# Patient Record
Sex: FEMALE | Race: BLACK OR AFRICAN AMERICAN | NOT HISPANIC OR LATINO | URBAN - METROPOLITAN AREA
[De-identification: names, ages, dates, MRNs, and addresses within clinical notes are randomized per-mention and may not be internally consistent; named-entity substitution may affect disease eponyms.]

---

## 2024-01-01 ENCOUNTER — INPATIENT (INPATIENT)
Facility: HOSPITAL | Age: 79
LOS: 24 days | End: 2025-01-10
Attending: INTERNAL MEDICINE | Admitting: HOSPITALIST
Payer: MEDICARE

## 2024-01-01 VITALS
TEMPERATURE: 98 F | RESPIRATION RATE: 18 BRPM | HEART RATE: 98 BPM | WEIGHT: 89.95 LBS | OXYGEN SATURATION: 99 % | HEIGHT: 69 IN | SYSTOLIC BLOOD PRESSURE: 109 MMHG | DIASTOLIC BLOOD PRESSURE: 68 MMHG

## 2024-01-01 DIAGNOSIS — R13.10 DYSPHAGIA, UNSPECIFIED: ICD-10-CM

## 2024-01-01 DIAGNOSIS — R31.9 HEMATURIA, UNSPECIFIED: ICD-10-CM

## 2024-01-01 DIAGNOSIS — R53.2 FUNCTIONAL QUADRIPLEGIA: ICD-10-CM

## 2024-01-01 DIAGNOSIS — E43 UNSPECIFIED SEVERE PROTEIN-CALORIE MALNUTRITION: ICD-10-CM

## 2024-01-01 DIAGNOSIS — Z51.5 ENCOUNTER FOR PALLIATIVE CARE: ICD-10-CM

## 2024-01-01 DIAGNOSIS — N39.0 URINARY TRACT INFECTION, SITE NOT SPECIFIED: ICD-10-CM

## 2024-01-01 DIAGNOSIS — M86.60 OTHER CHRONIC OSTEOMYELITIS, UNSPECIFIED SITE: ICD-10-CM

## 2024-01-01 DIAGNOSIS — L89.90 PRESSURE ULCER OF UNSPECIFIED SITE, UNSPECIFIED STAGE: ICD-10-CM

## 2024-01-01 LAB
-  AMPICILLIN/SULBACTAM: SIGNIFICANT CHANGE UP
-  AMPICILLIN: SIGNIFICANT CHANGE UP
-  AZTREONAM: SIGNIFICANT CHANGE UP
-  BACTEROIDES FRAGILIS: SIGNIFICANT CHANGE UP
-  CARBAPENEM RESISTANCE: SIGNIFICANT CHANGE UP
-  CEFAZOLIN: SIGNIFICANT CHANGE UP
-  CEFEPIME: SIGNIFICANT CHANGE UP
-  CEFTRIAXONE: SIGNIFICANT CHANGE UP
-  CEFUROXIME: SIGNIFICANT CHANGE UP
-  CIPROFLOXACIN: SIGNIFICANT CHANGE UP
-  CTX-M RESISTANCE MARKER: SIGNIFICANT CHANGE UP
-  ERTAPENEM: SIGNIFICANT CHANGE UP
-  ESBL: SIGNIFICANT CHANGE UP
-  GENTAMICIN: SIGNIFICANT CHANGE UP
-  IMIPENEM: SIGNIFICANT CHANGE UP
-  K. PNEUMONIAE GROUP: SIGNIFICANT CHANGE UP
-  KPC RESISTANCE GENE: SIGNIFICANT CHANGE UP
-  LEVOFLOXACIN: SIGNIFICANT CHANGE UP
-  MEROPENEM: SIGNIFICANT CHANGE UP
-  NITROFURANTOIN: SIGNIFICANT CHANGE UP
-  PIPERACILLIN/TAZOBACTAM: SIGNIFICANT CHANGE UP
-  TOBRAMYCIN: SIGNIFICANT CHANGE UP
-  TRIMETHOPRIM/SULFAMETHOXAZOLE: SIGNIFICANT CHANGE UP
ACANTHOCYTES BLD QL SMEAR: SLIGHT — SIGNIFICANT CHANGE UP
ALBUMIN SERPL ELPH-MCNC: 1.2 G/DL — LOW (ref 3.3–5)
ALBUMIN SERPL ELPH-MCNC: 1.3 G/DL — LOW (ref 3.3–5)
ALBUMIN SERPL ELPH-MCNC: 1.3 G/DL — LOW (ref 3.3–5)
ALBUMIN SERPL ELPH-MCNC: 1.4 G/DL — LOW (ref 3.3–5)
ALBUMIN SERPL ELPH-MCNC: 1.5 G/DL — LOW (ref 3.3–5)
ALBUMIN SERPL ELPH-MCNC: 1.5 G/DL — LOW (ref 3.3–5)
ALBUMIN SERPL ELPH-MCNC: 1.6 G/DL — LOW (ref 3.3–5)
ALBUMIN SERPL ELPH-MCNC: 1.6 G/DL — LOW (ref 3.3–5)
ALBUMIN SERPL ELPH-MCNC: 1.8 G/DL — LOW (ref 3.3–5)
ALP SERPL-CCNC: 157 U/L — HIGH (ref 40–120)
ALP SERPL-CCNC: 166 U/L — HIGH (ref 40–120)
ALP SERPL-CCNC: 166 U/L — HIGH (ref 40–120)
ALP SERPL-CCNC: 208 U/L — HIGH (ref 40–120)
ALP SERPL-CCNC: 214 U/L — HIGH (ref 40–120)
ALP SERPL-CCNC: 219 U/L — HIGH (ref 40–120)
ALP SERPL-CCNC: 226 U/L — HIGH (ref 40–120)
ALP SERPL-CCNC: 244 U/L — HIGH (ref 40–120)
ALP SERPL-CCNC: 258 U/L — HIGH (ref 40–120)
ALT FLD-CCNC: 12 U/L — SIGNIFICANT CHANGE UP (ref 12–78)
ALT FLD-CCNC: 13 U/L — SIGNIFICANT CHANGE UP (ref 12–78)
ALT FLD-CCNC: 14 U/L — SIGNIFICANT CHANGE UP (ref 12–78)
ALT FLD-CCNC: 16 U/L — SIGNIFICANT CHANGE UP (ref 12–78)
ALT FLD-CCNC: 20 U/L — SIGNIFICANT CHANGE UP (ref 12–78)
ALT FLD-CCNC: 23 U/L — SIGNIFICANT CHANGE UP (ref 12–78)
ALT FLD-CCNC: 8 U/L — LOW (ref 12–78)
ANION GAP SERPL CALC-SCNC: 12 MMOL/L — SIGNIFICANT CHANGE UP (ref 5–17)
ANION GAP SERPL CALC-SCNC: 4 MMOL/L — LOW (ref 5–17)
ANION GAP SERPL CALC-SCNC: 6 MMOL/L — SIGNIFICANT CHANGE UP (ref 5–17)
ANION GAP SERPL CALC-SCNC: 7 MMOL/L — SIGNIFICANT CHANGE UP (ref 5–17)
ANION GAP SERPL CALC-SCNC: 8 MMOL/L — SIGNIFICANT CHANGE UP (ref 5–17)
ANION GAP SERPL CALC-SCNC: 9 MMOL/L — SIGNIFICANT CHANGE UP (ref 5–17)
ANION GAP SERPL CALC-SCNC: 9 MMOL/L — SIGNIFICANT CHANGE UP (ref 5–17)
ANISOCYTOSIS BLD QL: SIGNIFICANT CHANGE UP
ANISOCYTOSIS BLD QL: SLIGHT — SIGNIFICANT CHANGE UP
ANISOCYTOSIS BLD QL: SLIGHT — SIGNIFICANT CHANGE UP
APPEARANCE UR: ABNORMAL
APPEARANCE UR: ABNORMAL
APTT BLD: 25.7 SEC — SIGNIFICANT CHANGE UP (ref 24.5–35.6)
APTT BLD: 58 SEC — HIGH (ref 24.5–35.6)
AST SERPL-CCNC: 10 U/L — LOW (ref 15–37)
AST SERPL-CCNC: 18 U/L — SIGNIFICANT CHANGE UP (ref 15–37)
AST SERPL-CCNC: 19 U/L — SIGNIFICANT CHANGE UP (ref 15–37)
AST SERPL-CCNC: 22 U/L — SIGNIFICANT CHANGE UP (ref 15–37)
AST SERPL-CCNC: 27 U/L — SIGNIFICANT CHANGE UP (ref 15–37)
AST SERPL-CCNC: 32 U/L — SIGNIFICANT CHANGE UP (ref 15–37)
AST SERPL-CCNC: 8 U/L — LOW (ref 15–37)
BACTERIA # UR AUTO: ABNORMAL /HPF
BACTERIA # UR AUTO: ABNORMAL /HPF
BASOPHILS # BLD AUTO: 0 K/UL — SIGNIFICANT CHANGE UP (ref 0–0.2)
BASOPHILS # BLD AUTO: 0.01 K/UL — SIGNIFICANT CHANGE UP (ref 0–0.2)
BASOPHILS # BLD AUTO: 0.02 K/UL — SIGNIFICANT CHANGE UP (ref 0–0.2)
BASOPHILS NFR BLD AUTO: 0 % — SIGNIFICANT CHANGE UP (ref 0–2)
BASOPHILS NFR BLD AUTO: 0.1 % — SIGNIFICANT CHANGE UP (ref 0–2)
BILIRUB DIRECT SERPL-MCNC: 0.2 MG/DL — SIGNIFICANT CHANGE UP (ref 0–0.3)
BILIRUB INDIRECT FLD-MCNC: 0.1 MG/DL — LOW (ref 0.2–1)
BILIRUB SERPL-MCNC: 0.3 MG/DL — SIGNIFICANT CHANGE UP (ref 0.2–1.2)
BILIRUB SERPL-MCNC: 0.4 MG/DL — SIGNIFICANT CHANGE UP (ref 0.2–1.2)
BILIRUB SERPL-MCNC: 0.5 MG/DL — SIGNIFICANT CHANGE UP (ref 0.2–1.2)
BILIRUB SERPL-MCNC: 0.6 MG/DL — SIGNIFICANT CHANGE UP (ref 0.2–1.2)
BILIRUB SERPL-MCNC: 1.4 MG/DL — HIGH (ref 0.2–1.2)
BILIRUB UR-MCNC: ABNORMAL
BILIRUB UR-MCNC: NEGATIVE — SIGNIFICANT CHANGE UP
BLD GP AB SCN SERPL QL: SIGNIFICANT CHANGE UP
BLOOD GAS COMMENTS ARTERIAL: SIGNIFICANT CHANGE UP
BUN SERPL-MCNC: 10 MG/DL — SIGNIFICANT CHANGE UP (ref 7–23)
BUN SERPL-MCNC: 10 MG/DL — SIGNIFICANT CHANGE UP (ref 7–23)
BUN SERPL-MCNC: 11 MG/DL — SIGNIFICANT CHANGE UP (ref 7–23)
BUN SERPL-MCNC: 12 MG/DL — SIGNIFICANT CHANGE UP (ref 7–23)
BUN SERPL-MCNC: 15 MG/DL — SIGNIFICANT CHANGE UP (ref 7–23)
BUN SERPL-MCNC: 16 MG/DL — SIGNIFICANT CHANGE UP (ref 7–23)
BUN SERPL-MCNC: 21 MG/DL — SIGNIFICANT CHANGE UP (ref 7–23)
BUN SERPL-MCNC: 4 MG/DL — LOW (ref 7–23)
BUN SERPL-MCNC: 4 MG/DL — LOW (ref 7–23)
BUN SERPL-MCNC: 5 MG/DL — LOW (ref 7–23)
BUN SERPL-MCNC: 6 MG/DL — LOW (ref 7–23)
BUN SERPL-MCNC: 8 MG/DL — SIGNIFICANT CHANGE UP (ref 7–23)
BUN SERPL-MCNC: 8 MG/DL — SIGNIFICANT CHANGE UP (ref 7–23)
BUN SERPL-MCNC: 9 MG/DL — SIGNIFICANT CHANGE UP (ref 7–23)
BUN SERPL-MCNC: 9 MG/DL — SIGNIFICANT CHANGE UP (ref 7–23)
BURR CELLS BLD QL SMEAR: PRESENT — SIGNIFICANT CHANGE UP
BURR CELLS BLD QL SMEAR: PRESENT — SIGNIFICANT CHANGE UP
CALCIUM SERPL-MCNC: 7.1 MG/DL — LOW (ref 8.5–10.1)
CALCIUM SERPL-MCNC: 7.3 MG/DL — LOW (ref 8.5–10.1)
CALCIUM SERPL-MCNC: 7.3 MG/DL — LOW (ref 8.5–10.1)
CALCIUM SERPL-MCNC: 7.4 MG/DL — LOW (ref 8.5–10.1)
CALCIUM SERPL-MCNC: 7.7 MG/DL — LOW (ref 8.5–10.1)
CALCIUM SERPL-MCNC: 7.9 MG/DL — LOW (ref 8.5–10.1)
CALCIUM SERPL-MCNC: 8 MG/DL — LOW (ref 8.5–10.1)
CALCIUM SERPL-MCNC: 8 MG/DL — LOW (ref 8.5–10.1)
CALCIUM SERPL-MCNC: 8.1 MG/DL — LOW (ref 8.5–10.1)
CALCIUM SERPL-MCNC: 8.2 MG/DL — LOW (ref 8.5–10.1)
CALCIUM SERPL-MCNC: 8.2 MG/DL — LOW (ref 8.5–10.1)
CALCIUM SERPL-MCNC: 8.4 MG/DL — LOW (ref 8.5–10.1)
CALCIUM SERPL-MCNC: 8.4 MG/DL — LOW (ref 8.5–10.1)
CALCIUM SERPL-MCNC: 8.5 MG/DL — SIGNIFICANT CHANGE UP (ref 8.5–10.1)
CALCIUM SERPL-MCNC: 8.5 MG/DL — SIGNIFICANT CHANGE UP (ref 8.5–10.1)
CHLORIDE SERPL-SCNC: 100 MMOL/L — SIGNIFICANT CHANGE UP (ref 96–108)
CHLORIDE SERPL-SCNC: 101 MMOL/L — SIGNIFICANT CHANGE UP (ref 96–108)
CHLORIDE SERPL-SCNC: 102 MMOL/L — SIGNIFICANT CHANGE UP (ref 96–108)
CHLORIDE SERPL-SCNC: 103 MMOL/L — SIGNIFICANT CHANGE UP (ref 96–108)
CHLORIDE SERPL-SCNC: 104 MMOL/L — SIGNIFICANT CHANGE UP (ref 96–108)
CHLORIDE SERPL-SCNC: 98 MMOL/L — SIGNIFICANT CHANGE UP (ref 96–108)
CO2 SERPL-SCNC: 16 MMOL/L — LOW (ref 22–31)
CO2 SERPL-SCNC: 19 MMOL/L — LOW (ref 22–31)
CO2 SERPL-SCNC: 21 MMOL/L — LOW (ref 22–31)
CO2 SERPL-SCNC: 21 MMOL/L — LOW (ref 22–31)
CO2 SERPL-SCNC: 23 MMOL/L — SIGNIFICANT CHANGE UP (ref 22–31)
CO2 SERPL-SCNC: 24 MMOL/L — SIGNIFICANT CHANGE UP (ref 22–31)
CO2 SERPL-SCNC: 25 MMOL/L — SIGNIFICANT CHANGE UP (ref 22–31)
CO2 SERPL-SCNC: 26 MMOL/L — SIGNIFICANT CHANGE UP (ref 22–31)
CO2 SERPL-SCNC: 27 MMOL/L — SIGNIFICANT CHANGE UP (ref 22–31)
CO2 SERPL-SCNC: 27 MMOL/L — SIGNIFICANT CHANGE UP (ref 22–31)
CO2 SERPL-SCNC: 28 MMOL/L — SIGNIFICANT CHANGE UP (ref 22–31)
COLOR SPEC: ABNORMAL
COLOR SPEC: YELLOW — SIGNIFICANT CHANGE UP
COMMENT - URINE 2: SIGNIFICANT CHANGE UP
COMMENT - URINE: SIGNIFICANT CHANGE UP
CREAT SERPL-MCNC: 0.2 MG/DL — LOW (ref 0.5–1.3)
CREAT SERPL-MCNC: 0.2 MG/DL — LOW (ref 0.5–1.3)
CREAT SERPL-MCNC: 0.22 MG/DL — LOW (ref 0.5–1.3)
CREAT SERPL-MCNC: 0.22 MG/DL — LOW (ref 0.5–1.3)
CREAT SERPL-MCNC: 0.25 MG/DL — LOW (ref 0.5–1.3)
CREAT SERPL-MCNC: 0.28 MG/DL — LOW (ref 0.5–1.3)
CREAT SERPL-MCNC: 0.32 MG/DL — LOW (ref 0.5–1.3)
CREAT SERPL-MCNC: 0.4 MG/DL — LOW (ref 0.5–1.3)
CREAT SERPL-MCNC: 0.44 MG/DL — LOW (ref 0.5–1.3)
CREAT SERPL-MCNC: 0.45 MG/DL — LOW (ref 0.5–1.3)
CREAT SERPL-MCNC: 0.48 MG/DL — LOW (ref 0.5–1.3)
CREAT SERPL-MCNC: 0.82 MG/DL — SIGNIFICANT CHANGE UP (ref 0.5–1.3)
CREAT SERPL-MCNC: <0.15 MG/DL — LOW (ref 0.5–1.3)
CULTURE RESULTS: ABNORMAL
D DIMER BLD IA.RAPID-MCNC: 2047 NG/ML DDU — HIGH
DACRYOCYTES BLD QL SMEAR: SLIGHT — SIGNIFICANT CHANGE UP
DIFF PNL FLD: ABNORMAL
DIFF PNL FLD: ABNORMAL
EGFR: 101 ML/MIN/1.73M2 — SIGNIFICANT CHANGE UP
EGFR: 107 ML/MIN/1.73M2 — SIGNIFICANT CHANGE UP
EGFR: 110 ML/MIN/1.73M2 — SIGNIFICANT CHANGE UP
EGFR: 113 ML/MIN/1.73M2 — SIGNIFICANT CHANGE UP
EGFR: 117 ML/MIN/1.73M2 — SIGNIFICANT CHANGE UP
EGFR: 117 ML/MIN/1.73M2 — SIGNIFICANT CHANGE UP
EGFR: 120 ML/MIN/1.73M2 — SIGNIFICANT CHANGE UP
EGFR: 120 ML/MIN/1.73M2 — SIGNIFICANT CHANGE UP
EGFR: 128 ML/MIN/1.73M2 — SIGNIFICANT CHANGE UP
EGFR: 73 ML/MIN/1.73M2 — SIGNIFICANT CHANGE UP
EGFR: 97 ML/MIN/1.73M2 — SIGNIFICANT CHANGE UP
EGFR: 98 ML/MIN/1.73M2 — SIGNIFICANT CHANGE UP
EGFR: 99 ML/MIN/1.73M2 — SIGNIFICANT CHANGE UP
ELLIPTOCYTES BLD QL SMEAR: SLIGHT — SIGNIFICANT CHANGE UP
EOSINOPHIL # BLD AUTO: 0 K/UL — SIGNIFICANT CHANGE UP (ref 0–0.5)
EOSINOPHIL # BLD AUTO: 0.1 K/UL — SIGNIFICANT CHANGE UP (ref 0–0.5)
EOSINOPHIL NFR BLD AUTO: 0 % — SIGNIFICANT CHANGE UP (ref 0–6)
EOSINOPHIL NFR BLD AUTO: 0.5 % — SIGNIFICANT CHANGE UP (ref 0–6)
EPI CELLS # UR: SIGNIFICANT CHANGE UP
FERRITIN SERPL-MCNC: 445 NG/ML — HIGH (ref 13–330)
FIBRINOGEN PPP-MCNC: 165 MG/DL — LOW (ref 200–480)
FLUAV H3 RNA SPEC QL NAA+PROBE: DETECTED
GAS PNL BLDA: SIGNIFICANT CHANGE UP
GLUCOSE BLDC GLUCOMTR-MCNC: 101 MG/DL — HIGH (ref 70–99)
GLUCOSE BLDC GLUCOMTR-MCNC: 117 MG/DL — HIGH (ref 70–99)
GLUCOSE BLDC GLUCOMTR-MCNC: 146 MG/DL — HIGH (ref 70–99)
GLUCOSE BLDC GLUCOMTR-MCNC: 170 MG/DL — HIGH (ref 70–99)
GLUCOSE BLDC GLUCOMTR-MCNC: 171 MG/DL — HIGH (ref 70–99)
GLUCOSE BLDC GLUCOMTR-MCNC: 90 MG/DL — SIGNIFICANT CHANGE UP (ref 70–99)
GLUCOSE BLDC GLUCOMTR-MCNC: 95 MG/DL — SIGNIFICANT CHANGE UP (ref 70–99)
GLUCOSE BLDC GLUCOMTR-MCNC: 97 MG/DL — SIGNIFICANT CHANGE UP (ref 70–99)
GLUCOSE SERPL-MCNC: 114 MG/DL — HIGH (ref 70–99)
GLUCOSE SERPL-MCNC: 120 MG/DL — HIGH (ref 70–99)
GLUCOSE SERPL-MCNC: 138 MG/DL — HIGH (ref 70–99)
GLUCOSE SERPL-MCNC: 150 MG/DL — HIGH (ref 70–99)
GLUCOSE SERPL-MCNC: 163 MG/DL — HIGH (ref 70–99)
GLUCOSE SERPL-MCNC: 167 MG/DL — HIGH (ref 70–99)
GLUCOSE SERPL-MCNC: 167 MG/DL — HIGH (ref 70–99)
GLUCOSE SERPL-MCNC: 171 MG/DL — HIGH (ref 70–99)
GLUCOSE SERPL-MCNC: 187 MG/DL — HIGH (ref 70–99)
GLUCOSE SERPL-MCNC: 193 MG/DL — HIGH (ref 70–99)
GLUCOSE SERPL-MCNC: 204 MG/DL — HIGH (ref 70–99)
GLUCOSE SERPL-MCNC: 216 MG/DL — HIGH (ref 70–99)
GLUCOSE SERPL-MCNC: 231 MG/DL — HIGH (ref 70–99)
GLUCOSE SERPL-MCNC: 233 MG/DL — HIGH (ref 70–99)
GLUCOSE SERPL-MCNC: 97 MG/DL — SIGNIFICANT CHANGE UP (ref 70–99)
GLUCOSE UR QL: NEGATIVE MG/DL — SIGNIFICANT CHANGE UP
GLUCOSE UR QL: NEGATIVE MG/DL — SIGNIFICANT CHANGE UP
GRAM STN FLD: ABNORMAL
GRAM STN FLD: ABNORMAL
HCT VFR BLD CALC: 21.5 % — LOW (ref 34.5–45)
HCT VFR BLD CALC: 23.3 % — LOW (ref 34.5–45)
HCT VFR BLD CALC: 23.8 % — LOW (ref 34.5–45)
HCT VFR BLD CALC: 25.8 % — LOW (ref 34.5–45)
HCT VFR BLD CALC: 26 % — LOW (ref 34.5–45)
HCT VFR BLD CALC: 26.7 % — LOW (ref 34.5–45)
HCT VFR BLD CALC: 27.4 % — LOW (ref 34.5–45)
HCT VFR BLD CALC: 28.8 % — LOW (ref 34.5–45)
HCT VFR BLD CALC: 29.9 % — LOW (ref 34.5–45)
HCT VFR BLD CALC: 29.9 % — LOW (ref 34.5–45)
HCT VFR BLD CALC: 30.7 % — LOW (ref 34.5–45)
HCT VFR BLD CALC: 31.1 % — LOW (ref 34.5–45)
HCT VFR BLD CALC: 33.7 % — LOW (ref 34.5–45)
HCT VFR BLD CALC: 34.4 % — LOW (ref 34.5–45)
HCT VFR BLD CALC: 35 % — SIGNIFICANT CHANGE UP (ref 34.5–45)
HEPARIN-PF4 AB RESULT: <0.6 U/ML — SIGNIFICANT CHANGE UP (ref 0–0.9)
HGB BLD-MCNC: 10.3 G/DL — LOW (ref 11.5–15.5)
HGB BLD-MCNC: 10.7 G/DL — LOW (ref 11.5–15.5)
HGB BLD-MCNC: 10.8 G/DL — LOW (ref 11.5–15.5)
HGB BLD-MCNC: 7.1 G/DL — LOW (ref 11.5–15.5)
HGB BLD-MCNC: 7.5 G/DL — LOW (ref 11.5–15.5)
HGB BLD-MCNC: 7.8 G/DL — LOW (ref 11.5–15.5)
HGB BLD-MCNC: 8.3 G/DL — LOW (ref 11.5–15.5)
HGB BLD-MCNC: 8.7 G/DL — LOW (ref 11.5–15.5)
HGB BLD-MCNC: 8.9 G/DL — LOW (ref 11.5–15.5)
HGB BLD-MCNC: 9.2 G/DL — LOW (ref 11.5–15.5)
HGB BLD-MCNC: 9.3 G/DL — LOW (ref 11.5–15.5)
HGB BLD-MCNC: 9.4 G/DL — LOW (ref 11.5–15.5)
HGB BLD-MCNC: 9.6 G/DL — LOW (ref 11.5–15.5)
HYPOCHROMIA BLD QL: SLIGHT — SIGNIFICANT CHANGE UP
HYPOCHROMIA BLD QL: SLIGHT — SIGNIFICANT CHANGE UP
IMM GRANULOCYTES NFR BLD AUTO: 0.4 % — SIGNIFICANT CHANGE UP (ref 0–0.9)
IMM GRANULOCYTES NFR BLD AUTO: 2.9 % — HIGH (ref 0–0.9)
INR BLD: 1.36 RATIO — HIGH (ref 0.85–1.16)
INR BLD: 2.44 RATIO — HIGH (ref 0.85–1.16)
IRON SATN MFR SERPL: 8 % — LOW (ref 14–50)
IRON SATN MFR SERPL: 8 UG/DL — LOW (ref 30–160)
KETONES UR-MCNC: NEGATIVE MG/DL — SIGNIFICANT CHANGE UP
KETONES UR-MCNC: NEGATIVE MG/DL — SIGNIFICANT CHANGE UP
LEUKOCYTE ESTERASE UR-ACNC: ABNORMAL
LEUKOCYTE ESTERASE UR-ACNC: ABNORMAL
LG PLATELETS BLD QL AUTO: SLIGHT — SIGNIFICANT CHANGE UP
LYMPHOCYTES # BLD AUTO: 0.39 K/UL — LOW (ref 1–3.3)
LYMPHOCYTES # BLD AUTO: 0.45 K/UL — LOW (ref 1–3.3)
LYMPHOCYTES # BLD AUTO: 0.47 K/UL — LOW (ref 1–3.3)
LYMPHOCYTES # BLD AUTO: 0.86 K/UL — LOW (ref 1–3.3)
LYMPHOCYTES # BLD AUTO: 1 % — LOW (ref 13–44)
LYMPHOCYTES # BLD AUTO: 1 % — LOW (ref 13–44)
LYMPHOCYTES # BLD AUTO: 1.02 K/UL — SIGNIFICANT CHANGE UP (ref 1–3.3)
LYMPHOCYTES # BLD AUTO: 2 % — LOW (ref 13–44)
LYMPHOCYTES # BLD AUTO: 2.5 % — LOW (ref 13–44)
LYMPHOCYTES # BLD AUTO: 4.7 % — LOW (ref 13–44)
MACROCYTES BLD QL: SLIGHT — SIGNIFICANT CHANGE UP
MACROCYTES BLD QL: SLIGHT — SIGNIFICANT CHANGE UP
MAGNESIUM SERPL-MCNC: 1.5 MG/DL — LOW (ref 1.6–2.6)
MAGNESIUM SERPL-MCNC: 1.6 MG/DL — SIGNIFICANT CHANGE UP (ref 1.6–2.6)
MAGNESIUM SERPL-MCNC: 1.7 MG/DL — SIGNIFICANT CHANGE UP (ref 1.6–2.6)
MAGNESIUM SERPL-MCNC: 1.8 MG/DL — SIGNIFICANT CHANGE UP (ref 1.6–2.6)
MAGNESIUM SERPL-MCNC: 1.8 MG/DL — SIGNIFICANT CHANGE UP (ref 1.6–2.6)
MAGNESIUM SERPL-MCNC: 1.9 MG/DL — SIGNIFICANT CHANGE UP (ref 1.6–2.6)
MAGNESIUM SERPL-MCNC: 2 MG/DL — SIGNIFICANT CHANGE UP (ref 1.6–2.6)
MANUAL SMEAR VERIFICATION: SIGNIFICANT CHANGE UP
MCHC RBC-ENTMCNC: 23.1 PG — LOW (ref 27–34)
MCHC RBC-ENTMCNC: 23.1 PG — LOW (ref 27–34)
MCHC RBC-ENTMCNC: 23.3 PG — LOW (ref 27–34)
MCHC RBC-ENTMCNC: 23.5 PG — LOW (ref 27–34)
MCHC RBC-ENTMCNC: 23.5 PG — LOW (ref 27–34)
MCHC RBC-ENTMCNC: 23.6 PG — LOW (ref 27–34)
MCHC RBC-ENTMCNC: 23.6 PG — LOW (ref 27–34)
MCHC RBC-ENTMCNC: 23.7 PG — LOW (ref 27–34)
MCHC RBC-ENTMCNC: 23.8 PG — LOW (ref 27–34)
MCHC RBC-ENTMCNC: 23.8 PG — LOW (ref 27–34)
MCHC RBC-ENTMCNC: 23.9 PG — LOW (ref 27–34)
MCHC RBC-ENTMCNC: 24.1 PG — LOW (ref 27–34)
MCHC RBC-ENTMCNC: 24.3 PG — LOW (ref 27–34)
MCHC RBC-ENTMCNC: 24.6 PG — LOW (ref 27–34)
MCHC RBC-ENTMCNC: 24.6 PG — LOW (ref 27–34)
MCHC RBC-ENTMCNC: 30.6 G/DL — LOW (ref 32–36)
MCHC RBC-ENTMCNC: 30.8 G/DL — LOW (ref 32–36)
MCHC RBC-ENTMCNC: 30.9 G/DL — LOW (ref 32–36)
MCHC RBC-ENTMCNC: 30.9 G/DL — LOW (ref 32–36)
MCHC RBC-ENTMCNC: 31.1 G/DL — LOW (ref 32–36)
MCHC RBC-ENTMCNC: 31.1 G/DL — LOW (ref 32–36)
MCHC RBC-ENTMCNC: 31.4 G/DL — LOW (ref 32–36)
MCHC RBC-ENTMCNC: 31.8 G/DL — LOW (ref 32–36)
MCHC RBC-ENTMCNC: 31.9 G/DL — LOW (ref 32–36)
MCHC RBC-ENTMCNC: 32.2 G/DL — SIGNIFICANT CHANGE UP (ref 32–36)
MCHC RBC-ENTMCNC: 32.2 G/DL — SIGNIFICANT CHANGE UP (ref 32–36)
MCHC RBC-ENTMCNC: 32.8 G/DL — SIGNIFICANT CHANGE UP (ref 32–36)
MCHC RBC-ENTMCNC: 33 G/DL — SIGNIFICANT CHANGE UP (ref 32–36)
MCV RBC AUTO: 72.4 FL — LOW (ref 80–100)
MCV RBC AUTO: 72.6 FL — LOW (ref 80–100)
MCV RBC AUTO: 72.9 FL — LOW (ref 80–100)
MCV RBC AUTO: 73 FL — LOW (ref 80–100)
MCV RBC AUTO: 73.7 FL — LOW (ref 80–100)
MCV RBC AUTO: 75.4 FL — LOW (ref 80–100)
MCV RBC AUTO: 75.5 FL — LOW (ref 80–100)
MCV RBC AUTO: 75.7 FL — LOW (ref 80–100)
MCV RBC AUTO: 76.1 FL — LOW (ref 80–100)
MCV RBC AUTO: 76.4 FL — LOW (ref 80–100)
MCV RBC AUTO: 76.9 FL — LOW (ref 80–100)
MCV RBC AUTO: 77.8 FL — LOW (ref 80–100)
MCV RBC AUTO: 78.4 FL — LOW (ref 80–100)
MCV RBC AUTO: 78.9 FL — LOW (ref 80–100)
MCV RBC AUTO: 79.1 FL — LOW (ref 80–100)
METHOD TYPE: SIGNIFICANT CHANGE UP
METHOD TYPE: SIGNIFICANT CHANGE UP
MICROCYTES BLD QL: SIGNIFICANT CHANGE UP
MICROCYTES BLD QL: SLIGHT — SIGNIFICANT CHANGE UP
MICROCYTES BLD QL: SLIGHT — SIGNIFICANT CHANGE UP
MONOCYTES # BLD AUTO: 0.24 K/UL — SIGNIFICANT CHANGE UP (ref 0–0.9)
MONOCYTES # BLD AUTO: 0.34 K/UL — SIGNIFICANT CHANGE UP (ref 0–0.9)
MONOCYTES # BLD AUTO: 0.39 K/UL — SIGNIFICANT CHANGE UP (ref 0–0.9)
MONOCYTES # BLD AUTO: 0.48 K/UL — SIGNIFICANT CHANGE UP (ref 0–0.9)
MONOCYTES # BLD AUTO: 1.36 K/UL — HIGH (ref 0–0.9)
MONOCYTES NFR BLD AUTO: 0.8 % — LOW (ref 2–14)
MONOCYTES NFR BLD AUTO: 1 % — LOW (ref 2–14)
MONOCYTES NFR BLD AUTO: 1 % — LOW (ref 2–14)
MONOCYTES NFR BLD AUTO: 2.6 % — SIGNIFICANT CHANGE UP (ref 2–14)
MONOCYTES NFR BLD AUTO: 3 % — SIGNIFICANT CHANGE UP (ref 2–14)
MRSA PCR RESULT.: SIGNIFICANT CHANGE UP
NEUTROPHILS # BLD AUTO: 16.7 K/UL — HIGH (ref 1.8–7.4)
NEUTROPHILS # BLD AUTO: 22.92 K/UL — HIGH (ref 1.8–7.4)
NEUTROPHILS # BLD AUTO: 38.18 K/UL — HIGH (ref 1.8–7.4)
NEUTROPHILS # BLD AUTO: 38.62 K/UL — HIGH (ref 1.8–7.4)
NEUTROPHILS # BLD AUTO: 43.37 K/UL — HIGH (ref 1.8–7.4)
NEUTROPHILS NFR BLD AUTO: 91.7 % — HIGH (ref 43–77)
NEUTROPHILS NFR BLD AUTO: 93 % — HIGH (ref 43–77)
NEUTROPHILS NFR BLD AUTO: 93.8 % — HIGH (ref 43–77)
NEUTROPHILS NFR BLD AUTO: 95 % — HIGH (ref 43–77)
NEUTROPHILS NFR BLD AUTO: 98 % — HIGH (ref 43–77)
NEUTS BAND # BLD: 2 % — SIGNIFICANT CHANGE UP (ref 0–8)
NEUTS BAND # BLD: 3 % — SIGNIFICANT CHANGE UP (ref 0–8)
NEUTS VAC BLD QL SMEAR: SLIGHT — SIGNIFICANT CHANGE UP
NITRITE UR-MCNC: NEGATIVE — SIGNIFICANT CHANGE UP
NITRITE UR-MCNC: POSITIVE
NRBC # BLD: 0 /100 WBCS — SIGNIFICANT CHANGE UP (ref 0–0)
NRBC # BLD: SIGNIFICANT CHANGE UP /100 WBCS (ref 0–0)
ORGANISM # SPEC MICROSCOPIC CNT: ABNORMAL
ORGANISM # SPEC MICROSCOPIC CNT: SIGNIFICANT CHANGE UP
PF4 HEPARIN CMPLX AB SER-ACNC: NEGATIVE — SIGNIFICANT CHANGE UP
PH UR: 5.5 — SIGNIFICANT CHANGE UP (ref 5–8)
PH UR: 6 — SIGNIFICANT CHANGE UP (ref 5–8)
PHOSPHATE SERPL-MCNC: 1.5 MG/DL — LOW (ref 2.5–4.5)
PHOSPHATE SERPL-MCNC: 1.9 MG/DL — LOW (ref 2.5–4.5)
PHOSPHATE SERPL-MCNC: 1.9 MG/DL — LOW (ref 2.5–4.5)
PHOSPHATE SERPL-MCNC: 2.1 MG/DL — LOW (ref 2.5–4.5)
PHOSPHATE SERPL-MCNC: 2.2 MG/DL — LOW (ref 2.5–4.5)
PHOSPHATE SERPL-MCNC: 2.3 MG/DL — LOW (ref 2.5–4.5)
PHOSPHATE SERPL-MCNC: 2.5 MG/DL — SIGNIFICANT CHANGE UP (ref 2.5–4.5)
PHOSPHATE SERPL-MCNC: 2.6 MG/DL — SIGNIFICANT CHANGE UP (ref 2.5–4.5)
PHOSPHATE SERPL-MCNC: 3.5 MG/DL — SIGNIFICANT CHANGE UP (ref 2.5–4.5)
PHOSPHATE SERPL-MCNC: 4.1 MG/DL — SIGNIFICANT CHANGE UP (ref 2.5–4.5)
PHOSPHATE SERPL-MCNC: 4.1 MG/DL — SIGNIFICANT CHANGE UP (ref 2.5–4.5)
PLAT MORPH BLD: ABNORMAL
PLAT MORPH BLD: NORMAL — SIGNIFICANT CHANGE UP
PLAT MORPH BLD: NORMAL — SIGNIFICANT CHANGE UP
PLATELET # BLD AUTO: 101 K/UL — LOW (ref 150–400)
PLATELET # BLD AUTO: 104 K/UL — LOW (ref 150–400)
PLATELET # BLD AUTO: 13 K/UL — CRITICAL LOW (ref 150–400)
PLATELET # BLD AUTO: 168 K/UL — SIGNIFICANT CHANGE UP (ref 150–400)
PLATELET # BLD AUTO: 177 K/UL — SIGNIFICANT CHANGE UP (ref 150–400)
PLATELET # BLD AUTO: 186 K/UL — SIGNIFICANT CHANGE UP (ref 150–400)
PLATELET # BLD AUTO: 186 K/UL — SIGNIFICANT CHANGE UP (ref 150–400)
PLATELET # BLD AUTO: 192 K/UL — SIGNIFICANT CHANGE UP (ref 150–400)
PLATELET # BLD AUTO: 201 K/UL — SIGNIFICANT CHANGE UP (ref 150–400)
PLATELET # BLD AUTO: 202 K/UL — SIGNIFICANT CHANGE UP (ref 150–400)
PLATELET # BLD AUTO: 209 K/UL — SIGNIFICANT CHANGE UP (ref 150–400)
PLATELET # BLD AUTO: 235 K/UL — SIGNIFICANT CHANGE UP (ref 150–400)
PLATELET # BLD AUTO: 270 K/UL — SIGNIFICANT CHANGE UP (ref 150–400)
PLATELET # BLD AUTO: 280 K/UL — SIGNIFICANT CHANGE UP (ref 150–400)
PLATELET # BLD AUTO: 8 K/UL — CRITICAL LOW (ref 150–400)
POIKILOCYTOSIS BLD QL AUTO: SIGNIFICANT CHANGE UP
POIKILOCYTOSIS BLD QL AUTO: SLIGHT — SIGNIFICANT CHANGE UP
POIKILOCYTOSIS BLD QL AUTO: SLIGHT — SIGNIFICANT CHANGE UP
POLYCHROMASIA BLD QL SMEAR: SLIGHT — SIGNIFICANT CHANGE UP
POTASSIUM SERPL-MCNC: 2.9 MMOL/L — CRITICAL LOW (ref 3.5–5.3)
POTASSIUM SERPL-MCNC: 3.4 MMOL/L — LOW (ref 3.5–5.3)
POTASSIUM SERPL-MCNC: 3.5 MMOL/L — SIGNIFICANT CHANGE UP (ref 3.5–5.3)
POTASSIUM SERPL-MCNC: 3.6 MMOL/L — SIGNIFICANT CHANGE UP (ref 3.5–5.3)
POTASSIUM SERPL-MCNC: 3.6 MMOL/L — SIGNIFICANT CHANGE UP (ref 3.5–5.3)
POTASSIUM SERPL-MCNC: 3.8 MMOL/L — SIGNIFICANT CHANGE UP (ref 3.5–5.3)
POTASSIUM SERPL-MCNC: 3.8 MMOL/L — SIGNIFICANT CHANGE UP (ref 3.5–5.3)
POTASSIUM SERPL-MCNC: 4 MMOL/L — SIGNIFICANT CHANGE UP (ref 3.5–5.3)
POTASSIUM SERPL-MCNC: 4.1 MMOL/L — SIGNIFICANT CHANGE UP (ref 3.5–5.3)
POTASSIUM SERPL-MCNC: 4.1 MMOL/L — SIGNIFICANT CHANGE UP (ref 3.5–5.3)
POTASSIUM SERPL-MCNC: 4.4 MMOL/L — SIGNIFICANT CHANGE UP (ref 3.5–5.3)
POTASSIUM SERPL-MCNC: 4.8 MMOL/L — SIGNIFICANT CHANGE UP (ref 3.5–5.3)
POTASSIUM SERPL-MCNC: 4.9 MMOL/L — SIGNIFICANT CHANGE UP (ref 3.5–5.3)
POTASSIUM SERPL-SCNC: 2.9 MMOL/L — CRITICAL LOW (ref 3.5–5.3)
POTASSIUM SERPL-SCNC: 3.4 MMOL/L — LOW (ref 3.5–5.3)
POTASSIUM SERPL-SCNC: 3.5 MMOL/L — SIGNIFICANT CHANGE UP (ref 3.5–5.3)
POTASSIUM SERPL-SCNC: 3.6 MMOL/L — SIGNIFICANT CHANGE UP (ref 3.5–5.3)
POTASSIUM SERPL-SCNC: 3.6 MMOL/L — SIGNIFICANT CHANGE UP (ref 3.5–5.3)
POTASSIUM SERPL-SCNC: 3.8 MMOL/L — SIGNIFICANT CHANGE UP (ref 3.5–5.3)
POTASSIUM SERPL-SCNC: 3.8 MMOL/L — SIGNIFICANT CHANGE UP (ref 3.5–5.3)
POTASSIUM SERPL-SCNC: 4 MMOL/L — SIGNIFICANT CHANGE UP (ref 3.5–5.3)
POTASSIUM SERPL-SCNC: 4.1 MMOL/L — SIGNIFICANT CHANGE UP (ref 3.5–5.3)
POTASSIUM SERPL-SCNC: 4.1 MMOL/L — SIGNIFICANT CHANGE UP (ref 3.5–5.3)
POTASSIUM SERPL-SCNC: 4.4 MMOL/L — SIGNIFICANT CHANGE UP (ref 3.5–5.3)
POTASSIUM SERPL-SCNC: 4.8 MMOL/L — SIGNIFICANT CHANGE UP (ref 3.5–5.3)
POTASSIUM SERPL-SCNC: 4.9 MMOL/L — SIGNIFICANT CHANGE UP (ref 3.5–5.3)
PROCALCITONIN SERPL-MCNC: 6.42 NG/ML — HIGH (ref 0.02–0.1)
PROCALCITONIN SERPL-MCNC: 7.74 NG/ML — HIGH (ref 0.02–0.1)
PROT SERPL-MCNC: 4.6 GM/DL — LOW (ref 6–8.3)
PROT SERPL-MCNC: 5.1 GM/DL — LOW (ref 6–8.3)
PROT SERPL-MCNC: 5.3 GM/DL — LOW (ref 6–8.3)
PROT SERPL-MCNC: 5.4 GM/DL — LOW (ref 6–8.3)
PROT SERPL-MCNC: 5.6 GM/DL — LOW (ref 6–8.3)
PROT SERPL-MCNC: 5.7 GM/DL — LOW (ref 6–8.3)
PROT SERPL-MCNC: 5.7 GM/DL — LOW (ref 6–8.3)
PROT SERPL-MCNC: 5.9 GM/DL — LOW (ref 6–8.3)
PROT SERPL-MCNC: 5.9 GM/DL — LOW (ref 6–8.3)
PROT UR-MCNC: 100 MG/DL
PROT UR-MCNC: 30 MG/DL
PROTEUS SP DNA BLD POS QL NAA+NON-PROBE: SIGNIFICANT CHANGE UP
PROTHROM AB SERPL-ACNC: 15.3 SEC — HIGH (ref 9.9–13.4)
PROTHROM AB SERPL-ACNC: 27.3 SEC — HIGH (ref 9.9–13.4)
RAPID RVP RESULT: DETECTED
RBC # BLD: 2.97 M/UL — LOW (ref 3.8–5.2)
RBC # BLD: 3.19 M/UL — LOW (ref 3.8–5.2)
RBC # BLD: 3.28 M/UL — LOW (ref 3.8–5.2)
RBC # BLD: 3.41 M/UL — LOW (ref 3.8–5.2)
RBC # BLD: 3.51 M/UL — LOW (ref 3.8–5.2)
RBC # BLD: 3.53 M/UL — LOW (ref 3.8–5.2)
RBC # BLD: 3.76 M/UL — LOW (ref 3.8–5.2)
RBC # BLD: 3.77 M/UL — LOW (ref 3.8–5.2)
RBC # BLD: 3.78 M/UL — LOW (ref 3.8–5.2)
RBC # BLD: 3.89 M/UL — SIGNIFICANT CHANGE UP (ref 3.8–5.2)
RBC # BLD: 4.07 M/UL — SIGNIFICANT CHANGE UP (ref 3.8–5.2)
RBC # BLD: 4.12 M/UL — SIGNIFICANT CHANGE UP (ref 3.8–5.2)
RBC # BLD: 4.27 M/UL — SIGNIFICANT CHANGE UP (ref 3.8–5.2)
RBC # BLD: 4.39 M/UL — SIGNIFICANT CHANGE UP (ref 3.8–5.2)
RBC # BLD: 4.5 M/UL — SIGNIFICANT CHANGE UP (ref 3.8–5.2)
RBC # FLD: 21.3 % — HIGH (ref 10.3–14.5)
RBC # FLD: 21.5 % — HIGH (ref 10.3–14.5)
RBC # FLD: 21.6 % — HIGH (ref 10.3–14.5)
RBC # FLD: 21.7 % — HIGH (ref 10.3–14.5)
RBC # FLD: 21.8 % — HIGH (ref 10.3–14.5)
RBC # FLD: 21.9 % — HIGH (ref 10.3–14.5)
RBC # FLD: 22 % — HIGH (ref 10.3–14.5)
RBC # FLD: 22.1 % — HIGH (ref 10.3–14.5)
RBC # FLD: 22.2 % — HIGH (ref 10.3–14.5)
RBC # FLD: 22.3 % — HIGH (ref 10.3–14.5)
RBC # FLD: 22.5 % — HIGH (ref 10.3–14.5)
RBC # FLD: 22.6 % — HIGH (ref 10.3–14.5)
RBC # FLD: 22.6 % — HIGH (ref 10.3–14.5)
RBC # FLD: 22.8 % — HIGH (ref 10.3–14.5)
RBC # FLD: 23 % — HIGH (ref 10.3–14.5)
RBC BLD AUTO: ABNORMAL
RBC BLD AUTO: NORMAL — SIGNIFICANT CHANGE UP
RBC BLD AUTO: SIGNIFICANT CHANGE UP
RBC CASTS # UR COMP ASSIST: 10 /HPF — HIGH (ref 0–4)
RBC CASTS # UR COMP ASSIST: SIGNIFICANT CHANGE UP /HPF (ref 0–4)
RSV RNA SPEC QL NAA+PROBE: DETECTED
S AUREUS DNA NOSE QL NAA+PROBE: SIGNIFICANT CHANGE UP
SARS-COV-2 RNA SPEC QL NAA+PROBE: DETECTED
SCHISTOCYTES BLD QL AUTO: SLIGHT — SIGNIFICANT CHANGE UP
SCHISTOCYTES BLD QL AUTO: SLIGHT — SIGNIFICANT CHANGE UP
SMUDGE CELLS # BLD: PRESENT — SIGNIFICANT CHANGE UP
SODIUM SERPL-SCNC: 130 MMOL/L — LOW (ref 135–145)
SODIUM SERPL-SCNC: 131 MMOL/L — LOW (ref 135–145)
SODIUM SERPL-SCNC: 132 MMOL/L — LOW (ref 135–145)
SODIUM SERPL-SCNC: 133 MMOL/L — LOW (ref 135–145)
SODIUM SERPL-SCNC: 133 MMOL/L — LOW (ref 135–145)
SODIUM SERPL-SCNC: 134 MMOL/L — LOW (ref 135–145)
SODIUM SERPL-SCNC: 135 MMOL/L — SIGNIFICANT CHANGE UP (ref 135–145)
SODIUM SERPL-SCNC: 136 MMOL/L — SIGNIFICANT CHANGE UP (ref 135–145)
SP GR SPEC: 1.01 — SIGNIFICANT CHANGE UP (ref 1–1.03)
SP GR SPEC: 1.03 — SIGNIFICANT CHANGE UP (ref 1–1.03)
SPECIMEN SOURCE: SIGNIFICANT CHANGE UP
TARGETS BLD QL SMEAR: SLIGHT — SIGNIFICANT CHANGE UP
TIBC SERPL-MCNC: 103 UG/DL — LOW (ref 220–430)
TOXIC GRANULES BLD QL SMEAR: PRESENT — SIGNIFICANT CHANGE UP
UIBC SERPL-MCNC: 95 UG/DL — LOW (ref 110–370)
UROBILINOGEN FLD QL: 1 MG/DL — SIGNIFICANT CHANGE UP (ref 0.2–1)
UROBILINOGEN FLD QL: 1 MG/DL — SIGNIFICANT CHANGE UP (ref 0.2–1)
VANCOMYCIN FLD-MCNC: 7 UG/ML — SIGNIFICANT CHANGE UP
VANCOMYCIN TROUGH SERPL-MCNC: 8.6 UG/ML — LOW (ref 10–20)
WBC # BLD: 10.25 K/UL — SIGNIFICANT CHANGE UP (ref 3.8–10.5)
WBC # BLD: 11.95 K/UL — HIGH (ref 3.8–10.5)
WBC # BLD: 13.05 K/UL — HIGH (ref 3.8–10.5)
WBC # BLD: 13.27 K/UL — HIGH (ref 3.8–10.5)
WBC # BLD: 18.06 K/UL — HIGH (ref 3.8–10.5)
WBC # BLD: 18.23 K/UL — HIGH (ref 3.8–10.5)
WBC # BLD: 23.63 K/UL — HIGH (ref 3.8–10.5)
WBC # BLD: 25.87 K/UL — HIGH (ref 3.8–10.5)
WBC # BLD: 39.41 K/UL — HIGH (ref 3.8–10.5)
WBC # BLD: 40.74 K/UL — CRITICAL HIGH (ref 3.8–10.5)
WBC # BLD: 45.18 K/UL — CRITICAL HIGH (ref 3.8–10.5)
WBC # BLD: 6.15 K/UL — SIGNIFICANT CHANGE UP (ref 3.8–10.5)
WBC # BLD: 6.3 K/UL — SIGNIFICANT CHANGE UP (ref 3.8–10.5)
WBC # BLD: 6.63 K/UL — SIGNIFICANT CHANGE UP (ref 3.8–10.5)
WBC # BLD: 7.28 K/UL — SIGNIFICANT CHANGE UP (ref 3.8–10.5)
WBC # FLD AUTO: 10.25 K/UL — SIGNIFICANT CHANGE UP (ref 3.8–10.5)
WBC # FLD AUTO: 11.95 K/UL — HIGH (ref 3.8–10.5)
WBC # FLD AUTO: 13.05 K/UL — HIGH (ref 3.8–10.5)
WBC # FLD AUTO: 13.27 K/UL — HIGH (ref 3.8–10.5)
WBC # FLD AUTO: 18.06 K/UL — HIGH (ref 3.8–10.5)
WBC # FLD AUTO: 18.23 K/UL — HIGH (ref 3.8–10.5)
WBC # FLD AUTO: 23.63 K/UL — HIGH (ref 3.8–10.5)
WBC # FLD AUTO: 25.87 K/UL — HIGH (ref 3.8–10.5)
WBC # FLD AUTO: 39.41 K/UL — HIGH (ref 3.8–10.5)
WBC # FLD AUTO: 40.74 K/UL — CRITICAL HIGH (ref 3.8–10.5)
WBC # FLD AUTO: 45.18 K/UL — CRITICAL HIGH (ref 3.8–10.5)
WBC # FLD AUTO: 6.15 K/UL — SIGNIFICANT CHANGE UP (ref 3.8–10.5)
WBC # FLD AUTO: 6.3 K/UL — SIGNIFICANT CHANGE UP (ref 3.8–10.5)
WBC # FLD AUTO: 6.63 K/UL — SIGNIFICANT CHANGE UP (ref 3.8–10.5)
WBC # FLD AUTO: 7.28 K/UL — SIGNIFICANT CHANGE UP (ref 3.8–10.5)
WBC UR QL: ABNORMAL /HPF (ref 0–5)
WBC UR QL: SIGNIFICANT CHANGE UP /HPF (ref 0–5)

## 2024-01-01 PROCEDURE — 74177 CT ABD & PELVIS W/CONTRAST: CPT | Mod: 26,MC

## 2024-01-01 PROCEDURE — 99285 EMERGENCY DEPT VISIT HI MDM: CPT

## 2024-01-01 PROCEDURE — 99497 ADVNCD CARE PLAN 30 MIN: CPT | Mod: 25

## 2024-01-01 PROCEDURE — 99232 SBSQ HOSP IP/OBS MODERATE 35: CPT

## 2024-01-01 PROCEDURE — 72196 MRI PELVIS W/DYE: CPT | Mod: 26

## 2024-01-01 PROCEDURE — 99223 1ST HOSP IP/OBS HIGH 75: CPT

## 2024-01-01 PROCEDURE — 76937 US GUIDE VASCULAR ACCESS: CPT | Mod: 26

## 2024-01-01 PROCEDURE — 99291 CRITICAL CARE FIRST HOUR: CPT

## 2024-01-01 PROCEDURE — 36600 WITHDRAWAL OF ARTERIAL BLOOD: CPT | Mod: 59

## 2024-01-01 PROCEDURE — 99223 1ST HOSP IP/OBS HIGH 75: CPT | Mod: FS

## 2024-01-01 PROCEDURE — 36410 VNPNXR 3YR/> PHY/QHP DX/THER: CPT

## 2024-01-01 PROCEDURE — 36600 WITHDRAWAL OF ARTERIAL BLOOD: CPT

## 2024-01-01 PROCEDURE — 99222 1ST HOSP IP/OBS MODERATE 55: CPT | Mod: FS

## 2024-01-01 PROCEDURE — 71045 X-RAY EXAM CHEST 1 VIEW: CPT | Mod: 26

## 2024-01-01 PROCEDURE — 36410 VNPNXR 3YR/> PHY/QHP DX/THER: CPT | Mod: 59

## 2024-01-01 PROCEDURE — 99291 CRITICAL CARE FIRST HOUR: CPT | Mod: 25

## 2024-01-01 PROCEDURE — 99233 SBSQ HOSP IP/OBS HIGH 50: CPT

## 2024-01-01 PROCEDURE — 99233 SBSQ HOSP IP/OBS HIGH 50: CPT | Mod: FS

## 2024-01-01 PROCEDURE — 36000 PLACE NEEDLE IN VEIN: CPT

## 2024-01-01 RX ORDER — VANCOMYCIN HYDROCHLORIDE 5 G/100ML
1000 INJECTION, POWDER, LYOPHILIZED, FOR SOLUTION INTRAVENOUS ONCE
Refills: 0 | Status: COMPLETED | OUTPATIENT
Start: 2024-01-01 | End: 2024-01-01

## 2024-01-01 RX ORDER — COLLAGENASE CLOSTRIDIUM HIST. 250 UNIT/G
1 OINTMENT (GRAM) TOPICAL
Refills: 0 | Status: DISCONTINUED | OUTPATIENT
Start: 2024-01-01 | End: 2024-01-01

## 2024-01-01 RX ORDER — SODIUM CHLORIDE 9 MG/ML
1000 INJECTION, SOLUTION INTRAVENOUS ONCE
Refills: 0 | Status: COMPLETED | OUTPATIENT
Start: 2024-01-01 | End: 2024-01-01

## 2024-01-01 RX ORDER — POTASSIUM PHOSPHATE, MONOBASIC AND POTASSIUM PHOSPHATE, DIBASIC 224; 236 MG/ML; MG/ML
15 INJECTION, SOLUTION INTRAVENOUS ONCE
Refills: 0 | Status: COMPLETED | OUTPATIENT
Start: 2024-01-01 | End: 2024-01-01

## 2024-01-01 RX ORDER — ACETAMINOPHEN 80 MG/.8ML
600 SOLUTION/ DROPS ORAL ONCE
Refills: 0 | Status: COMPLETED | OUTPATIENT
Start: 2024-01-01 | End: 2024-01-01

## 2024-01-01 RX ORDER — ACETAMINOPHEN 80 MG/.8ML
625 SOLUTION/ DROPS ORAL ONCE
Refills: 0 | Status: COMPLETED | OUTPATIENT
Start: 2024-01-01 | End: 2024-01-01

## 2024-01-01 RX ORDER — INSULIN LISPRO 100/ML
VIAL (ML) SUBCUTANEOUS EVERY 6 HOURS
Refills: 0 | Status: DISCONTINUED | OUTPATIENT
Start: 2024-01-01 | End: 2025-01-01

## 2024-01-01 RX ORDER — MEROPENEM 1 G/20ML
500 INJECTION, POWDER, FOR SOLUTION INTRAVENOUS EVERY 12 HOURS
Refills: 0 | Status: DISCONTINUED | OUTPATIENT
Start: 2024-01-01 | End: 2024-01-01

## 2024-01-01 RX ORDER — ERTAPENEM SODIUM 1 G/1
1000 INJECTION, POWDER, LYOPHILIZED, FOR SOLUTION INTRAMUSCULAR; INTRAVENOUS EVERY 24 HOURS
Refills: 0 | Status: COMPLETED | OUTPATIENT
Start: 2024-01-01 | End: 2024-01-01

## 2024-01-01 RX ORDER — VANCOMYCIN HYDROCHLORIDE 5 G/100ML
1000 INJECTION, POWDER, LYOPHILIZED, FOR SOLUTION INTRAVENOUS EVERY 12 HOURS
Refills: 0 | Status: DISCONTINUED | OUTPATIENT
Start: 2024-01-01 | End: 2024-01-01

## 2024-01-01 RX ORDER — VANCOMYCIN HYDROCHLORIDE 5 G/100ML
750 INJECTION, POWDER, LYOPHILIZED, FOR SOLUTION INTRAVENOUS ONCE
Refills: 0 | Status: COMPLETED | OUTPATIENT
Start: 2024-01-01 | End: 2024-01-01

## 2024-01-01 RX ORDER — POLYETHYLENE GLYCOL 3350 17 G/DOSE
17 POWDER (GRAM) ORAL DAILY
Refills: 0 | Status: DISCONTINUED | OUTPATIENT
Start: 2024-01-01 | End: 2025-01-01

## 2024-01-01 RX ORDER — SOD PHOS DI, MONO/K PHOS MONO 250 MG
1 TABLET ORAL ONCE
Refills: 0 | Status: COMPLETED | OUTPATIENT
Start: 2024-01-01 | End: 2024-01-01

## 2024-01-01 RX ORDER — SODIUM CHLORIDE 9 MG/ML
10 INJECTION, SOLUTION INTRAMUSCULAR; INTRAVENOUS; SUBCUTANEOUS
Refills: 0 | Status: DISCONTINUED | OUTPATIENT
Start: 2024-01-01 | End: 2025-01-01

## 2024-01-01 RX ORDER — SODIUM PHOSPHATE, MONOBASIC, MONOHYDRATE AND SODIUM PHOSPHATE, DIBASIC ANHYDROUS 142; 276 MG/ML; MG/ML
15 INJECTION, SOLUTION INTRAVENOUS ONCE
Refills: 0 | Status: COMPLETED | OUTPATIENT
Start: 2024-01-01 | End: 2024-01-01

## 2024-01-01 RX ORDER — SODIUM CHLORIDE 9 MG/ML
1250 INJECTION, SOLUTION INTRAVENOUS ONCE
Refills: 0 | Status: COMPLETED | OUTPATIENT
Start: 2024-01-01 | End: 2024-01-01

## 2024-01-01 RX ORDER — POTASSIUM CHLORIDE 600 MG/1
20 TABLET, FILM COATED, EXTENDED RELEASE ORAL
Refills: 0 | Status: COMPLETED | OUTPATIENT
Start: 2024-01-01 | End: 2024-01-01

## 2024-01-01 RX ORDER — GINKGO BILOBA 40 MG
3 CAPSULE ORAL AT BEDTIME
Refills: 0 | Status: DISCONTINUED | OUTPATIENT
Start: 2024-01-01 | End: 2024-01-01

## 2024-01-01 RX ORDER — ACETAMINOPHEN 80 MG/.8ML
650 SOLUTION/ DROPS ORAL EVERY 6 HOURS
Refills: 0 | Status: DISCONTINUED | OUTPATIENT
Start: 2024-01-01 | End: 2024-01-01

## 2024-01-01 RX ORDER — MIDODRINE HYDROCHLORIDE 5 MG/1
20 TABLET ORAL EVERY 8 HOURS
Refills: 0 | Status: DISCONTINUED | OUTPATIENT
Start: 2024-01-01 | End: 2025-01-01

## 2024-01-01 RX ORDER — INSULIN LISPRO 100/ML
VIAL (ML) SUBCUTANEOUS
Refills: 0 | Status: DISCONTINUED | OUTPATIENT
Start: 2024-01-01 | End: 2024-01-01

## 2024-01-01 RX ORDER — POTASSIUM PHOSPHATE, MONOBASIC AND POTASSIUM PHOSPHATE, DIBASIC 224; 236 MG/ML; MG/ML
30 INJECTION, SOLUTION INTRAVENOUS ONCE
Refills: 0 | Status: COMPLETED | OUTPATIENT
Start: 2024-01-01 | End: 2024-01-01

## 2024-01-01 RX ORDER — CHLORHEXIDINE GLUCONATE 1.2 MG/ML
1 RINSE ORAL
Refills: 0 | Status: DISCONTINUED | OUTPATIENT
Start: 2024-01-01 | End: 2025-01-01

## 2024-01-01 RX ORDER — HEPARIN SODIUM 1000 [USP'U]/ML
5000 INJECTION, SOLUTION INTRAVENOUS; SUBCUTANEOUS EVERY 12 HOURS
Refills: 0 | Status: DISCONTINUED | OUTPATIENT
Start: 2024-01-01 | End: 2024-01-01

## 2024-01-01 RX ORDER — PIPERACILLIN AND TAZOBACTAM 3; .375 G/15ML; G/15ML
3.38 INJECTION, POWDER, LYOPHILIZED, FOR SOLUTION INTRAVENOUS ONCE
Refills: 0 | Status: COMPLETED | OUTPATIENT
Start: 2024-01-01 | End: 2024-01-01

## 2024-01-01 RX ORDER — MAGNESIUM SULFATE 500 MG/ML
2 INJECTION, SOLUTION INTRAMUSCULAR; INTRAVENOUS ONCE
Refills: 0 | Status: COMPLETED | OUTPATIENT
Start: 2024-01-01 | End: 2024-01-01

## 2024-01-01 RX ORDER — CHLORHEXIDINE GLUCONATE 1.2 MG/ML
1 RINSE ORAL
Refills: 0 | Status: DISCONTINUED | OUTPATIENT
Start: 2024-01-01 | End: 2024-01-01

## 2024-01-01 RX ORDER — MAGNESIUM SULFATE 500 MG/ML
1 INJECTION, SOLUTION INTRAMUSCULAR; INTRAVENOUS ONCE
Refills: 0 | Status: COMPLETED | OUTPATIENT
Start: 2024-01-01 | End: 2024-01-01

## 2024-01-01 RX ORDER — ONDANSETRON 4 MG/1
4 TABLET ORAL EVERY 8 HOURS
Refills: 0 | Status: DISCONTINUED | OUTPATIENT
Start: 2024-01-01 | End: 2025-01-01

## 2024-01-01 RX ORDER — MAG HYDROX/ALUMINUM HYD/SIMETH 200-200-20
30 SUSPENSION, ORAL (FINAL DOSE FORM) ORAL EVERY 4 HOURS
Refills: 0 | Status: DISCONTINUED | OUTPATIENT
Start: 2024-01-01 | End: 2024-01-01

## 2024-01-01 RX ORDER — NOREPINEPHRINE BITARTRATE 1 MG/ML
0.05 INJECTION INTRAVENOUS
Qty: 8 | Refills: 0 | Status: DISCONTINUED | OUTPATIENT
Start: 2024-01-01 | End: 2025-01-01

## 2024-01-01 RX ORDER — POTASSIUM CHLORIDE 600 MG/1
10 TABLET, FILM COATED, EXTENDED RELEASE ORAL
Refills: 0 | Status: COMPLETED | OUTPATIENT
Start: 2024-01-01 | End: 2024-01-01

## 2024-01-01 RX ORDER — MEROPENEM 1 G/20ML
500 INJECTION, POWDER, FOR SOLUTION INTRAVENOUS EVERY 24 HOURS
Refills: 0 | Status: DISCONTINUED | OUTPATIENT
Start: 2024-01-01 | End: 2024-01-01

## 2024-01-01 RX ORDER — SODIUM CHLORIDE 9 MG/ML
500 INJECTION, SOLUTION INTRAVENOUS ONCE
Refills: 0 | Status: COMPLETED | OUTPATIENT
Start: 2024-01-01 | End: 2024-01-01

## 2024-01-01 RX ORDER — PIPERACILLIN AND TAZOBACTAM 3; .375 G/15ML; G/15ML
3.38 INJECTION, POWDER, LYOPHILIZED, FOR SOLUTION INTRAVENOUS EVERY 8 HOURS
Refills: 0 | Status: COMPLETED | OUTPATIENT
Start: 2024-01-01 | End: 2024-01-01

## 2024-01-01 RX ORDER — NYSTATIN TOPICAL POWDER 100000 U/G
1 POWDER TOPICAL
Refills: 0 | Status: COMPLETED | OUTPATIENT
Start: 2024-01-01 | End: 2025-01-01

## 2024-01-01 RX ORDER — METRONIDAZOLE 250 MG/1
500 TABLET ORAL EVERY 8 HOURS
Refills: 0 | Status: DISCONTINUED | OUTPATIENT
Start: 2024-01-01 | End: 2025-01-01

## 2024-01-01 RX ORDER — SODIUM CHLORIDE 9 MG/ML
1000 INJECTION, SOLUTION INTRAVENOUS
Refills: 0 | Status: DISCONTINUED | OUTPATIENT
Start: 2024-01-01 | End: 2024-01-01

## 2024-01-01 RX ORDER — IBUPROFEN 200 MG
400 TABLET ORAL ONCE
Refills: 0 | Status: COMPLETED | OUTPATIENT
Start: 2024-01-01 | End: 2024-01-01

## 2024-01-01 RX ORDER — VANCOMYCIN HYDROCHLORIDE 5 G/100ML
1000 INJECTION, POWDER, LYOPHILIZED, FOR SOLUTION INTRAVENOUS ONCE
Refills: 0 | Status: DISCONTINUED | OUTPATIENT
Start: 2024-01-01 | End: 2024-01-01

## 2024-01-01 RX ORDER — SENNOSIDES 8.6 MG/1
2 TABLET, FILM COATED ORAL AT BEDTIME
Refills: 0 | Status: DISCONTINUED | OUTPATIENT
Start: 2024-01-01 | End: 2025-01-01

## 2024-01-01 RX ORDER — SOD PHOS DI, MONO/K PHOS MONO 250 MG
1 TABLET ORAL ONCE
Refills: 0 | Status: DISCONTINUED | OUTPATIENT
Start: 2024-01-01 | End: 2024-01-01

## 2024-01-01 RX ADMIN — ACETAMINOPHEN 625 MILLIGRAM(S): 80 SOLUTION/ DROPS ORAL at 23:16

## 2024-01-01 RX ADMIN — POTASSIUM PHOSPHATE, MONOBASIC AND POTASSIUM PHOSPHATE, DIBASIC 83.33 MILLIMOLE(S): 224; 236 INJECTION, SOLUTION INTRAVENOUS at 23:05

## 2024-01-01 RX ADMIN — ACETAMINOPHEN 625 MILLIGRAM(S): 80 SOLUTION/ DROPS ORAL at 16:45

## 2024-01-01 RX ADMIN — Medication 1 APPLICATION(S): at 05:38

## 2024-01-01 RX ADMIN — ACETAMINOPHEN 650 MILLIGRAM(S): 80 SOLUTION/ DROPS ORAL at 17:25

## 2024-01-01 RX ADMIN — NYSTATIN TOPICAL POWDER 1 APPLICATION(S): 100000 POWDER TOPICAL at 05:12

## 2024-01-01 RX ADMIN — ACETAMINOPHEN 650 MILLIGRAM(S): 80 SOLUTION/ DROPS ORAL at 10:50

## 2024-01-01 RX ADMIN — ERTAPENEM SODIUM 120 MILLIGRAM(S): 1 INJECTION, POWDER, LYOPHILIZED, FOR SOLUTION INTRAMUSCULAR; INTRAVENOUS at 14:09

## 2024-01-01 RX ADMIN — ACETAMINOPHEN 600 MILLIGRAM(S): 80 SOLUTION/ DROPS ORAL at 06:12

## 2024-01-01 RX ADMIN — PIPERACILLIN AND TAZOBACTAM 25 GRAM(S): 3; .375 INJECTION, POWDER, LYOPHILIZED, FOR SOLUTION INTRAVENOUS at 16:56

## 2024-01-01 RX ADMIN — ACETAMINOPHEN 650 MILLIGRAM(S): 80 SOLUTION/ DROPS ORAL at 02:58

## 2024-01-01 RX ADMIN — HEPARIN SODIUM 5000 UNIT(S): 1000 INJECTION, SOLUTION INTRAVENOUS; SUBCUTANEOUS at 18:20

## 2024-01-01 RX ADMIN — Medication 1 APPLICATION(S): at 17:02

## 2024-01-01 RX ADMIN — NYSTATIN TOPICAL POWDER 1 APPLICATION(S): 100000 POWDER TOPICAL at 19:29

## 2024-01-01 RX ADMIN — NYSTATIN TOPICAL POWDER 1 APPLICATION(S): 100000 POWDER TOPICAL at 17:15

## 2024-01-01 RX ADMIN — SODIUM PHOSPHATE, MONOBASIC, MONOHYDRATE AND SODIUM PHOSPHATE, DIBASIC ANHYDROUS 62.5 MILLIMOLE(S): 142; 276 INJECTION, SOLUTION INTRAVENOUS at 10:11

## 2024-01-01 RX ADMIN — Medication 1 APPLICATION(S): at 05:08

## 2024-01-01 RX ADMIN — ACETAMINOPHEN 600 MILLIGRAM(S): 80 SOLUTION/ DROPS ORAL at 17:03

## 2024-01-01 RX ADMIN — SODIUM PHOSPHATE, MONOBASIC, MONOHYDRATE AND SODIUM PHOSPHATE, DIBASIC ANHYDROUS 62.5 MILLIMOLE(S): 142; 276 INJECTION, SOLUTION INTRAVENOUS at 14:56

## 2024-01-01 RX ADMIN — ACETAMINOPHEN 650 MILLIGRAM(S): 80 SOLUTION/ DROPS ORAL at 03:58

## 2024-01-01 RX ADMIN — SODIUM CHLORIDE 100 MILLILITER(S): 9 INJECTION, SOLUTION INTRAVENOUS at 23:30

## 2024-01-01 RX ADMIN — ACETAMINOPHEN 240 MILLIGRAM(S): 80 SOLUTION/ DROPS ORAL at 12:59

## 2024-01-01 RX ADMIN — ERTAPENEM SODIUM 120 MILLIGRAM(S): 1 INJECTION, POWDER, LYOPHILIZED, FOR SOLUTION INTRAMUSCULAR; INTRAVENOUS at 15:07

## 2024-01-01 RX ADMIN — Medication 1 APPLICATION(S): at 05:13

## 2024-01-01 RX ADMIN — NYSTATIN TOPICAL POWDER 1 APPLICATION(S): 100000 POWDER TOPICAL at 17:29

## 2024-01-01 RX ADMIN — NYSTATIN TOPICAL POWDER 1 APPLICATION(S): 100000 POWDER TOPICAL at 06:06

## 2024-01-01 RX ADMIN — Medication 1 APPLICATION(S): at 06:06

## 2024-01-01 RX ADMIN — SODIUM CHLORIDE 100 MILLILITER(S): 9 INJECTION, SOLUTION INTRAVENOUS at 10:11

## 2024-01-01 RX ADMIN — SODIUM CHLORIDE 100 MILLILITER(S): 9 INJECTION, SOLUTION INTRAVENOUS at 22:06

## 2024-01-01 RX ADMIN — NYSTATIN TOPICAL POWDER 1 APPLICATION(S): 100000 POWDER TOPICAL at 17:57

## 2024-01-01 RX ADMIN — ACETAMINOPHEN 600 MILLIGRAM(S): 80 SOLUTION/ DROPS ORAL at 13:45

## 2024-01-01 RX ADMIN — NYSTATIN TOPICAL POWDER 1 APPLICATION(S): 100000 POWDER TOPICAL at 18:20

## 2024-01-01 RX ADMIN — SODIUM CHLORIDE 100 MILLILITER(S): 9 INJECTION, SOLUTION INTRAVENOUS at 17:56

## 2024-01-01 RX ADMIN — ACETAMINOPHEN 650 MILLIGRAM(S): 80 SOLUTION/ DROPS ORAL at 17:15

## 2024-01-01 RX ADMIN — NYSTATIN TOPICAL POWDER 1 APPLICATION(S): 100000 POWDER TOPICAL at 05:39

## 2024-01-01 RX ADMIN — SODIUM CHLORIDE 100 MILLILITER(S): 9 INJECTION, SOLUTION INTRAVENOUS at 13:31

## 2024-01-01 RX ADMIN — PIPERACILLIN AND TAZOBACTAM 25 GRAM(S): 3; .375 INJECTION, POWDER, LYOPHILIZED, FOR SOLUTION INTRAVENOUS at 00:31

## 2024-01-01 RX ADMIN — MAGNESIUM SULFATE 100 GRAM(S): 500 INJECTION, SOLUTION INTRAMUSCULAR; INTRAVENOUS at 13:27

## 2024-01-01 RX ADMIN — Medication 50 MILLILITER(S): at 06:46

## 2024-01-01 RX ADMIN — Medication 1 APPLICATION(S): at 18:26

## 2024-01-01 RX ADMIN — PIPERACILLIN AND TAZOBACTAM 25 GRAM(S): 3; .375 INJECTION, POWDER, LYOPHILIZED, FOR SOLUTION INTRAVENOUS at 06:02

## 2024-01-01 RX ADMIN — ACETAMINOPHEN 650 MILLIGRAM(S): 80 SOLUTION/ DROPS ORAL at 17:49

## 2024-01-01 RX ADMIN — PIPERACILLIN AND TAZOBACTAM 25 GRAM(S): 3; .375 INJECTION, POWDER, LYOPHILIZED, FOR SOLUTION INTRAVENOUS at 19:00

## 2024-01-01 RX ADMIN — METRONIDAZOLE 100 MILLIGRAM(S): 250 TABLET ORAL at 09:40

## 2024-01-01 RX ADMIN — Medication 400 MILLIGRAM(S): at 19:10

## 2024-01-01 RX ADMIN — NOREPINEPHRINE BITARTRATE 3.83 MICROGRAM(S)/KG/MIN: 1 INJECTION INTRAVENOUS at 21:24

## 2024-01-01 RX ADMIN — PIPERACILLIN AND TAZOBACTAM 200 GRAM(S): 3; .375 INJECTION, POWDER, LYOPHILIZED, FOR SOLUTION INTRAVENOUS at 21:37

## 2024-01-01 RX ADMIN — NYSTATIN TOPICAL POWDER 1 APPLICATION(S): 100000 POWDER TOPICAL at 05:32

## 2024-01-01 RX ADMIN — ACETAMINOPHEN 650 MILLIGRAM(S): 80 SOLUTION/ DROPS ORAL at 15:56

## 2024-01-01 RX ADMIN — POTASSIUM CHLORIDE 100 MILLIEQUIVALENT(S): 600 TABLET, FILM COATED, EXTENDED RELEASE ORAL at 08:11

## 2024-01-01 RX ADMIN — SODIUM CHLORIDE 100 MILLILITER(S): 9 INJECTION, SOLUTION INTRAVENOUS at 18:48

## 2024-01-01 RX ADMIN — ERTAPENEM SODIUM 120 MILLIGRAM(S): 1 INJECTION, POWDER, LYOPHILIZED, FOR SOLUTION INTRAMUSCULAR; INTRAVENOUS at 13:48

## 2024-01-01 RX ADMIN — VANCOMYCIN HYDROCHLORIDE 250 MILLIGRAM(S): 5 INJECTION, POWDER, LYOPHILIZED, FOR SOLUTION INTRAVENOUS at 21:40

## 2024-01-01 RX ADMIN — SODIUM CHLORIDE 500 MILLILITER(S): 9 INJECTION, SOLUTION INTRAVENOUS at 02:57

## 2024-01-01 RX ADMIN — ACETAMINOPHEN 650 MILLIGRAM(S): 80 SOLUTION/ DROPS ORAL at 18:34

## 2024-01-01 RX ADMIN — PIPERACILLIN AND TAZOBACTAM 25 GRAM(S): 3; .375 INJECTION, POWDER, LYOPHILIZED, FOR SOLUTION INTRAVENOUS at 02:33

## 2024-01-01 RX ADMIN — SODIUM PHOSPHATE, MONOBASIC, MONOHYDRATE AND SODIUM PHOSPHATE, DIBASIC ANHYDROUS 62.5 MILLIMOLE(S): 142; 276 INJECTION, SOLUTION INTRAVENOUS at 09:49

## 2024-01-01 RX ADMIN — Medication 1 APPLICATION(S): at 18:07

## 2024-01-01 RX ADMIN — ACETAMINOPHEN 250 MILLIGRAM(S): 80 SOLUTION/ DROPS ORAL at 16:30

## 2024-01-01 RX ADMIN — PIPERACILLIN AND TAZOBACTAM 25 GRAM(S): 3; .375 INJECTION, POWDER, LYOPHILIZED, FOR SOLUTION INTRAVENOUS at 02:26

## 2024-01-01 RX ADMIN — SODIUM CHLORIDE 100 MILLILITER(S): 9 INJECTION, SOLUTION INTRAVENOUS at 00:31

## 2024-01-01 RX ADMIN — CHLORHEXIDINE GLUCONATE 1 APPLICATION(S): 1.2 RINSE ORAL at 10:53

## 2024-01-01 RX ADMIN — ACETAMINOPHEN 650 MILLIGRAM(S): 80 SOLUTION/ DROPS ORAL at 09:56

## 2024-01-01 RX ADMIN — MEROPENEM 100 MILLIGRAM(S): 1 INJECTION, POWDER, FOR SOLUTION INTRAVENOUS at 10:41

## 2024-01-01 RX ADMIN — SODIUM CHLORIDE 100 MILLILITER(S): 9 INJECTION, SOLUTION INTRAVENOUS at 05:46

## 2024-01-01 RX ADMIN — Medication 1 PACKET(S): at 12:59

## 2024-01-01 RX ADMIN — Medication 1 APPLICATION(S): at 22:07

## 2024-01-01 RX ADMIN — NOREPINEPHRINE BITARTRATE 3.83 MICROGRAM(S)/KG/MIN: 1 INJECTION INTRAVENOUS at 10:53

## 2024-01-01 RX ADMIN — NYSTATIN TOPICAL POWDER 1 APPLICATION(S): 100000 POWDER TOPICAL at 17:01

## 2024-01-01 RX ADMIN — Medication 1 APPLICATION(S): at 05:12

## 2024-01-01 RX ADMIN — NOREPINEPHRINE BITARTRATE 3.83 MICROGRAM(S)/KG/MIN: 1 INJECTION INTRAVENOUS at 08:11

## 2024-01-01 RX ADMIN — NYSTATIN TOPICAL POWDER 1 APPLICATION(S): 100000 POWDER TOPICAL at 05:13

## 2024-01-01 RX ADMIN — SODIUM CHLORIDE 100 MILLILITER(S): 9 INJECTION, SOLUTION INTRAVENOUS at 09:23

## 2024-01-01 RX ADMIN — NYSTATIN TOPICAL POWDER 1 APPLICATION(S): 100000 POWDER TOPICAL at 05:38

## 2024-01-01 RX ADMIN — METRONIDAZOLE 100 MILLIGRAM(S): 250 TABLET ORAL at 21:21

## 2024-01-01 RX ADMIN — POTASSIUM PHOSPHATE, MONOBASIC AND POTASSIUM PHOSPHATE, DIBASIC 62.5 MILLIMOLE(S): 224; 236 INJECTION, SOLUTION INTRAVENOUS at 15:57

## 2024-01-01 RX ADMIN — ERTAPENEM SODIUM 120 MILLIGRAM(S): 1 INJECTION, POWDER, LYOPHILIZED, FOR SOLUTION INTRAMUSCULAR; INTRAVENOUS at 14:12

## 2024-01-01 RX ADMIN — METRONIDAZOLE 100 MILLIGRAM(S): 250 TABLET ORAL at 13:51

## 2024-01-01 RX ADMIN — PIPERACILLIN AND TAZOBACTAM 25 GRAM(S): 3; .375 INJECTION, POWDER, LYOPHILIZED, FOR SOLUTION INTRAVENOUS at 10:26

## 2024-01-01 RX ADMIN — POTASSIUM CHLORIDE 50 MILLIEQUIVALENT(S): 600 TABLET, FILM COATED, EXTENDED RELEASE ORAL at 16:37

## 2024-01-01 RX ADMIN — PIPERACILLIN AND TAZOBACTAM 25 GRAM(S): 3; .375 INJECTION, POWDER, LYOPHILIZED, FOR SOLUTION INTRAVENOUS at 09:44

## 2024-01-01 RX ADMIN — POTASSIUM CHLORIDE 50 MILLIEQUIVALENT(S): 600 TABLET, FILM COATED, EXTENDED RELEASE ORAL at 18:25

## 2024-01-01 RX ADMIN — SODIUM CHLORIDE 1250 MILLILITER(S): 9 INJECTION, SOLUTION INTRAVENOUS at 06:32

## 2024-01-01 RX ADMIN — ACETAMINOPHEN 250 MILLIGRAM(S): 80 SOLUTION/ DROPS ORAL at 22:46

## 2024-01-01 RX ADMIN — MEROPENEM 100 MILLIGRAM(S): 1 INJECTION, POWDER, FOR SOLUTION INTRAVENOUS at 18:20

## 2024-01-01 RX ADMIN — ACETAMINOPHEN 240 MILLIGRAM(S): 80 SOLUTION/ DROPS ORAL at 05:12

## 2024-01-01 RX ADMIN — SODIUM CHLORIDE 100 MILLILITER(S): 9 INJECTION, SOLUTION INTRAVENOUS at 05:08

## 2024-01-01 RX ADMIN — ACETAMINOPHEN 650 MILLIGRAM(S): 80 SOLUTION/ DROPS ORAL at 05:09

## 2024-01-01 RX ADMIN — ACETAMINOPHEN 650 MILLIGRAM(S): 80 SOLUTION/ DROPS ORAL at 14:56

## 2024-01-01 RX ADMIN — ERTAPENEM SODIUM 120 MILLIGRAM(S): 1 INJECTION, POWDER, LYOPHILIZED, FOR SOLUTION INTRAMUSCULAR; INTRAVENOUS at 14:58

## 2024-01-01 RX ADMIN — ACETAMINOPHEN 650 MILLIGRAM(S): 80 SOLUTION/ DROPS ORAL at 06:09

## 2024-01-01 RX ADMIN — Medication 50 MILLILITER(S): at 15:55

## 2024-01-01 RX ADMIN — SODIUM CHLORIDE 1000 MILLILITER(S): 9 INJECTION, SOLUTION INTRAVENOUS at 08:50

## 2024-01-01 RX ADMIN — PIPERACILLIN AND TAZOBACTAM 25 GRAM(S): 3; .375 INJECTION, POWDER, LYOPHILIZED, FOR SOLUTION INTRAVENOUS at 18:42

## 2024-01-01 RX ADMIN — NYSTATIN TOPICAL POWDER 1 APPLICATION(S): 100000 POWDER TOPICAL at 05:09

## 2024-01-01 RX ADMIN — POTASSIUM CHLORIDE 50 MILLIEQUIVALENT(S): 600 TABLET, FILM COATED, EXTENDED RELEASE ORAL at 14:33

## 2024-01-01 RX ADMIN — SODIUM CHLORIDE 100 MILLILITER(S): 9 INJECTION, SOLUTION INTRAVENOUS at 18:44

## 2024-01-01 RX ADMIN — POTASSIUM CHLORIDE 100 MILLIEQUIVALENT(S): 600 TABLET, FILM COATED, EXTENDED RELEASE ORAL at 06:54

## 2024-01-01 RX ADMIN — ERTAPENEM SODIUM 120 MILLIGRAM(S): 1 INJECTION, POWDER, LYOPHILIZED, FOR SOLUTION INTRAMUSCULAR; INTRAVENOUS at 13:11

## 2024-01-01 RX ADMIN — NYSTATIN TOPICAL POWDER 1 APPLICATION(S): 100000 POWDER TOPICAL at 18:29

## 2024-01-01 RX ADMIN — Medication 1 APPLICATION(S): at 13:12

## 2024-01-01 RX ADMIN — SODIUM CHLORIDE 100 MILLILITER(S): 9 INJECTION, SOLUTION INTRAVENOUS at 19:50

## 2024-01-01 RX ADMIN — SODIUM CHLORIDE 100 MILLILITER(S): 9 INJECTION, SOLUTION INTRAVENOUS at 23:18

## 2024-01-01 RX ADMIN — Medication 1 APPLICATION(S): at 17:29

## 2024-01-01 RX ADMIN — ACETAMINOPHEN 240 MILLIGRAM(S): 80 SOLUTION/ DROPS ORAL at 17:01

## 2024-01-01 RX ADMIN — MAGNESIUM SULFATE 25 GRAM(S): 500 INJECTION, SOLUTION INTRAMUSCULAR; INTRAVENOUS at 10:41

## 2024-01-01 RX ADMIN — Medication 1 APPLICATION(S): at 19:30

## 2024-01-01 RX ADMIN — CHLORHEXIDINE GLUCONATE 1 APPLICATION(S): 1.2 RINSE ORAL at 05:31

## 2024-01-01 RX ADMIN — VANCOMYCIN HYDROCHLORIDE 250 MILLIGRAM(S): 5 INJECTION, POWDER, LYOPHILIZED, FOR SOLUTION INTRAVENOUS at 10:53

## 2024-06-03 ENCOUNTER — INPATIENT (INPATIENT)
Facility: HOSPITAL | Age: 79
LOS: 2 days | Discharge: HOME | End: 2024-06-06
Attending: INTERNAL MEDICINE | Admitting: INTERNAL MEDICINE
Payer: MEDICARE

## 2024-06-03 VITALS
WEIGHT: 89.95 LBS | HEART RATE: 107 BPM | TEMPERATURE: 98 F | OXYGEN SATURATION: 100 % | SYSTOLIC BLOOD PRESSURE: 104 MMHG | DIASTOLIC BLOOD PRESSURE: 62 MMHG | RESPIRATION RATE: 19 BRPM

## 2024-06-03 LAB
ALBUMIN SERPL ELPH-MCNC: 2.3 G/DL — LOW (ref 3.3–5)
ALP SERPL-CCNC: 126 U/L — HIGH (ref 40–120)
ALT FLD-CCNC: 17 U/L — SIGNIFICANT CHANGE UP (ref 12–78)
ANION GAP SERPL CALC-SCNC: 5 MMOL/L — SIGNIFICANT CHANGE UP (ref 5–17)
AST SERPL-CCNC: 20 U/L — SIGNIFICANT CHANGE UP (ref 15–37)
BASOPHILS # BLD AUTO: 0.06 K/UL — SIGNIFICANT CHANGE UP (ref 0–0.2)
BASOPHILS NFR BLD AUTO: 0.3 % — SIGNIFICANT CHANGE UP (ref 0–2)
BILIRUB SERPL-MCNC: 0.8 MG/DL — SIGNIFICANT CHANGE UP (ref 0.2–1.2)
BUN SERPL-MCNC: 20 MG/DL — SIGNIFICANT CHANGE UP (ref 7–23)
CALCIUM SERPL-MCNC: 9.2 MG/DL — SIGNIFICANT CHANGE UP (ref 8.5–10.1)
CHLORIDE SERPL-SCNC: 100 MMOL/L — SIGNIFICANT CHANGE UP (ref 96–108)
CO2 SERPL-SCNC: 28 MMOL/L — SIGNIFICANT CHANGE UP (ref 22–31)
CREAT SERPL-MCNC: 0.52 MG/DL — SIGNIFICANT CHANGE UP (ref 0.5–1.3)
EGFR: 95 ML/MIN/1.73M2 — SIGNIFICANT CHANGE UP
EOSINOPHIL # BLD AUTO: 0.35 K/UL — SIGNIFICANT CHANGE UP (ref 0–0.5)
EOSINOPHIL NFR BLD AUTO: 2 % — SIGNIFICANT CHANGE UP (ref 0–6)
GLUCOSE SERPL-MCNC: 182 MG/DL — HIGH (ref 70–99)
HCT VFR BLD CALC: 32.3 % — LOW (ref 34.5–45)
HGB BLD-MCNC: 10.5 G/DL — LOW (ref 11.5–15.5)
IMM GRANULOCYTES NFR BLD AUTO: 0.4 % — SIGNIFICANT CHANGE UP (ref 0–0.9)
LYMPHOCYTES # BLD AUTO: 1.4 K/UL — SIGNIFICANT CHANGE UP (ref 1–3.3)
LYMPHOCYTES # BLD AUTO: 7.8 % — LOW (ref 13–44)
MCHC RBC-ENTMCNC: 24.6 PG — LOW (ref 27–34)
MCHC RBC-ENTMCNC: 32.5 G/DL — SIGNIFICANT CHANGE UP (ref 32–36)
MCV RBC AUTO: 75.8 FL — LOW (ref 80–100)
MONOCYTES # BLD AUTO: 1.6 K/UL — HIGH (ref 0–0.9)
MONOCYTES NFR BLD AUTO: 8.9 % — SIGNIFICANT CHANGE UP (ref 2–14)
NEUTROPHILS # BLD AUTO: 14.43 K/UL — HIGH (ref 1.8–7.4)
NEUTROPHILS NFR BLD AUTO: 80.6 % — HIGH (ref 43–77)
NRBC # BLD: 0 /100 WBCS — SIGNIFICANT CHANGE UP (ref 0–0)
PLATELET # BLD AUTO: 393 K/UL — SIGNIFICANT CHANGE UP (ref 150–400)
POTASSIUM SERPL-MCNC: 4.1 MMOL/L — SIGNIFICANT CHANGE UP (ref 3.5–5.3)
POTASSIUM SERPL-SCNC: 4.1 MMOL/L — SIGNIFICANT CHANGE UP (ref 3.5–5.3)
PROT SERPL-MCNC: 7.6 GM/DL — SIGNIFICANT CHANGE UP (ref 6–8.3)
RBC # BLD: 4.26 M/UL — SIGNIFICANT CHANGE UP (ref 3.8–5.2)
RBC # FLD: 15.8 % — HIGH (ref 10.3–14.5)
SODIUM SERPL-SCNC: 133 MMOL/L — LOW (ref 135–145)
WBC # BLD: 17.92 K/UL — HIGH (ref 3.8–10.5)
WBC # FLD AUTO: 17.92 K/UL — HIGH (ref 3.8–10.5)

## 2024-06-03 PROCEDURE — 99285 EMERGENCY DEPT VISIT HI MDM: CPT

## 2024-06-03 RX ORDER — SODIUM CHLORIDE 9 MG/ML
1000 INJECTION INTRAMUSCULAR; INTRAVENOUS; SUBCUTANEOUS ONCE
Refills: 0 | Status: COMPLETED | OUTPATIENT
Start: 2024-06-03 | End: 2024-06-03

## 2024-06-03 RX ADMIN — SODIUM CHLORIDE 1000 MILLILITER(S): 9 INJECTION INTRAMUSCULAR; INTRAVENOUS; SUBCUTANEOUS at 23:15

## 2024-06-03 NOTE — ED ADULT NURSE NOTE - ED STAT RN HANDOFF DETAILS
Handoff report given to CHRISTIAN Ricardo, pt AOx2 in NAD at this time, respirations equal and unlabored. 14Fr Landeros catheter placed at this time. Safety measures maintained.

## 2024-06-03 NOTE — ED ADULT NURSE NOTE - CHIEF COMPLAINT QUOTE
BIBA for dark colored urine and smell started today. Has aemzquita catheter placed 4 weeks ago. Bedbound, AOx4 in triage.

## 2024-06-03 NOTE — ED ADULT NURSE NOTE - PRIMARY CARE PROVIDER
Subjective   Patient ID: Wanda is a 36 year old female.    Chief Complaint   Patient presents with   • Toenail     Pt c/o of brittle toenails on both feet. Per pt she states it has been going on during and after her pregnancy.      HPI    Patient presents for concern for her nails   Reports she thinks she has fungus of the nails   Has had it for more than a year   Was pregnant so she didn't treat at that time   Has a 2 month old now but not breastfeeding   Would like to treat the nails now   Really bother her aesthetically     Allergies  ALLERGIES:  No Known Allergies    Current Meds      Summary of your Discharge Medications          Accurate as of 2022  3:40 PM. Always use your most recent med list.            Take these Medications      Details   CitraNatal 90 DHA 90-1 & 300 MG combination pack   Take 1 tablet by mouth daily.     multivitamin & mineral w/folic acid- PRENATAL 27-1 MG Tab   Take 1 tablet by mouth daily.     terbinafine 250 MG tablet  Commonly known as: LamISIL   Take 1 tablet by mouth daily.            Active Problems  Patient Active Problem List   Diagnosis   • Attention deficit hyperactivity disorder, predominantly inattentive type   • Onychomycosis of toenail       Past Medical History  Past Medical History:   Diagnosis Date   • Attempting to conceive 11/3/2021   • No known problems        Surgical History  Past Surgical History:   Procedure Laterality Date   •  section, low transverse     • No past surgeries     • Oral surgery procedure Bilateral        Family History  Family History   Problem Relation Age of Onset   • Patient is unaware of any medical problems Mother    • Patient is unaware of any medical problems Father    • Diabetes Paternal Grandmother    • Diabetes Paternal Grandfather          Social History  Social History     Tobacco Use   Smoking Status Never Smoker   Smokeless Tobacco Never Used       Immunizations  Immunizations Reviewed    Patient's  medications, allergies, past medical, surgical, social and family histories were reviewed and updated as appropriate.    Review of Systems   Constitutional: Negative for chills and fever.   HENT: Negative for congestion and sore throat.    Respiratory: Negative for cough.    Cardiovascular: Negative for chest pain.   Gastrointestinal: Negative  Genitourinary: Negative   Musculoskeletal: Negative  Skin: +toenail concern   Neurological: Negative for headaches.       Objective   Visit Vitals  /72 (BP Location: RUE - Right upper extremity, Patient Position: Sitting, Cuff Size: Regular)   Pulse 77   Temp 98.2 °F (36.8 °C) (Temporal)   Resp 18   Ht 5' 2.99\" (1.6 m)   Wt 76.2 kg (167 lb 15.9 oz)   LMP 09/29/2022 Comment: regular menses   SpO2 97%   Breastfeeding No   BMI 29.77 kg/m²       Physical Exam   Vitals reviewed.  Constitutional: Pt is well-developed, well-nourished, and in no acute distress.   HENT:   Head: Normocephalic and atraumatic.   Eyes: Conjunctivae and EOM are normal.   Neck: Normal appearing, Neck supple.   Cardiovascular: Normal rate  Pulmonary/Chest: Effort normal   Musculoskeletal: Normal range of motion of upper and lower extremities.   Neurological:  Gait normal. CN 2-12 grossly intact, 2+patellar reflexes bilaterally  Skin: Skin is warm and dry. Thickened yellow toenails bilaterally with eroded appearance of 1st toes bilaterally, has toenail polish on all toenails but not covering entire nail so can see some of the fungal appearance  Psychiatric: Affect and judgment normal.     Assessment   Problem List Items Addressed This Visit        Skin    Onychomycosis of toenail     > 6 months - chronic condition and seems to be worsening - toenails are getting more thickened and brittle  Will treat empirically with terbinafine 250 mg x 12 weeks   Discussed with patient after that dead fungal nail needs to grow out so trim nail aggressively   RTC if not improving in 3-6 months                Schedule  follow up: as needed - Instructed to call if symptoms worsens, do not improve or new symptoms arise   RTC for pap smear     Disclaimer: this document  was dictated using Dragon Voice Recognition Software. Rapid proofreading  was performed to expedite delivery of information. Phonetic errors will occur, which are common with voice recognition software. If there is concern about meaning or content, please contact our office.        Katja Stanton MD   n/a

## 2024-06-03 NOTE — ED ADULT TRIAGE NOTE - AS TEMP SITE
Verified Results  COMP METABOLIC PANEL WITH CBCA, LIPID PANEL AND TSH (CMP,CBCA,LIPFA,TSH) 12Mar2018 12:25PM WEI LI   [Mar 13, 2018 7:44AM WEI LI]  cristin: Your blood count, sugar, liver, and thyroid were in a good range    Your cholesterol is elevated . You need to diet  (no eggs, sausage or soriano) and exercise (walk) in order to reduce the cardiovascular risk of high cholesterol. but some of the elevation in the cholesterol is HDL good cholesterol so not so bad.     copy of this note to ms alejandro mckeon     Test Name Result Flag Reference   WHITE BLOOD COUNT 5.1 K/mcL  4.2-11.0   RED CELL COUNT 4.55 mil/mcL  4.00-5.20   HEMOGLOBIN 13.8 g/dl  12.0-15.5   HEMATOCRIT 42.0 %  36.0-46.5   MEAN CORPUSCULAR VOLUME 92.3 fL  78.0-100.0   MEAN CORPUSCULAR HEMOGLOBIN 30.3 pg  26.0-34.0   MEAN CORPUSCULAR HGB CONC 32.9 g/dl  32.0-36.5   RDW-CV 13.8 %  11.0-15.0   PLATELET COUNT 201 K/mcL  140-450   PAULO% 59 %     LYM% 30 %     MON% 8 %     EOS% 3 %     BASO% 0 %     PAULO ABS 3.0 K/mcL  1.8-7.7   LYM ABS 1.5 K/mcL  1.0-4.0   MON ABS 0.4 K/mcL  0.3-0.9   EOS ABS 0.2 K/mcL  0.1-0.5   BASO ABS 0.0 K/mcL  0.0-0.3   SODIUM 143 mmol/L  135-145   POTASSIUM 4.7 mmol/L  3.4-5.1   CHLORIDE 105 mmol/L     CARBON DIOXIDE 27 mmol/L  21-32   ANION GAP 16 mmol/L  10-20   GLUCOSE 84 mg/dl  65-99   BUN 23 mg/dl H 6-20   CREATININE 0.90 mg/dl  0.51-0.95   GFR EST.AFRICAN AMER 75     eGFR 60 - 89 mL/min/1.73m2 = Mild decrease in kidney function.   GFR EST.NONAFRI AMER 64     eGFR 60 - 89 mL/min/1.73m2 = Mild decrease in kidney function.   BUN/CREATININE RATIO 26 H 7-25   BILIRUBIN TOTAL 0.5 mg/dl  0.2-1.0   GOT/AST 19 Units/L  <38   ALKALINE PHOSPHATASE 87 Units/L     ALBUMIN 3.9 g/dl  3.6-5.1   TOTAL PROTEIN 7.3 g/dl  6.4-8.2   GLOBULIN (CALCULATED) 3.4 g/dl  2.0-4.0   A/G RATIO 1.1  1.0-2.4   CALCIUM 9.7 mg/dl  8.4-10.2   GPT/ALT 23 Units/L  <79   FASTING STATUS UNKNOWN hrs     CHOLESTEROL 230 mg/dl H <200   Desirable             <200  Borderline High      200 to 239  High                 >=240   HDL CHOLESTEROL 72 mg/dl  >49   Low            <40  Borderline Low 40 to 49  Near Optimal   50 to 59  Optimal        >=60   TRIGLYCERIDES 52 mg/dl  <150   Normal                   <150  Borderline High          150 to 199  High                     200 to 499  Very High                >=500   LDL CHOLESTEROL (CALCULATED) 148 mg/dl H <130   OPTIMAL               <100  NEAR OPTIMAL          100-129  BORDERLINE HIGH       130-159  HIGH                  160-189  VERY HIGH             >=190   NON-HDL CHOLESTEROL 158 mg/dl     Therapeutic Target:  CHD and risk equivalents <130  Multiple risk factors    <160  0 to 1 risk factors      <190   CHOLESTEROL/HDL RATIO 3.2  <4.5   TSH 1.281 mcUnits/mL  0.350-5.000   DIFF TYPE      AUTOMATED DIFFERENTIAL   FASTING STATUS UNKNOWN hrs          oral

## 2024-06-03 NOTE — ED ADULT TRIAGE NOTE - CHIEF COMPLAINT QUOTE
BIBA for dark colored urine and smell started today. Has amezquita catheter placed 4 weeks ago. Bedbound, AOx4 in triage.

## 2024-06-03 NOTE — ED ADULT NURSE NOTE - NSFALLHARMRISKINTERV_ED_ALL_ED
Assistance OOB with selected safe patient handling equipment if applicable/Assistance with ambulation/Communicate risk of Fall with Harm to all staff, patient, and family/Monitor gait and stability/Provide visual cue: red socks, yellow wristband, yellow gown, etc/Reinforce activity limits and safety measures with patient and family/Bed in lowest position, wheels locked, appropriate side rails in place/Call bell, personal items and telephone in reach/Instruct patient to call for assistance before getting out of bed/chair/stretcher/Non-slip footwear applied when patient is off stretcher/[place] to call system/Physically safe environment - no spills, clutter or unnecessary equipment/Purposeful Proactive Rounding/Room/bathroom lighting operational, light cord in reach Dermal Autograft Text: The defect edges were debeveled with a #15 scalpel blade.  Given the location of the defect, shape of the defect and the proximity to free margins a dermal autograft was deemed most appropriate.  Using a sterile surgical marker, the primary defect shape was transferred to the donor site. The area thus outlined was incised deep to adipose tissue with a #15 scalpel blade.  The harvested graft was then trimmed of adipose and epidermal tissue until only dermis was left.  The skin graft was then placed in the primary defect and oriented appropriately.

## 2024-06-03 NOTE — ED ADULT NURSE NOTE - OBJECTIVE STATEMENT
79 yr old female AOx3. C/o dark colored urine noticed today. P presents to ED with indwelling urethral catheter. Output noted to be cloudy. Pt states amezquita was placed 4 weeks ago. PMH of osteoporosis. Pt denies any pain, CP, SOB, N/V/D, fever/chills, h/a, dizziness. Bedbound at baseline

## 2024-06-04 DIAGNOSIS — I10 ESSENTIAL (PRIMARY) HYPERTENSION: ICD-10-CM

## 2024-06-04 DIAGNOSIS — E11.9 TYPE 2 DIABETES MELLITUS WITHOUT COMPLICATIONS: ICD-10-CM

## 2024-06-04 DIAGNOSIS — D64.9 ANEMIA, UNSPECIFIED: ICD-10-CM

## 2024-06-04 DIAGNOSIS — N39.0 URINARY TRACT INFECTION, SITE NOT SPECIFIED: ICD-10-CM

## 2024-06-04 LAB
A1C WITH ESTIMATED AVERAGE GLUCOSE RESULT: 7.2 % — HIGH (ref 4–5.6)
ALBUMIN SERPL ELPH-MCNC: 2.1 G/DL — LOW (ref 3.3–5)
ALP SERPL-CCNC: 120 U/L — SIGNIFICANT CHANGE UP (ref 40–120)
ALT FLD-CCNC: 17 U/L — SIGNIFICANT CHANGE UP (ref 12–78)
ANION GAP SERPL CALC-SCNC: 8 MMOL/L — SIGNIFICANT CHANGE UP (ref 5–17)
APPEARANCE UR: ABNORMAL
AST SERPL-CCNC: 14 U/L — LOW (ref 15–37)
BACTERIA # UR AUTO: ABNORMAL /HPF
BILIRUB SERPL-MCNC: 0.4 MG/DL — SIGNIFICANT CHANGE UP (ref 0.2–1.2)
BILIRUB UR-MCNC: NEGATIVE — SIGNIFICANT CHANGE UP
BUN SERPL-MCNC: 14 MG/DL — SIGNIFICANT CHANGE UP (ref 7–23)
CALCIUM SERPL-MCNC: 9.1 MG/DL — SIGNIFICANT CHANGE UP (ref 8.5–10.1)
CHLORIDE SERPL-SCNC: 103 MMOL/L — SIGNIFICANT CHANGE UP (ref 96–108)
CO2 SERPL-SCNC: 25 MMOL/L — SIGNIFICANT CHANGE UP (ref 22–31)
COLOR SPEC: YELLOW — SIGNIFICANT CHANGE UP
COMMENT - URINE: SIGNIFICANT CHANGE UP
CREAT SERPL-MCNC: 0.43 MG/DL — LOW (ref 0.5–1.3)
DIFF PNL FLD: ABNORMAL
EGFR: 99 ML/MIN/1.73M2 — SIGNIFICANT CHANGE UP
EPI CELLS # UR: PRESENT
ESTIMATED AVERAGE GLUCOSE: 160 MG/DL — HIGH (ref 68–114)
GLUCOSE BLDC GLUCOMTR-MCNC: 129 MG/DL — HIGH (ref 70–99)
GLUCOSE BLDC GLUCOMTR-MCNC: 132 MG/DL — HIGH (ref 70–99)
GLUCOSE BLDC GLUCOMTR-MCNC: 173 MG/DL — HIGH (ref 70–99)
GLUCOSE BLDC GLUCOMTR-MCNC: 191 MG/DL — HIGH (ref 70–99)
GLUCOSE BLDC GLUCOMTR-MCNC: 201 MG/DL — HIGH (ref 70–99)
GLUCOSE SERPL-MCNC: 169 MG/DL — HIGH (ref 70–99)
GLUCOSE UR QL: NEGATIVE MG/DL — SIGNIFICANT CHANGE UP
HCT VFR BLD CALC: 33.3 % — LOW (ref 34.5–45)
HGB BLD-MCNC: 10.8 G/DL — LOW (ref 11.5–15.5)
KETONES UR-MCNC: NEGATIVE MG/DL — SIGNIFICANT CHANGE UP
LEUKOCYTE ESTERASE UR-ACNC: ABNORMAL
MCHC RBC-ENTMCNC: 25.1 PG — LOW (ref 27–34)
MCHC RBC-ENTMCNC: 32.4 G/DL — SIGNIFICANT CHANGE UP (ref 32–36)
MCV RBC AUTO: 77.3 FL — LOW (ref 80–100)
NITRITE UR-MCNC: POSITIVE
NRBC # BLD: 0 /100 WBCS — SIGNIFICANT CHANGE UP (ref 0–0)
PH UR: 9 (ref 5–8)
PLATELET # BLD AUTO: 372 K/UL — SIGNIFICANT CHANGE UP (ref 150–400)
POTASSIUM SERPL-MCNC: 4.3 MMOL/L — SIGNIFICANT CHANGE UP (ref 3.5–5.3)
POTASSIUM SERPL-SCNC: 4.3 MMOL/L — SIGNIFICANT CHANGE UP (ref 3.5–5.3)
PROT SERPL-MCNC: 7.4 GM/DL — SIGNIFICANT CHANGE UP (ref 6–8.3)
PROT UR-MCNC: 30 MG/DL
RBC # BLD: 4.31 M/UL — SIGNIFICANT CHANGE UP (ref 3.8–5.2)
RBC # FLD: 15.9 % — HIGH (ref 10.3–14.5)
RBC CASTS # UR COMP ASSIST: 17 /HPF — HIGH (ref 0–4)
SODIUM SERPL-SCNC: 136 MMOL/L — SIGNIFICANT CHANGE UP (ref 135–145)
SP GR SPEC: <1.005 — LOW (ref 1–1.03)
TRI-PHOS CRY UR QL COMP ASSIST: PRESENT
UROBILINOGEN FLD QL: 0.2 MG/DL — SIGNIFICANT CHANGE UP (ref 0.2–1)
WBC # BLD: 14.67 K/UL — HIGH (ref 3.8–10.5)
WBC # FLD AUTO: 14.67 K/UL — HIGH (ref 3.8–10.5)
WBC UR QL: 10 /HPF — HIGH (ref 0–5)

## 2024-06-04 PROCEDURE — 99223 1ST HOSP IP/OBS HIGH 75: CPT

## 2024-06-04 RX ORDER — DEXTROSE 50 % IN WATER 50 %
15 SYRINGE (ML) INTRAVENOUS ONCE
Refills: 0 | Status: DISCONTINUED | OUTPATIENT
Start: 2024-06-04 | End: 2024-06-06

## 2024-06-04 RX ORDER — DEXTROSE 50 % IN WATER 50 %
25 SYRINGE (ML) INTRAVENOUS ONCE
Refills: 0 | Status: DISCONTINUED | OUTPATIENT
Start: 2024-06-04 | End: 2024-06-06

## 2024-06-04 RX ORDER — SODIUM CHLORIDE 9 MG/ML
1000 INJECTION INTRAMUSCULAR; INTRAVENOUS; SUBCUTANEOUS
Refills: 0 | Status: DISCONTINUED | OUTPATIENT
Start: 2024-06-04 | End: 2024-06-06

## 2024-06-04 RX ORDER — SODIUM CHLORIDE 9 MG/ML
1000 INJECTION, SOLUTION INTRAVENOUS
Refills: 0 | Status: DISCONTINUED | OUTPATIENT
Start: 2024-06-04 | End: 2024-06-06

## 2024-06-04 RX ORDER — AMLODIPINE BESYLATE 2.5 MG/1
10 TABLET ORAL DAILY
Refills: 0 | Status: DISCONTINUED | OUTPATIENT
Start: 2024-06-04 | End: 2024-06-06

## 2024-06-04 RX ORDER — DEXTROSE 10 % IN WATER 10 %
125 INTRAVENOUS SOLUTION INTRAVENOUS ONCE
Refills: 0 | Status: DISCONTINUED | OUTPATIENT
Start: 2024-06-04 | End: 2024-06-06

## 2024-06-04 RX ORDER — ONDANSETRON 8 MG/1
4 TABLET, FILM COATED ORAL EVERY 8 HOURS
Refills: 0 | Status: DISCONTINUED | OUTPATIENT
Start: 2024-06-04 | End: 2024-06-06

## 2024-06-04 RX ORDER — LANOLIN ALCOHOL/MO/W.PET/CERES
3 CREAM (GRAM) TOPICAL AT BEDTIME
Refills: 0 | Status: DISCONTINUED | OUTPATIENT
Start: 2024-06-04 | End: 2024-06-06

## 2024-06-04 RX ORDER — CEFTRIAXONE 500 MG/1
1000 INJECTION, POWDER, FOR SOLUTION INTRAMUSCULAR; INTRAVENOUS ONCE
Refills: 0 | Status: COMPLETED | OUTPATIENT
Start: 2024-06-04 | End: 2024-06-04

## 2024-06-04 RX ORDER — INSULIN LISPRO 100/ML
VIAL (ML) SUBCUTANEOUS
Refills: 0 | Status: DISCONTINUED | OUTPATIENT
Start: 2024-06-04 | End: 2024-06-06

## 2024-06-04 RX ORDER — ENOXAPARIN SODIUM 100 MG/ML
30 INJECTION SUBCUTANEOUS EVERY 24 HOURS
Refills: 0 | Status: DISCONTINUED | OUTPATIENT
Start: 2024-06-04 | End: 2024-06-06

## 2024-06-04 RX ORDER — CEFTRIAXONE 500 MG/1
1000 INJECTION, POWDER, FOR SOLUTION INTRAMUSCULAR; INTRAVENOUS EVERY 24 HOURS
Refills: 0 | Status: DISCONTINUED | OUTPATIENT
Start: 2024-06-05 | End: 2024-06-06

## 2024-06-04 RX ORDER — ACETAMINOPHEN 500 MG
650 TABLET ORAL EVERY 6 HOURS
Refills: 0 | Status: DISCONTINUED | OUTPATIENT
Start: 2024-06-04 | End: 2024-06-06

## 2024-06-04 RX ORDER — GLUCAGON INJECTION, SOLUTION 0.5 MG/.1ML
1 INJECTION, SOLUTION SUBCUTANEOUS ONCE
Refills: 0 | Status: DISCONTINUED | OUTPATIENT
Start: 2024-06-04 | End: 2024-06-06

## 2024-06-04 RX ADMIN — CEFTRIAXONE 100 MILLIGRAM(S): 500 INJECTION, POWDER, FOR SOLUTION INTRAMUSCULAR; INTRAVENOUS at 04:05

## 2024-06-04 RX ADMIN — Medication 650 MILLIGRAM(S): at 17:25

## 2024-06-04 RX ADMIN — ENOXAPARIN SODIUM 30 MILLIGRAM(S): 100 INJECTION SUBCUTANEOUS at 08:55

## 2024-06-04 RX ADMIN — Medication 4: at 08:55

## 2024-06-04 RX ADMIN — Medication 650 MILLIGRAM(S): at 16:25

## 2024-06-04 RX ADMIN — SODIUM CHLORIDE 50 MILLILITER(S): 9 INJECTION INTRAMUSCULAR; INTRAVENOUS; SUBCUTANEOUS at 08:55

## 2024-06-04 NOTE — H&P ADULT - HISTORY OF PRESENT ILLNESS
79 year old female with a PMH of HTN, Osteoporosis presents to the ED to be evaluated for discolored urine. As per the aid, the urine in the Landeros bag appeared darker than with foul smell. Landeros was originally placed 4 weeks ago and was changed 1 week ago. Upon evaluation, patient is stable, NAD, denies dysuria, abdominal pain, or any other pain or discomfort. Labs remarkable for WBC:17.92, H/H:10.5/32.3, UA (+). Normotensive and Afebrile. Received 1L-NS & Ceftriaxone.

## 2024-06-04 NOTE — ED PROVIDER NOTE - PHYSICAL EXAMINATION
General appearance: Nontoxic appearing, conversant, afebrile    Eyes: anicteric sclerae, DAE, EOMI   HENT: Atraumatic; oropharynx clear, MMM and no ulcerations, no pharyngeal erythema or exudate   Neck: Trachea midline; Full range of motion, supple   Pulm: CTA bl, normal respiratory effort and no intercostal retractions, normal work of breathing   CV: RRR, No murmurs, rubs, or gallops   Abdomen: Soft, non-tender, non-distended; no guarding or rebound   Extremities: No peripheral edema, no gross deformities, FROM x4   Skin: Dry, normal temperature, turgor and texture; no rash   Psych: Appropriate affect, cooperative

## 2024-06-04 NOTE — PATIENT PROFILE ADULT - FALL HARM RISK - HARM RISK INTERVENTIONS

## 2024-06-04 NOTE — PATIENT PROFILE ADULT - MEDICATIONS/VISITS
Pt has no signs of infection/inflammation.   She does have pain but it is lower back pain that she has had for months.   no

## 2024-06-04 NOTE — PATIENT PROFILE ADULT - NSPROPOAPRESSUREINJURY_GEN_A_NUR
Gregorio Gonzalez from [facility] called stating that she is faxing patient's lab report here for review. PCP is concerned that her A1C is 11.1%.
yes

## 2024-06-04 NOTE — H&P ADULT - NSHPPHYSICALEXAM_GEN_ALL_CORE
GENERAL: NAD, comfortable at bedside   HEAD:  Atraumatic, Normocephalic  EYES: EOMI, PERRL, conjunctiva and sclera clear  NECK: Supple, No JVD  LUNG: Clear to auscultation bilaterally; No wheezes, rales or rhonchi  HEART: Regular rate and rhythm; No murmurs, rubs, or gallops  ABDOMEN: Soft, Nontender, Nondistended; Normal Bowel sounds   EXTREMITIES:  2+ Peripheral Pulses, No clubbing, cyanosis, or edema  PSYCH: normal mood and affect  NEUROLOGY: AAOx3, non-focal   SKIN: No rashes or lesions

## 2024-06-04 NOTE — ED PROVIDER NOTE - CLINICAL SUMMARY MEDICAL DECISION MAKING FREE TEXT BOX
80yo female presenting with discolored urine.  Patient brought in by aide.  Had amezquita placed initially about 4 weeks ago and this was changed for new one about 1 week ago.  Has been draining but aide who is at bedside is with her M-F and noticed after coming back after the weekend the urine appears much darker.  Patient denies complaints, no fevers, or pain.  + Dark urine with sediment appearing in drainage bag.  Aide notes she drinks less over the weekends when shes not there.  Will get screening labs andgive ivf, treat for possible luis, dehydration.  Will eval uti.  Catheter exchanged.

## 2024-06-04 NOTE — H&P ADULT - PROBLEM SELECTOR PLAN 1
Landeros catheter placed 4 weeks ago   Landeros Exchanged  in the ED   UA (+), Leukocytosis. Afebrile   - IVF  - Ceftriaxone   - F/U Culture  - DVT prophylaxis

## 2024-06-04 NOTE — H&P ADULT - NSHPLABSRESULTS_GEN_ALL_CORE
10.5   17.92 )-----------( 393      ( 03 Jun 2024 21:53 )             32.3     133<L>  |  100  |  20  ----------------------------<  182<H>     06-03  4.1   |  28  |  0.52    Ca    9.2      03 Jun 2024 21:53    TPro  7.6  /  Alb  2.3<L>  /  TBili  0.8  /  DBili  x   /  AST  20  /  ALT  17  /  AlkPhos  126<H>  06-03        Urinalysis Basic - ( 04 Jun 2024 02:01 )  Color: Yellow / Appearance: Turbid / SG: <1.005 / pH: 9.0  Gluc: Negative mg/dL / Ketone: Negative mg/dL  / Bili: Negative / Urobili: 0.2 mg/dL   Blood: Large / Protein: 30 mg/dL / Nitrite: Positive   Leuk Esterase: Large / RBC: 17 /HPF / WBC 10 /HPF   Sq Epi: x / Bacteria: Many /HPF  Hyaline Casts: x/WBC Casts: x

## 2024-06-05 LAB
A1C WITH ESTIMATED AVERAGE GLUCOSE RESULT: 7 % — HIGH (ref 4–5.6)
ANION GAP SERPL CALC-SCNC: 7 MMOL/L — SIGNIFICANT CHANGE UP (ref 5–17)
BUN SERPL-MCNC: 13 MG/DL — SIGNIFICANT CHANGE UP (ref 7–23)
CALCIUM SERPL-MCNC: 8.7 MG/DL — SIGNIFICANT CHANGE UP (ref 8.5–10.1)
CHLORIDE SERPL-SCNC: 104 MMOL/L — SIGNIFICANT CHANGE UP (ref 96–108)
CHOLEST SERPL-MCNC: 112 MG/DL — SIGNIFICANT CHANGE UP
CO2 SERPL-SCNC: 25 MMOL/L — SIGNIFICANT CHANGE UP (ref 22–31)
CREAT SERPL-MCNC: 0.39 MG/DL — LOW (ref 0.5–1.3)
EGFR: 102 ML/MIN/1.73M2 — SIGNIFICANT CHANGE UP
ESTIMATED AVERAGE GLUCOSE: 154 MG/DL — HIGH (ref 68–114)
GLUCOSE BLDC GLUCOMTR-MCNC: 131 MG/DL — HIGH (ref 70–99)
GLUCOSE BLDC GLUCOMTR-MCNC: 148 MG/DL — HIGH (ref 70–99)
GLUCOSE BLDC GLUCOMTR-MCNC: 170 MG/DL — HIGH (ref 70–99)
GLUCOSE BLDC GLUCOMTR-MCNC: 198 MG/DL — HIGH (ref 70–99)
GLUCOSE SERPL-MCNC: 167 MG/DL — HIGH (ref 70–99)
HCT VFR BLD CALC: 29 % — LOW (ref 34.5–45)
HDLC SERPL-MCNC: 33 MG/DL — LOW
HGB BLD-MCNC: 9.2 G/DL — LOW (ref 11.5–15.5)
LIPID PNL WITH DIRECT LDL SERPL: 64 MG/DL — SIGNIFICANT CHANGE UP
MCHC RBC-ENTMCNC: 24.4 PG — LOW (ref 27–34)
MCHC RBC-ENTMCNC: 31.7 G/DL — LOW (ref 32–36)
MCV RBC AUTO: 76.9 FL — LOW (ref 80–100)
NON HDL CHOLESTEROL: 79 MG/DL — SIGNIFICANT CHANGE UP
NRBC # BLD: 0 /100 WBCS — SIGNIFICANT CHANGE UP (ref 0–0)
PLATELET # BLD AUTO: 384 K/UL — SIGNIFICANT CHANGE UP (ref 150–400)
POTASSIUM SERPL-MCNC: 4.1 MMOL/L — SIGNIFICANT CHANGE UP (ref 3.5–5.3)
POTASSIUM SERPL-SCNC: 4.1 MMOL/L — SIGNIFICANT CHANGE UP (ref 3.5–5.3)
RBC # BLD: 3.77 M/UL — LOW (ref 3.8–5.2)
RBC # FLD: 15.8 % — HIGH (ref 10.3–14.5)
SODIUM SERPL-SCNC: 136 MMOL/L — SIGNIFICANT CHANGE UP (ref 135–145)
TRIGL SERPL-MCNC: 70 MG/DL — SIGNIFICANT CHANGE UP
WBC # BLD: 10.47 K/UL — SIGNIFICANT CHANGE UP (ref 3.8–10.5)
WBC # FLD AUTO: 10.47 K/UL — SIGNIFICANT CHANGE UP (ref 3.8–10.5)

## 2024-06-05 PROCEDURE — 99232 SBSQ HOSP IP/OBS MODERATE 35: CPT

## 2024-06-05 RX ADMIN — AMLODIPINE BESYLATE 10 MILLIGRAM(S): 2.5 TABLET ORAL at 05:50

## 2024-06-05 RX ADMIN — CEFTRIAXONE 100 MILLIGRAM(S): 500 INJECTION, POWDER, FOR SOLUTION INTRAMUSCULAR; INTRAVENOUS at 05:49

## 2024-06-05 RX ADMIN — ENOXAPARIN SODIUM 30 MILLIGRAM(S): 100 INJECTION SUBCUTANEOUS at 08:43

## 2024-06-05 RX ADMIN — Medication 2: at 08:42

## 2024-06-06 ENCOUNTER — TRANSCRIPTION ENCOUNTER (OUTPATIENT)
Age: 79
End: 2024-06-06

## 2024-06-06 VITALS
OXYGEN SATURATION: 99 % | HEART RATE: 94 BPM | RESPIRATION RATE: 18 BRPM | SYSTOLIC BLOOD PRESSURE: 123 MMHG | TEMPERATURE: 98 F | DIASTOLIC BLOOD PRESSURE: 74 MMHG

## 2024-06-06 LAB
ANION GAP SERPL CALC-SCNC: 9 MMOL/L — SIGNIFICANT CHANGE UP (ref 5–17)
BUN SERPL-MCNC: 7 MG/DL — SIGNIFICANT CHANGE UP (ref 7–23)
CALCIUM SERPL-MCNC: 8.9 MG/DL — SIGNIFICANT CHANGE UP (ref 8.5–10.1)
CHLORIDE SERPL-SCNC: 103 MMOL/L — SIGNIFICANT CHANGE UP (ref 96–108)
CO2 SERPL-SCNC: 25 MMOL/L — SIGNIFICANT CHANGE UP (ref 22–31)
CREAT SERPL-MCNC: 0.32 MG/DL — LOW (ref 0.5–1.3)
EGFR: 107 ML/MIN/1.73M2 — SIGNIFICANT CHANGE UP
GLUCOSE BLDC GLUCOMTR-MCNC: 133 MG/DL — HIGH (ref 70–99)
GLUCOSE BLDC GLUCOMTR-MCNC: 149 MG/DL — HIGH (ref 70–99)
GLUCOSE BLDC GLUCOMTR-MCNC: 155 MG/DL — HIGH (ref 70–99)
GLUCOSE SERPL-MCNC: 142 MG/DL — HIGH (ref 70–99)
HCT VFR BLD CALC: 32.6 % — LOW (ref 34.5–45)
HGB BLD-MCNC: 10.5 G/DL — LOW (ref 11.5–15.5)
MCHC RBC-ENTMCNC: 24.5 PG — LOW (ref 27–34)
MCHC RBC-ENTMCNC: 32.2 G/DL — SIGNIFICANT CHANGE UP (ref 32–36)
MCV RBC AUTO: 76 FL — LOW (ref 80–100)
NRBC # BLD: 0 /100 WBCS — SIGNIFICANT CHANGE UP (ref 0–0)
PLATELET # BLD AUTO: 424 K/UL — HIGH (ref 150–400)
POTASSIUM SERPL-MCNC: 3.7 MMOL/L — SIGNIFICANT CHANGE UP (ref 3.5–5.3)
POTASSIUM SERPL-SCNC: 3.7 MMOL/L — SIGNIFICANT CHANGE UP (ref 3.5–5.3)
RBC # BLD: 4.29 M/UL — SIGNIFICANT CHANGE UP (ref 3.8–5.2)
RBC # FLD: 15.8 % — HIGH (ref 10.3–14.5)
SODIUM SERPL-SCNC: 137 MMOL/L — SIGNIFICANT CHANGE UP (ref 135–145)
WBC # BLD: 9.98 K/UL — SIGNIFICANT CHANGE UP (ref 3.8–10.5)
WBC # FLD AUTO: 9.98 K/UL — SIGNIFICANT CHANGE UP (ref 3.8–10.5)

## 2024-06-06 PROCEDURE — 99232 SBSQ HOSP IP/OBS MODERATE 35: CPT

## 2024-06-06 RX ORDER — CEFPODOXIME PROXETIL 100 MG
1 TABLET ORAL
Qty: 10 | Refills: 0
Start: 2024-06-06 | End: 2024-06-10

## 2024-06-06 RX ORDER — AMLODIPINE BESYLATE 2.5 MG/1
1 TABLET ORAL
Qty: 30 | Refills: 0
Start: 2024-06-06 | End: 2024-07-05

## 2024-06-06 RX ORDER — AMLODIPINE BESYLATE 2.5 MG/1
1 TABLET ORAL
Qty: 0 | Refills: 0 | DISCHARGE
Start: 2024-06-06

## 2024-06-06 RX ADMIN — CEFTRIAXONE 100 MILLIGRAM(S): 500 INJECTION, POWDER, FOR SOLUTION INTRAMUSCULAR; INTRAVENOUS at 05:42

## 2024-06-06 RX ADMIN — Medication 2: at 08:15

## 2024-06-06 RX ADMIN — AMLODIPINE BESYLATE 10 MILLIGRAM(S): 2.5 TABLET ORAL at 05:43

## 2024-06-06 RX ADMIN — ENOXAPARIN SODIUM 30 MILLIGRAM(S): 100 INJECTION SUBCUTANEOUS at 08:16

## 2024-06-06 NOTE — DIETITIAN INITIAL EVALUATION ADULT - OTHER INFO
Able to obtain limited information from pt. Pt lives c spouse, has HHA assistance (12 hrs x 7 days) who does shopping & cooking; consume mostly convenience & take-out foods; pt is completely dependent for ADLs; also has supportive children.  Pt admitted c discolored urin in Landeros; Landeros exchanged. Denies any N/V/C/D; pt receptive to Easy to Chew consistency for ease of eating & nutritional supplement. Pt reports no PMHx of DM; no medication taken; advised her that she has elevated A1c; discuss c PCP.  No height recorded in header; pt reports her height is 6'0".

## 2024-06-06 NOTE — DIETITIAN INITIAL EVALUATION ADULT - NUTRITIONGOAL OUTCOME1
Pt to consume >75% meals/supplements in order to meet daily estimated need & to promote wound healing

## 2024-06-06 NOTE — PROGRESS NOTE ADULT - ASSESSMENT
A/P     Problem/Plan - 1:  ·  Problem: Acute UTI.   ·  Plan: Landeros catheter placed 4 weeks ago   Landeros Exchanged  in the ED   UA (+), Leukocytosis. Afebrile   - IVF  - Ceftriaxone   - Urine culture growing >[id number] CFUs of Gram negative rods  - DVT prophylaxis.     Problem/Plan - 2:  ·  Problem: Hypertension.   ·  Plan: Normotensive   Continue home meds   - Amlodipine   - Monitor BP.     Problem/Plan - 3:  ·  Problem: Diabetes mellitus.   ·  Plan: ISS with hypoglycemia protocol     Dispo: home tomorrow        Radha Keen MD    
A/P     Problem/Plan - 1:  ·  Problem: Acute UTI.   ·  Plan: Landeros catheter placed 4 weeks ago   Landeros Exchanged  in the ED   UA (+), Leukocytosis. Afebrile   - IVF  - Ceftriaxone   - F/U Culture  - DVT prophylaxis.     Problem/Plan - 2:  ·  Problem: Hypertension.   ·  Plan: Normotensive   Continue home meds   - Amlodipine   - Monitor BP.     Problem/Plan - 3:  ·  Problem: Anemia.   ·  Plan: H/H: 10.5/32.3  - Monitor H/H   - Transfuse as per protocol for  Hgb <7.     Problem/Plan - 4:  ·  Problem: Diabetes mellitus.   ·  Plan: ISS with hypoglycemia protocol   - F.U A1C.    PT jcarlos Keen MD

## 2024-06-06 NOTE — DIETITIAN INITIAL EVALUATION ADULT - ETIOLOGY
Inadequate energy/protein intake + increased needed related to debility, chronic anemia, multiple stage III pressure ulcers

## 2024-06-06 NOTE — DIETITIAN NUTRITION RISK NOTIFICATION - TREATMENT: THE FOLLOWING DIET HAS BEEN RECOMMENDED
Diet, Easy to Chew:   Consistent Carbohydrate {No Snacks}  Supplement Feeding Modality:  Oral  Glucerna Shake Cans or Servings Per Day:  1       Frequency:  Three Times a day (06-06-24 @ 12:13) [Pending Verification By Attending]  Diet, Consistent Carbohydrate Renal w/Evening Snack (06-04-24 @ 06:15) [Active]

## 2024-06-06 NOTE — DISCHARGE NOTE NURSING/CASE MANAGEMENT/SOCIAL WORK - PATIENT PORTAL LINK FT
You can access the FollowMyHealth Patient Portal offered by [facility] by registering at the following website: [email or web address]. By joining [facility]’s FollowMyHealth portal, you will also be able to view your health information using other applications (apps) compatible with our system.

## 2024-06-06 NOTE — DIETITIAN INITIAL EVALUATION ADULT - PHYSICAL ASSESSMENT THIGH
Subjective:      Hiwot Aviles is a 27 year old  female at 29w5d who presents to [facility] with abdominal pain nausea and vomiting.  she was evaluated in the clinic and found to have a positive fetal fibronectin and cervical length of 7mm. Patient reports abdomen pain better than earlier today..      Patient Active Problem List   Diagnosis   • Other dyspnea and respiratory abnormality   • Hyperventilation   • Anxiety state, unspecified   • Back pain   • GERD (gastroesophageal reflux disease)   • Nausea alone   • Lumbar spondylosis   • Lumbar facet joint pain   • Subacromial or subdeltoid bursitis   • Syncope, cardiogenic   • Upper abdominal pain (epigastric & RUQ)   • Non-intractable vomiting with nausea   • Anal fissure   • Abdominal wall pain   • Abdominal cutaneous nerve entrapment syndrome   • Encounter for supervision of normal first pregnancy in first trimester   • Blood type, Rh negative       Her current pregnancy is also significant for routine prenatal care.     REVIEW OF SYSTEMS:  Constitutional: Negative for fever and chills.   Skin: Negative for rash.   HEENT: Negative for eye drainage, ear pain, sore throat.  Respiratory: Negative cough, wheezing or shortness of breath.    Cardiovascular: Negative for chest pain or chest pressure.   Gastrointestinal: vomiting and diarrhea on and off for 3 days  Genitourinary: negative  Extremities:  Negative for joint swelling or joint pain.  Endocrine: Negative for heat or cold intolerance.  Psych: Negative for change in mood or mentation.    Past Medical History:   Diagnosis Date   • Anxiety    • Back pain    • Insomnia    • Mental disorder    • Migraine    • Other chronic pain     back pain   • Otitis media    • Syncope, cardiogenic     last fainted 2014   • Urinary tract infection 2013     Past Surgical History:   Procedure Laterality Date   • CLINIC - CHRONIC PAIN CENTER     • ESOPHAGOGASTRODUODENOSCOPY  2016    Dr Esparza     • ESOPHAGOGASTRODUODENOSCOPY TRANSORAL FLEX W/BX SINGLE OR MULT  08/06/2012    EGD with Bx   • HYSTEROSCOPY  12/08/2016    Dr. Rincon in office   • PAP SMEAR,ROUTINE  04/08/2013   • RADIOFREQUENCY ABLATION BONE  2016    Lumbar   • REMOVE TONSILS/ADENOIDS,12+ Y/O  06/30/2011   • TONSILLECTOMY AND ADENOIDECTOMY     • UPPER GASTROINTESTINAL ENDOSCOPY  7/2014   • WISDOM TOOTH EXTRACTION  07/01/2010     SOCIAL HISTORY:   Social History   Substance Use Topics   • Smoking status: Never Smoker   • Smokeless tobacco: Never Used   • Alcohol use No       Sexually Active: Yes             Partners with: Male       Birth Control/Protection: None      Drug Use:    No              Review of patient's social economics indicates:   [employer]                 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    GBS:   CULTURE   Date Value Ref Range Status   07/30/2017   Final    10,000 TO 50,000 CFU/mL MIXED BACTERIAL ROMMEL WITH NO PREDOMINATING TYPE       Blood type O NEGATIVE  TREPONEMA PALLIDUM AB (no units)   Date Value   07/25/2017 NONREACTIVE     Rubella Antibody IgG (Units/mL)   Date Value   07/25/2017 17.1     HEP B Surface AG (no units)   Date Value   07/25/2017 NEGATIVE       No results found     Objective:      PHYSICAL EXAM:  Visit Vitals  LMP 05/26/2017 (Exact Date)     GENERAL: Well developed, well nourished, in no acute distress  HEENT: WIthin normal limits  LUNGS:  Normal respiratory effort.  CTA.  No wheezes, rales or rhonchi bilaterally  HEART:  Regular rate, rhythm, S1, S2 normal, no murmur, rub or gallop   ABDOMEN:  soft and non-tender, normal bowel sounds  FHT: 150    FETAL PRESENTATION:  cephalic    PELVIC:    External genitalia: normal appearance, no lesions or discharge    Vagina: normal appearing without lesions or discharge    Cervix: Visually closed  Uterus:  Gravid, Fundus non tender  EXTREMITIES: RUE: Normal strength and tone, non-tender,  no edema        LUE:  Normal strength and tone, non-tender, no edema        RLE:  Normal strength and tone, non-tender, Negative Efra's sign      no edema            LLE:  Normal strength and tone, non-tender, Negative Efra's sign      no edema      Assessment:   27 year old  at 29w5d presents to [facility] for  labor   Additional diagnoses:   Patient Active Problem List   Diagnosis   • Other dyspnea and respiratory abnormality   • Hyperventilation   • Anxiety state, unspecified   • Back pain   • GERD (gastroesophageal reflux disease)   • Nausea alone   • Lumbar spondylosis   • Lumbar facet joint pain   • Subacromial or subdeltoid bursitis   • Syncope, cardiogenic   • Upper abdominal pain (epigastric & RUQ)   • Non-intractable vomiting with nausea   • Anal fissure   • Abdominal wall pain   • Abdominal cutaneous nerve entrapment syndrome   • Encounter for supervision of normal first pregnancy in first trimester   • Blood type, Rh negative       Plan:      Admit to Labor and Delivery Inpatient status   consult mfm and NICU.  terbutaline today and in 24 hours.  mgsor if dell soon.  Is the patient expected to require at least a two midnight stay in the hospital? Yes -  I certify that I expect inpatient services for greater than two midnights are medically necessary for this patient.  Please see H&P and MD Progress Notes for additional information about the patient's course of treatment.       severe

## 2024-06-06 NOTE — DISCHARGE NOTE PROVIDER - NSDCMRMEDTOKEN_GEN_ALL_CORE_FT
amLODIPine 10 mg oral tablet: 1 tab(s) orally once a day   amLODIPine 10 mg oral tablet: 1 tab(s) orally once a day  cefpodoxime 100 mg oral tablet: 1 tab(s) orally 2 times a day

## 2024-06-06 NOTE — DISCHARGE NOTE PROVIDER - HOSPITAL COURSE
79 year old female with a PMH of HTN, Osteoporosis presents to the ED to be evaluated for discolored urine. As per the aid, the urine in the Landeros bag appeared darker than with foul smell. Landeros was originally placed 4 weeks ago and was changed 1 week ago. Upon evaluation, patient is stable, NAD, denies dysuria, abdominal pain, or any other pain or discomfort. Labs remarkable for WBC:17.92, H/H:10.5/32.3, UA (+). Normotensive and Afebrile. Received 1L-NS & Ceftriaxone.       Acute UTI.   Landeros catheter placed 4 weeks ago   Landeros Exchanged  in the ED   UA (+), Leukocytosis. Afebrile   IVF  Ceftriaxone   Urine culture growing >851065 CFUs of Gram negative rods    Hypertension.   Normotensive   Continue home meds   Amlodipine     Diabetes mellitus.   ISS with hypoglycemia protocol                        79 year old female with a PMH of HTN, Osteoporosis presents to the ED to be evaluated for discolored urine. As per the aid, the urine in the Landeros bag appeared darker than with foul smell. Landeros was originally placed 4 weeks ago and was changed 1 week ago. Upon evaluation, patient is stable, NAD, denies dysuria, abdominal pain, or any other pain or discomfort. Labs remarkable for WBC:17.92, H/H:10.5/32.3, UA (+). Normotensive and Afebrile. Received 1L-NS & Ceftriaxone.       Acute UTI.   Landeros catheter placed 4 weeks ago   Landeros Exchanged  in the ED   UA (+), Leukocytosis. Afebrile   IVF  Ceftriaxone   Urine culture growing >887079 CFUs of Gram negative rods  D/c on Vantin 100 mg BID x 5 days    Hypertension.   Normotensive     Continue home meds   Amlodipine     Diabetes mellitus.   ISS with hypoglycemia protocol                        79 year old female with a PMH of HTN, Osteoporosis presents to the ED to be evaluated for discolored urine. As per the aid, the urine in the Amezquita bag appeared darker than with foul smell. Amezquita was originally placed 4 weeks ago and was changed 1 week ago. Upon evaluation, patient is stable, NAD, denies dysuria, abdominal pain, or any other pain or discomfort. Labs remarkable for WBC:17.92, H/H:10.5/32.3, UA (+). Normotensive and Afebrile. Received 1L-NS & Ceftriaxone.       Acute UTI.   Amezquita catheter placed 4 weeks ago. UTI likely related to amezquita catheter  Amezquita Exchanged  in the ED   UA (+), Leukocytosis. Afebrile   IVF  Ceftriaxone   Urine culture growing >014654 CFUs of Gram negative rods  D/c on Vantin 100 mg BID x 5 days    Hypertension.   Normotensive     Continue home meds   Amlodipine     Diabetes mellitus.   ISS with hypoglycemia protocol

## 2024-06-06 NOTE — DIETITIAN INITIAL EVALUATION ADULT - LITERATURE/VIDEOS GIVEN
Provided [facility] Nutrition Therapy handout for HHA reference

## 2024-06-06 NOTE — DISCHARGE NOTE PROVIDER - NSDCCPCAREPLAN_GEN_ALL_CORE_FT
PRINCIPAL DISCHARGE DIAGNOSIS  Diagnosis: Acute UTI  Assessment and Plan of Treatment: You were started on antibiotics for a urinary infection. To help prevent future infections maintain healthy hygiene and avoid taking long baths. Wipe from front to back and empty your bladder frequently by keeping yourself properly hydrated. Monitor for signs/symptoms of infection, such as, fever/chills, burning/pain with urination, urinary frequency/hesitancy, cloudy urine, or blood in urine and follow-up with your primary care provider as outpatient for further care.        SECONDARY DISCHARGE DIAGNOSES  Diagnosis: Diabetes mellitus  Assessment and Plan of Treatment: Continue your medication regimen and a consistent carbohydrate diet (Meaning eating the same amount of carbohydrates at the same time each day). Monitor blood glucose levels throughout the day before meals and at bedtime. Record blood sugars and bring to outpatient providers appointment in order to be reviewed by your doctor for management modifications. If your sugars are more than 400 or less than 70 you should contact your PCP immediately. Monitor for signs/symptoms of low blood glucose, such as, dizziness, altered mental status, or cool/clammy skin. In addition, monitor for signs/symptoms of high blood glucose, such as, feeling hot, dry, fatigued, or with increased thirst/urination. Monitor finger sticks pre-meal and bedtime, low salt, fat and carbohydrate diet, minimize glucose intake.  Exercise daily for at least 30 minutes and weight loss.  Follow up with primary care physician and endocrinologist for routine Hemoglobin A1C checks and management.  Follow up with your ophthalmologist for routine yearly vision exams.Make regular podiatry appointments in order to have feet checked for wounds and uncontrolled toe nail growth to prevent infections, as well as, appointments with an ophthalmologist to monitor your vision.      Diagnosis: Hypertension  Assessment and Plan of Treatment: Continue blood pressure medication regimen as directed. Monitor for any visual changes, headaches or dizziness.  Monitor blood pressure regularly.  Follow up with your PCP for further management for high blood pressure.       PRINCIPAL DISCHARGE DIAGNOSIS  Diagnosis: Acute UTI  Assessment and Plan of Treatment: You were started on antibiotics for a urinary infection. To help prevent future infections maintain healthy hygiene and avoid taking long baths. Wipe from front to back and empty your bladder frequently by keeping yourself properly hydrated. Monitor for signs/symptoms of infection, such as, fever/chills, burning/pain with urination, urinary frequency/hesitancy, cloudy urine, or blood in urine and follow-up with your primary care provider as outpatient for further care.        SECONDARY DISCHARGE DIAGNOSES  Diagnosis: Diabetes mellitus  Assessment and Plan of Treatment: Continue your medication regimen and a consistent carbohydrate diet (Meaning eating the same amount of carbohydrates at the same time each day). Monitor blood glucose levels throughout the day before meals and at bedtime. Record blood sugars and bring to outpatient providers appointment in order to be reviewed by your doctor for management modifications. If your sugars are more than 400 or less than 70 you should contact your PCP immediately. Monitor for signs/symptoms of low blood glucose, such as, dizziness, altered mental status, or cool/clammy skin. In addition, monitor for signs/symptoms of high blood glucose, such as, feeling hot, dry, fatigued, or with increased thirst/urination. Monitor finger sticks pre-meal and bedtime, low salt, fat and carbohydrate diet, minimize glucose intake.  Exercise daily for at least 30 minutes and weight loss.  Follow up with primary care physician and endocrinologist for routine Hemoglobin A1C checks and management.  Follow up with your ophthalmologist for routine yearly vision exams.Make regular podiatry appointments in order to have feet checked for wounds and uncontrolled toe nail growth to prevent infections, as well as, appointments with an ophthalmologist to monitor your vision.      Diagnosis: Hypertension  Assessment and Plan of Treatment: Continue blood pressure medication regimen as directed. Monitor for any visual changes, headaches or dizziness.  Monitor blood pressure regularly.  Follow up with your PCP for further management for high blood pressure.      Diagnosis: Encounter for wound care  Assessment and Plan of Treatment: R Greater Trochanter, Stage III Pressure Injury; B PSIS, Stage IV Pressure Injury; Sacrum, Stage IV Pressure Injury;  1. Clean with sterile water.   2. Apply cavilon to periwound.  3. Fill areas of undermining with coiled hydrofiber ag, cover with hydrofiber ag.   4. Apply foam to wound.  5. Change every other day or if soiled/compromised.  Strict incontinence care, practice good hygiene, and apply moisture barrier cream as needed. Proper positioning and change position every 2 hours to off load the pressure area (i.e. Total Cair boots to B LE) and prevent skin breakdown. Optimize nutrition & hydration to promote wound healing.

## 2024-06-06 NOTE — DIETITIAN INITIAL EVALUATION ADULT - PERTINENT LABORATORY DATA
06-06    137  |  103  |  7   ----------------------------<  142<H>  3.7   |  25  |  0.32<L>    Ca    8.9      06 Jun 2024 07:52    POCT Blood Glucose.: 133 mg/dL (06-06-24); 06-05 170, 198, 131, 148  A1C with Estimated Average Glucose Result: 7.0 %, 154 (06-05-24)  A1C Result: 7.2 % (06-04-24)

## 2024-06-06 NOTE — PROGRESS NOTE ADULT - SUBJECTIVE AND OBJECTIVE BOX
Patient is a 79y old  Female who presents with a chief complaint of MARCY (05 Jun 2024 11:24)      INTERVAL HPI/OVERNIGHT EVENTS: No acute events overnight.     MEDICATIONS  (STANDING):  amLODIPine   Tablet 10 milliGRAM(s) Oral daily  cefTRIAXone   IVPB 1000 milliGRAM(s) IV Intermittent every 24 hours  dextrose 10% Bolus 125 milliLiter(s) IV Bolus once  dextrose 5%. 1000 milliLiter(s) (50 mL/Hr) IV Continuous <Continuous>  dextrose 50% Injectable 25 Gram(s) IV Push once  enoxaparin Injectable 30 milliGRAM(s) SubCutaneous every 24 hours  glucagon  Injectable 1 milliGRAM(s) IntraMuscular once  insulin lispro (ADMELOG) corrective regimen sliding scale   SubCutaneous three times a day before meals  sodium chloride 0.9%. 1000 milliLiter(s) (50 mL/Hr) IV Continuous <Continuous>    MEDICATIONS  (PRN):  acetaminophen     Tablet .. 650 milliGRAM(s) Oral every 6 hours PRN Temp greater or equal to 38C (100.4F), Mild Pain (1 - 3)  aluminum hydroxide/magnesium hydroxide/simethicone Suspension 30 milliLiter(s) Oral every 4 hours PRN Dyspepsia  dextrose Oral Gel 15 Gram(s) Oral once PRN Blood Glucose LESS THAN 70 milliGRAM(s)/deciliter  melatonin 3 milliGRAM(s) Oral at bedtime PRN Insomnia  ondansetron Injectable 4 milliGRAM(s) IV Push every 8 hours PRN Nausea and/or Vomiting      Allergies    No Known Allergies    Intolerances        REVIEW OF SYSTEMS:  CONSTITUTIONAL: +ve for fatigue  EYES: No eye pain, visual disturbances, or discharge  ENMT:  No difficulty hearing, tinnitus, vertigo; No sinus or throat pain  NECK: No pain or stiffness      Vital Signs Last 24 Hrs  T(C): 36.9 (06 Jun 2024 05:18), Max: 37.1 (05 Jun 2024 17:39)  T(F): 98.5 (06 Jun 2024 05:18), Max: 98.8 (05 Jun 2024 17:39)  HR: 89 (06 Jun 2024 05:18) (87 - 92)  BP: 134/71 (06 Jun 2024 05:18) (117/68 - 134/71)  BP(mean): --  RR: 18 (06 Jun 2024 05:18) (18 - 18)  SpO2: 98% (06 Jun 2024 05:18) (97% - 100%)    Parameters below as of 06 Jun 2024 05:18  Patient On (Oxygen Delivery Method): room air        PHYSICAL EXAM:  HEAD:  Atraumatic, Normocephalic  EYES: EOMI, PERRLA, conjunctiva and sclera clear  NECK: Supple, No JVD, Normal thyroid      LABS:                        10.5   9.98  )-----------( 424      ( 06 Jun 2024 07:52 )             32.6     06-06    137  |  103  |  7   ----------------------------<  142<H>  3.7   |  25  |  0.32<L>    Ca    8.9      06 Jun 2024 07:52        Urinalysis Basic - ( 06 Jun 2024 07:52 )    Color: x / Appearance: x / SG: x / pH: x  Gluc: 142 mg/dL / Ketone: x  / Bili: x / Urobili: x   Blood: x / Protein: x / Nitrite: x   Leuk Esterase: x / RBC: x / WBC x   Sq Epi: x / Non Sq Epi: x / Bacteria: x      CAPILLARY BLOOD GLUCOSE      POCT Blood Glucose.: 155 mg/dL (06 Jun 2024 07:55)  POCT Blood Glucose.: 148 mg/dL (05 Jun 2024 21:12)  POCT Blood Glucose.: 131 mg/dL (05 Jun 2024 16:17)  POCT Blood Glucose.: 198 mg/dL (05 Jun 2024 11:17)      
Patient is a 79y old  Female who presents with a chief complaint of MARCY (04 Jun 2024 07:32)      INTERVAL HPI/OVERNIGHT EVENTS: No acute events overnight.     MEDICATIONS  (STANDING):  amLODIPine   Tablet 10 milliGRAM(s) Oral daily  cefTRIAXone   IVPB 1000 milliGRAM(s) IV Intermittent every 24 hours  dextrose 10% Bolus 125 milliLiter(s) IV Bolus once  dextrose 5%. 1000 milliLiter(s) (50 mL/Hr) IV Continuous <Continuous>  dextrose 50% Injectable 25 Gram(s) IV Push once  enoxaparin Injectable 30 milliGRAM(s) SubCutaneous every 24 hours  glucagon  Injectable 1 milliGRAM(s) IntraMuscular once  insulin lispro (ADMELOG) corrective regimen sliding scale   SubCutaneous three times a day before meals  sodium chloride 0.9%. 1000 milliLiter(s) (50 mL/Hr) IV Continuous <Continuous>    MEDICATIONS  (PRN):  acetaminophen     Tablet .. 650 milliGRAM(s) Oral every 6 hours PRN Temp greater or equal to 38C (100.4F), Mild Pain (1 - 3)  aluminum hydroxide/magnesium hydroxide/simethicone Suspension 30 milliLiter(s) Oral every 4 hours PRN Dyspepsia  dextrose Oral Gel 15 Gram(s) Oral once PRN Blood Glucose LESS THAN 70 milliGRAM(s)/deciliter  melatonin 3 milliGRAM(s) Oral at bedtime PRN Insomnia  ondansetron Injectable 4 milliGRAM(s) IV Push every 8 hours PRN Nausea and/or Vomiting      Allergies    No Known Allergies    Intolerances        REVIEW OF SYSTEMS:  CONSTITUTIONAL: +ve for fatigue  EYES: No eye pain, visual disturbances, or discharge  ENMT:  No difficulty hearing, tinnitus, vertigo; No sinus or throat pain  NECK: No pain or stiffness      Vital Signs Last 24 Hrs  T(C): 36.8 (05 Jun 2024 11:06), Max: 37.7 (04 Jun 2024 17:46)  T(F): 98.3 (05 Jun 2024 11:06), Max: 99.9 (04 Jun 2024 17:46)  HR: 92 (05 Jun 2024 11:06) (92 - 94)  BP: 126/67 (05 Jun 2024 11:06) (119/60 - 135/67)  BP(mean): --  RR: 18 (05 Jun 2024 11:06) (18 - 20)  SpO2: 100% (05 Jun 2024 11:06) (97% - 100%)    Parameters below as of 05 Jun 2024 11:06  Patient On (Oxygen Delivery Method): room air        PHYSICAL EXAM:    HEAD:  Atraumatic, Normocephalic  EYES: EOMI, PERRLA, conjunctiva and sclera clear  NECK: Supple, No JVD, Normal thyroid      LABS:                        9.2    10.47 )-----------( 384      ( 05 Jun 2024 07:03 )             29.0     06-05    136  |  104  |  13  ----------------------------<  167<H>  4.1   |  25  |  0.39<L>    Ca    8.7      05 Jun 2024 07:03    TPro  7.4  /  Alb  2.1<L>  /  TBili  0.4  /  DBili  x   /  AST  14<L>  /  ALT  17  /  AlkPhos  120  06-04      Urinalysis Basic - ( 05 Jun 2024 07:03 )    Color: x / Appearance: x / SG: x / pH: x  Gluc: 167 mg/dL / Ketone: x  / Bili: x / Urobili: x   Blood: x / Protein: x / Nitrite: x   Leuk Esterase: x / RBC: x / WBC x   Sq Epi: x / Non Sq Epi: x / Bacteria: x      CAPILLARY BLOOD GLUCOSE      POCT Blood Glucose.: 198 mg/dL (05 Jun 2024 11:17)  POCT Blood Glucose.: 170 mg/dL (05 Jun 2024 07:45)  POCT Blood Glucose.: 191 mg/dL (04 Jun 2024 21:05)  POCT Blood Glucose.: 132 mg/dL (04 Jun 2024 16:18)  POCT Blood Glucose.: 129 mg/dL (04 Jun 2024 12:19)

## 2024-06-06 NOTE — DISCHARGE NOTE NURSING/CASE MANAGEMENT/SOCIAL WORK - NSDCPEFALRISK_GEN_ALL_CORE
For information on Fall & Injury Prevention, visit: [email or web address] OR  [email or web address] OR  https://www.cdc.gov/steadi/patient.html

## 2024-06-06 NOTE — DISCHARGE NOTE PROVIDER - DETAILS OF MALNUTRITION DIAGNOSIS/DIAGNOSES
This patient has been assessed with a concern for Malnutrition and was treated during this hospitalization for the following Nutrition diagnosis/diagnoses:     -  06/06/2024: Severe protein-calorie malnutrition   -  06/06/2024: Underweight (BMI < 19)

## 2024-06-07 LAB
-  AMOXICILLIN/CLAVULANIC ACID: SIGNIFICANT CHANGE UP
-  AMPICILLIN/SULBACTAM: SIGNIFICANT CHANGE UP
-  AMPICILLIN: SIGNIFICANT CHANGE UP
-  AZTREONAM: SIGNIFICANT CHANGE UP
-  CEFAZOLIN: SIGNIFICANT CHANGE UP
-  CEFEPIME: SIGNIFICANT CHANGE UP
-  CEFOXITIN: SIGNIFICANT CHANGE UP
-  CEFTRIAXONE: SIGNIFICANT CHANGE UP
-  CEFUROXIME: SIGNIFICANT CHANGE UP
-  CIPROFLOXACIN: SIGNIFICANT CHANGE UP
-  ERTAPENEM: SIGNIFICANT CHANGE UP
-  GENTAMICIN: SIGNIFICANT CHANGE UP
-  LEVOFLOXACIN: SIGNIFICANT CHANGE UP
-  MEROPENEM: SIGNIFICANT CHANGE UP
-  NITROFURANTOIN: SIGNIFICANT CHANGE UP
-  PIPERACILLIN/TAZOBACTAM: SIGNIFICANT CHANGE UP
-  TOBRAMYCIN: SIGNIFICANT CHANGE UP
-  TRIMETHOPRIM/SULFAMETHOXAZOLE: SIGNIFICANT CHANGE UP
METHOD TYPE: SIGNIFICANT CHANGE UP

## 2024-06-09 LAB
CULTURE RESULTS: ABNORMAL
ORGANISM # SPEC MICROSCOPIC CNT: ABNORMAL
ORGANISM # SPEC MICROSCOPIC CNT: SIGNIFICANT CHANGE UP
SPECIMEN SOURCE: SIGNIFICANT CHANGE UP
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
## 2024-06-17 DIAGNOSIS — D64.9 ANEMIA, UNSPECIFIED: ICD-10-CM

## 2024-06-17 DIAGNOSIS — N39.0 URINARY TRACT INFECTION, SITE NOT SPECIFIED: ICD-10-CM

## 2024-06-17 DIAGNOSIS — M81.0 AGE-RELATED OSTEOPOROSIS WITHOUT CURRENT PATHOLOGICAL FRACTURE: ICD-10-CM

## 2024-06-17 DIAGNOSIS — E43 UNSPECIFIED SEVERE PROTEIN-CALORIE MALNUTRITION: ICD-10-CM

## 2024-06-17 DIAGNOSIS — M51.26 OTHER INTERVERTEBRAL DISC DISPLACEMENT, LUMBAR REGION: ICD-10-CM

## 2024-06-17 DIAGNOSIS — E86.0 DEHYDRATION: ICD-10-CM

## 2024-06-17 DIAGNOSIS — T83.511A INFECTION AND INFLAMMATORY REACTION DUE TO INDWELLING URETHRAL CATHETER, INITIAL ENCOUNTER: ICD-10-CM

## 2024-06-17 DIAGNOSIS — E11.9 TYPE 2 DIABETES MELLITUS WITHOUT COMPLICATIONS: ICD-10-CM

## 2024-06-17 DIAGNOSIS — I10 ESSENTIAL (PRIMARY) HYPERTENSION: ICD-10-CM
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
## 2024-06-17 DIAGNOSIS — Z98.51 TUBAL LIGATION STATUS: ICD-10-CM

## 2024-08-04 ENCOUNTER — INPATIENT (INPATIENT)
Facility: HOSPITAL | Age: 79
LOS: 4 days | Discharge: HOME | End: 2024-08-09
Attending: STUDENT IN AN ORGANIZED HEALTH CARE EDUCATION/TRAINING PROGRAM | Admitting: STUDENT IN AN ORGANIZED HEALTH CARE EDUCATION/TRAINING PROGRAM
Payer: MEDICARE

## 2024-08-04 VITALS
HEIGHT: 72 IN | DIASTOLIC BLOOD PRESSURE: 66 MMHG | SYSTOLIC BLOOD PRESSURE: 124 MMHG | TEMPERATURE: 100 F | OXYGEN SATURATION: 96 % | WEIGHT: 89.95 LBS | RESPIRATION RATE: 16 BRPM | HEART RATE: 105 BPM

## 2024-08-04 LAB
ALBUMIN SERPL ELPH-MCNC: 1.9 G/DL — LOW (ref 3.3–5)
ALP SERPL-CCNC: 130 U/L — HIGH (ref 40–120)
ALT FLD-CCNC: 22 U/L — SIGNIFICANT CHANGE UP (ref 12–78)
ANION GAP SERPL CALC-SCNC: 7 MMOL/L — SIGNIFICANT CHANGE UP (ref 5–17)
ANISOCYTOSIS BLD QL: SIGNIFICANT CHANGE UP
APTT BLD: 25.6 SEC — SIGNIFICANT CHANGE UP (ref 24.5–35.6)
AST SERPL-CCNC: 19 U/L — SIGNIFICANT CHANGE UP (ref 15–37)
BASOPHILS # BLD AUTO: 0.05 K/UL — SIGNIFICANT CHANGE UP (ref 0–0.2)
BASOPHILS NFR BLD AUTO: 0.3 % — SIGNIFICANT CHANGE UP (ref 0–2)
BILIRUB SERPL-MCNC: 0.5 MG/DL — SIGNIFICANT CHANGE UP (ref 0.2–1.2)
BUN SERPL-MCNC: 13 MG/DL — SIGNIFICANT CHANGE UP (ref 7–23)
CALCIUM SERPL-MCNC: 9.5 MG/DL — SIGNIFICANT CHANGE UP (ref 8.5–10.1)
CHLORIDE SERPL-SCNC: 89 MMOL/L — LOW (ref 96–108)
CO2 SERPL-SCNC: 27 MMOL/L — SIGNIFICANT CHANGE UP (ref 22–31)
CREAT SERPL-MCNC: 0.5 MG/DL — SIGNIFICANT CHANGE UP (ref 0.5–1.3)
DACRYOCYTES BLD QL SMEAR: SLIGHT — SIGNIFICANT CHANGE UP
EGFR: 96 ML/MIN/1.73M2 — SIGNIFICANT CHANGE UP
ELLIPTOCYTES BLD QL SMEAR: SLIGHT — SIGNIFICANT CHANGE UP
EOSINOPHIL # BLD AUTO: 0.03 K/UL — SIGNIFICANT CHANGE UP (ref 0–0.5)
EOSINOPHIL NFR BLD AUTO: 0.2 % — SIGNIFICANT CHANGE UP (ref 0–6)
GLUCOSE SERPL-MCNC: 264 MG/DL — HIGH (ref 70–99)
HCT VFR BLD CALC: 29.5 % — LOW (ref 34.5–45)
HGB BLD-MCNC: 9.3 G/DL — LOW (ref 11.5–15.5)
HYPOCHROMIA BLD QL: SLIGHT — SIGNIFICANT CHANGE UP
IMM GRANULOCYTES NFR BLD AUTO: 0.6 % — SIGNIFICANT CHANGE UP (ref 0–0.9)
INR BLD: 1.24 RATIO — HIGH (ref 0.85–1.18)
LACTATE SERPL-SCNC: 1.4 MMOL/L — SIGNIFICANT CHANGE UP (ref 0.7–2)
LG PLATELETS BLD QL AUTO: SLIGHT — SIGNIFICANT CHANGE UP
LYMPHOCYTES # BLD AUTO: 1.01 K/UL — SIGNIFICANT CHANGE UP (ref 1–3.3)
LYMPHOCYTES # BLD AUTO: 5.7 % — LOW (ref 13–44)
MACROCYTES BLD QL: SLIGHT — SIGNIFICANT CHANGE UP
MANUAL SMEAR VERIFICATION: SIGNIFICANT CHANGE UP
MCHC RBC-ENTMCNC: 22.1 PG — LOW (ref 27–34)
MCHC RBC-ENTMCNC: 31.5 G/DL — LOW (ref 32–36)
MCV RBC AUTO: 70.2 FL — LOW (ref 80–100)
MICROCYTES BLD QL: SIGNIFICANT CHANGE UP
MONOCYTES # BLD AUTO: 1.05 K/UL — HIGH (ref 0–0.9)
MONOCYTES NFR BLD AUTO: 5.9 % — SIGNIFICANT CHANGE UP (ref 2–14)
NEUTROPHILS # BLD AUTO: 15.43 K/UL — HIGH (ref 1.8–7.4)
NEUTROPHILS NFR BLD AUTO: 87.3 % — HIGH (ref 43–77)
NRBC # BLD: 0 /100 WBCS — SIGNIFICANT CHANGE UP (ref 0–0)
OVALOCYTES BLD QL SMEAR: SLIGHT — SIGNIFICANT CHANGE UP
PLAT MORPH BLD: ABNORMAL
PLATELET # BLD AUTO: 434 K/UL — HIGH (ref 150–400)
PLATELET CLUMP BLD QL SMEAR: SLIGHT
PLATELET COUNT - ESTIMATE: ABNORMAL
POIKILOCYTOSIS BLD QL AUTO: SLIGHT — SIGNIFICANT CHANGE UP
POLYCHROMASIA BLD QL SMEAR: SLIGHT — SIGNIFICANT CHANGE UP
POTASSIUM SERPL-MCNC: 4.7 MMOL/L — SIGNIFICANT CHANGE UP (ref 3.5–5.3)
POTASSIUM SERPL-SCNC: 4.7 MMOL/L — SIGNIFICANT CHANGE UP (ref 3.5–5.3)
PROT SERPL-MCNC: 7.2 GM/DL — SIGNIFICANT CHANGE UP (ref 6–8.3)
PROTHROM AB SERPL-ACNC: 14.7 SEC — HIGH (ref 9.5–13)
RBC # BLD: 4.2 M/UL — SIGNIFICANT CHANGE UP (ref 3.8–5.2)
RBC # FLD: 18.9 % — HIGH (ref 10.3–14.5)
RBC BLD AUTO: ABNORMAL
SODIUM SERPL-SCNC: 123 MMOL/L — LOW (ref 135–145)
TARGETS BLD QL SMEAR: SLIGHT — SIGNIFICANT CHANGE UP
WBC # BLD: 17.68 K/UL — HIGH (ref 3.8–10.5)
WBC # FLD AUTO: 17.68 K/UL — HIGH (ref 3.8–10.5)

## 2024-08-04 PROCEDURE — 74177 CT ABD & PELVIS W/CONTRAST: CPT | Mod: 26,MC

## 2024-08-04 PROCEDURE — 71045 X-RAY EXAM CHEST 1 VIEW: CPT | Mod: 26

## 2024-08-04 PROCEDURE — 99285 EMERGENCY DEPT VISIT HI MDM: CPT

## 2024-08-04 RX ORDER — VANCOMYCIN HYDROCHLORIDE 5 G/100ML
1000 INJECTION, POWDER, LYOPHILIZED, FOR SOLUTION INTRAVENOUS ONCE
Refills: 0 | Status: COMPLETED | OUTPATIENT
Start: 2024-08-04 | End: 2024-08-04

## 2024-08-04 RX ORDER — CEFEPIME HYDROCHLORIDE 1 G/1
2000 INJECTION, POWDER, FOR SOLUTION INTRAMUSCULAR; INTRAVENOUS ONCE
Refills: 0 | Status: COMPLETED | OUTPATIENT
Start: 2024-08-04 | End: 2024-08-04

## 2024-08-04 RX ORDER — ACETAMINOPHEN 500 MG
600 TABLET ORAL ONCE
Refills: 0 | Status: COMPLETED | OUTPATIENT
Start: 2024-08-04 | End: 2024-08-04

## 2024-08-04 RX ORDER — BACTERIOSTATIC SODIUM CHLORIDE 0.9 %
1250 VIAL (ML) INJECTION ONCE
Refills: 0 | Status: COMPLETED | OUTPATIENT
Start: 2024-08-04 | End: 2024-08-04

## 2024-08-04 RX ADMIN — Medication 240 MILLIGRAM(S): at 21:08

## 2024-08-04 RX ADMIN — VANCOMYCIN HYDROCHLORIDE 250 MILLIGRAM(S): 5 INJECTION, POWDER, LYOPHILIZED, FOR SOLUTION INTRAVENOUS at 23:31

## 2024-08-04 RX ADMIN — Medication 1250 MILLILITER(S): at 21:32

## 2024-08-04 RX ADMIN — CEFEPIME HYDROCHLORIDE 100 MILLIGRAM(S): 1 INJECTION, POWDER, FOR SOLUTION INTRAMUSCULAR; INTRAVENOUS at 21:33

## 2024-08-04 NOTE — ED PROVIDER NOTE - CLINICAL SUMMARY MEDICAL DECISION MAKING FREE TEXT BOX
80yo female with pmh cva bedbound p/w fever, sacral ulcers.  Brought in by aide and son.  Has known sacral decub ulcers which they have been trying to manage at home with wound care doc.  + Fever today and aide thought she may have uti due to cloudy urine so she pulled the catheter.  Patient endorsing back pain over sacral wounds site.  Several stage 4/ unstageable wounds which appear to have drainage and foul odor.  Plan sepsis labs, abx, ct, admission

## 2024-08-04 NOTE — ED ADULT NURSE NOTE - NSFALLHARMRISKINTERV_ED_ALL_ED

## 2024-08-04 NOTE — ED ADULT TRIAGE NOTE - CHIEF COMPLAINT QUOTE
brought by ems for fever since morning . multiple wounds to the sacrum and bilateral hip . h/o stroke with rt side brought by ems for fever since morning . multiple wounds to the sacrum and bilateral hip . h/o stroke with rt side deficits.

## 2024-08-04 NOTE — ED PROVIDER NOTE - PHYSICAL EXAMINATION
General appearance: Nontoxic appearing, conversant, febrile    Eyes: anicteric sclerae, DAE, EOMI   HENT: Atraumatic; oropharynx clear, MMM and no ulcerations, no pharyngeal erythema or exudate   Neck: Trachea midline; Full range of motion, supple   Pulm: CTA bl, normal respiratory effort and no intercostal retractions, normal work of breathing   CV: RRR, No murmurs, rubs, or gallops   Abdomen: Soft, non-tender, non-distended; no guarding or rebound   Extremities: No peripheral edema, no gross deformities, FROM x4   Skin: Dry, normal temperature, turgor and texture; multiple small infected appearing stage 4 sacral ulcers   Psych: Appropriate affect, cooperative

## 2024-08-04 NOTE — ED ADULT NURSE NOTE - NSSEPSISSUSPECTED_ED_A_ED
PRINCIPAL DISCHARGE DIAGNOSIS  Diagnosis: Colitis  Assessment and Plan of Treatment: You were found to have inflammation in your Colon related to possible Gastroenteritis. Please continue antibiotics for 7 days and if symptoms worsen please return.  Please keep clear liquid diet for several days.    
Yes

## 2024-08-04 NOTE — ED PROVIDER NOTE - PROGRESS NOTE DETAILS
CT shows infected sacral ulcers, sepsis alert, elevated WBCs are consistent with infection  will admit for further care

## 2024-08-04 NOTE — ED ADULT NURSE NOTE - OBJECTIVE STATEMENT
Patient presents from home for fever.Pt is A+Ox1, to self, daughter at bedside states she has been having fevers since yesterday, has pressure uclers to b/l hips and sacrum. patient has a chronic amezquita which daughter took out due to concerns of UTI. patient is A+ox1, as per daughter, she is normally alert and oriented. patient denies any pain or discomfort. Rectal temp at home is 99.5F Patient presents from home for fever.Pt is A+Ox1, to self, daughter at bedside states she has been having fevers since yesterday, has pressure uclers to b/l hips and sacrum, unable to visualize due to dressings. patient has a chronic amezquita which daughter took out due to concerns of UTI. patient is A+ox1, as per daughter, she is normally alert and oriented. patient denies any pain or discomfort. Rectal temp at home is 99.5F

## 2024-08-04 NOTE — ED ADULT NURSE NOTE - CHIEF COMPLAINT QUOTE
brought by ems for fever since morning . multiple wounds to the sacrum and bilateral hip . h/o stroke with rt side deficits.

## 2024-08-04 NOTE — ED ADULT NURSE NOTE - ED STAT RN HANDOFF DETAILS
report given to [facility] on 1C. unable to insert amezquita catheter at this time after attempts made by multiple RNs. MD Wharton notified, pt ok to bring to floor. IV's flushing well. VSS in flowsheet. no acute distress noted at this time.

## 2024-08-05 DIAGNOSIS — I10 ESSENTIAL (PRIMARY) HYPERTENSION: ICD-10-CM

## 2024-08-05 DIAGNOSIS — L89.90 PRESSURE ULCER OF UNSPECIFIED SITE, UNSPECIFIED STAGE: ICD-10-CM

## 2024-08-05 DIAGNOSIS — E87.1 HYPO-OSMOLALITY AND HYPONATREMIA: ICD-10-CM

## 2024-08-05 DIAGNOSIS — K59.00 CONSTIPATION, UNSPECIFIED: ICD-10-CM

## 2024-08-05 DIAGNOSIS — R53.2 FUNCTIONAL QUADRIPLEGIA: ICD-10-CM

## 2024-08-05 DIAGNOSIS — A41.9 SEPSIS, UNSPECIFIED ORGANISM: ICD-10-CM

## 2024-08-05 LAB
ABO RH CONFIRMATION: SIGNIFICANT CHANGE UP
ALBUMIN SERPL ELPH-MCNC: 1.8 G/DL — LOW (ref 3.3–5)
ALLERGY+IMMUNOLOGY DIAG STUDY NOTE: SIGNIFICANT CHANGE UP
ALP SERPL-CCNC: 109 U/L — SIGNIFICANT CHANGE UP (ref 40–120)
ALT FLD-CCNC: 19 U/L — SIGNIFICANT CHANGE UP (ref 12–78)
ANION GAP SERPL CALC-SCNC: 3 MMOL/L — LOW (ref 5–17)
ANTIBODY INTERPRETATION 2: SIGNIFICANT CHANGE UP
APPEARANCE UR: CLEAR — SIGNIFICANT CHANGE UP
AST SERPL-CCNC: 18 U/L — SIGNIFICANT CHANGE UP (ref 15–37)
BILIRUB SERPL-MCNC: 0.4 MG/DL — SIGNIFICANT CHANGE UP (ref 0.2–1.2)
BILIRUB UR-MCNC: NEGATIVE — SIGNIFICANT CHANGE UP
BLD GP AB SCN SERPL QL: SIGNIFICANT CHANGE UP
BUN SERPL-MCNC: 11 MG/DL — SIGNIFICANT CHANGE UP (ref 7–23)
CALCIUM SERPL-MCNC: 8.8 MG/DL — SIGNIFICANT CHANGE UP (ref 8.5–10.1)
CHLORIDE SERPL-SCNC: 92 MMOL/L — LOW (ref 96–108)
CO2 SERPL-SCNC: 30 MMOL/L — SIGNIFICANT CHANGE UP (ref 22–31)
COD CRY URNS QL: PRESENT
COLOR SPEC: SIGNIFICANT CHANGE UP
COMMENT - URINE: SIGNIFICANT CHANGE UP
CREAT SERPL-MCNC: 0.41 MG/DL — LOW (ref 0.5–1.3)
DIFF PNL FLD: NEGATIVE — SIGNIFICANT CHANGE UP
DIR ANTIGLOB POLYSPECIFIC INTERPRETATION: SIGNIFICANT CHANGE UP
EGFR: 101 ML/MIN/1.73M2 — SIGNIFICANT CHANGE UP
EPI CELLS # UR: PRESENT
GLUCOSE BLDC GLUCOMTR-MCNC: 155 MG/DL — HIGH (ref 70–99)
GLUCOSE BLDC GLUCOMTR-MCNC: 169 MG/DL — HIGH (ref 70–99)
GLUCOSE BLDC GLUCOMTR-MCNC: 236 MG/DL — HIGH (ref 70–99)
GLUCOSE BLDC GLUCOMTR-MCNC: 269 MG/DL — HIGH (ref 70–99)
GLUCOSE BLDC GLUCOMTR-MCNC: 297 MG/DL — HIGH (ref 70–99)
GLUCOSE SERPL-MCNC: 244 MG/DL — HIGH (ref 70–99)
GLUCOSE UR QL: NEGATIVE MG/DL — SIGNIFICANT CHANGE UP
HCT VFR BLD CALC: 27.9 % — LOW (ref 34.5–45)
HGB BLD-MCNC: 9 G/DL — LOW (ref 11.5–15.5)
KETONES UR-MCNC: ABNORMAL MG/DL
LEUKOCYTE ESTERASE UR-ACNC: ABNORMAL
MAGNESIUM SERPL-MCNC: 1.8 MG/DL — SIGNIFICANT CHANGE UP (ref 1.6–2.6)
MCHC RBC-ENTMCNC: 22.9 PG — LOW (ref 27–34)
MCHC RBC-ENTMCNC: 32.3 G/DL — SIGNIFICANT CHANGE UP (ref 32–36)
MCV RBC AUTO: 71 FL — LOW (ref 80–100)
NITRITE UR-MCNC: NEGATIVE — SIGNIFICANT CHANGE UP
NRBC # BLD: 0 /100 WBCS — SIGNIFICANT CHANGE UP (ref 0–0)
PH UR: 6 — SIGNIFICANT CHANGE UP (ref 5–8)
PHOSPHATE SERPL-MCNC: 2.2 MG/DL — LOW (ref 2.5–4.5)
PLATELET # BLD AUTO: 425 K/UL — HIGH (ref 150–400)
POTASSIUM SERPL-MCNC: 4.4 MMOL/L — SIGNIFICANT CHANGE UP (ref 3.5–5.3)
POTASSIUM SERPL-SCNC: 4.4 MMOL/L — SIGNIFICANT CHANGE UP (ref 3.5–5.3)
PROT SERPL-MCNC: 6.8 GM/DL — SIGNIFICANT CHANGE UP (ref 6–8.3)
PROT UR-MCNC: 30 MG/DL
RBC # BLD: 3.93 M/UL — SIGNIFICANT CHANGE UP (ref 3.8–5.2)
RBC # FLD: 18.8 % — HIGH (ref 10.3–14.5)
RBC CASTS # UR COMP ASSIST: 1 /HPF — SIGNIFICANT CHANGE UP (ref 0–4)
SODIUM SERPL-SCNC: 125 MMOL/L — LOW (ref 135–145)
SP GR SPEC: >1.03 — HIGH (ref 1–1.03)
URATE CRY FLD QL MICRO: PRESENT
UROBILINOGEN FLD QL: 0.2 MG/DL — SIGNIFICANT CHANGE UP (ref 0.2–1)
VANCOMYCIN TROUGH SERPL-MCNC: 7.6 UG/ML — LOW (ref 10–20)
WBC # BLD: 17.5 K/UL — HIGH (ref 3.8–10.5)
WBC # FLD AUTO: 17.5 K/UL — HIGH (ref 3.8–10.5)
WBC UR QL: 8 /HPF — HIGH (ref 0–5)

## 2024-08-05 PROCEDURE — 86077 PHYS BLOOD BANK SERV XMATCH: CPT

## 2024-08-05 PROCEDURE — 99223 1ST HOSP IP/OBS HIGH 75: CPT

## 2024-08-05 PROCEDURE — 93010 ELECTROCARDIOGRAM REPORT: CPT

## 2024-08-05 RX ORDER — INSULIN LISPRO 100/ML
VIAL (ML) SUBCUTANEOUS
Refills: 0 | Status: DISCONTINUED | OUTPATIENT
Start: 2024-08-05 | End: 2024-08-09

## 2024-08-05 RX ORDER — BACTERIOSTATIC SODIUM CHLORIDE 0.9 %
1000 VIAL (ML) INJECTION
Refills: 0 | Status: DISCONTINUED | OUTPATIENT
Start: 2024-08-05 | End: 2024-08-06

## 2024-08-05 RX ORDER — DEXTROSE 4 G
25 TABLET,CHEWABLE ORAL ONCE
Refills: 0 | Status: DISCONTINUED | OUTPATIENT
Start: 2024-08-05 | End: 2024-08-09

## 2024-08-05 RX ORDER — SENNOSIDES 8.6 MG/1
2 TABLET ORAL AT BEDTIME
Refills: 0 | Status: DISCONTINUED | OUTPATIENT
Start: 2024-08-05 | End: 2024-08-09

## 2024-08-05 RX ORDER — VANCOMYCIN HYDROCHLORIDE 5 G/100ML
500 INJECTION, POWDER, LYOPHILIZED, FOR SOLUTION INTRAVENOUS EVERY 12 HOURS
Refills: 0 | Status: DISCONTINUED | OUTPATIENT
Start: 2024-08-05 | End: 2024-08-05

## 2024-08-05 RX ORDER — ACETAMINOPHEN 500 MG
650 TABLET ORAL EVERY 6 HOURS
Refills: 0 | Status: DISCONTINUED | OUTPATIENT
Start: 2024-08-05 | End: 2024-08-09

## 2024-08-05 RX ORDER — DEXTROSE MONOHYDRATE, SODIUM CHLORIDE, SODIUM LACTATE, CALCIUM CHLORIDE, MAGNESIUM CHLORIDE 1.5; 538; 448; 18.4; 5.08 G/100ML; MG/100ML; MG/100ML; MG/100ML; MG/100ML
1000 SOLUTION INTRAPERITONEAL
Refills: 0 | Status: DISCONTINUED | OUTPATIENT
Start: 2024-08-05 | End: 2024-08-09

## 2024-08-05 RX ORDER — GLUCAGON INJECTION, SOLUTION 0.5 MG/.1ML
1 INJECTION, SOLUTION SUBCUTANEOUS ONCE
Refills: 0 | Status: DISCONTINUED | OUTPATIENT
Start: 2024-08-05 | End: 2024-08-09

## 2024-08-05 RX ORDER — MELATONIN 3 MG
3 TABLET ORAL AT BEDTIME
Refills: 0 | Status: DISCONTINUED | OUTPATIENT
Start: 2024-08-05 | End: 2024-08-09

## 2024-08-05 RX ORDER — PIPERACILLIN SODIUM, TAZOBACTAM SODIUM 3; .375 G/15ML; G/15ML
3.38 INJECTION, POWDER, LYOPHILIZED, FOR SOLUTION INTRAVENOUS EVERY 8 HOURS
Refills: 0 | Status: DISCONTINUED | OUTPATIENT
Start: 2024-08-05 | End: 2024-08-09

## 2024-08-05 RX ORDER — DEXTROSE 4 G
15 TABLET,CHEWABLE ORAL ONCE
Refills: 0 | Status: DISCONTINUED | OUTPATIENT
Start: 2024-08-05 | End: 2024-08-09

## 2024-08-05 RX ORDER — VANCOMYCIN HYDROCHLORIDE 5 G/100ML
500 INJECTION, POWDER, LYOPHILIZED, FOR SOLUTION INTRAVENOUS EVERY 12 HOURS
Refills: 0 | Status: DISCONTINUED | OUTPATIENT
Start: 2024-08-05 | End: 2024-08-09

## 2024-08-05 RX ORDER — AMLODIPINE BESYLATE 2.5 MG/1
10 TABLET ORAL DAILY
Refills: 0 | Status: DISCONTINUED | OUTPATIENT
Start: 2024-08-05 | End: 2024-08-07

## 2024-08-05 RX ORDER — SOD PHOS DI, MONO/K PHOS MONO 250 MG
1 TABLET ORAL ONCE
Refills: 0 | Status: COMPLETED | OUTPATIENT
Start: 2024-08-05 | End: 2024-08-05

## 2024-08-05 RX ORDER — LORATADINE 10 MG
17 TABLET,DISINTEGRATING ORAL DAILY
Refills: 0 | Status: DISCONTINUED | OUTPATIENT
Start: 2024-08-05 | End: 2024-08-09

## 2024-08-05 RX ORDER — DEXTROSE 4 G
12.5 TABLET,CHEWABLE ORAL ONCE
Refills: 0 | Status: DISCONTINUED | OUTPATIENT
Start: 2024-08-05 | End: 2024-08-09

## 2024-08-05 RX ORDER — BACTERIOSTATIC SODIUM CHLORIDE 0.9 %
1000 VIAL (ML) INJECTION
Refills: 0 | Status: DISCONTINUED | OUTPATIENT
Start: 2024-08-05 | End: 2024-08-05

## 2024-08-05 RX ADMIN — PIPERACILLIN SODIUM, TAZOBACTAM SODIUM 25 GRAM(S): 3; .375 INJECTION, POWDER, LYOPHILIZED, FOR SOLUTION INTRAVENOUS at 15:37

## 2024-08-05 RX ADMIN — Medication 75 MILLILITER(S): at 11:59

## 2024-08-05 RX ADMIN — Medication 40 MILLILITER(S): at 09:19

## 2024-08-05 RX ADMIN — PIPERACILLIN SODIUM, TAZOBACTAM SODIUM 25 GRAM(S): 3; .375 INJECTION, POWDER, LYOPHILIZED, FOR SOLUTION INTRAVENOUS at 10:05

## 2024-08-05 RX ADMIN — Medication 133 MILLILITER(S): at 13:45

## 2024-08-05 RX ADMIN — Medication 1 PACKET(S): at 14:50

## 2024-08-05 RX ADMIN — Medication 75 MILLILITER(S): at 22:30

## 2024-08-05 RX ADMIN — VANCOMYCIN HYDROCHLORIDE 100 MILLIGRAM(S): 5 INJECTION, POWDER, LYOPHILIZED, FOR SOLUTION INTRAVENOUS at 10:05

## 2024-08-05 RX ADMIN — Medication 4: at 09:00

## 2024-08-05 RX ADMIN — VANCOMYCIN HYDROCHLORIDE 100 MILLIGRAM(S): 5 INJECTION, POWDER, LYOPHILIZED, FOR SOLUTION INTRAVENOUS at 20:52

## 2024-08-05 RX ADMIN — Medication 40 MILLILITER(S): at 04:02

## 2024-08-05 RX ADMIN — AMLODIPINE BESYLATE 10 MILLIGRAM(S): 2.5 TABLET ORAL at 11:50

## 2024-08-05 RX ADMIN — Medication 2: at 17:42

## 2024-08-05 RX ADMIN — Medication 6: at 11:50

## 2024-08-05 RX ADMIN — PIPERACILLIN SODIUM, TAZOBACTAM SODIUM 25 GRAM(S): 3; .375 INJECTION, POWDER, LYOPHILIZED, FOR SOLUTION INTRAVENOUS at 21:03

## 2024-08-05 RX ADMIN — Medication 17 GRAM(S): at 11:50

## 2024-08-05 RX ADMIN — SENNOSIDES 2 TABLET(S): 8.6 TABLET ORAL at 21:03

## 2024-08-05 NOTE — H&P ADULT - PROBLEM SELECTOR PLAN 1
febrile, tachycardic upon arrival, leukocytosis, infected sacral ulcers  CT abd/pelvis  ( I personally review) with multiple bilateral decubitus ulcers with associated abscess. Erosive changes involving right sacroiliac joint  Received vanco/cefepime in ED  continue with vanco and zosyn. Monitor vanco trough, therapeutic monitoring is necessary with IV vancomycin  Follow up blood culture result  Closely monitor hemodynamic status

## 2024-08-05 NOTE — H&P ADULT - NSHPPHYSICALEXAM_GEN_ALL_CORE
-- DO NOT REPLY / DO NOT REPLY ALL --  -- Message is from the [facility]--    COVID-19 Universal Screening: Negative      Patient is requesting a medication refill - medication is on active list    Was Medication Pended? Yes.    Rx Name and Dose:      hydrALAZINE (APRESOLINE) 25 MG tablet    Order was placed 04/06/20 but was sent to the [facility] pharmacy. Patient would like for the order to be sent to [facility].     Duration: 90 days    Pharmacy  [facility] - [place] [address]    Patient confirmed the above pharmacy as correct?  Yes    Caller Information       Type Contact Phone    04/23/2020 02:35 PM Phone (Incoming) Maggie Michel (Self) [phone] (H)          Alternative phone number: None given     Turnaround time given to caller:   \"This message will be sent to [state Provider's name]. The clinical team will fulfill your request as soon as they review your message.\"   CONSTITUTIONAL: alert and cooperative, no acute distress  EYES: PERRL,  no scleral icterus  ENT: Mucosa moist, tongue normal.   NECK: Neck supple, trachea midline, non-tender  CARDIAC: Normal S1 and S2. Regular rate and rhythms No Pedal edema. Peripheral pulses intact  LUNGS: Equal air entry both lungs. No rales, rhonchi, wheezing. Normal respiratory effort.   ABDOMEN: Soft, nondistended, nontender. No guarding or rebound tenderness. No hepatomegaly or splenomegaly. Bowel sound normal.   MUSCULOSKELETAL: Normocephalic, atraumatic.  slightly contracted extremities  NEUROLOGICAL: functional quadriplegia. Contracted RUE   SKIN: Multiple infected decubitus ulcers  PSYCHIATRIC: A&O x 1-2

## 2024-08-05 NOTE — H&P ADULT - ASSESSMENT
79 years old female with h/o HTN, osteporosis, bedbound, decubitrus ulcers, chronic Landeros was brought in to ED for fever. Patient denied any pain, cough, SOB, nausea, vomiting or abdominal pain. Attempted to call patient's son to get more collateral information but no one  phone.   Febrile to 100.4, BP stable, WBC 17.6, Na 123, Cr 0.5, glucose 264. CT abd/pelvis with multiple bilateral decubitus ulcers with associated abscess. Erosive changes involving right sacroiliac joint.  Rectum is distended to 8 cm with stool

## 2024-08-05 NOTE — CONSULT NOTE ADULT - SUBJECTIVE AND OBJECTIVE BOX
Patient seen and examined at bedside with Dr. Whitehead. Patient bedbound, laying in bad, responds to pain.    Chief Complaint:  Patient is a 79y old  Female who presents with a chief complaint of sepsis due to infected decubitus ulcers with abscess (05 Aug 2024 11:02)      HPI:  79 years old female with h/o HTN, osteporosis, bedbound, decubitrus ulcers, chronic Landeros was brought in to ED for fever. Patient denied any pain, cough, SOB, nausea, vomiting or abdominal pain. Attempted to call patient's son to get more collateral information but no one  phone.   Febrile to 100.4, BP stable, WBC 17.6, Na 123, Cr 0.5, glucose 264. CT abd/pelvis with multiple bilateral decubitus ulcers with associated abscess. Erosive changes involving right sacroiliac joint.  Rectum is distended to 8 cm with stool (05 Aug 2024 11:02)      PMH/PSH:PAST MEDICAL & SURGICAL HISTORY:  Osteoporosis      Uterine polyp      Herniated disc  lumbar      Bilateral Tubal ligation  48y/o          Allergies:  No Known Allergies      Medications:  acetaminophen     Tablet .. 650 milliGRAM(s) Oral every 6 hours PRN  amLODIPine   Tablet 10 milliGRAM(s) Oral daily  dextrose 5%. 1000 milliLiter(s) IV Continuous <Continuous>  dextrose 5%. 1000 milliLiter(s) IV Continuous <Continuous>  dextrose 50% Injectable 25 Gram(s) IV Push once  dextrose 50% Injectable 12.5 Gram(s) IV Push once  dextrose 50% Injectable 25 Gram(s) IV Push once  dextrose Oral Gel 15 Gram(s) Oral once PRN  glucagon  Injectable 1 milliGRAM(s) IntraMuscular once  insulin lispro (ADMELOG) corrective regimen sliding scale   SubCutaneous three times a day before meals  melatonin 3 milliGRAM(s) Oral at bedtime PRN  piperacillin/tazobactam IVPB.. 3.375 Gram(s) IV Intermittent every 8 hours  polyethylene glycol 3350 17 Gram(s) Oral daily  senna 2 Tablet(s) Oral at bedtime  sodium chloride 0.9%. 1000 milliLiter(s) IV Continuous <Continuous>  vancomycin  IVPB 500 milliGRAM(s) IV Intermittent every 12 hours      REVIEW OF SYSTEMS:  All other review of systems is negative unless indicated above.    Relevant Family History:   FAMILY HISTORY:      Relevant Social History:  Denies ETOH or tobacco history    Physical Exam:    Vital Signs:  Vital Signs Last 24 Hrs  T(C): 37.3 (05 Aug 2024 16:24), Max: 38 (04 Aug 2024 17:50)  T(F): 99.1 (05 Aug 2024 16:24), Max: 100.4 (04 Aug 2024 17:50)  HR: 101 (05 Aug 2024 16:24) (85 - 105)  BP: 116/51 (05 Aug 2024 16:24) (106/54 - 136/55)  RR: 22 (05 Aug 2024 16:24) (16 - 25)  SpO2: 100% (05 Aug 2024 16:24) (96% - 100%)    Parameters below as of 05 Aug 2024 16:24  Patient On (Oxygen Delivery Method): room air      Daily Height in cm: 182.88 (04 Aug 2024 17:50)    Daily     Constitutional: NAD  HEENT: NC/AT, PERRL, EOMI, anicteric sclera, no nasal discharge  Neck: supple; no JVD or thyromegaly  Respiratory: Good inspiratory effort, no respiratory distress  Cardiac: +S1/S2  Gastrointestinal: soft, NT/ND; no rebound or guarding   Extremities: Contracted, No clubbing or cyanosis; no peripheral edema  Musculoskeletal:  no joint swelling, tenderness or erythema  Vascular: 2+ radial, femoral, DP/PT pulses B/L  Skin: bilateral hip ulcers with necrotic tissue, and sacral ulcer, stage IV. Left hip ulcer with purulent drainage  Neurologic: Alert    Laboratory:                          9.0    17.50 )-----------( 425      ( 05 Aug 2024 10:10 )             27.9     08-05    125<L>  |  92<L>  |  11  ----------------------------<  244<H>  4.4   |  30  |  0.41<L>    Ca    8.8      05 Aug 2024 10:10  Phos  2.2     08-05  Mg     1.8     08-05    TPro  6.8  /  Alb  1.8<L>  /  TBili  0.4  /  DBili  x   /  AST  18  /  ALT  19  /  AlkPhos  109  08-05    LIVER FUNCTIONS - ( 05 Aug 2024 10:10 )  Alb: 1.8 g/dL / Pro: 6.8 gm/dL / ALK PHOS: 109 U/L / ALT: 19 U/L / AST: 18 U/L / GGT: x           PT/INR - ( 04 Aug 2024 20:41 )   PT: 14.7 sec;   INR: 1.24 ratio         PTT - ( 04 Aug 2024 20:41 )  PTT:25.6 sec  Urinalysis Basic - ( 05 Aug 2024 10:10 )    Color: x / Appearance: x / SG: x / pH: x  Gluc: 244 mg/dL / Ketone: x  / Bili: x / Urobili: x   Blood: x / Protein: x / Nitrite: x   Leuk Esterase: x / RBC: x / WBC x   Sq Epi: x / Non Sq Epi: x / Bacteria: x            Imaging:    < from: CT Abdomen and Pelvis w/ IV Cont (08.04.24 @ 23:03) >    ACC: [id number] EXAM:  CT ABDOMEN AND PELVIS IC   ORDERED BY: Anthony Pina     PROCEDURE DATE:  08/04/2024          INTERPRETATION:  PROCEDURE INFORMATION:  Exam: CT Abdomen And Pelvis With Contrast  Exam date and time: 8/4/2024 10:58 PM  Age: 79 years old  Clinical indication: Sepsis, sacral ulcers    TECHNIQUE:  Imaging protocol: Computed tomography of the abdomen and pelvis with   contrast.  Contrast material: IV: OMNIPAQUE 350; Contrast volume: 90 ml; Contrast   route:  IV;    COMPARISON:  CR XR CHEST URGENT 8/4/2024 6:52 PM    FINDINGS:  Liver: Normal. No mass.  Gallbladder and biliary ducts: Gallbladder is contracted. No biliary   ductal  dilatation.  Pancreas: Unremarkable.  Spleen: Normal.  Adrenal glands: Normal. No mass.  Kidneys and ureters: Bilateral renal cysts. Kidneys otherwise   unremarkable.  Stomach and bowel: Rectum is distended to 8 cm with stool, potentially  impacted. Remainder of the colon is mildly distended with stool,   potentially  signifying constipation. No bowel wall thickening or intestinal   obstruction.  Appendix: Appendix not visualized. No evidence of appendicitis.    Intraperitoneal space: Unremarkable. No pneumoperitoneum. No abscess.  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  Lymph nodes: Unremarkable.  Urinary bladder: Nonobstructing calcifications in the dependent left   lumen of  the urinary bladder, measuring 8 mm and 5 mm. Mild thickening of the wall   of the urinary bladder,  potentially signifying cystitis.  Reproductive: Substantially enlarged partially calcified fibroid uterus.  Bones/joints: See &quot;Soft tissues&quot; finding.  Soft tissues: Multifocal bilateral decubitus ulcers: Right lateral   hip/proximal  thigh decubitus ulcer extends to the lateral surface the proximal right   femur  where there is productive calcification some sclerosis but no osseous   erosion  to definitively indicate osteomyelitis. At the inferomedial aspect of this  ulcerationis an approximately 2 cm rim enhancing collection compatible   with an  abscess (images 167-168 of axial series 2). Left lateral hip/proximal   thigh  decubitus ulcer extends to the lateral surface of the proximal left femur,  without erosion or alteration of the bone to indicate osteomyelitis;   however,  there is a thin elongated 6 cm fluid and gas containing collection   suggestive  of an abscess in the gluteus musculature at the posterolateral aspect of   the  greater trochanter of left femur. This collection appears to communicate   with  the external surface of the ulceration. There is a small, approximately   2.2 cm  fluid and gas containing collection in the soft tissue at the posterior   aspect  of the inferior right iliac bone with some overlying skin ulceration,  compatible in appearance with an abscess. Erosive changes involving the   right sacroiliac joint could be degenerative. An infectious sacroiliitis   is not excluded.    IMPRESSION:  1.   Rectum is distended to 8 cm with stool, potentially impacted.   Remainder of  the colon is mildly distended with stool, potentially signifying   constipation.  2.   Substantially enlarged partially calcified fibroid uterus.  3.   Mild thickening of the wall of the urinary bladder, potentially   signifying  cystitis. Nonobstructive bladder calculi.  4.   Multiple bilateral decubitus ulcers with associated abscesses as   detailed  above. Erosive change involving right sacroiliac joint could be   degenerative or infectious

## 2024-08-05 NOTE — ED CLERICAL - DIVISION
Chief Complaint   Patient presents with    Immunization/Injection     Graciela Delgado is a 37 y.o. male who presents for routine immunizations. He denies any symptoms , reactions or allergies that would exclude them from being immunized today. Risks and adverse reactions were discussed and the VIS was given to them. All questions were addressed. He was observed for 15 min post injection. There were no reactions observed.     Girma Calvin LPN [facility]...

## 2024-08-05 NOTE — PATIENT PROFILE ADULT - FALL HARM RISK - HARM RISK INTERVENTIONS

## 2024-08-05 NOTE — H&P ADULT - PROBLEM SELECTOR PLAN 2
CT abd/pelvis  ( I personally review) with multiple bilateral decubitus ulcers with associated abscess. Erosive changes involving right sacroiliac joint  Received vanco/cefepime in ED  continue with vanco and zosyn. Monitor vanco trough, therapeutic monitoring is necessary with IV vancomycin  Follow up blood culture result  Wound care consulted- Dr Whitehead  SCD for DVT prophylaxis pending possible debridement  Frequent turn/position CT abd/pelvis  ( I personally review) with multiple bilateral decubitus ulcers with associated abscess. Erosive changes involving right sacroiliac joint  Received vanco/cefepime in ED  continue with vanco and zosyn. Monitor vanco trough, therapeutic monitoring is necessary with IV vancomycin  Follow up blood culture result  Wound care consulted- Dr Whitehead  SCD for DVT prophylaxis pending possible debridement  Frequent turn/position  glucose elevated--> check A1c CT abd/pelvis  ( I personally review) with multiple bilateral decubitus ulcers with associated abscess. Erosive changes involving right sacroiliac joint  Received vanco/cefepime in ED  continue with vanco and zosyn. Monitor vanco trough, therapeutic monitoring is necessary with IV vancomycin  Follow up blood culture result  Wound care consulted- Dr Whiteehad  SCD for DVT prophylaxis pending possible debridement  Frequent turn/position  glucose elevated--> check A1c  Palliative care consulted for GOC discussion

## 2024-08-06 DIAGNOSIS — R53.81 OTHER MALAISE: ICD-10-CM

## 2024-08-06 DIAGNOSIS — E43 UNSPECIFIED SEVERE PROTEIN-CALORIE MALNUTRITION: ICD-10-CM

## 2024-08-06 DIAGNOSIS — Z51.5 ENCOUNTER FOR PALLIATIVE CARE: ICD-10-CM

## 2024-08-06 LAB
A1C WITH ESTIMATED AVERAGE GLUCOSE RESULT: 7.4 % — HIGH (ref 4–5.6)
A1C WITH ESTIMATED AVERAGE GLUCOSE RESULT: 7.5 % — HIGH (ref 4–5.6)
ALBUMIN SERPL ELPH-MCNC: 1.7 G/DL — LOW (ref 3.3–5)
ALP SERPL-CCNC: 108 U/L — SIGNIFICANT CHANGE UP (ref 40–120)
ALT FLD-CCNC: 20 U/L — SIGNIFICANT CHANGE UP (ref 12–78)
ANION GAP SERPL CALC-SCNC: 9 MMOL/L — SIGNIFICANT CHANGE UP (ref 5–17)
AST SERPL-CCNC: 24 U/L — SIGNIFICANT CHANGE UP (ref 15–37)
BILIRUB SERPL-MCNC: 0.5 MG/DL — SIGNIFICANT CHANGE UP (ref 0.2–1.2)
BUN SERPL-MCNC: 10 MG/DL — SIGNIFICANT CHANGE UP (ref 7–23)
CALCIUM SERPL-MCNC: 8.6 MG/DL — SIGNIFICANT CHANGE UP (ref 8.5–10.1)
CHLORIDE SERPL-SCNC: 93 MMOL/L — LOW (ref 96–108)
CO2 SERPL-SCNC: 24 MMOL/L — SIGNIFICANT CHANGE UP (ref 22–31)
CREAT SERPL-MCNC: 0.4 MG/DL — LOW (ref 0.5–1.3)
CULTURE RESULTS: SIGNIFICANT CHANGE UP
EGFR: 101 ML/MIN/1.73M2 — SIGNIFICANT CHANGE UP
ESTIMATED AVERAGE GLUCOSE: 166 MG/DL — HIGH (ref 68–114)
ESTIMATED AVERAGE GLUCOSE: 169 MG/DL — HIGH (ref 68–114)
GLUCOSE BLDC GLUCOMTR-MCNC: 180 MG/DL — HIGH (ref 70–99)
GLUCOSE BLDC GLUCOMTR-MCNC: 185 MG/DL — HIGH (ref 70–99)
GLUCOSE BLDC GLUCOMTR-MCNC: 200 MG/DL — HIGH (ref 70–99)
GLUCOSE BLDC GLUCOMTR-MCNC: 268 MG/DL — HIGH (ref 70–99)
GLUCOSE SERPL-MCNC: 193 MG/DL — HIGH (ref 70–99)
HCT VFR BLD CALC: 27 % — LOW (ref 34.5–45)
HGB BLD-MCNC: 8.6 G/DL — LOW (ref 11.5–15.5)
MAGNESIUM SERPL-MCNC: 1.7 MG/DL — SIGNIFICANT CHANGE UP (ref 1.6–2.6)
MCHC RBC-ENTMCNC: 22.6 PG — LOW (ref 27–34)
MCHC RBC-ENTMCNC: 31.9 G/DL — LOW (ref 32–36)
MCV RBC AUTO: 70.9 FL — LOW (ref 80–100)
NRBC # BLD: 0 /100 WBCS — SIGNIFICANT CHANGE UP (ref 0–0)
PHOSPHATE SERPL-MCNC: 2.5 MG/DL — SIGNIFICANT CHANGE UP (ref 2.5–4.5)
PLATELET # BLD AUTO: 425 K/UL — HIGH (ref 150–400)
POTASSIUM SERPL-MCNC: 4.2 MMOL/L — SIGNIFICANT CHANGE UP (ref 3.5–5.3)
POTASSIUM SERPL-SCNC: 4.2 MMOL/L — SIGNIFICANT CHANGE UP (ref 3.5–5.3)
PROT SERPL-MCNC: 6.4 GM/DL — SIGNIFICANT CHANGE UP (ref 6–8.3)
RBC # BLD: 3.81 M/UL — SIGNIFICANT CHANGE UP (ref 3.8–5.2)
RBC # FLD: 18.8 % — HIGH (ref 10.3–14.5)
SODIUM SERPL-SCNC: 126 MMOL/L — LOW (ref 135–145)
SPECIMEN SOURCE: SIGNIFICANT CHANGE UP
VANCOMYCIN TROUGH SERPL-MCNC: 9.8 UG/ML — LOW (ref 10–20)
WBC # BLD: 18.13 K/UL — HIGH (ref 3.8–10.5)
WBC # FLD AUTO: 18.13 K/UL — HIGH (ref 3.8–10.5)

## 2024-08-06 PROCEDURE — 99233 SBSQ HOSP IP/OBS HIGH 50: CPT

## 2024-08-06 PROCEDURE — 99223 1ST HOSP IP/OBS HIGH 75: CPT

## 2024-08-06 RX ORDER — BACTERIOSTATIC SODIUM CHLORIDE 0.9 %
1000 VIAL (ML) INJECTION
Refills: 0 | Status: DISCONTINUED | OUTPATIENT
Start: 2024-08-06 | End: 2024-08-09

## 2024-08-06 RX ORDER — SODIUM PHOSPHATE,MONO-DIBASIC
1 SOLUTION, ORAL ORAL ONCE
Refills: 0 | Status: COMPLETED | OUTPATIENT
Start: 2024-08-06 | End: 2024-08-06

## 2024-08-06 RX ADMIN — PIPERACILLIN SODIUM, TAZOBACTAM SODIUM 25 GRAM(S): 3; .375 INJECTION, POWDER, LYOPHILIZED, FOR SOLUTION INTRAVENOUS at 15:02

## 2024-08-06 RX ADMIN — PIPERACILLIN SODIUM, TAZOBACTAM SODIUM 25 GRAM(S): 3; .375 INJECTION, POWDER, LYOPHILIZED, FOR SOLUTION INTRAVENOUS at 06:14

## 2024-08-06 RX ADMIN — VANCOMYCIN HYDROCHLORIDE 100 MILLIGRAM(S): 5 INJECTION, POWDER, LYOPHILIZED, FOR SOLUTION INTRAVENOUS at 06:11

## 2024-08-06 RX ADMIN — PIPERACILLIN SODIUM, TAZOBACTAM SODIUM 25 GRAM(S): 3; .375 INJECTION, POWDER, LYOPHILIZED, FOR SOLUTION INTRAVENOUS at 22:47

## 2024-08-06 RX ADMIN — SENNOSIDES 2 TABLET(S): 8.6 TABLET ORAL at 22:47

## 2024-08-06 RX ADMIN — Medication 6: at 11:40

## 2024-08-06 RX ADMIN — Medication 100 MILLILITER(S): at 10:45

## 2024-08-06 RX ADMIN — Medication 75 MILLILITER(S): at 06:11

## 2024-08-06 RX ADMIN — Medication 2 MILLIGRAM(S): at 09:54

## 2024-08-06 RX ADMIN — Medication 2 MILLIGRAM(S): at 10:46

## 2024-08-06 RX ADMIN — Medication 1 ENEMA: at 09:55

## 2024-08-06 RX ADMIN — VANCOMYCIN HYDROCHLORIDE 100 MILLIGRAM(S): 5 INJECTION, POWDER, LYOPHILIZED, FOR SOLUTION INTRAVENOUS at 18:05

## 2024-08-06 RX ADMIN — Medication 2: at 16:46

## 2024-08-06 RX ADMIN — Medication 17 GRAM(S): at 13:12

## 2024-08-06 RX ADMIN — Medication 2: at 08:46

## 2024-08-06 NOTE — CONSULT NOTE ADULT - CONSULT REASON
Hyponatremia
sepsis due to infected decubitus ulcers with abscess
bilateral hip and sacral ulcer
GOC discussion in setting of bilateral hip and sacral ulcer  sepsis due to infected decubitus ulcers with abscess

## 2024-08-06 NOTE — CONSULT NOTE ADULT - PROBLEM SELECTOR RECOMMENDATION 5
Per son, pt presently oriented to name, baseline to place  mostly bedbound x 4-5 years, does get OOB to chair, minimally weight bearing tolerates for ~1-1 1/2 hrs before tiring and needs to return to bed   Has 12x7 [employer] provided HA who do wound care daily, visiting wound care MD visits weekly  dependent of ADL's

## 2024-08-06 NOTE — CONSULT NOTE ADULT - SUBJECTIVE AND OBJECTIVE BOX
Patient chart reviewed, full consult to follow.     Thank you for the courtesy of this consultation. [facility] NEPHROLOGY SERVICES, [facility]  NEPHROLOGY AND HYPERTENSION  [address]  [address]  [place] [address]  [phone]    MD DEE DEE DRAPER MD YELENA ROSENBERG, MD BINNY KOSHY, MD CHRISTOPHER CAPUTO, MD EDWARD BOVER, MD      Information from chart:  "Patient is a 79y old  Female who presents with a chief complaint of sepsis due to infected decubitus ulcers with abscess (06 Aug 2024 19:40)    HPI:  79 years old female with h/o HTN, osteporosis, bedbound, decubitrus ulcers, chronic Landeros was brought in to ED for fever. Patient denied any pain, cough, SOB, nausea, vomiting or abdominal pain. Attempted to call patient's son to get more collateral information but no one  phone.   Febrile to 100.4, BP stable, WBC 17.6, Na 123, Cr 0.5, glucose 264. CT abd/pelvis with multiple bilateral decubitus ulcers with associated abscess. Erosive changes involving right sacroiliac joint.  Rectum is distended to 8 cm with stool (05 Aug 2024 11:02)   "  No distress    PAST MEDICAL & SURGICAL HISTORY:  Osteoporosis      Uterine polyp      Herniated disc  lumbar      Bilateral Tubal ligation  46y/o        FAMILY HISTORY:    Allergies    No Known Allergies    Intolerances      Home Medications:    MEDICATIONS  (STANDING):  amLODIPine   Tablet 10 milliGRAM(s) Oral daily  dextrose 5%. 1000 milliLiter(s) (100 mL/Hr) IV Continuous <Continuous>  dextrose 5%. 1000 milliLiter(s) (50 mL/Hr) IV Continuous <Continuous>  dextrose 50% Injectable 25 Gram(s) IV Push once  dextrose 50% Injectable 12.5 Gram(s) IV Push once  dextrose 50% Injectable 25 Gram(s) IV Push once  glucagon  Injectable 1 milliGRAM(s) IntraMuscular once  insulin lispro (ADMELOG) corrective regimen sliding scale   SubCutaneous three times a day before meals  piperacillin/tazobactam IVPB.. 3.375 Gram(s) IV Intermittent every 8 hours  polyethylene glycol 3350 17 Gram(s) Oral daily  senna 2 Tablet(s) Oral at bedtime  sodium chloride 0.9%. 1000 milliLiter(s) (100 mL/Hr) IV Continuous <Continuous>  vancomycin  IVPB 500 milliGRAM(s) IV Intermittent every 12 hours    MEDICATIONS  (PRN):  acetaminophen     Tablet .. 650 milliGRAM(s) Oral every 6 hours PRN Temp greater or equal to 38C (100.4F), Mild Pain (1 - 3), Moderate Pain (4 - 6)  dextrose Oral Gel 15 Gram(s) Oral once PRN Blood Glucose LESS THAN 70 milliGRAM(s)/deciliter  melatonin 3 milliGRAM(s) Oral at bedtime PRN Insomnia    Vital Signs Last 24 Hrs  T(C): 37.6 (06 Aug 2024 18:28), Max: 37.6 (06 Aug 2024 18:28)  T(F): 99.7 (06 Aug 2024 18:28), Max: 99.7 (06 Aug 2024 18:28)  HR: 105 (06 Aug 2024 18:28) (92 - 105)  BP: 108/55 (06 Aug 2024 18:28) (97/58 - 108/55)  BP(mean): --  RR: 17 (06 Aug 2024 18:28) (16 - 18)  SpO2: 97% (06 Aug 2024 18:28) (94% - 97%)    Parameters below as of 06 Aug 2024 18:28  Patient On (Oxygen Delivery Method): room air        Daily     Daily     08-05-24 @ 07:01  -  08-06-24 @ 07:00  --------------------------------------------------------  IN: 0 mL / OUT: 300 mL / NET: -300 mL    08-06-24 @ 07:01  -  08-06-24 @ 22:57  --------------------------------------------------------  IN: 0 mL / OUT: 400 mL / NET: -400 mL      CAPILLARY BLOOD GLUCOSE      POCT Blood Glucose.: 180 mg/dL (06 Aug 2024 22:46)  POCT Blood Glucose.: 185 mg/dL (06 Aug 2024 16:21)  POCT Blood Glucose.: 268 mg/dL (06 Aug 2024 11:21)  POCT Blood Glucose.: 200 mg/dL (06 Aug 2024 07:57)    PHYSICAL EXAM:      T(C): 37.6 (08-06-24 @ 18:28), Max: 37.6 (08-06-24 @ 18:28)  HR: 105 (08-06-24 @ 18:28) (92 - 105)  BP: 108/55 (08-06-24 @ 18:28) (97/58 - 108/55)  RR: 17 (08-06-24 @ 18:28) (16 - 18)  SpO2: 97% (08-06-24 @ 18:28) (94% - 97%)  Wt(kg): --  Lungs clear  Heart S1S2  Abd soft NT ND  Extremities:   tr edema              08-06    126<L>  |  93<L>  |  10  ----------------------------<  193<H>  4.2   |  24  |  0.40<L>    Ca    8.6      06 Aug 2024 07:40  Phos  2.5     08-06  Mg     1.7     08-06    TPro  6.4  /  Alb  1.7<L>  /  TBili  0.5  /  DBili  x   /  AST  24  /  ALT  20  /  AlkPhos  108  08-06                          8.6    18.13 )-----------( 425      ( 06 Aug 2024 07:40 )             27.0     Creatinine Trend: 0.40<--, 0.41<--, 0.50<--  Urinalysis Basic - ( 06 Aug 2024 07:40 )    Color: x / Appearance: x / SG: x / pH: x  Gluc: 193 mg/dL / Ketone: x  / Bili: x / Urobili: x   Blood: x / Protein: x / Nitrite: x   Leuk Esterase: x / RBC: x / WBC x   Sq Epi: x / Non Sq Epi: x / Bacteria: x            Assessment   Hyponatremia, hypovolemic; poor po intake    Plan  Trail IVF NS    Pranav Brown MD

## 2024-08-06 NOTE — CONSULT NOTE ADULT - REASON FOR ADMISSION
sepsis due to infected decubitus ulcers with abscess

## 2024-08-06 NOTE — CONSULT NOTE ADULT - CONVERSATION DETAILS
Met with pt son Nasir toribio bedside, son Yazan via telephone.  Introduced service and  Palliative medicines  team's role in assisting with complex decision making, communication and support.  Discussed her current clinical condition and further goals of care.     Per sons, pt is  and lives with her . Cognitively he is not with capacity to make complex medical decisions, so both of pts 2 sons collaborate before decision making. Family has hired  private HH  12x 7. The HA is the person who provides the wound care. Pt  is seen by  home wound care specialist whose name they could not readily provide, every Sunday. Reviewed CT Scan findings They had believed that the wounds were getting better and were surprised to hear of the extensive infection within the wounds. They state that they bought pt to hospital as they believed that she had a UTI and was hyperglycemic. They were unaware of severe constipation     Discussed hopes and wishes for future. Discussed the unlikelihood  that these wounds will heal for multiple reasons,  her age, diabetes and  poor stores of albumin and protein to name a few  and attempted to set more realistic goals to contain infection, prevent pain and suffering. Discussed options for [facility] for excellent wound care. Family states that they are happy with Homecare at this time and do not wish to remove pt from her home. he asks for a copy of the CT to provide to the visiting doctor. They are  agreeable to antibiotics, were unaware of a planned debridement of B/L hips. Sons wish to discuss with doctor before giving consent.    Further discussed advance directives, discussed risks/benefits of LST such as CPR, intubation, feeding tubes in the context of comorbidities. Family states that they are not ready to make any decisions at this time. They are aware that [facility] default is to provide CPR and intubation unless otherwise told not to be pt or medical decision maker Met with pt son Nasir at bedside, son Yazan via telephone.  Introduced service and  Palliative medicines  team's role in assisting with complex decision making, communication and support.  Discussed her current clinical condition and further goals of care.     Per sons, pt is  and lives with her . Cognitively he is not with capacity to make complex medical decisions, so both of pts 2 sons collaborate before decision making. Family has hired  private HH  12x 7. The HA is the person who provides the wound care. Pt  is seen by  home wound care specialist whose name they could not readily provide, every Sunday. Reviewed CT Scan findings They had believed that the wounds were getting better and were surprised to hear of the extensive infection within the wounds. They state that they bought pt to hospital as they believed that she had a UTI and was hyperglycemic. They were unaware of severe constipation     Discussed hopes and wishes for future. Discussed the unlikelihood  that these wounds will heal for multiple reasons,  her age, diabetes and  poor stores of albumin and protein to name a few  and attempted to set more realistic goals to contain infection, prevent pain and suffering. Discussed options for [facility] for excellent wound care. Family states that they are happy with Homecare at this time and do not wish to remove pt from her home. he asks for a copy of the CT to provide to the visiting doctor. They are  agreeable to antibiotics, were unaware of a planned debridement of B/L hips. Sons wish to discuss with doctor before giving consent. AMEE Garcia aware, will provide copy of Scan     Further discussed advance directives, discussed risks/benefits of LST such as CPR, intubation, feeding tubes in the context of comorbidities. Family states that they are not ready to make any decisions at this time. They are aware that [facility] default is to provide CPR and intubation unless otherwise told not to be pt or medical decision maker    Dr Burgos aware  fo above via teams

## 2024-08-06 NOTE — CONSULT NOTE ADULT - SUBJECTIVE AND OBJECTIVE BOX
SPENCER MIXON          MRN-[id number]           : 1945    HPI:  79 year old female with h/o HTN, osteporosis, bedbound, decubitrus ulcers, chronic Amezquita was brought in to ED for fever. Patient denied any pain, cough, SOB, nausea, vomiting or abdominal pain.     Febrile to 100.4, BP stable, WBC 17.6, Na 123, Cr 0.5, glucose 264. CT abd/pelvis with multiple bilateral decubitus ulcers with associated abscess. Erosive changes involving right sacroiliac joint.  Rectum is distended to 8 cm with stool (05 Aug 2024 11:02)    Pt with recent admission to [facility]  - with UTI Treated with ABT and discharged home. with private wound care nurse, refused [facility] services    Interval Events:     PAST MEDICAL & SURGICAL HISTORY:  HTN  Osteoporosis  Uterine polyp  decubitus ulcers   chronic amezquita  Herniated disc Lumbar  Bilateral Tubal ligation 46y/o    FAMILY HISTORY:  Reviewed and found non contributory in mother or father    SOCIAL HISTORY: Restoration    ROS:    Unable to attain due to:                      Dyspnea (Suzanne 0-10):                        N/V (Y/N):                              Secretions (Y/N) :                Agitation(Y/N):   Pain (Y/N):       http://geriatrictoolkit.[place].[facility]/cog/painad.pdf  https://www.aci.health.[facility].gov.au/__data/assets/pdf_file/0018/080855/Abbey_Pain_Scale_Final.pdf  -Provocation/Palliation:  -Quality/Quantity:  -Radiating:  -Severity:  -Timing/Frequency:  -Impact on ADLs:    All other systems reveiwed and negative     Allergies    No Known Allergies    Intolerances    Opiate Naive (Y/N): Y  -iStop reviewed (Y/N): Yes. No Rx found on iStop  | Reference #: [id number]    Medications:      MEDICATIONS  (STANDING):  amLODIPine   Tablet 10 milliGRAM(s) Oral daily  dextrose 5%. 1000 milliLiter(s) (100 mL/Hr) IV Continuous <Continuous>  dextrose 5%. 1000 milliLiter(s) (50 mL/Hr) IV Continuous <Continuous>  dextrose 50% Injectable 25 Gram(s) IV Push once  dextrose 50% Injectable 12.5 Gram(s) IV Push once  dextrose 50% Injectable 25 Gram(s) IV Push once  glucagon  Injectable 1 milliGRAM(s) IntraMuscular once  insulin lispro (ADMELOG) corrective regimen sliding scale   SubCutaneous three times a day before meals  piperacillin/tazobactam IVPB.. 3.375 Gram(s) IV Intermittent every 8 hours  polyethylene glycol 3350 17 Gram(s) Oral daily  senna 2 Tablet(s) Oral at bedtime  sodium chloride 0.9%. 1000 milliLiter(s) (75 mL/Hr) IV Continuous <Continuous>  vancomycin  IVPB 500 milliGRAM(s) IV Intermittent every 12 hours    MEDICATIONS  (PRN):  acetaminophen     Tablet .. 650 milliGRAM(s) Oral every 6 hours PRN Temp greater or equal to 38C (100.4F), Mild Pain (1 - 3), Moderate Pain (4 - 6)  dextrose Oral Gel 15 Gram(s) Oral once PRN Blood Glucose LESS THAN 70 milliGRAM(s)/deciliter  melatonin 3 milliGRAM(s) Oral at bedtime PRN Insomnia    Labs:    CBC:                        8.6    18.13 )-----------( 425      ( 06 Aug 2024 07:40 )             27.0     CMP:    08-06    126<L>  |  93<L>  |  10  ----------------------------<  193<H>  4.2   |  24  |  0.40<L>    Ca    8.6      06 Aug 2024 07:40  Phos  2.2     08-05  Mg     1.7     08-06    TPro  x   /  Alb  1.7<L>  /  TBili  x   /  DBili  x   /  AST  24  /  ALT  20  /  AlkPhos  x   08-06     PT/INR - ( 04 Aug 2024 20:41 )   PT: 14.7 sec;   INR: 1.24 ratio         PTT - ( 04 Aug 2024 20:41 )  PTT:25.6 sec   Urinalysis Basic - ( 06 Aug 2024 07:40 )    Color: x / Appearance: x / SG: x / pH: x  Gluc: 193 mg/dL / Ketone: x  / Bili: x / Urobili: x   Blood: x / Protein: x / Nitrite: x   Leuk Esterase: x / RBC: x / WBC x   Sq Epi: x / Non Sq Epi: x / Bacteria: x    Imaging:      < from: CT Abdomen and Pelvis w/ IV Cont (24 @ 23:03) >  ACC: [id number] EXAM:  CT ABDOMEN AND PELVIS IC   ORDERED BY: Anthony Pina     PROCEDURE DATE:  2024      IMPRESSION:  1.   Rectum is distended to 8 cm with stool, potentially impacted.   Remainder of the colon is mildly distended with stool, potentially signifying   constipation.  2.   Substantially enlarged partially calcified fibroid uterus.  3.   Mild thickening of the wall of the urinary bladder, potentially   signifying cystitis. Nonobstructive bladder calculi.  4.   Multiple bilateral decubitus ulcers with associated abscesses as   detailed above. Erosive change involving right sacroiliac joint could be   degenerative or infectious        PEx:  T(C): 36.4 (24 @ 05:01), Max: 37.3 (24 @ 16:24)  HR: 94 (24 @ 05:01) (92 - 101)  BP: 97/58 (24 @ 05:01) (96/58 - 136/55)  RR: 17 (24 @ 05:01) (17 - 25)  SpO2: 94% (24 @ 05:01) (94% - 100%)  Wt(kg): --  Daily     Daily     General:   HEENT:    Neck:   CVS:   Resp:   GI:    :    Musc:     Neuro:   Psych:     Skin:  Lymph:  Preadmit Karnofsky:  %           Current Karnofsky:     %  http://www.npcrc.org/files/news/karnofsky_performance_scale.pdf   http://www.npcrc.org/files/news/palliative_performance_scale_PPSv2.pdf  Cachexia (Y/N):   BMI: 12.2    Advanced Directives:     Full Code     DNR/DNI     MOLST     HCP     DPOA     Living Will     Decision maker:  Legal surrogate:    GOALS OF CARE DISCUSSION       Palliative care info/counseling provided	           Family meeting       Advanced Directives addressed please see GOC discussion note below SPENCER MIXON          MRN-[id number]           : 1945    HPI:  79 year old female with h/o HTN, osteoporosis bedbound, decubitus ulcers, chronic Amezquita was brought in to ED for fever. Patient denied any pain, cough, SOB, nausea, vomiting or abdominal pain.     Febrile to 100.4, BP stable, WBC 17.6, Na 123, Cr 0.5, glucose 264. CT abd/pelvis with multiple bilateral decubitus ulcers with associated abscess. Erosive changes involving right sacroiliac joint.  Rectum is distended to 8 cm with stool (05 Aug 2024 11:02)    Pt with recent admission to [facility]  - with UTI Treated with ABT and discharged home  with private wound care nurse, refused [facility] services    Interval Events:     PAST MEDICAL & SURGICAL HISTORY:  HTN  Osteoporosis  Uterine polyp  decubitus ulcers   chronic amezquita  Herniated disc Lumbar  Bilateral Tubal ligation 46y/o    FAMILY HISTORY:  Reviewed and found non contributory in mother or father    SOCIAL HISTORY: Orthodoxy    ROS:                      Dyspnea (Suzanne 0-10):                        N/V (Y/N):                              Secretions (Y/N) :                Agitation(Y/N):   Pain (Y/N):       http://geriatrictoolkit.[place].[facility]/cog/painad.pdf  https://www.aci.health.[facility].gov.au/__data/assets/pdf_file/0018/210322/[place]bey_Pain_Scale_Final.pdf  -Provocation/Palliation:  -Quality/Quantity:  -Radiating:  -Severity:  -Timing/Frequency:  -Impact on ADLs:    All other systems reviewed and negative     Allergies    No Known Allergies    Intolerances    Opiate Naive (Y/N): Y  -iStop reviewed (Y/N): Yes. No Rx found on iStop  | Reference #: [id number]    Medications:      MEDICATIONS  (STANDING):  amLODIPine   Tablet 10 milliGRAM(s) Oral daily  dextrose 5%. 1000 milliLiter(s) (100 mL/Hr) IV Continuous <Continuous>  dextrose 5%. 1000 milliLiter(s) (50 mL/Hr) IV Continuous <Continuous>  dextrose 50% Injectable 25 Gram(s) IV Push once  dextrose 50% Injectable 12.5 Gram(s) IV Push once  dextrose 50% Injectable 25 Gram(s) IV Push once  glucagon  Injectable 1 milliGRAM(s) IntraMuscular once  insulin lispro (ADMELOG) corrective regimen sliding scale   SubCutaneous three times a day before meals  piperacillin/tazobactam IVPB.. 3.375 Gram(s) IV Intermittent every 8 hours  polyethylene glycol 3350 17 Gram(s) Oral daily  senna 2 Tablet(s) Oral at bedtime  sodium chloride 0.9%. 1000 milliLiter(s) (75 mL/Hr) IV Continuous <Continuous>  vancomycin  IVPB 500 milliGRAM(s) IV Intermittent every 12 hours    MEDICATIONS  (PRN):  acetaminophen     Tablet .. 650 milliGRAM(s) Oral every 6 hours PRN Temp greater or equal to 38C (100.4F), Mild Pain (1 - 3), Moderate Pain (4 - 6)  dextrose Oral Gel 15 Gram(s) Oral once PRN Blood Glucose LESS THAN 70 milliGRAM(s)/deciliter  melatonin 3 milliGRAM(s) Oral at bedtime PRN Insomnia    Labs:    CBC:                        8.6    18.13 )-----------( 425      ( 06 Aug 2024 07:40 )             27.0     CMP:    08-    126<L>  |  93<L>  |  10  ----------------------------<  193<H>  4.2   |  24  |  0.40<L>    Ca    8.6      06 Aug 2024 07:40  Phos  2.2     08-05  Mg     1.7     08-06    TPro  x   /  Alb  1.7<L>  /  TBili  x   /  DBili  x   /  AST  24  /  ALT  20  /  AlkPhos  x   08-06     PT/INR - ( 04 Aug 2024 20:41 )   PT: 14.7 sec;   INR: 1.24 ratio       PTT - ( 04 Aug 2024 20:41 )  PTT:25.6 sec   Urinalysis Basic - ( 06 Aug 2024 07:40 )    Color: x / Appearance: x / SG: x / pH: x  Gluc: 193 mg/dL / Ketone: x  / Bili: x / Urobili: x   Blood: x / Protein: x / Nitrite: x   Leuk Esterase: x / RBC: x / WBC x   Sq Epi: x / Non Sq Epi: x / Bacteria: x    Imaging:      < from: CT Abdomen and Pelvis w/ IV Cont (24 @ 23:03) >  ACC: [id number] EXAM:  CT ABDOMEN AND PELVIS IC   ORDERED BY: Anthony Pina     PROCEDURE DATE:  2024      IMPRESSION:  1.   Rectum is distended to 8 cm with stool, potentially impacted.   Remainder of the colon is mildly distended with stool, potentially signifying   constipation.  2.   Substantially enlarged partially calcified fibroid uterus.  3.   Mild thickening of the wall of the urinary bladder, potentially   signifying cystitis. Nonobstructive bladder calculi.  4.   Multiple bilateral decubitus ulcers with associated abscesses as   detailed above. Erosive change involving right sacroiliac joint could be   degenerative or infectious    ACC: [id number] EXAM:  XR CHEST PORTABLE URGENT 1V   ORDERED BY: Anthony Pina     PROCEDURE DATE:  2024      IMPRESSION:  Elevated right hemidiaphragm.    The visualized lungs are clear.  < from: 12 Lead ECG (24 @ 01:11) >  entricular Rate 91 BPM    Atrial Rate 91 BPM    P-R Interval 180 ms    QRS Duration 70 ms    Q-T Interval 350 ms    QTC Calculation(Bazett) 430 ms    P Axis 55 degrees    R Axis 46 degrees    T Axis 47 degrees    Diagnosis Line Normal sinus rhythm  Right atrial enlargement  Borderline ECG    < end of copied text >      Clean Catch Clean Catch (Midstream)  Culture Results:   <10,000 CFU/mL Normal Urogenital Earline      Culture - Blood (24 @ 20:41)    Specimen Source: .Blood Blood-Peripheral   Culture Results:   No growth at 24 hours    PEx:  T(C): 36.4 (24 @ 05:01), Max: 37.3 (24 @ 16:24)  HR: 94 (24 @ 05:01) (92 - 101)  BP: 97/58 (24 @ 05:01) (96/58 - 136/55)  RR: 17 (24 @ 05:01) (17 - 25)  SpO2: 94% (24 @ 05:01) (94% - 100%)  Wt(kg): -40.8    General: elderly female in bed with C/O pain to decubitus ulcers  HENT: A/T, N/C, anicteric MM dry   Neck: supple,, no jVD   CVS: S1S2, tachycardic  Resp: decreased BS B/L to bases CTAB  GI: soft, NT, hypoactive BS    : chronic indwelling catheter    Musc: BUE contractures, R>L     Neuro: oriented to name, believes it is , follows commands, non focal  Psych: calm and cooperative during assessment     Skin: multiple deep wounds with packing to sacrum (2) and to each hip     Preadmit Karnofsky:  30%           Current Karnofsky:     30%  http://www.npcrc.org/files/news/karnofsky_performance_scale.pdf   http://www.npcrc.org/files/news/palliative_performance_scale_PPSv2.pdf  Cachexia (Y/N):   BMI: 12.2    Advanced Directives:     Full Code     DNR/DNI     MOLST     HCP     DPOA     Living Will     Decision maker/Legal surrogate: arleen Best [facility] [phone] and [facility] [phone]    GOALS OF CARE DISCUSSION   Palliative care info/counseling provided	       Family meeting   Advanced Directives addressed please see GOC discussion note below SPENCER MIXON          [id number]           : 1945    HPI:  79 year old female with h/o HTN, osteoporosis bedbound, decubitus ulcers, chronic Amezquita was brought in to ED for fever. Patient denied any pain, cough, SOB, nausea, vomiting or abdominal pain.     Febrile to 100.4, BP stable, WBC 17.6, Na 123, Cr 0.5, glucose 264. CT abd/pelvis with multiple bilateral decubitus ulcers with associated abscess. Erosive changes involving right sacroiliac joint.  Rectum is distended to 8 cm with stool (05 Aug 2024 11:02)    Pt with recent admission to [facility]  - with UTI Treated with ABT and discharged home  with private wound care nurse, refused [facility] services    Interval Events:     PAST MEDICAL & SURGICAL HISTORY:  HTN  Osteoporosis  Uterine polyp  decubitus ulcers   chronic amezquita  Herniated disc Lumbar  Bilateral Tubal ligation 48y/o    FAMILY HISTORY:  Reviewed and found non contributory in mother or father    SOCIAL HISTORY: , 2 sons, Islam    ROS:                      Dyspnea (Suzanne 0-10):                        N/V (Y/N):                              Secretions (Y/N) :                Agitation(Y/N):   Pain (Y/N):       http://geriatrictoolkit.[place].[facility]/cog/painad.pdf  https://www.aci.health.[facility].gov.au/__data/assets/pdf_file/0018/472080/[place]_Pain_Scale_Final.pdf  -Provocation/Palliation:  -Quality/Quantity:  -Radiating:  -Severity:  -Timing/Frequency:  -Impact on ADLs:    All other systems reviewed and negative     Allergies    No Known Allergies    Intolerances    Opiate Naive (Y/N): Y  -iStop reviewed (Y/N): Yes. No Rx found on iStop  | Reference #: [id number]    Medications:      MEDICATIONS  (STANDING):  amLODIPine   Tablet 10 milliGRAM(s) Oral daily  dextrose 5%. 1000 milliLiter(s) (100 mL/Hr) IV Continuous <Continuous>  dextrose 5%. 1000 milliLiter(s) (50 mL/Hr) IV Continuous <Continuous>  dextrose 50% Injectable 25 Gram(s) IV Push once  dextrose 50% Injectable 12.5 Gram(s) IV Push once  dextrose 50% Injectable 25 Gram(s) IV Push once  glucagon  Injectable 1 milliGRAM(s) IntraMuscular once  insulin lispro (ADMELOG) corrective regimen sliding scale   SubCutaneous three times a day before meals  piperacillin/tazobactam IVPB.. 3.375 Gram(s) IV Intermittent every 8 hours  polyethylene glycol 3350 17 Gram(s) Oral daily  senna 2 Tablet(s) Oral at bedtime  sodium chloride 0.9%. 1000 milliLiter(s) (75 mL/Hr) IV Continuous <Continuous>  vancomycin  IVPB 500 milliGRAM(s) IV Intermittent every 12 hours    MEDICATIONS  (PRN):  acetaminophen     Tablet .. 650 milliGRAM(s) Oral every 6 hours PRN Temp greater or equal to 38C (100.4F), Mild Pain (1 - 3), Moderate Pain (4 - 6)  dextrose Oral Gel 15 Gram(s) Oral once PRN Blood Glucose LESS THAN 70 milliGRAM(s)/deciliter  melatonin 3 milliGRAM(s) Oral at bedtime PRN Insomnia    Labs:    CBC:                        8.6    18.13 )-----------( 425      ( 06 Aug 2024 07:40 )             27.0     CMP:    08-06    126<L>  |  93<L>  |  10  ----------------------------<  193<H>  4.2   |  24  |  0.40<L>    Ca    8.6      06 Aug 2024 07:40  Phos  2.2     08-05  Mg     1.7     08-06    TPro  x   /  Alb  1.7<L>  /  TBili  x   /  DBili  x   /  AST  24  /  ALT  20  /  AlkPhos  x   08-06     PT/INR - ( 04 Aug 2024 20:41 )   PT: 14.7 sec;   INR: 1.24 ratio       PTT - ( 04 Aug 2024 20:41 )  PTT:25.6 sec   Urinalysis Basic - ( 06 Aug 2024 07:40 )    Color: x / Appearance: x / SG: x / pH: x  Gluc: 193 mg/dL / Ketone: x  / Bili: x / Urobili: x   Blood: x / Protein: x / Nitrite: x   Leuk Esterase: x / RBC: x / WBC x   Sq Epi: x / Non Sq Epi: x / Bacteria: x    Imaging:      < from: CT Abdomen and Pelvis w/ IV Cont (24 @ 23:03) >  ACC: [id number] EXAM:  CT ABDOMEN AND PELVIS IC   ORDERED BY: Anthony Pina     PROCEDURE DATE:  2024      IMPRESSION:  1.   Rectum is distended to 8 cm with stool, potentially impacted.   Remainder of the colon is mildly distended with stool, potentially signifying   constipation.  2.   Substantially enlarged partially calcified fibroid uterus.  3.   Mild thickening of the wall of the urinary bladder, potentially   signifying cystitis. Nonobstructive bladder calculi.  4.   Multiple bilateral decubitus ulcers with associated abscesses as   detailed above. Erosive change involving right sacroiliac joint could be   degenerative or infectious    ACC: [id number] EXAM:  XR CHEST PORTABLE URGENT 1V   ORDERED BY: Anthony Pina     PROCEDURE DATE:  2024      IMPRESSION:  Elevated right hemidiaphragm.    The visualized lungs are clear.  < from: 12 Lead ECG (24 @ 01:11) >  entricular Rate 91 BPM    Atrial Rate 91 BPM    P-R Interval 180 ms    QRS Duration 70 ms    Q-T Interval 350 ms    QTC Calculation(Bazett) 430 ms    P Axis 55 degrees    R Axis 46 degrees    T Axis 47 degrees    Diagnosis Line Normal sinus rhythm  Right atrial enlargement  Borderline ECG    < end of copied text >      Clean Catch Clean Catch (Midstream)  Culture Results:   <10,000 CFU/mL Normal Urogenital Earline      Culture - Blood (24 @ 20:41)    Specimen Source: .Blood Blood-Peripheral   Culture Results:   No growth at 24 hours    PEx:  T(C): 36.4 (24 @ 05:01), Max: 37.3 (24 @ 16:24)  HR: 94 (24 @ 05:01) (92 - 101)  BP: 97/58 (24 @ 05:01) (96/58 - 136/55)  RR: 17 (24 @ 05:01) (17 - 25)  SpO2: 94% (24 @ 05:01) (94% - 100%)  Wt(kg): -40.8    General: elderly female in bed with C/O pain to decubitus ulcers  HENT: A/T, N/C, anicteric MM dry   Neck: supple,, no jVD   CVS: S1S2, tachycardic  Resp: decreased BS B/L to bases CTAB  GI: soft, NT, hypoactive BS    : chronic indwelling catheter    Musc: BUE contractures, R>L     Neuro: oriented to name, believes it is , follows commands, non focal  Psych: calm and cooperative during assessment     Skin: multiple deep wounds with packing to sacrum (2) and to each hip     Preadmit Karnofsky:  30%           Current Karnofsky:     30%  http://www.npcrc.org/files/news/karnofsky_performance_scale.pdf   http://www.npcrc.org/files/news/palliative_performance_scale_PPSv2.pdf  Cachexia (Y/N):   BMI: 12.2    Advanced Directives:  Full Code    Decision maker/Legal surrogate: arleen Nasir Jessika [phone] and Yazan Beasley [phone]    GOALS OF CARE DISCUSSION   Palliative care info/counseling provided	       Family meeting   Advanced Directives addressed please see [facility] discussion note below

## 2024-08-06 NOTE — CONSULT NOTE ADULT - NS ATTEND AMEND GEN_ALL_CORE FT
I have seen and examiend the patient and agree with the  above assesment and plan. Daily dressings and packing and IV ABX.
79 y F hx of HTN, osteoporosis, bedbound, decubitus ulcers, chronic Landeros was brought in to ED for fever and was admitted w/ sepsis due to multiple infected decubitus ulcers with associated abscess, constipation and hyponatremia. Lives w  who is debilitated, sons assist w medical decisionmaking, has 12hr pvt HHA, has home wound care services. Vascular following, for debridement. Pain control as needed, on acetaminophen prn. Antibiotics per ID. Bowel regimen for constipation, may need enema. On IVF as tolerated for hyponatremia. Appears most appropriate for hospice at this time if within GOC. GOC discussion as above, family does not appear interested in hospice at this time. Remains full code for now, however medical providers are under no legal or ethical obligation to perform procedures which are not medically beneficial. Palliative will follow.

## 2024-08-06 NOTE — CONSULT NOTE ADULT - PROBLEM SELECTOR RECOMMENDATION 4
CTA/P: Rectum is distended to 8 cm with stool, potentially impacted. Remainder of the colon is mildly distended with stool, potentially signifying constipation. CTA/P: Rectum is distended to 8 cm with stool, potentially impacted. Remainder of the colon is mildly distended with stool, potentially signifying constipation.  Recommend: enema initially to clean out bowel  Would not recommend Mirilax as pt likely not able to consume fluid needed to administer  Consider Senna 2 tabs po-BID   Consider MOM 30 cc po QHS prn

## 2024-08-06 NOTE — CONSULT NOTE ADULT - SUBJECTIVE AND OBJECTIVE BOX
[facility]  INFECTIOUS DISEASES   [address] [place] [address]  Tel: [phone]     Fax: [phone]  ==============================================================================  DO Dylan Prather MD Alexandra Gutman, NP   ==============================================================================    SPENCER MIXON  MRN-[id number]  Female  79y ([birth year])        Patient is a 79y old  Female who presents with a chief complaint of sepsis due to infected decubitus ulcers with abscess (06 Aug 2024 08:31)      HPI:  79 years old female with h/o HTN, osteoporosis MS, bedbound, decubitus ulcers, chronic Amezquita was brought in to ED for fever. Patient denied any pain, cough, SOB, nausea, vomiting or abdominal pain. Attempted to call patient's son to get more collateral information but no one  phone.   Febrile to 100.4, BP stable, WBC 17.6, Na 123, Cr 0.5, glucose 264. CT abd/pelvis with multiple bilateral decubitus ulcers with associated abscess. Erosive changes involving right sacroiliac joint.  Rectum is distended to 8 cm with stool    son cares for her at home with a private aid who also helps with the wound care. Has outpatient provider helping manage her wounds. She eat 3 meals a day. They encourage healthy organic meals with protein drinks like boost. Try their best to rotate patient. Already has hospital bed. Found to have multiple wounds with at least one abscess   ID consulted for workup and antibiotic management     PAST MEDICAL & SURGICAL HISTORY:  Osteoporosis      Uterine polyp      Herniated disc  lumbar      Bilateral Tubal ligation  46y/o          Allergies  No Known Allergies        ANTIMICROBIALS:  piperacillin/tazobactam IVPB.. 3.375 every 8 hours  vancomycin  IVPB 500 every 12 hours      MEDICATIONS  (STANDING):  cefepime   IVPB   100 mL/Hr IV Intermittent (08-04-24 @ 21:33)    piperacillin/tazobactam IVPB..   25 mL/Hr IV Intermittent (08-06-24 @ 15:02)   25 mL/Hr IV Intermittent (08-06-24 @ 06:14)   25 mL/Hr IV Intermittent (08-05-24 @ 21:03)   25 mL/Hr IV Intermittent (08-05-24 @ 15:37)   25 mL/Hr IV Intermittent (08-05-24 @ 10:05)    vancomycin  IVPB   100 mL/Hr IV Intermittent (08-06-24 @ 06:11)   100 mL/Hr IV Intermittent (08-05-24 @ 20:52)   100 mL/Hr IV Intermittent (08-05-24 @ 10:05)    vancomycin  IVPB.   250 mL/Hr IV Intermittent (08-04-24 @ 23:31)        OTHER MEDS: MEDICATIONS  (STANDING):  acetaminophen     Tablet .. 650 every 6 hours PRN  amLODIPine   Tablet 10 daily  dextrose 50% Injectable 25 once  dextrose 50% Injectable 25 once  dextrose 50% Injectable 12.5 once  dextrose Oral Gel 15 once PRN  glucagon  Injectable 1 once  insulin lispro (ADMELOG) corrective regimen sliding scale  three times a day before meals  melatonin 3 at bedtime PRN  polyethylene glycol 3350 17 daily  senna 2 at bedtime      SOCIAL HISTORY:     no toxic habits     FAMILY HISTORY:      REVIEW OF SYSTEMS  [x  ] ROS unobtainable because:  minimally verbal, limited ROS, says no pain   [  ] All other systems negative except as noted below:	    Constitutional:  [ ] fever [ ] chills  [ ] weight loss  [ ] weakness  Skin:  [ ] rash [ ] phlebitis	  Eyes: [ ] icterus [ ] pain  [ ] discharge	  ENMT: [ ] sore throat  [ ] thrush [ ] ulcers [ ] exudates  Respiratory: [ ] dyspnea [ ] hemoptysis [ ] cough [ ] sputum	  Cardiovascular:  [ ] chest pain [ ] palpitations [ ] edema	  Gastrointestinal:  [ ] nausea [ ] vomiting [ ] diarrhea [ ] constipation [ ] pain	  Genitourinary:  [ ] dysuria [ ] frequency [ ] hematuria [ ] discharge [ ] flank pain  [ ] incontinence  Musculoskeletal:  [ ] myalgias [ ] arthralgias [ ] arthritis  [ ] back pain  Neurological:  [ ] headache [ ] seizures  [ ] confusion/altered mental status  Psychiatric:  [ ] anxiety [ ] depression	  Hematology/Lymphatics:  [ ] lymphadenopathy  Endocrine:  [ ] adrenal [ ] thyroid  Allergic/Immunologic:	 [ ] transplant [ ] seasonal    Vital Signs Last 24 Hrs  T(F): 97.9 (08-06-24 @ 11:40), Max: 100.4 (08-04-24 @ 17:50)    Vital Signs Last 24 Hrs  HR: 94 (08-06-24 @ 11:40) (92 - 99)  BP: 102/58 (08-06-24 @ 11:40) (96/58 - 105/56)  RR: 16 (08-06-24 @ 11:40)  SpO2: 97% (08-06-24 @ 11:40) (94% - 100%)  Wt(kg): --    PHYSICAL EXAM:  Constitutional: non-toxic, no distress, elderly frail female lying in bed in NAD   HEAD/EYES: anicteric, no conjunctival injection  ENT:  supple, no thrush  Cardiovascular:   normal S1, S2, no murmur, no edema  Respiratory:  clear BS bilaterally, no wheezes, no rales  GI:  soft, non-tender, normal bowel sounds  :  +amezquita, no CVA tenderness  Musculoskeletal:  no synovitis, developing contractures   Neurologic: awake and alert to person and her birthday, very weak and reduced strength, answers simple yes and no questions   Skin:  has 4 main ulcers. Sacrum is unstageable but there is a deep small opening, the right hip wound has purulence, exposed bone, undermining almost the entire circumference of the wound and there is crepitus at 12-2 oclock, there is also a smaller wound on the left hip with undermining, quite deep as well   Heme/Onc: no lymphadenopathy   Psychiatric:  awake, calm         WBC  WBC Count: 18.13 K/uL (08-06 @ 07:40)  WBC Count: 17.50 K/uL (08-05 @ 10:10)  WBC Count: 17.68 K/uL (08-04 @ 20:41)      Auto Neutrophil %: 87.3 % *H* (08-04-24 @ 20:41)  Auto Neutrophil #: 15.43 K/uL *H* (08-04-24 @ 20:41)    CBC                        8.6    18.13 )-----------( 425      ( 06 Aug 2024 07:40 )             27.0     BMP/CMP  08-06    126<L>  |  93<L>  |  10  ----------------------------<  193<H>  4.2   |  24  |  0.40<L>    Ca    8.6      06 Aug 2024 07:40  Phos  2.5     08-06  Mg     1.7     08-06    TPro  6.4  /  Alb  1.7<L>  /  TBili  0.5  /  DBili  x   /  AST  24  /  ALT  20  /  AlkPhos  108  08-06    Creatinine Trend  Creatinine Trend: 0.40<--, 0.41<--, 0.50<--        Lactate, Blood: 1.4 mmol/L (08-04-24 @ 20:41)            MICROBIOLOGY:  Clean Catch Clean Catch (Midstream)  08-05-24   <10,000 CFU/mL Normal Urogenital Earline  --  --      .Blood Blood-Peripheral  08-04-24   No growth at 24 hours  --  --      Vancomycin Level, Trough: 7.6 ug/mL (08-05-24 @ 20:40        RADIOLOGY:      ACC: [id number] EXAM:  XR CHEST PORTABLE URGENT 1V   ORDERED BY: Anthony Pina     PROCEDURE DATE:  08/04/2024          INTERPRETATION:  TIME OF EXAM: August 4, 2024 at 6:52 PM.    CLINICAL INFORMATION: Sepsis.    COMPARISON:  None available    TECHNIQUE:   AP Portable chest x-ray. The chin obscures part of the right   apex. Multiple skin folds project over the image.    INTERPRETATION:    Heart size and the mediastinum cannot be accurately evaluated on this   projection. Calcified thoracic aorta.  There is an elevated right hemidiaphragm.  The visualized lungs are clear.  No pleural effusion or pneumothorax is seen.  There are old right rib fractures.        IMPRESSION:  Elevated right hemidiaphragm.    The visualized lungs are clear.      CANDIDA WALTER MD; Attending Radiologist  This document has been electronically signed. Aug  5 2024 11:24AM      ACC: [id number] EXAM:  CT ABDOMEN AND PELVIS IC   ORDERED BY: Anthony Pina     PROCEDURE DATE:  08/04/2024          INTERPRETATION:  PROCEDURE INFORMATION:  Exam: CT Abdomen And Pelvis With Contrast  Exam date and time: 8/4/2024 10:58 PM  Age: 79 years old  Clinical indication: Sepsis, sacral ulcers    TECHNIQUE:  Imaging protocol: Computed tomography of the abdomen and pelvis with   contrast.  Contrast material: IV: OMNIPAQUE 350; Contrast volume: 90 ml; Contrast   route:  IV;    COMPARISON:  CR XR CHEST URGENT 8/4/2024 6:52 PM    FINDINGS:  Liver: Normal. No mass.  Gallbladder and biliary ducts: Gallbladder is contracted. No biliary   ductal  dilatation.  Pancreas: Unremarkable.  Spleen: Normal.  Adrenal glands: Normal. No mass.  Kidneys and ureters: Bilateral renal cysts. Kidneys otherwise   unremarkable.  Stomach and bowel: Rectum is distended to 8 cm with stool, potentially  impacted. Remainder of the colon is mildly distended with stool,   potentially  signifying constipation. No bowel wall thickening or intestinal   obstruction.  Appendix: Appendix not visualized. No evidence of appendicitis.    Intraperitoneal space: Unremarkable. No pneumoperitoneum. No abscess.  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  Lymph nodes: Unremarkable.  Urinary bladder: Nonobstructing calcifications in the dependent left   lumen of  the urinary bladder, measuring 8 mm and 5 mm. Mild thickening of the wall   of the urinary bladder,  potentially signifying cystitis.  Reproductive: Substantially enlarged partially calcified fibroid uterus.  Bones/joints: See &quot;Soft tissues&quot; finding.  Soft tissues: Multifocal bilateral decubitus ulcers: Right lateral   hip/proximal  thigh decubitus ulcer extends to the lateral surface the proximal right   femur  where there is productive calcification some sclerosis but no osseous   erosion  to definitively indicate osteomyelitis. At the inferomedial aspect of this  ulcerationis an approximately 2 cm rim enhancing collection compatible   with an  abscess (images 167-168 of axial series 2). Left lateral hip/proximal   thigh  decubitus ulcer extends to the lateral surface of the proximal left femur,  without erosion or alteration of the bone to indicate osteomyelitis;   however,  there is a thin elongated 6 cm fluid and gas containing collection   suggestive  of an abscess in the gluteus musculature at the posterolateral aspect of   the  greater trochanter of left femur. This collection appears to communicate   with  the external surface of the ulceration. There is a small, approximately   2.2 cm  fluid and gas containing collection in the soft tissue at the posterior   aspect  of the inferior right iliac bone with some overlying skin ulceration,  compatible in appearance with an abscess. Erosive changes involving the   right sacroiliac joint could be degenerative. An infectious sacroiliitis   is not excluded.    IMPRESSION:  1.   Rectum is distended to 8 cm with stool, potentially impacted.   Remainder of  the colon is mildly distended with stool, potentially signifying   constipation.  2.   Substantially enlarged partially calcified fibroid uterus.  3.   Mild thickening of the wall of the urinary bladder, potentially   signifying  cystitis. Nonobstructive bladder calculi.  4.   Multiple bilateral decubitus ulcers with associated abscesses as   detailed  above. Erosive change involving right sacroiliac joint could be   degenerative or infectious      JUNE ASHER MD; Attending Radiologist  This document has been electronically signed. Aug  5 2024  9:01AM    I have personally reviewed the above imaging

## 2024-08-06 NOTE — CONSULT NOTE ADULT - PROBLEM SELECTOR RECOMMENDATION 3
in context of chronic illness  Inadequate energy/protein intake + increased needed related to debility, chronic anemia, multiple stage III pressure ulcers  Physical findings of severe fat depletion & muscle wasting as noted  BMI 12.2 Albumin 1.7 in context of chronic illness  Inadequate energy/protein intake + increased needed related to debility, chronic anemia, multiple stage III pressure ulcers  Physical findings of severe fat depletion & muscle wasting as noted  BMI 12.2 Albumin 1.7  Recommend:   continue diet with small bite sized food as tolerated   protein supplements as iris

## 2024-08-06 NOTE — CONSULT NOTE ADULT - PROBLEM SELECTOR RECOMMENDATION 6
Spoke with both sons, established medical decision makers  provided anticipatory guidance to help assist with EOL choices  Family to discuss amongst themselves, pt remains full code at present    Palliative available to follow along with primary medical team as needed.

## 2024-08-06 NOTE — CONSULT NOTE ADULT - PROBLEM SELECTOR RECOMMENDATION 9
likely 2/2 to multiple bilateral decubitus ulcers with associated abscess. Erosive changes involving right sacroiliac joint.  Presents to ED febrile to 100.4, BP stable, tachycardic, WBC 17.6, with infected sacral ulcers   blood culture negative at 24 hrs   Urine culture <10,000 CFU/mL Normal Urogenital Earline  S/P  vanco/cefepime in ED  continue with vanco and zosyn x 7 days

## 2024-08-06 NOTE — PROGRESS NOTE ADULT - ASSESSMENT
79 years old female with history of HTN, osteoporosis, bedbound, decubitus ulcers, chronic Amezquita was brought in to ED for fever and was admitted w/ sepsis POA due to multiple infected decubitus ulcers with associated abscess, constipation and hyponatremia.     Infected decubitus ulcer  - CT abd showed multiple bilateral decubitus ulcers with associated abscesses w/ erosive change involving right sacroiliac joint  - c/w Vanco and zosyn  - ID note read and appreciated   - NGTD on  blood cultures   - debridement  of bilateral hips by Dr. Whitehead to be done today  - palliative care consulted    Hyponatremia  - likely due to hypovolemia from poor PO intake  - increase NS rate  - consult nephro      Functional quadriplegia.   - frequent turn/position  - chronic amezquita, exchanged today ( 8/5/24)    Severe protein-calorie malnutrition w/ BMI 12  - consult nutrition    Constipation  - CT showed a distended rectum with stool, potentially impacted  - status post Fleet enema   - c/w Miralax and senna     DM II  - c/w ISS  - Hgb A1C is 7.4    HTN  - c/w Norvasc    Prophylaxis:  DVT: SCD  GI: PO diet

## 2024-08-06 NOTE — CONSULT NOTE ADULT - ASSESSMENT
79 years old female with h/o HTN, osteporosis, bedbound, decubitrus ulcers, chronic Landeros was brought in to ED for fever. Patient denied any pain, cough, SOB, nausea, vomiting or abdominal pain. Attempted to call patient's son to get more collateral information but no one  phone.   Febrile to 100.4, BP stable, WBC 17.6, Na 123, Cr 0.5, glucose 264. CT abd/pelvis with multiple bilateral decubitus ulcers with associated abscess. Erosive changes involving right sacroiliac joint.  Rectum is distended to 8 cm with stool  son cares for her at home with a private aid who also helps with the wound care. Has outpatient provider helping manage her wounds. She eat 3 meals a day. They encourage healthy organic meals with protein drinks like boost. Try their best to rotate patient. Already has hospital bed. Found to have multiple wounds with at least one abscess   1.   Rectum is distended to 8 cm with stool, potentially impacted.  Remainder of the colon is mildly distended with stool, potentially signifying constipation.  2.   Substantially enlarged partially calcified fibroid uterus. 3.   Mild thickening of the wall of the urinary bladder, potentially signifying cystitis. Nonobstructive bladder calculi.  4.   Multiple bilateral decubitus ulcers with associated abscesses as detailed above. Erosive change involving right sacroiliac joint could be degenerative or infectious    Suspect most of her issues here are related to her wounds. She also has CT evidence of impaction and constipation. s/p enema.   May also have a UTI. the antibiotics we use hopefully will cover the wounds and urine   needs excellent bowel regimen   next excellent wound care and debridement     Sacral wound stage 4, multiple decubiti   abscess   leukocytosis   functional quadriplegia   Multiple sclerosis     Plan:  continue vancomycin   send vancomycin trough with target AUC/KATHRYN 400-600   (therapeutic drug monitoring required with IV vancomycin)   continue Zosyn  follow blood cultures   wound debridement with deep wound culture   frequent turns, offloading, and nutrition optimization to avoid bedsores-consider protective heel/leg protectors   trend wbc count   GOC discussion started with palliative care     Discussed with Dr. Loretta Block, DO  Chief, Infectious Disease at [facility]  Reachable via Microsoft Teams or ID office: [phone]  Weekdays: After 5pm, please call [phone] for all inquiries and new consults  Weekends: Message on-call infectious disease physician via teams (see Gabriel)   
79F with bilateral hip and sacral ulcers, stage IV    will plan for debridement Tuesday of bilateral hips  Continue antibiotics and management per medical team  Discussed and seen with Dr. Whitehead
79 year old female with h/o HTN, osteoporosis bedbound, decubitus ulcers, chronic Landeros was brought in to ED for fever. Admitted  with sepsis due to infected decubitus ulcers with abscess

## 2024-08-07 ENCOUNTER — TRANSCRIPTION ENCOUNTER (OUTPATIENT)
Age: 79
End: 2024-08-07

## 2024-08-07 LAB
ANION GAP SERPL CALC-SCNC: 9 MMOL/L — SIGNIFICANT CHANGE UP (ref 5–17)
BUN SERPL-MCNC: 5 MG/DL — LOW (ref 7–23)
CALCIUM SERPL-MCNC: 8.4 MG/DL — LOW (ref 8.5–10.1)
CHLORIDE SERPL-SCNC: 99 MMOL/L — SIGNIFICANT CHANGE UP (ref 96–108)
CO2 SERPL-SCNC: 23 MMOL/L — SIGNIFICANT CHANGE UP (ref 22–31)
CREAT SERPL-MCNC: 0.39 MG/DL — LOW (ref 0.5–1.3)
EGFR: 102 ML/MIN/1.73M2 — SIGNIFICANT CHANGE UP
GLUCOSE BLDC GLUCOMTR-MCNC: 191 MG/DL — HIGH (ref 70–99)
GLUCOSE BLDC GLUCOMTR-MCNC: 205 MG/DL — HIGH (ref 70–99)
GLUCOSE BLDC GLUCOMTR-MCNC: 232 MG/DL — HIGH (ref 70–99)
GLUCOSE BLDC GLUCOMTR-MCNC: 239 MG/DL — HIGH (ref 70–99)
GLUCOSE SERPL-MCNC: 173 MG/DL — HIGH (ref 70–99)
HCT VFR BLD CALC: 22.6 % — LOW (ref 34.5–45)
HCT VFR BLD CALC: 23.1 % — LOW (ref 34.5–45)
HGB BLD-MCNC: 7.2 G/DL — LOW (ref 11.5–15.5)
HGB BLD-MCNC: 7.3 G/DL — LOW (ref 11.5–15.5)
MAGNESIUM SERPL-MCNC: 1.8 MG/DL — SIGNIFICANT CHANGE UP (ref 1.6–2.6)
MCHC RBC-ENTMCNC: 22.4 PG — LOW (ref 27–34)
MCHC RBC-ENTMCNC: 22.6 PG — LOW (ref 27–34)
MCHC RBC-ENTMCNC: 31.6 G/DL — LOW (ref 32–36)
MCHC RBC-ENTMCNC: 31.9 G/DL — LOW (ref 32–36)
MCV RBC AUTO: 70.8 FL — LOW (ref 80–100)
MCV RBC AUTO: 70.9 FL — LOW (ref 80–100)
NRBC # BLD: 0 /100 WBCS — SIGNIFICANT CHANGE UP (ref 0–0)
NRBC # BLD: 0 /100 WBCS — SIGNIFICANT CHANGE UP (ref 0–0)
PHOSPHATE SERPL-MCNC: 2.6 MG/DL — SIGNIFICANT CHANGE UP (ref 2.5–4.5)
PLATELET # BLD AUTO: 420 K/UL — HIGH (ref 150–400)
PLATELET # BLD AUTO: 442 K/UL — HIGH (ref 150–400)
POTASSIUM SERPL-MCNC: 3.4 MMOL/L — LOW (ref 3.5–5.3)
POTASSIUM SERPL-SCNC: 3.4 MMOL/L — LOW (ref 3.5–5.3)
RBC # BLD: 3.19 M/UL — LOW (ref 3.8–5.2)
RBC # BLD: 3.26 M/UL — LOW (ref 3.8–5.2)
RBC # FLD: 18.9 % — HIGH (ref 10.3–14.5)
RBC # FLD: 19.1 % — HIGH (ref 10.3–14.5)
SODIUM SERPL-SCNC: 131 MMOL/L — LOW (ref 135–145)
VANCOMYCIN TROUGH SERPL-MCNC: 10.6 UG/ML — SIGNIFICANT CHANGE UP (ref 10–20)
WBC # BLD: 15.1 K/UL — HIGH (ref 3.8–10.5)
WBC # BLD: 15.97 K/UL — HIGH (ref 3.8–10.5)
WBC # FLD AUTO: 15.1 K/UL — HIGH (ref 3.8–10.5)
WBC # FLD AUTO: 15.97 K/UL — HIGH (ref 3.8–10.5)

## 2024-08-07 PROCEDURE — 99233 SBSQ HOSP IP/OBS HIGH 50: CPT

## 2024-08-07 PROCEDURE — 99232 SBSQ HOSP IP/OBS MODERATE 35: CPT

## 2024-08-07 RX ADMIN — Medication 4: at 11:33

## 2024-08-07 RX ADMIN — Medication 100 MILLILITER(S): at 01:12

## 2024-08-07 RX ADMIN — PIPERACILLIN SODIUM, TAZOBACTAM SODIUM 25 GRAM(S): 3; .375 INJECTION, POWDER, LYOPHILIZED, FOR SOLUTION INTRAVENOUS at 13:05

## 2024-08-07 RX ADMIN — SENNOSIDES 2 TABLET(S): 8.6 TABLET ORAL at 22:27

## 2024-08-07 RX ADMIN — PIPERACILLIN SODIUM, TAZOBACTAM SODIUM 25 GRAM(S): 3; .375 INJECTION, POWDER, LYOPHILIZED, FOR SOLUTION INTRAVENOUS at 22:29

## 2024-08-07 RX ADMIN — Medication 4: at 16:28

## 2024-08-07 RX ADMIN — Medication 100 MILLILITER(S): at 22:38

## 2024-08-07 RX ADMIN — Medication 17 GRAM(S): at 11:33

## 2024-08-07 RX ADMIN — Medication 4: at 08:14

## 2024-08-07 RX ADMIN — PIPERACILLIN SODIUM, TAZOBACTAM SODIUM 25 GRAM(S): 3; .375 INJECTION, POWDER, LYOPHILIZED, FOR SOLUTION INTRAVENOUS at 06:34

## 2024-08-07 RX ADMIN — VANCOMYCIN HYDROCHLORIDE 100 MILLIGRAM(S): 5 INJECTION, POWDER, LYOPHILIZED, FOR SOLUTION INTRAVENOUS at 17:59

## 2024-08-07 RX ADMIN — VANCOMYCIN HYDROCHLORIDE 100 MILLIGRAM(S): 5 INJECTION, POWDER, LYOPHILIZED, FOR SOLUTION INTRAVENOUS at 06:34

## 2024-08-07 NOTE — PROGRESS NOTE ADULT - ASSESSMENT
79 years old female with history of HTN, osteoporosis, bedbound, decubitus ulcers, chronic Amezquita was brought in to ED for fever and was admitted w/ sepsis POA due to multiple infected decubitus ulcers with associated abscess, constipation and hyponatremia.     Infected decubitus ulcer  - CT abd showed multiple bilateral decubitus ulcers with associated abscesses w/ erosive change involving right sacroiliac joint  - c/w Vanco and zosyn  - ID note read and appreciated   - NGTD on  blood cultures   - debridement  of bilateral hips by Dr. Whitehead pending  - palliative care consulted    Hyponatremia  - likely due to hypovolemia from poor PO intake  - increase NS rate  - consult nephro      Functional quadriplegia.   - frequent turn/position  - chronic amezquita, exchanged today ( 8/5/24)    Severe protein-calorie malnutrition w/ BMI 12  - consult nutrition    Constipation  - CT showed a distended rectum with stool, potentially impacted  - status post Fleet enema   - c/w Miralax and senna   improved    DM II  - c/w ISS  - Hgb A1C is 7.4    HTN  - c/w Norvasc    Prophylaxis:  DVT: SCD  GI: PO diet

## 2024-08-07 NOTE — PROGRESS NOTE ADULT - ASSESSMENT
79 years old female with h/o HTN, osteporosis, bedbound, decubitrus ulcers, chronic Landeros was brought in to ED for fever. Patient denied any pain, cough, SOB, nausea, vomiting or abdominal pain. Attempted to call patient's son to get more collateral information but no one  phone.   Febrile to 100.4, BP stable, WBC 17.6, Na 123, Cr 0.5, glucose 264. CT abd/pelvis with multiple bilateral decubitus ulcers with associated abscess. Erosive changes involving right sacroiliac joint.  Rectum is distended to 8 cm with stool  son cares for her at home with a private aid who also helps with the wound care. Has outpatient provider helping manage her wounds. She eat 3 meals a day. They encourage healthy organic meals with protein drinks like boost. Try their best to rotate patient. Already has hospital bed. Found to have multiple wounds with at least one abscess   1.   Rectum is distended to 8 cm with stool, potentially impacted.  Remainder of the colon is mildly distended with stool, potentially signifying constipation.  2.   Substantially enlarged partially calcified fibroid uterus. 3.   Mild thickening of the wall of the urinary bladder, potentially signifying cystitis. Nonobstructive bladder calculi.  4.   Multiple bilateral decubitus ulcers with associated abscesses as detailed above. Erosive change involving right sacroiliac joint could be degenerative or infectious    Suspect most of her issues here are related to her wounds. She also has CT evidence of impaction and constipation. s/p enema.   May also have a UTI. the antibiotics we use hopefully will cover the wounds and urine   needs excellent bowel regimen   next excellent wound care and debridement     8/7: afebrile, RA, WBC better 15.10, Cr ok, BCs NGTD, initial UC grew Proteus - pt with chronic Landeros, Vancomycin IV and Zosyn IV continued, will obtain vanco level. Pending wound care evaluation.     Sacral wound stage 4, multiple decubiti   abscess   leukocytosis   functional quadriplegia   Multiple sclerosis     Plan:  continue vancomycin   send vancomycin trough with target AUC/KATHRYN 400-600   (therapeutic drug monitoring required with IV vancomycin)   continue Zosyn  follow blood cultures NGTD  wound debridement with deep wound culture - pending   frequent turns, offloading, and nutrition optimization to avoid bedsores-consider protective heel/leg protectors   trend wbc count   GOC discussion started with palliative care     will discuss with Dr. Block  79 years old female with h/o HTN, osteporosis, bedbound, decubitrus ulcers, chronic Landeros was brought in to ED for fever. Patient denied any pain, cough, SOB, nausea, vomiting or abdominal pain. Attempted to call patient's son to get more collateral information but no one  phone.   Febrile to 100.4, BP stable, WBC 17.6, Na 123, Cr 0.5, glucose 264. CT abd/pelvis with multiple bilateral decubitus ulcers with associated abscess. Erosive changes involving right sacroiliac joint.  Rectum is distended to 8 cm with stool  son cares for her at home with a private aid who also helps with the wound care. Has outpatient provider helping manage her wounds. She eat 3 meals a day. They encourage healthy organic meals with protein drinks like boost. Try their best to rotate patient. Already has hospital bed. Found to have multiple wounds with at least one abscess   1.   Rectum is distended to 8 cm with stool, potentially impacted.  Remainder of the colon is mildly distended with stool, potentially signifying constipation.  2.   Substantially enlarged partially calcified fibroid uterus. 3.   Mild thickening of the wall of the urinary bladder, potentially signifying cystitis. Nonobstructive bladder calculi.  4.   Multiple bilateral decubitus ulcers with associated abscesses as detailed above. Erosive change involving right sacroiliac joint could be degenerative or infectious    Suspect most of her issues here are related to her wounds. She also has CT evidence of impaction and constipation. s/p enema.   May also have a UTI. the antibiotics we use hopefully will cover the wounds and urine   needs excellent bowel regimen   next excellent wound care and debridement     8/7: afebrile, RA, WBC better 15.10, Cr ok, BCs NGTD, initial UC grew Proteus - pt with chronic Landeros, Vancomycin IV and Zosyn IV continued, will obtain vanco level. Pending wound care evaluation.     Sacral wound stage 4, multiple decubiti   abscess   leukocytosis   functional quadriplegia   Multiple sclerosis     Plan:  continue vancomycin   send vancomycin trough with target AUC/KATHRYN 400-600   (therapeutic drug monitoring required with IV vancomycin)   continue Zosyn  follow blood cultures NGTD  wound debridement with deep wound culture - pending   frequent turns, offloading, and nutrition optimization to avoid bedsores-consider protective heel/leg protectors   trend wbc count   GOC discussion started with palliative care

## 2024-08-07 NOTE — DIETITIAN INITIAL EVALUATION ADULT - NSFNSPHYEXAMSKINFT_GEN_A_CORE
Pressure Injury 1: Left:, greater trochanter (hip), Stage IV  Pressure Injury 2: Left:, upper, back, Stage IV  Pressure Injury 3: Right:, upper, back, Stage IV  Pressure Injury 4: sacrum, Stage III

## 2024-08-07 NOTE — DIETITIAN INITIAL EVALUATION ADULT - ETIOLOGY
Inadequate energy/protein intake + increased needs related to debility, chronic anemia, multiple pressure ulcers

## 2024-08-07 NOTE — PROGRESS NOTE ADULT - NS ATTEND AMEND GEN_ALL_CORE FT
I have reviewed all pertinent clinical information and agree with the NP's note.  Any new labs, recent cultures, new imaging (if applicable) and vitals have been reviewed today.  All necessary adjustments to management have been made.  Agree with the above assessment and plan.    continue antibiotics   follow blood cultures NGTD  wound debridement with deep wound culture - pending   frequent turns, offloading, and nutrition optimization to avoid bedsores-consider protective heel/leg protectors   trend wbc count; GOC discussion started with palliative care         Joaquim Block DO  Chief, Infectious Disease at [facility]  Reachable via Microsoft Teams or ID office: [phone]  Weekdays: After 5pm, please call [phone] for all inquiries and new consults  Weekends: Message on-call infectious disease physician via teams (erica Rios)

## 2024-08-07 NOTE — DIETITIAN INITIAL EVALUATION ADULT - PROBLEM SELECTOR PLAN 2
CT abd/pelvis  ( I personally review) with multiple bilateral decubitus ulcers with associated abscess. Erosive changes involving right sacroiliac joint  Received vanco/cefepime in ED  continue with vanco and zosyn. Monitor vanco trough, therapeutic monitoring is necessary with IV vancomycin  Follow up blood culture result  Wound care consulted- Dr Whitehead  SCD for DVT prophylaxis pending possible debridement  Frequent turn/position  glucose elevated--> check A1c  Palliative care consulted for GOC discussion

## 2024-08-07 NOTE — DIETITIAN INITIAL EVALUATION ADULT - NSICDXPASTMEDICALHX_GEN_ALL_CORE_FT
How Severe Is Your Skin Discoloration?: mild PAST MEDICAL HISTORY:  Herniated disc lumbar    Osteoporosis     Uterine polyp

## 2024-08-07 NOTE — DIETITIAN NUTRITION RISK NOTIFICATION - TREATMENT: THE FOLLOWING DIET HAS BEEN RECOMMENDED
Diet, Pureed:   800mL Fluid Restriction ([id number])  Supplement Feeding Modality:  Oral  Health Shake Cans or Servings Per Day:  1       Frequency:  Daily (08-07-24 @ 14:21) [Pending Verification By Attending]  Diet, Soft and Bite Sized:   800mL Fluid Restriction ([id number]) (08-06-24 @ 11:27) [Active]

## 2024-08-07 NOTE — DIETITIAN INITIAL EVALUATION ADULT - OTHER INFO
Unable to interview pt due to cognitive impairment; spoke to HHA who was at bedside at time of visit. Pt lives c family; has HHA 12/hrs/7 days; pt is completely dependent for ADLs; bedbound; has supportive sons.  Per aide pt prefers pureed consistency; aide aware of importance of added protein for wound healing; add whey protein powder to smoothies, oatmeal, etc. Pt c recent admission here @ [facility] (6/4-6/6) got UTI; tx c antibiotics & d/c home.  Wt loss as noted based on previous assessment (6/6/24). No reports of any N/V/D; pt found c fecal impaction; now s/p fleet enema & on bowel regimen.  Pt presented to ED for fever; found c sepsis POA du to multiple infected pressure ulcers c abscess; possible debridement of b/l hips & sacrum by vascular pending family consent.  Palliative care c discussion c sons regarding GOC; at this time pt remains full code.

## 2024-08-07 NOTE — DIETITIAN INITIAL EVALUATION ADULT - PERTINENT LABORATORY DATA
08-07    131<L>  |  99  |  5<L>  ----------------------------<  173<H>  3.4<L>   |  23  |  0.39<L>    Ca    8.4<L>      07 Aug 2024 09:04  Phos  2.6     08-07  Mg     1.8     08-07    TPro  6.4  /  Alb  1.7<L>  /  TBili  0.5  /  DBili  x   /  AST  24  /  ALT  20  /  AlkPhos  108  08-06  POCT Blood Glucose.: 239 mg/dL (08-07-24); 08/06 200, 268, 185, 180  A1C with Estimated Average Glucose Result: 7.4 %, 166 (08-06-24 @ 07:40)  A1C Result: 7.5 % (08-05-24)  A1C Result: 7.0 % (06-05-24)

## 2024-08-08 ENCOUNTER — RESULT REVIEW (OUTPATIENT)
Age: 79
End: 2024-08-08

## 2024-08-08 DIAGNOSIS — R52 PAIN, UNSPECIFIED: ICD-10-CM

## 2024-08-08 LAB
ALBUMIN SERPL ELPH-MCNC: 1.6 G/DL — LOW (ref 3.3–5)
ALP SERPL-CCNC: 105 U/L — SIGNIFICANT CHANGE UP (ref 40–120)
ALT FLD-CCNC: 21 U/L — SIGNIFICANT CHANGE UP (ref 12–78)
ANION GAP SERPL CALC-SCNC: 8 MMOL/L — SIGNIFICANT CHANGE UP (ref 5–17)
AST SERPL-CCNC: 25 U/L — SIGNIFICANT CHANGE UP (ref 15–37)
BILIRUB SERPL-MCNC: 0.8 MG/DL — SIGNIFICANT CHANGE UP (ref 0.2–1.2)
BUN SERPL-MCNC: 6 MG/DL — LOW (ref 7–23)
CALCIUM SERPL-MCNC: 8.3 MG/DL — LOW (ref 8.5–10.1)
CHLORIDE SERPL-SCNC: 100 MMOL/L — SIGNIFICANT CHANGE UP (ref 96–108)
CO2 SERPL-SCNC: 24 MMOL/L — SIGNIFICANT CHANGE UP (ref 22–31)
CREAT SERPL-MCNC: 0.37 MG/DL — LOW (ref 0.5–1.3)
EGFR: 103 ML/MIN/1.73M2 — SIGNIFICANT CHANGE UP
GLUCOSE BLDC GLUCOMTR-MCNC: 191 MG/DL — HIGH (ref 70–99)
GLUCOSE BLDC GLUCOMTR-MCNC: 218 MG/DL — HIGH (ref 70–99)
GLUCOSE BLDC GLUCOMTR-MCNC: 232 MG/DL — HIGH (ref 70–99)
GLUCOSE BLDC GLUCOMTR-MCNC: 335 MG/DL — HIGH (ref 70–99)
GLUCOSE SERPL-MCNC: 213 MG/DL — HIGH (ref 70–99)
HCT VFR BLD CALC: 31.1 % — LOW (ref 34.5–45)
HGB BLD-MCNC: 9.9 G/DL — LOW (ref 11.5–15.5)
MAGNESIUM SERPL-MCNC: 1.7 MG/DL — SIGNIFICANT CHANGE UP (ref 1.6–2.6)
MCHC RBC-ENTMCNC: 23.1 PG — LOW (ref 27–34)
MCHC RBC-ENTMCNC: 31.8 G/DL — LOW (ref 32–36)
MCV RBC AUTO: 72.5 FL — LOW (ref 80–100)
NRBC # BLD: 0 /100 WBCS — SIGNIFICANT CHANGE UP (ref 0–0)
PHOSPHATE SERPL-MCNC: 1.8 MG/DL — LOW (ref 2.5–4.5)
PLATELET # BLD AUTO: 457 K/UL — HIGH (ref 150–400)
POTASSIUM SERPL-MCNC: 3.3 MMOL/L — LOW (ref 3.5–5.3)
POTASSIUM SERPL-SCNC: 3.3 MMOL/L — LOW (ref 3.5–5.3)
PROT SERPL-MCNC: 6.3 GM/DL — SIGNIFICANT CHANGE UP (ref 6–8.3)
RBC # BLD: 4.29 M/UL — SIGNIFICANT CHANGE UP (ref 3.8–5.2)
RBC # FLD: 19.8 % — HIGH (ref 10.3–14.5)
SODIUM SERPL-SCNC: 132 MMOL/L — LOW (ref 135–145)
WBC # BLD: 15.46 K/UL — HIGH (ref 3.8–10.5)
WBC # FLD AUTO: 15.46 K/UL — HIGH (ref 3.8–10.5)

## 2024-08-08 PROCEDURE — 99233 SBSQ HOSP IP/OBS HIGH 50: CPT

## 2024-08-08 PROCEDURE — 99232 SBSQ HOSP IP/OBS MODERATE 35: CPT

## 2024-08-08 PROCEDURE — 88304 TISSUE EXAM BY PATHOLOGIST: CPT | Mod: 26

## 2024-08-08 PROCEDURE — 11043 DBRDMT MUSC&/FSCA 1ST 20/<: CPT | Mod: AS

## 2024-08-08 PROCEDURE — 11043 DBRDMT MUSC&/FSCA 1ST 20/<: CPT

## 2024-08-08 PROCEDURE — 11046 DBRDMT MUSC&/FSCA EA ADDL: CPT | Mod: AS

## 2024-08-08 RX ORDER — HYOSCYAMINE SULFATE 16 OZ
1 SOLUTION MISCELLANEOUS DAILY
Refills: 0 | Status: DISCONTINUED | OUTPATIENT
Start: 2024-08-08 | End: 2024-08-09

## 2024-08-08 RX ORDER — SOD PHOS DI, MONO/K PHOS MONO 250 MG
1 TABLET ORAL THREE TIMES A DAY
Refills: 0 | Status: DISCONTINUED | OUTPATIENT
Start: 2024-08-08 | End: 2024-08-09

## 2024-08-08 RX ADMIN — PIPERACILLIN SODIUM, TAZOBACTAM SODIUM 25 GRAM(S): 3; .375 INJECTION, POWDER, LYOPHILIZED, FOR SOLUTION INTRAVENOUS at 22:08

## 2024-08-08 RX ADMIN — VANCOMYCIN HYDROCHLORIDE 100 MILLIGRAM(S): 5 INJECTION, POWDER, LYOPHILIZED, FOR SOLUTION INTRAVENOUS at 18:13

## 2024-08-08 RX ADMIN — Medication 2: at 17:02

## 2024-08-08 RX ADMIN — Medication 2 MILLIGRAM(S): at 13:34

## 2024-08-08 RX ADMIN — HYOSCYAMINE SULFATE 1 APPLICATION(S): 16 SOLUTION at 11:49

## 2024-08-08 RX ADMIN — Medication 4: at 09:04

## 2024-08-08 RX ADMIN — VANCOMYCIN HYDROCHLORIDE 100 MILLIGRAM(S): 5 INJECTION, POWDER, LYOPHILIZED, FOR SOLUTION INTRAVENOUS at 06:14

## 2024-08-08 RX ADMIN — PIPERACILLIN SODIUM, TAZOBACTAM SODIUM 25 GRAM(S): 3; .375 INJECTION, POWDER, LYOPHILIZED, FOR SOLUTION INTRAVENOUS at 06:14

## 2024-08-08 RX ADMIN — Medication 70 MILLILITER(S): at 12:34

## 2024-08-08 RX ADMIN — Medication 2 MILLIGRAM(S): at 12:34

## 2024-08-08 RX ADMIN — Medication 1 PACKET(S): at 22:08

## 2024-08-08 RX ADMIN — Medication 1 PACKET(S): at 13:26

## 2024-08-08 RX ADMIN — SENNOSIDES 2 TABLET(S): 8.6 TABLET ORAL at 22:08

## 2024-08-08 NOTE — PROGRESS NOTE ADULT - ASSESSMENT
79F with bilateral hip and sacral ulcers, stage IV  left hip debridement performed by Dr. Whitehead at bedside  I, Yaima Vincent PA-C provided direct first assist support to the surgeon during this surgical procedure    Cleanse wound daily and when soiled  Wash with Dakins solution  Pack with Iodoform packing and cover with dry sterile gauze and Pink foam dressing  Frequent turning  Optimize nutrition  Continue antibiotics and management per medical team  Discussed and seen with Dr. Whitehead

## 2024-08-08 NOTE — PROGRESS NOTE ADULT - PROBLEM SELECTOR PLAN 5
baseline oriented to name and place, oriented to name only this AM   mostly bedbound x 4-5 years, does get OOB to chair, minimally weight bearing tolerates for ~1-1 1/2 hrs before tiring and needs to return to bed   Has 12x7 Medicaid provided HA who do wound care daily, visiting wound care MD visits weekly  dependent of ADL's.  PPSV2 30%

## 2024-08-08 NOTE — PROGRESS NOTE ADULT - ASSESSMENT
79 years old female with h/o HTN, osteporosis, bedbound, decubitrus ulcers, chronic Landeros was brought in to ED for fever. Patient denied any pain, cough, SOB, nausea, vomiting or abdominal pain. Attempted to call patient's son to get more collateral information but no one  phone.   Febrile to 100.4, BP stable, WBC 17.6, Na 123, Cr 0.5, glucose 264. CT abd/pelvis with multiple bilateral decubitus ulcers with associated abscess. Erosive changes involving right sacroiliac joint.  Rectum is distended to 8 cm with stool  son cares for her at home with a private aid who also helps with the wound care. Has outpatient provider helping manage her wounds. She eat 3 meals a day. They encourage healthy organic meals with protein drinks like boost. Try their best to rotate patient. Already has hospital bed. Found to have multiple wounds with at least one abscess   1.   Rectum is distended to 8 cm with stool, potentially impacted.  Remainder of the colon is mildly distended with stool, potentially signifying constipation.  2.   Substantially enlarged partially calcified fibroid uterus. 3.   Mild thickening of the wall of the urinary bladder, potentially signifying cystitis. Nonobstructive bladder calculi.  4.   Multiple bilateral decubitus ulcers with associated abscesses as detailed above. Erosive change involving right sacroiliac joint could be degenerative or infectious    Suspect most of her issues here are related to her wounds. She also has CT evidence of impaction and constipation. s/p enema.   May also have a UTI. the antibiotics we use hopefully will cover the wounds and urine   needs excellent bowel regimen   next excellent wound care and debridement     8/7: afebrile, RA, WBC better 15.10, Cr ok, BCs NGTD, initial UC grew Proteus - pt with chronic Landeros, Vancomycin IV and Zosyn IV continued, will obtain vanco level. Pending wound care evaluation.   Attending addendum:  continue antibiotics   follow blood cultures NGTD  wound debridement with deep wound culture - pending   frequent turns, offloading, and nutrition optimization to avoid bedsores-consider protective heel/leg protectors   trend wbc count; GOC discussion started with palliative care     8/8: s/p wounds debridement today, no fevers, RA, WBC is about the same 15.46, Cr and LfTs ok, BCs NGTD, UC grew Proteus, Vancomycin IV and Zosyn IV continued for now.     Sacral wound stage 4, multiple decubiti   abscess   leukocytosis   functional quadriplegia   Multiple sclerosis     Plan:  continue vancomycin   send vancomycin trough with target AUC/KATHRYN 400-600   (therapeutic drug monitoring required with IV vancomycin)   continue Zosyn  follow blood cultures NGTD  vascular / wound care evaluation and debridement are appreciated   frequent turns, offloading, and nutrition optimization to avoid bedsores-consider protective heel/leg protectors   trend wbc count   GOC discussion started with palliative care     discussed with Dr. Block  discussed with Dr. Whitehead      79 years old female with h/o HTN, osteporosis, bedbound, decubitrus ulcers, chronic Landeros was brought in to ED for fever. Patient denied any pain, cough, SOB, nausea, vomiting or abdominal pain. Attempted to call patient's son to get more collateral information but no one  phone.   Febrile to 100.4, BP stable, WBC 17.6, Na 123, Cr 0.5, glucose 264. CT abd/pelvis with multiple bilateral decubitus ulcers with associated abscess. Erosive changes involving right sacroiliac joint.  Rectum is distended to 8 cm with stool  son cares for her at home with a private aid who also helps with the wound care. Has outpatient provider helping manage her wounds. She eat 3 meals a day. They encourage healthy organic meals with protein drinks like boost. Try their best to rotate patient. Already has hospital bed. Found to have multiple wounds with at least one abscess   1.   Rectum is distended to 8 cm with stool, potentially impacted.  Remainder of the colon is mildly distended with stool, potentially signifying constipation.  2.   Substantially enlarged partially calcified fibroid uterus. 3.   Mild thickening of the wall of the urinary bladder, potentially signifying cystitis. Nonobstructive bladder calculi.  4.   Multiple bilateral decubitus ulcers with associated abscesses as detailed above. Erosive change involving right sacroiliac joint could be degenerative or infectious    Suspect most of her issues here are related to her wounds. She also has CT evidence of impaction and constipation. s/p enema.   May also have a UTI. the antibiotics we use hopefully will cover the wounds and urine   needs excellent bowel regimen   next excellent wound care and debridement     8/7: afebrile, RA, WBC better 15.10, Cr ok, BCs NGTD, initial UC grew Proteus - pt with chronic Landeros, Vancomycin IV and Zosyn IV continued, will obtain vanco level. Pending wound care evaluation.   Attending addendum:  continue antibiotics   follow blood cultures NGTD  wound debridement with deep wound culture - pending   frequent turns, offloading, and nutrition optimization to avoid bedsores-consider protective heel/leg protectors   trend wbc count; GOC discussion started with palliative care     8/8: s/p wounds debridement today, no fevers, RA, WBC is about the same 15.46, Cr and LFTs ok, BCs NGTD, UC grew Proteus, Vancomycin IV and Zosyn IV continued for now.     Sacral wound stage 4, multiple decubiti   abscess   leukocytosis   functional quadriplegia   Multiple sclerosis     Plan:  continue vancomycin   send vancomycin trough with target AUC/KATHRYN 400-600   (therapeutic drug monitoring required with IV vancomycin)   continue Zosyn  follow blood cultures NGTD  vascular / wound care evaluation and debridement are appreciated   frequent turns, offloading, and nutrition optimization to avoid bedsores-consider protective heel/leg protectors   trend wbc count   GOC discussion started with palliative care     discussed with Dr. Block  discussed with Dr. Whitehead

## 2024-08-08 NOTE — PROGRESS NOTE ADULT - PROBLEM SELECTOR PLAN 3
in context of chronic illness  Inadequate energy/protein intake + increased needed related to debility, chronic anemia, multiple stage III pressure ulcers  Physical findings of severe fat depletion & muscle wasting as noted  BMI 12.2 Albumin 1.7  Recommend:   continue diet with small bite sized food as tolerated   protein supplements as iris.

## 2024-08-08 NOTE — PROGRESS NOTE ADULT - PROBLEM SELECTOR PLAN 7
no family at bedside this AM  Pt remains full code at his time as family contemplates decisions    Palliative available to follow along with primary medical team as needed.

## 2024-08-08 NOTE — PROGRESS NOTE ADULT - NS ATTEND AMEND GEN_ALL_CORE FT
I have reviewed all pertinent clinical information and agree with the NP's note.  Any new labs, recent cultures, new imaging (if applicable) and vitals have been reviewed today.  All necessary adjustments to management have been made.  Agree with the above assessment and plan.    continue antibiotics   follow cultures   excellent wound care   frequent turns, offloading, and nutrition optimization to avoid bedsores        Joaquim Block DO  Chief, Infectious Disease at [facility]  Reachable via Microsoft Teams or ID office: [phone]  Weekdays: After 5pm, please call [phone] for all inquiries and new consults  Weekends: Message on-call infectious disease physician via teams (see Gabriel)

## 2024-08-08 NOTE — PROGRESS NOTE ADULT - ASSESSMENT
79 years old female with history of HTN, osteoporosis, bedbound, decubitus ulcers, chronic Amezquita was brought in to ED for fever and was admitted w/ sepsis POA due to multiple infected decubitus ulcers with associated abscess, constipation and hyponatremia.     Infected decubitus ulcer  - CT abd showed multiple bilateral decubitus ulcers with associated abscesses w/ erosive change involving right sacroiliac joint  - c/w Vanco and zosyn  - ID note read and appreciated   - NGTD on  blood cultures   - debridement  of bilateral hips by Dr. Whitehead pending  - palliative care consulted    Anemia  H/H <7 transfused 1 unit.  (8/8) H/H stable    Hyponatremia  - likely due to hypovolemia from poor PO intake  - increase NS rate  - consult nephro      Functional quadriplegia.   - frequent turn/position  - chronic amezquita, exchanged today ( 8/5/24)    Severe protein-calorie malnutrition w/ BMI 12  - consult nutrition    Constipation  - CT showed a distended rectum with stool, potentially impacted  - status post Fleet enema   - c/w Miralax and senna   improved    DM II  - c/w ISS  - Hgb A1C is 7.4    HTN  - c/w Norvasc    Prophylaxis:  DVT: SCD  GI: PO diet      79 years old female with history of HTN, osteoporosis, bedbound, decubitus ulcers, chronic Amezquita was brought in to ED for fever and was admitted w/ sepsis POA due to multiple infected decubitus ulcers with associated abscess, constipation and hyponatremia.     Infected decubitus ulcer  - CT abd showed multiple bilateral decubitus ulcers with associated abscesses w/ erosive change involving right sacroiliac joint  - c/w Vanco and zosyn  - ID note read and appreciated   - NGTD on  blood cultures   - debridement  of bilateral hips by Dr. Whitehead done on 8/8  - palliative care consulted  discharge planning    Anemia  H/H <7 transfused 1 unit.  (8/8) H/H stable    Hyponatremia  - likely due to hypovolemia from poor PO intake  - increase NS rate  - consult nephro      Functional quadriplegia.   - frequent turn/position  - chronic amezquita, exchanged today ( 8/5/24)    Severe protein-calorie malnutrition w/ BMI 12  - consult nutrition    Constipation  - CT showed a distended rectum with stool, potentially impacted  - status post Fleet enema   - c/w Miralax and senna   improved    DM II  - c/w ISS  - Hgb A1C is 7.4    HTN  - c/w Norvasc    Prophylaxis:  DVT: SCD  GI: PO diet

## 2024-08-08 NOTE — PROGRESS NOTE ADULT - PROBLEM SELECTOR PLAN 2
multiple bilateral decubitus ulcers with associated abscess. Erosive changes involving right sacroiliac joint.  s/p debridement B/L hips 8/7

## 2024-08-08 NOTE — PROGRESS NOTE ADULT - ASSESSMENT
79 year old female with h/o HTN, osteoporosis bedbound, decubitus ulcers, chronic Landeros was brought in to ED for fever. Admitted  with sepsis due to infected decubitus ulcers with abscess Principal Discharge DX:	Chest discomfort   1

## 2024-08-08 NOTE — PROGRESS NOTE ADULT - PROBLEM SELECTOR PLAN 1
likely 2/2 to multiple bilateral decubitus ulcers with associated abscess. Erosive changes involving right sacroiliac joint.  Presents to ED febrile to 100.4, BP stable, tachycardic, WBC 17.6, with infected sacral ulcers   blood culture negative at 24 hrs    BCs NGTD,   UC grew Proteus  S/P  vanco/cefepime in ED  continue with vanco and zosyn x 7 days.

## 2024-08-08 NOTE — PROGRESS NOTE ADULT - NUTRITIONAL ASSESSMENT
This patient has been assessed with a concern for Malnutrition and has been determined to have a diagnosis/diagnoses of Severe protein-calorie malnutrition and Underweight (BMI < 19).    This patient is being managed with:   Diet Pureed-  800mL Fluid Restriction ([id number])  Supplement Feeding Modality:  Oral  Health Shake Cans or Servings Per Day:  1       Frequency:  Daily  Entered: Aug  7 2024  2:21PM  
This patient has been assessed with a concern for Malnutrition and has been determined to have a diagnosis/diagnoses of Severe protein-calorie malnutrition and Underweight (BMI < 19).    This patient is being managed with:   Diet Soft and Bite Sized-  800mL Fluid Restriction ([id number])  Entered: Aug  6 2024 11:27AM    The following pending diet order is being considered for treatment of Severe protein-calorie malnutrition and Underweight (BMI < 19):  Diet Pureed-  800mL Fluid Restriction ([id number])  Supplement Feeding Modality:  Oral  Health Shake Cans or Servings Per Day:  1       Frequency:  Daily  Entered: Aug  7 2024  2:21PM

## 2024-08-08 NOTE — PROGRESS NOTE ADULT - PROBLEM SELECTOR PLAN 6
2/2 decubitus ulcers  Begin Morphine 2 mg IV q 4 hr prn  Please premedicate 15 minutes prior to daily wound care.

## 2024-08-08 NOTE — PROGRESS NOTE ADULT - PROBLEM SELECTOR PLAN 4
CTA/P: Rectum is distended to 8 cm with stool, potentially impacted. Remainder of the colon is mildly distended with stool, potentially signifying constipation.  Per floor RN, pt with good BM last night   Would not recommend Mirilax as pt likely not able to consume fluid needed to administer  Consider Senna 2 tabs po-BID   Consider MOM 30 cc po QHS prn.Per floor RN, pt with "good soft BM" last night  Consider Fleet enema DE q  daily prn

## 2024-08-09 ENCOUNTER — TRANSCRIPTION ENCOUNTER (OUTPATIENT)
Age: 79
End: 2024-08-09

## 2024-08-09 VITALS
SYSTOLIC BLOOD PRESSURE: 130 MMHG | DIASTOLIC BLOOD PRESSURE: 67 MMHG | TEMPERATURE: 98 F | OXYGEN SATURATION: 99 % | HEART RATE: 82 BPM | RESPIRATION RATE: 18 BRPM

## 2024-08-09 LAB
ANION GAP SERPL CALC-SCNC: 6 MMOL/L — SIGNIFICANT CHANGE UP (ref 5–17)
BUN SERPL-MCNC: 5 MG/DL — LOW (ref 7–23)
CALCIUM SERPL-MCNC: 8.3 MG/DL — LOW (ref 8.5–10.1)
CHLORIDE SERPL-SCNC: 101 MMOL/L — SIGNIFICANT CHANGE UP (ref 96–108)
CO2 SERPL-SCNC: 26 MMOL/L — SIGNIFICANT CHANGE UP (ref 22–31)
CREAT SERPL-MCNC: 0.39 MG/DL — LOW (ref 0.5–1.3)
EGFR: 102 ML/MIN/1.73M2 — SIGNIFICANT CHANGE UP
GLUCOSE BLDC GLUCOMTR-MCNC: 195 MG/DL — HIGH (ref 70–99)
GLUCOSE BLDC GLUCOMTR-MCNC: 209 MG/DL — HIGH (ref 70–99)
GLUCOSE SERPL-MCNC: 175 MG/DL — HIGH (ref 70–99)
HCT VFR BLD CALC: 26.1 % — LOW (ref 34.5–45)
HGB BLD-MCNC: 8.4 G/DL — LOW (ref 11.5–15.5)
MCHC RBC-ENTMCNC: 23.5 PG — LOW (ref 27–34)
MCHC RBC-ENTMCNC: 32.2 G/DL — SIGNIFICANT CHANGE UP (ref 32–36)
MCV RBC AUTO: 72.9 FL — LOW (ref 80–100)
NRBC # BLD: 0 /100 WBCS — SIGNIFICANT CHANGE UP (ref 0–0)
PLATELET # BLD AUTO: 439 K/UL — HIGH (ref 150–400)
POTASSIUM SERPL-MCNC: 3.6 MMOL/L — SIGNIFICANT CHANGE UP (ref 3.5–5.3)
POTASSIUM SERPL-SCNC: 3.6 MMOL/L — SIGNIFICANT CHANGE UP (ref 3.5–5.3)
RBC # BLD: 3.58 M/UL — LOW (ref 3.8–5.2)
RBC # FLD: 19.8 % — HIGH (ref 10.3–14.5)
SODIUM SERPL-SCNC: 133 MMOL/L — LOW (ref 135–145)
SURGICAL PATHOLOGY STUDY: SIGNIFICANT CHANGE UP
VANCOMYCIN TROUGH SERPL-MCNC: 7.4 UG/ML — LOW (ref 10–20)
WBC # BLD: 11.3 K/UL — HIGH (ref 3.8–10.5)
WBC # FLD AUTO: 11.3 K/UL — HIGH (ref 3.8–10.5)

## 2024-08-09 PROCEDURE — 99239 HOSP IP/OBS DSCHRG MGMT >30: CPT

## 2024-08-09 RX ORDER — SENNOSIDES 8.6 MG/1
2 TABLET ORAL
Qty: 60 | Refills: 0
Start: 2024-08-09 | End: 2024-09-07

## 2024-08-09 RX ORDER — DOXYCYCLINE 100 MG/1
1 CAPSULE ORAL
Qty: 18 | Refills: 0
Start: 2024-08-09 | End: 2024-08-17

## 2024-08-09 RX ADMIN — Medication 70 MILLILITER(S): at 05:34

## 2024-08-09 RX ADMIN — PIPERACILLIN SODIUM, TAZOBACTAM SODIUM 25 GRAM(S): 3; .375 INJECTION, POWDER, LYOPHILIZED, FOR SOLUTION INTRAVENOUS at 05:30

## 2024-08-09 RX ADMIN — Medication 2: at 11:59

## 2024-08-09 RX ADMIN — Medication 1 PACKET(S): at 05:33

## 2024-08-09 RX ADMIN — VANCOMYCIN HYDROCHLORIDE 100 MILLIGRAM(S): 5 INJECTION, POWDER, LYOPHILIZED, FOR SOLUTION INTRAVENOUS at 07:11

## 2024-08-09 RX ADMIN — Medication 17 GRAM(S): at 12:00

## 2024-08-09 RX ADMIN — HYOSCYAMINE SULFATE 1 APPLICATION(S): 16 SOLUTION at 11:59

## 2024-08-09 RX ADMIN — Medication 4: at 08:43

## 2024-08-09 NOTE — DISCHARGE NOTE PROVIDER - PROVIDER TOKENS
PROVIDER:[TOKEN:[[id number]],FOLLOWUP:[2 weeks]],PROVIDER:[TOKEN:[[id number]],FOLLOWUP:[2 weeks]]

## 2024-08-09 NOTE — DISCHARGE NOTE PROVIDER - CARE PROVIDERS DIRECT ADDRESSES
[email or web address]

## 2024-08-09 NOTE — DISCHARGE NOTE PROVIDER - NSDCMRMEDTOKEN_GEN_ALL_CORE_FT
amoxicillin-clavulanate 500 mg-125 mg oral tablet: 500 milligram(s) orally 2 times a day  doxycycline hyclate 100 mg oral capsule: 1 cap(s) orally 2 times a day  senna leaf extract oral tablet: 2 tab(s) orally once a day (at bedtime)

## 2024-08-09 NOTE — PROGRESS NOTE ADULT - REASON FOR ADMISSION
sepsis due to infected decubitus ulcers with abscess
No
sepsis due to infected decubitus ulcers with abscess

## 2024-08-09 NOTE — PROGRESS NOTE ADULT - SUBJECTIVE AND OBJECTIVE BOX
SPENCER MIXON  [id number]  79y (1945)    Follow Up:  wounds    Interval History: The pt was seen and examined earlier, not in acute distress, no new complaints, awake and alert, answers simple questions, confused. Pt is afebrile, RA, wbc better 11.3.    PAST MEDICAL & SURGICAL HISTORY:  Osteoporosis      Uterine polyp      Herniated disc  lumbar      Bilateral Tubal ligation  48y/o          ROS:  limited, pt is confused, denies sob, denies pain   [ ] Unobtainable because:  [x ] All other systems negative    Constitutional: no fever, no chills  Head: no trauma  Eyes: no vision changes, no eye pain  ENT:  no sore throat, no rhinorrhea  Cardiovascular:  no chest pain, no palpitation  Respiratory:  no SOB, no cough  GI:  no abd pain, no vomiting, no diarrhea  urinary: no dysuria, no hematuria, no flank pain  musculoskeletal:  no joint pain, no joint swelling  skin:  no rash  neurology:  no headache, no seizure, no change in mental status  psych: no anxiety, no depression         Allergies  No Known Allergies        ANTIMICROBIALS:  piperacillin/tazobactam IVPB.. 3.375 every 8 hours  vancomycin  IVPB 500 every 12 hours      OTHER MEDS:  acetaminophen     Tablet .. 650 milliGRAM(s) Oral every 6 hours PRN  Dakins Solution - 1/2 Strength 1 Application(s) Topical daily  dextrose 5%. 1000 milliLiter(s) IV Continuous <Continuous>  dextrose 5%. 1000 milliLiter(s) IV Continuous <Continuous>  dextrose 50% Injectable 25 Gram(s) IV Push once  dextrose 50% Injectable 25 Gram(s) IV Push once  dextrose 50% Injectable 12.5 Gram(s) IV Push once  dextrose Oral Gel 15 Gram(s) Oral once PRN  glucagon  Injectable 1 milliGRAM(s) IntraMuscular once  insulin lispro (ADMELOG) corrective regimen sliding scale   SubCutaneous three times a day before meals  melatonin 3 milliGRAM(s) Oral at bedtime PRN  morphine  - Injectable 2 milliGRAM(s) IV Push every 4 hours PRN  polyethylene glycol 3350 17 Gram(s) Oral daily  potassium phosphate / sodium phosphate Powder (PHOS-NaK) 1 Packet(s) Oral three times a day  senna 2 Tablet(s) Oral at bedtime  sodium chloride 0.9%. 1000 milliLiter(s) IV Continuous <Continuous>      Vital Signs Last 24 Hrs  T(C): 36.4 (09 Aug 2024 11:28), Max: 37.4 (09 Aug 2024 05:52)  T(F): 97.6 (09 Aug 2024 11:28), Max: 99.4 (09 Aug 2024 05:52)  HR: 84 (09 Aug 2024 11:28) (81 - 94)  BP: 138/67 (09 Aug 2024 11:28) (120/64 - 152/60)  BP(mean): --  RR: 18 (09 Aug 2024 11:28) (16 - 18)  SpO2: 99% (09 Aug 2024 11:28) (95% - 99%)    Parameters below as of 09 Aug 2024 11:28  Patient On (Oxygen Delivery Method): room air        Physical Exam:  Constitutional: non-toxic, no distress, elderly frail female lying in bed in NAD   HEAD/EYES: anicteric, no conjunctival injection  ENT:  supple, no thrush  Cardiovascular:   normal S1, S2, no murmur, no edema  Respiratory:  clear BS bilaterally, no wheezes, no rales  GI:  soft, non-tender, normal bowel sounds  :  +amezquita, no CVA tenderness  Musculoskeletal:  no synovitis, developing contractures   Neurologic: awake and alert to person and her birthday, very weak and reduced strength, answers simple yes and no questions   Skin:  has 4 main ulcers, s/p debridement yesterday, dressed c/d/i  Heme/Onc: no lymphadenopathy   Psychiatric:  awake, calm     WBC Count: 11.30 K/uL (08-09 @ 07:00)  WBC Count: 15.46 K/uL (08-08 @ 07:19)  WBC Count: 15.97 K/uL (08-07 @ 16:13)  WBC Count: 15.10 K/uL (08-07 @ 09:04)  WBC Count: 18.13 K/uL (08-06 @ 07:40)  WBC Count: 17.50 K/uL (08-05 @ 10:10)  WBC Count: 17.68 K/uL (08-04 @ 20:41)                            8.4    11.30 )-----------( 439      ( 09 Aug 2024 07:00 )             26.1       08-09    133<L>  |  101  |  5<L>  ----------------------------<  175<H>  3.6   |  26  |  0.39<L>    Ca    8.3<L>      09 Aug 2024 07:00  Phos  1.8     08-08  Mg     1.7     08-08    TPro  6.3  /  Alb  1.6<L>  /  TBili  0.8  /  DBili  x   /  AST  25  /  ALT  21  /  AlkPhos  105  08-08      Urinalysis Basic - ( 09 Aug 2024 07:00 )    Color: x / Appearance: x / SG: x / pH: x  Gluc: 175 mg/dL / Ketone: x  / Bili: x / Urobili: x   Blood: x / Protein: x / Nitrite: x   Leuk Esterase: x / RBC: x / WBC x   Sq Epi: x / Non Sq Epi: x / Bacteria: x        Creatinine Trend: 0.39<--, 0.37<--, 0.39<--, 0.40<--, 0.41<--, 0.50<--      MICROBIOLOGY:  Vancomycin Level, Trough: 7.4 ug/mL (08-09-24 @ 07:00)  v  Clean Catch Clean Catch (Midstream)  08-05-24   <10,000 CFU/mL Normal Urogenital Earline  --  --      .Blood Blood-Peripheral  08-04-24   No growth at 4 days  --  --      RADIOLOGY:    
[facility] NEPHROLOGY SERVICES, [facility]  NEPHROLOGY AND HYPERTENSION  [address]  [address]  [place] [address]  [phone]    MD DEE DEE DRAPER MD YELENA ROSENBERG, MD BINNY KOSHY, MD CHRISTOPHER CAPUTO, MD EDWARD BOVER, MD          Patient events noted    MEDICATIONS  (STANDING):  Dakins Solution - 1/2 Strength 1 Application(s) Topical daily  dextrose 5%. 1000 milliLiter(s) (100 mL/Hr) IV Continuous <Continuous>  dextrose 5%. 1000 milliLiter(s) (50 mL/Hr) IV Continuous <Continuous>  dextrose 50% Injectable 25 Gram(s) IV Push once  dextrose 50% Injectable 12.5 Gram(s) IV Push once  dextrose 50% Injectable 25 Gram(s) IV Push once  glucagon  Injectable 1 milliGRAM(s) IntraMuscular once  insulin lispro (ADMELOG) corrective regimen sliding scale   SubCutaneous three times a day before meals  piperacillin/tazobactam IVPB.. 3.375 Gram(s) IV Intermittent every 8 hours  polyethylene glycol 3350 17 Gram(s) Oral daily  potassium phosphate / sodium phosphate Powder (PHOS-NaK) 1 Packet(s) Oral three times a day  senna 2 Tablet(s) Oral at bedtime  sodium chloride 0.9%. 1000 milliLiter(s) (100 mL/Hr) IV Continuous <Continuous>  vancomycin  IVPB 500 milliGRAM(s) IV Intermittent every 12 hours    MEDICATIONS  (PRN):  acetaminophen     Tablet .. 650 milliGRAM(s) Oral every 6 hours PRN Temp greater or equal to 38C (100.4F), Mild Pain (1 - 3), Moderate Pain (4 - 6)  dextrose Oral Gel 15 Gram(s) Oral once PRN Blood Glucose LESS THAN 70 milliGRAM(s)/deciliter  melatonin 3 milliGRAM(s) Oral at bedtime PRN Insomnia      08-07-24 @ 07:01  -  08-08-24 @ 07:00  --------------------------------------------------------  IN: 1533 mL / OUT: 600 mL / NET: 933 mL      PHYSICAL EXAM:      T(C): 36.7 (08-08-24 @ 11:01), Max: 37.7 (08-07-24 @ 17:30)  HR: 88 (08-08-24 @ 11:01) (86 - 99)  BP: 110/53 (08-08-24 @ 11:01) (96/56 - 132/67)  RR: 16 (08-08-24 @ 11:01) (15 - 18)  SpO2: 97% (08-08-24 @ 11:01) (95% - 97%)  Wt(kg): --  Lungs clear  Heart S1S2  Abd soft NT ND  Extremities:   tr edema                                    9.9    15.46 )-----------( 457      ( 08 Aug 2024 07:19 )             31.1     08-08    132<L>  |  100  |  6<L>  ----------------------------<  213<H>  3.3<L>   |  24  |  0.37<L>    Ca    8.3<L>      08 Aug 2024 07:19  Phos  1.8     08-08  Mg     1.7     08-08    TPro  6.3  /  Alb  1.6<L>  /  TBili  0.8  /  DBili  x   /  AST  25  /  ALT  21  /  AlkPhos  105  08-08      LIVER FUNCTIONS - ( 08 Aug 2024 07:19 )  Alb: 1.6 g/dL / Pro: 6.3 gm/dL / ALK PHOS: 105 U/L / ALT: 21 U/L / AST: 25 U/L / GGT: x           Creatinine Trend: 0.37<--, 0.39<--, 0.40<--, 0.41<--, 0.50<--        Assessment   Hyponatremia, hypovolemic; poor po intake  Hypokalemia     Plan  Continue IVF; kphos   Will follow     Pranav Brown MD
Patient seen and examined at bedside, with Dr. Whitehead, for left hip debridement      MEDICATIONS  (STANDING):  Dakins Solution - 1/2 Strength 1 Application(s) Topical daily  dextrose 5%. 1000 milliLiter(s) (50 mL/Hr) IV Continuous <Continuous>  dextrose 5%. 1000 milliLiter(s) (100 mL/Hr) IV Continuous <Continuous>  dextrose 50% Injectable 25 Gram(s) IV Push once  dextrose 50% Injectable 25 Gram(s) IV Push once  dextrose 50% Injectable 12.5 Gram(s) IV Push once  glucagon  Injectable 1 milliGRAM(s) IntraMuscular once  insulin lispro (ADMELOG) corrective regimen sliding scale   SubCutaneous three times a day before meals  piperacillin/tazobactam IVPB.. 3.375 Gram(s) IV Intermittent every 8 hours  polyethylene glycol 3350 17 Gram(s) Oral daily  potassium phosphate / sodium phosphate Powder (PHOS-NaK) 1 Packet(s) Oral three times a day  senna 2 Tablet(s) Oral at bedtime  sodium chloride 0.9%. 1000 milliLiter(s) (70 mL/Hr) IV Continuous <Continuous>  vancomycin  IVPB 500 milliGRAM(s) IV Intermittent every 12 hours    MEDICATIONS  (PRN):  acetaminophen     Tablet .. 650 milliGRAM(s) Oral every 6 hours PRN Temp greater or equal to 38C (100.4F), Mild Pain (1 - 3), Moderate Pain (4 - 6)  dextrose Oral Gel 15 Gram(s) Oral once PRN Blood Glucose LESS THAN 70 milliGRAM(s)/deciliter  melatonin 3 milliGRAM(s) Oral at bedtime PRN Insomnia  morphine  - Injectable 2 milliGRAM(s) IV Push every 4 hours PRN moderate to severe pain      Vital Signs Last 24 Hrs  T(C): 36.7 (08 Aug 2024 11:01), Max: 37.7 (07 Aug 2024 17:30)  T(F): 98.1 (08 Aug 2024 11:01), Max: 99.9 (07 Aug 2024 17:30)  HR: 88 (08 Aug 2024 11:01) (86 - 99)  BP: 110/53 (08 Aug 2024 11:01) (96/56 - 132/67)  RR: 16 (08 Aug 2024 11:01) (16 - 18)  SpO2: 97% (08 Aug 2024 11:01) (95% - 97%)    Parameters below as of 08 Aug 2024 11:01  Patient On (Oxygen Delivery Method): room air      PHYSICAL EXAM:  General: NAD  Neuro:  Alert  HEENT: NCAT, EOMI, conjunctiva clear  CV: +S1+S2  Lung: Respirations nonlabored, good inspiratory effort  Abdomen: soft, NTND  Skin: bilateral hip ulcer, left hip with some necrotic tissue. Sacral ulcer x2  Extremities: no pedal edema or calf tenderness noted           LABS:                        9.9    15.46 )-----------( 457      ( 08 Aug 2024 07:19 )             31.1     08-08    132<L>  |  100  |  6<L>  ----------------------------<  213<H>  3.3<L>   |  24  |  0.37<L>    Ca    8.3<L>      08 Aug 2024 07:19  Phos  1.8     08-08  Mg     1.7     08-08    TPro  6.3  /  Alb  1.6<L>  /  TBili  0.8  /  DBili  x   /  AST  25  /  ALT  21  /  AlkPhos  105  08-08      Urinalysis Basic - ( 08 Aug 2024 07:19 )    Color: x / Appearance: x / SG: x / pH: x  Gluc: 213 mg/dL / Ketone: x  / Bili: x / Urobili: x   Blood: x / Protein: x / Nitrite: x   Leuk Esterase: x / RBC: x / WBC x   Sq Epi: x / Non Sq Epi: x / Bacteria: x      
SPENCER MIXON  MRN-[id number]  79y (1945)    Follow Up:  multiple decubitus, Fever, leukocytosis    Interval History: The pt was seen and examined earlier, not in acute distress, pt is in bed, comfortable, awake and alert, able to state her name and birthday, not place, year or president, pleasant. Pt is afebrile, RA,  WBC better 15.10.    PAST MEDICAL & SURGICAL HISTORY:  Osteoporosis      Uterine polyp      Herniated disc  lumbar      Bilateral Tubal ligation  48y/o          ROS:  pt is confused, was able to answer "no", denies any sob or chest pain, no abd pain, no n/V or diarrhea, denies pain at sacral area - unclear how reliable to pt is as a source of information   [ ] Unobtainable because:  [ x] All other systems negative    Constitutional: no fever, no chills  Head: no trauma  Eyes: no vision changes, no eye pain  ENT:  no sore throat, no rhinorrhea  Cardiovascular:  no chest pain, no palpitation  Respiratory:  no SOB, no cough  GI:  no abd pain, no vomiting, no diarrhea  urinary: no dysuria, no hematuria, no flank pain  musculoskeletal:  no joint pain, no joint swelling  skin:  no rash  neurology:  no headache, no seizure, no change in mental status  psych: no anxiety, no depression         Allergies  No Known Allergies        ANTIMICROBIALS:  piperacillin/tazobactam IVPB.. 3.375 every 8 hours  vancomycin  IVPB 500 every 12 hours      OTHER MEDS:  acetaminophen     Tablet .. 650 milliGRAM(s) Oral every 6 hours PRN  amLODIPine   Tablet 10 milliGRAM(s) Oral daily  dextrose 5%. 1000 milliLiter(s) IV Continuous <Continuous>  dextrose 5%. 1000 milliLiter(s) IV Continuous <Continuous>  dextrose 50% Injectable 12.5 Gram(s) IV Push once  dextrose 50% Injectable 25 Gram(s) IV Push once  dextrose 50% Injectable 25 Gram(s) IV Push once  dextrose Oral Gel 15 Gram(s) Oral once PRN  glucagon  Injectable 1 milliGRAM(s) IntraMuscular once  insulin lispro (ADMELOG) corrective regimen sliding scale   SubCutaneous three times a day before meals  melatonin 3 milliGRAM(s) Oral at bedtime PRN  polyethylene glycol 3350 17 Gram(s) Oral daily  senna 2 Tablet(s) Oral at bedtime  sodium chloride 0.9%. 1000 milliLiter(s) IV Continuous <Continuous>      Vital Signs Last 24 Hrs  T(C): 37.4 (07 Aug 2024 05:17), Max: 37.6 (06 Aug 2024 18:28)  T(F): 99.4 (07 Aug 2024 05:17), Max: 99.7 (06 Aug 2024 18:28)  HR: 89 (07 Aug 2024 05:17) (89 - 105)  BP: 100/73 (07 Aug 2024 06:03) (93/59 - 108/55)  BP(mean): --  RR: 16 (07 Aug 2024 05:17) (15 - 17)  SpO2: 97% (07 Aug 2024 05:17) (97% - 98%)    Parameters below as of 07 Aug 2024 05:17  Patient On (Oxygen Delivery Method): room air        Physical Exam:  Constitutional: non-toxic, no distress, elderly frail female lying in bed in NAD   HEAD/EYES: anicteric, no conjunctival injection  ENT:  supple, no thrush  Cardiovascular:   normal S1, S2, no murmur, no edema  Respiratory:  clear BS bilaterally, no wheezes, no rales  GI:  soft, non-tender, normal bowel sounds  :  +amezquita, no CVA tenderness  Musculoskeletal:  no synovitis, developing contractures   Neurologic: awake and alert to person and her birthday, very weak and reduced strength, answers simple yes and no questions   Skin:  has 4 main ulcers. Sacrum is unstageable but there is a deep small opening, the right hip wound has purulence, exposed bone, undermining almost the entire circumference of the wound and there is crepitus at 12-2 oclock, there is also a smaller wound on the left hip with undermining, quite deep as well   Heme/Onc: no lymphadenopathy   Psychiatric:  awake, calm     WBC Count: 15.10 K/uL (08-07 @ 09:04)  WBC Count: 18.13 K/uL (08-06 @ 07:40)  WBC Count: 17.50 K/uL (08-05 @ 10:10)  WBC Count: 17.68 K/uL (08-04 @ 20:41)                            7.2    15.10 )-----------( 420      ( 07 Aug 2024 09:04 )             22.6       08-07    131<L>  |  99  |  5<L>  ----------------------------<  173<H>  3.4<L>   |  23  |  0.39<L>    Ca    8.4<L>      07 Aug 2024 09:04  Phos  2.6     08-07  Mg     1.8     08-07    TPro  6.4  /  Alb  1.7<L>  /  TBili  0.5  /  DBili  x   /  AST  24  /  ALT  20  /  AlkPhos  108  08-06      Urinalysis Basic - ( 07 Aug 2024 09:04 )    Color: x / Appearance: x / SG: x / pH: x  Gluc: 173 mg/dL / Ketone: x  / Bili: x / Urobili: x   Blood: x / Protein: x / Nitrite: x   Leuk Esterase: x / RBC: x / WBC x   Sq Epi: x / Non Sq Epi: x / Bacteria: x        Creatinine Trend: 0.39<--, 0.40<--, 0.41<--, 0.50<--      MICROBIOLOGY:  Vancomycin Level, Trough: 9.8 ug/mL (08-06-24 @ 17:35)  v  Clean Catch Clean Catch (Midstream)  08-05-24   <10,000 CFU/mL Normal Urogenital Earline  --  --      .Blood Blood-Peripheral  08-04-24   No growth at 48 Hours  --  --      RADIOLOGY:        
SPENCER MIXON  MRN-[id number]  79y (1945)    Follow Up:  multiple wounds, leukocytosis     Interval History: The pt was seen and examined earlier, not in acute distress, awake and alert, mildly confused, pleasant, no new complaints. Pt is afebrile, RA, WBC is about the same 15.46.    PAST MEDICAL & SURGICAL HISTORY:  Osteoporosis      Uterine polyp      Herniated disc  lumbar      Bilateral Tubal ligation  48y/o          ROS:  limited, pt is confused mildly, denies any cough or sob, no chest pain, no abd pain, no N/V, admits to mild pain at wound site   [ ] Unobtainable because:  [x ] All other systems negative    Constitutional: no fever, no chills  Head: no trauma  Eyes: no vision changes, no eye pain  ENT:  no sore throat, no rhinorrhea  Cardiovascular:  no chest pain, no palpitation  Respiratory:  no SOB, no cough  GI:  no abd pain, no vomiting, no diarrhea  urinary: no dysuria, no hematuria, no flank pain  musculoskeletal:  no joint pain, no joint swelling  skin:  no rash  neurology:  no headache, no seizure, no change in mental status  psych: no anxiety, no depression         Allergies  No Known Allergies        ANTIMICROBIALS:  piperacillin/tazobactam IVPB.. 3.375 every 8 hours  vancomycin  IVPB 500 every 12 hours      OTHER MEDS:  acetaminophen     Tablet .. 650 milliGRAM(s) Oral every 6 hours PRN  Dakins Solution - 1/2 Strength 1 Application(s) Topical daily  dextrose 5%. 1000 milliLiter(s) IV Continuous <Continuous>  dextrose 5%. 1000 milliLiter(s) IV Continuous <Continuous>  dextrose 50% Injectable 25 Gram(s) IV Push once  dextrose 50% Injectable 25 Gram(s) IV Push once  dextrose 50% Injectable 12.5 Gram(s) IV Push once  dextrose Oral Gel 15 Gram(s) Oral once PRN  glucagon  Injectable 1 milliGRAM(s) IntraMuscular once  insulin lispro (ADMELOG) corrective regimen sliding scale   SubCutaneous three times a day before meals  melatonin 3 milliGRAM(s) Oral at bedtime PRN  morphine  - Injectable 2 milliGRAM(s) IV Push every 4 hours PRN  polyethylene glycol 3350 17 Gram(s) Oral daily  potassium phosphate / sodium phosphate Powder (PHOS-NaK) 1 Packet(s) Oral three times a day  senna 2 Tablet(s) Oral at bedtime  sodium chloride 0.9%. 1000 milliLiter(s) IV Continuous <Continuous>      Vital Signs Last 24 Hrs  T(C): 36.7 (08 Aug 2024 11:01), Max: 37.7 (07 Aug 2024 17:30)  T(F): 98.1 (08 Aug 2024 11:01), Max: 99.9 (07 Aug 2024 17:30)  HR: 88 (08 Aug 2024 11:01) (86 - 99)  BP: 110/53 (08 Aug 2024 11:01) (96/56 - 132/67)  BP(mean): --  RR: 16 (08 Aug 2024 11:01) (16 - 18)  SpO2: 97% (08 Aug 2024 11:01) (95% - 97%)    Parameters below as of 08 Aug 2024 11:01  Patient On (Oxygen Delivery Method): room air        Physical Exam:  Constitutional: non-toxic, no distress, elderly frail female lying in bed in NAD   HEAD/EYES: anicteric, no conjunctival injection  ENT:  supple, no thrush  Cardiovascular:   normal S1, S2, no murmur, no edema  Respiratory:  clear BS bilaterally, no wheezes, no rales  GI:  soft, non-tender, normal bowel sounds  :  +amezquita, no CVA tenderness  Musculoskeletal:  no synovitis, developing contractures   Neurologic: awake and alert to person and her birthday, very weak and reduced strength, answers simple yes and no questions   Skin:  has 4 main ulcers. Sacrum dressed c/d/i  Heme/Onc: no lymphadenopathy   Psychiatric:  awake, calm     WBC Count: 15.46 K/uL (08-08 @ 07:19)  WBC Count: 15.97 K/uL (08-07 @ 16:13)  WBC Count: 15.10 K/uL (08-07 @ 09:04)  WBC Count: 18.13 K/uL (08-06 @ 07:40)  WBC Count: 17.50 K/uL (08-05 @ 10:10)  WBC Count: 17.68 K/uL (08-04 @ 20:41)                            9.9    15.46 )-----------( 457      ( 08 Aug 2024 07:19 )             31.1       08-08    132<L>  |  100  |  6<L>  ----------------------------<  213<H>  3.3<L>   |  24  |  0.37<L>    Ca    8.3<L>      08 Aug 2024 07:19  Phos  1.8     08-08  Mg     1.7     08-08    TPro  6.3  /  Alb  1.6<L>  /  TBili  0.8  /  DBili  x   /  AST  25  /  ALT  21  /  AlkPhos  105  08-08      Urinalysis Basic - ( 08 Aug 2024 07:19 )    Color: x / Appearance: x / SG: x / pH: x  Gluc: 213 mg/dL / Ketone: x  / Bili: x / Urobili: x   Blood: x / Protein: x / Nitrite: x   Leuk Esterase: x / RBC: x / WBC x   Sq Epi: x / Non Sq Epi: x / Bacteria: x        Creatinine Trend: 0.37<--, 0.39<--, 0.40<--, 0.41<--, 0.50<--      MICROBIOLOGY:  Vancomycin Level, Trough: 10.6 ug/mL (08-07-24 @ 16:13)  v  Clean Catch Clean Catch (Midstream)  08-05-24   <10,000 CFU/mL Normal Urogenital Earline  --  --      .Blood Blood-Peripheral  08-04-24   No growth at 72 Hours  --  --      RADIOLOGY:    
79 years old female with history of HTN, osteoporosis, bedbound, decubitus ulcers, chronic Landeros was brought in to ED for fever and was admitted w/ sepsis POA due to multiple infected decubitus ulcers with associated abscess, constipation and hyponatremia. She is lying in bed in NAD.    MEDICATIONS  (STANDING):  amLODIPine   Tablet 10 milliGRAM(s) Oral daily  dextrose 5%. 1000 milliLiter(s) (100 mL/Hr) IV Continuous <Continuous>  dextrose 5%. 1000 milliLiter(s) (50 mL/Hr) IV Continuous <Continuous>  dextrose 50% Injectable 25 Gram(s) IV Push once  dextrose 50% Injectable 12.5 Gram(s) IV Push once  dextrose 50% Injectable 25 Gram(s) IV Push once  glucagon  Injectable 1 milliGRAM(s) IntraMuscular once  insulin lispro (ADMELOG) corrective regimen sliding scale   SubCutaneous three times a day before meals  piperacillin/tazobactam IVPB.. 3.375 Gram(s) IV Intermittent every 8 hours  polyethylene glycol 3350 17 Gram(s) Oral daily  senna 2 Tablet(s) Oral at bedtime  sodium chloride 0.9%. 1000 milliLiter(s) (100 mL/Hr) IV Continuous <Continuous>  vancomycin  IVPB 500 milliGRAM(s) IV Intermittent every 12 hours    MEDICATIONS  (PRN):  acetaminophen     Tablet .. 650 milliGRAM(s) Oral every 6 hours PRN Temp greater or equal to 38C (100.4F), Mild Pain (1 - 3), Moderate Pain (4 - 6)  dextrose Oral Gel 15 Gram(s) Oral once PRN Blood Glucose LESS THAN 70 milliGRAM(s)/deciliter  melatonin 3 milliGRAM(s) Oral at bedtime PRN Insomnia      Allergies    No Known Allergies    Intolerances        Vital Signs Last 24 Hrs  T(C): 37.6 (06 Aug 2024 18:28), Max: 37.6 (06 Aug 2024 18:28)  T(F): 99.7 (06 Aug 2024 18:28), Max: 99.7 (06 Aug 2024 18:28)  HR: 105 (06 Aug 2024 18:28) (92 - 105)  BP: 108/55 (06 Aug 2024 18:28) (96/58 - 108/55)   RR: 17 (06 Aug 2024 18:28) (16 - 20)  SpO2: 97% (06 Aug 2024 18:28) (94% - 100%)    Parameters below as of 06 Aug 2024 18:28  Patient On (Oxygen Delivery Method): room air        PHYSICAL EXAM:  GENERAL: NAD, cachectic, contracted   HEAD:  Atraumatic, Normocephalic  EYES: EOMI, PERRLA  NECK: Supple   NERVOUS SYSTEM:  Alert & Oriented to self   CHEST/LUNG: Clear to auscultation bilaterally; No rales, rhonchi, wheezing, or rubs  HEART: Regular rate and rhythm; No murmurs, rubs, or gallops  ABDOMEN: Soft, Nontender, Nondistended; Bowel sounds present  EXTREMITIES:  No clubbing, cyanosis, or edema  LYMPH: No lymphadenopathy noted  SKIN: bilateral hip and sacral ulcers    LABS:                        8.6    18.13 )-----------( 425      ( 06 Aug 2024 07:40 )             27.0     08-06    126<L>  |  93<L>  |  10  ----------------------------<  193<H>  4.2   |  24  |  0.40<L>    Ca    8.6      06 Aug 2024 07:40  Phos  2.5     08-06  Mg     1.7     08-06    TPro  6.4  /  Alb  1.7<L>  /  TBili  0.5  /  DBili  x   /  AST  24  /  ALT  20  /  AlkPhos  108  08-06    PT/INR - ( 04 Aug 2024 20:41 )   PT: 14.7 sec;   INR: 1.24 ratio         PTT - ( 04 Aug 2024 20:41 )  PTT:25.6 sec  Urinalysis Basic - ( 06 Aug 2024 07:40 )    Color: x / Appearance: x / SG: x / pH: x  Gluc: 193 mg/dL / Ketone: x  / Bili: x / Urobili: x   Blood: x / Protein: x / Nitrite: x   Leuk Esterase: x / RBC: x / WBC x   Sq Epi: x / Non Sq Epi: x / Bacteria: x     POCT Blood Glucose.: 185 mg/dL (06 Aug 2024 16:21)  POCT Blood Glucose.: 268 mg/dL (06 Aug 2024 11:21)  POCT Blood Glucose.: 200 mg/dL (06 Aug 2024 07:57)  POCT Blood Glucose.: 155 mg/dL (05 Aug 2024 20:58)      Culture - Urine (collected 05 Aug 2024 00:25)  Source: Clean Catch Clean Catch (Midstream)  Final Report (06 Aug 2024 07:22):    <10,000 CFU/mL Normal Urogenital Earline    Culture - Blood (collected 04 Aug 2024 20:41)  Source: .Blood Blood-Peripheral  Preliminary Report (06 Aug 2024 01:02):    No growth at 24 hours    Culture - Blood (collected 04 Aug 2024 20:41)  Source: .Blood Blood-Peripheral  Preliminary Report (06 Aug 2024 01:02):    No growth at 24 hours      RADIOLOGY & ADDITIONAL TESTS:    08-05-24 @ 07:01  -  08-06-24 @ 07:00  --------------------------------------------------------  IN:  Total IN: 0 mL    OUT:    Indwelling Catheter - Urethral (mL): 300 mL  Total OUT: 300 mL    Total NET: -300 mL      08-06-24 @ 07:01  -  08-06-24 @ 19:41  --------------------------------------------------------  IN:  Total IN: 0 mL    OUT:    Indwelling Catheter - Urethral (mL): 400 mL  Total OUT: 400 mL    Total NET: -400 mL      
[facility] NEPHROLOGY [facility], [facility]  NEPHROLOGY AND HYPERTENSION  [address]  [address]  [place] [address]  [phone]    MD DEE DEE DRAPER MD YELENA ROSENBERG, MD BINNY KOSHY, MD CHRISTOPHER CAPUTO, MD EDWARD BOVER, MD          Patient events noted    MEDICATIONS  (STANDING):  dextrose 5%. 1000 milliLiter(s) (50 mL/Hr) IV Continuous <Continuous>  dextrose 5%. 1000 milliLiter(s) (100 mL/Hr) IV Continuous <Continuous>  dextrose 50% Injectable 25 Gram(s) IV Push once  dextrose 50% Injectable 12.5 Gram(s) IV Push once  dextrose 50% Injectable 25 Gram(s) IV Push once  glucagon  Injectable 1 milliGRAM(s) IntraMuscular once  insulin lispro (ADMELOG) corrective regimen sliding scale   SubCutaneous three times a day before meals  piperacillin/tazobactam IVPB.. 3.375 Gram(s) IV Intermittent every 8 hours  polyethylene glycol 3350 17 Gram(s) Oral daily  senna 2 Tablet(s) Oral at bedtime  sodium chloride 0.9%. 1000 milliLiter(s) (100 mL/Hr) IV Continuous <Continuous>  vancomycin  IVPB 500 milliGRAM(s) IV Intermittent every 12 hours    MEDICATIONS  (PRN):  acetaminophen     Tablet .. 650 milliGRAM(s) Oral every 6 hours PRN Temp greater or equal to 38C (100.4F), Mild Pain (1 - 3), Moderate Pain (4 - 6)  dextrose Oral Gel 15 Gram(s) Oral once PRN Blood Glucose LESS THAN 70 milliGRAM(s)/deciliter  melatonin 3 milliGRAM(s) Oral at bedtime PRN Insomnia      08-06-24 @ 07:01  -  08-07-24 @ 07:00  --------------------------------------------------------  IN: 1400 mL / OUT: 600 mL / NET: 800 mL      PHYSICAL EXAM:      T(C): 37.2 (08-07-24 @ 18:15), Max: 37.7 (08-07-24 @ 17:30)  HR: 93 (08-07-24 @ 18:15) (88 - 94)  BP: 97/50 (08-07-24 @ 18:15) (93/59 - 106/56)  RR: 16 (08-07-24 @ 18:15) (15 - 16)  SpO2: 97% (08-07-24 @ 18:15) (96% - 98%)  Wt(kg): --  Lungs clear  Heart S1S2  Abd soft NT ND  Extremities:   tr edema                                    7.3    15.97 )-----------( 442      ( 07 Aug 2024 16:13 )             23.1     08-07    131<L>  |  99  |  5<L>  ----------------------------<  173<H>  3.4<L>   |  23  |  0.39<L>    Ca    8.4<L>      07 Aug 2024 09:04  Phos  2.6     08-07  Mg     1.8     08-07    TPro  6.4  /  Alb  1.7<L>  /  TBili  0.5  /  DBili  x   /  AST  24  /  ALT  20  /  AlkPhos  108  08-06      LIVER FUNCTIONS - ( 06 Aug 2024 07:40 )  Alb: 1.7 g/dL / Pro: 6.4 gm/dL / ALK PHOS: 108 U/L / ALT: 20 U/L / AST: 24 U/L / GGT: x           Creatinine Trend: 0.39<--, 0.40<--, 0.41<--, 0.50<--        Assessment   Hyponatremia, hypovolemic; poor po intake  Improving   Plan  Continue IVF       Pranav Brown MD
Patient is a 79y old  Female who presents with a chief complaint of sepsis due to infected decubitus ulcers with abscess (07 Aug 2024 21:39)    INTERVAL HPI/OVERNIGHT EVENTS: Patients seen and examined at bedside this morning. No acute events overnight.     MEDICATIONS  (STANDING):  Dakins Solution - 1/2 Strength 1 Application(s) Topical daily  dextrose 5%. 1000 milliLiter(s) (100 mL/Hr) IV Continuous <Continuous>  dextrose 5%. 1000 milliLiter(s) (50 mL/Hr) IV Continuous <Continuous>  dextrose 50% Injectable 25 Gram(s) IV Push once  dextrose 50% Injectable 12.5 Gram(s) IV Push once  dextrose 50% Injectable 25 Gram(s) IV Push once  glucagon  Injectable 1 milliGRAM(s) IntraMuscular once  insulin lispro (ADMELOG) corrective regimen sliding scale   SubCutaneous three times a day before meals  piperacillin/tazobactam IVPB.. 3.375 Gram(s) IV Intermittent every 8 hours  polyethylene glycol 3350 17 Gram(s) Oral daily  potassium phosphate / sodium phosphate Powder (PHOS-NaK) 1 Packet(s) Oral three times a day  senna 2 Tablet(s) Oral at bedtime  sodium chloride 0.9%. 1000 milliLiter(s) (100 mL/Hr) IV Continuous <Continuous>  vancomycin  IVPB 500 milliGRAM(s) IV Intermittent every 12 hours    MEDICATIONS  (PRN):  acetaminophen     Tablet .. 650 milliGRAM(s) Oral every 6 hours PRN Temp greater or equal to 38C (100.4F), Mild Pain (1 - 3), Moderate Pain (4 - 6)  dextrose Oral Gel 15 Gram(s) Oral once PRN Blood Glucose LESS THAN 70 milliGRAM(s)/deciliter  melatonin 3 milliGRAM(s) Oral at bedtime PRN Insomnia    Allergies    No Known Allergies    Intolerances      REVIEW OF SYSTEMS:  All other systems reviewed and are negative    Vital Signs Last 24 Hrs  T(C): 37.4 (08 Aug 2024 05:35), Max: 37.7 (07 Aug 2024 17:30)  T(F): 99.3 (08 Aug 2024 05:35), Max: 99.9 (07 Aug 2024 17:30)  HR: 86 (08 Aug 2024 05:35) (86 - 99)  BP: 117/63 (08 Aug 2024 05:35) (96/56 - 132/67)  BP(mean): --  RR: 16 (08 Aug 2024 05:35) (15 - 18)  SpO2: 95% (08 Aug 2024 05:35) (95% - 97%)    Parameters below as of 08 Aug 2024 05:35  Patient On (Oxygen Delivery Method): room air      Daily     Daily   I&O's Summary    07 Aug 2024 07:01  -  08 Aug 2024 07:00  --------------------------------------------------------  IN: 1533 mL / OUT: 600 mL / NET: 933 mL      CAPILLARY BLOOD GLUCOSE      POCT Blood Glucose.: 232 mg/dL (08 Aug 2024 08:57)  POCT Blood Glucose.: 191 mg/dL (07 Aug 2024 21:35)  POCT Blood Glucose.: 232 mg/dL (07 Aug 2024 16:06)  POCT Blood Glucose.: 239 mg/dL (07 Aug 2024 11:17)    PHYSICAL EXAM:  HEAD:  Atraumatic, Normocephalic  EYES: EOMI, PERRLA  NECK: Supple   NERVOUS SYSTEM:  Alert & Oriented to self   CHEST/LUNG: Clear to auscultation bilaterally; No rales, rhonchi, wheezing, or rubs  HEART: Regular rate and rhythm; No murmurs, rubs, or gallops  ABDOMEN: Soft, Nontender, Nondistended; Bowel sounds present  EXTREMITIES:  No clubbing, cyanosis, or edema  LYMPH: No lymphadenopathy noted  SKIN: bilateral hip and sacral ulcers    Labs                          9.9    15.46 )-----------( 457      ( 08 Aug 2024 07:19 )             31.1     08-08    132<L>  |  100  |  6<L>  ----------------------------<  213<H>  3.3<L>   |  24  |  0.37<L>    Ca    8.3<L>      08 Aug 2024 07:19  Phos  1.8     08-08  Mg     1.7     08-08    TPro  6.3  /  Alb  1.6<L>  /  TBili  0.8  /  DBili  x   /  AST  25  /  ALT  21  /  AlkPhos  105  08-08          Urinalysis Basic - ( 08 Aug 2024 07:19 )    Color: x / Appearance: x / SG: x / pH: x  Gluc: 213 mg/dL / Ketone: x  / Bili: x / Urobili: x   Blood: x / Protein: x / Nitrite: x   Leuk Esterase: x / RBC: x / WBC x   Sq Epi: x / Non Sq Epi: x / Bacteria: x                      Radiology and Imaging reviewed.
Patient is a 79y old  Female who presents with a chief complaint of SACRAL PRESSURE ULCER; SEPSIS     (07 Aug 2024 13:48)    INTERVAL HPI/OVERNIGHT EVENTS: Patients seen and examined at bedside this morning. No acute events overnight.     MEDICATIONS  (STANDING):  amLODIPine   Tablet 10 milliGRAM(s) Oral daily  dextrose 5%. 1000 milliLiter(s) (100 mL/Hr) IV Continuous <Continuous>  dextrose 5%. 1000 milliLiter(s) (50 mL/Hr) IV Continuous <Continuous>  dextrose 50% Injectable 25 Gram(s) IV Push once  dextrose 50% Injectable 12.5 Gram(s) IV Push once  dextrose 50% Injectable 25 Gram(s) IV Push once  glucagon  Injectable 1 milliGRAM(s) IntraMuscular once  insulin lispro (ADMELOG) corrective regimen sliding scale   SubCutaneous three times a day before meals  piperacillin/tazobactam IVPB.. 3.375 Gram(s) IV Intermittent every 8 hours  polyethylene glycol 3350 17 Gram(s) Oral daily  senna 2 Tablet(s) Oral at bedtime  sodium chloride 0.9%. 1000 milliLiter(s) (100 mL/Hr) IV Continuous <Continuous>  vancomycin  IVPB 500 milliGRAM(s) IV Intermittent every 12 hours    MEDICATIONS  (PRN):  acetaminophen     Tablet .. 650 milliGRAM(s) Oral every 6 hours PRN Temp greater or equal to 38C (100.4F), Mild Pain (1 - 3), Moderate Pain (4 - 6)  dextrose Oral Gel 15 Gram(s) Oral once PRN Blood Glucose LESS THAN 70 milliGRAM(s)/deciliter  melatonin 3 milliGRAM(s) Oral at bedtime PRN Insomnia    Allergies    No Known Allergies    Intolerances      REVIEW OF SYSTEMS:  All other systems reviewed and are negative    Vital Signs Last 24 Hrs  T(C): 37.1 (07 Aug 2024 12:05), Max: 37.6 (06 Aug 2024 18:28)  T(F): 98.8 (07 Aug 2024 12:05), Max: 99.7 (06 Aug 2024 18:28)  HR: 90 (07 Aug 2024 12:05) (89 - 105)  BP: 106/56 (07 Aug 2024 12:05) (93/59 - 108/55)  BP(mean): --  RR: 15 (07 Aug 2024 12:05) (15 - 17)  SpO2: 96% (07 Aug 2024 12:05) (96% - 98%)    Parameters below as of 07 Aug 2024 12:05  Patient On (Oxygen Delivery Method): room air      Daily     Daily   I&O's Summary    06 Aug 2024 07:01  -  07 Aug 2024 07:00  --------------------------------------------------------  IN: 1400 mL / OUT: 600 mL / NET: 800 mL      CAPILLARY BLOOD GLUCOSE      POCT Blood Glucose.: 239 mg/dL (07 Aug 2024 11:17)  POCT Blood Glucose.: 205 mg/dL (07 Aug 2024 07:52)  POCT Blood Glucose.: 180 mg/dL (06 Aug 2024 22:46)  POCT Blood Glucose.: 185 mg/dL (06 Aug 2024 16:21)    PHYSICAL EXAM:  HEAD:  Atraumatic, Normocephalic  EYES: EOMI, PERRLA  NECK: Supple   NERVOUS SYSTEM:  Alert & Oriented to self   CHEST/LUNG: Clear to auscultation bilaterally; No rales, rhonchi, wheezing, or rubs  HEART: Regular rate and rhythm; No murmurs, rubs, or gallops  ABDOMEN: Soft, Nontender, Nondistended; Bowel sounds present  EXTREMITIES:  No clubbing, cyanosis, or edema  LYMPH: No lymphadenopathy noted  SKIN: bilateral hip and sacral ulcers      Labs                          7.2    15.10 )-----------( 420      ( 07 Aug 2024 09:04 )             22.6     08-07    131<L>  |  99  |  5<L>  ----------------------------<  173<H>  3.4<L>   |  23  |  0.39<L>    Ca    8.4<L>      07 Aug 2024 09:04  Phos  2.6     08-07  Mg     1.8     08-07    TPro  6.4  /  Alb  1.7<L>  /  TBili  0.5  /  DBili  x   /  AST  24  /  ALT  20  /  AlkPhos  108  08-06          Urinalysis Basic - ( 07 Aug 2024 09:04 )    Color: x / Appearance: x / SG: x / pH: x  Gluc: 173 mg/dL / Ketone: x  / Bili: x / Urobili: x   Blood: x / Protein: x / Nitrite: x   Leuk Esterase: x / RBC: x / WBC x   Sq Epi: x / Non Sq Epi: x / Bacteria: x        Culture - Urine (collected 05 Aug 2024 00:25)  Source: Clean Catch Clean Catch (Midstream)  Final Report (06 Aug 2024 07:22):    <10,000 CFU/mL Normal Urogenital Earline    Culture - Blood (collected 04 Aug 2024 20:41)  Source: .Blood Blood-Peripheral  Preliminary Report (07 Aug 2024 01:01):    No growth at 48 Hours    Culture - Blood (collected 04 Aug 2024 20:41)  Source: .Blood Blood-Peripheral  Preliminary Report (07 Aug 2024 01:01):    No growth at 48 Hours                    Radiology and Imaging reviewed.
follow up on:  complex medical decision making related to goals of care    OVERNIGHT EVENTS:  no acute events overnight  c/o pain at site of decubitus ulcers    Review of systems:     Pain:  [x ] yes [ ] no chronic pain at site of decubitus unable to quantify or qualify   QOL impact -   Location -                    Aggravating factors -  Quality -  Radiation -  Timing-  Severity (0-10 scale):  Minimal acceptable level (0-10 scale):     Unable to obtain/Limited due to poor mental status    MEDICATIONS  (STANDING):  Dakins Solution - 1/2 Strength 1 Application(s) Topical daily  dextrose 5%. 1000 milliLiter(s) (100 mL/Hr) IV Continuous <Continuous>  dextrose 5%. 1000 milliLiter(s) (50 mL/Hr) IV Continuous <Continuous>  dextrose 50% Injectable 25 Gram(s) IV Push once  dextrose 50% Injectable 25 Gram(s) IV Push once  dextrose 50% Injectable 12.5 Gram(s) IV Push once  glucagon  Injectable 1 milliGRAM(s) IntraMuscular once  insulin lispro (ADMELOG) corrective regimen sliding scale   SubCutaneous three times a day before meals  piperacillin/tazobactam IVPB.. 3.375 Gram(s) IV Intermittent every 8 hours  polyethylene glycol 3350 17 Gram(s) Oral daily  potassium phosphate / sodium phosphate Powder (PHOS-NaK) 1 Packet(s) Oral three times a day  senna 2 Tablet(s) Oral at bedtime  sodium chloride 0.9%. 1000 milliLiter(s) (70 mL/Hr) IV Continuous <Continuous>  vancomycin  IVPB 500 milliGRAM(s) IV Intermittent every 12 hours    MEDICATIONS  (PRN):  acetaminophen     Tablet .. 650 milliGRAM(s) Oral every 6 hours PRN Temp greater or equal to 38C (100.4F), Mild Pain (1 - 3), Moderate Pain (4 - 6)  dextrose Oral Gel 15 Gram(s) Oral once PRN Blood Glucose LESS THAN 70 milliGRAM(s)/deciliter  melatonin 3 milliGRAM(s) Oral at bedtime PRN Insomnia  morphine  - Injectable 2 milliGRAM(s) IV Push every 4 hours PRN moderate to severe pain      PHYSICAL EXAM:  Vital Signs Last 24 Hrs  T(C): 36.7 (08 Aug 2024 11:01), Max: 37.7 (07 Aug 2024 17:30)  T(F): 98.1 (08 Aug 2024 11:01), Max: 99.9 (07 Aug 2024 17:30)  HR: 88 (08 Aug 2024 11:01) (86 - 99)  BP: 110/53 (08 Aug 2024 11:01) (96/56 - 132/67)  BP(mean): --  RR: 16 (08 Aug 2024 11:01) (16 - 18)  SpO2: 97% (08 Aug 2024 11:01) (95% - 97%)    Parameters below as of 08 Aug 2024 11:01  Patient On (Oxygen Delivery Method): room air          Palliative Performance Scale/Karnofsky Score:  ECOG Performance:     General: elderly female in bed with C/O pain to decubitus ulcers  HENT: A/T, N/C, anicteric MM dry   Neck: supple,, no jVD   CVS: S1S2, tachycardic  Resp: decreased BS B/L to bases CTAB  GI: soft, NT, hypoactive BS    : chronic indwelling catheter    Musc: BUE contractures, R>L     Neuro: oriented to name,  follows simple commands, non focal  Psych: calm and cooperative during assessment     Skin: multiple deep wounds with packing to sacrum (2) and to each hip  PO intake: poor     Preadmit Karnofsky:  30%           Current Karnofsky:     30%    LABS:                        9.9    15.46 )-----------( 457      ( 08 Aug 2024 07:19 )             31.1     08-08    132<L>  |  100  |  6<L>  ----------------------------<  213<H>  3.3<L>   |  24  |  0.37<L>    Ca    8.3<L>      08 Aug 2024 07:19  Phos  1.8     08-08  Mg     1.7     08-08    TPro  6.3  /  Alb  1.6<L>  /  TBili  0.8  /  DBili  x   /  AST  25  /  ALT  21  /  AlkPhos  105  08-08    Urinalysis Basic - ( 08 Aug 2024 07:19 )    Color: x / Appearance: x / SG: x / pH: x  Gluc: 213 mg/dL / Ketone: x  / Bili: x / Urobili: x   Blood: x / Protein: x / Nitrite: x   Leuk Esterase: x / RBC: x / WBC x   Sq Epi: x / Non Sq Epi: x / Bacteria: x        RADIOLOGY & ADDITIONAL STUDIES: no new radiology testing

## 2024-08-09 NOTE — PROGRESS NOTE ADULT - ASSESSMENT
79 years old female with h/o HTN, osteporosis, bedbound, decubitrus ulcers, chronic Landeros was brought in to ED for fever. Patient denied any pain, cough, SOB, nausea, vomiting or abdominal pain. Attempted to call patient's son to get more collateral information but no one  phone.   Febrile to 100.4, BP stable, WBC 17.6, Na 123, Cr 0.5, glucose 264. CT abd/pelvis with multiple bilateral decubitus ulcers with associated abscess. Erosive changes involving right sacroiliac joint.  Rectum is distended to 8 cm with stool  son cares for her at home with a private aid who also helps with the wound care. Has outpatient provider helping manage her wounds. She eat 3 meals a day. They encourage healthy organic meals with protein drinks like boost. Try their best to rotate patient. Already has hospital bed. Found to have multiple wounds with at least one abscess   1.   Rectum is distended to 8 cm with stool, potentially impacted.  Remainder of the colon is mildly distended with stool, potentially signifying constipation.  2.   Substantially enlarged partially calcified fibroid uterus. 3.   Mild thickening of the wall of the urinary bladder, potentially signifying cystitis. Nonobstructive bladder calculi.  4.   Multiple bilateral decubitus ulcers with associated abscesses as detailed above. Erosive change involving right sacroiliac joint could be degenerative or infectious    Suspect most of her issues here are related to her wounds. She also has CT evidence of impaction and constipation. s/p enema.   May also have a UTI. the antibiotics we use hopefully will cover the wounds and urine   needs excellent bowel regimen   next excellent wound care and debridement     8/7: afebrile, RA, WBC better 15.10, Cr ok, BCs NGTD, initial UC grew Proteus - pt with chronic Landeros, Vancomycin IV and Zosyn IV continued, will obtain vanco level. Pending wound care evaluation.   Attending addendum:  continue antibiotics   follow blood cultures NGTD  wound debridement with deep wound culture - pending   frequent turns, offloading, and nutrition optimization to avoid bedsores-consider protective heel/leg protectors   trend wbc count; GOC discussion started with palliative care     8/8: s/p wounds debridement today, no fevers, RA, WBC is about the same 15.46, Cr and LFTs ok, BCs NGTD, UC grew Proteus, Vancomycin IV and Zosyn IV continued for now.   Attending addendum:  continue antibiotics   follow cultures   excellent wound care   frequent turns, offloading, and nutrition optimization to avoid bedsores    8/9: no fever, RA, WBC better, on IV abx while in pt, Vancomycin and zosyn, ok to change abx to po Augmentin and po Doxycycline to complete 10 days course post debridement.     Sacral wound stage 4, multiple decubiti   abscess   leukocytosis   functional quadriplegia   Multiple sclerosis     Plan:  continue vancomycin while in pt  send vancomycin trough with target AUC/KATHRYN 400-600   (therapeutic drug monitoring required with IV vancomycin)   continue Zosyn while in pt  ok to change abx upon discharge to po Augmentin and po Doxycycline to complete 10 days course post debridement   follow blood cultures NGTD  frequent turns, offloading, and nutrition optimization to avoid bedsores-consider protective heel/leg protectors   trend wbc count   GOC discussion started with palliative care   wound care as op    discussed with Dr. Block  discussed with Dr. Villegas

## 2024-08-09 NOTE — PROGRESS NOTE ADULT - NS ATTEND AMEND GEN_ALL_CORE FT
I have reviewed all pertinent clinical information and agree with the NP's note.  Any new labs, recent cultures, new imaging (if applicable) and vitals have been reviewed today.  All necessary adjustments to management have been made.  Agree with the above assessment and plan.    continue vancomycin while in pt  send vancomycin trough with target AUC/KATHRYN 400-600   (therapeutic drug monitoring required with IV vancomycin)   continue Zosyn while in pt  ok to change abx upon discharge to po Augmentin and po Doxycycline to complete 10 days course post debridement   follow blood cultures NGTD  frequent turns, offloading, and nutrition optimization to avoid bedsores-consider protective heel/leg protectors   trend wbc count   GOC discussion started with palliative care       Joaquim Block DO  Chief, Infectious Disease at [facility]  Reachable via Microsoft Teams or ID office: [phone]  Weekdays: After 5pm, please call [phone] for all inquiries and new consults  Weekends: Message on-call infectious disease physician via teams (erica Rios)

## 2024-08-09 NOTE — DISCHARGE NOTE PROVIDER - CARE PROVIDER_API CALL
Joaquim Block  Infectious Disease  [address] [place] [address]  Phone: [phone]  Fax: [phone]  Follow Up Time: 2 weeks    Francisco Whitehead  Vascular Surgery  [address]  [place] [address]  Phone: [phone]  Fax: [phone]  Follow Up Time: 2 weeks

## 2024-08-09 NOTE — DISCHARGE NOTE NURSING/CASE MANAGEMENT/SOCIAL WORK - PATIENT PORTAL LINK FT
You can access the FollowMyHealth Patient Portal offered by [facility] by registering at the following website: [email or web address]. By joining [employer]’s FollowMyHealth portal, you will also be able to view your health information using other applications (apps) compatible with our system.

## 2024-08-09 NOTE — DISCHARGE NOTE PROVIDER - DETAILS OF MALNUTRITION DIAGNOSIS/DIAGNOSES
This patient has been assessed with a concern for Malnutrition and was treated during this hospitalization for the following Nutrition diagnosis/diagnoses:     -  08/07/2024: Severe protein-calorie malnutrition   -  08/07/2024: Underweight (BMI < 19)

## 2024-08-09 NOTE — DISCHARGE NOTE PROVIDER - NSDCCPCAREPLAN_GEN_ALL_CORE_FT
PRINCIPAL DISCHARGE DIAGNOSIS  Diagnosis: Sacral pressure ulcer  Assessment and Plan of Treatment:       SECONDARY DISCHARGE DIAGNOSES  Diagnosis: Sepsis  Assessment and Plan of Treatment:

## 2024-08-09 NOTE — DISCHARGE NOTE PROVIDER - ATTENDING DISCHARGE PHYSICAL EXAMINATION:
HEAD:  Atraumatic, Normocephalic  EYES: EOMI, PERRLA  NECK: Supple   NERVOUS SYSTEM:  Alert & Oriented to self   CHEST/LUNG: Clear to auscultation bilaterally; No rales, rhonchi, wheezing, or rubs  HEART: Regular rate and rhythm; No murmurs, rubs, or gallops  ABDOMEN: Soft, Nontender, Nondistended; Bowel sounds present  EXTREMITIES:  No clubbing, cyanosis, or edema  LYMPH: No lymphadenopathy noted  SKIN: bilateral hip and sacral ulcers

## 2024-08-09 NOTE — DISCHARGE NOTE PROVIDER - HOSPITAL COURSE
79 years old female with history of HTN, osteoporosis, bedbound, decubitus ulcers, chronic Amezquita was brought in to ED for fever and was admitted w/ sepsis POA due to multiple infected decubitus ulcers with associated abscess, constipation and hyponatremia nwo stable for discharge with home care services.     Infected decubitus ulcer  - CT abd showed multiple bilateral decubitus ulcers with associated abscesses w/ erosive change involving right sacroiliac joint  - c/w Vanco and zosyn  - ID note read and appreciated   - NGTD on  blood cultures   - debridement  of bilateral hips by Dr. Whitehead done on 8/8. Will need to complete 10 day course of doxycyline and augmentin.  - palliative care consulted  discharge planning    Anemia  H/H <7 transfused 1 unit.  (8/8) H/H stable    Hyponatremia  - likely due to hypovolemia from poor PO intake  - increase NS rate  - consult nephro      Functional quadriplegia.   - frequent turn/position  - chronic amezquita, exchanged today ( 8/5/24)    Severe protein-calorie malnutrition w/ BMI 12  - consult nutrition    Constipation  - CT showed a distended rectum with stool, potentially impacted  - status post Fleet enema   - c/w Miralax and senna   improved    DM II  - c/w ISS  - Hgb A1C is 7.4    HTN  - c/w Norvasc    Prophylaxis:  DVT: SCD  GI: PO diet

## 2024-08-09 NOTE — PROGRESS NOTE ADULT - PROVIDER SPECIALTY LIST ADULT
Hospitalist
Infectious Disease
Infectious Disease
Nephrology
Hospitalist
Nephrology
Wound Care
Infectious Disease
Hospitalist
Palliative Care

## 2024-08-10 LAB
CULTURE RESULTS: SIGNIFICANT CHANGE UP
CULTURE RESULTS: SIGNIFICANT CHANGE UP
SPECIMEN SOURCE: SIGNIFICANT CHANGE UP
SPECIMEN SOURCE: SIGNIFICANT CHANGE UP

## 2024-08-13 LAB — ANTIBODY INTERPRETATION 2: SIGNIFICANT CHANGE UP

## 2024-08-17 DIAGNOSIS — L89.154 PRESSURE ULCER OF SACRAL REGION, STAGE 4: ICD-10-CM

## 2024-08-17 DIAGNOSIS — Z74.01 BED CONFINEMENT STATUS: ICD-10-CM

## 2024-08-17 DIAGNOSIS — R53.2 FUNCTIONAL QUADRIPLEGIA: ICD-10-CM

## 2024-08-17 DIAGNOSIS — E11.9 TYPE 2 DIABETES MELLITUS WITHOUT COMPLICATIONS: ICD-10-CM

## 2024-08-17 DIAGNOSIS — A41.9 SEPSIS, UNSPECIFIED ORGANISM: ICD-10-CM

## 2024-08-17 DIAGNOSIS — Z79.899 OTHER LONG TERM (CURRENT) DRUG THERAPY: ICD-10-CM

## 2024-08-17 DIAGNOSIS — Z86.73 PERSONAL HISTORY OF TRANSIENT ISCHEMIC ATTACK (TIA), AND CEREBRAL INFARCTION WITHOUT RESIDUAL DEFICITS: ICD-10-CM

## 2024-08-17 DIAGNOSIS — K59.00 CONSTIPATION, UNSPECIFIED: ICD-10-CM

## 2024-08-17 DIAGNOSIS — E87.1 HYPO-OSMOLALITY AND HYPONATREMIA: ICD-10-CM

## 2024-08-17 DIAGNOSIS — I10 ESSENTIAL (PRIMARY) HYPERTENSION: ICD-10-CM

## 2024-08-17 DIAGNOSIS — N39.0 URINARY TRACT INFECTION, SITE NOT SPECIFIED: ICD-10-CM

## 2024-08-17 DIAGNOSIS — B96.4 PROTEUS (MIRABILIS) (MORGANII) AS THE CAUSE OF DISEASES CLASSIFIED ELSEWHERE: ICD-10-CM

## 2024-08-17 DIAGNOSIS — E43 UNSPECIFIED SEVERE PROTEIN-CALORIE MALNUTRITION: ICD-10-CM

## 2024-08-17 DIAGNOSIS — D64.9 ANEMIA, UNSPECIFIED: ICD-10-CM

## 2024-08-17 DIAGNOSIS — M46.28 OSTEOMYELITIS OF VERTEBRA, SACRAL AND SACROCOCCYGEAL REGION: ICD-10-CM

## 2024-08-17 DIAGNOSIS — M81.0 AGE-RELATED OSTEOPOROSIS WITHOUT CURRENT PATHOLOGICAL FRACTURE: ICD-10-CM

## 2024-08-17 DIAGNOSIS — L02.416 CUTANEOUS ABSCESS OF LEFT LOWER LIMB: ICD-10-CM

## 2024-08-17 DIAGNOSIS — G35 MULTIPLE SCLEROSIS: ICD-10-CM

## 2024-08-17 DIAGNOSIS — L02.415 CUTANEOUS ABSCESS OF RIGHT LOWER LIMB: ICD-10-CM

## 2024-08-17 DIAGNOSIS — L89.224 PRESSURE ULCER OF LEFT HIP, STAGE 4: ICD-10-CM

## 2024-10-07 ENCOUNTER — APPOINTMENT (OUTPATIENT)
Dept: HOME HEALTH SERVICES | Facility: HOME HEALTH | Age: 79
End: 2024-10-07

## 2024-10-07 VITALS — OXYGEN SATURATION: 99 % | SYSTOLIC BLOOD PRESSURE: 124 MMHG | HEART RATE: 102 BPM | DIASTOLIC BLOOD PRESSURE: 62 MMHG

## 2024-10-07 VITALS
OXYGEN SATURATION: 99 % | DIASTOLIC BLOOD PRESSURE: 62 MMHG | RESPIRATION RATE: 16 BRPM | HEART RATE: 102 BPM | SYSTOLIC BLOOD PRESSURE: 124 MMHG | TEMPERATURE: 98.9 F

## 2024-10-07 DIAGNOSIS — L89.223 PRESSURE ULCER OF LEFT HIP, STAGE 3: ICD-10-CM

## 2024-10-07 DIAGNOSIS — R64 CACHEXIA: ICD-10-CM

## 2024-10-07 DIAGNOSIS — L89.213 PRESSURE ULCER OF RIGHT HIP, STAGE 3: ICD-10-CM

## 2024-10-07 DIAGNOSIS — Z87.42 PERSONAL HISTORY OF OTHER DISEASES OF THE FEMALE GENITAL TRACT: ICD-10-CM

## 2024-10-07 DIAGNOSIS — E11.9 TYPE 2 DIABETES MELLITUS W/OUT COMPLICATIONS: ICD-10-CM

## 2024-10-07 DIAGNOSIS — R53.2 FUNCTIONAL QUADRIPLEGIA: ICD-10-CM

## 2024-10-07 DIAGNOSIS — Z97.8 PRESENCE OF OTHER SPECIFIED DEVICES: ICD-10-CM

## 2024-10-07 DIAGNOSIS — Z74.01 BED CONFINEMENT STATUS: ICD-10-CM

## 2024-10-07 DIAGNOSIS — G35 MULTIPLE SCLEROSIS: ICD-10-CM

## 2024-10-24 ENCOUNTER — NON-APPOINTMENT (OUTPATIENT)
Age: 79
End: 2024-10-24

## 2024-11-06 ENCOUNTER — APPOINTMENT (OUTPATIENT)
Dept: HOME HEALTH SERVICES | Facility: HOME HEALTH | Age: 79
End: 2024-11-06

## 2024-11-19 ENCOUNTER — INPATIENT (INPATIENT)
Facility: HOSPITAL | Age: 79
LOS: 23 days | Discharge: HOME HEALTH | End: 2024-12-13
Attending: STUDENT IN AN ORGANIZED HEALTH CARE EDUCATION/TRAINING PROGRAM | Admitting: STUDENT IN AN ORGANIZED HEALTH CARE EDUCATION/TRAINING PROGRAM
Payer: MEDICARE

## 2024-11-19 VITALS
SYSTOLIC BLOOD PRESSURE: 153 MMHG | HEIGHT: 69 IN | WEIGHT: 95.02 LBS | OXYGEN SATURATION: 99 % | TEMPERATURE: 97 F | HEART RATE: 96 BPM | RESPIRATION RATE: 17 BRPM | DIASTOLIC BLOOD PRESSURE: 83 MMHG

## 2024-11-19 DIAGNOSIS — D64.9 ANEMIA, UNSPECIFIED: ICD-10-CM

## 2024-11-19 DIAGNOSIS — R62.7 ADULT FAILURE TO THRIVE: ICD-10-CM

## 2024-11-19 DIAGNOSIS — M81.0 AGE-RELATED OSTEOPOROSIS WITHOUT CURRENT PATHOLOGICAL FRACTURE: ICD-10-CM

## 2024-11-19 DIAGNOSIS — E43 UNSPECIFIED SEVERE PROTEIN-CALORIE MALNUTRITION: ICD-10-CM
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
## 2024-11-19 DIAGNOSIS — L89.892 PRESSURE ULCER OF OTHER SITE, STAGE 2: ICD-10-CM

## 2024-11-19 DIAGNOSIS — L89.214 PRESSURE ULCER OF RIGHT HIP, STAGE 4: ICD-10-CM

## 2024-11-19 DIAGNOSIS — L89.894 PRESSURE ULCER OF OTHER SITE, STAGE 4: ICD-10-CM

## 2024-11-19 DIAGNOSIS — R73.9 HYPERGLYCEMIA, UNSPECIFIED: ICD-10-CM

## 2024-11-19 DIAGNOSIS — B96.1 KLEBSIELLA PNEUMONIAE [K. PNEUMONIAE] AS THE CAUSE OF DISEASES CLASSIFIED ELSEWHERE: ICD-10-CM

## 2024-11-19 DIAGNOSIS — T83.511A INFECTION AND INFLAMMATORY REACTION DUE TO INDWELLING URETHRAL CATHETER, INITIAL ENCOUNTER: ICD-10-CM

## 2024-11-19 DIAGNOSIS — Z51.5 ENCOUNTER FOR PALLIATIVE CARE: ICD-10-CM

## 2024-11-19 DIAGNOSIS — L89.224 PRESSURE ULCER OF LEFT HIP, STAGE 4: ICD-10-CM

## 2024-11-19 DIAGNOSIS — L89.154 PRESSURE ULCER OF SACRAL REGION, STAGE 4: ICD-10-CM

## 2024-11-19 DIAGNOSIS — R53.2 FUNCTIONAL QUADRIPLEGIA: ICD-10-CM

## 2024-11-19 DIAGNOSIS — N39.0 URINARY TRACT INFECTION, SITE NOT SPECIFIED: ICD-10-CM

## 2024-11-19 DIAGNOSIS — Z16.12 EXTENDED SPECTRUM BETA LACTAMASE (ESBL) RESISTANCE: ICD-10-CM

## 2024-11-19 DIAGNOSIS — L89.104 PRESSURE ULCER OF UNSPECIFIED PART OF BACK, STAGE 4: ICD-10-CM
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
## 2024-11-19 DIAGNOSIS — E83.39 OTHER DISORDERS OF PHOSPHORUS METABOLISM: ICD-10-CM

## 2024-11-19 DIAGNOSIS — Z74.01 BED CONFINEMENT STATUS: ICD-10-CM

## 2024-11-19 DIAGNOSIS — U07.1 COVID-19: ICD-10-CM

## 2024-11-19 DIAGNOSIS — E83.42 HYPOMAGNESEMIA: ICD-10-CM

## 2024-11-19 DIAGNOSIS — E87.1 HYPO-OSMOLALITY AND HYPONATREMIA: ICD-10-CM

## 2024-11-19 DIAGNOSIS — G35 MULTIPLE SCLEROSIS: ICD-10-CM

## 2024-11-19 DIAGNOSIS — B96.20 UNSPECIFIED ESCHERICHIA COLI [E. COLI] AS THE CAUSE OF DISEASES CLASSIFIED ELSEWHERE: ICD-10-CM

## 2024-11-19 LAB
ALBUMIN SERPL ELPH-MCNC: 1.5 G/DL — LOW (ref 3.3–5)
ALP SERPL-CCNC: 275 U/L — HIGH (ref 40–120)
ALT FLD-CCNC: 35 U/L — SIGNIFICANT CHANGE UP (ref 12–78)
ANION GAP SERPL CALC-SCNC: 5 MMOL/L — SIGNIFICANT CHANGE UP (ref 5–17)
AST SERPL-CCNC: 26 U/L — SIGNIFICANT CHANGE UP (ref 15–37)
BILIRUB SERPL-MCNC: 0.3 MG/DL — SIGNIFICANT CHANGE UP (ref 0.2–1.2)
BUN SERPL-MCNC: 14 MG/DL — SIGNIFICANT CHANGE UP (ref 7–23)
CALCIUM SERPL-MCNC: 8.7 MG/DL — SIGNIFICANT CHANGE UP (ref 8.5–10.1)
CHLORIDE SERPL-SCNC: 96 MMOL/L — SIGNIFICANT CHANGE UP (ref 96–108)
CO2 SERPL-SCNC: 27 MMOL/L — SIGNIFICANT CHANGE UP (ref 22–31)
CREAT SERPL-MCNC: 0.38 MG/DL — LOW (ref 0.5–1.3)
EGFR: 102 ML/MIN/1.73M2 — SIGNIFICANT CHANGE UP
GLUCOSE SERPL-MCNC: 226 MG/DL — HIGH (ref 70–99)
HCT VFR BLD CALC: 27.6 % — LOW (ref 34.5–45)
HGB BLD-MCNC: 8.6 G/DL — LOW (ref 11.5–15.5)
MCHC RBC-ENTMCNC: 22.2 PG — LOW (ref 27–34)
MCHC RBC-ENTMCNC: 31.2 G/DL — LOW (ref 32–36)
MCV RBC AUTO: 71.1 FL — LOW (ref 80–100)
PLATELET # BLD AUTO: 318 K/UL — SIGNIFICANT CHANGE UP (ref 150–400)
POTASSIUM SERPL-MCNC: 4.9 MMOL/L — SIGNIFICANT CHANGE UP (ref 3.5–5.3)
POTASSIUM SERPL-SCNC: 4.9 MMOL/L — SIGNIFICANT CHANGE UP (ref 3.5–5.3)
PROT SERPL-MCNC: 6.3 GM/DL — SIGNIFICANT CHANGE UP (ref 6–8.3)
RBC # BLD: 3.88 M/UL — SIGNIFICANT CHANGE UP (ref 3.8–5.2)
RBC # FLD: 19.8 % — HIGH (ref 10.3–14.5)
SODIUM SERPL-SCNC: 128 MMOL/L — LOW (ref 135–145)
WBC # BLD: 15.34 K/UL — HIGH (ref 3.8–10.5)
WBC # FLD AUTO: 15.34 K/UL — HIGH (ref 3.8–10.5)

## 2024-11-19 PROCEDURE — 99285 EMERGENCY DEPT VISIT HI MDM: CPT

## 2024-11-19 PROCEDURE — 71045 X-RAY EXAM CHEST 1 VIEW: CPT | Mod: 26

## 2024-11-19 NOTE — ED ADULT NURSE REASSESSMENT NOTE - NS ED NURSE REASSESS COMMENT FT1
patient noted right flank , left flank stage 4, right iliac , left iliac stage 4, sacrum stage 4, noted right ear sore. bandaged dry and intact.

## 2024-11-19 NOTE — ED ADULT NURSE NOTE - ASSOCIATED SYMPTOMS
Pt BIBA with HHA at the bedside. HHA providing history states pt has decreased po intake and has cloudy urine

## 2024-11-19 NOTE — ED PROVIDER NOTE - CLINICAL SUMMARY MEDICAL DECISION MAKING FREE TEXT BOX
79 F pmh MS, osteoporosis, sepsis 2/2 infected decubitus ulcers presenting to the ED for reduced appetite    concern for infected decubitus ulcer  labs  XR, UA 79 F pmh MS, osteoporosis, sepsis 2/2 infected decubitus ulcers presenting to the ED for reduced appetite    concern for infected decubitus ulcer, UTI, failure to thrive  labs  XR, UA  amezquita change  electrolyte repletion  medicine admit

## 2024-11-19 NOTE — ED PROVIDER NOTE - OBJECTIVE STATEMENT
79 F pmh osteoporosis, MS, bedbound. infected decubitus ulcers presenting to the ED for reduced appetite x 2-3 days. Son states that home health aide noted that patient has not been eating. There was no fever. amezquita catheter changed last week

## 2024-11-19 NOTE — ED PROVIDER NOTE - IV ALTEPLASE INCLUSION HIDDEN
Patient contacted unable to reach. Message left for a return call. Awaiting return call.       ----- Message from DUANE Barron NP sent at 9/5/2024  8:27 PM EDT -----  Cholesterol numbers are elevated.  According to the American Heart Association and the American College of Cardiology, your 10-year risk of a heart attack or stroke is 29.6%.   Adults 40 to 75 years of age with LDL 70 to 189 mg/dL with no diabetes and estimated 10-year risk greater than 7.5% for a stroke or heart attack should be treated with cholesterol medication. I know you have not wanted to start cholesterol medication.  Your risk of stroke or heart attack has increased from 13.4% in Oct 2022 to almost 30% today.  I just to make you aware of the risks and see if you would be interested in starting a cholesterol medication?    A1c has gone up to 7.5%.  Glucose was 226.  this is the highest it has been since 2018.  I would like to start you on a diabetic medication (Januvia).  If you are not interested in medication, would you be interested in seeing our diabetes educator and pharmacist Jose Carlos to discuss diabetes, diet and ways to improve your sugar without medication? (There is no charge for her visits).    Prostate cancer screening negative.  Blood counts normal.  Liver and kidney function normal.  
show

## 2024-11-19 NOTE — ED ADULT TRIAGE NOTE - CHIEF COMPLAINT QUOTE
hx of Uterine polyp, OA, brought in by HHA for failure to to thrive, in addition c/o cloudy urine. PT is A & O x 2

## 2024-11-19 NOTE — ED ADULT NURSE NOTE - OBJECTIVE STATEMENT
Pt is a 79y F AOx2 with a pmh of MS, uterine polyp, bedbound and OA. Pt BIBA with HHA at the bedside. HHA providing history states pt has decreased po intake and has cloudy urine. HHA believes pt has a UTI. Pt has a chronic Landeros catheter placed, last changed on Saturday. Pt hasn't had any fevers, flu-like symptoms or abdominal pain.

## 2024-11-19 NOTE — ED ADULT NURSE NOTE - NSFALLHARMRISKINTERV_ED_ALL_ED
Assistance OOB with selected safe patient handling equipment if applicable/Assistance with ambulation/Communicate risk of Fall with Harm to all staff, patient, and family/Monitor gait and stability/Provide visual cue: red socks, yellow wristband, yellow gown, etc/Reinforce activity limits and safety measures with patient and family/Bed in lowest position, wheels locked, appropriate side rails in place/Call bell, personal items and telephone in reach/Instruct patient to call for assistance before getting out of bed/chair/stretcher/Non-slip footwear applied when patient is off stretcher/[place] to call system/Physically safe environment - no spills, clutter or unnecessary equipment/Purposeful Proactive Rounding/Room/bathroom lighting operational, light cord in reach

## 2024-11-19 NOTE — ED PROVIDER NOTE - CONSIDERATION OF ADMISSION OBSERVATION
Consideration of Admission/Observation will admit for failure to thrive. son concerned that patient will continue to not eat.

## 2024-11-19 NOTE — ED PROVIDER NOTE - NS ED ROS FT
General: Denies fever, chills; + reduced appetite  HEENT: Denies sensory changes, sore throat  Neck: Denies neck pain, neck stiffness  Resp: Denies coughing, SOB  Cardiovascular: Denies CP, palpitations, LE edema  GI: Denies nausea, vomiting, abdominal pain, diarrhea, constipation, blood in stool  : Denies dysuria, hematuria, frequency, incontinence  MSK: Denies back pain  Neuro: Denies HA, dizziness, numbness, weakness  Skin: + bed sores

## 2024-11-19 NOTE — ED PROVIDER NOTE - PHYSICAL EXAMINATION
General: Well appearing elderly female in no acute distress  HEENT: Normocephalic, atraumatic. Moist mucous membranes. Oropharynx clear. No lymphadenopathy.  Eyes: No scleral icterus. EOMI. SHAUNNA.  Neck:. Soft and supple. Full ROM without pain. No midline tenderness  Cardiac: Regular rate and regular rhythm. No murmurs, rubs, gallops. Peripheral pulses 2+ and symmetric. No LE edema.  Resp: Lungs CTAB. Speaking in full sentences. No wheezes, rales or rhonchi.  Abd: Soft, non-tender, non-distended. No guarding or rebound. No scars, masses, or lesions.  : amezquita catheter in place  Back: Spine midline and non-tender. No CVA tenderness.    Skin: 7 unstageable ulcers over sacrum and b/l upper back  Neuro: AO x 1-2. contracted b/l extremities

## 2024-11-20 ENCOUNTER — NON-APPOINTMENT (OUTPATIENT)
Age: 79
End: 2024-11-20

## 2024-11-20 DIAGNOSIS — G35 MULTIPLE SCLEROSIS: ICD-10-CM

## 2024-11-20 DIAGNOSIS — R62.7 ADULT FAILURE TO THRIVE: ICD-10-CM

## 2024-11-20 DIAGNOSIS — N39.0 URINARY TRACT INFECTION, SITE NOT SPECIFIED: ICD-10-CM

## 2024-11-20 DIAGNOSIS — L89.90 PRESSURE ULCER OF UNSPECIFIED SITE, UNSPECIFIED STAGE: ICD-10-CM

## 2024-11-20 LAB
ANION GAP SERPL CALC-SCNC: 6 MMOL/L — SIGNIFICANT CHANGE UP (ref 5–17)
ANISOCYTOSIS BLD QL: SLIGHT — SIGNIFICANT CHANGE UP
APPEARANCE UR: ABNORMAL
APPEARANCE UR: ABNORMAL
BACTERIA # UR AUTO: ABNORMAL /HPF
BACTERIA # UR AUTO: ABNORMAL /HPF
BASOPHILS # BLD AUTO: 0.03 K/UL — SIGNIFICANT CHANGE UP (ref 0–0.2)
BASOPHILS NFR BLD AUTO: 0.2 % — SIGNIFICANT CHANGE UP (ref 0–2)
BILIRUB UR-MCNC: NEGATIVE — SIGNIFICANT CHANGE UP
BILIRUB UR-MCNC: NEGATIVE — SIGNIFICANT CHANGE UP
BUN SERPL-MCNC: 10 MG/DL — SIGNIFICANT CHANGE UP (ref 7–23)
CALCIUM SERPL-MCNC: 8.1 MG/DL — LOW (ref 8.5–10.1)
CHLORIDE SERPL-SCNC: 97 MMOL/L — SIGNIFICANT CHANGE UP (ref 96–108)
CO2 SERPL-SCNC: 26 MMOL/L — SIGNIFICANT CHANGE UP (ref 22–31)
COD CRY URNS QL: PRESENT
COLOR SPEC: SIGNIFICANT CHANGE UP
COLOR SPEC: SIGNIFICANT CHANGE UP
COMMENT - URINE: SIGNIFICANT CHANGE UP
COMMENT - URINE: SIGNIFICANT CHANGE UP
CREAT SERPL-MCNC: 0.47 MG/DL — LOW (ref 0.5–1.3)
DACRYOCYTES BLD QL SMEAR: SLIGHT — SIGNIFICANT CHANGE UP
DIFF PNL FLD: ABNORMAL
DIFF PNL FLD: ABNORMAL
EGFR: 97 ML/MIN/1.73M2 — SIGNIFICANT CHANGE UP
ELLIPTOCYTES BLD QL SMEAR: SLIGHT — SIGNIFICANT CHANGE UP
EOSINOPHIL # BLD AUTO: 0.01 K/UL — SIGNIFICANT CHANGE UP (ref 0–0.5)
EOSINOPHIL NFR BLD AUTO: 0.1 % — SIGNIFICANT CHANGE UP (ref 0–6)
EPI CELLS # UR: PRESENT
EPI CELLS # UR: PRESENT
FLUAV AG NPH QL: SIGNIFICANT CHANGE UP
FLUBV AG NPH QL: SIGNIFICANT CHANGE UP
GLUCOSE BLDC GLUCOMTR-MCNC: 201 MG/DL — HIGH (ref 70–99)
GLUCOSE BLDC GLUCOMTR-MCNC: 217 MG/DL — HIGH (ref 70–99)
GLUCOSE BLDC GLUCOMTR-MCNC: 292 MG/DL — HIGH (ref 70–99)
GLUCOSE BLDC GLUCOMTR-MCNC: 314 MG/DL — HIGH (ref 70–99)
GLUCOSE SERPL-MCNC: 284 MG/DL — HIGH (ref 70–99)
GLUCOSE UR QL: NEGATIVE MG/DL — SIGNIFICANT CHANGE UP
GLUCOSE UR QL: NEGATIVE MG/DL — SIGNIFICANT CHANGE UP
HYPOCHROMIA BLD QL: SLIGHT — SIGNIFICANT CHANGE UP
IMM GRANULOCYTES NFR BLD AUTO: 0.5 % — SIGNIFICANT CHANGE UP (ref 0–0.9)
KETONES UR-MCNC: ABNORMAL MG/DL
KETONES UR-MCNC: ABNORMAL MG/DL
LACTATE SERPL-SCNC: 1.9 MMOL/L — SIGNIFICANT CHANGE UP (ref 0.7–2)
LEUKOCYTE ESTERASE UR-ACNC: ABNORMAL
LEUKOCYTE ESTERASE UR-ACNC: ABNORMAL
LYMPHOCYTES # BLD AUTO: 0.79 K/UL — LOW (ref 1–3.3)
LYMPHOCYTES # BLD AUTO: 5.1 % — LOW (ref 13–44)
MACROCYTES BLD QL: SLIGHT — SIGNIFICANT CHANGE UP
MANUAL SMEAR VERIFICATION: SIGNIFICANT CHANGE UP
MICROCYTES BLD QL: SLIGHT — SIGNIFICANT CHANGE UP
MONOCYTES # BLD AUTO: 0.56 K/UL — SIGNIFICANT CHANGE UP (ref 0–0.9)
MONOCYTES NFR BLD AUTO: 3.7 % — SIGNIFICANT CHANGE UP (ref 2–14)
NEUTROPHILS # BLD AUTO: 13.87 K/UL — HIGH (ref 1.8–7.4)
NEUTROPHILS NFR BLD AUTO: 90.4 % — HIGH (ref 43–77)
NITRITE UR-MCNC: POSITIVE
NITRITE UR-MCNC: POSITIVE
NRBC # BLD: 0 /100 WBCS — SIGNIFICANT CHANGE UP (ref 0–0)
PH UR: 6 — SIGNIFICANT CHANGE UP (ref 5–8)
PH UR: 6 — SIGNIFICANT CHANGE UP (ref 5–8)
PLAT MORPH BLD: NORMAL — SIGNIFICANT CHANGE UP
POIKILOCYTOSIS BLD QL AUTO: SLIGHT — SIGNIFICANT CHANGE UP
POLYCHROMASIA BLD QL SMEAR: SLIGHT — SIGNIFICANT CHANGE UP
POTASSIUM SERPL-MCNC: 4.2 MMOL/L — SIGNIFICANT CHANGE UP (ref 3.5–5.3)
POTASSIUM SERPL-SCNC: 4.2 MMOL/L — SIGNIFICANT CHANGE UP (ref 3.5–5.3)
PROT UR-MCNC: 100 MG/DL
PROT UR-MCNC: 100 MG/DL
RBC BLD AUTO: ABNORMAL
RBC CASTS # UR COMP ASSIST: 2 /HPF — SIGNIFICANT CHANGE UP (ref 0–4)
RBC CASTS # UR COMP ASSIST: 3 /HPF — SIGNIFICANT CHANGE UP (ref 0–4)
RSV RNA NPH QL NAA+NON-PROBE: SIGNIFICANT CHANGE UP
SARS-COV-2 RNA SPEC QL NAA+PROBE: SIGNIFICANT CHANGE UP
SODIUM SERPL-SCNC: 129 MMOL/L — LOW (ref 135–145)
SP GR SPEC: 1.02 — SIGNIFICANT CHANGE UP (ref 1–1.03)
SP GR SPEC: >1.03 — HIGH (ref 1–1.03)
TARGETS BLD QL SMEAR: SLIGHT — SIGNIFICANT CHANGE UP
UROBILINOGEN FLD QL: 1 MG/DL — SIGNIFICANT CHANGE UP (ref 0.2–1)
UROBILINOGEN FLD QL: 1 MG/DL — SIGNIFICANT CHANGE UP (ref 0.2–1)
WBC UR QL: ABNORMAL /HPF (ref 0–5)
WBC UR QL: ABNORMAL /HPF (ref 0–5)

## 2024-11-20 PROCEDURE — 12345: CPT | Mod: NC

## 2024-11-20 PROCEDURE — 93010 ELECTROCARDIOGRAM REPORT: CPT

## 2024-11-20 PROCEDURE — 99222 1ST HOSP IP/OBS MODERATE 55: CPT

## 2024-11-20 RX ORDER — SENNOSIDES 8.6 MG
2 TABLET ORAL AT BEDTIME
Refills: 0 | Status: DISCONTINUED | OUTPATIENT
Start: 2024-11-20 | End: 2024-12-13

## 2024-11-20 RX ORDER — HEPARIN SODIUM 5000 [USP'U]/.5ML
15 INJECTION, SOLUTION INTRAVENOUS; SUBCUTANEOUS ONCE
Refills: 0 | Status: COMPLETED | OUTPATIENT
Start: 2024-11-20 | End: 2024-11-20

## 2024-11-20 RX ORDER — ACETAMINOPHEN 500MG 500 MG/1
650 TABLET, COATED ORAL EVERY 6 HOURS
Refills: 0 | Status: DISCONTINUED | OUTPATIENT
Start: 2024-11-20 | End: 2024-12-13

## 2024-11-20 RX ORDER — ACETAMINOPHEN, DIPHENHYDRAMINE HCL, PHENYLEPHRINE HCL 325; 25; 5 MG/1; MG/1; MG/1
3 TABLET ORAL AT BEDTIME
Refills: 0 | Status: DISCONTINUED | OUTPATIENT
Start: 2024-11-20 | End: 2024-12-13

## 2024-11-20 RX ORDER — ONDANSETRON HYDROCHLORIDE 4 MG/1
4 TABLET, FILM COATED ORAL EVERY 8 HOURS
Refills: 0 | Status: DISCONTINUED | OUTPATIENT
Start: 2024-11-20 | End: 2024-12-13

## 2024-11-20 RX ORDER — MAGNESIUM, ALUMINUM HYDROXIDE 200-225/5
30 SUSPENSION, ORAL (FINAL DOSE FORM) ORAL EVERY 4 HOURS
Refills: 0 | Status: DISCONTINUED | OUTPATIENT
Start: 2024-11-20 | End: 2024-12-13

## 2024-11-20 RX ORDER — SODIUM CHLORIDE 9 MG/ML
1000 INJECTION, SOLUTION INTRAMUSCULAR; INTRAVENOUS; SUBCUTANEOUS
Refills: 0 | Status: DISCONTINUED | OUTPATIENT
Start: 2024-11-20 | End: 2024-11-21

## 2024-11-20 RX ORDER — GLUCAGON INJECTION, SOLUTION 0.5 MG/.1ML
1 INJECTION, SOLUTION SUBCUTANEOUS ONCE
Refills: 0 | Status: DISCONTINUED | OUTPATIENT
Start: 2024-11-20 | End: 2024-12-13

## 2024-11-20 RX ORDER — 0.9 % SODIUM CHLORIDE 0.9 %
1000 INTRAVENOUS SOLUTION INTRAVENOUS
Refills: 0 | Status: DISCONTINUED | OUTPATIENT
Start: 2024-11-20 | End: 2024-12-13

## 2024-11-20 RX ORDER — CEFTRIAXONE SODIUM 1 G
1000 VIAL (EA) INJECTION ONCE
Refills: 0 | Status: COMPLETED | OUTPATIENT
Start: 2024-11-20 | End: 2024-11-20

## 2024-11-20 RX ADMIN — ACETAMINOPHEN 500MG 650 MILLIGRAM(S): 500 TABLET, COATED ORAL at 22:40

## 2024-11-20 RX ADMIN — ACETAMINOPHEN 500MG 650 MILLIGRAM(S): 500 TABLET, COATED ORAL at 23:40

## 2024-11-20 RX ADMIN — Medication 100 MILLIGRAM(S): at 02:11

## 2024-11-20 RX ADMIN — Medication 2 TABLET(S): at 22:26

## 2024-11-20 RX ADMIN — HEPARIN SODIUM 62.5 MILLIMOLE(S): 5000 INJECTION, SOLUTION INTRAVENOUS; SUBCUTANEOUS at 01:35

## 2024-11-20 RX ADMIN — Medication 3: at 16:53

## 2024-11-20 RX ADMIN — Medication 4: at 11:41

## 2024-11-20 RX ADMIN — Medication 2: at 08:18

## 2024-11-20 NOTE — H&P ADULT - NSICDXPASTMEDICALHX_GEN_ALL_CORE_FT
PAST MEDICAL HISTORY:  Functional quadriplegia secondary to multiple sclerosis     Herniated disc lumbar    Osteoporosis     Uterine polyp

## 2024-11-20 NOTE — H&P ADULT - NSHPREVIEWOFSYSTEMS_GEN_ALL_CORE
REVIEW OF SYSTEMS:    CONSTITUTIONAL: +weakness, -fevers or chills  EYES/ENT: No visual changes;  No vertigo or throat pain   NECK: No pain or stiffness  RESPIRATORY: No cough, wheezing, hemoptysis; No shortness of breath  CARDIOVASCULAR: No chest pain or palpitations  GASTROINTESTINAL: No abdominal or epigastric pain. No nausea, vomiting, or hematemesis; No diarrhea or constipation.  GENITOURINARY: No dysuria, frequency or hematuria  NEUROLOGICAL: No numbness or weakness beyond her baseline.   SKIN: No itching, rashes

## 2024-11-20 NOTE — PATIENT PROFILE ADULT - FUNCTIONAL ASSESSMENT - BASIC MOBILITY ASSESSMENT TYPE
Admission Notification Instructions: Patient will be notified of biopsy results. However, patient instructed to call the office if not contacted within 2 weeks.

## 2024-11-20 NOTE — PROGRESS NOTE ADULT - SUBJECTIVE AND OBJECTIVE BOX
Patient is a 79y old  Female who presents with a chief complaint of UTI, failure to thrive , hyponatremia. (20 Nov 2024 04:32)      OVERNIGHT EVENTS:      REVIEW OF SYSTEMS: denies chest pain/SOB, diaphoresis, no F/C, cough, dizziness, headache, blurry vision, nausea, vomiting, abdominal pain. Rest unremarkable     MEDICATIONS  (STANDING):  dextrose 5%. 1000 milliLiter(s) (50 mL/Hr) IV Continuous <Continuous>  dextrose 5%. 1000 milliLiter(s) (100 mL/Hr) IV Continuous <Continuous>  dextrose 50% Injectable 25 Gram(s) IV Push once  dextrose 50% Injectable 12.5 Gram(s) IV Push once  dextrose 50% Injectable 25 Gram(s) IV Push once  glucagon  Injectable 1 milliGRAM(s) IntraMuscular once  insulin lispro (ADMELOG) corrective regimen sliding scale   SubCutaneous three times a day before meals  senna 2 Tablet(s) Oral at bedtime  sodium chloride 0.9%. 1000 milliLiter(s) (75 mL/Hr) IV Continuous <Continuous>    MEDICATIONS  (PRN):  acetaminophen     Tablet .. 650 milliGRAM(s) Oral every 6 hours PRN Temp greater or equal to 38C (100.4F), Mild Pain (1 - 3)  aluminum hydroxide/magnesium hydroxide/simethicone Suspension 30 milliLiter(s) Oral every 4 hours PRN Dyspepsia  dextrose Oral Gel 15 Gram(s) Oral once PRN Blood Glucose LESS THAN 70 milliGRAM(s)/deciliter  melatonin 3 milliGRAM(s) Oral at bedtime PRN Insomnia  ondansetron Injectable 4 milliGRAM(s) IV Push every 8 hours PRN Nausea and/or Vomiting      Allergies    No Known Allergies    Intolerances        SUBJECTIVE: in bed in NAD, no acute events overnight     T(F): 97.7 (11-20-24 @ 10:51), Max: 98.2 (11-20-24 @ 00:32)  HR: 87 (11-20-24 @ 10:51) (68 - 96)  BP: 123/68 (11-20-24 @ 10:51) (118/70 - 153/83)  RR: 17 (11-20-24 @ 10:51) (16 - 18)  SpO2: 95% (11-20-24 @ 10:51) (95% - 99%)  Wt(kg): --    PHYSICAL EXAM:  GENERAL: NAD, well-groomed, well-developed  HEAD:  Atraumatic, Normocephalic  EYES: EOMI, PERRLA, conjunctiva and sclera clear  ENMT: No tonsillar erythema, exudates, or enlargement; Moist mucous membranes, Good dentition, No lesions  NECK: Supple,   CHEST/LUNG: Clear to  auscultation bilaterally; No rales, rhonchi, wheezing, or rubs  bilaterally  HEART: Regular rate and rhythm; No murmurs, rubs, or gallops  ABDOMEN: Soft, Nontender, Nondistended; Bowel sounds present  EXTREMITIES:  2+ Peripheral Pulses, No clubbing, cyanosis, or edema BL LE  SKIN: No rashes or lesions  NERVOUS SYSTEM:  Alert & Oriented X3, Good concentration; Motor Strength 5/5 B/L upper and lower extremities;   DTRs 2+ intact and symmetric, sensation intact BL    LABS:                        8.6    15.34 )-----------( 318      ( 19 Nov 2024 23:05 )             27.6     11-20    129[L]  |  97  |  10  ----------------------------<  284[H]  4.2   |  26  |  0.47[L]    Ca    8.1[L]      20 Nov 2024 10:20  Phos  1.9     11-19  Mg     1.6     11-19    TPro  6.3  /  Alb  1.5[L]  /  TBili  0.3  /  DBili  x   /  AST  26  /  ALT  35  /  AlkPhos  275[H]  11-19      Urinalysis Basic - ( 20 Nov 2024 10:20 )    Color: x / Appearance: x / SG: x / pH: x  Gluc: 284 mg/dL / Ketone: x  / Bili: x / Urobili: x   Blood: x / Protein: x / Nitrite: x   Leuk Esterase: x / RBC: x / WBC x   Sq Epi: x / Non Sq Epi: x / Bacteria: x      Cultures;   CAPILLARY BLOOD GLUCOSE      POCT Blood Glucose.: 314 mg/dL (20 Nov 2024 11:16)  POCT Blood Glucose.: 217 mg/dL (20 Nov 2024 07:49)    Lipid panel:           RADIOLOGY & ADDITIONAL TESTS:      Imaging Personally Reviewed:  [ x] YES      Consultant(s) Notes Reviewed:  [x ] YES     Care Discussed with [x ] Consultants [X ] Patient [x ] Family  [x ]    [x ]  Other; RN Patient is a 79y old  Female who presents with a chief complaint of UTI, failure to thrive , hyponatremia. (20 Nov 2024 04:32)      OVERNIGHT EVENTS:    MEDICATIONS  (STANDING):  dextrose 5%. 1000 milliLiter(s) (50 mL/Hr) IV Continuous <Continuous>  dextrose 5%. 1000 milliLiter(s) (100 mL/Hr) IV Continuous <Continuous>  dextrose 50% Injectable 25 Gram(s) IV Push once  dextrose 50% Injectable 12.5 Gram(s) IV Push once  dextrose 50% Injectable 25 Gram(s) IV Push once  glucagon  Injectable 1 milliGRAM(s) IntraMuscular once  insulin lispro (ADMELOG) corrective regimen sliding scale   SubCutaneous three times a day before meals  senna 2 Tablet(s) Oral at bedtime  sodium chloride 0.9%. 1000 milliLiter(s) (75 mL/Hr) IV Continuous <Continuous>    MEDICATIONS  (PRN):  acetaminophen     Tablet .. 650 milliGRAM(s) Oral every 6 hours PRN Temp greater or equal to 38C (100.4F), Mild Pain (1 - 3)  aluminum hydroxide/magnesium hydroxide/simethicone Suspension 30 milliLiter(s) Oral every 4 hours PRN Dyspepsia  dextrose Oral Gel 15 Gram(s) Oral once PRN Blood Glucose LESS THAN 70 milliGRAM(s)/deciliter  melatonin 3 milliGRAM(s) Oral at bedtime PRN Insomnia  ondansetron Injectable 4 milliGRAM(s) IV Push every 8 hours PRN Nausea and/or Vomiting      Allergies    No Known Allergies    Intolerances        SUBJECTIVE: in bed in NAD, no acute events overnight     T(F): 97.7 (11-20-24 @ 10:51), Max: 98.2 (11-20-24 @ 00:32)  HR: 87 (11-20-24 @ 10:51) (68 - 96)  BP: 123/68 (11-20-24 @ 10:51) (118/70 - 153/83)  RR: 17 (11-20-24 @ 10:51) (16 - 18)  SpO2: 95% (11-20-24 @ 10:51) (95% - 99%)  Wt(kg): --    PHYSICAL EXAM:  GENERAL: NAD, cachetic frail   HEAD:  Atraumatic, Normocephalic  EYES: EOMI, PERRLA, conjunctiva and sclera clear  ENMT: No tonsillar erythema, exudates, or enlargement; Moist mucous membranes, Good dentition, No lesions  NECK: Supple,   CHEST/LUNG: Clear to  auscultation bilaterally; No rales, rhonchi, wheezing, or rubs  bilaterally  HEART: Regular rate and rhythm; No murmurs, rubs, or gallops  ABDOMEN: Soft, Nontender, Nondistended; Bowel sounds present  EXTREMITIES:  2+ Peripheral Pulses, No clubbing, cyanosis, or edema BL LE  SKIN: multiple  decubuti  ulcers on back, hips and feet varying stages and depths   NERVOUS SYSTEM:  Alert , contracted       LABS:                        8.6    15.34 )-----------( 318      ( 19 Nov 2024 23:05 )             27.6     11-20    129[L]  |  97  |  10  ----------------------------<  284[H]  4.2   |  26  |  0.47[L]    Ca    8.1[L]      20 Nov 2024 10:20  Phos  1.9     11-19  Mg     1.6     11-19    TPro  6.3  /  Alb  1.5[L]  /  TBili  0.3  /  DBili  x   /  AST  26  /  ALT  35  /  AlkPhos  275[H]  11-19      Urinalysis Basic - ( 20 Nov 2024 10:20 )    Color: x / Appearance: x / SG: x / pH: x  Gluc: 284 mg/dL / Ketone: x  / Bili: x / Urobili: x   Blood: x / Protein: x / Nitrite: x   Leuk Esterase: x / RBC: x / WBC x   Sq Epi: x / Non Sq Epi: x / Bacteria: x      Cultures;   CAPILLARY BLOOD GLUCOSE      POCT Blood Glucose.: 314 mg/dL (20 Nov 2024 11:16)  POCT Blood Glucose.: 217 mg/dL (20 Nov 2024 07:49)    Lipid panel:           RADIOLOGY & ADDITIONAL TESTS:      Imaging Personally Reviewed:  [ x] YES      Consultant(s) Notes Reviewed:  [x ] YES     Care Discussed with [x ] Consultants [X ] Patient [x ] Family  [x ]    [x ]  Other; RN

## 2024-11-20 NOTE — PATIENT PROFILE ADULT - HISTORY OF COVID-19 VACCINATION
----- Message from CHRISTIANO Salazar CNP sent at 6/17/2022  1:14 PM EDT -----  Abnormal mammogram on the left. Additional views needed. Ordered. Yes

## 2024-11-20 NOTE — PATIENT PROFILE ADULT - FALL HARM RISK - HARM RISK INTERVENTIONS

## 2024-11-20 NOTE — H&P ADULT - NSHPLABSRESULTS_GEN_ALL_CORE
=======================================================  Labs:                        8.6    15.34 )-----------( 318      ( 19 Nov 2024 23:05 )             27.6     11-19    128[L]  |  96  |  14  ----------------------------<  226[H]  4.9   |  27  |  0.38[L]    Ca    8.7      19 Nov 2024 23:05  Phos  1.9     11-19  Mg     1.6     11-19    TPro  6.3  /  Alb  1.5[L]  /  TBili  0.3  /  DBili  x   /  AST  26  /  ALT  35  /  AlkPhos  275[H]  11-19      Urinalysis with Rflx Culture (collected 11-20-24 @ 03:00)    Urinalysis with Rflx Culture (collected 11-20-24 @ 00:20)    Culture - Urine (collected 08-05-24 @ 00:25)  Source: Clean Catch Clean Catch (Midstream)  Final Report (08-06-24 @ 07:22):    <10,000 CFU/mL Normal Urogenital Earline    Culture - Blood (collected 08-04-24 @ 20:41)  Source: .Blood Blood-Peripheral  Final Report (08-10-24 @ 01:01):    No growth at 5 days    Culture - Blood (collected 08-04-24 @ 20:41)  Source: .Blood Blood-Peripheral  Final Report (08-10-24 @ 01:01):    No growth at 5 days    Culture - Urine (collected 06-04-24 @ 01:59)  Source: Catheterized Catheterized  Final Report (06-09-24 @ 12:02):    >100,000 CFU/ml Proteus mirabilis    Unable to evaluate further due to Proteus overgrowth  Organism: Proteus mirabilis (06-09-24 @ 12:02)  Organism: Proteus mirabilis (06-09-24 @ 12:02)    Sensitivities:      -  Trimethoprim/Sulfamethoxazole: S <=0.5/9.5      -  Tobramycin: S <=2      -  Piperacillin/Tazobactam: S <=8      -  Nitrofurantoin: R 64 Should not be used to treat pyelonephritis      -  Meropenem: S <=1      -  Levofloxacin: S <=0.5      -  Gentamicin: S <=2      -  Ertapenem: S <=0.5      -  Ciprofloxacin: S <=0.25      -  Cefuroxime: S <=4      -  Ceftriaxone: S <=1      -  Cefoxitin: S <=8      -  Cefepime: S <=2      -  Cefazolin: S <=2 For uncomplicated UTI with K. pneumoniae, E. coli, or P. mirablis: KATHRYN <=16 is sensitive and KATHRYN >=32 is resistant. This also predicts results for oral agents cefaclor, cefdinir, cefpodoxime, cefprozil, cefuroxime axetil, cephalexin and locarbef for uncomplicated UTI. Note that some isolates may be susceptible to these agents while testing resistant to cefazolin.      -  Aztreonam: S <=4      -  Ampicillin/Sulbactam: S <=4/2      -  Ampicillin: S <=8 These ampicillin results predict results for amoxicillin      -  Amoxicillin/Clavulanic Acid: S <=8/4      Method Type: KATHRYN      Creatinine: 0.38 mg/dL (11-19-24 @ 23:05)            WBC Count: 15.34 K/uL (11-19-24 @ 23:05)    SARS-CoV-2 Result: NotDetec (11-19-24 @ 23:05)      Alkaline Phosphatase: 275 U/L (11-19-24 @ 23:05)  Alanine Aminotransferase (ALT/SGPT): 35 U/L (11-19-24 @ 23:05)  Aspartate Aminotransferase (AST/SGOT): 26 U/L (11-19-24 @ 23:05)  Bilirubin Total: 0.3 mg/dL (11-19-24 @ 23:05)

## 2024-11-20 NOTE — CONSULT NOTE ADULT - SUBJECTIVE AND OBJECTIVE BOX
[facility] NEPHROLOGY SERVICES, [facility]  NEPHROLOGY AND HYPERTENSION  [address]  [address]  [place] [address]  [phone]    MD DEE DEE DRAPER MD YELENA ROSENBERG, MD BINNY KOSHY, MD CHRISTOPHER CAPUTO, MD EDWARD BOVER, MD      Information from chart:  "Patient is a 79y old  Female who presents with a chief complaint of UTI, failure to thrive , hyponatremia. (2024 13:02)    HPI:  79 F pmh osteoporosis, MS, bedbound. infected decubitus ulcers presenting to the ED for reduced appetite x 2-3 days. Son states that home health aide noted that patient has not been eating. There was no fever. amezquita catheter changed last week.    Pt denies any symptoms only saying she doesnt feel thirsty or hungry (2024 04:32)   "  poor solute intake     PAST MEDICAL & SURGICAL HISTORY:  Osteoporosis      Uterine polyp      Herniated disc  lumbar      Functional quadriplegia secondary to multiple sclerosis      Bilateral Tubal ligation  46y/o        FAMILY HISTORY:    Allergies    No Known Allergies    Intolerances      Home Medications:    MEDICATIONS  (STANDING):  dextrose 5%. 1000 milliLiter(s) (50 mL/Hr) IV Continuous <Continuous>  dextrose 5%. 1000 milliLiter(s) (100 mL/Hr) IV Continuous <Continuous>  dextrose 50% Injectable 25 Gram(s) IV Push once  dextrose 50% Injectable 12.5 Gram(s) IV Push once  dextrose 50% Injectable 25 Gram(s) IV Push once  glucagon  Injectable 1 milliGRAM(s) IntraMuscular once  insulin lispro (ADMELOG) corrective regimen sliding scale   SubCutaneous three times a day before meals  senna 2 Tablet(s) Oral at bedtime  sodium chloride 0.9%. 1000 milliLiter(s) (75 mL/Hr) IV Continuous <Continuous>    MEDICATIONS  (PRN):  acetaminophen     Tablet .. 650 milliGRAM(s) Oral every 6 hours PRN Temp greater or equal to 38C (100.4F), Mild Pain (1 - 3)  aluminum hydroxide/magnesium hydroxide/simethicone Suspension 30 milliLiter(s) Oral every 4 hours PRN Dyspepsia  dextrose Oral Gel 15 Gram(s) Oral once PRN Blood Glucose LESS THAN 70 milliGRAM(s)/deciliter  melatonin 3 milliGRAM(s) Oral at bedtime PRN Insomnia  ondansetron Injectable 4 milliGRAM(s) IV Push every 8 hours PRN Nausea and/or Vomiting    Vital Signs Last 24 Hrs  T(C): 38.1 (2024 21:10), Max: 38.1 (2024 21:10)  T(F): 100.5 (2024 21:10), Max: 100.5 (2024 21:10)  HR: 88 (2024 18:00) (68 - 110)  BP: 108/66 (2024 18:00) (97/58 - 129/84)  BP(mean): --  RR: 18 (:09) (16 - 18)  SpO2: 100% (:09) (95% - 100%)    Parameters below as of 2024 17:09  Patient On (Oxygen Delivery Method): room air        Daily     Daily Weight in k (2024 04:00)    CAPILLARY BLOOD GLUCOSE      POCT Blood Glucose.: 201 mg/dL (2024 21:37)  POCT Blood Glucose.: 292 mg/dL (2024 15:50)  POCT Blood Glucose.: 314 mg/dL (2024 11:16)  POCT Blood Glucose.: 217 mg/dL (2024 07:49)    PHYSICAL EXAM:      T(C): 38.1 (24 @ 21:10), Max: 38.1 (24 @ 21:10)  HR: 88 (24 @ 18:00) (68 - 110)  BP: 108/66 (24 @ 18:00) (97/58 - 129/84)  RR: 18 (24 @ 17:09) (16 - 18)  SpO2: 100% (24 @ 17:09) (95% - 100%)  Wt(kg): --  Lungs clear  Heart S1S2  Abd soft NT ND  Extremities:   tr edema                  129[L]  |  97  |  10  ----------------------------<  284[H]  4.2   |  26  |  0.47[L]    Ca    8.1[L]      2024 10:20  Phos  1.9     -  Mg     1.6     -    TPro  6.3  /  Alb  1.5[L]  /  TBili  0.3  /  DBili  x   /  AST  26  /  ALT  35  /  AlkPhos  275[H]  11-19                          8.6    15.34 )-----------( 318      ( 2024 23:05 )             27.6     Creatinine Trend: 0.47<--, 0.38<--  Urinalysis Basic - ( 2024 10:20 )    Color: x / Appearance: x / SG: x / pH: x  Gluc: 284 mg/dL / Ketone: x  / Bili: x / Urobili: x   Blood: x / Protein: x / Nitrite: x   Leuk Esterase: x / RBC: x / WBC x   Sq Epi: x / Non Sq Epi: x / Bacteria: x            Assessment   Suspected hypovolemic hyponatremia     Plan  Continue isotonic saline   Discussed with son at bedside     Pranav Brown MD

## 2024-11-20 NOTE — H&P ADULT - HISTORY OF PRESENT ILLNESS
79 F pmh osteoporosis, MS, bedbound. infected decubitus ulcers presenting to the ED for reduced appetite x 2-3 days. Son states that home health aide noted that patient has not been eating. There was no fever. amezquita catheter changed last week.    Pt denies any symptoms only saying she doesnt feel thirsty or hungry

## 2024-11-20 NOTE — PROGRESS NOTE ADULT - PROBLEM SELECTOR PLAN 4
per my colleague's documentation "multiple ulcers unstageable.  wound care in AM"    11/20/2024-f/u wound care per my colleague's documentation "multiple ulcers unstageable.  wound care in AM"    11/20/2024-f/u wound care also need Dr. Whitehead has some that will likely need debridement  and wound vac

## 2024-11-20 NOTE — H&P ADULT - ASSESSMENT
79 F pmh osteoporosis, MS, bedbound. infected decubitus ulcers presenting to the ED for reduced appetite x 2-3 days. Son states that home health aide noted that patient has not been eating. Workup with UTI. Also noted hyponatremia to 128 .

## 2024-11-20 NOTE — H&P ADULT - NSHPPHYSICALEXAM_GEN_ALL_CORE
VITALS:   T(C): 36.8 (11-20-24 @ 00:32), Max: 36.8 (11-20-24 @ 00:32)  HR: 96 (11-20-24 @ 00:32) (96 - 96)  BP: 118/70 (11-20-24 @ 00:32) (118/70 - 153/83)  RR: 18 (11-20-24 @ 00:32) (17 - 18)  SpO2: 99% (11-20-24 @ 00:32) (99% - 99%)    GENERAL: NAD, lying in bed , emaciated   HEAD:  Atraumatic, Normocephalic, tilted towards left side at rest.   EYES: EOMI, PERRLA, conjunctiva and sclera clear  ENT: dry  mucous membranes  NECK: Supple,   CHEST/LUNG: Clear to auscultation bilaterally. Unlabored respirations  HEART: Regular rate and rhythm;   ABDOMEN: BSx4; Soft, nontender, nondistended  EXTREMITIES:. No clubbing, cyanosis, or edema  NERVOUS SYSTEM:  A&Ox2, not moving any extremity   SKIN: decubitus ulcers unstageable.

## 2024-11-21 ENCOUNTER — RESULT REVIEW (OUTPATIENT)
Age: 79
End: 2024-11-21

## 2024-11-21 DIAGNOSIS — D64.9 ANEMIA, UNSPECIFIED: ICD-10-CM

## 2024-11-21 LAB
A1C WITH ESTIMATED AVERAGE GLUCOSE RESULT: 7 % — HIGH (ref 4–5.6)
ANION GAP SERPL CALC-SCNC: 5 MMOL/L — SIGNIFICANT CHANGE UP (ref 5–17)
BLD GP AB SCN SERPL QL: SIGNIFICANT CHANGE UP
BUN SERPL-MCNC: 12 MG/DL — SIGNIFICANT CHANGE UP (ref 7–23)
CALCIUM SERPL-MCNC: 7.9 MG/DL — LOW (ref 8.5–10.1)
CHLORIDE SERPL-SCNC: 102 MMOL/L — SIGNIFICANT CHANGE UP (ref 96–108)
CO2 SERPL-SCNC: 26 MMOL/L — SIGNIFICANT CHANGE UP (ref 22–31)
CREAT SERPL-MCNC: 0.17 MG/DL — LOW (ref 0.5–1.3)
EGFR: 124 ML/MIN/1.73M2 — SIGNIFICANT CHANGE UP
ESTIMATED AVERAGE GLUCOSE: 154 MG/DL — HIGH (ref 68–114)
GLUCOSE BLDC GLUCOMTR-MCNC: 187 MG/DL — HIGH (ref 70–99)
GLUCOSE BLDC GLUCOMTR-MCNC: 258 MG/DL — HIGH (ref 70–99)
GLUCOSE BLDC GLUCOMTR-MCNC: 282 MG/DL — HIGH (ref 70–99)
GLUCOSE BLDC GLUCOMTR-MCNC: 356 MG/DL — HIGH (ref 70–99)
GLUCOSE SERPL-MCNC: 230 MG/DL — HIGH (ref 70–99)
HCT VFR BLD CALC: 20.4 % — CRITICAL LOW (ref 34.5–45)
HCT VFR BLD CALC: 22 % — LOW (ref 34.5–45)
HGB BLD-MCNC: 6.4 G/DL — CRITICAL LOW (ref 11.5–15.5)
HGB BLD-MCNC: 6.8 G/DL — CRITICAL LOW (ref 11.5–15.5)
LACTATE SERPL-SCNC: 3.1 MMOL/L — HIGH (ref 0.7–2)
MAGNESIUM SERPL-MCNC: 1.6 MG/DL — SIGNIFICANT CHANGE UP (ref 1.6–2.6)
MCHC RBC-ENTMCNC: 22.1 PG — LOW (ref 27–34)
MCHC RBC-ENTMCNC: 22.5 PG — LOW (ref 27–34)
MCHC RBC-ENTMCNC: 30.9 G/DL — LOW (ref 32–36)
MCHC RBC-ENTMCNC: 31.4 G/DL — LOW (ref 32–36)
MCV RBC AUTO: 71.4 FL — LOW (ref 80–100)
MCV RBC AUTO: 71.8 FL — LOW (ref 80–100)
NRBC # BLD: 0 /100 WBCS — SIGNIFICANT CHANGE UP (ref 0–0)
NRBC # BLD: 0 /100 WBCS — SIGNIFICANT CHANGE UP (ref 0–0)
PHOSPHATE SERPL-MCNC: 1.8 MG/DL — LOW (ref 2.5–4.5)
PLATELET # BLD AUTO: 257 K/UL — SIGNIFICANT CHANGE UP (ref 150–400)
PLATELET # BLD AUTO: 292 K/UL — SIGNIFICANT CHANGE UP (ref 150–400)
POTASSIUM SERPL-MCNC: 3.9 MMOL/L — SIGNIFICANT CHANGE UP (ref 3.5–5.3)
POTASSIUM SERPL-SCNC: 3.9 MMOL/L — SIGNIFICANT CHANGE UP (ref 3.5–5.3)
RBC # BLD: 2.84 M/UL — LOW (ref 3.8–5.2)
RBC # BLD: 3.08 M/UL — LOW (ref 3.8–5.2)
RBC # FLD: 19.5 % — HIGH (ref 10.3–14.5)
RBC # FLD: 19.7 % — HIGH (ref 10.3–14.5)
SODIUM SERPL-SCNC: 133 MMOL/L — LOW (ref 135–145)
WBC # BLD: 13.57 K/UL — HIGH (ref 3.8–10.5)
WBC # BLD: 14.73 K/UL — HIGH (ref 3.8–10.5)
WBC # FLD AUTO: 13.57 K/UL — HIGH (ref 3.8–10.5)
WBC # FLD AUTO: 14.73 K/UL — HIGH (ref 3.8–10.5)

## 2024-11-21 PROCEDURE — 99232 SBSQ HOSP IP/OBS MODERATE 35: CPT

## 2024-11-21 PROCEDURE — 88304 TISSUE EXAM BY PATHOLOGIST: CPT | Mod: 26

## 2024-11-21 RX ORDER — CEFTRIAXONE SODIUM 1 G
1000 VIAL (EA) INJECTION EVERY 24 HOURS
Refills: 0 | Status: DISCONTINUED | OUTPATIENT
Start: 2024-11-21 | End: 2024-11-22

## 2024-11-21 RX ORDER — SODIUM CHLORIDE 9 MG/ML
250 INJECTION, SOLUTION INTRAMUSCULAR; INTRAVENOUS; SUBCUTANEOUS
Refills: 0 | Status: DISCONTINUED | OUTPATIENT
Start: 2024-11-21 | End: 2024-11-21

## 2024-11-21 RX ORDER — SODIUM CHLORIDE 9 MG/ML
250 INJECTION, SOLUTION INTRAMUSCULAR; INTRAVENOUS; SUBCUTANEOUS
Refills: 0 | Status: DISCONTINUED | OUTPATIENT
Start: 2024-11-21 | End: 2024-11-22

## 2024-11-21 RX ORDER — SODIUM,POTASSIUM PHOSPHATES 278-250MG
1 POWDER IN PACKET (EA) ORAL
Refills: 0 | Status: COMPLETED | OUTPATIENT
Start: 2024-11-21 | End: 2024-11-22

## 2024-11-21 RX ORDER — ACETAMINOPHEN 500MG 500 MG/1
650 TABLET, COATED ORAL ONCE
Refills: 0 | Status: COMPLETED | OUTPATIENT
Start: 2024-11-21 | End: 2024-11-21

## 2024-11-21 RX ORDER — COLLAGENASE SANTYL 250 [ARB'U]/G
1 OINTMENT TOPICAL DAILY
Refills: 0 | Status: DISCONTINUED | OUTPATIENT
Start: 2024-11-21 | End: 2024-12-13

## 2024-11-21 RX ADMIN — SODIUM CHLORIDE 250 MILLILITER(S): 9 INJECTION, SOLUTION INTRAMUSCULAR; INTRAVENOUS; SUBCUTANEOUS at 17:15

## 2024-11-21 RX ADMIN — Medication 1 PACKET(S): at 16:07

## 2024-11-21 RX ADMIN — Medication 1: at 11:16

## 2024-11-21 RX ADMIN — Medication 3: at 16:05

## 2024-11-21 RX ADMIN — ACETAMINOPHEN 500MG 260 MILLIGRAM(S): 500 TABLET, COATED ORAL at 21:56

## 2024-11-21 RX ADMIN — COLLAGENASE SANTYL 1 APPLICATION(S): 250 OINTMENT TOPICAL at 16:07

## 2024-11-21 RX ADMIN — Medication 3: at 21:57

## 2024-11-21 RX ADMIN — ACETAMINOPHEN 500MG 650 MILLIGRAM(S): 500 TABLET, COATED ORAL at 22:56

## 2024-11-21 RX ADMIN — Medication 3: at 08:07

## 2024-11-21 RX ADMIN — Medication 100 MILLIGRAM(S): at 12:46

## 2024-11-21 NOTE — DIETITIAN INITIAL EVALUATION ADULT - PERTINENT LABORATORY DATA
11-21    133[L]  |  102  |  12  ----------------------------<  230[H]  3.9   |  26  |  0.17[L]    Ca    7.9[L]      21 Nov 2024 08:15  Phos  1.8     11-21  Mg     1.6     11-21    TPro  6.3  /  Alb  1.5[L]  /  TBili  0.3  /  DBili  x   /  AST  26  /  ALT  35  /  AlkPhos  275[H]  11-19  POCT Blood Glucose.: 187 mg/dL (11-21-24 @ 10:54)  A1C with Estimated Average Glucose Result: 7.4 % (08-06-24 @ 07:40)  A1C with Estimated Average Glucose Result: 7.5 % (08-05-24 @ 10:10)  A1C with Estimated Average Glucose Result: 7.0 % (06-05-24 @ 07:03)

## 2024-11-21 NOTE — DIETITIAN INITIAL EVALUATION ADULT - PERTINENT MEDS FT
Subjective   Patient ID: Robles Rodrigues is a 50 y.o. male is here today for follow-up after cervical MRI for neck and right arm pain.  He states his symptoms are unchanged since last visit. Mr. Rodrigues takes Ibuprofen 200 mg PRN for pain.     Neck Pain    The current episode started more than 1 year ago (March 2017). The problem occurs constantly. The problem has been unchanged. The pain is present in the anterior neck and right side. The quality of the pain is described as aching and stabbing. The pain is at a severity of 10/10. The pain is severe. The symptoms are aggravated by position. Associated symptoms include pain with swallowing. Pertinent negatives include no fever, headaches, leg pain, paresis, photophobia, syncope, trouble swallowing, visual change, weakness or weight loss. He has tried nothing for the symptoms. The treatment provided no relief.   Arm Pain    The pain is present in the right shoulder. The pain has been intermittent since the incident.       The following portions of the patient's history were reviewed and updated as appropriate: allergies, current medications, past family history, past medical history, past social history, past surgical history and problem list.    Review of Systems   Constitutional: Negative for fever and weight loss.   HENT: Negative for trouble swallowing.    Eyes: Negative for photophobia.   Cardiovascular: Negative for syncope.   Musculoskeletal: Positive for neck pain.   Neurological: Negative for weakness and headaches.   All other systems reviewed and are negative.      Objective   Physical Exam   Constitutional: He is oriented to person, place, and time. He appears well-developed and well-nourished.   HENT:   Head: Normocephalic and atraumatic.   Right Ear: External ear normal.   Left Ear: External ear normal.   Eyes: Conjunctivae and EOM are normal. Pupils are equal, round, and reactive to light. Right eye exhibits no discharge. Left eye exhibits no  discharge.   Neck: Normal range of motion. Neck supple. No tracheal deviation present.   Pulmonary/Chest: Effort normal. No stridor. No respiratory distress.   Musculoskeletal: Normal range of motion. He exhibits no edema, tenderness or deformity.   Neurological: He is alert and oriented to person, place, and time. He has normal strength and normal reflexes. He displays no atrophy, no tremor and normal reflexes. No cranial nerve deficit or sensory deficit. He exhibits normal muscle tone. He displays a negative Romberg sign. He displays no seizure activity. Coordination and gait normal.   No long tract signs   Skin: Skin is warm and dry.   Psychiatric: His speech is normal and behavior is normal. Judgment and thought content normal. He exhibits a depressed mood.   Nursing note and vitals reviewed.    Neurologic Exam     Mental Status   Oriented to person, place, and time.   Speech: speech is normal     Cranial Nerves     CN III, IV, VI   Pupils are equal, round, and reactive to light.  Extraocular motions are normal.     Motor Exam     Strength   Strength 5/5 throughout.       Assessment/Plan   Independent Review of Radiographic Studies:    I did review the cervical spine MRI.  It does show multilevel stenosis mostly secondary to a congenitally small canal with superimposed disc osteophyte complexes.  There is moderate stenosis at C3 C4 C4 C5 C5 C6 C6-C7 C7-T1.  There is at least moderate if not more severe right-sided foraminal narrowing at C3 C4 C4 C5 C5 C6 C6-C7.  Medical Decision Making:    Mr. Rodrigues continues to complain of an area of pain along the right anterior neck.  He has had this pain for just over a year.  He has been evaluated by multiple different physicians with multiple specialties.  No one has found any abnormalities to account for his pain.  He does occasionally have headaches but no real posterior neck pain or arm pain.  I did review the MRI with the patient as well as his sister and mother.   I explained that although he does have moderate stenosis and moderate to severe foraminal narrowing on the right side at multiple levels I still cannot correlate his anterior medial neck pain with these findings.  He has no radiculopathy or signs or symptoms of myelopathy and therefore no surgical indications.  I would not recommend epidurals or other traditional pain management options either given that his symptoms are not consistent with radicular pain.  His sister is currently established as a patient with [facility] pain management and has requested a referral for her brother to see if they have any medical recommendations.  Again they have been cleared by multiple ENTs, gastroenterologists, internal medicine and emergency room medicine physicians.  They are in the process of a workup with an endocrinologist but to their knowledge they have not yet discovered any abnormalities.  I am happy to refer him to pain management.  I told them to call at any point if he did develop posterior neck pain or right scapular or right arm pain or any change in arm strength or sensation or gait or balance problems.  Robles was seen today for neck pain and arm pain.    Diagnoses and all orders for this visit:    Neck pain on right side  -     Ambulatory Referral to Pain Management    Spinal stenosis in cervical region  -     Ambulatory Referral to Pain Management      Return if symptoms worsen or fail to improve.                  MEDICATIONS  (STANDING):  dextrose 5%. 1000 milliLiter(s) (50 mL/Hr) IV Continuous <Continuous>  dextrose 5%. 1000 milliLiter(s) (100 mL/Hr) IV Continuous <Continuous>  dextrose 50% Injectable 25 Gram(s) IV Push once  dextrose 50% Injectable 12.5 Gram(s) IV Push once  dextrose 50% Injectable 25 Gram(s) IV Push once  glucagon  Injectable 1 milliGRAM(s) IntraMuscular once  insulin lispro (ADMELOG) corrective regimen sliding scale   SubCutaneous three times a day before meals  potassium phosphate / sodium phosphate Powder (PHOS-NaK) 1 Packet(s) Oral two times a day before meals  senna 2 Tablet(s) Oral at bedtime  sodium chloride 0.9%. 1000 milliLiter(s) (75 mL/Hr) IV Continuous <Continuous>    MEDICATIONS  (PRN):  acetaminophen     Tablet .. 650 milliGRAM(s) Oral every 6 hours PRN Temp greater or equal to 38C (100.4F), Mild Pain (1 - 3)  aluminum hydroxide/magnesium hydroxide/simethicone Suspension 30 milliLiter(s) Oral every 4 hours PRN Dyspepsia  dextrose Oral Gel 15 Gram(s) Oral once PRN Blood Glucose LESS THAN 70 milliGRAM(s)/deciliter  melatonin 3 milliGRAM(s) Oral at bedtime PRN Insomnia  ondansetron Injectable 4 milliGRAM(s) IV Push every 8 hours PRN Nausea and/or Vomiting

## 2024-11-21 NOTE — PROGRESS NOTE ADULT - SUBJECTIVE AND OBJECTIVE BOX
NEPHROLOGY PROGRESS NOTE    CHIEF COMPLAINT:  Hyponatremia    HPI:  Serum Na slowly correcting.    EXAM:  Vital Signs Last 24 Hrs  T(C): 36.7 (21 Nov 2024 11:30), Max: 38.1 (20 Nov 2024 21:10)  T(F): 98 (21 Nov 2024 11:30), Max: 100.5 (20 Nov 2024 21:10)  HR: 93 (21 Nov 2024 11:30) (88 - 110)  BP: 119/68 (21 Nov 2024 11:30) (79/58 - 119/68)  BP(mean): --  RR: 17 (21 Nov 2024 11:30) (17 - 18)  SpO2: 100% (21 Nov 2024 11:30) (95% - 100%)    Parameters below as of 20 Nov 2024 17:09  Patient On (Oxygen Delivery Method): room air      I&O's Summary    20 Nov 2024 07:01  -  21 Nov 2024 07:00  --------------------------------------------------------  IN: 450 mL / OUT: 750 mL / NET: -300 mL      Normal respiratory effort, lungs clear bilaterally  Heart RRR with no murmur, no peripheral edema    LABS                        6.8    14.73 )-----------( 292      ( 21 Nov 2024 12:02 )             22.0     11-21    133[L]  |  102  |  12  ----------------------------<  230[H]  3.9   |  26  |  0.17[L]    Ca    7.9[L]      21 Nov 2024 08:15  Phos  1.8     11-21  Mg     1.6     11-21    TPro  6.3  /  Alb  1.5[L]  /  TBili  0.3  /  DBili  x   /  AST  26  /  ALT  35  /  AlkPhos  275[H]  11-19      Assessment   Hypovolemic hyponatremia improving    Plan  Continue isotonic saline   BMP in AM

## 2024-11-21 NOTE — DIETITIAN INITIAL EVALUATION ADULT - DIET TYPE
Glucerna Shake 8 oz daily (220 miguel angel, 10 gm pro) x 3/pureed/ileostomy/consistent carbohydrate (evening snack)/supplement (specify)

## 2024-11-21 NOTE — DIETITIAN INITIAL EVALUATION ADULT - OTHER INFO
Pt seen on medical floor, adm w/ hyponatremia. Pt w/ MS ; bedbound ; infected decub itis ulcers and decreased p.o intake x 2-3 days PTA as per son in medical chart. Pt is unable to speak ( unrelated to language ). No N/V/D/C/Chewing/Swallowing issues, No food allergies documented. Pt lives w/ HHA. Adult FTT , BMI = 14 on admission & P/U x 10.

## 2024-11-21 NOTE — DIETITIAN INITIAL EVALUATION ADULT - NS FNS DIET ORDER
Diet, Pureed:   Supplement Feeding Modality:  Oral  Ensure Plus High Protein Cans or Servings Per Day:  1       Frequency:  Three Times a day (11-20-24 @ 01:57)

## 2024-11-21 NOTE — PROGRESS NOTE ADULT - PROBLEM SELECTOR PLAN 4
per my colleague's documentation "multiple ulcers unstageable.  wound care in AM"    11/20/2024-f/u wound care also need Dr. Whitehead has some that will likely need debridement  and wound vac  11/21/2024 f/u with both wound care team and dr. bronson team

## 2024-11-21 NOTE — DIETITIAN NUTRITION RISK NOTIFICATION - TREATMENT: THE FOLLOWING DIET HAS BEEN RECOMMENDED
Diet, Consistent Carbohydrate w/Evening Snack:   Pureed (PUREED)  Supplement Feeding Modality:  Oral  Glucerna Shake Cans or Servings Per Day:  1       Frequency:  Three Times a day (11-21-24 @ 11:27) [Pending Verification By Attending]  Diet, Pureed:   Supplement Feeding Modality:  Oral  Ensure Plus High Protein Cans or Servings Per Day:  1       Frequency:  Three Times a day (11-20-24 @ 01:57) [Active]

## 2024-11-21 NOTE — DIETITIAN INITIAL EVALUATION ADULT - RD TO REMAIN AVAILABLE
Pt brought in by ambulance from home for eval of worsening nausea for the past month. Pt states that today she had excessive nausea and dizziness with positional changes. Pt denies chest pain or pressure. States that all of her symptoms began 2 years ago s/p fall and rib fracture. yes

## 2024-11-21 NOTE — DIETITIAN INITIAL EVALUATION ADULT - NUTRITION DIAGNOSIS
yes... No. WILLY screening performed.  STOP BANG Legend: 0-2 = LOW Risk; 3-4 = INTERMEDIATE Risk; 5-8 = HIGH Risk

## 2024-11-21 NOTE — PROGRESS NOTE ADULT - SUBJECTIVE AND OBJECTIVE BOX
Patient is a 79y old  Female who presents with a chief complaint of UTI, failure to thrive , hyponatremia. (20 Nov 2024 04:32)      OVERNIGHT EVENTS:  today hgb low    MEDICATIONS  (STANDING):  dextrose 5%. 1000 milliLiter(s) (50 mL/Hr) IV Continuous <Continuous>  dextrose 5%. 1000 milliLiter(s) (100 mL/Hr) IV Continuous <Continuous>  dextrose 50% Injectable 25 Gram(s) IV Push once  dextrose 50% Injectable 12.5 Gram(s) IV Push once  dextrose 50% Injectable 25 Gram(s) IV Push once  glucagon  Injectable 1 milliGRAM(s) IntraMuscular once  insulin lispro (ADMELOG) corrective regimen sliding scale   SubCutaneous three times a day before meals  potassium phosphate / sodium phosphate Powder (PHOS-NaK) 1 Packet(s) Oral two times a day before meals  senna 2 Tablet(s) Oral at bedtime  sodium chloride 0.9%. 1000 milliLiter(s) (75 mL/Hr) IV Continuous <Continuous>    MEDICATIONS  (PRN):  acetaminophen     Tablet .. 650 milliGRAM(s) Oral every 6 hours PRN Temp greater or equal to 38C (100.4F), Mild Pain (1 - 3)  aluminum hydroxide/magnesium hydroxide/simethicone Suspension 30 milliLiter(s) Oral every 4 hours PRN Dyspepsia  dextrose Oral Gel 15 Gram(s) Oral once PRN Blood Glucose LESS THAN 70 milliGRAM(s)/deciliter  melatonin 3 milliGRAM(s) Oral at bedtime PRN Insomnia  ondansetron Injectable 4 milliGRAM(s) IV Push every 8 hours PRN Nausea and/or Vomiting      Allergies    No Known Allergies    Intolerances        SUBJECTIVE: in bed in NAD, no acute events overnight     Vital Signs Last 24 Hrs  T(C): 36.7 (21 Nov 2024 11:30), Max: 38.1 (20 Nov 2024 21:10)  T(F): 98 (21 Nov 2024 11:30), Max: 100.5 (20 Nov 2024 21:10)  HR: 93 (21 Nov 2024 11:30) (88 - 110)  BP: 119/68 (21 Nov 2024 11:30) (79/58 - 119/68)  BP(mean): --  RR: 17 (21 Nov 2024 11:30) (17 - 18)  SpO2: 100% (21 Nov 2024 11:30) (95% - 100%)    Parameters below as of 20 Nov 2024 17:09  Patient On (Oxygen Delivery Method): room air    PHYSICAL EXAM:  GENERAL: NAD, cachetic frail   HEAD:  Atraumatic, Normocephalic  EYES: EOMI, PERRLA, conjunctiva and sclera clear  ENMT: No tonsillar erythema, exudates, or enlargement; Moist mucous membranes, Good dentition, No lesions  NECK: Supple,   CHEST/LUNG: Clear to  auscultation bilaterally; No rales, rhonchi, wheezing, or rubs  bilaterally  HEART: Regular rate and rhythm; No murmurs, rubs, or gallops  ABDOMEN: Soft, Nontender, Nondistended; Bowel sounds present  EXTREMITIES:  2+ Peripheral Pulses, No clubbing, cyanosis, or edema BL LE  SKIN: multiple  decubiti  ulcers on back, hips and feet varying stages and depths   NERVOUS SYSTEM:  Alert , contracted       LABS:                                 6.4    13.57 )-----------( 257      ( 21 Nov 2024 08:15 )             20.4   11-21    133[L]  |  102  |  12  ----------------------------<  230[H]  3.9   |  26  |  0.17[L]    Ca    7.9[L]      21 Nov 2024 08:15  Phos  1.8     11-21  Mg     1.6     11-21    TPro  6.3  /  Alb  1.5[L]  /  TBili  0.3  /  DBili  x   /  AST  26  /  ALT  35  /  AlkPhos  275[H]  11-19      Urinalysis Basic - ( 20 Nov 2024 10:20 )    Color: x / Appearance: x / SG: x / pH: x  Gluc: 284 mg/dL / Ketone: x  / Bili: x / Urobili: x   Blood: x / Protein: x / Nitrite: x   Leuk Esterase: x / RBC: x / WBC x   Sq Epi: x / Non Sq Epi: x / Bacteria: x      Cultures;   CAPILLARY BLOOD GLUCOSE    CAPILLARY BLOOD GLUCOSE      POCT Blood Glucose.: 187 mg/dL (21 Nov 2024 10:54)  POCT Blood Glucose.: 258 mg/dL (21 Nov 2024 07:37)  POCT Blood Glucose.: 201 mg/dL (20 Nov 2024 21:37)  POCT Blood Glucose.: 292 mg/dL (20 Nov 2024 15:50)    Lipid panel:           RADIOLOGY & ADDITIONAL TESTS:      Imaging Personally Reviewed:  [ x] YES      Consultant(s) Notes Reviewed:  [x ] YES     Care Discussed with [x ] Consultants [X ] Patient [x ] Family  [x ]    [x ]  Other; RN

## 2024-11-22 DIAGNOSIS — R50.9 FEVER, UNSPECIFIED: ICD-10-CM

## 2024-11-22 LAB
ANION GAP SERPL CALC-SCNC: 4 MMOL/L — LOW (ref 5–17)
BUN SERPL-MCNC: 12 MG/DL — SIGNIFICANT CHANGE UP (ref 7–23)
CALCIUM SERPL-MCNC: 7.8 MG/DL — LOW (ref 8.5–10.1)
CHLORIDE SERPL-SCNC: 105 MMOL/L — SIGNIFICANT CHANGE UP (ref 96–108)
CO2 SERPL-SCNC: 27 MMOL/L — SIGNIFICANT CHANGE UP (ref 22–31)
CREAT SERPL-MCNC: 0.23 MG/DL — LOW (ref 0.5–1.3)
EGFR: 116 ML/MIN/1.73M2 — SIGNIFICANT CHANGE UP
GLUCOSE BLDC GLUCOMTR-MCNC: 168 MG/DL — HIGH (ref 70–99)
GLUCOSE BLDC GLUCOMTR-MCNC: 178 MG/DL — HIGH (ref 70–99)
GLUCOSE BLDC GLUCOMTR-MCNC: 185 MG/DL — HIGH (ref 70–99)
GLUCOSE BLDC GLUCOMTR-MCNC: 247 MG/DL — HIGH (ref 70–99)
GLUCOSE SERPL-MCNC: 188 MG/DL — HIGH (ref 70–99)
HCT VFR BLD CALC: 24.8 % — LOW (ref 34.5–45)
HCT VFR BLD CALC: 25.9 % — LOW (ref 34.5–45)
HGB BLD-MCNC: 8 G/DL — LOW (ref 11.5–15.5)
HGB BLD-MCNC: 8.3 G/DL — LOW (ref 11.5–15.5)
LACTATE SERPL-SCNC: 1.6 MMOL/L — SIGNIFICANT CHANGE UP (ref 0.7–2)
LACTATE SERPL-SCNC: 2.7 MMOL/L — HIGH (ref 0.7–2)
MAGNESIUM SERPL-MCNC: 1.6 MG/DL — SIGNIFICANT CHANGE UP (ref 1.6–2.6)
MCHC RBC-ENTMCNC: 24.1 PG — LOW (ref 27–34)
MCHC RBC-ENTMCNC: 24.2 PG — LOW (ref 27–34)
MCHC RBC-ENTMCNC: 32 G/DL — SIGNIFICANT CHANGE UP (ref 32–36)
MCHC RBC-ENTMCNC: 32.3 G/DL — SIGNIFICANT CHANGE UP (ref 32–36)
MCV RBC AUTO: 74.9 FL — LOW (ref 80–100)
MCV RBC AUTO: 75.1 FL — LOW (ref 80–100)
NRBC # BLD: 0 /100 WBCS — SIGNIFICANT CHANGE UP (ref 0–0)
NRBC # BLD: 0 /100 WBCS — SIGNIFICANT CHANGE UP (ref 0–0)
PHOSPHATE SERPL-MCNC: 1.6 MG/DL — LOW (ref 2.5–4.5)
PLATELET # BLD AUTO: 250 K/UL — SIGNIFICANT CHANGE UP (ref 150–400)
PLATELET # BLD AUTO: 251 K/UL — SIGNIFICANT CHANGE UP (ref 150–400)
POTASSIUM SERPL-MCNC: 3.6 MMOL/L — SIGNIFICANT CHANGE UP (ref 3.5–5.3)
POTASSIUM SERPL-SCNC: 3.6 MMOL/L — SIGNIFICANT CHANGE UP (ref 3.5–5.3)
RBC # BLD: 3.31 M/UL — LOW (ref 3.8–5.2)
RBC # BLD: 3.45 M/UL — LOW (ref 3.8–5.2)
RBC # FLD: 20.3 % — HIGH (ref 10.3–14.5)
RBC # FLD: 20.5 % — HIGH (ref 10.3–14.5)
SODIUM SERPL-SCNC: 136 MMOL/L — SIGNIFICANT CHANGE UP (ref 135–145)
WBC # BLD: 13.61 K/UL — HIGH (ref 3.8–10.5)
WBC # BLD: 14.05 K/UL — HIGH (ref 3.8–10.5)
WBC # FLD AUTO: 13.61 K/UL — HIGH (ref 3.8–10.5)
WBC # FLD AUTO: 14.05 K/UL — HIGH (ref 3.8–10.5)

## 2024-11-22 PROCEDURE — 99222 1ST HOSP IP/OBS MODERATE 55: CPT

## 2024-11-22 PROCEDURE — 99222 1ST HOSP IP/OBS MODERATE 55: CPT | Mod: FS,57

## 2024-11-22 PROCEDURE — 99232 SBSQ HOSP IP/OBS MODERATE 35: CPT

## 2024-11-22 PROCEDURE — 11046 DBRDMT MUSC&/FSCA EA ADDL: CPT

## 2024-11-22 PROCEDURE — 11043 DBRDMT MUSC&/FSCA 1ST 20/<: CPT

## 2024-11-22 RX ORDER — POTASSIUM PHOSPHATE, MONOBASIC POTASSIUM PHOSPHATE, DIBASIC INJECTION, 236; 224 MG/ML; MG/ML
30 SOLUTION, CONCENTRATE INTRAVENOUS ONCE
Refills: 0 | Status: COMPLETED | OUTPATIENT
Start: 2024-11-22 | End: 2024-11-22

## 2024-11-22 RX ORDER — PIPERACILLIN SODIUM AND TAZOBACTAM SODIUM 4; .5 G/20ML; G/20ML
3.38 INJECTION, POWDER, LYOPHILIZED, FOR SOLUTION INTRAVENOUS ONCE
Refills: 0 | Status: COMPLETED | OUTPATIENT
Start: 2024-11-22 | End: 2024-11-22

## 2024-11-22 RX ORDER — VANCOMYCIN HCL 900 MCG/MG
750 POWDER (GRAM) MISCELLANEOUS EVERY 24 HOURS
Refills: 0 | Status: DISCONTINUED | OUTPATIENT
Start: 2024-11-22 | End: 2024-11-22

## 2024-11-22 RX ORDER — SODIUM CHLORIDE 9 MG/ML
250 INJECTION, SOLUTION INTRAMUSCULAR; INTRAVENOUS; SUBCUTANEOUS
Refills: 0 | Status: DISCONTINUED | OUTPATIENT
Start: 2024-11-22 | End: 2024-12-13

## 2024-11-22 RX ORDER — SODIUM HYPOCHLORITE 0.057 %
1 GEL (GRAM) TOPICAL DAILY
Refills: 0 | Status: DISCONTINUED | OUTPATIENT
Start: 2024-11-22 | End: 2024-12-13

## 2024-11-22 RX ORDER — PIPERACILLIN SODIUM AND TAZOBACTAM SODIUM 4; .5 G/20ML; G/20ML
3.38 INJECTION, POWDER, LYOPHILIZED, FOR SOLUTION INTRAVENOUS EVERY 8 HOURS
Refills: 0 | Status: DISCONTINUED | OUTPATIENT
Start: 2024-11-23 | End: 2024-11-24

## 2024-11-22 RX ORDER — VANCOMYCIN HCL 900 MCG/MG
500 POWDER (GRAM) MISCELLANEOUS EVERY 24 HOURS
Refills: 0 | Status: DISCONTINUED | OUTPATIENT
Start: 2024-11-22 | End: 2024-11-25

## 2024-11-22 RX ADMIN — Medication 100 MILLIGRAM(S): at 12:11

## 2024-11-22 RX ADMIN — ACETAMINOPHEN 500MG 650 MILLIGRAM(S): 500 TABLET, COATED ORAL at 16:23

## 2024-11-22 RX ADMIN — SODIUM CHLORIDE 250 MILLILITER(S): 9 INJECTION, SOLUTION INTRAMUSCULAR; INTRAVENOUS; SUBCUTANEOUS at 02:22

## 2024-11-22 RX ADMIN — Medication 1: at 16:48

## 2024-11-22 RX ADMIN — Medication 2 TABLET(S): at 22:53

## 2024-11-22 RX ADMIN — PIPERACILLIN SODIUM AND TAZOBACTAM SODIUM 200 GRAM(S): 4; .5 INJECTION, POWDER, LYOPHILIZED, FOR SOLUTION INTRAVENOUS at 18:45

## 2024-11-22 RX ADMIN — PIPERACILLIN SODIUM AND TAZOBACTAM SODIUM 25 GRAM(S): 4; .5 INJECTION, POWDER, LYOPHILIZED, FOR SOLUTION INTRAVENOUS at 22:53

## 2024-11-22 RX ADMIN — Medication 100 MILLIGRAM(S): at 20:54

## 2024-11-22 RX ADMIN — Medication 1: at 08:08

## 2024-11-22 RX ADMIN — POTASSIUM PHOSPHATE, MONOBASIC POTASSIUM PHOSPHATE, DIBASIC INJECTION, 83.33 MILLIMOLE(S): 236; 224 SOLUTION, CONCENTRATE INTRAVENOUS at 11:24

## 2024-11-22 RX ADMIN — Medication 1 PACKET(S): at 06:56

## 2024-11-22 RX ADMIN — COLLAGENASE SANTYL 1 APPLICATION(S): 250 OINTMENT TOPICAL at 11:45

## 2024-11-22 RX ADMIN — Medication 1: at 11:39

## 2024-11-22 RX ADMIN — ACETAMINOPHEN 500MG 650 MILLIGRAM(S): 500 TABLET, COATED ORAL at 16:53

## 2024-11-22 NOTE — PROGRESS NOTE ADULT - PROBLEM SELECTOR PLAN 6
pan cultured yesterday     continue with Rocephin for Urinary Tract-   will consult ID as I suspect infected decubiti

## 2024-11-22 NOTE — PROGRESS NOTE ADULT - SUBJECTIVE AND OBJECTIVE BOX
Patient is a 79y old  Female who presents with a chief complaint of UTI, failure to thrive , hyponatremia. (20 Nov 2024 04:32)      OVERNIGHT EVENTS:  transfused overnight -    MEDICATIONS  (STANDING):  cefTRIAXone   IVPB 1000 milliGRAM(s) IV Intermittent every 24 hours  collagenase Ointment 1 Application(s) Topical daily  dextrose 5%. 1000 milliLiter(s) (50 mL/Hr) IV Continuous <Continuous>  dextrose 5%. 1000 milliLiter(s) (100 mL/Hr) IV Continuous <Continuous>  dextrose 50% Injectable 25 Gram(s) IV Push once  dextrose 50% Injectable 12.5 Gram(s) IV Push once  dextrose 50% Injectable 25 Gram(s) IV Push once  glucagon  Injectable 1 milliGRAM(s) IntraMuscular once  insulin lispro (ADMELOG) corrective regimen sliding scale   SubCutaneous three times a day before meals  insulin lispro (ADMELOG) corrective regimen sliding scale   SubCutaneous at bedtime  senna 2 Tablet(s) Oral at bedtime  sodium chloride 0.9%. 250 milliLiter(s) (250 mL/Hr) IV Continuous <Continuous>    MEDICATIONS  (PRN):  acetaminophen     Tablet .. 650 milliGRAM(s) Oral every 6 hours PRN Temp greater or equal to 38C (100.4F), Mild Pain (1 - 3)  aluminum hydroxide/magnesium hydroxide/simethicone Suspension 30 milliLiter(s) Oral every 4 hours PRN Dyspepsia  dextrose Oral Gel 15 Gram(s) Oral once PRN Blood Glucose LESS THAN 70 milliGRAM(s)/deciliter  melatonin 3 milliGRAM(s) Oral at bedtime PRN Insomnia  ondansetron Injectable 4 milliGRAM(s) IV Push every 8 hours PRN Nausea and/or Vomiting      Allergies    No Known Allergies    Intolerances        SUBJECTIVE: in bed in NAD, no acute events overnight     Vital Signs Last 24 Hrs  T(C): 36.9 (22 Nov 2024 12:10), Max: 38.5 (21 Nov 2024 21:00)  T(F): 98.4 (22 Nov 2024 12:10), Max: 101.3 (21 Nov 2024 21:00)  HR: 91 (22 Nov 2024 12:10) (91 - 104)  BP: 113/68 (22 Nov 2024 12:10) (94/53 - 114/66)  BP(mean): --  RR: 18 (22 Nov 2024 12:10) (16 - 18)  SpO2: 100% (22 Nov 2024 12:10) (96% - 100%)    Parameters below as of 22 Nov 2024 12:10  Patient On (Oxygen Delivery Method): room air      PHYSICAL EXAM:  GENERAL: NAD, cachetic frail   HEAD:  Atraumatic, Normocephalic  EYES: EOMI, PERRLA, conjunctiva and sclera clear  ENMT: No tonsillar erythema, exudates, or enlargement; Moist mucous membranes, Good dentition, No lesions  NECK: Supple,   CHEST/LUNG: Clear to  auscultation bilaterally; No rales, rhonchi, wheezing, or rubs  bilaterally  HEART: Regular rate and rhythm; No murmurs, rubs, or gallops  ABDOMEN: Soft, Nontender, Nondistended; Bowel sounds present  EXTREMITIES:  2+ Peripheral Pulses, No clubbing, cyanosis, or edema BL LE  SKIN: multiple  decubiti  ulcers on back, hips and feet varying stages and depths   NERVOUS SYSTEM:  Alert , contracted       LABS:                                                      8.0    14.05 )-----------( 250      ( 22 Nov 2024 06:26 )             24.8   11-22    136  |  105  |  12  ----------------------------<  188[H]  3.6   |  27  |  0.23[L]    Ca    7.8[L]      22 Nov 2024 06:26  Phos  1.6     11-22  Mg     1.6     11-22        Urinalysis Basic - ( 20 Nov 2024 10:20 )    Color: x / Appearance: x / SG: x / pH: x  Gluc: 284 mg/dL / Ketone: x  / Bili: x / Urobili: x   Blood: x / Protein: x / Nitrite: x   Leuk Esterase: x / RBC: x / WBC x   Sq Epi: x / Non Sq Epi: x / Bacteria: x      Cultures;     CAPILLARY BLOOD GLUCOSE      POCT Blood Glucose.: 168 mg/dL (22 Nov 2024 11:21)  POCT Blood Glucose.: 185 mg/dL (22 Nov 2024 07:44)  POCT Blood Glucose.: 356 mg/dL (21 Nov 2024 21:27)  POCT Blood Glucose.: 282 mg/dL (21 Nov 2024 15:55)    Lipid panel:           RADIOLOGY & ADDITIONAL TESTS:      Imaging Personally Reviewed:  [ x] YES      Consultant(s) Notes Reviewed:  [x ] YES     Care Discussed with [x ] Consultants [X ] Patient [x ] Family  [x ]    [x ]  Other; RN

## 2024-11-22 NOTE — CONSULT NOTE ADULT - SUBJECTIVE AND OBJECTIVE BOX
Asked to see this patient for infected decubiti and UTi     History obtained from medical chart as patient is with dementia and unable to provide medical history.    HPI:  Patient is a 79 year old Female with  pmh osteoporosis, MS, bedbound. infected decubitus ulcers presenting to the ED for reduced appetite x 2-3 days.   patient  also has chronic amezquita     Infectious disease consult called 11/22/2024 at 3:45 pm to help with antibiotic management.    patient seen and examined .  she is awake but weak  patient does not answer most questions      PAST MEDICAL & SURGICAL HISTORY:  Osteoporosis      Uterine polyp      Herniated disc  lumbar      Functional quadriplegia secondary to multiple sclerosis      Bilateral Tubal ligation  46y/o          Allergies    No Known Drug Allergies      ANTIMICROBIALS:  cefTRIAXone   IVPB 1000 every 24 hours      OTHER MEDS:  acetaminophen     Tablet .. 650 milliGRAM(s) Oral every 6 hours PRN  aluminum hydroxide/magnesium hydroxide/simethicone Suspension 30 milliLiter(s) Oral every 4 hours PRN  collagenase Ointment 1 Application(s) Topical daily  dextrose 5%. 1000 milliLiter(s) IV Continuous <Continuous>  dextrose 5%. 1000 milliLiter(s) IV Continuous <Continuous>  dextrose 50% Injectable 25 Gram(s) IV Push once  dextrose 50% Injectable 12.5 Gram(s) IV Push once  dextrose 50% Injectable 25 Gram(s) IV Push once  dextrose Oral Gel 15 Gram(s) Oral once PRN  glucagon  Injectable 1 milliGRAM(s) IntraMuscular once  insulin lispro (ADMELOG) corrective regimen sliding scale   SubCutaneous three times a day before meals  insulin lispro (ADMELOG) corrective regimen sliding scale   SubCutaneous at bedtime  melatonin 3 milliGRAM(s) Oral at bedtime PRN  ondansetron Injectable 4 milliGRAM(s) IV Push every 8 hours PRN  senna 2 Tablet(s) Oral at bedtime  sodium chloride 0.9%. 250 milliLiter(s) IV Continuous <Continuous>      SOCIAL HISTORY:  bed-bound      FAMILY HISTORY:  N/C    ROS:  Unobtainable because:   patient does not answer questions    Physical Exam:    General:    weak looking   Head: atraumatic, normocephalic  Eyes: normal sclera and conjunctiva  ENT:   no oropharyngeal lesions, no LAD, neck supple  Cardio:    regular S1,S2  Respiratory:   No wheezing, No rhonchi  abd:   soft, BS +, not tender, no distention  :     no CVAT, no suprapubic tenderness, has amezquita and urine is with sediments  Musculoskeletal : multiple decubiti with most prominent on right hip and upper back and sacral  region , deep wounds about 5 x 6 cm and purulent fluid coming from the wounds and presence of necrotic tissue as well  Skin:    no rash  Neurologic:    awake but weak and does not answer most questions  psych: normal affect      Drug Dosing Weight  Height (cm): 175.3 (19 Nov 2024 19:25)  Weight (kg): 43.1 (19 Nov 2024 19:25)  BMI (kg/m2): 14 (19 Nov 2024 19:25)  BSA (m2): 1.51 (19 Nov 2024 19:25)    Vital Signs Last 24 Hrs  T(F): 98.4 (11-22-24 @ 12:10), Max: 101.3 (11-21-24 @ 21:00)    Vital Signs Last 24 Hrs  HR: 91 (11-22-24 @ 12:10) (91 - 104)  BP: 113/68 (11-22-24 @ 12:10) (94/53 - 114/66)  RR: 18 (11-22-24 @ 12:10)  SpO2: 100% (11-22-24 @ 12:10) (96% - 100%)  Wt(kg): --                          8.0    14.05 )-----------( 250      ( 22 Nov 2024 06:26 )             24.8       11-22    136  |  105  |  12  ----------------------------<  188[H]  3.6   |  27  |  0.23[L]    Ca    7.8[L]      22 Nov 2024 06:26  Phos  1.6     11-22  Mg     1.6     11-22        Urinalysis Basic - ( 22 Nov 2024 06:26 )    Color: x / Appearance: x / SG: x / pH: x  Gluc: 188 mg/dL / Ketone: x  / Bili: x / Urobili: x   Blood: x / Protein: x / Nitrite: x   Leuk Esterase: x / RBC: x / WBC x   Sq Epi: x / Non Sq Epi: x / Bacteria: x        MICROBIOLOGY:    Clean Catch  11-20-24   >100,000 CFU/ml Escherichia coli  <10,000 CFU/ml Normal Urogenital korey present  --  --      Clean Catch  11-20-24   >100,000 CFU/ml Klebsiella pneumoniae  >100,000 CFU/ml Escherichia coli  --  --      < from: Xray Chest 1 View- PORTABLE-Urgent (Xray Chest 1 View- PORTABLE-Urgent .) (11.19.24 @ 23:05) >  ACC: [id number] EXAM:  XR CHEST PORTABLE URGENT 1V   ORDERED BY:   BRENDA GONZALEZ     PROCEDURE DATE:  11/19/2024          INTERPRETATION:  Failure to thrive.    AP chest. Prior 8/4/2024.    Stable heart mediastinum. No consolidation or effusion. Stable elevation   right hemidiaphragm. Old right rib fractures.    IMPRESSION: No acute findings.    --- End of Report ---        JUNE ASHER MD; Attending Radiologist  This document has been electronically signed. Nov 20 2024 11:03AM    < end of copied text >      RADIOLOGY:

## 2024-11-22 NOTE — PROGRESS NOTE ADULT - PROBLEM SELECTOR PLAN 5
repeat cbc stat  with type and cross   transfuse if repeat hgb <7  11/22/2024 s/p one unit prbc - monitor hgb- good response hgb today at 8

## 2024-11-22 NOTE — CONSULT NOTE ADULT - REASON FOR ADMISSION
UTI, failure to thrive , hyponatremia.

## 2024-11-22 NOTE — CONSULT NOTE ADULT - SUBJECTIVE AND OBJECTIVE BOX
Patient is a 79y old  Female who presents with a chief complaint of UTI, failure to thrive , hyponatremia. (21 Nov 2024 13:44)      HPI:  79 F pmh osteoporosis, MS, bedbound. infected decubitus ulcers presenting to the ED for reduced appetite x 2-3 days. Son states that home health aide noted that patient has not been eating. There was no fever. amezquita catheter changed last week.    Pt denies any symptoms only saying she doesnt feel thirsty or hungry (20 Nov 2024 04:32)    interval wound care HPI: 80 yo F with pmhx of MS bedbound, chronic amezquita, multiple known decubitus ulcers admitted for UTI, failure to thrive. wound care consulted for possible debridement       PAST MEDICAL & SURGICAL HISTORY:  Osteoporosis      Uterine polyp      Herniated disc  lumbar      Functional quadriplegia secondary to multiple sclerosis      Bilateral Tubal ligation  46y/o          Review of Systems:  Negative except  as above in HPI    MEDICATIONS  (STANDING):  cefTRIAXone   IVPB 1000 milliGRAM(s) IV Intermittent every 24 hours  collagenase Ointment 1 Application(s) Topical daily  dextrose 5%. 1000 milliLiter(s) (50 mL/Hr) IV Continuous <Continuous>  dextrose 5%. 1000 milliLiter(s) (100 mL/Hr) IV Continuous <Continuous>  dextrose 50% Injectable 25 Gram(s) IV Push once  dextrose 50% Injectable 12.5 Gram(s) IV Push once  dextrose 50% Injectable 25 Gram(s) IV Push once  glucagon  Injectable 1 milliGRAM(s) IntraMuscular once  insulin lispro (ADMELOG) corrective regimen sliding scale   SubCutaneous three times a day before meals  insulin lispro (ADMELOG) corrective regimen sliding scale   SubCutaneous at bedtime  senna 2 Tablet(s) Oral at bedtime  sodium chloride 0.9%. 250 milliLiter(s) (250 mL/Hr) IV Continuous <Continuous>    MEDICATIONS  (PRN):  acetaminophen     Tablet .. 650 milliGRAM(s) Oral every 6 hours PRN Temp greater or equal to 38C (100.4F), Mild Pain (1 - 3)  aluminum hydroxide/magnesium hydroxide/simethicone Suspension 30 milliLiter(s) Oral every 4 hours PRN Dyspepsia  dextrose Oral Gel 15 Gram(s) Oral once PRN Blood Glucose LESS THAN 70 milliGRAM(s)/deciliter  melatonin 3 milliGRAM(s) Oral at bedtime PRN Insomnia  ondansetron Injectable 4 milliGRAM(s) IV Push every 8 hours PRN Nausea and/or Vomiting      Allergies    No Known Allergies    Intolerances        Vital Signs Last 24 Hrs  T(C): 36.9 (22 Nov 2024 12:10), Max: 38.5 (21 Nov 2024 21:00)  T(F): 98.4 (22 Nov 2024 12:10), Max: 101.3 (21 Nov 2024 21:00)  HR: 91 (22 Nov 2024 12:10) (91 - 104)  BP: 113/68 (22 Nov 2024 12:10) (94/53 - 114/66)  BP(mean): --  RR: 18 (22 Nov 2024 12:10) (16 - 18)  SpO2: 100% (22 Nov 2024 12:10) (96% - 100%)    Parameters below as of 22 Nov 2024 12:10  Patient On (Oxygen Delivery Method): room air        Physical Exam:  General:  Appears stated age, cachetic  HENT:  WNL, no JVD  Chest: no respiratory distress, no accessory muscle use  Skin: right hip deep decubitus ulcer minimal slough, left hip deep decubitus ulcer with slough and active purulent drainage.   sacral wound with tracking to smaller superior sacral wound   Neuro/Psych: alert and awake. not oriented    LABS:                        8.0    14.05 )-----------( 250      ( 22 Nov 2024 06:26 )             24.8     11-22    136  |  105  |  12  ----------------------------<  188[H]  3.6   |  27  |  0.23[L]    Ca    7.8[L]      22 Nov 2024 06:26  Phos  1.6     11-22  Mg     1.6     11-22        Urinalysis Basic - ( 22 Nov 2024 06:26 )    Color: x / Appearance: x / SG: x / pH: x  Gluc: 188 mg/dL / Ketone: x  / Bili: x / Urobili: x   Blood: x / Protein: x / Nitrite: x   Leuk Esterase: x / RBC: x / WBC x   Sq Epi: x / Non Sq Epi: x / Bacteria: x      Culture Results:   >100,000 CFU/ml Escherichia coli  <10,000 CFU/ml Normal Urogenital korey present *!* (11-20 @ 03:00)  Culture Results:   >100,000 CFU/ml Gram Negative Rods *!* (11-20 @ 00:20)      A/P:  80 yo F with pmhx of MS bedbound, chronic amezquita, multiple known decubitus ulcers admitted for UTI, failure to thrive. wound care consulted for possible debridement   ****incomplete note       Patient is a 79y old  Female who presents with a chief complaint of UTI, failure to thrive , hyponatremia. (21 Nov 2024 13:44)      HPI:  79 F pmh osteoporosis, MS, bedbound. infected decubitus ulcers presenting to the ED for reduced appetite x 2-3 days. Son states that home health aide noted that patient has not been eating. There was no fever. amezquita catheter changed last week.    Pt denies any symptoms only saying she doesnt feel thirsty or hungry (20 Nov 2024 04:32)    interval wound care HPI: 80 yo F with pmhx of MS bedbound, chronic amezquita, multiple known decubitus ulcers admitted for UTI, failure to thrive. wound care consulted for possible debridement       PAST MEDICAL & SURGICAL HISTORY:  Osteoporosis      Uterine polyp      Herniated disc  lumbar      Functional quadriplegia secondary to multiple sclerosis      Bilateral Tubal ligation  46y/o          Review of Systems:  Negative except  as above in HPI    MEDICATIONS  (STANDING):  cefTRIAXone   IVPB 1000 milliGRAM(s) IV Intermittent every 24 hours  collagenase Ointment 1 Application(s) Topical daily  dextrose 5%. 1000 milliLiter(s) (50 mL/Hr) IV Continuous <Continuous>  dextrose 5%. 1000 milliLiter(s) (100 mL/Hr) IV Continuous <Continuous>  dextrose 50% Injectable 25 Gram(s) IV Push once  dextrose 50% Injectable 12.5 Gram(s) IV Push once  dextrose 50% Injectable 25 Gram(s) IV Push once  glucagon  Injectable 1 milliGRAM(s) IntraMuscular once  insulin lispro (ADMELOG) corrective regimen sliding scale   SubCutaneous three times a day before meals  insulin lispro (ADMELOG) corrective regimen sliding scale   SubCutaneous at bedtime  senna 2 Tablet(s) Oral at bedtime  sodium chloride 0.9%. 250 milliLiter(s) (250 mL/Hr) IV Continuous <Continuous>    MEDICATIONS  (PRN):  acetaminophen     Tablet .. 650 milliGRAM(s) Oral every 6 hours PRN Temp greater or equal to 38C (100.4F), Mild Pain (1 - 3)  aluminum hydroxide/magnesium hydroxide/simethicone Suspension 30 milliLiter(s) Oral every 4 hours PRN Dyspepsia  dextrose Oral Gel 15 Gram(s) Oral once PRN Blood Glucose LESS THAN 70 milliGRAM(s)/deciliter  melatonin 3 milliGRAM(s) Oral at bedtime PRN Insomnia  ondansetron Injectable 4 milliGRAM(s) IV Push every 8 hours PRN Nausea and/or Vomiting      Allergies    No Known Allergies    Intolerances        Vital Signs Last 24 Hrs  T(C): 36.9 (22 Nov 2024 12:10), Max: 38.5 (21 Nov 2024 21:00)  T(F): 98.4 (22 Nov 2024 12:10), Max: 101.3 (21 Nov 2024 21:00)  HR: 91 (22 Nov 2024 12:10) (91 - 104)  BP: 113/68 (22 Nov 2024 12:10) (94/53 - 114/66)  BP(mean): --  RR: 18 (22 Nov 2024 12:10) (16 - 18)  SpO2: 100% (22 Nov 2024 12:10) (96% - 100%)    Parameters below as of 22 Nov 2024 12:10  Patient On (Oxygen Delivery Method): room air        Physical Exam:  General:  Appears stated age, cachetic  HENT:  WNL, no JVD  Chest: no respiratory distress, no accessory muscle use  Skin: right hip deep decubitus ulcer minimal slough, left hip deep decubitus ulcer with slough and active purulent drainage.   sacral wound with tracking to smaller superior sacral wound   Neuro/Psych: alert and awake. not oriented    LABS:                        8.0    14.05 )-----------( 250      ( 22 Nov 2024 06:26 )             24.8     11-22    136  |  105  |  12  ----------------------------<  188[H]  3.6   |  27  |  0.23[L]    Ca    7.8[L]      22 Nov 2024 06:26  Phos  1.6     11-22  Mg     1.6     11-22        Urinalysis Basic - ( 22 Nov 2024 06:26 )    Color: x / Appearance: x / SG: x / pH: x  Gluc: 188 mg/dL / Ketone: x  / Bili: x / Urobili: x   Blood: x / Protein: x / Nitrite: x   Leuk Esterase: x / RBC: x / WBC x   Sq Epi: x / Non Sq Epi: x / Bacteria: x      Culture Results:   >100,000 CFU/ml Escherichia coli  <10,000 CFU/ml Normal Urogenital korey present *!* (11-20 @ 03:00)  Culture Results:   >100,000 CFU/ml Gram Negative Rods *!* (11-20 @ 00:20)      A/P:  80 yo F with pmhx of MS bedbound, chronic amezquita, multiple known decubitus ulcers admitted for UTI, failure to thrive. wound care consulted for possible debridement   -daily wound care to bilateral hips with dakins solution, pack with iodoform packing and place allevyn  -Charley to debride, will obtain consent  -continue care per primary team  -case discussed with Dr Whitehead

## 2024-11-22 NOTE — PROCEDURE NOTE - ADDITIONAL PROCEDURE DETAILS
Bedside excisional debridement of bilateral hip stage 4 pressure ulcers performed by Dr. Whitehead.   Full dictation to follow.   Daily dressing changes per RN ordered.

## 2024-11-22 NOTE — PROGRESS NOTE ADULT - PROBLEM SELECTOR PLAN 4
per my colleague's documentation "multiple ulcers unstageable.  wound care in AM"    11/20/2024-f/u wound care also need Dr. Whitehead has some that will likely need debridement  and wound vac  11/21/2024 f/u with both wound care team and dr. segovia team  11/22/2024- will need ID consult- I have consulted them  - suspect infected decubiti-

## 2024-11-22 NOTE — CONSULT NOTE ADULT - ASSESSMENT
A/P-  79 year old Female with  pmh osteoporosis, MS, bedbound. infected decubitus ulcers presenting to the ED for reduced appetite x 2-3 days.   patient  also has chronic amezquita     Infectious disease consult called 11/22/2024 at 3:45 pm to help with antibiotic management.    +fever  +leukocytosis  CAUTI with urine cx>100K e.coli and K,pneumonia  Infected looking hip and sacral and  upper back decubiti    Impression-  multiple infected deep decubiti , most likely OM  CAUTI      Plan-  check blood cx x 2  await sens reports of the GNR pathogens in urine cx.  advise to start IV zosyn pending sens result of Urine cx pathogens.  d/c ceftraixone  also advise to start vanco pending decubiti debridement and decubiti wound cx.  keep vanco trough <15, -600  patient needs to have debridement of the infected ,necrotic  decubiti.  please send wound cx as well.  frequent offloading .  monitor for aspiration precautions as well.        All labs and imaging and chart notes reviewed.     d/w Hospitalist .    Thank you for this consultation.      Dylan Escobar MD  Infectious Disease Attending    for any questions please do not hesitate to contact me either via teams or by calling [phone]           A/P-  79 year old Female with  pmh osteoporosis, MS, bedbound. infected decubitus ulcers presenting to the ED for reduced appetite x 2-3 days.   patient  also has chronic amezquita     Infectious disease consult called 11/22/2024 at 3:45 pm to help with antibiotic management.    +fever  +leukocytosis  CAUTI with urine cx>100K e.coli and K,pneumonia  Infected looking hip and sacral and  upper back decubiti    Impression-  multiple infected deep decubiti , most likely OM  CAUTI      Plan-  check blood cx x 2  await sens reports of the GNR pathogens in urine cx.  advise to start IV zosyn pending sens result of Urine cx pathogens.  d/c ceftraixone  also advise to start vanco pending decubiti debridement and decubiti wound cx.  keep vanco trough <15, -600  patient needs to have debridement of the infected ,necrotic  decubiti.  please send wound cx as well.  frequent offloading .  if amezquita is needed please make sure it gets changed every 2 weeks .  monitor for aspiration precautions as well.        All labs and imaging and chart notes reviewed.     d/w Hospitalist .    Thank you for this consultation.      Dylan Escobar MD  Infectious Disease Attending    for any questions please do not hesitate to contact me either via teams or by calling [phone]

## 2024-11-22 NOTE — PROCEDURE NOTE - NSDEBLEVEL_SKIN_A_CORE
6/21/19 [facility] [place] [phone]  Call reference [phone]  bone

## 2024-11-22 NOTE — PROGRESS NOTE ADULT - PROBLEM SELECTOR PLAN 1
ceftriaxone  FU Ucx  11/21/2024 f/u urine cx - continue with antibx  11/22/2024 urine cx - kleb /ecoli-- await sensitivities

## 2024-11-23 DIAGNOSIS — E83.39 OTHER DISORDERS OF PHOSPHORUS METABOLISM: ICD-10-CM

## 2024-11-23 DIAGNOSIS — E83.42 HYPOMAGNESEMIA: ICD-10-CM

## 2024-11-23 LAB
-  AMOXICILLIN/CLAVULANIC ACID: SIGNIFICANT CHANGE UP
-  AMPICILLIN/SULBACTAM: SIGNIFICANT CHANGE UP
-  AMPICILLIN: SIGNIFICANT CHANGE UP
-  AZTREONAM: SIGNIFICANT CHANGE UP
-  CEFAZOLIN: SIGNIFICANT CHANGE UP
-  CEFEPIME: SIGNIFICANT CHANGE UP
-  CEFTRIAXONE: SIGNIFICANT CHANGE UP
-  CEFUROXIME: SIGNIFICANT CHANGE UP
-  CIPROFLOXACIN: SIGNIFICANT CHANGE UP
-  ERTAPENEM: SIGNIFICANT CHANGE UP
-  GENTAMICIN: SIGNIFICANT CHANGE UP
-  IMIPENEM: SIGNIFICANT CHANGE UP
-  LEVOFLOXACIN: SIGNIFICANT CHANGE UP
-  MEROPENEM: SIGNIFICANT CHANGE UP
-  NITROFURANTOIN: SIGNIFICANT CHANGE UP
-  PIPERACILLIN/TAZOBACTAM: SIGNIFICANT CHANGE UP
-  RESISTANCE GENE KPC: SIGNIFICANT CHANGE UP
-  TOBRAMYCIN: SIGNIFICANT CHANGE UP
-  TRIMETHOPRIM/SULFAMETHOXAZOLE: SIGNIFICANT CHANGE UP
BLANDM BLD POS QL PROBE: SIGNIFICANT CHANGE UP
BUN SERPL-MCNC: 11 MG/DL — SIGNIFICANT CHANGE UP (ref 7–23)
CALCIUM SERPL-MCNC: 7.5 MG/DL — LOW (ref 8.5–10.1)
CHLORIDE SERPL-SCNC: 106 MMOL/L — SIGNIFICANT CHANGE UP (ref 96–108)
CO2 SERPL-SCNC: 27 MMOL/L — SIGNIFICANT CHANGE UP (ref 22–31)
CREAT SERPL-MCNC: 0.27 MG/DL — LOW (ref 0.5–1.3)
CULTURE RESULTS: ABNORMAL
CULTURE RESULTS: ABNORMAL
EGFR: 111 ML/MIN/1.73M2 — SIGNIFICANT CHANGE UP
GLUCOSE BLDC GLUCOMTR-MCNC: 169 MG/DL — HIGH (ref 70–99)
GLUCOSE BLDC GLUCOMTR-MCNC: 189 MG/DL — HIGH (ref 70–99)
GLUCOSE BLDC GLUCOMTR-MCNC: 212 MG/DL — HIGH (ref 70–99)
GLUCOSE BLDC GLUCOMTR-MCNC: 249 MG/DL — HIGH (ref 70–99)
GLUCOSE SERPL-MCNC: 178 MG/DL — HIGH (ref 70–99)
GRAM STN FLD: ABNORMAL
HCT VFR BLD CALC: 25.2 % — LOW (ref 34.5–45)
HGB BLD-MCNC: 8.1 G/DL — LOW (ref 11.5–15.5)
MAGNESIUM SERPL-MCNC: 1.5 MG/DL — LOW (ref 1.6–2.6)
MCHC RBC-ENTMCNC: 24.4 PG — LOW (ref 27–34)
MCHC RBC-ENTMCNC: 32.1 G/DL — SIGNIFICANT CHANGE UP (ref 32–36)
MCV RBC AUTO: 75.9 FL — LOW (ref 80–100)
METHOD TYPE: SIGNIFICANT CHANGE UP
NRBC # BLD: 0 /100 WBCS — SIGNIFICANT CHANGE UP (ref 0–0)
ORGANISM # SPEC MICROSCOPIC CNT: ABNORMAL
ORGANISM # SPEC MICROSCOPIC CNT: SIGNIFICANT CHANGE UP
ORGANISM # SPEC MICROSCOPIC CNT: SIGNIFICANT CHANGE UP
PHOSPHATE SERPL-MCNC: 1.9 MG/DL — LOW (ref 2.5–4.5)
PLATELET # BLD AUTO: 245 K/UL — SIGNIFICANT CHANGE UP (ref 150–400)
POTASSIUM SERPL-MCNC: 4 MMOL/L — SIGNIFICANT CHANGE UP (ref 3.5–5.3)
POTASSIUM SERPL-SCNC: 4 MMOL/L — SIGNIFICANT CHANGE UP (ref 3.5–5.3)
RBC # BLD: 3.32 M/UL — LOW (ref 3.8–5.2)
RBC # FLD: 20.9 % — HIGH (ref 10.3–14.5)
SODIUM SERPL-SCNC: 139 MMOL/L — SIGNIFICANT CHANGE UP (ref 135–145)
SPECIMEN SOURCE: SIGNIFICANT CHANGE UP
WBC # BLD: 13.74 K/UL — HIGH (ref 3.8–10.5)
WBC # FLD AUTO: 13.74 K/UL — HIGH (ref 3.8–10.5)

## 2024-11-23 PROCEDURE — 99231 SBSQ HOSP IP/OBS SF/LOW 25: CPT

## 2024-11-23 RX ORDER — HEPARIN SODIUM 5000 [USP'U]/.5ML
30 INJECTION, SOLUTION INTRAVENOUS; SUBCUTANEOUS ONCE
Refills: 0 | Status: COMPLETED | OUTPATIENT
Start: 2024-11-23 | End: 2024-11-23

## 2024-11-23 RX ORDER — TRAMADOL HYDROCHLORIDE 300 MG/1
25 CAPSULE ORAL EVERY 8 HOURS
Refills: 0 | Status: DISCONTINUED | OUTPATIENT
Start: 2024-11-23 | End: 2024-11-29

## 2024-11-23 RX ORDER — VITAMIN A
1 CREAM (GRAM) TOPICAL THREE TIMES A DAY
Refills: 0 | Status: DISCONTINUED | OUTPATIENT
Start: 2024-11-23 | End: 2024-12-13

## 2024-11-23 RX ADMIN — Medication 1 APPLICATION(S): at 22:15

## 2024-11-23 RX ADMIN — COLLAGENASE SANTYL 1 APPLICATION(S): 250 OINTMENT TOPICAL at 11:50

## 2024-11-23 RX ADMIN — Medication 100 MILLIGRAM(S): at 20:14

## 2024-11-23 RX ADMIN — ACETAMINOPHEN 500MG 650 MILLIGRAM(S): 500 TABLET, COATED ORAL at 06:25

## 2024-11-23 RX ADMIN — ACETAMINOPHEN 500MG 650 MILLIGRAM(S): 500 TABLET, COATED ORAL at 05:25

## 2024-11-23 RX ADMIN — PIPERACILLIN SODIUM AND TAZOBACTAM SODIUM 25 GRAM(S): 4; .5 INJECTION, POWDER, LYOPHILIZED, FOR SOLUTION INTRAVENOUS at 05:24

## 2024-11-23 RX ADMIN — Medication 1: at 08:08

## 2024-11-23 RX ADMIN — HEPARIN SODIUM 85 MILLIMOLE(S): 5000 INJECTION, SOLUTION INTRAVENOUS; SUBCUTANEOUS at 16:22

## 2024-11-23 RX ADMIN — ACETAMINOPHEN 500MG 650 MILLIGRAM(S): 500 TABLET, COATED ORAL at 11:46

## 2024-11-23 RX ADMIN — PIPERACILLIN SODIUM AND TAZOBACTAM SODIUM 25 GRAM(S): 4; .5 INJECTION, POWDER, LYOPHILIZED, FOR SOLUTION INTRAVENOUS at 22:09

## 2024-11-23 RX ADMIN — Medication 1 APPLICATION(S): at 17:08

## 2024-11-23 RX ADMIN — Medication 1 APPLICATION(S): at 11:42

## 2024-11-23 RX ADMIN — Medication 1: at 16:41

## 2024-11-23 RX ADMIN — ACETAMINOPHEN 500MG 650 MILLIGRAM(S): 500 TABLET, COATED ORAL at 12:30

## 2024-11-23 RX ADMIN — Medication 2: at 11:42

## 2024-11-23 RX ADMIN — PIPERACILLIN SODIUM AND TAZOBACTAM SODIUM 25 GRAM(S): 4; .5 INJECTION, POWDER, LYOPHILIZED, FOR SOLUTION INTRAVENOUS at 13:35

## 2024-11-23 RX ADMIN — Medication 25 GRAM(S): at 14:39

## 2024-11-23 NOTE — PROGRESS NOTE ADULT - SUBJECTIVE AND OBJECTIVE BOX
Patient is a 79y old  Female who presents with a chief complaint of UTI, failure to thrive , hyponatremia. (20 Nov 2024 04:32)      OVERNIGHT EVENTS:    MEDICATIONS  (STANDING):  collagenase Ointment 1 Application(s) Topical daily  Dakins Solution - 1/2 Strength 1 Application(s) Topical daily  dextrose 5%. 1000 milliLiter(s) (50 mL/Hr) IV Continuous <Continuous>  dextrose 5%. 1000 milliLiter(s) (100 mL/Hr) IV Continuous <Continuous>  dextrose 50% Injectable 25 Gram(s) IV Push once  dextrose 50% Injectable 12.5 Gram(s) IV Push once  dextrose 50% Injectable 25 Gram(s) IV Push once  glucagon  Injectable 1 milliGRAM(s) IntraMuscular once  insulin lispro (ADMELOG) corrective regimen sliding scale   SubCutaneous three times a day before meals  insulin lispro (ADMELOG) corrective regimen sliding scale   SubCutaneous at bedtime  magnesium sulfate  IVPB 2 Gram(s) IV Intermittent once  piperacillin/tazobactam IVPB.. 3.375 Gram(s) IV Intermittent every 8 hours  senna 2 Tablet(s) Oral at bedtime  sodium chloride 0.9%. 250 milliLiter(s) (250 mL/Hr) IV Continuous <Continuous>  sodium phosphate 30 milliMole(s)/500 mL IVPB 30 milliMole(s) IV Intermittent once  vancomycin  IVPB 500 milliGRAM(s) IV Intermittent every 24 hours    MEDICATIONS  (PRN):  acetaminophen     Tablet .. 650 milliGRAM(s) Oral every 6 hours PRN Temp greater or equal to 38C (100.4F), Mild Pain (1 - 3)  aluminum hydroxide/magnesium hydroxide/simethicone Suspension 30 milliLiter(s) Oral every 4 hours PRN Dyspepsia  dextrose Oral Gel 15 Gram(s) Oral once PRN Blood Glucose LESS THAN 70 milliGRAM(s)/deciliter  melatonin 3 milliGRAM(s) Oral at bedtime PRN Insomnia  ondansetron Injectable 4 milliGRAM(s) IV Push every 8 hours PRN Nausea and/or Vomiting      Allergies    No Known Allergies    Intolerances        SUBJECTIVE: in bed in NAD, no acute events overnight   Vital Signs Last 24 Hrs  T(C): 37.3 (23 Nov 2024 13:20), Max: 37.3 (23 Nov 2024 13:20)  T(F): 99.1 (23 Nov 2024 13:20), Max: 99.1 (23 Nov 2024 13:20)  HR: 98 (23 Nov 2024 13:20) (92 - 98)  BP: 110/67 (23 Nov 2024 13:20) (96/56 - 116/69)  BP(mean): --  RR: 18 (23 Nov 2024 13:20) (18 - 18)  SpO2: 100% (23 Nov 2024 13:20) (98% - 100%)    Parameters below as of 23 Nov 2024 13:20  Patient On (Oxygen Delivery Method): room air        PHYSICAL EXAM:  GENERAL: NAD, cachetic frail   HEAD:  Atraumatic, Normocephalic  EYES: EOMI, PERRLA, conjunctiva and sclera clear  ENMT: No tonsillar erythema, exudates, or enlargement; Moist mucous membranes, Good dentition, No lesions  NECK: Supple,   CHEST/LUNG: Clear to  auscultation bilaterally; No rales, rhonchi, wheezing, or rubs  bilaterally  HEART: Regular rate and rhythm; No murmurs, rubs, or gallops  ABDOMEN: Soft, Nontender, Nondistended; Bowel sounds present  EXTREMITIES:  2+ Peripheral Pulses, No clubbing, cyanosis, or edema BL LE  SKIN: multiple  decubiti  ulcers on back, hips and feet varying stages and depths   NERVOUS SYSTEM:  Alert , contracted       LABS:                                  8.1    13.74 )-----------( 245      ( 23 Nov 2024 05:32 )             25.2   11-23    139  |  106  |  11  ----------------------------<  178[H]  4.0   |  27  |  0.27[L]    Ca    7.5[L]      23 Nov 2024 05:32  Phos  1.9     11-23  Mg     1.5     11-23          Urinalysis Basic - ( 20 Nov 2024 10:20 )    Color: x / Appearance: x / SG: x / pH: x  Gluc: 284 mg/dL / Ketone: x  / Bili: x / Urobili: x   Blood: x / Protein: x / Nitrite: x   Leuk Esterase: x / RBC: x / WBC x   Sq Epi: x / Non Sq Epi: x / Bacteria: x      Cultures;     CAPILLARY BLOOD GLUCOSE      POCT Blood Glucose.: 168 mg/dL (22 Nov 2024 11:21)  POCT Blood Glucose.: 185 mg/dL (22 Nov 2024 07:44)  POCT Blood Glucose.: 356 mg/dL (21 Nov 2024 21:27)  POCT Blood Glucose.: 282 mg/dL (21 Nov 2024 15:55)    Lipid panel:           RADIOLOGY & ADDITIONAL TESTS:      Imaging Personally Reviewed:  [ x] YES      Consultant(s) Notes Reviewed:  [x ] YES     Care Discussed with [x ] Consultants [X ] Patient [x ] Family  [x ]    [x ]  Other; RN

## 2024-11-23 NOTE — PROGRESS NOTE ADULT - PROBLEM SELECTOR PLAN 4
per my colleague's documentation "multiple ulcers unstageable.  wound care in AM"    11/20/2024-f/u wound care also need Dr. Whitehead has some that will likely need debridement  and wound vac  11/21/2024 f/u with both wound care team and dr. segovia team  11/22/2024- will need ID consult- I have consulted them  - suspect infected decubiti-  11/23/2024 appreciate Id consult- f/u wound culture

## 2024-11-23 NOTE — PROGRESS NOTE ADULT - PROBLEM SELECTOR PLAN 5
repeat cbc stat  with type and cross   transfuse if repeat hgb <7  11/22/2024 s/p one unit prbc - monitor hgb- good response hgb today at 8  11/23/2024 hgb at 8.1

## 2024-11-24 LAB
-  AMPICILLIN/SULBACTAM: SIGNIFICANT CHANGE UP
-  AMPICILLIN: SIGNIFICANT CHANGE UP
-  AMPICILLIN: SIGNIFICANT CHANGE UP
-  AZTREONAM: SIGNIFICANT CHANGE UP
-  CEFAZOLIN: SIGNIFICANT CHANGE UP
-  CEFEPIME: SIGNIFICANT CHANGE UP
-  CEFTRIAXONE: SIGNIFICANT CHANGE UP
-  CIPROFLOXACIN: SIGNIFICANT CHANGE UP
-  ERTAPENEM: SIGNIFICANT CHANGE UP
-  GENTAMICIN: SIGNIFICANT CHANGE UP
-  IMIPENEM: SIGNIFICANT CHANGE UP
-  LEVOFLOXACIN: SIGNIFICANT CHANGE UP
-  MEROPENEM: SIGNIFICANT CHANGE UP
-  PIPERACILLIN/TAZOBACTAM: SIGNIFICANT CHANGE UP
-  TOBRAMYCIN: SIGNIFICANT CHANGE UP
-  TRIMETHOPRIM/SULFAMETHOXAZOLE: SIGNIFICANT CHANGE UP
-  VANCOMYCIN: SIGNIFICANT CHANGE UP
ANION GAP SERPL CALC-SCNC: 5 MMOL/L — SIGNIFICANT CHANGE UP (ref 5–17)
BUN SERPL-MCNC: 12 MG/DL — SIGNIFICANT CHANGE UP (ref 7–23)
CALCIUM SERPL-MCNC: 7.7 MG/DL — LOW (ref 8.5–10.1)
CHLORIDE SERPL-SCNC: 104 MMOL/L — SIGNIFICANT CHANGE UP (ref 96–108)
CO2 SERPL-SCNC: 28 MMOL/L — SIGNIFICANT CHANGE UP (ref 22–31)
CREAT SERPL-MCNC: 0.18 MG/DL — LOW (ref 0.5–1.3)
EGFR: 123 ML/MIN/1.73M2 — SIGNIFICANT CHANGE UP
GLUCOSE BLDC GLUCOMTR-MCNC: 154 MG/DL — HIGH (ref 70–99)
GLUCOSE BLDC GLUCOMTR-MCNC: 159 MG/DL — HIGH (ref 70–99)
GLUCOSE BLDC GLUCOMTR-MCNC: 159 MG/DL — HIGH (ref 70–99)
GLUCOSE BLDC GLUCOMTR-MCNC: 168 MG/DL — HIGH (ref 70–99)
GLUCOSE SERPL-MCNC: 139 MG/DL — HIGH (ref 70–99)
HCT VFR BLD CALC: 24.7 % — LOW (ref 34.5–45)
HGB BLD-MCNC: 8 G/DL — LOW (ref 11.5–15.5)
MAGNESIUM SERPL-MCNC: 1.8 MG/DL — SIGNIFICANT CHANGE UP (ref 1.6–2.6)
MCHC RBC-ENTMCNC: 24.3 PG — LOW (ref 27–34)
MCHC RBC-ENTMCNC: 32.4 G/DL — SIGNIFICANT CHANGE UP (ref 32–36)
MCV RBC AUTO: 75.1 FL — LOW (ref 80–100)
METHOD TYPE: SIGNIFICANT CHANGE UP
METHOD TYPE: SIGNIFICANT CHANGE UP
NRBC # BLD: 0 /100 WBCS — SIGNIFICANT CHANGE UP (ref 0–0)
PHOSPHATE SERPL-MCNC: 2.4 MG/DL — LOW (ref 2.5–4.5)
PLATELET # BLD AUTO: 268 K/UL — SIGNIFICANT CHANGE UP (ref 150–400)
POTASSIUM SERPL-MCNC: 3.4 MMOL/L — LOW (ref 3.5–5.3)
POTASSIUM SERPL-SCNC: 3.4 MMOL/L — LOW (ref 3.5–5.3)
RBC # BLD: 3.29 M/UL — LOW (ref 3.8–5.2)
RBC # FLD: 21.3 % — HIGH (ref 10.3–14.5)
SODIUM SERPL-SCNC: 137 MMOL/L — SIGNIFICANT CHANGE UP (ref 135–145)
VANCOMYCIN TROUGH SERPL-MCNC: 3.1 UG/ML — LOW (ref 10–20)
WBC # BLD: 13.91 K/UL — HIGH (ref 3.8–10.5)
WBC # FLD AUTO: 13.91 K/UL — HIGH (ref 3.8–10.5)

## 2024-11-24 PROCEDURE — 99231 SBSQ HOSP IP/OBS SF/LOW 25: CPT

## 2024-11-24 RX ORDER — POTASSIUM CHLORIDE 600 MG/1
40 TABLET, EXTENDED RELEASE ORAL ONCE
Refills: 0 | Status: COMPLETED | OUTPATIENT
Start: 2024-11-24 | End: 2024-11-24

## 2024-11-24 RX ORDER — MEROPENEM 500 MG/1
1000 INJECTION, POWDER, FOR SOLUTION INTRAVENOUS EVERY 8 HOURS
Refills: 0 | Status: DISCONTINUED | OUTPATIENT
Start: 2024-11-25 | End: 2024-11-25

## 2024-11-24 RX ORDER — POTASSIUM PHOSPHATE, MONOBASIC POTASSIUM PHOSPHATE, DIBASIC INJECTION, 236; 224 MG/ML; MG/ML
15 SOLUTION, CONCENTRATE INTRAVENOUS ONCE
Refills: 0 | Status: COMPLETED | OUTPATIENT
Start: 2024-11-24 | End: 2024-11-24

## 2024-11-24 RX ADMIN — POTASSIUM CHLORIDE 40 MILLIEQUIVALENT(S): 600 TABLET, EXTENDED RELEASE ORAL at 16:02

## 2024-11-24 RX ADMIN — Medication 1 APPLICATION(S): at 14:01

## 2024-11-24 RX ADMIN — Medication 1 APPLICATION(S): at 21:53

## 2024-11-24 RX ADMIN — PIPERACILLIN SODIUM AND TAZOBACTAM SODIUM 25 GRAM(S): 4; .5 INJECTION, POWDER, LYOPHILIZED, FOR SOLUTION INTRAVENOUS at 21:52

## 2024-11-24 RX ADMIN — Medication 1: at 16:51

## 2024-11-24 RX ADMIN — PIPERACILLIN SODIUM AND TAZOBACTAM SODIUM 25 GRAM(S): 4; .5 INJECTION, POWDER, LYOPHILIZED, FOR SOLUTION INTRAVENOUS at 13:54

## 2024-11-24 RX ADMIN — TRAMADOL HYDROCHLORIDE 25 MILLIGRAM(S): 300 CAPSULE ORAL at 20:40

## 2024-11-24 RX ADMIN — POTASSIUM PHOSPHATE, MONOBASIC POTASSIUM PHOSPHATE, DIBASIC INJECTION, 62.5 MILLIMOLE(S): 236; 224 SOLUTION, CONCENTRATE INTRAVENOUS at 15:54

## 2024-11-24 RX ADMIN — COLLAGENASE SANTYL 1 APPLICATION(S): 250 OINTMENT TOPICAL at 11:54

## 2024-11-24 RX ADMIN — Medication 1 APPLICATION(S): at 05:53

## 2024-11-24 RX ADMIN — Medication 1 APPLICATION(S): at 11:52

## 2024-11-24 RX ADMIN — PIPERACILLIN SODIUM AND TAZOBACTAM SODIUM 25 GRAM(S): 4; .5 INJECTION, POWDER, LYOPHILIZED, FOR SOLUTION INTRAVENOUS at 05:55

## 2024-11-24 RX ADMIN — Medication 2 TABLET(S): at 21:53

## 2024-11-24 RX ADMIN — Medication 100 MILLIGRAM(S): at 20:18

## 2024-11-24 RX ADMIN — TRAMADOL HYDROCHLORIDE 25 MILLIGRAM(S): 300 CAPSULE ORAL at 19:48

## 2024-11-24 RX ADMIN — Medication 1: at 11:48

## 2024-11-24 RX ADMIN — Medication 1: at 08:24

## 2024-11-24 NOTE — PROGRESS NOTE ADULT - PROBLEM SELECTOR PLAN 1
ceftriaxone  FU Ucx  11/21/2024 f/u urine cx - continue with antibx  11/22/2024 urine cx - kleb /ecoli-- await sensitivities  11/24/2024 - kleb carbapenem resistant / ecoli esbl- defer to ID for antibx managemnet

## 2024-11-24 NOTE — PROGRESS NOTE ADULT - PROBLEM SELECTOR PLAN 6
pan cultured yesterday     continue with Rocephin for Urinary Tract-   will consult ID as I suspect infected decubiti    11/24/2024 wound cx as above await sensitivities, antibx per ID team

## 2024-11-24 NOTE — PROGRESS NOTE ADULT - SUBJECTIVE AND OBJECTIVE BOX
Patient is a 79y old  Female who presents with a chief complaint of UTI, failure to thrive , hyponatremia. (20 Nov 2024 04:32)      OVERNIGHT EVENTS:    MEDICATIONS  (STANDING):  collagenase Ointment 1 Application(s) Topical daily  Dakins Solution - 1/2 Strength 1 Application(s) Topical daily  dextrose 5%. 1000 milliLiter(s) (100 mL/Hr) IV Continuous <Continuous>  dextrose 5%. 1000 milliLiter(s) (50 mL/Hr) IV Continuous <Continuous>  dextrose 50% Injectable 25 Gram(s) IV Push once  dextrose 50% Injectable 12.5 Gram(s) IV Push once  dextrose 50% Injectable 25 Gram(s) IV Push once  glucagon  Injectable 1 milliGRAM(s) IntraMuscular once  insulin lispro (ADMELOG) corrective regimen sliding scale   SubCutaneous at bedtime  insulin lispro (ADMELOG) corrective regimen sliding scale   SubCutaneous three times a day before meals  piperacillin/tazobactam IVPB.. 3.375 Gram(s) IV Intermittent every 8 hours  potassium chloride   Powder 40 milliEquivalent(s) Oral once  potassium phosphate IVPB 15 milliMole(s) IV Intermittent once  senna 2 Tablet(s) Oral at bedtime  sodium chloride 0.9%. 250 milliLiter(s) (250 mL/Hr) IV Continuous <Continuous>  vancomycin  IVPB 500 milliGRAM(s) IV Intermittent every 24 hours  vitamin A & D Ointment 1 Application(s) Topical three times a day    MEDICATIONS  (PRN):  acetaminophen     Tablet .. 650 milliGRAM(s) Oral every 6 hours PRN Temp greater or equal to 38C (100.4F), Mild Pain (1 - 3)  aluminum hydroxide/magnesium hydroxide/simethicone Suspension 30 milliLiter(s) Oral every 4 hours PRN Dyspepsia  dextrose Oral Gel 15 Gram(s) Oral once PRN Blood Glucose LESS THAN 70 milliGRAM(s)/deciliter  melatonin 3 milliGRAM(s) Oral at bedtime PRN Insomnia  ondansetron Injectable 4 milliGRAM(s) IV Push every 8 hours PRN Nausea and/or Vomiting  traMADol 25 milliGRAM(s) Oral every 8 hours PRN Severe Pain (7 - 10)    Allergies    No Known Allergies    Intolerances        SUBJECTIVE: in bed in NAD, no acute events overnight     Vital Signs Last 24 Hrs  T(C): 36.7 (24 Nov 2024 11:24), Max: 37.5 (23 Nov 2024 17:47)  T(F): 98 (24 Nov 2024 11:24), Max: 99.5 (23 Nov 2024 17:47)  HR: 90 (24 Nov 2024 11:24) (83 - 90)  BP: 107/61 (24 Nov 2024 11:24) (98/59 - 111/62)  BP(mean): --  RR: 18 (24 Nov 2024 11:24) (17 - 18)  SpO2: 98% (24 Nov 2024 11:24) (98% - 99%)    Parameters below as of 24 Nov 2024 11:24  Patient On (Oxygen Delivery Method): room air          PHYSICAL EXAM:  GENERAL: NAD, cachetic frail   HEAD:  Atraumatic, Normocephalic  EYES: EOMI, PERRLA, conjunctiva and sclera clear  ENMT: No tonsillar erythema, exudates, or enlargement; Moist mucous membranes, Good dentition, No lesions  NECK: Supple,   CHEST/LUNG: Clear to  auscultation bilaterally; No rales, rhonchi, wheezing, or rubs  bilaterally  HEART: Regular rate and rhythm; No murmurs, rubs, or gallops  ABDOMEN: Soft, Nontender, Nondistended; Bowel sounds present  EXTREMITIES:  2+ Peripheral Pulses, No clubbing, cyanosis, or edema BL LE  SKIN: multiple  decubiti  ulcers on back, hips and feet varying stages and depths   NERVOUS SYSTEM:  Alert , contracted       LABS:                                   8.0    13.91 )-----------( 268      ( 24 Nov 2024 06:10 )             24.7   11-24    137  |  104  |  12  ----------------------------<  139[H]  3.4[L]   |  28  |  0.18[L]    Ca    7.7[L]      24 Nov 2024 06:10  Phos  2.4     11-24  Mg     1.8     11-24        Culture - Abscess with Gram Stain (collected 22 Nov 2024 17:40)  Source: .Abscess  Gram Stain (prelim) (23 Nov 2024 18:48):    No polymorphonuclear cells seen per low power field    Few Gram Negative Rods seen per oil power field    Few Gram positive cocci in pairs seen per oil power field  Preliminary Report (23 Nov 2024 18:48):    Few Escherichia coli    Rare Enterococcus faecalis    Rare Streptococcus mitis/oralis group "Susceptibilities not performed"    Culture - Blood (collected 21 Nov 2024 16:28)  Source: .Blood BLOOD  Preliminary Report (24 Nov 2024 01:01):    No growth at 48 Hours    Culture - Blood (collected 21 Nov 2024 16:25)  Source: .Blood BLOOD  Preliminary Report (24 Nov 2024 01:01):    No growth at 48 Hours        Urinalysis Basic - ( 20 Nov 2024 10:20 )    Color: x / Appearance: x / SG: x / pH: x  Gluc: 284 mg/dL / Ketone: x  / Bili: x / Urobili: x   Blood: x / Protein: x / Nitrite: x   Leuk Esterase: x / RBC: x / WBC x   Sq Epi: x / Non Sq Epi: x / Bacteria: x      Cultures;     CAPILLARY BLOOD GLUCOSE    CAPILLARY BLOOD GLUCOSE      POCT Blood Glucose.: 159 mg/dL (24 Nov 2024 11:32)  POCT Blood Glucose.: 159 mg/dL (24 Nov 2024 07:49)  POCT Blood Glucose.: 249 mg/dL (23 Nov 2024 21:35)  POCT Blood Glucose.: 169 mg/dL (23 Nov 2024 16:26)    Lipid panel:           RADIOLOGY & ADDITIONAL TESTS:      Imaging Personally Reviewed:  [ x] YES      Consultant(s) Notes Reviewed:  [x ] YES     Care Discussed with [x ] Consultants [X ] Patient [x ] Family  [x ]    [x ]  Other; RN

## 2024-11-24 NOTE — PROGRESS NOTE ADULT - PROBLEM SELECTOR PLAN 4
per my colleague's documentation "multiple ulcers unstageable.  wound care in AM"    11/20/2024-f/u wound care also need Dr. Whitehead has some that will likely need debridement  and wound vac  11/21/2024 f/u with both wound care team and dr. segovia team  11/22/2024- will need ID consult- I have consulted them  - suspect infected decubiti-  11/23/2024 appreciate Id consult- f/u wound culture  11/24/2024 wound cx as above await sensitivities, antibx per ID team

## 2024-11-25 LAB
GLUCOSE BLDC GLUCOMTR-MCNC: 137 MG/DL — HIGH (ref 70–99)
GLUCOSE BLDC GLUCOMTR-MCNC: 144 MG/DL — HIGH (ref 70–99)
GLUCOSE BLDC GLUCOMTR-MCNC: 160 MG/DL — HIGH (ref 70–99)
GLUCOSE BLDC GLUCOMTR-MCNC: 201 MG/DL — HIGH (ref 70–99)

## 2024-11-25 PROCEDURE — 99231 SBSQ HOSP IP/OBS SF/LOW 25: CPT

## 2024-11-25 PROCEDURE — 99232 SBSQ HOSP IP/OBS MODERATE 35: CPT

## 2024-11-25 RX ORDER — MEROPENEM 500 MG/1
1000 INJECTION, POWDER, FOR SOLUTION INTRAVENOUS EVERY 12 HOURS
Refills: 0 | Status: DISCONTINUED | OUTPATIENT
Start: 2024-11-25 | End: 2024-12-05

## 2024-11-25 RX ORDER — VANCOMYCIN HCL 900 MCG/MG
500 POWDER (GRAM) MISCELLANEOUS EVERY 12 HOURS
Refills: 0 | Status: DISCONTINUED | OUTPATIENT
Start: 2024-11-25 | End: 2024-11-27

## 2024-11-25 RX ADMIN — Medication 1 APPLICATION(S): at 13:41

## 2024-11-25 RX ADMIN — Medication 1 APPLICATION(S): at 06:51

## 2024-11-25 RX ADMIN — TRAMADOL HYDROCHLORIDE 25 MILLIGRAM(S): 300 CAPSULE ORAL at 15:53

## 2024-11-25 RX ADMIN — COLLAGENASE SANTYL 1 APPLICATION(S): 250 OINTMENT TOPICAL at 12:04

## 2024-11-25 RX ADMIN — Medication 1 APPLICATION(S): at 12:03

## 2024-11-25 RX ADMIN — Medication 1 APPLICATION(S): at 21:19

## 2024-11-25 RX ADMIN — MEROPENEM 100 MILLIGRAM(S): 500 INJECTION, POWDER, FOR SOLUTION INTRAVENOUS at 05:21

## 2024-11-25 RX ADMIN — Medication 2 TABLET(S): at 21:19

## 2024-11-25 RX ADMIN — TRAMADOL HYDROCHLORIDE 25 MILLIGRAM(S): 300 CAPSULE ORAL at 15:13

## 2024-11-25 RX ADMIN — MEROPENEM 100 MILLIGRAM(S): 500 INJECTION, POWDER, FOR SOLUTION INTRAVENOUS at 17:25

## 2024-11-25 RX ADMIN — Medication 1: at 12:05

## 2024-11-25 RX ADMIN — Medication 100 MILLIGRAM(S): at 18:15

## 2024-11-25 NOTE — PROGRESS NOTE ADULT - PROBLEM SELECTOR PLAN 1
ceftriaxone  FU Ucx  11/21/2024 f/u urine cx - continue with antibx  11/22/2024 urine cx - kleb /ecoli-- await sensitivities  11/24/2024 - kleb carbapenem resistant / ecoli esbl- defer to ID for antibx management    11/25/2024 - kleb carbapenem resistant / ecoli esbl- defer to ID for antibx management

## 2024-11-25 NOTE — PROGRESS NOTE ADULT - SUBJECTIVE AND OBJECTIVE BOX
SPENCER MIXON  MRN-[id number]  79y (1945)    Follow Up:  wounds, fever, CAUTI    Interval History: The pt was seen and examined earlier, not in acute distress, pt is awake and alert, calm, able to state her name and birthday, know she is in the hospital, unable to state current year or president. Pt is afebrile since 11/21, RA, no new cbc. Pt has no complaints.     PAST MEDICAL & SURGICAL HISTORY:  Osteoporosis      Uterine polyp      Herniated disc  lumbar      Functional quadriplegia secondary to multiple sclerosis      Bilateral Tubal ligation  48y/o          ROS:    [ ] Unobtainable because:  [x ] All other systems negative    Constitutional: no fever, no chills  Head: no trauma  Eyes: no vision changes, no eye pain  ENT:  no sore throat, no rhinorrhea  Cardiovascular:  no chest pain, no palpitation  Respiratory:  no SOB, no cough  GI:  no abd pain, no vomiting, no diarrhea  urinary: no dysuria, no hematuria, no flank pain  musculoskeletal:  no joint pain, no joint swelling  skin:  no rash  neurology:  no headache, no seizure, no change in mental status  psych: no anxiety, no depression         Allergies  No Known Allergies        ANTIMICROBIALS:  meropenem  IVPB 1000 every 8 hours  vancomycin  IVPB 500 every 24 hours      OTHER MEDS:  acetaminophen     Tablet .. 650 milliGRAM(s) Oral every 6 hours PRN  aluminum hydroxide/magnesium hydroxide/simethicone Suspension 30 milliLiter(s) Oral every 4 hours PRN  collagenase Ointment 1 Application(s) Topical daily  Dakins Solution - 1/2 Strength 1 Application(s) Topical daily  dextrose 5%. 1000 milliLiter(s) IV Continuous <Continuous>  dextrose 5%. 1000 milliLiter(s) IV Continuous <Continuous>  dextrose 50% Injectable 25 Gram(s) IV Push once  dextrose 50% Injectable 12.5 Gram(s) IV Push once  dextrose 50% Injectable 25 Gram(s) IV Push once  dextrose Oral Gel 15 Gram(s) Oral once PRN  glucagon  Injectable 1 milliGRAM(s) IntraMuscular once  insulin lispro (ADMELOG) corrective regimen sliding scale   SubCutaneous at bedtime  insulin lispro (ADMELOG) corrective regimen sliding scale   SubCutaneous three times a day before meals  melatonin 3 milliGRAM(s) Oral at bedtime PRN  ondansetron Injectable 4 milliGRAM(s) IV Push every 8 hours PRN  senna 2 Tablet(s) Oral at bedtime  sodium chloride 0.9%. 250 milliLiter(s) IV Continuous <Continuous>  traMADol 25 milliGRAM(s) Oral every 8 hours PRN  vitamin A & D Ointment 1 Application(s) Topical three times a day      Vital Signs Last 24 Hrs  T(C): 36.8 (25 Nov 2024 05:22), Max: 37 (24 Nov 2024 16:44)  T(F): 98.2 (25 Nov 2024 05:22), Max: 98.6 (24 Nov 2024 16:44)  HR: 94 (25 Nov 2024 05:22) (87 - 94)  BP: 105/56 (25 Nov 2024 05:22) (105/56 - 117/67)  BP(mean): --  RR: 16 (25 Nov 2024 05:22) (16 - 18)  SpO2: 94% (25 Nov 2024 05:22) (94% - 99%)    Parameters below as of 25 Nov 2024 05:22  Patient On (Oxygen Delivery Method): room air        Physical Exam:  General:    weak looking, frail elderly contracted female  Head: atraumatic, normocephalic, bitemporal wasting   Eyes: normal sclera and conjunctiva  ENT:   no oropharyngeal lesions, no LAD, neck supple  Cardio:    regular S1,S2  Respiratory:   No wheezing, No rhonchi  abd:   soft, BS +, not tender, no distention  :     no CVAT, no suprapubic tenderness, has amezquita and urine is with sediments  Musculoskeletal : contracted, multiple decubiti with most prominent on right hip, left hip and upper back and sacral  region , deep wounds, packed   Skin:    no rash, PIV ok  Neurologic:    awake, able to answer simple questions  psych: normal affect, calm    WBC Count: 13.91 K/uL (11-24 @ 06:10)  WBC Count: 13.74 K/uL (11-23 @ 05:32)  WBC Count: 14.05 K/uL (11-22 @ 06:26)  WBC Count: 13.61 K/uL (11-21 @ 23:09)  WBC Count: 14.73 K/uL (11-21 @ 12:02)  WBC Count: 13.57 K/uL (11-21 @ 08:15)  WBC Count: 15.34 K/uL (11-19 @ 23:05)                            8.0    13.91 )-----------( 268      ( 24 Nov 2024 06:10 )             24.7       11-24    137  |  104  |  12  ----------------------------<  139[H]  3.4[L]   |  28  |  0.18[L]    Ca    7.7[L]      24 Nov 2024 06:10  Phos  2.4     11-24  Mg     1.8     11-24        Urinalysis Basic - ( 24 Nov 2024 06:10 )    Color: x / Appearance: x / SG: x / pH: x  Gluc: 139 mg/dL / Ketone: x  / Bili: x / Urobili: x   Blood: x / Protein: x / Nitrite: x   Leuk Esterase: x / RBC: x / WBC x   Sq Epi: x / Non Sq Epi: x / Bacteria: x        Creatinine Trend: 0.18<--, 0.27<--, 0.23<--, 0.17<--, 0.47<--, 0.38<--      MICROBIOLOGY:  Vancomycin Level, Trough: 3.1 ug/mL (11-24-24 @ 19:00)  v  .Abscess  11-22-24   Few Escherichia coli ESBL  Rare Enterococcus faecalis  Rare Streptococcus mitis/oralis group "Susceptibilities not performed"  --  Escherichia coli ESBL  Enterococcus faecalis      .Blood BLOOD  11-21-24   No growth at 72 Hours  --  --      .Blood BLOOD  11-21-24   No growth at 72 Hours  --  --      Clean Catch  11-20-24   >100,000 CFU/ml Escherichia coli ESBL  <10,000 CFU/ml Normal Urogenital korey present  --  Escherichia coli ESBL      Clean Catch  11-20-24   >100,000 CFU/ml Klebsiella pneumoniae (Carbapenem Resistant)  >100,000 CFU/ml Escherichia coli ESBL  --  Klebsiella pneumoniae (Carbapenem Resistant)  Escherichia coli ESBL      RADIOLOGY:     SPENCER MIXON  [id number]  79y (1945)    Follow Up:  wounds, fever, CAUTI    Interval History: The pt was seen and examined earlier, not in acute distress, pt is awake and alert, calm, able to state her name and birthday, know she is in the hospital, unable to state current year or president. Pt is afebrile since 11/21, RA, no new cbc. Pt has no complaints.     PAST MEDICAL & SURGICAL HISTORY:  Osteoporosis      Uterine polyp      Herniated disc  lumbar      Functional quadriplegia secondary to multiple sclerosis      Bilateral Tubal ligation  48y/o          ROS:    [ ] Unobtainable because:  [x ] All other systems negative    Constitutional: no fever, no chills  Head: no trauma  Eyes: no vision changes, no eye pain  ENT:  no sore throat, no rhinorrhea  Cardiovascular:  no chest pain, no palpitation  Respiratory:  no SOB, no cough  GI:  no abd pain, no vomiting, no diarrhea  urinary: no dysuria, no hematuria, no flank pain  musculoskeletal:  no joint pain, no joint swelling  skin:  no rash  neurology:  no headache, no seizure, no change in mental status  psych: no anxiety, no depression         Allergies  No Known Allergies        ANTIMICROBIALS:  meropenem  IVPB 1000 every 8 hours  vancomycin  IVPB 500 every 24 hours      OTHER MEDS:  acetaminophen     Tablet .. 650 milliGRAM(s) Oral every 6 hours PRN  aluminum hydroxide/magnesium hydroxide/simethicone Suspension 30 milliLiter(s) Oral every 4 hours PRN  collagenase Ointment 1 Application(s) Topical daily  Dakins Solution - 1/2 Strength 1 Application(s) Topical daily  dextrose 5%. 1000 milliLiter(s) IV Continuous <Continuous>  dextrose 5%. 1000 milliLiter(s) IV Continuous <Continuous>  dextrose 50% Injectable 25 Gram(s) IV Push once  dextrose 50% Injectable 12.5 Gram(s) IV Push once  dextrose 50% Injectable 25 Gram(s) IV Push once  dextrose Oral Gel 15 Gram(s) Oral once PRN  glucagon  Injectable 1 milliGRAM(s) IntraMuscular once  insulin lispro (ADMELOG) corrective regimen sliding scale   SubCutaneous at bedtime  insulin lispro (ADMELOG) corrective regimen sliding scale   SubCutaneous three times a day before meals  melatonin 3 milliGRAM(s) Oral at bedtime PRN  ondansetron Injectable 4 milliGRAM(s) IV Push every 8 hours PRN  senna 2 Tablet(s) Oral at bedtime  sodium chloride 0.9%. 250 milliLiter(s) IV Continuous <Continuous>  traMADol 25 milliGRAM(s) Oral every 8 hours PRN  vitamin A & D Ointment 1 Application(s) Topical three times a day      Vital Signs Last 24 Hrs  T(C): 36.8 (25 Nov 2024 05:22), Max: 37 (24 Nov 2024 16:44)  T(F): 98.2 (25 Nov 2024 05:22), Max: 98.6 (24 Nov 2024 16:44)  HR: 94 (25 Nov 2024 05:22) (87 - 94)  BP: 105/56 (25 Nov 2024 05:22) (105/56 - 117/67)  BP(mean): --  RR: 16 (25 Nov 2024 05:22) (16 - 18)  SpO2: 94% (25 Nov 2024 05:22) (94% - 99%)    Parameters below as of 25 Nov 2024 05:22  Patient On (Oxygen Delivery Method): room air        Physical Exam:  General:    weak looking, frail elderly contracted female  Head: atraumatic, normocephalic, bitemporal wasting   Eyes: normal sclera and conjunctiva  ENT:   no oropharyngeal lesions, no LAD, neck supple  Cardio:    regular S1,S2  Respiratory:   No wheezing, No rhonchi  abd:   soft, BS +, not tender, no distention  :     no CVAT, no suprapubic tenderness, has amezquita and urine is with sediments  Musculoskeletal : contracted, multiple decubiti with most prominent on right hip, left hip and upper back and sacral  region , deep wounds, packed   Skin:    no rash, PIV ok  Neurologic:    awake, able to answer simple questions  psych: normal affect, calm    WBC Count: 13.91 K/uL (11-24 @ 06:10)  WBC Count: 13.74 K/uL (11-23 @ 05:32)  WBC Count: 14.05 K/uL (11-22 @ 06:26)  WBC Count: 13.61 K/uL (11-21 @ 23:09)  WBC Count: 14.73 K/uL (11-21 @ 12:02)  WBC Count: 13.57 K/uL (11-21 @ 08:15)  WBC Count: 15.34 K/uL (11-19 @ 23:05)                            8.0    13.91 )-----------( 268      ( 24 Nov 2024 06:10 )             24.7       11-24    137  |  104  |  12  ----------------------------<  139[H]  3.4[L]   |  28  |  0.18[L]    Ca    7.7[L]      24 Nov 2024 06:10  Phos  2.4     11-24  Mg     1.8     11-24        Urinalysis Basic - ( 24 Nov 2024 06:10 )    Color: x / Appearance: x / SG: x / pH: x  Gluc: 139 mg/dL / Ketone: x  / Bili: x / Urobili: x   Blood: x / Protein: x / Nitrite: x   Leuk Esterase: x / RBC: x / WBC x   Sq Epi: x / Non Sq Epi: x / Bacteria: x        Creatinine Trend: 0.18<--, 0.27<--, 0.23<--, 0.17<--, 0.47<--, 0.38<--      MICROBIOLOGY:  Vancomycin Level, Trough: 3.1 ug/mL (11-24-24 @ 19:00)    .Abscess  11-22-24   Few Escherichia coli ESBL  Rare Enterococcus faecalis  Rare Streptococcus mitis/oralis group "Susceptibilities not performed"  --  Escherichia coli ESBL  Enterococcus faecalis      .Blood BLOOD  11-21-24   No growth at 72 Hours  --  --      .Blood BLOOD  11-21-24   No growth at 72 Hours  --  --      Clean Catch  11-20-24   >100,000 CFU/ml Escherichia coli ESBL  <10,000 CFU/ml Normal Urogenital korey present  --  Escherichia coli ESBL      Clean Catch  11-20-24   >100,000 CFU/ml Klebsiella pneumoniae (Carbapenem Resistant)  >100,000 CFU/ml Escherichia coli ESBL  --  Klebsiella pneumoniae (Carbapenem Resistant)  Escherichia coli ESBL      RADIOLOGY:

## 2024-11-25 NOTE — PROGRESS NOTE ADULT - SUBJECTIVE AND OBJECTIVE BOX
Patient is a 79y old  Female who presents with a chief complaint of UTI, failure to thrive , hyponatremia. (20 Nov 2024 04:32)      OVERNIGHT EVENTS:    MEDICATIONS  (STANDING):  collagenase Ointment 1 Application(s) Topical daily  Dakins Solution - 1/2 Strength 1 Application(s) Topical daily  dextrose 5%. 1000 milliLiter(s) (50 mL/Hr) IV Continuous <Continuous>  dextrose 5%. 1000 milliLiter(s) (100 mL/Hr) IV Continuous <Continuous>  dextrose 50% Injectable 25 Gram(s) IV Push once  dextrose 50% Injectable 12.5 Gram(s) IV Push once  dextrose 50% Injectable 25 Gram(s) IV Push once  glucagon  Injectable 1 milliGRAM(s) IntraMuscular once  insulin lispro (ADMELOG) corrective regimen sliding scale   SubCutaneous three times a day before meals  insulin lispro (ADMELOG) corrective regimen sliding scale   SubCutaneous at bedtime  meropenem  IVPB 1000 milliGRAM(s) IV Intermittent every 12 hours  senna 2 Tablet(s) Oral at bedtime  sodium chloride 0.9%. 250 milliLiter(s) (250 mL/Hr) IV Continuous <Continuous>  vancomycin  IVPB 500 milliGRAM(s) IV Intermittent every 12 hours  vitamin A & D Ointment 1 Application(s) Topical three times a day    MEDICATIONS  (PRN):  acetaminophen     Tablet .. 650 milliGRAM(s) Oral every 6 hours PRN Temp greater or equal to 38C (100.4F), Mild Pain (1 - 3)  aluminum hydroxide/magnesium hydroxide/simethicone Suspension 30 milliLiter(s) Oral every 4 hours PRN Dyspepsia  dextrose Oral Gel 15 Gram(s) Oral once PRN Blood Glucose LESS THAN 70 milliGRAM(s)/deciliter  melatonin 3 milliGRAM(s) Oral at bedtime PRN Insomnia  ondansetron Injectable 4 milliGRAM(s) IV Push every 8 hours PRN Nausea and/or Vomiting  traMADol 25 milliGRAM(s) Oral every 8 hours PRN Severe Pain (7 - 10)    Allergies    No Known Allergies    Intolerances        SUBJECTIVE: in bed in NAD, no acute events overnight   Vital Signs Last 24 Hrs  T(C): 36.7 (25 Nov 2024 11:11), Max: 37 (24 Nov 2024 16:44)  T(F): 98 (25 Nov 2024 11:11), Max: 98.6 (24 Nov 2024 16:44)  HR: 100 (25 Nov 2024 11:11) (87 - 100)  BP: 106/58 (25 Nov 2024 11:11) (105/56 - 117/67)  BP(mean): --  RR: 17 (25 Nov 2024 11:11) (16 - 18)  SpO2: 99% (25 Nov 2024 11:11) (94% - 99%)    Parameters below as of 25 Nov 2024 11:11  Patient On (Oxygen Delivery Method): room air            PHYSICAL EXAM:  GENERAL: NAD, cachetic frail   HEAD:  Atraumatic, Normocephalic  EYES: EOMI, PERRLA, conjunctiva and sclera clear  ENMT: No tonsillar erythema, exudates, or enlargement; Moist mucous membranes, Good dentition, No lesions  NECK: Supple,   CHEST/LUNG: Clear to  auscultation bilaterally; No rales, rhonchi, wheezing, or rubs  bilaterally  HEART: Regular rate and rhythm; No murmurs, rubs, or gallops  ABDOMEN: Soft, Nontender, Nondistended; Bowel sounds present  EXTREMITIES:  2+ Peripheral Pulses, No clubbing, cyanosis, or edema BL LE  SKIN: multiple  decubiti  ulcers on back, hips and feet varying stages and depths   NERVOUS SYSTEM:  Alert , contracted       LABS:                                     Culture - Abscess with Gram Stain (collected 22 Nov 2024 17:40)  Source: .Abscess  Gram Stain (prelim) (23 Nov 2024 18:48):    No polymorphonuclear cells seen per low power field    Few Gram Negative Rods seen per oil power field    Few Gram positive cocci in pairs seen per oil power field  Preliminary Report (23 Nov 2024 18:48):    Few Escherichia coli    Rare Enterococcus faecalis    Rare Streptococcus mitis/oralis group "Susceptibilities not performed"    Culture - Blood (collected 21 Nov 2024 16:28)  Source: .Blood BLOOD  Preliminary Report (24 Nov 2024 01:01):    No growth at 48 Hours    Culture - Blood (collected 21 Nov 2024 16:25)  Source: .Blood BLOOD  Preliminary Report (24 Nov 2024 01:01):    No growth at 48 Hours        Urinalysis Basic - ( 20 Nov 2024 10:20 )    Color: x / Appearance: x / SG: x / pH: x  Gluc: 284 mg/dL / Ketone: x  / Bili: x / Urobili: x   Blood: x / Protein: x / Nitrite: x   Leuk Esterase: x / RBC: x / WBC x   Sq Epi: x / Non Sq Epi: x / Bacteria: x      Cultures;     CAPILLARY BLOOD GLUCOSE    CAPILLARY BLOOD GLUCOSE      POCT Blood Glucose.: 159 mg/dL (24 Nov 2024 11:32)  POCT Blood Glucose.: 159 mg/dL (24 Nov 2024 07:49)  POCT Blood Glucose.: 249 mg/dL (23 Nov 2024 21:35)  POCT Blood Glucose.: 169 mg/dL (23 Nov 2024 16:26)    Lipid panel:           RADIOLOGY & ADDITIONAL TESTS:      Imaging Personally Reviewed:  [ x] YES      Consultant(s) Notes Reviewed:  [x ] YES     Care Discussed with [x ] Consultants [X ] Patient [x ] Family  [x ]    [x ]  Other; RN

## 2024-11-25 NOTE — PROGRESS NOTE ADULT - ASSESSMENT
A/P-  79 year old Female with  pmh osteoporosis, MS, bedbound. infected decubitus ulcers presenting to the ED for reduced appetite x 2-3 days.   patient  also has chronic amezquita     Infectious disease consult called 11/22/2024 at 3:45 pm to help with antibiotic management.    +fever  +leukocytosis  CAUTI with urine cx>100K e.coli and K,pneumonia  Infected looking hip and sacral and  upper back decubiti    11/25  no fevers since 11/21  RA  no new cbc  UC #1 grew Klebsiella and ESBL E. Coli  UC #2 grew ESBL E. Coli  BCs x 2 NGTD  abx were changed over the weekend to Meropenem, IV Vanco continued  Vanco level is 3.1 yesterday     Impression-  multiple infected deep decubiti , most likely OM  CAUTI      Plan-  Zosyn was changed to Meropenem IV over the weekend (day #1)  continue vanco pending decubiti debridement and decubiti wound cx.  keep vanco trough <15, -600  patient needs to have debridement of the infected ,necrotic  decubiti.  please send wound cx as well.  frequent offloading   if amezquita is needed please make sure it gets changed every 2 weeks .  monitor for aspiration precautions as well     A/P-  79 year old Female with  pmh osteoporosis, MS, bedbound. infected decubitus ulcers presenting to the ED for reduced appetite x 2-3 days.   patient  also has chronic amezquita     Infectious disease consult called 11/22/2024 at 3:45 pm to help with antibiotic management.    +fever  +leukocytosis  CAUTI with urine cx>100K e.coli and K,pneumonia  Infected looking hip and sacral and  upper back decubiti    11/25  no fevers since 11/21  13K leukocytosis  UC #1 grew Klebsiella and ESBL E. Coli  UC #2 grew ESBL E. Coli  BCs x 2 NGTD  wound cx-ESBL, e.feaclis, strep Mitis from 11/22   abx were changed over the weekend to Meropenem, IV Vanco continued  Vanco level is 3.1 yesterday     Impression-  multiple infected deep decubiti , most likely OM  CAUTI from pre-admission      Plan-  Zosyn was changed to Meropenem IV over the weekend (day #1)  continue vanco   keep vanco trough <15, -600  s/p debridement by wound care on 11/22/2024  frequent offloading   if amezquita is needed please make sure it gets changed every 2 weeks .  monitor for aspiration precautions as well

## 2024-11-26 LAB
ANION GAP SERPL CALC-SCNC: 4 MMOL/L — LOW (ref 5–17)
BUN SERPL-MCNC: 13 MG/DL — SIGNIFICANT CHANGE UP (ref 7–23)
CALCIUM SERPL-MCNC: 8.2 MG/DL — LOW (ref 8.5–10.1)
CHLORIDE SERPL-SCNC: 106 MMOL/L — SIGNIFICANT CHANGE UP (ref 96–108)
CO2 SERPL-SCNC: 28 MMOL/L — SIGNIFICANT CHANGE UP (ref 22–31)
CREAT SERPL-MCNC: 0.25 MG/DL — LOW (ref 0.5–1.3)
EGFR: 113 ML/MIN/1.73M2 — SIGNIFICANT CHANGE UP
GLUCOSE BLDC GLUCOMTR-MCNC: 144 MG/DL — HIGH (ref 70–99)
GLUCOSE BLDC GLUCOMTR-MCNC: 153 MG/DL — HIGH (ref 70–99)
GLUCOSE BLDC GLUCOMTR-MCNC: 222 MG/DL — HIGH (ref 70–99)
GLUCOSE BLDC GLUCOMTR-MCNC: 226 MG/DL — HIGH (ref 70–99)
GLUCOSE SERPL-MCNC: 193 MG/DL — HIGH (ref 70–99)
HCT VFR BLD CALC: 26.7 % — LOW (ref 34.5–45)
HGB BLD-MCNC: 8.3 G/DL — LOW (ref 11.5–15.5)
MAGNESIUM SERPL-MCNC: 2.1 MG/DL — SIGNIFICANT CHANGE UP (ref 1.6–2.6)
MCHC RBC-ENTMCNC: 24.3 PG — LOW (ref 27–34)
MCHC RBC-ENTMCNC: 31.1 G/DL — LOW (ref 32–36)
MCV RBC AUTO: 78.1 FL — LOW (ref 80–100)
NRBC # BLD: 0 /100 WBCS — SIGNIFICANT CHANGE UP (ref 0–0)
PHOSPHATE SERPL-MCNC: 2.3 MG/DL — LOW (ref 2.5–4.5)
PLATELET # BLD AUTO: 313 K/UL — SIGNIFICANT CHANGE UP (ref 150–400)
POTASSIUM SERPL-MCNC: 4.3 MMOL/L — SIGNIFICANT CHANGE UP (ref 3.5–5.3)
POTASSIUM SERPL-SCNC: 4.3 MMOL/L — SIGNIFICANT CHANGE UP (ref 3.5–5.3)
RBC # BLD: 3.42 M/UL — LOW (ref 3.8–5.2)
RBC # FLD: 22.2 % — HIGH (ref 10.3–14.5)
SODIUM SERPL-SCNC: 138 MMOL/L — SIGNIFICANT CHANGE UP (ref 135–145)
SURGICAL PATHOLOGY STUDY: SIGNIFICANT CHANGE UP
SURGICAL PATHOLOGY STUDY: SIGNIFICANT CHANGE UP
VANCOMYCIN TROUGH SERPL-MCNC: 6.2 UG/ML — LOW (ref 10–20)
WBC # BLD: 13.28 K/UL — HIGH (ref 3.8–10.5)
WBC # FLD AUTO: 13.28 K/UL — HIGH (ref 3.8–10.5)

## 2024-11-26 PROCEDURE — 99231 SBSQ HOSP IP/OBS SF/LOW 25: CPT

## 2024-11-26 PROCEDURE — 99232 SBSQ HOSP IP/OBS MODERATE 35: CPT

## 2024-11-26 RX ORDER — HEPARIN SODIUM 5000 [USP'U]/.5ML
15 INJECTION, SOLUTION INTRAVENOUS; SUBCUTANEOUS ONCE
Refills: 0 | Status: COMPLETED | OUTPATIENT
Start: 2024-11-26 | End: 2024-11-26

## 2024-11-26 RX ADMIN — COLLAGENASE SANTYL 1 APPLICATION(S): 250 OINTMENT TOPICAL at 14:21

## 2024-11-26 RX ADMIN — Medication 1 APPLICATION(S): at 21:28

## 2024-11-26 RX ADMIN — Medication 100 MILLIGRAM(S): at 06:07

## 2024-11-26 RX ADMIN — Medication 2 TABLET(S): at 21:24

## 2024-11-26 RX ADMIN — Medication 1 APPLICATION(S): at 14:50

## 2024-11-26 RX ADMIN — Medication 1 APPLICATION(S): at 11:52

## 2024-11-26 RX ADMIN — Medication 100 MILLIGRAM(S): at 21:13

## 2024-11-26 RX ADMIN — MEROPENEM 100 MILLIGRAM(S): 500 INJECTION, POWDER, FOR SOLUTION INTRAVENOUS at 18:37

## 2024-11-26 RX ADMIN — Medication 1 APPLICATION(S): at 06:15

## 2024-11-26 RX ADMIN — MEROPENEM 100 MILLIGRAM(S): 500 INJECTION, POWDER, FOR SOLUTION INTRAVENOUS at 06:07

## 2024-11-26 RX ADMIN — Medication 1: at 11:50

## 2024-11-26 RX ADMIN — Medication 2: at 08:19

## 2024-11-26 RX ADMIN — HEPARIN SODIUM 62.5 MILLIMOLE(S): 5000 INJECTION, SOLUTION INTRAVENOUS; SUBCUTANEOUS at 14:54

## 2024-11-26 NOTE — PROGRESS NOTE ADULT - SUBJECTIVE AND OBJECTIVE BOX
SPENCER MIXON  [id number]  79y (1945)    Follow Up:  OM, wounds     Interval History: The pt was seen and examined earlier, not in acute distress, no new complaints, awake and alert, fatigued, answers simple questions. Pt is afebrile, RA, leukocytosis is about the same 13.28.    PAST MEDICAL & SURGICAL HISTORY:  Osteoporosis      Uterine polyp      Herniated disc  lumbar      Functional quadriplegia secondary to multiple sclerosis      Bilateral Tubal ligation  46y/o          ROS:    [ ] Unobtainable because:  [ x] All other systems negative    Constitutional: no fever, no chills  Head: no trauma  Eyes: no vision changes, no eye pain  ENT:  no sore throat, no rhinorrhea  Cardiovascular:  no chest pain, no palpitation  Respiratory:  no SOB, no cough  GI:  no abd pain, no vomiting, no diarrhea  urinary: no dysuria, no hematuria, no flank pain  musculoskeletal:  no joint pain, no joint swelling  skin:  no rash  neurology:  no headache, no seizure, no change in mental status  psych: no anxiety, no depression         Allergies  No Known Allergies        ANTIMICROBIALS:  meropenem  IVPB 1000 every 12 hours  vancomycin  IVPB 500 every 12 hours      OTHER MEDS:  acetaminophen     Tablet .. 650 milliGRAM(s) Oral every 6 hours PRN  aluminum hydroxide/magnesium hydroxide/simethicone Suspension 30 milliLiter(s) Oral every 4 hours PRN  collagenase Ointment 1 Application(s) Topical daily  Dakins Solution - 1/2 Strength 1 Application(s) Topical daily  dextrose 5%. 1000 milliLiter(s) IV Continuous <Continuous>  dextrose 5%. 1000 milliLiter(s) IV Continuous <Continuous>  dextrose 50% Injectable 25 Gram(s) IV Push once  dextrose 50% Injectable 12.5 Gram(s) IV Push once  dextrose 50% Injectable 25 Gram(s) IV Push once  dextrose Oral Gel 15 Gram(s) Oral once PRN  glucagon  Injectable 1 milliGRAM(s) IntraMuscular once  insulin lispro (ADMELOG) corrective regimen sliding scale   SubCutaneous three times a day before meals  insulin lispro (ADMELOG) corrective regimen sliding scale   SubCutaneous at bedtime  melatonin 3 milliGRAM(s) Oral at bedtime PRN  ondansetron Injectable 4 milliGRAM(s) IV Push every 8 hours PRN  senna 2 Tablet(s) Oral at bedtime  sodium chloride 0.9%. 250 milliLiter(s) IV Continuous <Continuous>  traMADol 25 milliGRAM(s) Oral every 8 hours PRN  vitamin A & D Ointment 1 Application(s) Topical three times a day      Vital Signs Last 24 Hrs  T(C): 36.7 (26 Nov 2024 10:35), Max: 37.2 (25 Nov 2024 23:48)  T(F): 98.1 (26 Nov 2024 10:35), Max: 98.9 (25 Nov 2024 23:48)  HR: 93 (26 Nov 2024 10:35) (87 - 103)  BP: 94/58 (26 Nov 2024 10:35) (94/58 - 126/73)  BP(mean): --  RR: 16 (26 Nov 2024 10:35) (16 - 17)  SpO2: 99% (26 Nov 2024 10:35) (98% - 99%)    Parameters below as of 26 Nov 2024 10:35  Patient On (Oxygen Delivery Method): room air        Physical Exam:  General:    weak looking, frail elderly contracted female  Head: atraumatic, normocephalic, bitemporal wasting   Eyes: normal sclera and conjunctiva  ENT:   no oropharyngeal lesions, no LAD, neck supple  Cardio:    regular S1,S2  Respiratory:   No wheezing, No rhonchi  abd:   soft, BS +, not tender, no distention  :     no CVAT, no suprapubic tenderness, has amezquita and urine is with sediments  Musculoskeletal : contracted, multiple decubiti with most prominent on right hip, left hip and upper back and sacral  region , deep wounds, packed   Skin:    no rash, PIV ok  Neurologic:    awake, able to answer simple questions  psych: normal affect, calm    WBC Count: 13.28 K/uL (11-26 @ 07:06)  WBC Count: 13.91 K/uL (11-24 @ 06:10)  WBC Count: 13.74 K/uL (11-23 @ 05:32)  WBC Count: 14.05 K/uL (11-22 @ 06:26)  WBC Count: 13.61 K/uL (11-21 @ 23:09)  WBC Count: 14.73 K/uL (11-21 @ 12:02)  WBC Count: 13.57 K/uL (11-21 @ 08:15)                            8.3    13.28 )-----------( 313      ( 26 Nov 2024 07:06 )             26.7       11-26    138  |  106  |  13  ----------------------------<  193[H]  4.3   |  28  |  0.25[L]    Ca    8.2[L]      26 Nov 2024 07:06  Phos  2.3     11-26  Mg     2.1     11-26        Urinalysis Basic - ( 26 Nov 2024 07:06 )    Color: x / Appearance: x / SG: x / pH: x  Gluc: 193 mg/dL / Ketone: x  / Bili: x / Urobili: x   Blood: x / Protein: x / Nitrite: x   Leuk Esterase: x / RBC: x / WBC x   Sq Epi: x / Non Sq Epi: x / Bacteria: x        Creatinine Trend: 0.25<--, 0.18<--, 0.27<--, 0.23<--, 0.17<--, 0.47<--      MICROBIOLOGY:  v  .Abscess  11-22-24   Few Escherichia coli ESBL  Rare Enterococcus faecalis  Rare Streptococcus mitis/oralis group "Susceptibilities not performed"  --  Escherichia coli ESBL  Enterococcus faecalis      .Blood BLOOD  11-21-24   No growth at 4 days  --  --      .Blood BLOOD  11-21-24   No growth at 4 days  --  --      Clean Catch  11-20-24   >100,000 CFU/ml Escherichia coli ESBL  <10,000 CFU/ml Normal Urogenital korey present  --  Escherichia coli ESBL      Clean Catch  11-20-24   >100,000 CFU/ml Klebsiella pneumoniae (Carbapenem Resistant)  >100,000 CFU/ml Escherichia coli ESBL  --  Klebsiella pneumoniae (Carbapenem Resistant)  Escherichia coli ESBL        RADIOLOGY:     SPENCER MIXON  [id number]  79y (1945)    Follow Up:  OM, wounds     Interval History: The pt was seen and examined earlier, not in acute distress, no new complaints, awake and alert, fatigued, answers simple questions. Pt is afebrile, RA, leukocytosis is about the same 13.28.    PAST MEDICAL & SURGICAL HISTORY:  Osteoporosis      Uterine polyp      Herniated disc  lumbar      Functional quadriplegia secondary to multiple sclerosis      Bilateral Tubal ligation  48y/o          ROS:    [ ] Unobtainable because:  [ x] All other systems negative    Constitutional: no fever, no chills  Head: no trauma  Eyes: no vision changes, no eye pain  ENT:  no sore throat, no rhinorrhea  Cardiovascular:  no chest pain, no palpitation  Respiratory:  no SOB, no cough  GI:  no abd pain, no vomiting, no diarrhea  urinary: no dysuria, no hematuria, no flank pain  musculoskeletal:  no joint pain, no joint swelling  skin:  no rash  neurology:  no headache, no seizure, no change in mental status  psych: no anxiety, no depression         Allergies  No Known Allergies        ANTIMICROBIALS:  meropenem  IVPB 1000 every 12 hours  vancomycin  IVPB 500 every 12 hours      OTHER MEDS:  acetaminophen     Tablet .. 650 milliGRAM(s) Oral every 6 hours PRN  aluminum hydroxide/magnesium hydroxide/simethicone Suspension 30 milliLiter(s) Oral every 4 hours PRN  collagenase Ointment 1 Application(s) Topical daily  Dakins Solution - 1/2 Strength 1 Application(s) Topical daily  dextrose 5%. 1000 milliLiter(s) IV Continuous <Continuous>  dextrose 5%. 1000 milliLiter(s) IV Continuous <Continuous>  dextrose 50% Injectable 25 Gram(s) IV Push once  dextrose 50% Injectable 12.5 Gram(s) IV Push once  dextrose 50% Injectable 25 Gram(s) IV Push once  dextrose Oral Gel 15 Gram(s) Oral once PRN  glucagon  Injectable 1 milliGRAM(s) IntraMuscular once  insulin lispro (ADMELOG) corrective regimen sliding scale   SubCutaneous three times a day before meals  insulin lispro (ADMELOG) corrective regimen sliding scale   SubCutaneous at bedtime  melatonin 3 milliGRAM(s) Oral at bedtime PRN  ondansetron Injectable 4 milliGRAM(s) IV Push every 8 hours PRN  senna 2 Tablet(s) Oral at bedtime  sodium chloride 0.9%. 250 milliLiter(s) IV Continuous <Continuous>  traMADol 25 milliGRAM(s) Oral every 8 hours PRN  vitamin A & D Ointment 1 Application(s) Topical three times a day      Vital Signs Last 24 Hrs  T(C): 36.7 (26 Nov 2024 10:35), Max: 37.2 (25 Nov 2024 23:48)  T(F): 98.1 (26 Nov 2024 10:35), Max: 98.9 (25 Nov 2024 23:48)  HR: 93 (26 Nov 2024 10:35) (87 - 103)  BP: 94/58 (26 Nov 2024 10:35) (94/58 - 126/73)  BP(mean): --  RR: 16 (26 Nov 2024 10:35) (16 - 17)  SpO2: 99% (26 Nov 2024 10:35) (98% - 99%)    Parameters below as of 26 Nov 2024 10:35  Patient On (Oxygen Delivery Method): room air        Physical Exam:  General:    weak looking, frail elderly contracted female  Head: atraumatic, normocephalic, bitemporal wasting   Eyes: normal sclera and conjunctiva  ENT:   no oropharyngeal lesions, no LAD, neck supple  Cardio:    regular S1,S2  Respiratory:   No wheezing, No rhonchi  abd:   soft, BS +, not tender, no distention  :     no CVAT, no suprapubic tenderness, has amezquita and urine is with sediments  Musculoskeletal : contracted, multiple decubiti with most prominent on right hip, left hip and upper back and sacral  region , deep wounds, packed   Skin:    no rash, PIV ok  Neurologic:    awake, able to answer simple questions  psych: normal affect, calm    WBC Count: 13.28 K/uL (11-26 @ 07:06)  WBC Count: 13.91 K/uL (11-24 @ 06:10)  WBC Count: 13.74 K/uL (11-23 @ 05:32)  WBC Count: 14.05 K/uL (11-22 @ 06:26)  WBC Count: 13.61 K/uL (11-21 @ 23:09)  WBC Count: 14.73 K/uL (11-21 @ 12:02)  WBC Count: 13.57 K/uL (11-21 @ 08:15)                            8.3    13.28 )-----------( 313      ( 26 Nov 2024 07:06 )             26.7       11-26    138  |  106  |  13  ----------------------------<  193[H]  4.3   |  28  |  0.25[L]    Ca    8.2[L]      26 Nov 2024 07:06  Phos  2.3     11-26  Mg     2.1     11-26        Urinalysis Basic - ( 26 Nov 2024 07:06 )    Color: x / Appearance: x / SG: x / pH: x  Gluc: 193 mg/dL / Ketone: x  / Bili: x / Urobili: x   Blood: x / Protein: x / Nitrite: x   Leuk Esterase: x / RBC: x / WBC x   Sq Epi: x / Non Sq Epi: x / Bacteria: x        Creatinine Trend: 0.25<--, 0.18<--, 0.27<--, 0.23<--, 0.17<--, 0.47<--      MICROBIOLOGY:    .Abscess  11-22-24   Few Escherichia coli ESBL  Rare Enterococcus faecalis  Rare Streptococcus mitis/oralis group "Susceptibilities not performed"  --  Escherichia coli ESBL  Enterococcus faecalis      .Blood BLOOD  11-21-24   No growth at 4 days  --  --      .Blood BLOOD  11-21-24   No growth at 4 days  --  --      Clean Catch  11-20-24   >100,000 CFU/ml Escherichia coli ESBL  <10,000 CFU/ml Normal Urogenital korey present  --  Escherichia coli ESBL      Clean Catch  11-20-24   >100,000 CFU/ml Klebsiella pneumoniae (Carbapenem Resistant)  >100,000 CFU/ml Escherichia coli ESBL  --  Klebsiella pneumoniae (Carbapenem Resistant)  Escherichia coli ESBL        RADIOLOGY:

## 2024-11-26 NOTE — PROGRESS NOTE ADULT - ASSESSMENT
A/P-  79 year old Female with  pmh osteoporosis, MS, bedbound. infected decubitus ulcers presenting to the ED for reduced appetite x 2-3 days.   patient  also has chronic amezquita     Infectious disease consult called 11/22/2024 at 3:45 pm to help with antibiotic management.    +fever  +leukocytosis  CAUTI with urine cx>100K e.coli and K,pneumonia  Infected looking hip and sacral and  upper back decubiti    11/25  no fevers since 11/21  13K leukocytosis  UC #1 grew Klebsiella and ESBL E. Coli  UC #2 grew ESBL E. Coli  BCs x 2 NGTD  wound cx-ESBL, e.feaclis, strep Mitis from 11/22   abx were changed over the weekend to Meropenem, IV Vanco continued  Vanco level is 3.1 yesterday     11/26: afebrile  RA  leukocytosis is about the same 13.28  Cr ok  culture data reviewed, no change   pending vac dressing application  abx continued, Meropenem and Vancomycin     Impression-  multiple infected deep decubiti , most likely OM  CAUTI from pre-admission      Plan-  continue Meropenem IV over the weekend (day #2)  continue vanco   keep vanco trough <15, -600  s/p debridement by wound care on 11/22/2024, pending wound vac application   frequent offloading   if amezquita is needed please make sure it gets changed every 2 weeks .  monitor for aspiration precautions as well      discussed with Dr. Nj   A/P-  79 year old Female with  pmh osteoporosis, MS, bedbound. infected decubitus ulcers presenting to the ED for reduced appetite x 2-3 days.   patient  also has chronic amezquita     Infectious disease consult called 11/22/2024 at 3:45 pm to help with antibiotic management.    +fever  +leukocytosis  CAUTI with urine cx>100K e.coli and K,pneumonia  Infected looking hip and sacral and  upper back decubiti    11/25  no fevers since 11/21  13K leukocytosis  UC #1 grew Klebsiella ( carbapanem resistant )  and ESBL E. Coli   UC #2 grew ESBL E. Coli ( repeat urine culture)  BCs x 2 NGTD  wound cx-ESBL, e.feaclis, strep Mitis from 11/22   abx were changed over the weekend to Meropenem, IV Vanco continued  Vanco level is 3.1 yesterday     11/26:   afebrile  on RA  leukocytosis is about the same 13.28  Cr ok  culture data reviewed, no change   pending vac dressing application  abx continued, Meropenem and Vancomycin     Impression-  multiple infected deep decubiti , most likely OM  CAUTI from pre-admission      Plan-  continue Meropenem IV over the weekend (day #2)  continue vanco   keep vanco trough <15, -600  s/p debridement by wound care on 11/22/2024, pending wound vac application   frequent offloading   if amezquita is needed please make sure it gets changed every 2 weeks .  monitor for aspiration precautions as well      discussed with Dr. Escobar

## 2024-11-27 LAB
CULTURE RESULTS: ABNORMAL
CULTURE RESULTS: SIGNIFICANT CHANGE UP
CULTURE RESULTS: SIGNIFICANT CHANGE UP
GLUCOSE BLDC GLUCOMTR-MCNC: 171 MG/DL — HIGH (ref 70–99)
GLUCOSE BLDC GLUCOMTR-MCNC: 206 MG/DL — HIGH (ref 70–99)
GLUCOSE BLDC GLUCOMTR-MCNC: 232 MG/DL — HIGH (ref 70–99)
GLUCOSE BLDC GLUCOMTR-MCNC: 243 MG/DL — HIGH (ref 70–99)
GRAM STN FLD: ABNORMAL
ORGANISM # SPEC MICROSCOPIC CNT: ABNORMAL
ORGANISM # SPEC MICROSCOPIC CNT: ABNORMAL
ORGANISM # SPEC MICROSCOPIC CNT: SIGNIFICANT CHANGE UP
SPECIMEN SOURCE: SIGNIFICANT CHANGE UP
VANCOMYCIN TROUGH SERPL-MCNC: 8 UG/ML — LOW (ref 10–20)

## 2024-11-27 PROCEDURE — 99232 SBSQ HOSP IP/OBS MODERATE 35: CPT

## 2024-11-27 RX ORDER — VANCOMYCIN HCL 900 MCG/MG
1250 POWDER (GRAM) MISCELLANEOUS EVERY 24 HOURS
Refills: 0 | Status: DISCONTINUED | OUTPATIENT
Start: 2024-11-28 | End: 2024-11-30

## 2024-11-27 RX ADMIN — Medication 100 MILLIGRAM(S): at 10:54

## 2024-11-27 RX ADMIN — Medication 2: at 17:06

## 2024-11-27 RX ADMIN — ACETAMINOPHEN 500MG 650 MILLIGRAM(S): 500 TABLET, COATED ORAL at 02:03

## 2024-11-27 RX ADMIN — MEROPENEM 100 MILLIGRAM(S): 500 INJECTION, POWDER, FOR SOLUTION INTRAVENOUS at 05:53

## 2024-11-27 RX ADMIN — TRAMADOL HYDROCHLORIDE 25 MILLIGRAM(S): 300 CAPSULE ORAL at 02:03

## 2024-11-27 RX ADMIN — Medication 1 APPLICATION(S): at 23:13

## 2024-11-27 RX ADMIN — Medication 2: at 11:41

## 2024-11-27 RX ADMIN — Medication 1 APPLICATION(S): at 13:28

## 2024-11-27 RX ADMIN — TRAMADOL HYDROCHLORIDE 25 MILLIGRAM(S): 300 CAPSULE ORAL at 03:00

## 2024-11-27 RX ADMIN — Medication 2 TABLET(S): at 23:09

## 2024-11-27 RX ADMIN — MEROPENEM 100 MILLIGRAM(S): 500 INJECTION, POWDER, FOR SOLUTION INTRAVENOUS at 17:43

## 2024-11-27 RX ADMIN — Medication 1: at 08:29

## 2024-11-27 RX ADMIN — Medication 1 APPLICATION(S): at 12:24

## 2024-11-27 RX ADMIN — COLLAGENASE SANTYL 1 APPLICATION(S): 250 OINTMENT TOPICAL at 12:24

## 2024-11-27 RX ADMIN — ACETAMINOPHEN 500MG 650 MILLIGRAM(S): 500 TABLET, COATED ORAL at 03:00

## 2024-11-27 RX ADMIN — Medication 1 APPLICATION(S): at 05:54

## 2024-11-27 NOTE — PHARMACOTHERAPY INTERVENTION NOTE - COMMENTS
As per policy adjusted meropenem from 1g q8h to 1g q12h as CrCl = 39 mL/min (utilized SCr of 0.8 mg/dL as pt has low body weight and low actual SCr can overestimate CrCl).
As per policy vancomycin dose adjusted.     Pt on vancomycin 500mg q24h with calculated AUC of 212. Therapeutic AUC range is 400-600. Dose adjusted to 500mg q12h to target a predicted AUC of 427.
As per policy vancomycin dose adjusted.     Patient on vancomycin 500 mg q12h to predict AUC of 391. Therapeutic AUC range is 400-600. Dose increased to 1250mg q24h to target a predicted AUC of 489.

## 2024-11-27 NOTE — PROGRESS NOTE ADULT - ASSESSMENT
A/P-  79 year old Female with  pmh osteoporosis, MS, bedbound. infected decubitus ulcers presenting to the ED for reduced appetite x 2-3 days.   patient  also has chronic amezquita     Infectious disease consult called 11/22/2024 at 3:45 pm to help with antibiotic management.    +fever  +leukocytosis  CAUTI with urine cx>100K e.coli and K,pneumonia  Infected looking hip and sacral and  upper back decubiti    11/25  no fevers since 11/21  13K leukocytosis  UC #1 grew Klebsiella ( carbapanem resistant )  and ESBL E. Coli   UC #2 grew ESBL E. Coli ( repeat urine culture)  BCs x 2 NGTD  wound cx-ESBL, e.feaclis, strep Mitis from 11/22   abx were changed over the weekend to Meropenem, IV Vanco continued  Vanco level is 3.1 yesterday     11/26:   afebrile  on RA  leukocytosis is about the same 13.28  Cr ok  culture data reviewed, no change   pending vac dressing application  abx continued, Meropenem and Vancomycin     11/27: no fever, RA  no new cbc  culture data reviewed:  Abscess cx grew ESBL E. Coli, Enterococcus and strep mitis   BCs NGTD x 2  Meropenem and Vancomycin continued  Vanco level is 8.0 today, spoke with pharmacy  pending vac dressing application     Impression-  multiple infected deep decubiti , most likely OM  CAUTI from pre-admission      Plan-  continue Meropenem IV over the weekend (day #3)  continue vanco (day #5) - vanco level is low, spoke with pharmacy  keep vanco trough <15, -600  s/p debridement by wound care on 11/22/2024, pending wound vac application   frequent offloading   if amezquita is needed please make sure it gets changed every 2 weeks .  monitor for aspiration precautions as well      discussed with Dr. Escobar  discussed with pharmacy    A/P-  79 year old Female with  pmh osteoporosis, MS, bedbound. infected decubitus ulcers presenting to the ED for reduced appetite x 2-3 days.   patient  also has chronic amezquita     Infectious disease consult called 11/22/2024 at 3:45 pm to help with antibiotic management.    +fever  +leukocytosis  CAUTI with urine cx>100K e.coli and K,pneumonia  Infected looking hip and sacral and  upper back decubiti    11/25  no fevers since 11/21  13K leukocytosis  UC #1 grew Klebsiella ( carbapanem resistant )  and ESBL E. Coli   UC #2 grew ESBL E. Coli ( repeat urine culture)  BCs x 2 NGTD  wound cx-ESBL, e.feaclis, strep Mitis from 11/22   abx were changed over the weekend to Meropenem, IV Vanco continued  Vanco level is 3.1 yesterday     11/26:   afebrile  on RA  leukocytosis is about the same 13.28  Cr ok  culture data reviewed, no change   pending vac dressing application  abx continued, Meropenem and Vancomycin     11/27: no fever,   is on  RA  no new cbc today  culture data reviewed:  Abscess cx grew ESBL E. Coli, Enterococcus and strep mitis   BCs NGTD x 2  Meropenem and Vancomycin continued  Vanco level is 8.0 today, spoke with pharmacy  pending vac dressing application     Impression-  multiple infected deep decubiti , most likely OM  CAUTI from pre-admission      Plan-  continue Meropenem IV (day #3)  continue vanco (day #5) - vanco level is low, spoke with pharmacy  keep vanco trough <15, -600  s/p debridement by wound care on 11/22/2024, pending wound vac application   frequent offloading   if amezquita is needed please make sure it gets changed every 2 weeks .  monitor for aspiration precautions as well      discussed with Dr. Escboar  discussed with pharmacy

## 2024-11-27 NOTE — PROGRESS NOTE ADULT - SUBJECTIVE AND OBJECTIVE BOX
Patient is a 79y old  Female who presents with a chief complaint of UTI, failure to thrive , hyponatremia. (27 Nov 2024 13:00)      INTERVAL HPI/OVERNIGHT EVENTS: Overnight no acute events. C/o pain all over.     MEDICATIONS  (STANDING):  collagenase Ointment 1 Application(s) Topical daily  Dakins Solution - 1/2 Strength 1 Application(s) Topical daily  dextrose 5%. 1000 milliLiter(s) (100 mL/Hr) IV Continuous <Continuous>  dextrose 5%. 1000 milliLiter(s) (50 mL/Hr) IV Continuous <Continuous>  dextrose 50% Injectable 25 Gram(s) IV Push once  dextrose 50% Injectable 12.5 Gram(s) IV Push once  dextrose 50% Injectable 25 Gram(s) IV Push once  glucagon  Injectable 1 milliGRAM(s) IntraMuscular once  insulin lispro (ADMELOG) corrective regimen sliding scale   SubCutaneous at bedtime  insulin lispro (ADMELOG) corrective regimen sliding scale   SubCutaneous three times a day before meals  meropenem  IVPB 1000 milliGRAM(s) IV Intermittent every 12 hours  senna 2 Tablet(s) Oral at bedtime  sodium chloride 0.9%. 250 milliLiter(s) (250 mL/Hr) IV Continuous <Continuous>  vitamin A & D Ointment 1 Application(s) Topical three times a day    MEDICATIONS  (PRN):  acetaminophen     Tablet .. 650 milliGRAM(s) Oral every 6 hours PRN Temp greater or equal to 38C (100.4F), Mild Pain (1 - 3)  aluminum hydroxide/magnesium hydroxide/simethicone Suspension 30 milliLiter(s) Oral every 4 hours PRN Dyspepsia  dextrose Oral Gel 15 Gram(s) Oral once PRN Blood Glucose LESS THAN 70 milliGRAM(s)/deciliter  melatonin 3 milliGRAM(s) Oral at bedtime PRN Insomnia  ondansetron Injectable 4 milliGRAM(s) IV Push every 8 hours PRN Nausea and/or Vomiting  traMADol 25 milliGRAM(s) Oral every 8 hours PRN Severe Pain (7 - 10)      Allergies    No Known Allergies    Intolerances        REVIEW OF SYSTEMS:  ROS negative unless stated otherwise.    Vital Signs Last 24 Hrs  T(C): 36.9 (27 Nov 2024 11:17), Max: 37.3 (26 Nov 2024 23:56)  T(F): 98.4 (27 Nov 2024 11:17), Max: 99.1 (26 Nov 2024 23:56)  HR: 88 (27 Nov 2024 11:17) (81 - 92)  BP: 90/55 (27 Nov 2024 11:17) (90/55 - 131/66)  BP(mean): --  RR: 17 (27 Nov 2024 11:17) (15 - 18)  SpO2: 100% (27 Nov 2024 11:17) (97% - 100%)    Parameters below as of 27 Nov 2024 11:17  Patient On (Oxygen Delivery Method): room air        PHYSICAL EXAM:  GENERAL: NAD, cachetic frail   HEAD:  Atraumatic, Normocephalic  EYES: EOMI, PERRLA, conjunctiva and sclera clear  ENMT: Dry mucus membranes  NECK: Supple,   CHEST/LUNG: Clear to  auscultation bilaterally; No rales, rhonchi, wheezing, or rubs  bilaterally  HEART: Regular rate and rhythm; No murmurs, rubs, or gallops  ABDOMEN: Soft, Nontender, Nondistended; Bowel sounds present  EXTREMITIES:  2+ Peripheral Pulses, No clubbing, cyanosis, or edema BL LE  SKIN: multiple  decubiti  ulcers on back, hips and feet varying stages and depths   NERVOUS SYSTEM:  Alert , contracted     LABS:                        8.3    13.28 )-----------( 313      ( 26 Nov 2024 07:06 )             26.7     11-26    138  |  106  |  13  ----------------------------<  193[H]  4.3   |  28  |  0.25[L]    Ca    8.2[L]      26 Nov 2024 07:06  Phos  2.3     11-26  Mg     2.1     11-26        Urinalysis Basic - ( 26 Nov 2024 07:06 )    Color: x / Appearance: x / SG: x / pH: x  Gluc: 193 mg/dL / Ketone: x  / Bili: x / Urobili: x   Blood: x / Protein: x / Nitrite: x   Leuk Esterase: x / RBC: x / WBC x   Sq Epi: x / Non Sq Epi: x / Bacteria: x      CAPILLARY BLOOD GLUCOSE      POCT Blood Glucose.: 243 mg/dL (27 Nov 2024 16:28)  POCT Blood Glucose.: 232 mg/dL (27 Nov 2024 11:08)  POCT Blood Glucose.: 171 mg/dL (27 Nov 2024 07:53)  POCT Blood Glucose.: 226 mg/dL (26 Nov 2024 22:29)  POCT Blood Glucose.: 144 mg/dL (26 Nov 2024 16:50)      RADIOLOGY & ADDITIONAL TESTS:    Imaging Personally Reviewed:  [ X] YES  [ ] NO    Consultant(s) Notes Reviewed:  [ X] YES  [ ] NO    Care Discussed with Consultants/Other Providers [X ] YES  [ ] NO

## 2024-11-27 NOTE — PROGRESS NOTE ADULT - SUBJECTIVE AND OBJECTIVE BOX
SPENCER MIXON  MRN-[id number]  79y (1945)    Follow Up:  multiple wounds     Interval History: The pt was seen and examined earlier, not in acute distress, pt is awake and alert, able to answer simple questions. Pt is afebrile, RA, no new cbc.     PAST MEDICAL & SURGICAL HISTORY:  Osteoporosis      Uterine polyp      Herniated disc  lumbar      Functional quadriplegia secondary to multiple sclerosis      Bilateral Tubal ligation  48y/o          ROS:  limited, pt denies any sob or chest pain, no abd pain   [ ] Unobtainable because:  [x ] All other systems negative    Constitutional: no fever, no chills  Head: no trauma  Eyes: no vision changes, no eye pain  ENT:  no sore throat, no rhinorrhea  Cardiovascular:  no chest pain, no palpitation  Respiratory:  no SOB, no cough  GI:  no abd pain, no vomiting, no diarrhea  urinary: no dysuria, no hematuria, no flank pain  musculoskeletal:  no joint pain, no joint swelling  skin:  no rash  neurology:  no headache, no seizure, no change in mental status  psych: no anxiety, no depression         Allergies  No Known Allergies        ANTIMICROBIALS:  meropenem  IVPB 1000 every 12 hours  vancomycin  IVPB 500 every 12 hours      OTHER MEDS:  acetaminophen     Tablet .. 650 milliGRAM(s) Oral every 6 hours PRN  aluminum hydroxide/magnesium hydroxide/simethicone Suspension 30 milliLiter(s) Oral every 4 hours PRN  collagenase Ointment 1 Application(s) Topical daily  Dakins Solution - 1/2 Strength 1 Application(s) Topical daily  dextrose 5%. 1000 milliLiter(s) IV Continuous <Continuous>  dextrose 5%. 1000 milliLiter(s) IV Continuous <Continuous>  dextrose 50% Injectable 25 Gram(s) IV Push once  dextrose 50% Injectable 12.5 Gram(s) IV Push once  dextrose 50% Injectable 25 Gram(s) IV Push once  dextrose Oral Gel 15 Gram(s) Oral once PRN  glucagon  Injectable 1 milliGRAM(s) IntraMuscular once  insulin lispro (ADMELOG) corrective regimen sliding scale   SubCutaneous at bedtime  insulin lispro (ADMELOG) corrective regimen sliding scale   SubCutaneous three times a day before meals  melatonin 3 milliGRAM(s) Oral at bedtime PRN  ondansetron Injectable 4 milliGRAM(s) IV Push every 8 hours PRN  senna 2 Tablet(s) Oral at bedtime  sodium chloride 0.9%. 250 milliLiter(s) IV Continuous <Continuous>  traMADol 25 milliGRAM(s) Oral every 8 hours PRN  vitamin A & D Ointment 1 Application(s) Topical three times a day      Vital Signs Last 24 Hrs  T(C): 36.9 (27 Nov 2024 11:17), Max: 37.3 (26 Nov 2024 23:56)  T(F): 98.4 (27 Nov 2024 11:17), Max: 99.1 (26 Nov 2024 23:56)  HR: 88 (27 Nov 2024 11:17) (81 - 92)  BP: 90/55 (27 Nov 2024 11:17) (90/55 - 131/66)  BP(mean): --  RR: 17 (27 Nov 2024 11:17) (15 - 18)  SpO2: 100% (27 Nov 2024 11:17) (97% - 100%)    Parameters below as of 27 Nov 2024 11:17  Patient On (Oxygen Delivery Method): room air        Physical Exam:  General:    weak looking, frail elderly contracted female  Head: atraumatic, normocephalic, bitemporal wasting   Eyes: normal sclera and conjunctiva  ENT:   no oropharyngeal lesions, no LAD, neck supple  Cardio:    regular S1,S2  Respiratory:   No wheezing, No rhonchi  abd:   soft, BS +, not tender, no distention  :     no CVAT, no suprapubic tenderness, has amezquita and urine is with sediments  Musculoskeletal : contracted, multiple decubiti with most prominent on right hip, left hip and upper back and sacral  region , deep wounds, packed   Skin:    no rash, PIV ok  Neurologic:    awake, able to answer simple questions  psych: normal affect, calm    WBC Count: 13.28 K/uL (11-26 @ 07:06)  WBC Count: 13.91 K/uL (11-24 @ 06:10)  WBC Count: 13.74 K/uL (11-23 @ 05:32)  WBC Count: 14.05 K/uL (11-22 @ 06:26)  WBC Count: 13.61 K/uL (11-21 @ 23:09)  WBC Count: 14.73 K/uL (11-21 @ 12:02)  WBC Count: 13.57 K/uL (11-21 @ 08:15)                            8.3    13.28 )-----------( 313      ( 26 Nov 2024 07:06 )             26.7       11-26    138  |  106  |  13  ----------------------------<  193[H]  4.3   |  28  |  0.25[L]    Ca    8.2[L]      26 Nov 2024 07:06  Phos  2.3     11-26  Mg     2.1     11-26        Urinalysis Basic - ( 26 Nov 2024 07:06 )    Color: x / Appearance: x / SG: x / pH: x  Gluc: 193 mg/dL / Ketone: x  / Bili: x / Urobili: x   Blood: x / Protein: x / Nitrite: x   Leuk Esterase: x / RBC: x / WBC x   Sq Epi: x / Non Sq Epi: x / Bacteria: x        Creatinine Trend: 0.25<--, 0.18<--, 0.27<--, 0.23<--, 0.17<--, 0.47<--      MICROBIOLOGY:  Vancomycin Level, Trough: 8.0 ug/mL (11-27-24 @ 09:50)  Vancomycin Level, Trough: 6.2 ug/mL (11-26-24 @ 18:54)  v  .Abscess  11-22-24   Few Escherichia coli ESBL  Rare Enterococcus faecalis  Rare Streptococcus mitis/oralis group "Susceptibilities not performed"  --  Escherichia coli ESBL  Enterococcus faecalis      .Blood BLOOD  11-21-24   No growth at 5 days  --  --      .Blood BLOOD  11-21-24   No growth at 5 days  --  --      Clean Catch  11-20-24   >100,000 CFU/ml Escherichia coli ESBL  <10,000 CFU/ml Normal Urogenital korey present  --  Escherichia coli ESBL      Clean Catch  11-20-24   >100,000 CFU/ml Klebsiella pneumoniae (Carbapenem Resistant)  >100,000 CFU/ml Escherichia coli ESBL  --  Klebsiella pneumoniae (Carbapenem Resistant)  Escherichia coli ESBL    RADIOLOGY:     SPENCER MIXON  [id number]  79y (1945)    Follow Up:  multiple wounds     Interval History: The pt was seen and examined earlier, not in acute distress, pt is awake and alert, able to answer simple questions. Pt is afebrile, RA, no new cbc.     PAST MEDICAL & SURGICAL HISTORY:  Osteoporosis      Uterine polyp      Herniated disc  lumbar      Functional quadriplegia secondary to multiple sclerosis      Bilateral Tubal ligation  48y/o          ROS:  limited, pt denies any sob or chest pain, no abd pain   [ ] Unobtainable because:  [x ] All other systems negative    Constitutional: no fever, no chills  Head: no trauma  Eyes: no vision changes, no eye pain  ENT:  no sore throat, no rhinorrhea  Cardiovascular:  no chest pain, no palpitation  Respiratory:  no SOB, no cough  GI:  no abd pain, no vomiting, no diarrhea  urinary: no dysuria, no hematuria, no flank pain  musculoskeletal:  no joint pain, no joint swelling  skin:  no rash  neurology:  no headache, no seizure, no change in mental status  psych: no anxiety, no depression         Allergies  No Known Allergies        ANTIMICROBIALS:  meropenem  IVPB 1000 every 12 hours  vancomycin  IVPB 500 every 12 hours      OTHER MEDS:  acetaminophen     Tablet .. 650 milliGRAM(s) Oral every 6 hours PRN  aluminum hydroxide/magnesium hydroxide/simethicone Suspension 30 milliLiter(s) Oral every 4 hours PRN  collagenase Ointment 1 Application(s) Topical daily  Dakins Solution - 1/2 Strength 1 Application(s) Topical daily  dextrose 5%. 1000 milliLiter(s) IV Continuous <Continuous>  dextrose 5%. 1000 milliLiter(s) IV Continuous <Continuous>  dextrose 50% Injectable 25 Gram(s) IV Push once  dextrose 50% Injectable 12.5 Gram(s) IV Push once  dextrose 50% Injectable 25 Gram(s) IV Push once  dextrose Oral Gel 15 Gram(s) Oral once PRN  glucagon  Injectable 1 milliGRAM(s) IntraMuscular once  insulin lispro (ADMELOG) corrective regimen sliding scale   SubCutaneous at bedtime  insulin lispro (ADMELOG) corrective regimen sliding scale   SubCutaneous three times a day before meals  melatonin 3 milliGRAM(s) Oral at bedtime PRN  ondansetron Injectable 4 milliGRAM(s) IV Push every 8 hours PRN  senna 2 Tablet(s) Oral at bedtime  sodium chloride 0.9%. 250 milliLiter(s) IV Continuous <Continuous>  traMADol 25 milliGRAM(s) Oral every 8 hours PRN  vitamin A & D Ointment 1 Application(s) Topical three times a day      Vital Signs Last 24 Hrs  T(C): 36.9 (27 Nov 2024 11:17), Max: 37.3 (26 Nov 2024 23:56)  T(F): 98.4 (27 Nov 2024 11:17), Max: 99.1 (26 Nov 2024 23:56)  HR: 88 (27 Nov 2024 11:17) (81 - 92)  BP: 90/55 (27 Nov 2024 11:17) (90/55 - 131/66)  BP(mean): --  RR: 17 (27 Nov 2024 11:17) (15 - 18)  SpO2: 100% (27 Nov 2024 11:17) (97% - 100%)    Parameters below as of 27 Nov 2024 11:17  Patient On (Oxygen Delivery Method): room air        Physical Exam:  General:    weak looking, frail elderly contracted female  Head: atraumatic, normocephalic, bitemporal wasting   Eyes: normal sclera and conjunctiva  ENT:   no oropharyngeal lesions, no LAD, neck supple  Cardio:    regular S1,S2  Respiratory:   No wheezing, No rhonchi  abd:   soft, BS +, not tender, no distention  :     no CVAT, no suprapubic tenderness, has amezquita and urine is with sediments  Musculoskeletal : contracted, multiple decubiti with most prominent on right hip, left hip and upper back and sacral  region , deep wounds, packed   Skin:    no rash, PIV ok  Neurologic:    awake, able to answer simple questions  psych: normal affect, calm    WBC Count: 13.28 K/uL (11-26 @ 07:06)  WBC Count: 13.91 K/uL (11-24 @ 06:10)  WBC Count: 13.74 K/uL (11-23 @ 05:32)  WBC Count: 14.05 K/uL (11-22 @ 06:26)  WBC Count: 13.61 K/uL (11-21 @ 23:09)  WBC Count: 14.73 K/uL (11-21 @ 12:02)  WBC Count: 13.57 K/uL (11-21 @ 08:15)                            8.3    13.28 )-----------( 313      ( 26 Nov 2024 07:06 )             26.7       11-26    138  |  106  |  13  ----------------------------<  193[H]  4.3   |  28  |  0.25[L]    Ca    8.2[L]      26 Nov 2024 07:06  Phos  2.3     11-26  Mg     2.1     11-26        Urinalysis Basic - ( 26 Nov 2024 07:06 )    Color: x / Appearance: x / SG: x / pH: x  Gluc: 193 mg/dL / Ketone: x  / Bili: x / Urobili: x   Blood: x / Protein: x / Nitrite: x   Leuk Esterase: x / RBC: x / WBC x   Sq Epi: x / Non Sq Epi: x / Bacteria: x        Creatinine Trend: 0.25<--, 0.18<--, 0.27<--, 0.23<--, 0.17<--, 0.47<--      MICROBIOLOGY:  Vancomycin Level, Trough: 8.0 ug/mL (11-27-24 @ 09:50)  Vancomycin Level, Trough: 6.2 ug/mL (11-26-24 @ 18:54)    .Abscess  11-22-24   Few Escherichia coli ESBL  Rare Enterococcus faecalis  Rare Streptococcus mitis/oralis group "Susceptibilities not performed"  --  Escherichia coli ESBL  Enterococcus faecalis      .Blood BLOOD  11-21-24   No growth at 5 days  --  --      .Blood BLOOD  11-21-24   No growth at 5 days  --  --      Clean Catch  11-20-24   >100,000 CFU/ml Escherichia coli ESBL  <10,000 CFU/ml Normal Urogenital korey present  --  Escherichia coli ESBL      Clean Catch  11-20-24   >100,000 CFU/ml Klebsiella pneumoniae (Carbapenem Resistant)  >100,000 CFU/ml Escherichia coli ESBL  --  Klebsiella pneumoniae (Carbapenem Resistant)  Escherichia coli ESBL    RADIOLOGY:

## 2024-11-27 NOTE — PROGRESS NOTE ADULT - PROBLEM SELECTOR PLAN 4
multiple ulcers unstageable.  wound care in AM    11/20/2024-f/u wound care also need Dr. Whitehead has some that will likely need debridement  and wound vac  11/21/2024 f/u with both wound care team and dr. segovia team  11/22/2024- will need ID consult- I have consulted them  - suspect infected decubiti-  11/23/2024 appreciate Id consult- f/u wound culture  11/24/2024 wound cx as above await sensitivities, antibx per ID team    Pending wound vac placment

## 2024-11-28 LAB
ANION GAP SERPL CALC-SCNC: 4 MMOL/L — LOW (ref 5–17)
BUN SERPL-MCNC: 12 MG/DL — SIGNIFICANT CHANGE UP (ref 7–23)
CALCIUM SERPL-MCNC: 7.8 MG/DL — LOW (ref 8.5–10.1)
CHLORIDE SERPL-SCNC: 103 MMOL/L — SIGNIFICANT CHANGE UP (ref 96–108)
CO2 SERPL-SCNC: 29 MMOL/L — SIGNIFICANT CHANGE UP (ref 22–31)
CREAT SERPL-MCNC: 0.27 MG/DL — LOW (ref 0.5–1.3)
EGFR: 111 ML/MIN/1.73M2 — SIGNIFICANT CHANGE UP
GLUCOSE BLDC GLUCOMTR-MCNC: 137 MG/DL — HIGH (ref 70–99)
GLUCOSE BLDC GLUCOMTR-MCNC: 140 MG/DL — HIGH (ref 70–99)
GLUCOSE BLDC GLUCOMTR-MCNC: 221 MG/DL — HIGH (ref 70–99)
GLUCOSE BLDC GLUCOMTR-MCNC: 87 MG/DL — SIGNIFICANT CHANGE UP (ref 70–99)
GLUCOSE SERPL-MCNC: 211 MG/DL — HIGH (ref 70–99)
HCT VFR BLD CALC: 26.9 % — LOW (ref 34.5–45)
HGB BLD-MCNC: 8.3 G/DL — LOW (ref 11.5–15.5)
MCHC RBC-ENTMCNC: 24.3 PG — LOW (ref 27–34)
MCHC RBC-ENTMCNC: 30.9 G/DL — LOW (ref 32–36)
MCV RBC AUTO: 78.7 FL — LOW (ref 80–100)
NRBC # BLD: 0 /100 WBCS — SIGNIFICANT CHANGE UP (ref 0–0)
PLATELET # BLD AUTO: 351 K/UL — SIGNIFICANT CHANGE UP (ref 150–400)
POTASSIUM SERPL-MCNC: 3.8 MMOL/L — SIGNIFICANT CHANGE UP (ref 3.5–5.3)
POTASSIUM SERPL-SCNC: 3.8 MMOL/L — SIGNIFICANT CHANGE UP (ref 3.5–5.3)
RBC # BLD: 3.42 M/UL — LOW (ref 3.8–5.2)
RBC # FLD: 22 % — HIGH (ref 10.3–14.5)
SODIUM SERPL-SCNC: 136 MMOL/L — SIGNIFICANT CHANGE UP (ref 135–145)
WBC # BLD: 13.67 K/UL — HIGH (ref 3.8–10.5)
WBC # FLD AUTO: 13.67 K/UL — HIGH (ref 3.8–10.5)

## 2024-11-28 PROCEDURE — 99232 SBSQ HOSP IP/OBS MODERATE 35: CPT

## 2024-11-28 RX ADMIN — Medication 1 APPLICATION(S): at 14:35

## 2024-11-28 RX ADMIN — Medication 2 TABLET(S): at 21:51

## 2024-11-28 RX ADMIN — COLLAGENASE SANTYL 1 APPLICATION(S): 250 OINTMENT TOPICAL at 14:33

## 2024-11-28 RX ADMIN — Medication 1 APPLICATION(S): at 22:00

## 2024-11-28 RX ADMIN — TRAMADOL HYDROCHLORIDE 25 MILLIGRAM(S): 300 CAPSULE ORAL at 08:45

## 2024-11-28 RX ADMIN — Medication 1 APPLICATION(S): at 14:33

## 2024-11-28 RX ADMIN — TRAMADOL HYDROCHLORIDE 25 MILLIGRAM(S): 300 CAPSULE ORAL at 07:46

## 2024-11-28 RX ADMIN — Medication 1 APPLICATION(S): at 05:37

## 2024-11-28 RX ADMIN — Medication 166.67 MILLIGRAM(S): at 05:25

## 2024-11-28 RX ADMIN — MEROPENEM 100 MILLIGRAM(S): 500 INJECTION, POWDER, FOR SOLUTION INTRAVENOUS at 05:25

## 2024-11-28 RX ADMIN — MEROPENEM 100 MILLIGRAM(S): 500 INJECTION, POWDER, FOR SOLUTION INTRAVENOUS at 17:20

## 2024-11-28 RX ADMIN — Medication 2: at 07:51

## 2024-11-28 NOTE — PROGRESS NOTE ADULT - PROBLEM SELECTOR PLAN 4
multiple ulcers unstageable  Dr. Whitehead and wound care input   IV meropenem as per ID   Wound vac placed

## 2024-11-28 NOTE — PROGRESS NOTE ADULT - SUBJECTIVE AND OBJECTIVE BOX
Patient is a 79y old  Female who presents with a chief complaint of UTI, failure to thrive , hyponatremia. (27 Nov 2024 16:46)      INTERVAL HPI/OVERNIGHT EVENTS: Overnight no acute events. Wound vac placed.     MEDICATIONS  (STANDING):  collagenase Ointment 1 Application(s) Topical daily  Dakins Solution - 1/2 Strength 1 Application(s) Topical daily  dextrose 5%. 1000 milliLiter(s) (100 mL/Hr) IV Continuous <Continuous>  dextrose 5%. 1000 milliLiter(s) (50 mL/Hr) IV Continuous <Continuous>  dextrose 50% Injectable 25 Gram(s) IV Push once  dextrose 50% Injectable 12.5 Gram(s) IV Push once  dextrose 50% Injectable 25 Gram(s) IV Push once  glucagon  Injectable 1 milliGRAM(s) IntraMuscular once  insulin lispro (ADMELOG) corrective regimen sliding scale   SubCutaneous at bedtime  insulin lispro (ADMELOG) corrective regimen sliding scale   SubCutaneous three times a day before meals  meropenem  IVPB 1000 milliGRAM(s) IV Intermittent every 12 hours  senna 2 Tablet(s) Oral at bedtime  sodium chloride 0.9%. 250 milliLiter(s) (250 mL/Hr) IV Continuous <Continuous>  vancomycin  IVPB 1250 milliGRAM(s) IV Intermittent every 24 hours  vitamin A & D Ointment 1 Application(s) Topical three times a day    MEDICATIONS  (PRN):  acetaminophen     Tablet .. 650 milliGRAM(s) Oral every 6 hours PRN Temp greater or equal to 38C (100.4F), Mild Pain (1 - 3)  aluminum hydroxide/magnesium hydroxide/simethicone Suspension 30 milliLiter(s) Oral every 4 hours PRN Dyspepsia  dextrose Oral Gel 15 Gram(s) Oral once PRN Blood Glucose LESS THAN 70 milliGRAM(s)/deciliter  melatonin 3 milliGRAM(s) Oral at bedtime PRN Insomnia  ondansetron Injectable 4 milliGRAM(s) IV Push every 8 hours PRN Nausea and/or Vomiting  traMADol 25 milliGRAM(s) Oral every 8 hours PRN Severe Pain (7 - 10)      Allergies    No Known Allergies    Intolerances        REVIEW OF SYSTEMS:  ROS negative unless stated otherwise.    Vital Signs Last 24 Hrs  T(C): 36.2 (28 Nov 2024 11:44), Max: 37 (27 Nov 2024 17:02)  T(F): 97.2 (28 Nov 2024 11:44), Max: 98.6 (27 Nov 2024 17:02)  HR: 86 (28 Nov 2024 11:44) (86 - 93)  BP: 91/56 (28 Nov 2024 11:44) (91/56 - 120/72)  BP(mean): --  RR: 17 (28 Nov 2024 11:44) (17 - 18)  SpO2: 97% (28 Nov 2024 11:44) (95% - 100%)    Parameters below as of 28 Nov 2024 11:44  Patient On (Oxygen Delivery Method): room air        PHYSICAL EXAM:  GENERAL: NAD, cachetic frail   HEAD:  Atraumatic, Normocephalic  EYES: EOMI, PERRLA, conjunctiva and sclera clear  ENMT: Dry mucus membranes  NECK: Supple,   CHEST/LUNG: Clear to  auscultation bilaterally   HEART: Regular rate and rhythm; No murmurs, rubs, or gallops  ABDOMEN: Soft, Nontender, Nondistended; Bowel sounds present  EXTREMITIES:  2+ Peripheral Pulses, No clubbing, cyanosis, or edema BL LE  SKIN: multiple  decubiti  ulcers on back, hips and feet varying stages and depths   NERVOUS SYSTEM:  Alert , contracted     LABS:                        8.3    13.67 )-----------( 351      ( 28 Nov 2024 06:47 )             26.9     11-28    136  |  103  |  12  ----------------------------<  211[H]  3.8   |  29  |  0.27[L]    Ca    7.8[L]      28 Nov 2024 06:47        Urinalysis Basic - ( 28 Nov 2024 06:47 )    Color: x / Appearance: x / SG: x / pH: x  Gluc: 211 mg/dL / Ketone: x  / Bili: x / Urobili: x   Blood: x / Protein: x / Nitrite: x   Leuk Esterase: x / RBC: x / WBC x   Sq Epi: x / Non Sq Epi: x / Bacteria: x      CAPILLARY BLOOD GLUCOSE      POCT Blood Glucose.: 87 mg/dL (28 Nov 2024 11:26)  POCT Blood Glucose.: 221 mg/dL (28 Nov 2024 07:46)  POCT Blood Glucose.: 206 mg/dL (27 Nov 2024 22:14)  POCT Blood Glucose.: 243 mg/dL (27 Nov 2024 16:28)      RADIOLOGY & ADDITIONAL TESTS:    Imaging Personally Reviewed:  [ X] YES  [ ] NO    Consultant(s) Notes Reviewed:  [ X] YES  [ ] NO    Care Discussed with Consultants/Other Providers [X ] YES  [ ] NO

## 2024-11-28 NOTE — PROGRESS NOTE ADULT - PROBLEM SELECTOR PLAN 5
repeat cbc stat  with type and cross   transfuse if repeat hgb <7  11/22/2024 s/p one unit prbc - monitor hgb- good response hgb today at 8  11/23/2024 hgb at 8.1 Transfuse if repeat hgb <7

## 2024-11-28 NOTE — PROGRESS NOTE ADULT - PROBLEM SELECTOR PLAN 1
FU Ucx-- kleb carbapenem resistant / ecoli esbl  On IV merem   ID following FU Ucx-- kleb carbapenem resistant / ecoli esbl  On IV merem and vanc  ID following

## 2024-11-28 NOTE — PROGRESS NOTE ADULT - PROBLEM SELECTOR PLAN 6
pan cultured yesterday     continue with Rocephin for Urinary Tract-   will consult ID as I suspect infected decubiti    11/24/2024 wound cx as above await sensitivities, antibx per ID team See above

## 2024-11-29 LAB
ANION GAP SERPL CALC-SCNC: 5 MMOL/L — SIGNIFICANT CHANGE UP (ref 5–17)
BUN SERPL-MCNC: 11 MG/DL — SIGNIFICANT CHANGE UP (ref 7–23)
CALCIUM SERPL-MCNC: 7.9 MG/DL — LOW (ref 8.5–10.1)
CHLORIDE SERPL-SCNC: 102 MMOL/L — SIGNIFICANT CHANGE UP (ref 96–108)
CO2 SERPL-SCNC: 29 MMOL/L — SIGNIFICANT CHANGE UP (ref 22–31)
CREAT SERPL-MCNC: 0.2 MG/DL — LOW (ref 0.5–1.3)
EGFR: 120 ML/MIN/1.73M2 — SIGNIFICANT CHANGE UP
GLUCOSE BLDC GLUCOMTR-MCNC: 104 MG/DL — HIGH (ref 70–99)
GLUCOSE BLDC GLUCOMTR-MCNC: 124 MG/DL — HIGH (ref 70–99)
GLUCOSE BLDC GLUCOMTR-MCNC: 196 MG/DL — HIGH (ref 70–99)
GLUCOSE BLDC GLUCOMTR-MCNC: 234 MG/DL — HIGH (ref 70–99)
GLUCOSE SERPL-MCNC: 197 MG/DL — HIGH (ref 70–99)
HCT VFR BLD CALC: 26 % — LOW (ref 34.5–45)
HGB BLD-MCNC: 8 G/DL — LOW (ref 11.5–15.5)
MCHC RBC-ENTMCNC: 24 PG — LOW (ref 27–34)
MCHC RBC-ENTMCNC: 30.8 G/DL — LOW (ref 32–36)
MCV RBC AUTO: 77.8 FL — LOW (ref 80–100)
NRBC # BLD: 0 /100 WBCS — SIGNIFICANT CHANGE UP (ref 0–0)
PLATELET # BLD AUTO: 390 K/UL — SIGNIFICANT CHANGE UP (ref 150–400)
POTASSIUM SERPL-MCNC: 3.9 MMOL/L — SIGNIFICANT CHANGE UP (ref 3.5–5.3)
POTASSIUM SERPL-SCNC: 3.9 MMOL/L — SIGNIFICANT CHANGE UP (ref 3.5–5.3)
RBC # BLD: 3.34 M/UL — LOW (ref 3.8–5.2)
RBC # FLD: 21.8 % — HIGH (ref 10.3–14.5)
SODIUM SERPL-SCNC: 136 MMOL/L — SIGNIFICANT CHANGE UP (ref 135–145)
WBC # BLD: 12.53 K/UL — HIGH (ref 3.8–10.5)
WBC # FLD AUTO: 12.53 K/UL — HIGH (ref 3.8–10.5)

## 2024-11-29 PROCEDURE — 99232 SBSQ HOSP IP/OBS MODERATE 35: CPT

## 2024-11-29 RX ADMIN — TRAMADOL HYDROCHLORIDE 25 MILLIGRAM(S): 300 CAPSULE ORAL at 06:13

## 2024-11-29 RX ADMIN — Medication 1 APPLICATION(S): at 13:10

## 2024-11-29 RX ADMIN — Medication 2 TABLET(S): at 22:46

## 2024-11-29 RX ADMIN — MEROPENEM 100 MILLIGRAM(S): 500 INJECTION, POWDER, FOR SOLUTION INTRAVENOUS at 17:12

## 2024-11-29 RX ADMIN — TRAMADOL HYDROCHLORIDE 25 MILLIGRAM(S): 300 CAPSULE ORAL at 05:13

## 2024-11-29 RX ADMIN — Medication 1: at 07:48

## 2024-11-29 RX ADMIN — Medication 1 APPLICATION(S): at 05:14

## 2024-11-29 RX ADMIN — TRAMADOL HYDROCHLORIDE 25 MILLIGRAM(S): 300 CAPSULE ORAL at 15:07

## 2024-11-29 RX ADMIN — Medication 166.67 MILLIGRAM(S): at 05:13

## 2024-11-29 RX ADMIN — TRAMADOL HYDROCHLORIDE 25 MILLIGRAM(S): 300 CAPSULE ORAL at 16:13

## 2024-11-29 RX ADMIN — Medication 1 APPLICATION(S): at 22:52

## 2024-11-29 RX ADMIN — MEROPENEM 100 MILLIGRAM(S): 500 INJECTION, POWDER, FOR SOLUTION INTRAVENOUS at 05:13

## 2024-11-29 RX ADMIN — COLLAGENASE SANTYL 1 APPLICATION(S): 250 OINTMENT TOPICAL at 13:11

## 2024-11-29 RX ADMIN — Medication 1 APPLICATION(S): at 13:11

## 2024-11-29 RX ADMIN — Medication 2: at 16:32

## 2024-11-29 NOTE — PROGRESS NOTE ADULT - SUBJECTIVE AND OBJECTIVE BOX
SPENCER MIXON  MRN-[id number]  79y (1945)    Follow Up:  multiple wounds     Interval History: The pt was seen and examined earlier, not in acute distress, Pt is afebrile, RA    PAST MEDICAL & SURGICAL HISTORY:  Osteoporosis      Uterine polyp      Herniated disc  lumbar      Functional quadriplegia secondary to multiple sclerosis      Bilateral Tubal ligation  48y/o          ROS:  [X] Limited, pt denies any sob or chest pain, no abd pain   [ ] Unobtainable because:  [ ] All other systems negative    Constitutional: no fever, no chills  Head: no trauma  Eyes: no vision changes, no eye pain  ENT:  no sore throat, no rhinorrhea  Cardiovascular:  no chest pain, no palpitation  Respiratory:  no SOB, no cough  GI:  no abd pain, no vomiting, no diarrhea  urinary: no dysuria, no hematuria, no flank pain  musculoskeletal:  no joint pain, no joint swelling  skin:  no rash  neurology:  no headache, no seizure, no change in mental status  psych: no anxiety, no depression         Allergies  No Known Allergies        ANTIMICROBIALS:    meropenem  IVPB 1000 every 12 hours  vancomycin  IVPB 1250 every 24 hours      MEDICATIONS  (STANDING):  dextrose 50% Injectable 25 once  dextrose 50% Injectable 12.5 once  dextrose 50% Injectable 25 once  glucagon  Injectable 1 once  insulin lispro (ADMELOG) corrective regimen sliding scale  three times a day before meals  insulin lispro (ADMELOG) corrective regimen sliding scale  at bedtime  senna 2 at bedtime      PRN  acetaminophen     Tablet .. 650 milliGRAM(s) Oral every 6 hours PRN  aluminum hydroxide/magnesium hydroxide/simethicone Suspension 30 milliLiter(s) Oral every 4 hours PRN  dextrose Oral Gel 15 Gram(s) Oral once PRN  melatonin 3 milliGRAM(s) Oral at bedtime PRN  ondansetron Injectable 4 milliGRAM(s) IV Push every 8 hours PRN  traMADol 25 milliGRAM(s) Oral every 8 hours PRN      Physical Exam:  Vital Signs Last 24 Hrs  T(F): 98.4 (11-29-24 @ 10:34), Max: 98.9 (11-29-24 @ 05:53)  HR: 97 (11-29-24 @ 10:34)  BP: 104/69 (11-29-24 @ 10:34)  RR: 18 (11-29-24 @ 10:34)  SpO2: 96% (11-29-24 @ 10:34) (96% - 98%)  Wt(kg): --      Physical Exam:  General:    weak looking, frail elderly contracted female  Head: atraumatic, normocephalic, bitemporal wasting   Eyes: normal sclera and conjunctiva  ENT:   no oropharyngeal lesions, no LAD, neck supple  Cardio:    regular S1,S2  Respiratory:   No wheezing, No rhonchi  abd:   soft, BS +, not tender, no distention  :     no suprapubic tenderness, has amezquita and urine draining   Musculoskeletal : contracted, multiple decubiti with most prominent on right hip, left hip and upper back and sacral  region , deep wounds, wound vac present   Skin:    no rash, PIV ok  Neurologic:    awake, able to answer simple questions  psych: normal affect, calm      LABS    CBC  WBC Count: 12.53 K/uL (11-29 @ 07:17)  WBC Count: 13.67 K/uL (11-28 @ 06:47)  WBC Count: 13.28 K/uL (11-26 @ 07:06)  WBC Count: 13.91 K/uL (11-24 @ 06:10)  WBC Count: 13.74 K/uL (11-23 @ 05:32)                            8.0    12.53 )-----------( 390      ( 29 Nov 2024 07:17 )             26.0       BMP/CMP  11-29    136  |  102  |  11  ----------------------------<  197[H]  3.9   |  29  |  0.20[L]    Ca    7.9[L]      29 Nov 2024 07:17        Creatinine Trend: 0.20<--, 0.27<--, 0.25<--, 0.18<--, 0.27<--, 0.23<--        MICROBIOLOGY:  Vancomycin Level, Trough: 8.0 ug/mL (11-27-24 @ 09:50)  Vancomycin Level, Trough: 6.2 ug/mL (11-26-24 @ 18:54)    .Abscess  11-22-24   Few Escherichia coli ESBL  Rare Enterococcus faecalis  Rare Streptococcus mitis/oralis group "Susceptibilities not performed"  --  Escherichia coli ESBL  Enterococcus faecalis      .Blood BLOOD  11-21-24   No growth at 5 days  --  --      .Blood BLOOD  11-21-24   No growth at 5 days  --  --      Clean Catch  11-20-24   >100,000 CFU/ml Escherichia coli ESBL  <10,000 CFU/ml Normal Urogenital korey present  --  Escherichia coli ESBL      Clean Catch  11-20-24   >100,000 CFU/ml Klebsiella pneumoniae (Carbapenem Resistant)  >100,000 CFU/ml Escherichia coli ESBL  --  Klebsiella pneumoniae (Carbapenem Resistant)  Escherichia coli ESBL    RADIOLOGY:

## 2024-11-29 NOTE — CHART NOTE - NSCHARTNOTEFT_GEN_A_CORE
Pt seen on medical floor for followup, adm w/ UTI , FTT ; hyponatremia. Pt has MS ; bedbound ; osteoporosis ; infected decubitus ulcers; decreased p.o. intake  2 days PTA; Acute on chronic anemia ; functional quadriplegia positive wound vac; severe Malnutrition in chronic illness; BMI = 14 on adm    Factors impacting intake: [ ] none [ ] nausea  [ ] vomiting [ ] diarrhea [ ] constipation  [ ]chewing problems [ ] swallowing issues  [ ] other: Consuming < 50 % of meals, drinking Glucerna Shake 8 oz daily (220 miguel angel, 10 gm pro).     Diet Prescription: Diet, Consistent Carbohydrate w/Evening Snack:   Pureed (PUREED)  Supplement Feeding Modality:  Oral  Glucerna Shake Cans or Servings Per Day:  1       Frequency:  Three Times a day (11-21-24 @ 11:27)    Intake: < 50 % meals.    Current Weight: 11/20 - 149.8 (68 kg )  % Weight Change - no weights to trend since admission.    Edema - 11/21 - 1+ ( L & R ) foot      Pertinent Medications: MEDICATIONS  (STANDING):  collagenase Ointment 1 Application(s) Topical daily  Dakins Solution - 1/2 Strength 1 Application(s) Topical daily  dextrose 5%. 1000 milliLiter(s) (50 mL/Hr) IV Continuous <Continuous>  dextrose 5%. 1000 milliLiter(s) (100 mL/Hr) IV Continuous <Continuous>  dextrose 50% Injectable 25 Gram(s) IV Push once  dextrose 50% Injectable 12.5 Gram(s) IV Push once  dextrose 50% Injectable 25 Gram(s) IV Push once  glucagon  Injectable 1 milliGRAM(s) IntraMuscular once  insulin lispro (ADMELOG) corrective regimen sliding scale   SubCutaneous three times a day before meals  insulin lispro (ADMELOG) corrective regimen sliding scale   SubCutaneous at bedtime  meropenem  IVPB 1000 milliGRAM(s) IV Intermittent every 12 hours  senna 2 Tablet(s) Oral at bedtime  sodium chloride 0.9%. 250 milliLiter(s) (250 mL/Hr) IV Continuous <Continuous>  vancomycin  IVPB 1250 milliGRAM(s) IV Intermittent every 24 hours  vitamin A & D Ointment 1 Application(s) Topical three times a day    MEDICATIONS  (PRN):  acetaminophen     Tablet .. 650 milliGRAM(s) Oral every 6 hours PRN Temp greater or equal to 38C (100.4F), Mild Pain (1 - 3)  aluminum hydroxide/magnesium hydroxide/simethicone Suspension 30 milliLiter(s) Oral every 4 hours PRN Dyspepsia  dextrose Oral Gel 15 Gram(s) Oral once PRN Blood Glucose LESS THAN 70 milliGRAM(s)/deciliter  melatonin 3 milliGRAM(s) Oral at bedtime PRN Insomnia  ondansetron Injectable 4 milliGRAM(s) IV Push every 8 hours PRN Nausea and/or Vomiting  traMADol 25 milliGRAM(s) Oral every 8 hours PRN Severe Pain (7 - 10)    Pertinent Labs: 11-29 Na136 mmol/L Glu 197 mg/dL[H] K+ 3.9 mmol/L Cr  0.20 mg/dL[L] BUN 11 mg/dL 11-26 Phos 2.3 mg/dL[L]11-21-24 @ 08:15 A1C 7.0       CAPILLARY BLOOD GLUCOSE      POCT Blood Glucose.: 196 mg/dL (29 Nov 2024 07:43)  POCT Blood Glucose.: 137 mg/dL (28 Nov 2024 21:12)  POCT Blood Glucose.: 140 mg/dL (28 Nov 2024 16:20)  POCT Blood Glucose.: 87 mg/dL (28 Nov 2024 11:26)    Skin: P/U x 10    Estimated Needs:   [ x] no change since previous assessment ( 11/21 )  [ ] recalculated:     Previous Nutrition Diagnosis:   Nutrition Diagnostic Terminology #1	Malnutrition...  Malnutrition	Severe Malnutrition in the context of chronic illness  Etiology	Increased energy / protein needs related to  Signs/Symptoms	P/U x 10 > stage ll  Goal/Expected Outcome	Pt will Meet calorie and protein needs > 50 % via meals / supplements - NOT Met      Nutrition Diagnosis is [x ] ongoing  [ ] resolved [ ] not applicable     New Nutrition Diagnosis: [x ] not applicable       Interventions:   Recommend - Continue current diet as Rx'd  [ ] Change Diet To:  [ ] Nutrition Supplement  [ ] Nutrition Support  [ ] Other:     Monitoring and Evaluation:   [x ] PO intake [ x ] Tolerance to diet prescription [ x ] weights [ x ] labs[ x ] follow up per protocol  [ ] other:

## 2024-11-29 NOTE — PROGRESS NOTE ADULT - ASSESSMENT
A/P-  79 year old Female with  pmh osteoporosis, MS, bedbound. infected decubitus ulcers presenting to the ED for reduced appetite x 2-3 days.   patient  also has chronic amezquita     Infectious disease consult called 11/22/2024 at 3:45 pm to help with antibiotic management.    +fever  +leukocytosis  CAUTI with urine cx>100K e.coli and K,pneumonia  Infected looking hip and sacral and  upper back decubiti    11/25  no fevers since 11/21  13K leukocytosis  UC #1 grew Klebsiella ( carbapanem resistant )  and ESBL E. Coli   UC #2 grew ESBL E. Coli ( repeat urine culture)  BCs x 2 NGTD  wound cx-ESBL, e.feaclis, strep Mitis from 11/22   abx were changed over the weekend to Meropenem, IV Vanco continued  Vanco level is 3.1 yesterday     11/26:   afebrile  on RA  leukocytosis is about the same 13.28  Cr ok  culture data reviewed, no change   pending vac dressing application  abx continued, Meropenem and Vancomycin     11/27: no fever,   is on  RA  no new cbc today  culture data reviewed:  Abscess cx grew ESBL E. Coli, Enterococcus and strep mitis   BCs NGTD x 2  Meropenem and Vancomycin continued  Vanco level is 8.0 today, spoke with pharmacy  pending vac dressing application     11/29: vac placed, continue on vancomycin and meropenem, vancomycin level tomorrow AM, AUC is good and will keep same dose of vancomycin, please ensure excellent wound care offloading nutrition optimization     Impression-  multiple infected deep decubiti , most likely OM  CAUTI from pre-admission      Plan-  continue Meropenem IV (day #5)  continue vanco (day #7) -   Keep vancomycin -600, current calculation is between 400-500   s/p debridement by wound care on 11/22/2024, s/p wound vac application   frequent offloading and turning   if amezquita is needed please make sure it gets changed every 2-3 weeks   monitor for aspiration precautions as well, keep head of bed above 30 degrees     Discussed plan with primary team     Joaquim Block DO  Chief, Infectious Disease at [facility]  Reachable via Microsoft Teams or ID office: [phone]  Weekdays: After 5pm, please call [phone] for all inquiries and new consults  Weekends: Message on-call infectious disease physician via teams (see Gabriel)

## 2024-11-29 NOTE — PROGRESS NOTE ADULT - SUBJECTIVE AND OBJECTIVE BOX
Patient is a 79y old  Female who presents with a chief complaint of UTI, failure to thrive , hyponatremia. (28 Nov 2024 14:10)      INTERVAL HPI/OVERNIGHT EVENTS: no events. admits to pain but does not want to try anything other than the tramadol.     MEDICATIONS  (STANDING):  collagenase Ointment 1 Application(s) Topical daily  Dakins Solution - 1/2 Strength 1 Application(s) Topical daily  dextrose 5%. 1000 milliLiter(s) (50 mL/Hr) IV Continuous <Continuous>  dextrose 5%. 1000 milliLiter(s) (100 mL/Hr) IV Continuous <Continuous>  dextrose 50% Injectable 25 Gram(s) IV Push once  dextrose 50% Injectable 12.5 Gram(s) IV Push once  dextrose 50% Injectable 25 Gram(s) IV Push once  glucagon  Injectable 1 milliGRAM(s) IntraMuscular once  insulin lispro (ADMELOG) corrective regimen sliding scale   SubCutaneous three times a day before meals  insulin lispro (ADMELOG) corrective regimen sliding scale   SubCutaneous at bedtime  meropenem  IVPB 1000 milliGRAM(s) IV Intermittent every 12 hours  senna 2 Tablet(s) Oral at bedtime  sodium chloride 0.9%. 250 milliLiter(s) (250 mL/Hr) IV Continuous <Continuous>  vancomycin  IVPB 1250 milliGRAM(s) IV Intermittent every 24 hours  vitamin A & D Ointment 1 Application(s) Topical three times a day    MEDICATIONS  (PRN):  acetaminophen     Tablet .. 650 milliGRAM(s) Oral every 6 hours PRN Temp greater or equal to 38C (100.4F), Mild Pain (1 - 3)  aluminum hydroxide/magnesium hydroxide/simethicone Suspension 30 milliLiter(s) Oral every 4 hours PRN Dyspepsia  dextrose Oral Gel 15 Gram(s) Oral once PRN Blood Glucose LESS THAN 70 milliGRAM(s)/deciliter  melatonin 3 milliGRAM(s) Oral at bedtime PRN Insomnia  ondansetron Injectable 4 milliGRAM(s) IV Push every 8 hours PRN Nausea and/or Vomiting  traMADol 25 milliGRAM(s) Oral every 8 hours PRN Severe Pain (7 - 10)      Allergies    No Known Allergies    Intolerances        REVIEW OF SYSTEMS:  ROS negative unless stated otherwise.    Vital Signs Last 24 Hrs  T(C): 36.9 (29 Nov 2024 10:34), Max: 37.2 (29 Nov 2024 05:53)  T(F): 98.4 (29 Nov 2024 10:34), Max: 98.9 (29 Nov 2024 05:53)  HR: 97 (29 Nov 2024 10:34) (73 - 97)  BP: 104/69 (29 Nov 2024 10:34) (104/69 - 145/74)  BP(mean): --  RR: 18 (29 Nov 2024 10:34) (16 - 18)  SpO2: 96% (29 Nov 2024 10:34) (96% - 98%)    Parameters below as of 29 Nov 2024 10:34  Patient On (Oxygen Delivery Method): room air        PHYSICAL EXAM:  GENERAL: NAD, cachetic frail , significant kyphosis   HEAD:  Atraumatic, Normocephalic  EYES: EOMI, PERRLA, conjunctiva and sclera clear  ENMT: Dry mucus membranes  NECK: Supple,   CHEST/LUNG: Clear to  auscultation bilaterally   HEART: Regular rate and rhythm; No murmurs, rubs, or gallops  ABDOMEN: Soft, Nontender, Nondistended; Bowel sounds present  EXTREMITIES:  2+ Peripheral Pulses, No clubbing, cyanosis, or edema BL LE  SKIN: multiple  decubiti  ulcers on back, hips and feet varying stages and depths   NERVOUS SYSTEM:  Alert , contracted     LABS:                        8.0    12.53 )-----------( 390      ( 29 Nov 2024 07:17 )             26.0     11-29    136  |  102  |  11  ----------------------------<  197[H]  3.9   |  29  |  0.20[L]    Ca    7.9[L]      29 Nov 2024 07:17        Urinalysis Basic - ( 29 Nov 2024 07:17 )    Color: x / Appearance: x / SG: x / pH: x  Gluc: 197 mg/dL / Ketone: x  / Bili: x / Urobili: x   Blood: x / Protein: x / Nitrite: x   Leuk Esterase: x / RBC: x / WBC x   Sq Epi: x / Non Sq Epi: x / Bacteria: x      CAPILLARY BLOOD GLUCOSE      POCT Blood Glucose.: 124 mg/dL (29 Nov 2024 11:07)  POCT Blood Glucose.: 196 mg/dL (29 Nov 2024 07:43)  POCT Blood Glucose.: 137 mg/dL (28 Nov 2024 21:12)  POCT Blood Glucose.: 140 mg/dL (28 Nov 2024 16:20)      RADIOLOGY & ADDITIONAL TESTS:    Imaging Personally Reviewed:  [ X] YES  [ ] NO    Consultant(s) Notes Reviewed:  [ X] YES  [ ] NO    Care Discussed with Consultants/Other Providers [X ] YES  [ ] NO

## 2024-11-30 LAB
ANION GAP SERPL CALC-SCNC: 5 MMOL/L — SIGNIFICANT CHANGE UP (ref 5–17)
BUN SERPL-MCNC: 9 MG/DL — SIGNIFICANT CHANGE UP (ref 7–23)
CALCIUM SERPL-MCNC: 8.1 MG/DL — LOW (ref 8.5–10.1)
CHLORIDE SERPL-SCNC: 102 MMOL/L — SIGNIFICANT CHANGE UP (ref 96–108)
CO2 SERPL-SCNC: 29 MMOL/L — SIGNIFICANT CHANGE UP (ref 22–31)
CREAT SERPL-MCNC: 0.22 MG/DL — LOW (ref 0.5–1.3)
EGFR: 117 ML/MIN/1.73M2 — SIGNIFICANT CHANGE UP
GLUCOSE BLDC GLUCOMTR-MCNC: 140 MG/DL — HIGH (ref 70–99)
GLUCOSE BLDC GLUCOMTR-MCNC: 150 MG/DL — HIGH (ref 70–99)
GLUCOSE BLDC GLUCOMTR-MCNC: 172 MG/DL — HIGH (ref 70–99)
GLUCOSE BLDC GLUCOMTR-MCNC: 260 MG/DL — HIGH (ref 70–99)
GLUCOSE SERPL-MCNC: 130 MG/DL — HIGH (ref 70–99)
HCT VFR BLD CALC: 27.3 % — LOW (ref 34.5–45)
HGB BLD-MCNC: 8.4 G/DL — LOW (ref 11.5–15.5)
MCHC RBC-ENTMCNC: 23.9 PG — LOW (ref 27–34)
MCHC RBC-ENTMCNC: 30.8 G/DL — LOW (ref 32–36)
MCV RBC AUTO: 77.8 FL — LOW (ref 80–100)
NRBC # BLD: 0 /100 WBCS — SIGNIFICANT CHANGE UP (ref 0–0)
PLATELET # BLD AUTO: 425 K/UL — HIGH (ref 150–400)
POTASSIUM SERPL-MCNC: 4.2 MMOL/L — SIGNIFICANT CHANGE UP (ref 3.5–5.3)
POTASSIUM SERPL-SCNC: 4.2 MMOL/L — SIGNIFICANT CHANGE UP (ref 3.5–5.3)
RBC # BLD: 3.51 M/UL — LOW (ref 3.8–5.2)
RBC # FLD: 22 % — HIGH (ref 10.3–14.5)
SODIUM SERPL-SCNC: 136 MMOL/L — SIGNIFICANT CHANGE UP (ref 135–145)
VANCOMYCIN TROUGH SERPL-MCNC: 8.7 UG/ML — LOW (ref 10–20)
WBC # BLD: 11.76 K/UL — HIGH (ref 3.8–10.5)
WBC # FLD AUTO: 11.76 K/UL — HIGH (ref 3.8–10.5)

## 2024-11-30 PROCEDURE — 99232 SBSQ HOSP IP/OBS MODERATE 35: CPT

## 2024-11-30 RX ORDER — VANCOMYCIN HCL 900 MCG/MG
750 POWDER (GRAM) MISCELLANEOUS EVERY 12 HOURS
Refills: 0 | Status: DISCONTINUED | OUTPATIENT
Start: 2024-11-30 | End: 2024-11-30

## 2024-11-30 RX ORDER — VANCOMYCIN HCL 900 MCG/MG
750 POWDER (GRAM) MISCELLANEOUS ONCE
Refills: 0 | Status: COMPLETED | OUTPATIENT
Start: 2024-11-30 | End: 2024-11-30

## 2024-11-30 RX ORDER — VANCOMYCIN HCL 900 MCG/MG
500 POWDER (GRAM) MISCELLANEOUS ONCE
Refills: 0 | Status: COMPLETED | OUTPATIENT
Start: 2024-11-30 | End: 2024-11-30

## 2024-11-30 RX ORDER — VANCOMYCIN HCL 900 MCG/MG
1250 POWDER (GRAM) MISCELLANEOUS EVERY 24 HOURS
Refills: 0 | Status: DISCONTINUED | OUTPATIENT
Start: 2024-12-01 | End: 2024-12-02

## 2024-11-30 RX ORDER — VANCOMYCIN HCL 900 MCG/MG
POWDER (GRAM) MISCELLANEOUS
Refills: 0 | Status: DISCONTINUED | OUTPATIENT
Start: 2024-11-30 | End: 2024-11-30

## 2024-11-30 RX ADMIN — Medication 1: at 16:30

## 2024-11-30 RX ADMIN — Medication 1 APPLICATION(S): at 14:33

## 2024-11-30 RX ADMIN — Medication 250 MILLIGRAM(S): at 10:35

## 2024-11-30 RX ADMIN — Medication 3: at 11:46

## 2024-11-30 RX ADMIN — MEROPENEM 100 MILLIGRAM(S): 500 INJECTION, POWDER, FOR SOLUTION INTRAVENOUS at 05:08

## 2024-11-30 RX ADMIN — Medication 1 APPLICATION(S): at 11:45

## 2024-11-30 RX ADMIN — MEROPENEM 100 MILLIGRAM(S): 500 INJECTION, POWDER, FOR SOLUTION INTRAVENOUS at 18:02

## 2024-11-30 RX ADMIN — Medication 100 MILLIGRAM(S): at 11:44

## 2024-11-30 RX ADMIN — COLLAGENASE SANTYL 1 APPLICATION(S): 250 OINTMENT TOPICAL at 11:44

## 2024-11-30 RX ADMIN — Medication 2 TABLET(S): at 22:25

## 2024-11-30 RX ADMIN — Medication 1 APPLICATION(S): at 05:33

## 2024-11-30 RX ADMIN — Medication 1 APPLICATION(S): at 22:29

## 2024-11-30 NOTE — PROGRESS NOTE ADULT - SUBJECTIVE AND OBJECTIVE BOX
Patient is a 79y old  Female who presents with a chief complaint of UTI, failure to thrive , hyponatremia. (29 Nov 2024 14:46)      INTERVAL HPI/OVERNIGHT EVENTS: Overnight no acute events. No complaints. Denies pain.     MEDICATIONS  (STANDING):  collagenase Ointment 1 Application(s) Topical daily  Dakins Solution - 1/2 Strength 1 Application(s) Topical daily  dextrose 5%. 1000 milliLiter(s) (100 mL/Hr) IV Continuous <Continuous>  dextrose 5%. 1000 milliLiter(s) (50 mL/Hr) IV Continuous <Continuous>  dextrose 50% Injectable 25 Gram(s) IV Push once  dextrose 50% Injectable 12.5 Gram(s) IV Push once  dextrose 50% Injectable 25 Gram(s) IV Push once  glucagon  Injectable 1 milliGRAM(s) IntraMuscular once  insulin lispro (ADMELOG) corrective regimen sliding scale   SubCutaneous three times a day before meals  insulin lispro (ADMELOG) corrective regimen sliding scale   SubCutaneous at bedtime  meropenem  IVPB 1000 milliGRAM(s) IV Intermittent every 12 hours  senna 2 Tablet(s) Oral at bedtime  sodium chloride 0.9%. 250 milliLiter(s) (250 mL/Hr) IV Continuous <Continuous>  vitamin A & D Ointment 1 Application(s) Topical three times a day    MEDICATIONS  (PRN):  acetaminophen     Tablet .. 650 milliGRAM(s) Oral every 6 hours PRN Temp greater or equal to 38C (100.4F), Mild Pain (1 - 3)  aluminum hydroxide/magnesium hydroxide/simethicone Suspension 30 milliLiter(s) Oral every 4 hours PRN Dyspepsia  dextrose Oral Gel 15 Gram(s) Oral once PRN Blood Glucose LESS THAN 70 milliGRAM(s)/deciliter  melatonin 3 milliGRAM(s) Oral at bedtime PRN Insomnia  ondansetron Injectable 4 milliGRAM(s) IV Push every 8 hours PRN Nausea and/or Vomiting  traMADol 25 milliGRAM(s) Oral every 8 hours PRN Severe Pain (7 - 10)      Allergies    No Known Allergies    Intolerances        REVIEW OF SYSTEMS:  ROS negative unless stated otherwise.    Vital Signs Last 24 Hrs  T(C): 36.9 (30 Nov 2024 05:42), Max: 37.2 (30 Nov 2024 00:03)  T(F): 98.4 (30 Nov 2024 05:42), Max: 99 (30 Nov 2024 00:03)  HR: 95 (30 Nov 2024 05:42) (95 - 98)  BP: 135/74 (30 Nov 2024 05:42) (112/65 - 151/80)  BP(mean): --  RR: 18 (30 Nov 2024 05:42) (16 - 18)  SpO2: 98% (30 Nov 2024 05:42) (96% - 98%)    Parameters below as of 30 Nov 2024 05:42  Patient On (Oxygen Delivery Method): room air        PHYSICAL EXAM:  GENERAL: NAD, cachetic frail , significant kyphosis   HEAD:  Atraumatic, Normocephalic  EYES: EOMI, PERRLA, conjunctiva and sclera clear  ENMT: Dry mucus membranes  NECK: Supple,   CHEST/LUNG: Clear to  auscultation bilaterally   HEART: Regular rate and rhythm; No murmurs, rubs, or gallops  ABDOMEN: Soft, Nontender, Nondistended; Bowel sounds present  EXTREMITIES:  2+ Peripheral Pulses, No clubbing, cyanosis, or edema BL LE  SKIN: multiple  decubiti  ulcers on back, hips and feet varying stages and depths   NERVOUS SYSTEM:  Alert , contracted     LABS:                        8.4    11.76 )-----------( 425      ( 30 Nov 2024 07:40 )             27.3     11-30    136  |  102  |  9   ----------------------------<  130[H]  4.2   |  29  |  0.22[L]    Ca    8.1[L]      30 Nov 2024 07:40        Urinalysis Basic - ( 30 Nov 2024 07:40 )    Color: x / Appearance: x / SG: x / pH: x  Gluc: 130 mg/dL / Ketone: x  / Bili: x / Urobili: x   Blood: x / Protein: x / Nitrite: x   Leuk Esterase: x / RBC: x / WBC x   Sq Epi: x / Non Sq Epi: x / Bacteria: x      CAPILLARY BLOOD GLUCOSE      POCT Blood Glucose.: 260 mg/dL (30 Nov 2024 11:34)  POCT Blood Glucose.: 140 mg/dL (30 Nov 2024 08:04)  POCT Blood Glucose.: 104 mg/dL (29 Nov 2024 22:55)  POCT Blood Glucose.: 234 mg/dL (29 Nov 2024 16:30)      RADIOLOGY & ADDITIONAL TESTS:    Imaging Personally Reviewed:  [ X] YES  [ ] NO    Consultant(s) Notes Reviewed:  [ X] YES  [ ] NO    Care Discussed with Consultants/Other Providers [X ] YES  [ ] NO

## 2024-11-30 NOTE — PROGRESS NOTE ADULT - PROBLEM SELECTOR PLAN 4
multiple ulcers unstageable  Dr. Whitehead and wound care input   IV meropenem and vanc as per ID   Wound vac placed

## 2024-11-30 NOTE — CHART NOTE - NSCHARTNOTEFT_GEN_A_CORE
vanco trough 14.8 &  @7:40 am   5 am dose held for trough (am dose not given)   discussed with pharmacy and plan for vanco 750 q 12 trough prior to third dose

## 2024-12-01 LAB
GLUCOSE BLDC GLUCOMTR-MCNC: 144 MG/DL — HIGH (ref 70–99)
GLUCOSE BLDC GLUCOMTR-MCNC: 148 MG/DL — HIGH (ref 70–99)
GLUCOSE BLDC GLUCOMTR-MCNC: 170 MG/DL — HIGH (ref 70–99)
GLUCOSE BLDC GLUCOMTR-MCNC: 209 MG/DL — HIGH (ref 70–99)

## 2024-12-01 PROCEDURE — 99232 SBSQ HOSP IP/OBS MODERATE 35: CPT

## 2024-12-01 RX ADMIN — Medication 2: at 11:35

## 2024-12-01 RX ADMIN — Medication 1 APPLICATION(S): at 06:42

## 2024-12-01 RX ADMIN — MEROPENEM 100 MILLIGRAM(S): 500 INJECTION, POWDER, FOR SOLUTION INTRAVENOUS at 06:40

## 2024-12-01 RX ADMIN — MEROPENEM 100 MILLIGRAM(S): 500 INJECTION, POWDER, FOR SOLUTION INTRAVENOUS at 18:35

## 2024-12-01 RX ADMIN — Medication 1 APPLICATION(S): at 13:29

## 2024-12-01 RX ADMIN — Medication 1 APPLICATION(S): at 12:42

## 2024-12-01 RX ADMIN — COLLAGENASE SANTYL 1 APPLICATION(S): 250 OINTMENT TOPICAL at 12:44

## 2024-12-01 RX ADMIN — Medication 1 APPLICATION(S): at 22:10

## 2024-12-01 RX ADMIN — Medication 1: at 08:49

## 2024-12-01 RX ADMIN — Medication 166.67 MILLIGRAM(S): at 10:14

## 2024-12-01 RX ADMIN — Medication 2 TABLET(S): at 22:09

## 2024-12-01 NOTE — PROGRESS NOTE ADULT - SUBJECTIVE AND OBJECTIVE BOX
Patient is a 79y old  Female who presents with a chief complaint of UTI, failure to thrive , hyponatremia. (30 Nov 2024 12:19)      INTERVAL HPI/OVERNIGHT EVENTS: Overnight no acute events. Son at bedside updated on plan.     MEDICATIONS  (STANDING):  collagenase Ointment 1 Application(s) Topical daily  Dakins Solution - 1/2 Strength 1 Application(s) Topical daily  dextrose 5%. 1000 milliLiter(s) (100 mL/Hr) IV Continuous <Continuous>  dextrose 5%. 1000 milliLiter(s) (50 mL/Hr) IV Continuous <Continuous>  dextrose 50% Injectable 25 Gram(s) IV Push once  dextrose 50% Injectable 12.5 Gram(s) IV Push once  dextrose 50% Injectable 25 Gram(s) IV Push once  glucagon  Injectable 1 milliGRAM(s) IntraMuscular once  insulin lispro (ADMELOG) corrective regimen sliding scale   SubCutaneous three times a day before meals  insulin lispro (ADMELOG) corrective regimen sliding scale   SubCutaneous at bedtime  meropenem  IVPB 1000 milliGRAM(s) IV Intermittent every 12 hours  senna 2 Tablet(s) Oral at bedtime  sodium chloride 0.9%. 250 milliLiter(s) (250 mL/Hr) IV Continuous <Continuous>  vancomycin  IVPB 1250 milliGRAM(s) IV Intermittent every 24 hours  vitamin A & D Ointment 1 Application(s) Topical three times a day    MEDICATIONS  (PRN):  acetaminophen     Tablet .. 650 milliGRAM(s) Oral every 6 hours PRN Temp greater or equal to 38C (100.4F), Mild Pain (1 - 3)  aluminum hydroxide/magnesium hydroxide/simethicone Suspension 30 milliLiter(s) Oral every 4 hours PRN Dyspepsia  dextrose Oral Gel 15 Gram(s) Oral once PRN Blood Glucose LESS THAN 70 milliGRAM(s)/deciliter  melatonin 3 milliGRAM(s) Oral at bedtime PRN Insomnia  ondansetron Injectable 4 milliGRAM(s) IV Push every 8 hours PRN Nausea and/or Vomiting      Allergies    No Known Allergies    Intolerances        REVIEW OF SYSTEMS:  ROS negative unless stated otherwise.    Vital Signs Last 24 Hrs  T(C): 36.7 (01 Dec 2024 05:22), Max: 37.3 (30 Nov 2024 17:35)  T(F): 98.1 (01 Dec 2024 05:22), Max: 99.1 (30 Nov 2024 17:35)  HR: 101 (01 Dec 2024 05:22) (100 - 105)  BP: 106/64 (01 Dec 2024 05:22) (106/64 - 112/69)  BP(mean): --  RR: 18 (01 Dec 2024 05:22) (18 - 18)  SpO2: 97% (01 Dec 2024 05:22) (96% - 97%)    Parameters below as of 01 Dec 2024 05:22  Patient On (Oxygen Delivery Method): room air        PHYSICAL EXAM:  GENERAL: NAD, cachetic frail , significant kyphosis   HEAD:  Atraumatic, Normocephalic  EYES: EOMI, PERRLA, conjunctiva and sclera clear  ENMT: Dry mucus membranes  NECK: Supple,   CHEST/LUNG: Clear to  auscultation bilaterally   HEART: Regular rate and rhythm; No murmurs, rubs, or gallops  ABDOMEN: Soft, Nontender, Nondistended; Bowel sounds present  EXTREMITIES:  2+ Peripheral Pulses, No clubbing, cyanosis, or edema BL LE  SKIN: multiple  decubiti  ulcers on back, hips and feet varying stages and depths   NERVOUS SYSTEM:  Alert , contracted     LABS:                        8.4    11.76 )-----------( 425      ( 30 Nov 2024 07:40 )             27.3     11-30    136  |  102  |  9   ----------------------------<  130[H]  4.2   |  29  |  0.22[L]    Ca    8.1[L]      30 Nov 2024 07:40        Urinalysis Basic - ( 30 Nov 2024 07:40 )    Color: x / Appearance: x / SG: x / pH: x  Gluc: 130 mg/dL / Ketone: x  / Bili: x / Urobili: x   Blood: x / Protein: x / Nitrite: x   Leuk Esterase: x / RBC: x / WBC x   Sq Epi: x / Non Sq Epi: x / Bacteria: x      CAPILLARY BLOOD GLUCOSE      POCT Blood Glucose.: 209 mg/dL (01 Dec 2024 11:15)  POCT Blood Glucose.: 170 mg/dL (01 Dec 2024 07:58)  POCT Blood Glucose.: 150 mg/dL (30 Nov 2024 22:11)  POCT Blood Glucose.: 172 mg/dL (30 Nov 2024 16:24)      RADIOLOGY & ADDITIONAL TESTS:    Imaging Personally Reviewed:  [ X] YES  [ ] NO    Consultant(s) Notes Reviewed:  [ X] YES  [ ] NO    Care Discussed with Consultants/Other Providers [X ] YES  [ ] NO

## 2024-12-01 NOTE — PROGRESS NOTE ADULT - PROBLEM SELECTOR PLAN 1
FU Ucx-- kleb carbapenem resistant / ecoli esbl  On IV merem and vanc  ID following- will discuss length of abx

## 2024-12-02 LAB
ANION GAP SERPL CALC-SCNC: 4 MMOL/L — LOW (ref 5–17)
BUN SERPL-MCNC: 11 MG/DL — SIGNIFICANT CHANGE UP (ref 7–23)
CALCIUM SERPL-MCNC: 7.7 MG/DL — LOW (ref 8.5–10.1)
CHLORIDE SERPL-SCNC: 103 MMOL/L — SIGNIFICANT CHANGE UP (ref 96–108)
CO2 SERPL-SCNC: 30 MMOL/L — SIGNIFICANT CHANGE UP (ref 22–31)
CREAT SERPL-MCNC: 0.16 MG/DL — LOW (ref 0.5–1.3)
EGFR: 126 ML/MIN/1.73M2 — SIGNIFICANT CHANGE UP
GLUCOSE BLDC GLUCOMTR-MCNC: 134 MG/DL — HIGH (ref 70–99)
GLUCOSE BLDC GLUCOMTR-MCNC: 169 MG/DL — HIGH (ref 70–99)
GLUCOSE BLDC GLUCOMTR-MCNC: 181 MG/DL — HIGH (ref 70–99)
GLUCOSE BLDC GLUCOMTR-MCNC: 200 MG/DL — HIGH (ref 70–99)
GLUCOSE SERPL-MCNC: 125 MG/DL — HIGH (ref 70–99)
HCT VFR BLD CALC: 24.7 % — LOW (ref 34.5–45)
HGB BLD-MCNC: 7.6 G/DL — LOW (ref 11.5–15.5)
MCHC RBC-ENTMCNC: 24 PG — LOW (ref 27–34)
MCHC RBC-ENTMCNC: 30.8 G/DL — LOW (ref 32–36)
MCV RBC AUTO: 77.9 FL — LOW (ref 80–100)
NRBC # BLD: 0 /100 WBCS — SIGNIFICANT CHANGE UP (ref 0–0)
PLATELET # BLD AUTO: 422 K/UL — HIGH (ref 150–400)
POTASSIUM SERPL-MCNC: 3.6 MMOL/L — SIGNIFICANT CHANGE UP (ref 3.5–5.3)
POTASSIUM SERPL-SCNC: 3.6 MMOL/L — SIGNIFICANT CHANGE UP (ref 3.5–5.3)
RBC # BLD: 3.17 M/UL — LOW (ref 3.8–5.2)
RBC # FLD: 21.7 % — HIGH (ref 10.3–14.5)
SODIUM SERPL-SCNC: 137 MMOL/L — SIGNIFICANT CHANGE UP (ref 135–145)
VANCOMYCIN TROUGH SERPL-MCNC: 12.4 UG/ML — SIGNIFICANT CHANGE UP (ref 10–20)
WBC # BLD: 8.48 K/UL — SIGNIFICANT CHANGE UP (ref 3.8–10.5)
WBC # FLD AUTO: 8.48 K/UL — SIGNIFICANT CHANGE UP (ref 3.8–10.5)

## 2024-12-02 PROCEDURE — 99232 SBSQ HOSP IP/OBS MODERATE 35: CPT

## 2024-12-02 RX ADMIN — Medication 1: at 16:31

## 2024-12-02 RX ADMIN — Medication 1: at 11:58

## 2024-12-02 RX ADMIN — Medication 1 APPLICATION(S): at 13:37

## 2024-12-02 RX ADMIN — COLLAGENASE SANTYL 1 APPLICATION(S): 250 OINTMENT TOPICAL at 11:54

## 2024-12-02 RX ADMIN — Medication 1 APPLICATION(S): at 21:04

## 2024-12-02 RX ADMIN — Medication 1 APPLICATION(S): at 06:30

## 2024-12-02 RX ADMIN — Medication 1 APPLICATION(S): at 11:53

## 2024-12-02 RX ADMIN — Medication 2 TABLET(S): at 21:04

## 2024-12-02 RX ADMIN — MEROPENEM 100 MILLIGRAM(S): 500 INJECTION, POWDER, FOR SOLUTION INTRAVENOUS at 17:29

## 2024-12-02 RX ADMIN — MEROPENEM 100 MILLIGRAM(S): 500 INJECTION, POWDER, FOR SOLUTION INTRAVENOUS at 06:36

## 2024-12-02 RX ADMIN — Medication 166.67 MILLIGRAM(S): at 11:54

## 2024-12-02 NOTE — PROGRESS NOTE ADULT - SUBJECTIVE AND OBJECTIVE BOX
[facility]  INFECTIOUS DISEASES   [address] [place] [address]  Tel: [phone]     Fax: [phone]  ==============================================================================  MD Joaqumi Joe, DO Deysi Rojas, BRAYAN Oates M.D  ==============================================================================      ORAL, SPENCER  [id number]  79y ([birth year])      Interval History:    ROS:    [ ] Unobtainable because:  [ ] All other systems negative except as noted    Constitutional: no fever, no chills  Head: no trauma  Eyes: no vision changes, no eye pain  ENT:  no sore throat, no rhinorrhea  Cardiovascular:  no chest pain, no palpitation  Respiratory:  no SOB, no cough  GI:  no abd pain, no vomiting, no diarrhea  urinary: no dysuria, no hematuria, no flank pain  musculoskeletal:  no joint pain, no joint swelling  skin:  no rash  neurology:  no headache, no seizure, no change in mental status  psych: no anxiety, no depression         Allergies  No Known Allergies        ANTIMICROBIALS:  meropenem  IVPB 1000 every 12 hours  vancomycin  IVPB 1250 every 24 hours        Physical Exam:  Vital Signs Last 24 Hrs  T(C): 36.9 (02 Dec 2024 05:32), Max: 36.9 (02 Dec 2024 05:32)  T(F): 98.4 (02 Dec 2024 05:32), Max: 98.4 (02 Dec 2024 05:32)  HR: 91 (02 Dec 2024 05:32) (91 - 98)  BP: 119/66 (02 Dec 2024 05:32) (104/67 - 125/69)  BP(mean): --  RR: 18 (02 Dec 2024 05:32) (18 - 18)  SpO2: 96% (02 Dec 2024 05:32) (95% - 97%)    Parameters below as of 02 Dec 2024 05:32  Patient On (Oxygen Delivery Method): room air        12-01-24 @ 07:01  -  12-02-24 @ 07:00  --------------------------------------------------------  IN: 100 mL / OUT: 1150 mL / NET: -1050 mL    12-02-24 @ 07:01  -  12-02-24 @ 10:23  --------------------------------------------------------  IN: 237 mL / OUT: 0 mL / NET: 237 mL      General:    NAD,  non toxic  Head: atraumatic, normocephalic  Eye: normal sclera and conjunctiva  ENT:    no oral lesions, neck supple  Cardio:     regular S1, S2,  no murmur  Respiratory:    clear b/l,    no wheezing  abd:     soft,   BS +,   no tenderness  :   no CVAT,  no suprapubic tenderness,   no  amezquita  Musculoskeletal:   no joint swelling,   no edema  vascular: no central lines, +PIV   Skin:    no rash  Neurologic:     no focal deficit  psych: normal affect    WBC Count: 8.48 K/uL (12-02 @ 07:10)  WBC Count: 11.76 K/uL (11-30 @ 07:40)  WBC Count: 12.53 K/uL (11-29 @ 07:17)  WBC Count: 13.67 K/uL (11-28 @ 06:47)  WBC Count: 13.28 K/uL (11-26 @ 07:06)                            7.6    8.48  )-----------( 422      ( 02 Dec 2024 07:10 )             24.7       12-02    137  |  103  |  11  ----------------------------<  125[H]  3.6   |  30  |  0.16[L]    Ca    7.7[L]      02 Dec 2024 07:10        Urinalysis Basic - ( 02 Dec 2024 07:10 )    Color: x / Appearance: x / SG: x / pH: x  Gluc: 125 mg/dL / Ketone: x  / Bili: x / Urobili: x   Blood: x / Protein: x / Nitrite: x   Leuk Esterase: x / RBC: x / WBC x   Sq Epi: x / Non Sq Epi: x / Bacteria: x          Creatinine Trend: 0.16<--, 0.22<--, 0.20<--, 0.27<--, 0.25<--, 0.18<--      MICROBIOLOGY:  v  .Abscess  11-22-24   Few Escherichia coli ESBL  Rare Enterococcus faecalis  Rare Streptococcus mitis/oralis group "Susceptibilities not performed"  --  Escherichia coli ESBL  Enterococcus faecalis      .Blood BLOOD  11-21-24   No growth at 5 days  --  --      .Blood BLOOD  11-21-24   No growth at 5 days  --  --      Clean Catch  11-20-24   >100,000 CFU/ml Escherichia coli ESBL  <10,000 CFU/ml Normal Urogenital korey present  --  Escherichia coli ESBL      Clean Catch  11-20-24   >100,000 CFU/ml Klebsiella pneumoniae (Carbapenem Resistant)  >100,000 CFU/ml Escherichia coli ESBL  --  Klebsiella pneumoniae (Carbapenem Resistant)  Escherichia coli ESBL                                  RADIOLOGY:   [facility]  INFECTIOUS DISEASES   [address] [place] [address]  Tel: [phone]     Fax: [phone]  ==============================================================================  MD Joaquim Joe, BRAYAN Schultz M.D  ==============================================================================      ORALSPENCER  [id number]  79y ([birth year])      Interval History:    Patient seen and examined today while the wound care team is changing her wound dressings and the wound vac.    patient remains weak and lethargic.    unable to answer any questions      ROS:    [ ] Unobtainable because:  unable as patient does not answer any questions, does not verbalize        Allergies  No Known Drug Allergies        ANTIMICROBIALS:  meropenem  IVPB 1000 every 12 hours  vancomycin  IVPB 1250 every 24 hours        Physical Exam:  Vital Signs Last 24 Hrs  T(C): 36.9 (02 Dec 2024 05:32), Max: 36.9 (02 Dec 2024 05:32)  T(F): 98.4 (02 Dec 2024 05:32), Max: 98.4 (02 Dec 2024 05:32)  HR: 91 (02 Dec 2024 05:32) (91 - 98)  BP: 119/66 (02 Dec 2024 05:32) (104/67 - 125/69)  BP(mean): --  RR: 18 (02 Dec 2024 05:32) (18 - 18)  SpO2: 96% (02 Dec 2024 05:32) (95% - 97%)    Parameters below as of 02 Dec 2024 05:32  Patient On (Oxygen Delivery Method): room air        12-01-24 @ 07:01  -  12-02-24 @ 07:00  --------------------------------------------------------  IN: 100 mL / OUT: 1150 mL / NET: -1050 mL    12-02-24 @ 07:01  -  12-02-24 @ 10:23  --------------------------------------------------------  IN: 237 mL / OUT: 0 mL / NET: 237 mL      Physical Exam:  General:    weak looking, frail elderly contracted female  Head: atraumatic, normocephalic, bitemporal wasting   Eyes: normal sclera and conjunctiva  ENT:   no oropharyngeal lesions, no LAD, neck supple  Cardio:    regular S1,S2  Respiratory:   No wheezing, No rhonchi  abd:   soft, BS +, not tender, no distention  :     no suprapubic tenderness, has amezquita and urine draining   Musculoskeletal : contracted, multiple decubiti with most prominent on right hip, left hip and upper back and sacral  region , deep wounds, wound vac removed by wound team to change dressing and the b/l  hip and sacral  wound all have pink granulation tissue, no pus  very deep wound, bone exposed but no pus and no necrotic tissue  Skin:    no rash, PIV ok  Neurologic:    awake, able to answer simple questions  psych: normal affect, calm    WBC Count: 8.48 K/uL (12-02 @ 07:10)  WBC Count: 11.76 K/uL (11-30 @ 07:40)  WBC Count: 12.53 K/uL (11-29 @ 07:17)  WBC Count: 13.67 K/uL (11-28 @ 06:47)  WBC Count: 13.28 K/uL (11-26 @ 07:06)                            7.6    8.48  )-----------( 422      ( 02 Dec 2024 07:10 )             24.7       12-02    137  |  103  |  11  ----------------------------<  125[H]  3.6   |  30  |  0.16[L]    Ca    7.7[L]      02 Dec 2024 07:10        Urinalysis Basic - ( 02 Dec 2024 07:10 )    Color: x / Appearance: x / SG: x / pH: x  Gluc: 125 mg/dL / Ketone: x  / Bili: x / Urobili: x   Blood: x / Protein: x / Nitrite: x   Leuk Esterase: x / RBC: x / WBC x   Sq Epi: x / Non Sq Epi: x / Bacteria: x          Creatinine Trend: 0.16<--, 0.22<--, 0.20<--, 0.27<--, 0.25<--, 0.18<--      MICROBIOLOGY:    .Abscess  11-22-24   Few Escherichia coli ESBL  Rare Enterococcus faecalis  Rare Streptococcus mitis/oralis group "Susceptibilities not performed"  --  Escherichia coli ESBL  Enterococcus faecalis      .Blood BLOOD  11-21-24   No growth at 5 days  --  --      .Blood BLOOD  11-21-24   No growth at 5 days  --  --      Clean Catch  11-20-24   >100,000 CFU/ml Escherichia coli ESBL  <10,000 CFU/ml Normal Urogenital korey present  --  Escherichia coli ESBL      Clean Catch  11-20-24   >100,000 CFU/ml Klebsiella pneumoniae (Carbapenem Resistant)  >100,000 CFU/ml Escherichia coli ESBL  --  Klebsiella pneumoniae (Carbapenem Resistant)  Escherichia coli ESBL          RADIOLOGY:

## 2024-12-02 NOTE — PROGRESS NOTE ADULT - SUBJECTIVE AND OBJECTIVE BOX
Patient is a 79y old  Female who presents with a chief complaint of UTI, failure to thrive , hyponatremia. (02 Dec 2024 10:23)      INTERVAL HPI/OVERNIGHT EVENTS:    MEDICATIONS  (STANDING):  collagenase Ointment 1 Application(s) Topical daily  Dakins Solution - 1/2 Strength 1 Application(s) Topical daily  dextrose 5%. 1000 milliLiter(s) (100 mL/Hr) IV Continuous <Continuous>  dextrose 5%. 1000 milliLiter(s) (50 mL/Hr) IV Continuous <Continuous>  dextrose 50% Injectable 25 Gram(s) IV Push once  dextrose 50% Injectable 12.5 Gram(s) IV Push once  dextrose 50% Injectable 25 Gram(s) IV Push once  glucagon  Injectable 1 milliGRAM(s) IntraMuscular once  insulin lispro (ADMELOG) corrective regimen sliding scale   SubCutaneous three times a day before meals  insulin lispro (ADMELOG) corrective regimen sliding scale   SubCutaneous at bedtime  meropenem  IVPB 1000 milliGRAM(s) IV Intermittent every 12 hours  senna 2 Tablet(s) Oral at bedtime  sodium chloride 0.9%. 250 milliLiter(s) (250 mL/Hr) IV Continuous <Continuous>  vitamin A & D Ointment 1 Application(s) Topical three times a day    MEDICATIONS  (PRN):  acetaminophen     Tablet .. 650 milliGRAM(s) Oral every 6 hours PRN Temp greater or equal to 38C (100.4F), Mild Pain (1 - 3)  aluminum hydroxide/magnesium hydroxide/simethicone Suspension 30 milliLiter(s) Oral every 4 hours PRN Dyspepsia  dextrose Oral Gel 15 Gram(s) Oral once PRN Blood Glucose LESS THAN 70 milliGRAM(s)/deciliter  melatonin 3 milliGRAM(s) Oral at bedtime PRN Insomnia  ondansetron Injectable 4 milliGRAM(s) IV Push every 8 hours PRN Nausea and/or Vomiting      Allergies    No Known Allergies    Intolerances        REVIEW OF SYSTEMS:  CONSTITUTIONAL: No fever, weight loss, or fatigue  EYES: No eye pain, visual disturbances, or discharge  ENMT:  No difficulty hearing, tinnitus, vertigo; No sinus or throat pain  NECK: No pain or stiffness  BREASTS: No pain, masses, or nipple discharge  RESPIRATORY: No cough, wheezing, chills or hemoptysis; No shortness of breath  CARDIOVASCULAR: No chest pain, palpitations, dizziness, or leg swelling  GASTROINTESTINAL: No abdominal or epigastric pain. No nausea, vomiting, or hematemesis; No diarrhea or constipation. No melena or hematochezia.  GENITOURINARY: No dysuria, frequency, hematuria, or incontinence  NEUROLOGICAL: No headaches, memory loss, loss of strength, numbness, or tremors  SKIN: No itching, burning, rashes, or lesions   LYMPH NODES: No enlarged glands  ENDOCRINE: No heat or cold intolerance; No hair loss  MUSCULOSKELETAL: No joint pain or swelling; No muscle, back, or extremity pain  PSYCHIATRIC: No depression, anxiety, mood swings, or difficulty sleeping  HEME/LYMPH: No easy bruising, or bleeding gums  ALLERGY AND IMMUNOLOGIC: No hives or eczema    Vital Signs Last 24 Hrs  T(C): 36.7 (02 Dec 2024 11:47), Max: 36.9 (02 Dec 2024 05:32)  T(F): 98 (02 Dec 2024 11:47), Max: 98.4 (02 Dec 2024 05:32)  HR: 102 (02 Dec 2024 11:47) (91 - 102)  BP: 112/61 (02 Dec 2024 11:47) (104/67 - 125/69)  BP(mean): --  RR: 18 (02 Dec 2024 11:47) (18 - 18)  SpO2: 95% (02 Dec 2024 11:47) (95% - 97%)    Parameters below as of 02 Dec 2024 11:47  Patient On (Oxygen Delivery Method): room air        PHYSICAL EXAM:  GENERAL: NAD, well-groomed, well-developed  HEAD:  Atraumatic, Normocephalic  EYES: EOMI, PERRLA, conjunctiva and sclera clear  ENMT: No tonsillar erythema, exudates, or enlargement; Moist mucous membranes, Good dentition, No lesions  NECK: Supple, No JVD, Normal thyroid  NERVOUS SYSTEM:  Alert & Oriented X3, Good concentration; Motor Strength 5/5 B/L upper and lower extremities; DTRs 2+ intact and symmetric  CHEST/LUNG: Clear to percussion bilaterally; No rales, rhonchi, wheezing, or rubs  HEART: Regular rate and rhythm; No murmurs, rubs, or gallops  ABDOMEN: Soft, Nontender, Nondistended; Bowel sounds present  EXTREMITIES:  2+ Peripheral Pulses, No clubbing, cyanosis, or edema  LYMPH: No lymphadenopathy noted  SKIN: No rashes or lesions    LABS:                        7.6    8.48  )-----------( 422      ( 02 Dec 2024 07:10 )             24.7     12-02    137  |  103  |  11  ----------------------------<  125[H]  3.6   |  30  |  0.16[L]    Ca    7.7[L]      02 Dec 2024 07:10        Urinalysis Basic - ( 02 Dec 2024 07:10 )    Color: x / Appearance: x / SG: x / pH: x  Gluc: 125 mg/dL / Ketone: x  / Bili: x / Urobili: x   Blood: x / Protein: x / Nitrite: x   Leuk Esterase: x / RBC: x / WBC x   Sq Epi: x / Non Sq Epi: x / Bacteria: x      CAPILLARY BLOOD GLUCOSE      POCT Blood Glucose.: 169 mg/dL (02 Dec 2024 16:09)  POCT Blood Glucose.: 181 mg/dL (02 Dec 2024 11:38)  POCT Blood Glucose.: 134 mg/dL (02 Dec 2024 08:12)  POCT Blood Glucose.: 144 mg/dL (01 Dec 2024 21:36)      RADIOLOGY & ADDITIONAL TESTS:    Imaging Personally Reviewed:  [ X] YES  [ ] NO    Consultant(s) Notes Reviewed:  [ X] YES  [ ] NO    Care Discussed with Consultants/Other Providers [X ] YES  [ ] NO

## 2024-12-02 NOTE — PROGRESS NOTE ADULT - ASSESSMENT
A/P-  79 year old Female with  pmh osteoporosis, MS, bedbound. infected decubitus ulcers presenting to the ED for reduced appetite x 2-3 days.   patient  also has chronic amezquita     Infectious disease consult called 11/22/2024 at 3:45 pm to help with antibiotic management.    +fever  +leukocytosis  CAUTI with urine cx>100K e.coli and K,pneumonia  Infected looking hip and sacral and  upper back decubiti    11/25  no fevers since 11/21  13K leukocytosis  UC #1 grew Klebsiella ( carbapanem resistant )  and ESBL E. Coli   UC #2 grew ESBL E. Coli ( repeat urine culture)  BCs x 2 NGTD  wound cx-ESBL, e.feaclis, strep Mitis from 11/22   abx were changed over the weekend to Meropenem, IV Vanco continued  Vanco level is 3.1 yesterday     11/26:   afebrile  on RA  leukocytosis is about the same 13.28  Cr ok  culture data reviewed, no change   pending vac dressing application  abx continued, Meropenem and Vancomycin     11/27: no fever,   is on  RA  no new cbc today  culture data reviewed:  Abscess cx grew ESBL E. Coli, Enterococcus and strep mitis   BCs NGTD x 2  Meropenem and Vancomycin continued  Vanco level is 8.0 today, spoke with pharmacy  pending vac dressing application     11/29: vac placed, continue on vancomycin and meropenem, vancomycin level tomorrow AM, AUC is good and will keep same dose of vancomycin, please ensure excellent wound care offloading nutrition optimization     12/2/2024-  afebrile  no leukocytosis  wbc-8 k today  wound vac dressing changed today by wound team  wound look clean   on vanco and meropenem    Impression-  multiple infected deep decubiti , most likely OM  CAUTI from pre-admission      Plan-  continue Meropenem IV (day #8)  advise 2 more days of meropenem to complete total 10 days  continue vanco (day #10) -   Keep vancomycin -600, current calculation is between 400-500   d/c IV vanco after today's dose.  if patient is being d/c soon advise po augmentin for 10 days.  patient needs very close wound care to help prevent future infections as her decubiti are very deep and patient  is bedbound.  s/p debridement by wound care on 11/22/2024, s/p wound vac application   wound vac changed today   frequent offloading and turning   if amezquita is needed please make sure it gets changed every 2-3 weeks   monitor for aspiration precautions as well, keep head of bed above 30 degrees     Discussed plan with Hospitalist     All labs and imaging and chart notes reviewed.     Dylan Escobar MD  Infectious Disease Attending    for any questions please do not hesitate to contact me either via teams or by calling [phone]

## 2024-12-02 NOTE — PROGRESS NOTE ADULT - PROBLEM SELECTOR PLAN 1
FU Ucx-- kleb carbapenem resistant / ecoli esbl  On IV merem and vanc--- dc vanc today, need 2 more days of merem as per ID  ID following- will discuss length of abx

## 2024-12-03 LAB
ANION GAP SERPL CALC-SCNC: 5 MMOL/L — SIGNIFICANT CHANGE UP (ref 5–17)
BUN SERPL-MCNC: 11 MG/DL — SIGNIFICANT CHANGE UP (ref 7–23)
CALCIUM SERPL-MCNC: 7.7 MG/DL — LOW (ref 8.5–10.1)
CHLORIDE SERPL-SCNC: 104 MMOL/L — SIGNIFICANT CHANGE UP (ref 96–108)
CO2 SERPL-SCNC: 30 MMOL/L — SIGNIFICANT CHANGE UP (ref 22–31)
CREAT SERPL-MCNC: 0.19 MG/DL — LOW (ref 0.5–1.3)
EGFR: 121 ML/MIN/1.73M2 — SIGNIFICANT CHANGE UP
GLUCOSE BLDC GLUCOMTR-MCNC: 147 MG/DL — HIGH (ref 70–99)
GLUCOSE BLDC GLUCOMTR-MCNC: 147 MG/DL — HIGH (ref 70–99)
GLUCOSE BLDC GLUCOMTR-MCNC: 160 MG/DL — HIGH (ref 70–99)
GLUCOSE BLDC GLUCOMTR-MCNC: 184 MG/DL — HIGH (ref 70–99)
GLUCOSE SERPL-MCNC: 146 MG/DL — HIGH (ref 70–99)
HCT VFR BLD CALC: 26.4 % — LOW (ref 34.5–45)
HGB BLD-MCNC: 8 G/DL — LOW (ref 11.5–15.5)
MCHC RBC-ENTMCNC: 23.6 PG — LOW (ref 27–34)
MCHC RBC-ENTMCNC: 30.3 G/DL — LOW (ref 32–36)
MCV RBC AUTO: 77.9 FL — LOW (ref 80–100)
NRBC # BLD: 0 /100 WBCS — SIGNIFICANT CHANGE UP (ref 0–0)
PLATELET # BLD AUTO: 435 K/UL — HIGH (ref 150–400)
POTASSIUM SERPL-MCNC: 3.8 MMOL/L — SIGNIFICANT CHANGE UP (ref 3.5–5.3)
POTASSIUM SERPL-SCNC: 3.8 MMOL/L — SIGNIFICANT CHANGE UP (ref 3.5–5.3)
RBC # BLD: 3.39 M/UL — LOW (ref 3.8–5.2)
RBC # FLD: 21.8 % — HIGH (ref 10.3–14.5)
SODIUM SERPL-SCNC: 139 MMOL/L — SIGNIFICANT CHANGE UP (ref 135–145)
WBC # BLD: 9.19 K/UL — SIGNIFICANT CHANGE UP (ref 3.8–10.5)
WBC # FLD AUTO: 9.19 K/UL — SIGNIFICANT CHANGE UP (ref 3.8–10.5)

## 2024-12-03 PROCEDURE — 99232 SBSQ HOSP IP/OBS MODERATE 35: CPT

## 2024-12-03 RX ADMIN — Medication 1: at 11:16

## 2024-12-03 RX ADMIN — COLLAGENASE SANTYL 1 APPLICATION(S): 250 OINTMENT TOPICAL at 13:43

## 2024-12-03 RX ADMIN — MEROPENEM 100 MILLIGRAM(S): 500 INJECTION, POWDER, FOR SOLUTION INTRAVENOUS at 05:06

## 2024-12-03 RX ADMIN — Medication 1: at 16:23

## 2024-12-03 RX ADMIN — MEROPENEM 100 MILLIGRAM(S): 500 INJECTION, POWDER, FOR SOLUTION INTRAVENOUS at 18:10

## 2024-12-03 RX ADMIN — Medication 2 TABLET(S): at 21:41

## 2024-12-03 RX ADMIN — Medication 1 APPLICATION(S): at 14:09

## 2024-12-03 RX ADMIN — Medication 1 APPLICATION(S): at 21:41

## 2024-12-03 RX ADMIN — Medication 1 APPLICATION(S): at 05:07

## 2024-12-03 RX ADMIN — Medication 1 APPLICATION(S): at 11:19

## 2024-12-03 NOTE — PROGRESS NOTE ADULT - PROBLEM SELECTOR PLAN 1
FU Ucx-- kleb carbapenem resistant / ecoli esbl  On IV merem and vanc---Vanc d/c 11/2, will complete merem today   ID following  Pending home wound vac and set up of home care for dc

## 2024-12-03 NOTE — PROGRESS NOTE ADULT - SUBJECTIVE AND OBJECTIVE BOX
Patient is a 79y old  Female who presents with a chief complaint of UTI, failure to thrive , hyponatremia. (02 Dec 2024 16:23)      INTERVAL HPI/OVERNIGHT EVENTS:    MEDICATIONS  (STANDING):  collagenase Ointment 1 Application(s) Topical daily  Dakins Solution - 1/2 Strength 1 Application(s) Topical daily  dextrose 5%. 1000 milliLiter(s) (50 mL/Hr) IV Continuous <Continuous>  dextrose 5%. 1000 milliLiter(s) (100 mL/Hr) IV Continuous <Continuous>  dextrose 50% Injectable 25 Gram(s) IV Push once  dextrose 50% Injectable 12.5 Gram(s) IV Push once  dextrose 50% Injectable 25 Gram(s) IV Push once  glucagon  Injectable 1 milliGRAM(s) IntraMuscular once  insulin lispro (ADMELOG) corrective regimen sliding scale   SubCutaneous three times a day before meals  insulin lispro (ADMELOG) corrective regimen sliding scale   SubCutaneous at bedtime  meropenem  IVPB 1000 milliGRAM(s) IV Intermittent every 12 hours  senna 2 Tablet(s) Oral at bedtime  sodium chloride 0.9%. 250 milliLiter(s) (250 mL/Hr) IV Continuous <Continuous>  vitamin A & D Ointment 1 Application(s) Topical three times a day    MEDICATIONS  (PRN):  acetaminophen     Tablet .. 650 milliGRAM(s) Oral every 6 hours PRN Temp greater or equal to 38C (100.4F), Mild Pain (1 - 3)  aluminum hydroxide/magnesium hydroxide/simethicone Suspension 30 milliLiter(s) Oral every 4 hours PRN Dyspepsia  dextrose Oral Gel 15 Gram(s) Oral once PRN Blood Glucose LESS THAN 70 milliGRAM(s)/deciliter  melatonin 3 milliGRAM(s) Oral at bedtime PRN Insomnia  ondansetron Injectable 4 milliGRAM(s) IV Push every 8 hours PRN Nausea and/or Vomiting      Allergies    No Known Allergies    Intolerances        REVIEW OF SYSTEMS:  CONSTITUTIONAL: No fever, weight loss, or fatigue  EYES: No eye pain, visual disturbances, or discharge  ENMT:  No difficulty hearing, tinnitus, vertigo; No sinus or throat pain  NECK: No pain or stiffness  BREASTS: No pain, masses, or nipple discharge  RESPIRATORY: No cough, wheezing, chills or hemoptysis; No shortness of breath  CARDIOVASCULAR: No chest pain, palpitations, dizziness, or leg swelling  GASTROINTESTINAL: No abdominal or epigastric pain. No nausea, vomiting, or hematemesis; No diarrhea or constipation. No melena or hematochezia.  GENITOURINARY: No dysuria, frequency, hematuria, or incontinence  NEUROLOGICAL: No headaches, memory loss, loss of strength, numbness, or tremors  SKIN: No itching, burning, rashes, or lesions   LYMPH NODES: No enlarged glands  ENDOCRINE: No heat or cold intolerance; No hair loss  MUSCULOSKELETAL: No joint pain or swelling; No muscle, back, or extremity pain  PSYCHIATRIC: No depression, anxiety, mood swings, or difficulty sleeping  HEME/LYMPH: No easy bruising, or bleeding gums  ALLERGY AND IMMUNOLOGIC: No hives or eczema    Vital Signs Last 24 Hrs  T(C): 36.6 (03 Dec 2024 11:12), Max: 37.2 (03 Dec 2024 00:03)  T(F): 97.9 (03 Dec 2024 11:12), Max: 98.9 (03 Dec 2024 00:03)  HR: 96 (03 Dec 2024 11:12) (96 - 101)  BP: 112/58 (03 Dec 2024 11:12) (102/62 - 158/77)  BP(mean): --  RR: 18 (03 Dec 2024 11:12) (18 - 18)  SpO2: 95% (03 Dec 2024 11:12) (95% - 100%)    Parameters below as of 03 Dec 2024 11:12  Patient On (Oxygen Delivery Method): room air        PHYSICAL EXAM:  GENERAL: NAD, well-groomed, well-developed  HEAD:  Atraumatic, Normocephalic  EYES: EOMI, PERRLA, conjunctiva and sclera clear  ENMT: No tonsillar erythema, exudates, or enlargement; Moist mucous membranes, Good dentition, No lesions  NECK: Supple, No JVD, Normal thyroid  NERVOUS SYSTEM:  Alert & Oriented X3, Good concentration; Motor Strength 5/5 B/L upper and lower extremities; DTRs 2+ intact and symmetric  CHEST/LUNG: Clear to percussion bilaterally; No rales, rhonchi, wheezing, or rubs  HEART: Regular rate and rhythm; No murmurs, rubs, or gallops  ABDOMEN: Soft, Nontender, Nondistended; Bowel sounds present  EXTREMITIES:  2+ Peripheral Pulses, No clubbing, cyanosis, or edema  LYMPH: No lymphadenopathy noted  SKIN: No rashes or lesions    LABS:                        8.0    9.19  )-----------( 435      ( 03 Dec 2024 07:45 )             26.4     12-03    139  |  104  |  11  ----------------------------<  146[H]  3.8   |  30  |  0.19[L]    Ca    7.7[L]      03 Dec 2024 07:45        Urinalysis Basic - ( 03 Dec 2024 07:45 )    Color: x / Appearance: x / SG: x / pH: x  Gluc: 146 mg/dL / Ketone: x  / Bili: x / Urobili: x   Blood: x / Protein: x / Nitrite: x   Leuk Esterase: x / RBC: x / WBC x   Sq Epi: x / Non Sq Epi: x / Bacteria: x      CAPILLARY BLOOD GLUCOSE      POCT Blood Glucose.: 184 mg/dL (03 Dec 2024 11:01)  POCT Blood Glucose.: 147 mg/dL (03 Dec 2024 08:03)  POCT Blood Glucose.: 200 mg/dL (02 Dec 2024 21:10)  POCT Blood Glucose.: 169 mg/dL (02 Dec 2024 16:09)      RADIOLOGY & ADDITIONAL TESTS:    Imaging Personally Reviewed:  [ X] YES  [ ] NO    Consultant(s) Notes Reviewed:  [ X] YES  [ ] NO    Care Discussed with Consultants/Other Providers [X ] YES  [ ] NO

## 2024-12-03 NOTE — PROGRESS NOTE ADULT - PROBLEM SELECTOR PLAN 4
multiple ulcers unstageable  Dr. Whitehead and wound care input   IV meropenem and vanc as per ID   Wound vac inplace

## 2024-12-03 NOTE — PROGRESS NOTE ADULT - NSPROGADDITIONALINFOA_GEN_ALL_CORE
d/w care taker at bedside
d/w care taker at bedside
Pending home wound vac and set up of home care for dc.
d/w son  at bedside
d/w care taker at bedside
d/w son  at bedside
d/w care taker at bedside
Once completed abx, can dc home with home care, discussed with son.

## 2024-12-04 LAB
ANION GAP SERPL CALC-SCNC: 5 MMOL/L — SIGNIFICANT CHANGE UP (ref 5–17)
BUN SERPL-MCNC: 8 MG/DL — SIGNIFICANT CHANGE UP (ref 7–23)
CALCIUM SERPL-MCNC: 7.6 MG/DL — LOW (ref 8.5–10.1)
CHLORIDE SERPL-SCNC: 104 MMOL/L — SIGNIFICANT CHANGE UP (ref 96–108)
CO2 SERPL-SCNC: 29 MMOL/L — SIGNIFICANT CHANGE UP (ref 22–31)
CREAT SERPL-MCNC: 0.17 MG/DL — LOW (ref 0.5–1.3)
EGFR: 124 ML/MIN/1.73M2 — SIGNIFICANT CHANGE UP
GLUCOSE BLDC GLUCOMTR-MCNC: 151 MG/DL — HIGH (ref 70–99)
GLUCOSE BLDC GLUCOMTR-MCNC: 169 MG/DL — HIGH (ref 70–99)
GLUCOSE BLDC GLUCOMTR-MCNC: 170 MG/DL — HIGH (ref 70–99)
GLUCOSE BLDC GLUCOMTR-MCNC: 248 MG/DL — HIGH (ref 70–99)
GLUCOSE SERPL-MCNC: 135 MG/DL — HIGH (ref 70–99)
HCT VFR BLD CALC: 26.3 % — LOW (ref 34.5–45)
HGB BLD-MCNC: 8.1 G/DL — LOW (ref 11.5–15.5)
MCHC RBC-ENTMCNC: 24 PG — LOW (ref 27–34)
MCHC RBC-ENTMCNC: 30.8 G/DL — LOW (ref 32–36)
MCV RBC AUTO: 78 FL — LOW (ref 80–100)
NRBC # BLD: 0 /100 WBCS — SIGNIFICANT CHANGE UP (ref 0–0)
PLATELET # BLD AUTO: 467 K/UL — HIGH (ref 150–400)
POTASSIUM SERPL-MCNC: 4.1 MMOL/L — SIGNIFICANT CHANGE UP (ref 3.5–5.3)
POTASSIUM SERPL-SCNC: 4.1 MMOL/L — SIGNIFICANT CHANGE UP (ref 3.5–5.3)
RAPID RVP RESULT: DETECTED
RBC # BLD: 3.37 M/UL — LOW (ref 3.8–5.2)
RBC # FLD: 21.8 % — HIGH (ref 10.3–14.5)
SARS-COV-2 RNA SPEC QL NAA+PROBE: DETECTED
SODIUM SERPL-SCNC: 138 MMOL/L — SIGNIFICANT CHANGE UP (ref 135–145)
WBC # BLD: 16.32 K/UL — HIGH (ref 3.8–10.5)
WBC # FLD AUTO: 16.32 K/UL — HIGH (ref 3.8–10.5)

## 2024-12-04 PROCEDURE — 99497 ADVNCD CARE PLAN 30 MIN: CPT

## 2024-12-04 PROCEDURE — 71045 X-RAY EXAM CHEST 1 VIEW: CPT | Mod: 26

## 2024-12-04 PROCEDURE — 99233 SBSQ HOSP IP/OBS HIGH 50: CPT

## 2024-12-04 PROCEDURE — 99232 SBSQ HOSP IP/OBS MODERATE 35: CPT

## 2024-12-04 RX ORDER — DEXAMETHASONE 1.5 MG/1
4 TABLET ORAL DAILY
Refills: 0 | Status: DISCONTINUED | OUTPATIENT
Start: 2024-12-04 | End: 2024-12-13

## 2024-12-04 RX ORDER — REMDESIVIR 100 MG/1
100 INJECTION, POWDER, LYOPHILIZED, FOR SOLUTION INTRAVENOUS EVERY 24 HOURS
Refills: 0 | Status: COMPLETED | OUTPATIENT
Start: 2024-12-05 | End: 2024-12-08

## 2024-12-04 RX ORDER — REMDESIVIR 100 MG/1
INJECTION, POWDER, LYOPHILIZED, FOR SOLUTION INTRAVENOUS
Refills: 0 | Status: COMPLETED | OUTPATIENT
Start: 2024-12-04 | End: 2024-12-08

## 2024-12-04 RX ORDER — REMDESIVIR 100 MG/1
200 INJECTION, POWDER, LYOPHILIZED, FOR SOLUTION INTRAVENOUS EVERY 24 HOURS
Refills: 0 | Status: COMPLETED | OUTPATIENT
Start: 2024-12-04 | End: 2024-12-04

## 2024-12-04 RX ADMIN — Medication 2 TABLET(S): at 22:36

## 2024-12-04 RX ADMIN — Medication 1 APPLICATION(S): at 22:37

## 2024-12-04 RX ADMIN — ACETAMINOPHEN 500MG 650 MILLIGRAM(S): 500 TABLET, COATED ORAL at 22:36

## 2024-12-04 RX ADMIN — DEXAMETHASONE 4 MILLIGRAM(S): 1.5 TABLET ORAL at 17:56

## 2024-12-04 RX ADMIN — Medication 1: at 08:09

## 2024-12-04 RX ADMIN — MEROPENEM 100 MILLIGRAM(S): 500 INJECTION, POWDER, FOR SOLUTION INTRAVENOUS at 17:57

## 2024-12-04 RX ADMIN — Medication 2: at 11:45

## 2024-12-04 RX ADMIN — ACETAMINOPHEN 500MG 650 MILLIGRAM(S): 500 TABLET, COATED ORAL at 23:30

## 2024-12-04 RX ADMIN — MEROPENEM 100 MILLIGRAM(S): 500 INJECTION, POWDER, FOR SOLUTION INTRAVENOUS at 05:26

## 2024-12-04 RX ADMIN — Medication 1 APPLICATION(S): at 05:26

## 2024-12-04 RX ADMIN — REMDESIVIR 200 MILLIGRAM(S): 100 INJECTION, POWDER, LYOPHILIZED, FOR SOLUTION INTRAVENOUS at 17:55

## 2024-12-04 NOTE — PROGRESS NOTE ADULT - ASSESSMENT
79 F pmh osteoporosis, MS, bedbound. infected decubitus ulcers presenting to the ED for reduced appetite x 2-3 days. Son states that home health aide noted that patient has not been eating. Workup with UTI. Also noted hyponatremia to 128 .     COVID + 12/4   started remdesavir and decadron   Precautions per protocol, ideally airborne and contact in negative pressure room. Supplemental oxygen as required to maintain adequate saturation. Vigilance for secondary infection and other superimposed events. Monitor inflammatory markers and lab data. DVT prophylaxis per protocol. Prognosis guarded. Encourage use of incentive spirometer.  proning as tolerated

## 2024-12-04 NOTE — PROGRESS NOTE ADULT - PROBLEM SELECTOR PLAN 1
FU Ucx-- kleb carbapenem resistant / ecoli esbl  On IV merem and vanc---Vanc d/c 11/2, will complete merem today   ID following  Pending home wound vac and set up of home care for dc  12/4 amezquita was changed

## 2024-12-04 NOTE — PROGRESS NOTE ADULT - SUBJECTIVE AND OBJECTIVE BOX
Patient is a 79y old  Female who presents with a chief complaint of UTI, failure to thrive , hyponatremia. (02 Dec 2024 16:23)      INTERVAL HPI/OVERNIGHT EVENTS:  Patient seen and examined bedside.   new fever , elevated WBC   + COVID   per RN desat to 88%, was placed on 2L NC with improvement to 96%   no cough   amezquita was changed today     ROS limited but negative     MEDICATIONS  (STANDING):  collagenase Ointment 1 Application(s) Topical daily  Dakins Solution - 1/2 Strength 1 Application(s) Topical daily  dextrose 5%. 1000 milliLiter(s) (50 mL/Hr) IV Continuous <Continuous>  dextrose 5%. 1000 milliLiter(s) (100 mL/Hr) IV Continuous <Continuous>  dextrose 50% Injectable 25 Gram(s) IV Push once  dextrose 50% Injectable 12.5 Gram(s) IV Push once  dextrose 50% Injectable 25 Gram(s) IV Push once  glucagon  Injectable 1 milliGRAM(s) IntraMuscular once  insulin lispro (ADMELOG) corrective regimen sliding scale   SubCutaneous three times a day before meals  insulin lispro (ADMELOG) corrective regimen sliding scale   SubCutaneous at bedtime  meropenem  IVPB 1000 milliGRAM(s) IV Intermittent every 12 hours  senna 2 Tablet(s) Oral at bedtime  sodium chloride 0.9%. 250 milliLiter(s) (250 mL/Hr) IV Continuous <Continuous>  vitamin A & D Ointment 1 Application(s) Topical three times a day    MEDICATIONS  (PRN):  acetaminophen     Tablet .. 650 milliGRAM(s) Oral every 6 hours PRN Temp greater or equal to 38C (100.4F), Mild Pain (1 - 3)  aluminum hydroxide/magnesium hydroxide/simethicone Suspension 30 milliLiter(s) Oral every 4 hours PRN Dyspepsia  dextrose Oral Gel 15 Gram(s) Oral once PRN Blood Glucose LESS THAN 70 milliGRAM(s)/deciliter  melatonin 3 milliGRAM(s) Oral at bedtime PRN Insomnia  ondansetron Injectable 4 milliGRAM(s) IV Push every 8 hours PRN Nausea and/or Vomiting      Allergies    No Known Allergies    Intolerances          Vital Signs Last 24 Hrs  T(C): 38.3 (04 Dec 2024 21:41), Max: 38.3 (04 Dec 2024 21:41)  T(F): 100.9 (04 Dec 2024 21:41), Max: 100.9 (04 Dec 2024 21:41)  HR: 99 (04 Dec 2024 17:21) (97 - 120)  BP: 112/66 (04 Dec 2024 17:21) (100/63 - 117/68)  BP(mean): --  RR: 17 (04 Dec 2024 17:21) (17 - 18)  SpO2: 95% (04 Dec 2024 17:21) (90% - 95%)    Parameters below as of 04 Dec 2024 17:21  Patient On (Oxygen Delivery Method): room air            PHYSICAL EXAM:  GENERAL: NAD , no increased WOB  HEAD:  Atraumatic, Normocephalic  EYES: EOMI, PERRLA, conjunctiva and sclera clear  ENMT: No tonsillar erythema, exudates, or enlargement;   NECK: Supple, No JVD  NERVOUS SYSTEM:  sleepy but easily arousable, moving extremities   CHEST/LUNG: CTAB;  No rales, rhonchi, wheezing, or rubs  HEART: Regular rate and rhythm; No murmurs, rubs, or gallops  ABDOMEN: Soft, Nontender, Nondistended; Bowel sounds present  EXTREMITIES:  2+ Peripheral Pulses b/l, No clubbing, cyanosis, calf tenderness or edema b/l         LABS:                                   8.1    16.32 )-----------( 467      ( 04 Dec 2024 06:54 )             26.3   12-04    138  |  104  |  8   ----------------------------<  135[H]  4.1   |  29  |  0.17[L]    Ca    7.6[L]      04 Dec 2024 06:54            Urinalysis Basic - ( 03 Dec 2024 07:45 )    Color: x / Appearance: x / SG: x / pH: x  Gluc: 146 mg/dL / Ketone: x  / Bili: x / Urobili: x   Blood: x / Protein: x / Nitrite: x   Leuk Esterase: x / RBC: x / WBC x   Sq Epi: x / Non Sq Epi: x / Bacteria: x      Consultant(s) Notes Reveiwed [x ] Yes     Care Discussed with [x ] Consultants  [ x] Patient  [ x] Family  [x ] /   [x ] Other; RN  Care plan and all findings were discussed in detail with patient and son at bedside.  All questions and concerns addressed

## 2024-12-04 NOTE — PROGRESS NOTE ADULT - SUBJECTIVE AND OBJECTIVE BOX
[facility]  INFECTIOUS DISEASES   [address] [place] [address]  Tel: [phone]     Fax: [phone]  ==============================================================================  MD Joaquim Joe, DO Deysi Rojas, BRAYAN Oates M.D  ==============================================================================      ORAL, SPENCER  [id number]  79y ([birth year])      Interval History:    ROS:    [ ] Unobtainable because:  [ ] All other systems negative except as noted    Constitutional: no fever, no chills  Head: no trauma  Eyes: no vision changes, no eye pain  ENT:  no sore throat, no rhinorrhea  Cardiovascular:  no chest pain, no palpitation  Respiratory:  no SOB, no cough  GI:  no abd pain, no vomiting, no diarrhea  urinary: no dysuria, no hematuria, no flank pain  musculoskeletal:  no joint pain, no joint swelling  skin:  no rash  neurology:  no headache, no seizure, no change in mental status  psych: no anxiety, no depression         Allergies  No Known Allergies        ANTIMICROBIALS:  meropenem  IVPB 1000 every 12 hours        Physical Exam:  Vital Signs Last 24 Hrs  T(C): 36.8 (04 Dec 2024 10:54), Max: 37.8 (03 Dec 2024 16:52)  T(F): 98.3 (04 Dec 2024 10:54), Max: 100.1 (03 Dec 2024 16:52)  HR: 120 (04 Dec 2024 10:54) (91 - 120)  BP: 110/72 (04 Dec 2024 10:54) (100/63 - 117/68)  BP(mean): --  RR: 18 (04 Dec 2024 10:54) (18 - 18)  SpO2: 90% (04 Dec 2024 10:54) (90% - 97%)    Parameters below as of 04 Dec 2024 10:54  Patient On (Oxygen Delivery Method): room air        12-03-24 @ 07:01  -  12-04-24 @ 07:00  --------------------------------------------------------  IN: 0 mL / OUT: 650 mL / NET: -650 mL    12-04-24 @ 07:01  -  12-04-24 @ 11:10  --------------------------------------------------------  IN: 237 mL / OUT: 0 mL / NET: 237 mL      General:    NAD,  non toxic  Head: atraumatic, normocephalic  Eye: normal sclera and conjunctiva  ENT:    no oral lesions, neck supple  Cardio:     regular S1, S2,  no murmur  Respiratory:    clear b/l,    no wheezing  abd:     soft,   BS +,   no tenderness  :   no CVAT,  no suprapubic tenderness,   no  amezquita  Musculoskeletal:   no joint swelling,   no edema  vascular: no central lines, +PIV   Skin:    no rash  Neurologic:     no focal deficit  psych: normal affect    WBC Count: 16.32 K/uL (12-04 @ 06:54)  WBC Count: 9.19 K/uL (12-03 @ 07:45)  WBC Count: 8.48 K/uL (12-02 @ 07:10)  WBC Count: 11.76 K/uL (11-30 @ 07:40)  WBC Count: 12.53 K/uL (11-29 @ 07:17)  WBC Count: 13.67 K/uL (11-28 @ 06:47)                            8.1    16.32 )-----------( 467      ( 04 Dec 2024 06:54 )             26.3       12-04    138  |  104  |  8   ----------------------------<  135[H]  4.1   |  29  |  0.17[L]    Ca    7.6[L]      04 Dec 2024 06:54        Urinalysis Basic - ( 04 Dec 2024 06:54 )    Color: x / Appearance: x / SG: x / pH: x  Gluc: 135 mg/dL / Ketone: x  / Bili: x / Urobili: x   Blood: x / Protein: x / Nitrite: x   Leuk Esterase: x / RBC: x / WBC x   Sq Epi: x / Non Sq Epi: x / Bacteria: x          Creatinine Trend: 0.17<--, 0.19<--, 0.16<--, 0.22<--, 0.20<--, 0.27<--      MICROBIOLOGY:  v  .Abscess  11-22-24   Few Escherichia coli ESBL  Rare Enterococcus faecalis  Rare Streptococcus mitis/oralis group "Susceptibilities not performed"  --  Escherichia coli ESBL  Enterococcus faecalis      .Blood BLOOD  11-21-24   No growth at 5 days  --  --      .Blood BLOOD  11-21-24   No growth at 5 days  --  --      Clean Catch  11-20-24   >100,000 CFU/ml Escherichia coli ESBL  <10,000 CFU/ml Normal Urogenital korey present  --  Escherichia coli ESBL      Clean Catch  11-20-24   >100,000 CFU/ml Klebsiella pneumoniae (Carbapenem Resistant)  >100,000 CFU/ml Escherichia coli ESBL  --  Klebsiella pneumoniae (Carbapenem Resistant)  Escherichia coli ESBL                                  RADIOLOGY:   [facility]  INFECTIOUS DISEASES   [address] [place] [address]  Tel: [phone]     Fax: [phone]  ==============================================================================  MD Joaquim Joe, BRAYAN Schultz M.D  ==============================================================================      ORAL SPENCER  [id number]  79y ([birth year])      Interval History:  patient seen and evaluated.  remains lethargic and baseline nonverbal.    remains afebrile but has + leukocytosis today.      ROS:    [x ] Unobtainable because:  patient  nonverbal and does not answer any questions        Allergies  No Known Allergies        ANTIMICROBIALS:  meropenem  IVPB 1000 every 12 hours        Physical Exam:  Vital Signs Last 24 Hrs  T(C): 36.8 (04 Dec 2024 10:54), Max: 37.8 (03 Dec 2024 16:52)  T(F): 98.3 (04 Dec 2024 10:54), Max: 100.1 (03 Dec 2024 16:52)  HR: 120 (04 Dec 2024 10:54) (91 - 120)  BP: 110/72 (04 Dec 2024 10:54) (100/63 - 117/68)  BP(mean): --  RR: 18 (04 Dec 2024 10:54) (18 - 18)  SpO2: 90% (04 Dec 2024 10:54) (90% - 97%)    Parameters below as of 04 Dec 2024 10:54  Patient On (Oxygen Delivery Method): room air        12-03-24 @ 07:01  -  12-04-24 @ 07:00  --------------------------------------------------------  IN: 0 mL / OUT: 650 mL / NET: -650 mL    12-04-24 @ 07:01  -  12-04-24 @ 11:10  --------------------------------------------------------  IN: 237 mL / OUT: 0 mL / NET: 237 mL      Physical Exam:  General:    weak looking, frail elderly contracted female  Head: atraumatic, normocephalic, bitemporal wasting   Eyes: normal sclera and conjunctiva  ENT:   no oropharyngeal lesions, no LAD, neck supple  Cardio:    regular S1,S2  Respiratory:   No wheezing, No rhonchi  abd:   soft, BS +, not tender, no distention  :     no suprapubic tenderness, has amezquita and urine draining   Musculoskeletal : contracted, multiple decubiti with most prominent on right hip, left hip and upper back and sacral  region , deep wounds, wound vac removed by wound team to change dressing and the b/l  hip and sacral  wound all have pink granulation tissue, no pus  very deep wound, bone exposed but no pus and no necrotic tissue  Skin:    no rash, PIV ok  Neurologic:    awake, able to answer simple questions  psych: normal affect, calm      WBC Count: 16.32 K/uL (12-04 @ 06:54)  WBC Count: 9.19 K/uL (12-03 @ 07:45)  WBC Count: 8.48 K/uL (12-02 @ 07:10)  WBC Count: 11.76 K/uL (11-30 @ 07:40)  WBC Count: 12.53 K/uL (11-29 @ 07:17)  WBC Count: 13.67 K/uL (11-28 @ 06:47)                            8.1    16.32 )-----------( 467      ( 04 Dec 2024 06:54 )             26.3       12-04    138  |  104  |  8   ----------------------------<  135[H]  4.1   |  29  |  0.17[L]    Ca    7.6[L]      04 Dec 2024 06:54        Urinalysis Basic - ( 04 Dec 2024 06:54 )    Color: x / Appearance: x / SG: x / pH: x  Gluc: 135 mg/dL / Ketone: x  / Bili: x / Urobili: x   Blood: x / Protein: x / Nitrite: x   Leuk Esterase: x / RBC: x / WBC x   Sq Epi: x / Non Sq Epi: x / Bacteria: x          Creatinine Trend: 0.17<--, 0.19<--, 0.16<--, 0.22<--, 0.20<--, 0.27<--      MICROBIOLOGY:    .Abscess  11-22-24   Few Escherichia coli ESBL  Rare Enterococcus faecalis  Rare Streptococcus mitis/oralis group "Susceptibilities not performed"  --  Escherichia coli ESBL  Enterococcus faecalis      .Blood BLOOD  11-21-24   No growth at 5 days  --  --      .Blood BLOOD  11-21-24   No growth at 5 days  --  --      Clean Catch  11-20-24   >100,000 CFU/ml Escherichia coli ESBL  <10,000 CFU/ml Normal Urogenital korey present  --  Escherichia coli ESBL      Clean Catch  11-20-24   >100,000 CFU/ml Klebsiella pneumoniae (Carbapenem Resistant)  >100,000 CFU/ml Escherichia coli ESBL  --  Klebsiella pneumoniae (Carbapenem Resistant)  Escherichia coli ESBL    RADIOLOGY:

## 2024-12-04 NOTE — GOALS OF CARE CONVERSATION - ADVANCED CARE PLANNING - CONVERSATION DETAILS
Call to pt family for a f/u GOC  discussion during hospitalization in August. Spoke with pt son Yazan via T/C as he was unable to be in hospital at this time.      Pt is  and lives with her . He is cognitively impaired and unable to participate in any medical decision making. Pt lives with her , has 12x7 HA.SOn states that he is in the process of hiring additional HA hours to cover the nights.   Pt has 2 sons  live locally and coordinate all patient care Collaboratively make all  medical decisions for both of their parents.      Pt  has multiple deep wounds with packing to sacrum (2) and to each hip, She now has a wound vac and has had multiple debridements of the wound.  Family has hired  private HH 12x 7. The HA  does  the wound care throughout the week and pt is seen by Sergio LUBIN once weekly.     Son states that since pt discharge from hospital, things have been eventful. Pt resumed previous care. Pt appetite has improved, has a documented  weight gain of 2.3 kg since August. They feel that present care is meeting pt needs.     They bought their mother to the ED for evaluation of foul smelling urine, she was found to have a UTI and has been treated with a course of Ceftriaxone, followed by ID.     Readdressed wishes for LST measures .During August hospitalization, a  lengthy discussion with both sons took place surrounding  advance directives. Discussed risks/benefits of LST such as CPR, intubation, feeding tubes in the context of multiple comorbidities.  They were  not ready to make any decisions at that  time. Son states that family still has not made decisions but agree to speak about them in the near future.     Discharge plan is to return home when medically stable and resume prior care.

## 2024-12-04 NOTE — PROGRESS NOTE ADULT - ASSESSMENT
A/P-  79 year old Female with  pmh osteoporosis, MS, bedbound. infected decubitus ulcers presenting to the ED for reduced appetite x 2-3 days.   patient  also has chronic amezquita     Infectious disease consult called 11/22/2024 at 3:45 pm to help with antibiotic management.    +fever  +leukocytosis  CAUTI with urine cx>100K e.coli and K,pneumonia  Infected looking hip and sacral and  upper back decubiti    11/25  no fevers since 11/21  13K leukocytosis  UC #1 grew Klebsiella ( carbapanem resistant )  and ESBL E. Coli   UC #2 grew ESBL E. Coli ( repeat urine culture)  BCs x 2 NGTD  wound cx-ESBL, e.feaclis, strep Mitis from 11/22   abx were changed over the weekend to Meropenem, IV Vanco continued  Vanco level is 3.1 yesterday     11/26:   afebrile  on RA  leukocytosis is about the same 13.28  Cr ok  culture data reviewed, no change   pending vac dressing application  abx continued, Meropenem and Vancomycin     11/27: no fever,   is on  RA  no new cbc today  culture data reviewed:  Abscess cx grew ESBL E. Coli, Enterococcus and strep mitis   BCs NGTD x 2  Meropenem and Vancomycin continued  Vanco level is 8.0 today, spoke with pharmacy  pending vac dressing application     11/29: vac placed, continue on vancomycin and meropenem, vancomycin level tomorrow AM, AUC is good and will keep same dose of vancomycin, please ensure excellent wound care offloading nutrition optimization     12/2/2024-  afebrile  no leukocytosis  wbc-8 k today  wound vac dressing changed today by wound team  wound look clean   on vanco and meropenem    12/4-  afebrile  on RA  New leukocytosis of 16 k today  on meropenem ( last dose today)  vanco was d/c 12/2    Impression-  multiple infected deep decubiti , most likely OM  CAUTI from pre-admission      Plan-   Meropenem IV (day #10 today)  d/c after today's dose  in light of new +leukocytosis advise to check CXR r/o aspiration pneumonitis as she is contracted, advise to change amezquita cath  can start po augmentin today for 10 days.  patient needs very close wound care to help prevent future infections as her decubiti are very deep and patient  is bedbound.  s/p debridement by wound care on 11/22/2024, s/p wound vac application   wound vac changed 12/2/2024   frequent offloading and turning   if amezquita is needed please make sure it gets changed every 2-3 weeks   monitor for aspiration precautions as well, keep head of bed above 30 degrees     message sent to hospitalist  via teams.    All labs and imaging and chart notes reviewed.     Dylan Escobar MD  Infectious Disease Attending    for any questions please do not hesitate to contact me either via teams or by calling [phone]       A/P-  79 year old Female with  pmh osteoporosis, MS, bedbound. infected decubitus ulcers presenting to the ED for reduced appetite x 2-3 days.   patient  also has chronic amezquita     Infectious disease consult called 11/22/2024 at 3:45 pm to help with antibiotic management.    +fever  +leukocytosis  CAUTI with urine cx>100K e.coli and K,pneumonia  Infected looking hip and sacral and  upper back decubiti    11/25  no fevers since 11/21  13K leukocytosis  UC #1 grew Klebsiella ( carbapanem resistant )  and ESBL E. Coli   UC #2 grew ESBL E. Coli ( repeat urine culture)  BCs x 2 NGTD  wound cx-ESBL, e.feaclis, strep Mitis from 11/22   abx were changed over the weekend to Meropenem, IV Vanco continued  Vanco level is 3.1 yesterday     11/26:   afebrile  on RA  leukocytosis is about the same 13.28  Cr ok  culture data reviewed, no change   pending vac dressing application  abx continued, Meropenem and Vancomycin     11/27: no fever,   is on  RA  no new cbc today  culture data reviewed:  Abscess cx grew ESBL E. Coli, Enterococcus and strep mitis   BCs NGTD x 2  Meropenem and Vancomycin continued  Vanco level is 8.0 today, spoke with pharmacy  pending vac dressing application     11/29: vac placed, continue on vancomycin and meropenem, vancomycin level tomorrow AM, AUC is good and will keep same dose of vancomycin, please ensure excellent wound care offloading nutrition optimization     12/2/2024-  afebrile  no leukocytosis  wbc-8 k today  wound vac dressing changed today by wound team  wound look clean   on vanco and meropenem    12/4-  afebrile today   low grade fever of 100 yesterday  on RA  New leukocytosis of 16 k today  on meropenem ( last dose today)  vanco was d/c 12/2    Impression-  multiple infected deep decubiti , most likely OM  CAUTI from pre-admission      Plan-   Meropenem IV (day #10 today)  d/c after today's dose  in light of new +leukocytosis advise to check CXR r/o aspiration pneumonitis as she is contracted, advise to change amezquita cath  can start po augmentin today for 10 days.  patient needs very close wound care to help prevent future infections as her decubiti are very deep and patient  is bedbound.  s/p debridement by wound care on 11/22/2024, s/p wound vac application   wound vac changed 12/2/2024   frequent offloading and turning   if amezquita is needed please make sure it gets changed every 2-3 weeks   monitor for aspiration precautions as well, keep head of bed above 30 degrees     message sent to hospitalist  via teams.    All labs and imaging and chart notes reviewed.     Dylan Escobar MD  Infectious Disease Attending    for any questions please do not hesitate to contact me either via teams or by calling [phone]

## 2024-12-05 LAB
ALBUMIN SERPL ELPH-MCNC: 1.1 G/DL — LOW (ref 3.3–5)
ALP SERPL-CCNC: 153 U/L — HIGH (ref 40–120)
ALT FLD-CCNC: 11 U/L — LOW (ref 12–78)
AST SERPL-CCNC: 11 U/L — LOW (ref 15–37)
BILIRUB DIRECT SERPL-MCNC: 0.1 MG/DL — SIGNIFICANT CHANGE UP (ref 0–0.3)
BILIRUB INDIRECT FLD-MCNC: 0.2 MG/DL — SIGNIFICANT CHANGE UP (ref 0.2–1)
BILIRUB SERPL-MCNC: 0.3 MG/DL — SIGNIFICANT CHANGE UP (ref 0.2–1.2)
D DIMER BLD IA.RAPID-MCNC: 1115 NG/ML DDU — HIGH
GLUCOSE BLDC GLUCOMTR-MCNC: 160 MG/DL — HIGH (ref 70–99)
GLUCOSE BLDC GLUCOMTR-MCNC: 167 MG/DL — HIGH (ref 70–99)
GLUCOSE BLDC GLUCOMTR-MCNC: 214 MG/DL — HIGH (ref 70–99)
GLUCOSE BLDC GLUCOMTR-MCNC: 223 MG/DL — HIGH (ref 70–99)
HCT VFR BLD CALC: 26.1 % — LOW (ref 34.5–45)
HGB BLD-MCNC: 8 G/DL — LOW (ref 11.5–15.5)
LDH SERPL L TO P-CCNC: 178 U/L — SIGNIFICANT CHANGE UP (ref 50–242)
MCHC RBC-ENTMCNC: 24.1 PG — LOW (ref 27–34)
MCHC RBC-ENTMCNC: 30.7 G/DL — LOW (ref 32–36)
MCV RBC AUTO: 78.6 FL — LOW (ref 80–100)
NRBC # BLD: 0 /100 WBCS — SIGNIFICANT CHANGE UP (ref 0–0)
PLATELET # BLD AUTO: 417 K/UL — HIGH (ref 150–400)
PROT SERPL-MCNC: 5.4 GM/DL — LOW (ref 6–8.3)
RBC # BLD: 3.32 M/UL — LOW (ref 3.8–5.2)
RBC # FLD: 21.7 % — HIGH (ref 10.3–14.5)
WBC # BLD: 14.36 K/UL — HIGH (ref 3.8–10.5)
WBC # FLD AUTO: 14.36 K/UL — HIGH (ref 3.8–10.5)

## 2024-12-05 PROCEDURE — 93010 ELECTROCARDIOGRAM REPORT: CPT

## 2024-12-05 PROCEDURE — 99232 SBSQ HOSP IP/OBS MODERATE 35: CPT

## 2024-12-05 PROCEDURE — 99233 SBSQ HOSP IP/OBS HIGH 50: CPT

## 2024-12-05 RX ORDER — ACETAMINOPHEN 500MG 500 MG/1
650 TABLET, COATED ORAL ONCE
Refills: 0 | Status: COMPLETED | OUTPATIENT
Start: 2024-12-05 | End: 2024-12-05

## 2024-12-05 RX ORDER — AMOXICILLIN/POTASSIUM CLAV 250-125 MG
1 TABLET ORAL
Refills: 0 | Status: DISCONTINUED | OUTPATIENT
Start: 2024-12-05 | End: 2024-12-13

## 2024-12-05 RX ADMIN — ACETAMINOPHEN 500MG 650 MILLIGRAM(S): 500 TABLET, COATED ORAL at 11:23

## 2024-12-05 RX ADMIN — ACETAMINOPHEN 500MG 260 MILLIGRAM(S): 500 TABLET, COATED ORAL at 17:12

## 2024-12-05 RX ADMIN — Medication 1 APPLICATION(S): at 22:18

## 2024-12-05 RX ADMIN — Medication 1 APPLICATION(S): at 05:29

## 2024-12-05 RX ADMIN — Medication 1 APPLICATION(S): at 14:05

## 2024-12-05 RX ADMIN — ACETAMINOPHEN 500MG 650 MILLIGRAM(S): 500 TABLET, COATED ORAL at 06:32

## 2024-12-05 RX ADMIN — ACETAMINOPHEN 500MG 650 MILLIGRAM(S): 500 TABLET, COATED ORAL at 05:28

## 2024-12-05 RX ADMIN — Medication 1 TABLET(S): at 17:15

## 2024-12-05 RX ADMIN — ACETAMINOPHEN 500MG 650 MILLIGRAM(S): 500 TABLET, COATED ORAL at 12:12

## 2024-12-05 RX ADMIN — Medication 1: at 16:31

## 2024-12-05 RX ADMIN — ACETAMINOPHEN 500MG 650 MILLIGRAM(S): 500 TABLET, COATED ORAL at 18:23

## 2024-12-05 RX ADMIN — Medication 2 TABLET(S): at 22:27

## 2024-12-05 RX ADMIN — ACETAMINOPHEN 500MG 650 MILLIGRAM(S): 500 TABLET, COATED ORAL at 22:27

## 2024-12-05 RX ADMIN — DEXAMETHASONE 4 MILLIGRAM(S): 1.5 TABLET ORAL at 05:28

## 2024-12-05 RX ADMIN — Medication 2: at 11:25

## 2024-12-05 RX ADMIN — MEROPENEM 100 MILLIGRAM(S): 500 INJECTION, POWDER, FOR SOLUTION INTRAVENOUS at 05:28

## 2024-12-05 RX ADMIN — REMDESIVIR 200 MILLIGRAM(S): 100 INJECTION, POWDER, LYOPHILIZED, FOR SOLUTION INTRAVENOUS at 16:21

## 2024-12-05 RX ADMIN — Medication 1: at 08:44

## 2024-12-05 RX ADMIN — ACETAMINOPHEN 500MG 650 MILLIGRAM(S): 500 TABLET, COATED ORAL at 23:30

## 2024-12-05 NOTE — PROGRESS NOTE ADULT - SUBJECTIVE AND OBJECTIVE BOX
SPENCER MIXON  MRN-[id number]  79y (1945)    Follow Up:  COVID 19, multiple wounds     Interval History: The pt was seen and examined earlier, not in acute distress, awake and alert, frail female, complains of headache and no other complaints, RN is aware.  Pt had a low grade temp yesterday evening 100.9, + COVID 19 test, on NC, WBC better 14.36.     PAST MEDICAL & SURGICAL HISTORY:  Osteoporosis      Uterine polyp      Herniated disc  lumbar      Functional quadriplegia secondary to multiple sclerosis      Bilateral Tubal ligation  46y/o          ROS:    [ ] Unobtainable because:  [x ] All other systems negative    Constitutional: no fever, no chills  Head: no trauma  Eyes: no vision changes, no eye pain  ENT:  no sore throat, no rhinorrhea  Cardiovascular:  no chest pain, no palpitation  Respiratory:  no SOB, no cough  GI:  no abd pain, no vomiting, no diarrhea  urinary: no dysuria, no hematuria, no flank pain  musculoskeletal:  no joint pain, no joint swelling  skin:  no rash  neurology:  + headache, no seizure, no change in mental status  psych: no anxiety, no depression         Allergies  No Known Allergies        ANTIMICROBIALS:  meropenem  IVPB 1000 every 12 hours  remdesivir  IVPB    remdesivir  IVPB 100 every 24 hours      OTHER MEDS:  acetaminophen     Tablet .. 650 milliGRAM(s) Oral every 6 hours PRN  aluminum hydroxide/magnesium hydroxide/simethicone Suspension 30 milliLiter(s) Oral every 4 hours PRN  collagenase Ointment 1 Application(s) Topical daily  Dakins Solution - 1/2 Strength 1 Application(s) Topical daily  dexAMETHasone  Injectable 4 milliGRAM(s) IV Push daily  dextrose 5%. 1000 milliLiter(s) IV Continuous <Continuous>  dextrose 5%. 1000 milliLiter(s) IV Continuous <Continuous>  dextrose 50% Injectable 25 Gram(s) IV Push once  dextrose 50% Injectable 12.5 Gram(s) IV Push once  dextrose 50% Injectable 25 Gram(s) IV Push once  dextrose Oral Gel 15 Gram(s) Oral once PRN  glucagon  Injectable 1 milliGRAM(s) IntraMuscular once  insulin lispro (ADMELOG) corrective regimen sliding scale   SubCutaneous three times a day before meals  insulin lispro (ADMELOG) corrective regimen sliding scale   SubCutaneous at bedtime  melatonin 3 milliGRAM(s) Oral at bedtime PRN  ondansetron Injectable 4 milliGRAM(s) IV Push every 8 hours PRN  senna 2 Tablet(s) Oral at bedtime  sodium chloride 0.9%. 250 milliLiter(s) IV Continuous <Continuous>  vitamin A & D Ointment 1 Application(s) Topical three times a day      Vital Signs Last 24 Hrs  T(C): 36.2 (05 Dec 2024 10:50), Max: 38.3 (04 Dec 2024 21:41)  T(F): 97.2 (05 Dec 2024 10:50), Max: 100.9 (04 Dec 2024 21:41)  HR: 97 (05 Dec 2024 10:50) (94 - 99)  BP: 109/64 (05 Dec 2024 10:50) (109/64 - 164/84)  BP(mean): --  RR: 18 (05 Dec 2024 10:50) (17 - 18)  SpO2: 94% (05 Dec 2024 10:50) (94% - 99%)    Parameters below as of 05 Dec 2024 10:50  Patient On (Oxygen Delivery Method): room air        Physical Exam:  General:    weak looking, frail elderly contracted female, NC  Head: atraumatic, normocephalic, bitemporal wasting   Eyes: normal sclera and conjunctiva  ENT:   no oropharyngeal lesions, no LAD, neck supple  Cardio:    regular S1,S2  Respiratory:   No wheezing, No rhonchi  abd:   soft, BS +, not tender, no distention  :     no suprapubic tenderness, has amezquita and urine draining   Musculoskeletal : contracted, multiple decubiti, Vac dressing is on  Skin:    no rash, PIV ok  Neurologic:    awake, able to answer simple questions  psych: normal affect, calm      WBC Count: 14.36 K/uL (12-05 @ 07:30)  WBC Count: 16.32 K/uL (12-04 @ 06:54)  WBC Count: 9.19 K/uL (12-03 @ 07:45)  WBC Count: 8.48 K/uL (12-02 @ 07:10)  WBC Count: 11.76 K/uL (11-30 @ 07:40)  WBC Count: 12.53 K/uL (11-29 @ 07:17)                            8.0    14.36 )-----------( 417      ( 05 Dec 2024 07:30 )             26.1       12-04    138  |  104  |  8   ----------------------------<  135[H]  4.1   |  29  |  0.17[L]    Ca    7.6[L]      04 Dec 2024 06:54    TPro  5.4[L]  /  Alb  1.1[L]  /  TBili  0.3  /  DBili  0.1  /  AST  11[L]  /  ALT  11[L]  /  AlkPhos  153[H]  12-05      Urinalysis Basic - ( 04 Dec 2024 06:54 )    Color: x / Appearance: x / SG: x / pH: x  Gluc: 135 mg/dL / Ketone: x  / Bili: x / Urobili: x   Blood: x / Protein: x / Nitrite: x   Leuk Esterase: x / RBC: x / WBC x   Sq Epi: x / Non Sq Epi: x / Bacteria: x        Creatinine Trend: 0.17<--, 0.19<--, 0.16<--, 0.22<--, 0.20<--, 0.27<--      MICROBIOLOGY:  v  .Abscess  11-22-24   Few Escherichia coli ESBL  Rare Enterococcus faecalis  Rare Streptococcus mitis/oralis group "Susceptibilities not performed"  --  Escherichia coli ESBL  Enterococcus faecalis      .Blood BLOOD  11-21-24   No growth at 5 days  --  --      .Blood BLOOD  11-21-24   No growth at 5 days  --  --      Clean Catch  11-20-24   >100,000 CFU/ml Escherichia coli ESBL  <10,000 CFU/ml Normal Urogenital korey present  --  Escherichia coli ESBL      Clean Catch  11-20-24   >100,000 CFU/ml Klebsiella pneumoniae (Carbapenem Resistant)  >100,000 CFU/ml Escherichia coli ESBL  --  Klebsiella pneumoniae (Carbapenem Resistant)  Escherichia coli ESBL          Rapid RVP Result: Detected (12-04 @ 11:00)              D-Dimer Assay, Quantitative: 1115 (12-05)      SARS-CoV-2: Detected (12-04-24 @ 11:00)  Rapid RVP Result: Detected (12-04-24 @ 11:00)    SARS-CoV-2: Detected (04 Dec 2024 11:00)    RADIOLOGY:    < from: Xray Chest 1 View-PORTABLE IMMEDIATE (Xray Chest 1 View-PORTABLE IMMEDIATE .) (12.04.24 @ 12:47) >    ACC: [id number] EXAM:  XR CHEST PORTABLE IMMED 1V   ORDERED BY: MALIA LI     PROCEDURE DATE:  12/04/2024          INTERPRETATION:  TIME OF EXAM: December 4, 2024 at 11:30 AM.    CLINICAL INFORMATION: New hypoxia.    COMPARISON:  November 19, 2024.    TECHNIQUE:   AP Portable chest x-ray. Patient's arm and hand project over   and obscures portions of the medial right lower chest and adjacent right   upper abdomen.    INTERPRETATION:    Heart size and the mediastinum cannot be accurately evaluated on this   projection.  Continued elevation of the right hemidiaphragm.  Multiple skin folds project over the image.  There is new right midlung platelike atelectasis.  There are new bilateral perihilar lung opacities.  No pleural effusion orpneumothorax is seen.  There are old right rib fractures.      IMPRESSION:  Continued elevation of right hemidiaphragm.    New right midlung platelike atelectasis.    New bilateral perihilar lung opacities, of uncertain etiology.   Atelectasis, central pulmonary edema or multifocal infection are possible.    --- End of Report ---            CANDIDA WALTER MD; Attending Radiologist  This document has been electronically signed. Dec  4 2024  1:05PM    < end of copied text >   SPENCER MIXON  MRN-[id number]  79y (1945)    Follow Up:  COVID 19, multiple wounds     Interval History: The pt was seen and examined earlier, not in acute distress, awake and alert, frail female, complains of headache and no other complaints, RN is aware.  Pt had a low grade temp yesterday evening 100.9, + COVID 19 test, on NC, WBC better 14.36.     PAST MEDICAL & SURGICAL HISTORY:  Osteoporosis      Uterine polyp      Herniated disc  lumbar      Functional quadriplegia secondary to multiple sclerosis      Bilateral Tubal ligation  48y/o          ROS:    [ ] Unobtainable because:  [x ] All other systems negative    Constitutional: no fever, no chills  Head: no trauma  Eyes: no vision changes, no eye pain  ENT:  no sore throat, no rhinorrhea  Cardiovascular:  no chest pain, no palpitation  Respiratory:  no SOB, no cough  GI:  no abd pain, no vomiting, no diarrhea  urinary: no dysuria, no hematuria, no flank pain  musculoskeletal:  no joint pain, no joint swelling  skin:  no rash  neurology:  + headache, no seizure, no change in mental status  psych: no anxiety, no depression         Allergies  No Known Allergies        ANTIMICROBIALS:  meropenem  IVPB 1000 every 12 hours  remdesivir  IVPB    remdesivir  IVPB 100 every 24 hours      OTHER MEDS:  acetaminophen     Tablet .. 650 milliGRAM(s) Oral every 6 hours PRN  aluminum hydroxide/magnesium hydroxide/simethicone Suspension 30 milliLiter(s) Oral every 4 hours PRN  collagenase Ointment 1 Application(s) Topical daily  Dakins Solution - 1/2 Strength 1 Application(s) Topical daily  dexAMETHasone  Injectable 4 milliGRAM(s) IV Push daily  dextrose 5%. 1000 milliLiter(s) IV Continuous <Continuous>  dextrose 5%. 1000 milliLiter(s) IV Continuous <Continuous>  dextrose 50% Injectable 25 Gram(s) IV Push once  dextrose 50% Injectable 12.5 Gram(s) IV Push once  dextrose 50% Injectable 25 Gram(s) IV Push once  dextrose Oral Gel 15 Gram(s) Oral once PRN  glucagon  Injectable 1 milliGRAM(s) IntraMuscular once  insulin lispro (ADMELOG) corrective regimen sliding scale   SubCutaneous three times a day before meals  insulin lispro (ADMELOG) corrective regimen sliding scale   SubCutaneous at bedtime  melatonin 3 milliGRAM(s) Oral at bedtime PRN  ondansetron Injectable 4 milliGRAM(s) IV Push every 8 hours PRN  senna 2 Tablet(s) Oral at bedtime  sodium chloride 0.9%. 250 milliLiter(s) IV Continuous <Continuous>  vitamin A & D Ointment 1 Application(s) Topical three times a day      Vital Signs Last 24 Hrs  T(C): 36.2 (05 Dec 2024 10:50), Max: 38.3 (04 Dec 2024 21:41)  T(F): 97.2 (05 Dec 2024 10:50), Max: 100.9 (04 Dec 2024 21:41)  HR: 97 (05 Dec 2024 10:50) (94 - 99)  BP: 109/64 (05 Dec 2024 10:50) (109/64 - 164/84)  BP(mean): --  RR: 18 (05 Dec 2024 10:50) (17 - 18)  SpO2: 94% (05 Dec 2024 10:50) (94% - 99%)    Parameters below as of 05 Dec 2024 10:50  Patient On (Oxygen Delivery Method): room air        Physical Exam:  General:    weak looking, frail elderly contracted female, NC  Head: atraumatic, normocephalic, bitemporal wasting   Eyes: normal sclera and conjunctiva  ENT:   no oropharyngeal lesions, no LAD, neck supple  Cardio:    regular S1,S2  Respiratory:   No wheezing, No rhonchi  abd:   soft, BS +, not tender, no distention  :     no suprapubic tenderness, has amezquita and urine draining   Musculoskeletal : contracted, multiple decubiti, Vac dressing is on  Skin:    no rash, PIV ok  Neurologic:    awake, able to answer simple questions  psych: normal affect, calm      WBC Count: 14.36 K/uL (12-05 @ 07:30)  WBC Count: 16.32 K/uL (12-04 @ 06:54)  WBC Count: 9.19 K/uL (12-03 @ 07:45)  WBC Count: 8.48 K/uL (12-02 @ 07:10)  WBC Count: 11.76 K/uL (11-30 @ 07:40)  WBC Count: 12.53 K/uL (11-29 @ 07:17)                            8.0    14.36 )-----------( 417      ( 05 Dec 2024 07:30 )             26.1       12-04    138  |  104  |  8   ----------------------------<  135[H]  4.1   |  29  |  0.17[L]    Ca    7.6[L]      04 Dec 2024 06:54    TPro  5.4[L]  /  Alb  1.1[L]  /  TBili  0.3  /  DBili  0.1  /  AST  11[L]  /  ALT  11[L]  /  AlkPhos  153[H]  12-05      Urinalysis Basic - ( 04 Dec 2024 06:54 )    Color: x / Appearance: x / SG: x / pH: x  Gluc: 135 mg/dL / Ketone: x  / Bili: x / Urobili: x   Blood: x / Protein: x / Nitrite: x   Leuk Esterase: x / RBC: x / WBC x   Sq Epi: x / Non Sq Epi: x / Bacteria: x        Creatinine Trend: 0.17<--, 0.19<--, 0.16<--, 0.22<--, 0.20<--, 0.27<--      MICROBIOLOGY:    .Abscess  11-22-24   Few Escherichia coli ESBL  Rare Enterococcus faecalis  Rare Streptococcus mitis/oralis group "Susceptibilities not performed"  --  Escherichia coli ESBL  Enterococcus faecalis      .Blood BLOOD  11-21-24   No growth at 5 days  --  --      .Blood BLOOD  11-21-24   No growth at 5 days  --  --      Clean Catch  11-20-24   >100,000 CFU/ml Escherichia coli ESBL  <10,000 CFU/ml Normal Urogenital korey present  --  Escherichia coli ESBL      Clean Catch  11-20-24   >100,000 CFU/ml Klebsiella pneumoniae (Carbapenem Resistant)  >100,000 CFU/ml Escherichia coli ESBL  --  Klebsiella pneumoniae (Carbapenem Resistant)  Escherichia coli ESBL          Rapid RVP Result: Detected (12-04 @ 11:00)              D-Dimer Assay, Quantitative: 1115 (12-05)      SARS-CoV-2: Detected (12-04-24 @ 11:00)  Rapid RVP Result: Detected (12-04-24 @ 11:00)    SARS-CoV-2: Detected (04 Dec 2024 11:00)    RADIOLOGY:    < from: Xray Chest 1 View-PORTABLE IMMEDIATE (Xray Chest 1 View-PORTABLE IMMEDIATE .) (12.04.24 @ 12:47) >    ACC: [id number] EXAM:  XR CHEST PORTABLE IMMED 1V   ORDERED BY: MALIA LI     PROCEDURE DATE:  12/04/2024          INTERPRETATION:  TIME OF EXAM: December 4, 2024 at 11:30 AM.    CLINICAL INFORMATION: New hypoxia.    COMPARISON:  November 19, 2024.    TECHNIQUE:   AP Portable chest x-ray. Patient's arm and hand project over   and obscures portions of the medial right lower chest and adjacent right   upper abdomen.    INTERPRETATION:    Heart size and the mediastinum cannot be accurately evaluated on this   projection.  Continued elevation of the right hemidiaphragm.  Multiple skin folds project over the image.  There is new right midlung platelike atelectasis.  There are new bilateral perihilar lung opacities.  No pleural effusion orpneumothorax is seen.  There are old right rib fractures.      IMPRESSION:  Continued elevation of right hemidiaphragm.    New right midlung platelike atelectasis.    New bilateral perihilar lung opacities, of uncertain etiology.   Atelectasis, central pulmonary edema or multifocal infection are possible.    --- End of Report ---            CANDIDA WALTER MD; Attending Radiologist  This document has been electronically signed. Dec  4 2024  1:05PM    < end of copied text >

## 2024-12-05 NOTE — CHART NOTE - NSCHARTNOTEFT_GEN_A_CORE
Pt sleeping at time of visit. Per chart pt with PMH of MS, bedbound, infected decubitus ulcers, osteoporosis. Pt presented for reduced appetite; admitted with UTI, FTT(possibly 2/2 infection), hyponatremia. Also COVID+; on Remdesivir and Decadron; on COVID isolation; on supplemental O2. Per palliative care, ongoing discussions with family regarding GOC; discharge plan is to return home when medically stable and resume prior care.    Factors impacting intake: [x] other: persistent lack of appetite; dysphagia; self-feed difficulty;     Diet Prescription: Diet, Consistent Carbohydrate w/Evening Snack:   Pureed (PUREED)  Supplement Feeding Modality:  Oral  Glucerna Shake Cans or Servings Per Day:  1       Frequency:  Three Times a day (11-21-24 @ 11:27)    Intake: mostly 0-50% for documented meals as per flow sheets; % of supplement consumed; requires total feeding assistance    Current Weight: No wt x >2 weeks; request current and daily weights as per flow sheets  % Weight Change: N/A  1+ edema b/l feet as per flow sheets    Pertinent Medications: MEDICATIONS  (STANDING):  collagenase Ointment 1 Application(s) Topical daily  Dakins Solution - 1/2 Strength 1 Application(s) Topical daily  dexAMETHasone  Injectable 4 milliGRAM(s) IV Push daily  dextrose 5%. 1000 milliLiter(s) (50 mL/Hr) IV Continuous <Continuous>  dextrose 5%. 1000 milliLiter(s) (100 mL/Hr) IV Continuous <Continuous>  dextrose 50% Injectable 25 Gram(s) IV Push once  dextrose 50% Injectable 12.5 Gram(s) IV Push once  dextrose 50% Injectable 25 Gram(s) IV Push once  glucagon  Injectable 1 milliGRAM(s) IntraMuscular once  insulin lispro (ADMELOG) corrective regimen sliding scale   SubCutaneous three times a day before meals  insulin lispro (ADMELOG) corrective regimen sliding scale   SubCutaneous at bedtime  meropenem  IVPB 1000 milliGRAM(s) IV Intermittent every 12 hours  remdesivir  IVPB   IV Intermittent   remdesivir  IVPB 100 milliGRAM(s) IV Intermittent every 24 hours  senna 2 Tablet(s) Oral at bedtime  sodium chloride 0.9%. 250 milliLiter(s) (250 mL/Hr) IV Continuous <Continuous>  vitamin A & D Ointment 1 Application(s) Topical three times a day    MEDICATIONS  (PRN):  acetaminophen     Tablet .. 650 milliGRAM(s) Oral every 6 hours PRN Temp greater or equal to 38C (100.4F), Mild Pain (1 - 3)  aluminum hydroxide/magnesium hydroxide/simethicone Suspension 30 milliLiter(s) Oral every 4 hours PRN Dyspepsia  dextrose Oral Gel 15 Gram(s) Oral once PRN Blood Glucose LESS THAN 70 milliGRAM(s)/deciliter  melatonin 3 milliGRAM(s) Oral at bedtime PRN Insomnia  ondansetron Injectable 4 milliGRAM(s) IV Push every 8 hours PRN Nausea and/or Vomiting    Pertinent Labs: 12-04 Na138 mmol/L Glu 135 mg/dL[H] K+ 4.1 mmol/L Cr  0.17 mg/dL[L] BUN 8 mg/dL 12-05 Alb 1.1 g/dL[L]  11-21 HgbA1c 7.0%    POCT Blood Glucose.: 214 mg/dL (05 Dec 2024 11:11)  POCT Blood Glucose.: 160 mg/dL (05 Dec 2024 07:52)  POCT Blood Glucose.: 169 mg/dL (04 Dec 2024 21:37)  POCT Blood Glucose.: 170 mg/dL (04 Dec 2024 16:22)    Skin: pressure ulcer stage II or > x 10    Estimated Needs:   [x] no change since previous assessment on 11/21  [ ] recalculated:     Previous Nutrition Diagnosis:   Severe Malnutrition in the context of chronic illness  Etiology: Increased energy / protein needs related to multiple pressure ulcers  Signs/Symptoms: P/U x 10 > stage ll  Goal/Expected Outcome: Pt will Meet calorie and protein needs > 50 % via meals / supplements - mostly not met    Nutrition Diagnosis is [x] ongoing  [ ] resolved [ ] not applicable     New Nutrition Diagnosis: [x] not applicable       Interventions:   Recommend  [x] Continue with current   [ ] Change Diet To:  [ ] Nutrition Supplement  [ ] Nutrition Support  [x] Other:  1. Monitor and replace electrolytes as needed  2. Continue to provide encouragement and total assistance with PO intake    Monitoring and Evaluation:   [ x ] PO intake [ x ] Tolerance to diet prescription [ x ] weights [ x ] labs (glucose) [ x ] follow up per protocol  [ ] other: Pt sleeping at time of visit. Per chart pt with PMH of MS, bedbound, infected decubitus ulcers, osteoporosis. Pt presented for reduced appetite; admitted with UTI, FTT(possibly 2/2 infection), hyponatremia. Also COVID+; on Remdesivir and Decadron; on COVID isolation; on supplemental O2. Per palliative care, ongoing discussions with family regarding GOC; discharge plan is to return home when medically stable and resume prior care.    Factors impacting intake: [x] other: persistent lack of appetite; dysphagia; self-feed difficulty    Diet Prescription: Diet, Consistent Carbohydrate w/Evening Snack:   Pureed (PUREED)  Supplement Feeding Modality:  Oral  Glucerna Shake Cans or Servings Per Day:  1       Frequency:  Three Times a day (11-21-24 @ 11:27)    Intake: mostly 0-50% for documented meals as per flow sheets; % of supplement consumed; requires total feeding assistance    Current Weight: No wt x >2 weeks; request current and daily weights as per flow sheets  % Weight Change: N/A  1+ edema b/l feet as per flow sheets    Pertinent Medications: MEDICATIONS  (STANDING):  collagenase Ointment 1 Application(s) Topical daily  Dakins Solution - 1/2 Strength 1 Application(s) Topical daily  dexAMETHasone  Injectable 4 milliGRAM(s) IV Push daily  dextrose 5%. 1000 milliLiter(s) (50 mL/Hr) IV Continuous <Continuous>  dextrose 5%. 1000 milliLiter(s) (100 mL/Hr) IV Continuous <Continuous>  dextrose 50% Injectable 25 Gram(s) IV Push once  dextrose 50% Injectable 12.5 Gram(s) IV Push once  dextrose 50% Injectable 25 Gram(s) IV Push once  glucagon  Injectable 1 milliGRAM(s) IntraMuscular once  insulin lispro (ADMELOG) corrective regimen sliding scale   SubCutaneous three times a day before meals  insulin lispro (ADMELOG) corrective regimen sliding scale   SubCutaneous at bedtime  meropenem  IVPB 1000 milliGRAM(s) IV Intermittent every 12 hours  remdesivir  IVPB   IV Intermittent   remdesivir  IVPB 100 milliGRAM(s) IV Intermittent every 24 hours  senna 2 Tablet(s) Oral at bedtime  sodium chloride 0.9%. 250 milliLiter(s) (250 mL/Hr) IV Continuous <Continuous>  vitamin A & D Ointment 1 Application(s) Topical three times a day    MEDICATIONS  (PRN):  acetaminophen     Tablet .. 650 milliGRAM(s) Oral every 6 hours PRN Temp greater or equal to 38C (100.4F), Mild Pain (1 - 3)  aluminum hydroxide/magnesium hydroxide/simethicone Suspension 30 milliLiter(s) Oral every 4 hours PRN Dyspepsia  dextrose Oral Gel 15 Gram(s) Oral once PRN Blood Glucose LESS THAN 70 milliGRAM(s)/deciliter  melatonin 3 milliGRAM(s) Oral at bedtime PRN Insomnia  ondansetron Injectable 4 milliGRAM(s) IV Push every 8 hours PRN Nausea and/or Vomiting    Pertinent Labs: 12-04 Na138 mmol/L Glu 135 mg/dL[H] K+ 4.1 mmol/L Cr  0.17 mg/dL[L] BUN 8 mg/dL 12-05 Alb 1.1 g/dL[L]  11-21 HgbA1c 7.0%    POCT Blood Glucose.: 214 mg/dL (05 Dec 2024 11:11)  POCT Blood Glucose.: 160 mg/dL (05 Dec 2024 07:52)  POCT Blood Glucose.: 169 mg/dL (04 Dec 2024 21:37)  POCT Blood Glucose.: 170 mg/dL (04 Dec 2024 16:22)    Skin: pressure ulcer stage II or > x 10    Estimated Needs:   [x] no change since previous assessment on 11/21  [ ] recalculated:     Previous Nutrition Diagnosis:   Severe Malnutrition in the context of chronic illness  Etiology: Increased energy / protein needs related to multiple pressure ulcers  Signs/Symptoms: P/U x 10 > stage ll  Goal/Expected Outcome: Pt will Meet calorie and protein needs > 50 % via meals / supplements - mostly not met    Nutrition Diagnosis is [x] ongoing  [ ] resolved [ ] not applicable     New Nutrition Diagnosis: [x] not applicable       Interventions:   Recommend  [x] Continue with current diet rx as ordered  [ ] Change Diet To:  [ ] Nutrition Supplement  [ ] Nutrition Support  [x] Other:  1. Monitor and replace electrolytes as needed  2. Continue to provide encouragement and total assistance with PO intake    Monitoring and Evaluation:   [ x ] PO intake [ x ] Tolerance to diet prescription [ x ] weights [ x ] labs (glucose) [ x ] follow up per protocol  [ ] other:

## 2024-12-05 NOTE — PHYSICAL THERAPY INITIAL EVALUATION ADULT - GENERAL OBSERVATIONS, REHAB EVAL
Patient encountered supine in bed, alert.
Pt was received in supine, neck laterally rotated, difficult to position neck & head on th e pillow,

## 2024-12-05 NOTE — PHYSICAL THERAPY INITIAL EVALUATION ADULT - DIAGNOSIS, PT EVAL
[facility] Detox Discharge Summary


Admission Date: 


19





Discharge Date: 10/04/19





- History


Present History: Opioid Dependence


Additional Comments: 





PATIENT RETURNING HOME, [facility] ([place], [place]

) FOR AFTERCARE. PATIENT RECEIVED FIRST DOSE OF BICILLIN L-A 2.4 MILLION UNITS 

IM YESTERDAY FOR REACTIVE RPR NOTED NO DETOX ADMISSION LABORATORY ASSESSMENT. 

PATIENT AGIAN REMINDED TO FOLLOW-UP WITH  (DR. ADAME, [place], [place]

) FOR SUBSEQUENT TWO DOSES OF BICILLIN OVER NEXT TWO WEEKS. PATIENT ALS 

OADVISED AGAIN TO ADVISE HER PARTNER TO BE TESTED FOR SYPHILIS. PATIENT 

VERBALIZED UNDERSTANDING OF ALL RECOMMENDATIONS PRESENTED TO HER PRIOR TO 

DISCHARGE FROM DETOX UNIT. COPIES OF RESULTS OF ALL LABS DRAWN WHILE ADMITTED 

FOR DETOX, INCLUDING RPR RESULT, GIVEN TO PATIENT AT TIME OF DISCHARGE FROM 

DETOX UNIT. PATIENT WAS DISCHARGED FROM DETOX UNIT IN STABLE MEDICAL CONDITION.


Pertinent Past History: 





History Of PE, History Of DVT, History Of CVA / TIA, History Of Seizure, Asthma

, History Of Edema Of Bilateral Lower Extremities, History Of Hysterectomy, 

Depressive Disorder, Leukopenia, Reactive RPR.





- Physical Exam Results


Vital Signs: 


 Vital Signs











Temperature  97.9 F   10/04/19 06:00


 


Pulse Rate  75   10/04/19 06:00


 


Respiratory Rate  18   10/04/19 06:00


 


Blood Pressure  104/68   10/04/19 06:00


 


O2 Sat by Pulse Oximetry (%)      











Pertinent Admission Physical Exam Findings: 





WITHDRAWAL SYMPTOMS.





 Laboratory Tests











  10/01/19 10/01/19 10/01/19





  08:45 08:45 08:45


 


WBC  2.7 L  


 


RBC  4.11  


 


Hgb  12.6  


 


Hct  37.2  


 


MCV  90.6  


 


MCH  30.7  


 


MCHC  33.9  


 


RDW  14.5  


 


Plt Count  183  


 


MPV  10.1  


 


Sickle Cell Screen  Negative  


 


Sodium   139 


 


Potassium   4.0 


 


Chloride   105 


 


Carbon Dioxide   24 


 


Anion Gap   9 


 


BUN   14.9 


 


Creatinine   0.7 


 


Est GFR (CKD-EPI)AfAm   109.91 


 


Est GFR (CKD-EPI)NonAf   94.84 


 


Random Glucose   62 L 


 


Calcium   8.9 


 


Total Bilirubin   0.6 


 


AST   17 


 


ALT   19 


 


Alkaline Phosphatase   86 


 


Total Protein   6.6 


 


Albumin   3.6 


 


RPR Titer    Reactive 1:1 H


 


T.pallidum Ab (MHA)    Reactive


 


HIV 1&2 Antibody Screen   


 


HIV P24 Antigen   














  10/01/19





  08:45


 


WBC 


 


RBC 


 


Hgb 


 


Hct 


 


MCV 


 


MCH 


 


MCHC 


 


RDW 


 


Plt Count 


 


MPV 


 


Sickle Cell Screen 


 


Sodium 


 


Potassium 


 


Chloride 


 


Carbon Dioxide 


 


Anion Gap 


 


BUN 


 


Creatinine 


 


Est GFR (CKD-EPI)AfAm 


 


Est GFR (CKD-EPI)NonAf 


 


Random Glucose 


 


Calcium 


 


Total Bilirubin 


 


AST 


 


ALT 


 


Alkaline Phosphatase 


 


Total Protein 


 


Albumin 


 


RPR Titer 


 


T.pallidum Ab (MHA) 


 


HIV 1&2 Antibody Screen  Negative


 


HIV P24 Antigen  Negative








LABS NOTED.





- Treatment


Hospital Course: Detox Protocol Followed, Detoxed Safely, Responded well, 

Discharged Condition Good


Patient has Accepted a Rehab Referral to: PATIENT WILL ATTEND [facility] ([place], [place]).





- Medication


Discharge Medications: 


Ambulatory Orders





Amitriptyline HCl [Elavil -] 50 mg PO DAILY 19 


Citalopram Hydrobromide [Citalopram HBr] 40 mg PO DAILY 19 


Gabapentin [Neurontin] 600 mg PO TID 19 











- Diagnosis


(1) Depressive disorder


Status: Acute   





(2) Opiate dependence


Status: Acute   


Qualifiers: 


   Substance use status: in withdrawal   Qualified Code(s): F11.23 - Opioid 

dependence with withdrawal   





(3) Substance induced mood disorder


Status: Acute   





(4) Substance-induced sleep disorder


Status: Acute   





(5) Asthma


Status: Acute   


Qualifiers: 


   Asthma severity: unspecified severity   Asthma persistence: unspecified   

Asthma complication type: uncomplicated   Qualified Code(s): J45.909 - 

Unspecified asthma, uncomplicated   





(6) Edema of both feet


Status: Acute   





(7) Heroin abuse


Status: Acute   





(8) History of CVA (cerebrovascular accident)


Status: Acute   





(9) History of DVT (deep vein thrombosis)


Status: Acute   





(10) History of pulmonary embolus (PE)


Status: Acute   





(11) Leukopenia


Status: Acute   


Qualifiers: 


   Leukopenia type: unspecified   Qualified Code(s): D72.819 - Decreased white 

blood cell count, unspecified   





(12) Positive RPR test


Status: Acute   





- AMA


Did Patient Leave Against Medical Advice: No





[facility] COWS





- Scale


Resting Pulse: 0= DE 80 or Below


Sweatin= No chills or Flushing


Restless Observation: 1= Difficult to Sit Still


Pupil Size: 0= Normal to Room Light


Bone or Joint Aches: 1= Mild Discomfort


Runny Nose/ Eye Tearin= None


GI Upset > 30mins: 0= None


Tremor Observation of Outstretched Hands: 0= None


Yawning Observation: 1= 1-2x During Session


Anxiety or Irritability: 2=Irritable/Anxious


Goose Flesh Skin: 0=Smooth Skin


COWS Score: 5
impaired skin integrity
Impaired skin integrity

## 2024-12-05 NOTE — PROGRESS NOTE ADULT - SUBJECTIVE AND OBJECTIVE BOX
Patient is a 79y old  Female who presents with a chief complaint of UTI, failure to thrive , hyponatremia. (02 Dec 2024 16:23)      INTERVAL HPI/OVERNIGHT EVENTS:  Patient seen and examined bedside.   new fever , elevated WBC   + COVID       ROS limited but negative     MEDICATIONS  (STANDING):  collagenase Ointment 1 Application(s) Topical daily  Dakins Solution - 1/2 Strength 1 Application(s) Topical daily  dextrose 5%. 1000 milliLiter(s) (50 mL/Hr) IV Continuous <Continuous>  dextrose 5%. 1000 milliLiter(s) (100 mL/Hr) IV Continuous <Continuous>  dextrose 50% Injectable 25 Gram(s) IV Push once  dextrose 50% Injectable 12.5 Gram(s) IV Push once  dextrose 50% Injectable 25 Gram(s) IV Push once  glucagon  Injectable 1 milliGRAM(s) IntraMuscular once  insulin lispro (ADMELOG) corrective regimen sliding scale   SubCutaneous three times a day before meals  insulin lispro (ADMELOG) corrective regimen sliding scale   SubCutaneous at bedtime  meropenem  IVPB 1000 milliGRAM(s) IV Intermittent every 12 hours  senna 2 Tablet(s) Oral at bedtime  sodium chloride 0.9%. 250 milliLiter(s) (250 mL/Hr) IV Continuous <Continuous>  vitamin A & D Ointment 1 Application(s) Topical three times a day    MEDICATIONS  (PRN):  acetaminophen     Tablet .. 650 milliGRAM(s) Oral every 6 hours PRN Temp greater or equal to 38C (100.4F), Mild Pain (1 - 3)  aluminum hydroxide/magnesium hydroxide/simethicone Suspension 30 milliLiter(s) Oral every 4 hours PRN Dyspepsia  dextrose Oral Gel 15 Gram(s) Oral once PRN Blood Glucose LESS THAN 70 milliGRAM(s)/deciliter  melatonin 3 milliGRAM(s) Oral at bedtime PRN Insomnia  ondansetron Injectable 4 milliGRAM(s) IV Push every 8 hours PRN Nausea and/or Vomiting      Allergies    No Known Allergies    Intolerances          Vital Signs Last 24 Hrs  T(C): 36.6 (05 Dec 2024 17:22), Max: 38.3 (04 Dec 2024 21:41)  T(F): 97.8 (05 Dec 2024 17:22), Max: 100.9 (04 Dec 2024 21:41)  HR: 91 (05 Dec 2024 17:22) (91 - 99)  BP: 138/75 (05 Dec 2024 17:22) (109/64 - 164/84)  BP(mean): --  RR: 18 (05 Dec 2024 17:22) (17 - 18)  SpO2: 100% (05 Dec 2024 17:22) (94% - 100%)    Parameters below as of 05 Dec 2024 17:22  Patient On (Oxygen Delivery Method): room air          PHYSICAL EXAM:  GENERAL: NAD , no increased WOB  HEAD:  Atraumatic, Normocephalic  EYES: EOMI, PERRLA, conjunctiva and sclera clear  ENMT: No tonsillar erythema, exudates, or enlargement;   NECK: Supple, No JVD  NERVOUS SYSTEM:  sleepy but easily arousable, moving extremities   CHEST/LUNG: CTAB;  No rales, rhonchi, wheezing, or rubs  HEART: Regular rate and rhythm; No murmurs, rubs, or gallops  ABDOMEN: Soft, Nontender, Nondistended; Bowel sounds present  EXTREMITIES:  2+ Peripheral Pulses b/l, No clubbing, cyanosis, calf tenderness or edema b/l         LABS:                                              8.0    14.36 )-----------( 417      ( 05 Dec 2024 07:30 )             26.1   12-04    138  |  104  |  8   ----------------------------<  135[H]  4.1   |  29  |  0.17[L]    Ca    7.6[L]      04 Dec 2024 06:54    TPro  5.4[L]  /  Alb  1.1[L]  /  TBili  0.3  /  DBili  0.1  /  AST  11[L]  /  ALT  11[L]  /  AlkPhos  153[H]  12-05        Urinalysis Basic - ( 03 Dec 2024 07:45 )    Color: x / Appearance: x / SG: x / pH: x  Gluc: 146 mg/dL / Ketone: x  / Bili: x / Urobili: x   Blood: x / Protein: x / Nitrite: x   Leuk Esterase: x / RBC: x / WBC x   Sq Epi: x / Non Sq Epi: x / Bacteria: x      Consultant(s) Notes Reviewed [x ] Yes     Care Discussed with [x ] Consultants  [ x] Patient  [ x] Family  [x ] /   [x ] Other; RN  Care plan and all findings were discussed in detail with patient and son at bedside.  All questions and concerns addressed

## 2024-12-05 NOTE — PROGRESS NOTE ADULT - ASSESSMENT
A/P-  79 year old Female with  pmh osteoporosis, MS, bedbound. infected decubitus ulcers presenting to the ED for reduced appetite x 2-3 days.   patient  also has chronic amezquita     Infectious disease consult called 11/22/2024 at 3:45 pm to help with antibiotic management.    +fever  +leukocytosis  CAUTI with urine cx>100K e.coli and K,pneumonia  Infected looking hip and sacral and  upper back decubiti    11/25  no fevers since 11/21  13K leukocytosis  UC #1 grew Klebsiella ( carbapanem resistant )  and ESBL E. Coli   UC #2 grew ESBL E. Coli ( repeat urine culture)  BCs x 2 NGTD  wound cx-ESBL, e.feaclis, strep Mitis from 11/22   abx were changed over the weekend to Meropenem, IV Vanco continued  Vanco level is 3.1 yesterday     11/26:   afebrile  on RA  leukocytosis is about the same 13.28  Cr ok  culture data reviewed, no change   pending vac dressing application  abx continued, Meropenem and Vancomycin     11/27: no fever,   is on  RA  no new cbc today  culture data reviewed:  Abscess cx grew ESBL E. Coli, Enterococcus and strep mitis   BCs NGTD x 2  Meropenem and Vancomycin continued  Vanco level is 8.0 today, spoke with pharmacy  pending vac dressing application     11/29: vac placed, continue on vancomycin and meropenem, vancomycin level tomorrow AM, AUC is good and will keep same dose of vancomycin, please ensure excellent wound care offloading nutrition optimization     12/2/2024-  afebrile  no leukocytosis  wbc-8 k today  wound vac dressing changed today by wound team  wound look clean   on vanco and meropenem    12/4-  afebrile today   low grade fever of 100 yesterday  on RA  New leukocytosis of 16 k today  on meropenem ( last dose today)  vanco was d/c 12/2 12/5: low grade temp yesterday 100.9, NC, WBC better 14.36, RVP positive for covid 19, pt was started on Remdesivir and Decadron, wounds with vac dressing, s/p 10 days of Vancomycin, today is day #11 of Meropenem, will stop and start po Augmentin x 10 days as per attending recommendations.   CXR - Continued elevation of right hemidiaphragm. New right midlung platelike atelectasis. New bilateral perihilar lung opacities, of uncertain etiology. Atelectasis, central pulmonary edema or multifocal infection are possible.      Impression-  multiple infected deep decubiti , most likely OM  CAUTI from pre-admission      Plan-  Meropenem IV (day #11 today) - stopped   s/p 10 days of Vancomycin   advise to change amezquita cath, if amezquita is needed please make sure it gets changed every 2-3 weeks   start po Augmentin today for 10 days  patient needs very close wound care to help prevent future infections as her decubiti are very deep and patient  is bedbound.  s/p debridement by wound care on 11/22/2024, s/p wound vac application   wound vac per PT  frequent offloading and turning   monitor for aspiration precautions as well, keep head of bed above 30 degrees   + COVID 19 - continue remdesivir and decadron  wean off supplemental O2 as able      discussed with RN A/P-  79 year old Female with  pmh osteoporosis, MS, bedbound. infected decubitus ulcers presenting to the ED for reduced appetite x 2-3 days.   patient  also has chronic amezquita     Infectious disease consult called 11/22/2024 at 3:45 pm to help with antibiotic management.    +fever  +leukocytosis  CAUTI with urine cx>100K e.coli and K,pneumonia  Infected looking hip and sacral and  upper back decubiti    11/25  no fevers since 11/21  13K leukocytosis  UC #1 grew Klebsiella ( carbapanem resistant )  and ESBL E. Coli   UC #2 grew ESBL E. Coli ( repeat urine culture)  BCs x 2 NGTD  wound cx-ESBL, e.feaclis, strep Mitis from 11/22   abx were changed over the weekend to Meropenem, IV Vanco continued  Vanco level is 3.1 yesterday     11/26:   afebrile  on RA  leukocytosis is about the same 13.28  Cr ok  culture data reviewed, no change   pending vac dressing application  abx continued, Meropenem and Vancomycin     11/27: no fever,   is on  RA  no new cbc today  culture data reviewed:  Abscess cx grew ESBL E. Coli, Enterococcus and strep mitis   BCs NGTD x 2  Meropenem and Vancomycin continued  Vanco level is 8.0 today, spoke with pharmacy  pending vac dressing application     11/29: vac placed, continue on vancomycin and meropenem, vancomycin level tomorrow AM, AUC is good and will keep same dose of vancomycin, please ensure excellent wound care offloading nutrition optimization     12/2/2024-  afebrile  no leukocytosis  wbc-8 k today  wound vac dressing changed today by wound team  wound look clean   on vanco and meropenem    12/4-  afebrile today   low grade fever of 100 yesterday  on RA  New leukocytosis of 16 k today  on meropenem ( last dose today)  vanco was d/c 12/2 12/5: low grade temp yesterday 100.9, on NC, WBC better 14.36, RVP positive for covid 19, pt was started on Remdesivir and Decadron by medicine team,   wounds with vac dressing, s/p 10 days of Vancomycin, today is day #11 of Meropenem, will stop and start po Augmentin x 10 days as per attending recommendations.   CXR - Continued elevation of right hemidiaphragm. New right midlung platelike atelectasis. New bilateral perihilar lung opacities, of uncertain etiology. Atelectasis, central pulmonary edema or multifocal infection are possible.      Impression-  multiple infected deep decubiti , most likely OM  CAUTI from pre-admission  +covid 19      Plan-  Meropenem IV (day #11 today) - stopped today  s/p 10 days of Vancomycin   advise to change amezquita cath, if amezquita is needed please make sure it gets changed every 2-3 weeks   start po Augmentin today for 10 days  patient needs very close wound care to help prevent future infections as her decubiti are very deep and patient  is bedbound.  s/p debridement by wound care on 11/22/2024, s/p wound vac application   wound vac per PT  frequent offloading and turning   monitor for aspiration precautions as well, keep head of bed above 30 degrees   + COVID 19 - continue remdesivir and decadron  wean off supplemental O2 as able      discussed with RN

## 2024-12-05 NOTE — PHYSICAL THERAPY INITIAL EVALUATION ADULT - MODALITIES TREATMENT COMMENTS
Sacrum, Stage IV Pressure Injury; Lumbar Spine, Stage IV Pressure Injury; L PSIS, Stage IV Pressure Injury; L Hip, Stage IV Pressure Injury; L Rib Cage, Stage IV Pressure Injury; R Hip, Stage IV Pressure Injury; R Ischial Tuberosity, Unstageable Pressure Injury; R Shoulder, Stage II Pressure Injury; R Rib Cage, Stage IV Pressure Injury; L Lateral Knee, Unstageable Pressure Injury (stable eschar); L Medial Malleolus, Suspected DTI, R Heel, Suspected DTI; R Ear, Stage IV Pressure Injury; R 1st Metartarsal, Unstageable Pressure Injury
1. Sacrum stage IV pressure injury-[id number], Lumbar spine-Stage IV pressure injury-1.9x2.3x4.3cms, 3. Left PSIS-stage IV pressure injury-6.5x4.7x4.7cms, 4.Right ISchial tuberosity-Stage IV pressure injury-7x9x2.5cms,5. Left greater trochanter.-stage IV pressure injury- 4.6x4.6x3.3cm,, -all these 5 wounds with NPWT, 6. right rib cage- stage IV pressure-5.5x8x0.1cm, 7. Left lateral knee- unstageable- 2x2.3x0.1cms, 8. Left medial malleolus  DTI- 1.7x2x0.1cms, 9. Right ear -stage IV 10. Right heel DTI- 11. left rib cage unstageable-4.3x2.5-12. Right base of great toe- TI-1.5x1.3x0.1, 13. Right lateral border of foot-edge- DTI-1.3x1.3

## 2024-12-05 NOTE — PHYSICAL THERAPY INITIAL EVALUATION ADULT - ADDITIONAL COMMENTS
Refer initial eval
As per pervious PT eval on 6/6/24: Per EMR patient dependent at baseline. Has HHA 7 days x 12 hours.

## 2024-12-06 LAB
GLUCOSE BLDC GLUCOMTR-MCNC: 129 MG/DL — HIGH (ref 70–99)
GLUCOSE BLDC GLUCOMTR-MCNC: 137 MG/DL — HIGH (ref 70–99)
GLUCOSE BLDC GLUCOMTR-MCNC: 166 MG/DL — HIGH (ref 70–99)
GLUCOSE BLDC GLUCOMTR-MCNC: 167 MG/DL — HIGH (ref 70–99)
GLUCOSE BLDC GLUCOMTR-MCNC: 182 MG/DL — HIGH (ref 70–99)

## 2024-12-06 PROCEDURE — 99497 ADVNCD CARE PLAN 30 MIN: CPT

## 2024-12-06 PROCEDURE — 99232 SBSQ HOSP IP/OBS MODERATE 35: CPT

## 2024-12-06 RX ADMIN — REMDESIVIR 200 MILLIGRAM(S): 100 INJECTION, POWDER, LYOPHILIZED, FOR SOLUTION INTRAVENOUS at 18:13

## 2024-12-06 RX ADMIN — ACETAMINOPHEN 500MG 650 MILLIGRAM(S): 500 TABLET, COATED ORAL at 13:07

## 2024-12-06 RX ADMIN — Medication 1 TABLET(S): at 17:52

## 2024-12-06 RX ADMIN — Medication 1 APPLICATION(S): at 22:12

## 2024-12-06 RX ADMIN — Medication 1 APPLICATION(S): at 05:24

## 2024-12-06 RX ADMIN — Medication 1: at 08:16

## 2024-12-06 RX ADMIN — Medication 1 APPLICATION(S): at 17:21

## 2024-12-06 RX ADMIN — Medication 1 TABLET(S): at 05:17

## 2024-12-06 RX ADMIN — Medication 1: at 11:58

## 2024-12-06 RX ADMIN — ACETAMINOPHEN 500MG 650 MILLIGRAM(S): 500 TABLET, COATED ORAL at 13:42

## 2024-12-06 RX ADMIN — DEXAMETHASONE 4 MILLIGRAM(S): 1.5 TABLET ORAL at 05:17

## 2024-12-06 NOTE — PROGRESS NOTE ADULT - ASSESSMENT
A/P-  79 year old Female with  pmh osteoporosis, MS, bedbound. infected decubitus ulcers presenting to the ED for reduced appetite x 2-3 days.   patient  also has chronic amezquita     Infectious disease consult called 11/22/2024 at 3:45 pm to help with antibiotic management.    +fever  +leukocytosis  CAUTI with urine cx>100K e.coli and K,pneumonia  Infected looking hip and sacral and  upper back decubiti    11/25  no fevers since 11/21  13K leukocytosis  UC #1 grew Klebsiella ( carbapanem resistant )  and ESBL E. Coli   UC #2 grew ESBL E. Coli ( repeat urine culture)  BCs x 2 NGTD  wound cx-ESBL, e.feaclis, strep Mitis from 11/22   abx were changed over the weekend to Meropenem, IV Vanco continued  Vanco level is 3.1 yesterday     11/26:   afebrile  on RA  leukocytosis is about the same 13.28  Cr ok  culture data reviewed, no change   pending vac dressing application  abx continued, Meropenem and Vancomycin     11/27: no fever,   is on  RA  no new cbc today  culture data reviewed:  Abscess cx grew ESBL E. Coli, Enterococcus and strep mitis   BCs NGTD x 2  Meropenem and Vancomycin continued  Vanco level is 8.0 today, spoke with pharmacy  pending vac dressing application     11/29: vac placed, continue on vancomycin and meropenem, vancomycin level tomorrow AM, AUC is good and will keep same dose of vancomycin, please ensure excellent wound care offloading nutrition optimization     12/2/2024-  afebrile  no leukocytosis  wbc-8 k today  wound vac dressing changed today by wound team  wound look clean   on vanco and meropenem    12/4-  afebrile today   low grade fever of 100 yesterday  on RA  New leukocytosis of 16 k today  on meropenem ( last dose today)  vanco was d/c 12/2 12/5: low grade temp yesterday 100.9, on NC, WBC better 14.36, RVP positive for covid 19, pt was started on Remdesivir and Decadron by medicine team,   wounds with vac dressing, s/p 10 days of Vancomycin, today is day #11 of Meropenem, will stop and start po Augmentin x 10 days as per attending recommendations.   CXR - Continued elevation of right hemidiaphragm. New right midlung platelike atelectasis. New bilateral perihilar lung opacities, of uncertain etiology. Atelectasis, central pulmonary edema or multifocal infection are possible.    12/6: no fevers today  comfortable on NC  no new cbc  Remdesivir and Dracdron continued (day #3)  was started on po Augmentin (day #2)  no new culture data         Impression-  multiple infected deep decubiti , most likely OM  CAUTI from pre-admission  +covid 19      Plan-  s/p 11 days course of Meropenem IV - stopped on 12/5  s/p 10 days of Vancomycin   Amezquita exchanged on 12/4 as per my conversation with RN, if amezquita is needed please make sure it gets changed every 2-3 weeks   continue po Augmentin today for 10 days (day #2)  patient needs very close wound care to help prevent future infections as her decubiti are very deep and patient  is bedbound.  s/p debridement by wound care on 11/22/2024, s/p wound vac application - changed today   wound vac per PT  frequent offloading and turning   monitor for aspiration precautions as well, keep head of bed above 30 degrees   + COVID 19 - continue remdesivir and decadron  wean off supplemental O2 as able    discussed with Dr. Escobar  discussed with PT / wound care   discussed with RN

## 2024-12-06 NOTE — PROGRESS NOTE ADULT - SUBJECTIVE AND OBJECTIVE BOX
SPENCER MIXON  MRN-[id number]  79y (1945)    Follow Up:  covid 19, multiple wounds    Interval History: The pt was seen and examined earlier during vac dressings change, not in acute distress, no new complaints, awake and alert, answers simple questions. Pt is afebrile, NC, no new cbc.    PAST MEDICAL & SURGICAL HISTORY:  Osteoporosis      Uterine polyp      Herniated disc  lumbar      Functional quadriplegia secondary to multiple sclerosis      Bilateral Tubal ligation  48y/o          ROS:    [ ] Unobtainable because:  [x ] All other systems negative    Constitutional: no fever, no chills  Head: no trauma  Eyes: no vision changes, no eye pain  ENT:  no sore throat, no rhinorrhea  Cardiovascular:  no chest pain, no palpitation  Respiratory:  no SOB, no cough  GI:  no abd pain, no vomiting, no diarrhea  urinary: no dysuria, no hematuria, no flank pain  musculoskeletal:  no joint pain, no joint swelling  skin:  no rash  neurology:  no headache, no seizure, no change in mental status  psych: no anxiety, no depression         Allergies  No Known Allergies        ANTIMICROBIALS:  amoxicillin  875 milliGRAM(s)/clavulanate 1 two times a day  remdesivir  IVPB 100 every 24 hours  remdesivir  IVPB        OTHER MEDS:  acetaminophen     Tablet .. 650 milliGRAM(s) Oral every 6 hours PRN  aluminum hydroxide/magnesium hydroxide/simethicone Suspension 30 milliLiter(s) Oral every 4 hours PRN  collagenase Ointment 1 Application(s) Topical daily  Dakins Solution - 1/2 Strength 1 Application(s) Topical daily  dexAMETHasone  Injectable 4 milliGRAM(s) IV Push daily  dextrose 5%. 1000 milliLiter(s) IV Continuous <Continuous>  dextrose 5%. 1000 milliLiter(s) IV Continuous <Continuous>  dextrose 50% Injectable 25 Gram(s) IV Push once  dextrose 50% Injectable 12.5 Gram(s) IV Push once  dextrose 50% Injectable 25 Gram(s) IV Push once  dextrose Oral Gel 15 Gram(s) Oral once PRN  glucagon  Injectable 1 milliGRAM(s) IntraMuscular once  insulin lispro (ADMELOG) corrective regimen sliding scale   SubCutaneous three times a day before meals  insulin lispro (ADMELOG) corrective regimen sliding scale   SubCutaneous at bedtime  melatonin 3 milliGRAM(s) Oral at bedtime PRN  ondansetron Injectable 4 milliGRAM(s) IV Push every 8 hours PRN  senna 2 Tablet(s) Oral at bedtime  sodium chloride 0.9%. 250 milliLiter(s) IV Continuous <Continuous>  vitamin A & D Ointment 1 Application(s) Topical three times a day      Vital Signs Last 24 Hrs  T(C): 36.2 (06 Dec 2024 11:55), Max: 36.6 (05 Dec 2024 17:22)  T(F): 97.2 (06 Dec 2024 11:55), Max: 97.8 (05 Dec 2024 17:22)  HR: 84 (06 Dec 2024 11:55) (79 - 91)  BP: 156/81 (06 Dec 2024 11:55) (100/61 - 156/81)  BP(mean): --  RR: 17 (06 Dec 2024 11:55) (17 - 18)  SpO2: 100% (06 Dec 2024 11:55) (98% - 100%)    Parameters below as of 06 Dec 2024 11:55  Patient On (Oxygen Delivery Method): nasal cannula        Physical Exam:  General:    weak looking, frail elderly contracted female, NC  Head: atraumatic, normocephalic, bitemporal wasting   Eyes: normal sclera and conjunctiva  ENT:   no oropharyngeal lesions, no LAD, neck supple  Cardio:    regular S1,S2  Respiratory:   No wheezing, No rhonchi  abd:   soft, BS +, not tender, no distention  :     no suprapubic tenderness, has amezquita and urine draining   Musculoskeletal : contracted, multiple decubiti with clean base, seen during vac dressing change today   Skin:    no rash, PIV ok  Neurologic:    awake, able to answer simple questions  psych: normal affect, calm    WBC Count: 14.36 K/uL (12-05 @ 07:30)  WBC Count: 16.32 K/uL (12-04 @ 06:54)  WBC Count: 9.19 K/uL (12-03 @ 07:45)  WBC Count: 8.48 K/uL (12-02 @ 07:10)  WBC Count: 11.76 K/uL (11-30 @ 07:40)                            8.0    14.36 )-----------( 417      ( 05 Dec 2024 07:30 )             26.1           TPro  5.4[L]  /  Alb  1.1[L]  /  TBili  0.3  /  DBili  0.1  /  AST  11[L]  /  ALT  11[L]  /  AlkPhos  153[H]  12-05          Creatinine Trend: 0.17<--, 0.19<--, 0.16<--, 0.22<--, 0.20<--, 0.27<--      MICROBIOLOGY:  v  .Abscess  11-22-24   Few Escherichia coli ESBL  Rare Enterococcus faecalis  Rare Streptococcus mitis/oralis group "Susceptibilities not performed"  --  Escherichia coli ESBL  Enterococcus faecalis      .Blood BLOOD  11-21-24   No growth at 5 days  --  --      .Blood BLOOD  11-21-24   No growth at 5 days  --  --      Clean Catch  11-20-24   >100,000 CFU/ml Escherichia coli ESBL  <10,000 CFU/ml Normal Urogenital korey present  --  Escherichia coli ESBL      Clean Catch  11-20-24   >100,000 CFU/ml Klebsiella pneumoniae (Carbapenem Resistant)  >100,000 CFU/ml Escherichia coli ESBL  --  Klebsiella pneumoniae (Carbapenem Resistant)  Escherichia coli ESBL          Rapid RVP Result: Detected (12-04 @ 11:00)              D-Dimer Assay, Quantitative: 1115 (12-05)      SARS-CoV-2: Detected (12-04-24 @ 11:00)  Rapid RVP Result: Detected (12-04-24 @ 11:00)    SARS-CoV-2: Detected (04 Dec 2024 11:00)    RADIOLOGY:     SPENCER MIXON  MRN-[id number]  79y (1945)    Follow Up:  covid 19, multiple wounds    Interval History: The pt was seen and examined earlier during vac dressings change, not in acute distress, no new complaints, awake and alert, answers simple questions. Pt is afebrile, NC, no new cbc.    PAST MEDICAL & SURGICAL HISTORY:  Osteoporosis      Uterine polyp      Herniated disc  lumbar      Functional quadriplegia secondary to multiple sclerosis      Bilateral Tubal ligation  48y/o          ROS:    [ ] Unobtainable because:  [x ] All other systems negative    Constitutional: no fever, no chills  Head: no trauma  Eyes: no vision changes, no eye pain  ENT:  no sore throat, no rhinorrhea  Cardiovascular:  no chest pain, no palpitation  Respiratory:  no SOB, no cough  GI:  no abd pain, no vomiting, no diarrhea  urinary: no dysuria, no hematuria, no flank pain  musculoskeletal:  no joint pain, no joint swelling  skin:  no rash  neurology:  no headache, no seizure, no change in mental status  psych: no anxiety, no depression         Allergies  No Known Allergies        ANTIMICROBIALS:  amoxicillin  875 milliGRAM(s)/clavulanate 1 two times a day  remdesivir  IVPB 100 every 24 hours  remdesivir  IVPB        OTHER MEDS:  acetaminophen     Tablet .. 650 milliGRAM(s) Oral every 6 hours PRN  aluminum hydroxide/magnesium hydroxide/simethicone Suspension 30 milliLiter(s) Oral every 4 hours PRN  collagenase Ointment 1 Application(s) Topical daily  Dakins Solution - 1/2 Strength 1 Application(s) Topical daily  dexAMETHasone  Injectable 4 milliGRAM(s) IV Push daily  dextrose 5%. 1000 milliLiter(s) IV Continuous <Continuous>  dextrose 5%. 1000 milliLiter(s) IV Continuous <Continuous>  dextrose 50% Injectable 25 Gram(s) IV Push once  dextrose 50% Injectable 12.5 Gram(s) IV Push once  dextrose 50% Injectable 25 Gram(s) IV Push once  dextrose Oral Gel 15 Gram(s) Oral once PRN  glucagon  Injectable 1 milliGRAM(s) IntraMuscular once  insulin lispro (ADMELOG) corrective regimen sliding scale   SubCutaneous three times a day before meals  insulin lispro (ADMELOG) corrective regimen sliding scale   SubCutaneous at bedtime  melatonin 3 milliGRAM(s) Oral at bedtime PRN  ondansetron Injectable 4 milliGRAM(s) IV Push every 8 hours PRN  senna 2 Tablet(s) Oral at bedtime  sodium chloride 0.9%. 250 milliLiter(s) IV Continuous <Continuous>  vitamin A & D Ointment 1 Application(s) Topical three times a day      Vital Signs Last 24 Hrs  T(C): 36.2 (06 Dec 2024 11:55), Max: 36.6 (05 Dec 2024 17:22)  T(F): 97.2 (06 Dec 2024 11:55), Max: 97.8 (05 Dec 2024 17:22)  HR: 84 (06 Dec 2024 11:55) (79 - 91)  BP: 156/81 (06 Dec 2024 11:55) (100/61 - 156/81)  BP(mean): --  RR: 17 (06 Dec 2024 11:55) (17 - 18)  SpO2: 100% (06 Dec 2024 11:55) (98% - 100%)    Parameters below as of 06 Dec 2024 11:55  Patient On (Oxygen Delivery Method): nasal cannula        Physical Exam:  General:    weak looking, frail elderly contracted female, NC  Head: atraumatic, normocephalic, bitemporal wasting   Eyes: normal sclera and conjunctiva  ENT:   no oropharyngeal lesions, no LAD, neck supple  Cardio:    regular S1,S2  Respiratory:   No wheezing, No rhonchi  abd:   soft, BS +, not tender, no distention  :     no suprapubic tenderness, has amezquita and urine draining   Musculoskeletal : contracted, multiple decubiti with clean base, seen during vac dressing change today   Skin:    no rash, PIV ok  Neurologic:    awake, able to answer simple questions  psych: normal affect, calm    WBC Count: 14.36 K/uL (12-05 @ 07:30)  WBC Count: 16.32 K/uL (12-04 @ 06:54)  WBC Count: 9.19 K/uL (12-03 @ 07:45)  WBC Count: 8.48 K/uL (12-02 @ 07:10)  WBC Count: 11.76 K/uL (11-30 @ 07:40)                            8.0    14.36 )-----------( 417      ( 05 Dec 2024 07:30 )             26.1           TPro  5.4[L]  /  Alb  1.1[L]  /  TBili  0.3  /  DBili  0.1  /  AST  11[L]  /  ALT  11[L]  /  AlkPhos  153[H]  12-05          Creatinine Trend: 0.17<--, 0.19<--, 0.16<--, 0.22<--, 0.20<--, 0.27<--      MICROBIOLOGY:    .Abscess  11-22-24   Few Escherichia coli ESBL  Rare Enterococcus faecalis  Rare Streptococcus mitis/oralis group "Susceptibilities not performed"  --  Escherichia coli ESBL  Enterococcus faecalis      .Blood BLOOD  11-21-24   No growth at 5 days  --  --      .Blood BLOOD  11-21-24   No growth at 5 days  --  --      Clean Catch  11-20-24   >100,000 CFU/ml Escherichia coli ESBL  <10,000 CFU/ml Normal Urogenital korey present  --  Escherichia coli ESBL      Clean Catch  11-20-24   >100,000 CFU/ml Klebsiella pneumoniae (Carbapenem Resistant)  >100,000 CFU/ml Escherichia coli ESBL  --  Klebsiella pneumoniae (Carbapenem Resistant)  Escherichia coli ESBL          Rapid RVP Result: Detected (12-04 @ 11:00)              D-Dimer Assay, Quantitative: 1115 (12-05)      SARS-CoV-2: Detected (12-04-24 @ 11:00)  Rapid RVP Result: Detected (12-04-24 @ 11:00)    SARS-CoV-2: Detected (04 Dec 2024 11:00)    RADIOLOGY:

## 2024-12-06 NOTE — PROGRESS NOTE ADULT - ASSESSMENT
79 F pmh osteoporosis, MS, bedbound. infected decubitus ulcers presenting to the ED for reduced appetite x 2-3 days. Son states that home health aide noted that patient has not been eating. Workup with UTI. Also noted hyponatremia to 128 .     COVID + 12/4   continue with remdesavir and decadron   Precautions per protocol, ideally airborne and contact in negative pressure room. Supplemental oxygen as required to maintain adequate saturation. Vigilance for secondary infection and other superimposed events. Monitor inflammatory markers and lab data. DVT prophylaxis per protocol. Prognosis guarded. Encourage use of incentive spirometer.  proning as tolerated

## 2024-12-06 NOTE — CHART NOTE - NSCHARTNOTEFT_GEN_A_CORE
Met this afternoon with pt son Rex Li at his request. Mr Guillermo had many questions and was seeking guidance to assure that he was doing best for his mother. He states that he and his family will return to prior home care on discharge to for Home with TIFFANY , HA PH 12x7 and PA wound care visit 1x weekly. Pt will now go home with a wound vac Joe is anticipating that his mothers care needs  will be greater.     Family member is presently providing PH care, discussed and recommended that he reach out to Medicaid and inquire about the CDPAP program. He can then take the money he was paying her and hire additional hours at nighttime. Rediscussed options for hospice should they decide to  focus solely on comfort and best QOL. He is aware that a wound vac is not within a hospice POC. Further reminded him to have discussion with his brother in near future to discuss wishes for resuscitation status and other EOL decisions Recommended that he find a copy of a MOLST on the internet and discuss with pt     Plan for discharge when Remdesivir course is completed     Palliative Medicine will sign off at his time. Please reconsult if we could be of further assistance with this patient Met this afternoon with pt son Rex Li at his request. Mr Guillermo had many questions and was seeking guidance to assure that he was doing best for his mother. He states that he and his family will return to prior home care on discharge to for Home with TIFFANY , HA PH 12x7 and PA wound care visit 1x weekly. Pt will now go home with a wound vac Joe is anticipating that his mothers care needs  will be greater.     Family member is presently providing PH care, discussed and recommended that he reach out to Medicaid and inquire about the CDPAP program. He can then take the money he was paying her and hire additional hours at nighttime. Rediscussed options for hospice should they decide to  focus solely on comfort and best QOL. He is aware that a wound vac is not within a hospice POC. Further reminded him to have discussion with his brother in near future to discuss wishes for resuscitation status and other EOL decisions Recommended that he find a copy of a MOLST on the internet and discuss with pt     Plan for discharge when Remdesivir course is completed     Palliative Medicine will sign off at his time. Please reconsult if we could be of further assistance with this patient    Total time spent 24 minutes in counseling and documenting.

## 2024-12-06 NOTE — PROGRESS NOTE ADULT - SUBJECTIVE AND OBJECTIVE BOX
Patient is a 79y old  Female who presents with a chief complaint of UTI, failure to thrive , hyponatremia. (02 Dec 2024 16:23)      INTERVAL HPI/OVERNIGHT EVENTS:  Patient seen and examined bedside.         ROS limited but negative     MEDICATIONS  (STANDING):  collagenase Ointment 1 Application(s) Topical daily  Dakins Solution - 1/2 Strength 1 Application(s) Topical daily  dextrose 5%. 1000 milliLiter(s) (50 mL/Hr) IV Continuous <Continuous>  dextrose 5%. 1000 milliLiter(s) (100 mL/Hr) IV Continuous <Continuous>  dextrose 50% Injectable 25 Gram(s) IV Push once  dextrose 50% Injectable 12.5 Gram(s) IV Push once  dextrose 50% Injectable 25 Gram(s) IV Push once  glucagon  Injectable 1 milliGRAM(s) IntraMuscular once  insulin lispro (ADMELOG) corrective regimen sliding scale   SubCutaneous three times a day before meals  insulin lispro (ADMELOG) corrective regimen sliding scale   SubCutaneous at bedtime  meropenem  IVPB 1000 milliGRAM(s) IV Intermittent every 12 hours  senna 2 Tablet(s) Oral at bedtime  sodium chloride 0.9%. 250 milliLiter(s) (250 mL/Hr) IV Continuous <Continuous>  vitamin A & D Ointment 1 Application(s) Topical three times a day    MEDICATIONS  (PRN):  acetaminophen     Tablet .. 650 milliGRAM(s) Oral every 6 hours PRN Temp greater or equal to 38C (100.4F), Mild Pain (1 - 3)  aluminum hydroxide/magnesium hydroxide/simethicone Suspension 30 milliLiter(s) Oral every 4 hours PRN Dyspepsia  dextrose Oral Gel 15 Gram(s) Oral once PRN Blood Glucose LESS THAN 70 milliGRAM(s)/deciliter  melatonin 3 milliGRAM(s) Oral at bedtime PRN Insomnia  ondansetron Injectable 4 milliGRAM(s) IV Push every 8 hours PRN Nausea and/or Vomiting      Allergies    No Known Allergies    Intolerances          Vital Signs Last 24 Hrs  T(C): 36.6 (05 Dec 2024 17:22), Max: 38.3 (04 Dec 2024 21:41)  T(F): 97.8 (05 Dec 2024 17:22), Max: 100.9 (04 Dec 2024 21:41)  HR: 91 (05 Dec 2024 17:22) (91 - 99)  BP: 138/75 (05 Dec 2024 17:22) (109/64 - 164/84)  BP(mean): --  RR: 18 (05 Dec 2024 17:22) (17 - 18)  SpO2: 100% (05 Dec 2024 17:22) (94% - 100%)    Parameters below as of 05 Dec 2024 17:22  Patient On (Oxygen Delivery Method): room air          PHYSICAL EXAM:  GENERAL: NAD , no increased WOB  HEAD:  Atraumatic, Normocephalic  EYES: EOMI, PERRLA, conjunctiva and sclera clear  ENMT: No tonsillar erythema, exudates, or enlargement;   NECK: Supple, No JVD  NERVOUS SYSTEM:  sleepy but easily arousable, moving extremities   CHEST/LUNG: CTAB;  No rales, rhonchi, wheezing, or rubs  HEART: Regular rate and rhythm; No murmurs, rubs, or gallops  ABDOMEN: Soft, Nontender, Nondistended; Bowel sounds present  EXTREMITIES:  2+ Peripheral Pulses b/l, No clubbing, cyanosis, calf tenderness or edema b/l         LABS:                                              8.0    14.36 )-----------( 417      ( 05 Dec 2024 07:30 )             26.1   12-04    138  |  104  |  8   ----------------------------<  135[H]  4.1   |  29  |  0.17[L]    Ca    7.6[L]      04 Dec 2024 06:54    TPro  5.4[L]  /  Alb  1.1[L]  /  TBili  0.3  /  DBili  0.1  /  AST  11[L]  /  ALT  11[L]  /  AlkPhos  153[H]  12-05        Urinalysis Basic - ( 03 Dec 2024 07:45 )    Color: x / Appearance: x / SG: x / pH: x  Gluc: 146 mg/dL / Ketone: x  / Bili: x / Urobili: x   Blood: x / Protein: x / Nitrite: x   Leuk Esterase: x / RBC: x / WBC x   Sq Epi: x / Non Sq Epi: x / Bacteria: x      Consultant(s) Notes Reviewed [x ] Yes     Care Discussed with [x ] Consultants  [ x] Patient  [ x] Family  [x ] /   [x ] Other; RN  Care plan and all findings were discussed in detail with patient and son at bedside.  All questions and concerns addressed

## 2024-12-07 LAB
GLUCOSE BLDC GLUCOMTR-MCNC: 145 MG/DL — HIGH (ref 70–99)
GLUCOSE BLDC GLUCOMTR-MCNC: 173 MG/DL — HIGH (ref 70–99)
GLUCOSE BLDC GLUCOMTR-MCNC: 199 MG/DL — HIGH (ref 70–99)
GLUCOSE BLDC GLUCOMTR-MCNC: 265 MG/DL — HIGH (ref 70–99)

## 2024-12-07 PROCEDURE — 99232 SBSQ HOSP IP/OBS MODERATE 35: CPT

## 2024-12-07 RX ADMIN — DEXAMETHASONE 4 MILLIGRAM(S): 1.5 TABLET ORAL at 05:22

## 2024-12-07 RX ADMIN — Medication 1 APPLICATION(S): at 05:39

## 2024-12-07 RX ADMIN — REMDESIVIR 200 MILLIGRAM(S): 100 INJECTION, POWDER, LYOPHILIZED, FOR SOLUTION INTRAVENOUS at 16:40

## 2024-12-07 RX ADMIN — COLLAGENASE SANTYL 1 APPLICATION(S): 250 OINTMENT TOPICAL at 12:01

## 2024-12-07 RX ADMIN — Medication 1: at 16:42

## 2024-12-07 RX ADMIN — Medication 1 TABLET(S): at 17:20

## 2024-12-07 RX ADMIN — Medication 1 APPLICATION(S): at 12:01

## 2024-12-07 RX ADMIN — ACETAMINOPHEN 500MG 650 MILLIGRAM(S): 500 TABLET, COATED ORAL at 01:02

## 2024-12-07 RX ADMIN — Medication 3: at 12:00

## 2024-12-07 RX ADMIN — Medication 1 APPLICATION(S): at 21:44

## 2024-12-07 RX ADMIN — ACETAMINOPHEN 500MG 650 MILLIGRAM(S): 500 TABLET, COATED ORAL at 02:02

## 2024-12-07 RX ADMIN — Medication 1 APPLICATION(S): at 13:50

## 2024-12-07 RX ADMIN — Medication 1 TABLET(S): at 05:22

## 2024-12-07 NOTE — PROGRESS NOTE ADULT - PROBLEM SELECTOR PLAN 7
was replaced
will be replaced
was replaced
will be replaced
was replaced

## 2024-12-07 NOTE — PROGRESS NOTE ADULT - PROBLEM SELECTOR PLAN 2
possible 2/2 infection   ensure.   dietary consult
possible 2/2 infection   ensure.   dietary consult in AM
possible 2/2 infection   ensure.   dietary consult

## 2024-12-07 NOTE — PROGRESS NOTE ADULT - PROBLEM SELECTOR PLAN 3
bedbound unable to more.

## 2024-12-07 NOTE — PROGRESS NOTE ADULT - PROBLEM SELECTOR PROBLEM 4
Decubitus ulcers
22-Aug-2019 00:00

## 2024-12-07 NOTE — PROGRESS NOTE ADULT - SUBJECTIVE AND OBJECTIVE BOX
Patient is a 79y old  Female who presents with a chief complaint of UTI, failure to thrive , hyponatremia. (02 Dec 2024 16:23)      INTERVAL HPI/OVERNIGHT EVENTS:  Patient seen and examined bedside.         ROS limited but negative     MEDICATIONS  (STANDING):  collagenase Ointment 1 Application(s) Topical daily  Dakins Solution - 1/2 Strength 1 Application(s) Topical daily  dextrose 5%. 1000 milliLiter(s) (50 mL/Hr) IV Continuous <Continuous>  dextrose 5%. 1000 milliLiter(s) (100 mL/Hr) IV Continuous <Continuous>  dextrose 50% Injectable 25 Gram(s) IV Push once  dextrose 50% Injectable 12.5 Gram(s) IV Push once  dextrose 50% Injectable 25 Gram(s) IV Push once  glucagon  Injectable 1 milliGRAM(s) IntraMuscular once  insulin lispro (ADMELOG) corrective regimen sliding scale   SubCutaneous three times a day before meals  insulin lispro (ADMELOG) corrective regimen sliding scale   SubCutaneous at bedtime  meropenem  IVPB 1000 milliGRAM(s) IV Intermittent every 12 hours  senna 2 Tablet(s) Oral at bedtime  sodium chloride 0.9%. 250 milliLiter(s) (250 mL/Hr) IV Continuous <Continuous>  vitamin A & D Ointment 1 Application(s) Topical three times a day    MEDICATIONS  (PRN):  acetaminophen     Tablet .. 650 milliGRAM(s) Oral every 6 hours PRN Temp greater or equal to 38C (100.4F), Mild Pain (1 - 3)  aluminum hydroxide/magnesium hydroxide/simethicone Suspension 30 milliLiter(s) Oral every 4 hours PRN Dyspepsia  dextrose Oral Gel 15 Gram(s) Oral once PRN Blood Glucose LESS THAN 70 milliGRAM(s)/deciliter  melatonin 3 milliGRAM(s) Oral at bedtime PRN Insomnia  ondansetron Injectable 4 milliGRAM(s) IV Push every 8 hours PRN Nausea and/or Vomiting      Allergies    No Known Allergies    Intolerances          Vital Signs Last 24 Hrs  T(C): 36.4 (07 Dec 2024 17:07), Max: 36.7 (07 Dec 2024 07:38)  T(F): 97.5 (07 Dec 2024 17:07), Max: 98 (07 Dec 2024 07:38)  HR: 93 (07 Dec 2024 17:07) (80 - 102)  BP: 122/73 (07 Dec 2024 17:07) (102/58 - 122/73)  BP(mean): --  RR: 17 (07 Dec 2024 17:07) (17 - 18)  SpO2: 94% (07 Dec 2024 17:07) (92% - 99%)    Parameters below as of 07 Dec 2024 17:07  Patient On (Oxygen Delivery Method): room air            PHYSICAL EXAM:  GENERAL: NAD , no increased WOB  HEAD:  Atraumatic, Normocephalic  EYES: EOMI, PERRLA, conjunctiva and sclera clear  ENMT: No tonsillar erythema, exudates, or enlargement;   NECK: Supple, No JVD  NERVOUS SYSTEM:  sleepy but easily arousable, moving extremities   CHEST/LUNG: CTAB;  No rales, rhonchi, wheezing, or rubs  HEART: Regular rate and rhythm; No murmurs, rubs, or gallops  ABDOMEN: Soft, Nontender, Nondistended; Bowel sounds present  EXTREMITIES:  2+ Peripheral Pulses b/l, No clubbing, cyanosis, calf tenderness or edema b/l         LABS:                                   no new labs         Urinalysis Basic - ( 03 Dec 2024 07:45 )    Color: x / Appearance: x / SG: x / pH: x  Gluc: 146 mg/dL / Ketone: x  / Bili: x / Urobili: x   Blood: x / Protein: x / Nitrite: x   Leuk Esterase: x / RBC: x / WBC x   Sq Epi: x / Non Sq Epi: x / Bacteria: x      Consultant(s) Notes Reviewed [x ] Yes     Care Discussed with [x ] Consultants  [ x] Patient  [ x] Family  [x ] /   [x ] Other; RN  Care plan and all findings were discussed in detail with patient and son at bedside.  All questions and concerns addressed

## 2024-12-07 NOTE — PROGRESS NOTE ADULT - PROBLEM SELECTOR PROBLEM 1
Acute UTI

## 2024-12-07 NOTE — PROGRESS NOTE ADULT - PROBLEM SELECTOR PROBLEM 3
Functional quadriplegia secondary to multiple sclerosis

## 2024-12-07 NOTE — PROGRESS NOTE ADULT - PROBLEM/PLAN-7
DISPLAY PLAN FREE TEXT
Sandy Mcgrath  (RN)  2023 09:24:18

## 2024-12-07 NOTE — PROGRESS NOTE ADULT - PROBLEM SELECTOR PROBLEM 8
Hypophosphatemia

## 2024-12-07 NOTE — PROGRESS NOTE ADULT - PROBLEM SELECTOR PROBLEM 2
Adult failure to thrive

## 2024-12-07 NOTE — PROGRESS NOTE ADULT - PROBLEM/PLAN-3
DISPLAY PLAN FREE TEXT
denies pain/discomfort

## 2024-12-07 NOTE — PROGRESS NOTE ADULT - PROBLEM SELECTOR PROBLEM 7
Hypomagnesemia

## 2024-12-07 NOTE — PROGRESS NOTE ADULT - PROBLEM SELECTOR PROBLEM 5
Acute on chronic anemia

## 2024-12-07 NOTE — PROGRESS NOTE ADULT - PROBLEM SELECTOR PLAN 8
was replaced
will be replaced
was replaced
will be replaced

## 2024-12-08 LAB
GLUCOSE BLDC GLUCOMTR-MCNC: 189 MG/DL — HIGH (ref 70–99)
GLUCOSE BLDC GLUCOMTR-MCNC: 193 MG/DL — HIGH (ref 70–99)
GLUCOSE BLDC GLUCOMTR-MCNC: 235 MG/DL — HIGH (ref 70–99)
GLUCOSE BLDC GLUCOMTR-MCNC: 255 MG/DL — HIGH (ref 70–99)

## 2024-12-08 PROCEDURE — 99232 SBSQ HOSP IP/OBS MODERATE 35: CPT

## 2024-12-08 RX ORDER — HEPARIN SODIUM,PORCINE 1000/ML
5000 VIAL (ML) INJECTION EVERY 12 HOURS
Refills: 0 | Status: DISCONTINUED | OUTPATIENT
Start: 2024-12-08 | End: 2024-12-13

## 2024-12-08 RX ADMIN — Medication 1 APPLICATION(S): at 22:14

## 2024-12-08 RX ADMIN — Medication 1 TABLET(S): at 17:21

## 2024-12-08 RX ADMIN — COLLAGENASE SANTYL 1 APPLICATION(S): 250 OINTMENT TOPICAL at 11:42

## 2024-12-08 RX ADMIN — Medication 2: at 17:16

## 2024-12-08 RX ADMIN — Medication 1 TABLET(S): at 05:56

## 2024-12-08 RX ADMIN — REMDESIVIR 200 MILLIGRAM(S): 100 INJECTION, POWDER, LYOPHILIZED, FOR SOLUTION INTRAVENOUS at 17:18

## 2024-12-08 RX ADMIN — Medication 3: at 11:40

## 2024-12-08 RX ADMIN — DEXAMETHASONE 4 MILLIGRAM(S): 1.5 TABLET ORAL at 05:56

## 2024-12-08 RX ADMIN — Medication 1 APPLICATION(S): at 11:40

## 2024-12-08 RX ADMIN — Medication 1: at 08:21

## 2024-12-08 RX ADMIN — Medication 1 APPLICATION(S): at 05:54

## 2024-12-08 RX ADMIN — Medication 1 APPLICATION(S): at 15:07

## 2024-12-08 NOTE — DISCHARGE NOTE NURSING/CASE MANAGEMENT/SOCIAL WORK - NSDCPEFALRISK_GEN_ALL_CORE
For information on Fall & Injury Prevention, visit: [email or web address] OR  [email or web address] OR  https://www.cdc.gov/steadi/patient.html

## 2024-12-08 NOTE — DISCHARGE NOTE NURSING/CASE MANAGEMENT/SOCIAL WORK - FINANCIAL ASSISTANCE
[facility] provides services at a reduced cost to those who are determined to be eligible through [facility]’s financial assistance program. Information regarding [facility]’s financial assistance program can be found by going to [email or web address] or by calling [phone].

## 2024-12-08 NOTE — PROGRESS NOTE ADULT - TIME BILLING
min spent reviewing patient's chart,  discussing in IDR, examining patient, discussing plan with patient and family and staff, reviewing consultant recommendations/communicating with consultants, writing progress note and placing orders.
37 minutes aggregate care time spent on encounter; activities included   direct patient care, counseling and/or coordinating care reviewing notes, lab data/ imaging , discussion with multidisciplinary team/ patient  /family.
min spent reviewing patient's chart,  discussing in IDR, examining patient, discussing plan with patient and family and staff, reviewing consultant recommendations/communicating with consultants, writing progress note and placing orders.

## 2024-12-08 NOTE — DISCHARGE NOTE NURSING/CASE MANAGEMENT/SOCIAL WORK - PATIENT PORTAL LINK FT
You can access the FollowMyHealth Patient Portal offered by [facility] by registering at the following website: [email or web address]. By joining [facility]’s FollowMyHealth portal, you will also be able to view your health information using other applications (apps) compatible with our system.

## 2024-12-08 NOTE — PROGRESS NOTE ADULT - ASSESSMENT
79 F pmh osteoporosis, MS, bedbound. infected decubitus ulcers presenting to the ED for reduced appetite x 2-3 days. Son states that home health aide noted that patient has not been eating. Workup with UTI. Also noted hyponatremia to 128 .    Assessment and Plan:   COVID + 12/4   continue with remdesavir and decadron   Precautions per protocol, ideally airborne and contact in negative pressure room. Supplemental oxygen as required to maintain adequate saturation. Vigilance for secondary infection and other superimposed events. Monitor inflammatory markers and lab data. DVT prophylaxis per protocol. Prognosis guarded. Encourage use of incentive spirometer.  proning as tolerated      Acute UTI.   ·  Plan: FU Ucx-- kleb carbapenem resistant / ecoli esbl  On IV merem and vanc---Vanc d/c 11/2, will complete merem today   ID following  Pending home wound vac and set up of home care for dc  12/4 amezquita was changed.     Problem/Plan - 2:  ·  Problem: Adult failure to thrive.   ·  Plan: possible 2/2 infection   ensure.   dietary consult.     Problem/Plan - 3:  ·  Problem: Functional quadriplegia secondary to multiple sclerosis.   ·  Plan: bedbound unable to more.     Problem/Plan - 4:  ·  Problem: Decubitus ulcers.   ·  Plan: multiple ulcers unstageable  Dr. Whitehead and wound care input   IV meropenem and vanc as per ID   Wound vac inplace.     Problem/Plan - 5:  ·  Problem: Acute on chronic anemia.   ·  Plan: Transfuse if repeat hgb <7.     Problem/Plan - 6:  ·  Problem: Fever.   ·  Plan: See above.     Problem/Plan - 7:  ·  Problem: Hypomagnesemia.   ·  Plan: was replaced.     Problem/Plan - 8:  ·  Problem: Hypophosphatemia.   ·  Plan: was replaced.     79 F pmh osteoporosis, MS, bedbound. infected decubitus ulcers presenting to the ED for reduced appetite x 2-3 days. Son states that home health aide noted that patient has not been eating. Workup with UTI. Also noted hyponatremia to 128 .    Assessment and Plan:   COVID + 12/4   continue with remdesavir day 5 and decadron day 5   currently doing well on room air      Acute UTI.   ·  Plan: FU Ucx-- kleb carbapenem resistant / ecoli esbl  ID following  was on meropenem   Pending home wound vac and set up of home care for dc  12/4 amezquita was changed. replace every 2-3 weeks      Functional quadriplegia secondary to multiple sclerosis.   bedbound    supportive care  frequent repositioning to prevent pressure wounds      Decubitus ulcers.   ·  Plan: multiple ulcers unstageable s/p debridement 11/22  Dr. Whitehead and wound care input   IV meropenem and vanc as per ID stopped   now on augmentin for 10 days total   Wound vac in place.     Acute on chronic anemia.   ·  Plan: Transfuse if repeat hgb <7.  H/H stable   no source of bleed     Preventative Measures   Heparin SQ--dvt ppx  fall, aspiration precautions   HOBE   FULL CODE   palliative following     Dispo: discharge home once wound Vac delivered

## 2024-12-09 LAB
GLUCOSE BLDC GLUCOMTR-MCNC: 152 MG/DL — HIGH (ref 70–99)
GLUCOSE BLDC GLUCOMTR-MCNC: 153 MG/DL — HIGH (ref 70–99)
GLUCOSE BLDC GLUCOMTR-MCNC: 156 MG/DL — HIGH (ref 70–99)
GLUCOSE BLDC GLUCOMTR-MCNC: 167 MG/DL — HIGH (ref 70–99)

## 2024-12-09 PROCEDURE — 99232 SBSQ HOSP IP/OBS MODERATE 35: CPT

## 2024-12-09 RX ADMIN — Medication 1 TABLET(S): at 17:10

## 2024-12-09 RX ADMIN — Medication 1 APPLICATION(S): at 06:48

## 2024-12-09 RX ADMIN — DEXAMETHASONE 4 MILLIGRAM(S): 1.5 TABLET ORAL at 05:44

## 2024-12-09 RX ADMIN — Medication 1 TABLET(S): at 05:44

## 2024-12-09 RX ADMIN — Medication 5000 UNIT(S): at 05:42

## 2024-12-09 RX ADMIN — Medication 1 APPLICATION(S): at 12:25

## 2024-12-09 RX ADMIN — Medication 5000 UNIT(S): at 17:12

## 2024-12-09 RX ADMIN — Medication 1: at 16:50

## 2024-12-09 RX ADMIN — Medication 1: at 08:35

## 2024-12-09 RX ADMIN — Medication 1 APPLICATION(S): at 13:06

## 2024-12-09 RX ADMIN — Medication 1 APPLICATION(S): at 21:56

## 2024-12-09 RX ADMIN — Medication 1: at 12:27

## 2024-12-09 RX ADMIN — COLLAGENASE SANTYL 1 APPLICATION(S): 250 OINTMENT TOPICAL at 12:26

## 2024-12-09 RX ADMIN — Medication 2 TABLET(S): at 21:57

## 2024-12-09 NOTE — PROGRESS NOTE ADULT - NS ATTEND OPT1A GEN_ALL_CORE
History/Medical decision making
Medical decision making
History/Medical decision making
Medical decision making

## 2024-12-09 NOTE — PROGRESS NOTE ADULT - SUBJECTIVE AND OBJECTIVE BOX
Patient is a 79y old  Female who presents with a chief complaint of UTI, failure to thrive , hyponatremia. (09 Dec 2024 13:56)      INTERVAL HPI/OVERNIGHT EVENTS: no events noted overnight.    MEDICATIONS  (STANDING):  amoxicillin  875 milliGRAM(s)/clavulanate 1 Tablet(s) Oral two times a day  collagenase Ointment 1 Application(s) Topical daily  Dakins Solution - 1/2 Strength 1 Application(s) Topical daily  dexAMETHasone  Injectable 4 milliGRAM(s) IV Push daily  dextrose 5%. 1000 milliLiter(s) (50 mL/Hr) IV Continuous <Continuous>  dextrose 5%. 1000 milliLiter(s) (100 mL/Hr) IV Continuous <Continuous>  dextrose 50% Injectable 25 Gram(s) IV Push once  dextrose 50% Injectable 12.5 Gram(s) IV Push once  dextrose 50% Injectable 25 Gram(s) IV Push once  glucagon  Injectable 1 milliGRAM(s) IntraMuscular once  heparin   Injectable 5000 Unit(s) SubCutaneous every 12 hours  insulin lispro (ADMELOG) corrective regimen sliding scale   SubCutaneous three times a day before meals  insulin lispro (ADMELOG) corrective regimen sliding scale   SubCutaneous at bedtime  senna 2 Tablet(s) Oral at bedtime  sodium chloride 0.9%. 250 milliLiter(s) (250 mL/Hr) IV Continuous <Continuous>  vitamin A & D Ointment 1 Application(s) Topical three times a day    MEDICATIONS  (PRN):  acetaminophen     Tablet .. 650 milliGRAM(s) Oral every 6 hours PRN Temp greater or equal to 38C (100.4F), Mild Pain (1 - 3)  aluminum hydroxide/magnesium hydroxide/simethicone Suspension 30 milliLiter(s) Oral every 4 hours PRN Dyspepsia  dextrose Oral Gel 15 Gram(s) Oral once PRN Blood Glucose LESS THAN 70 milliGRAM(s)/deciliter  melatonin 3 milliGRAM(s) Oral at bedtime PRN Insomnia  ondansetron Injectable 4 milliGRAM(s) IV Push every 8 hours PRN Nausea and/or Vomiting      __________________________________________________  REVIEW OF SYSTEMS:    unable to obtain, patient was minimally verbal- did not answer any questions       Vital Signs Last 24 Hrs  T(C): 36.9 (09 Dec 2024 12:17), Max: 37.1 (08 Dec 2024 17:17)  T(F): 98.4 (09 Dec 2024 12:17), Max: 98.7 (08 Dec 2024 17:17)  HR: 90 (09 Dec 2024 12:17) (84 - 90)  BP: 121/68 (09 Dec 2024 12:17) (121/68 - 134/78)  BP(mean): --  RR: 18 (09 Dec 2024 12:17) (17 - 18)  SpO2: 96% (09 Dec 2024 12:17) (95% - 96%)    Parameters below as of 09 Dec 2024 12:17  Patient On (Oxygen Delivery Method): room air        ________________________________________________  PHYSICAL EXAM:  GENERAL: NAD , no increased WOB  HEAD:  Atraumatic, Normocephalic  EYES: EOMI, PERRLA, conjunctiva and sclera clear  ENMT: No tonsillar erythema, exudates, or enlargement;   NECK: Supple, No JVD  NERVOUS SYSTEM:  sleepy but easily arousable, moving extremities   CHEST/LUNG: CTAB;  No rales, rhonchi, wheezing, or rubs  HEART: Regular rate and rhythm; No murmurs, rubs, or gallops  ABDOMEN: Soft, Nontender, Nondistended; Bowel sounds present  EXTREMITIES:  2+ Peripheral Pulses b/l, No clubbing, cyanosis, calf tenderness or edema b/l   wound vac in place   _________________________________________________  LABS:              CAPILLARY BLOOD GLUCOSE      POCT Blood Glucose.: 153 mg/dL (09 Dec 2024 12:08)  POCT Blood Glucose.: 156 mg/dL (09 Dec 2024 08:13)  POCT Blood Glucose.: 189 mg/dL (08 Dec 2024 22:27)  POCT Blood Glucose.: 235 mg/dL (08 Dec 2024 16:37)        RADIOLOGY & ADDITIONAL TESTS:    Imaging Personally Reviewed:  YES    Consultant(s) Notes Reviewed:   YES    Care Discussed with Consultants : YES     Plan of care was discussed with patient and /or primary care giver; all questions and concerns were addressed and care was aligned with patient's wishes.

## 2024-12-09 NOTE — PROGRESS NOTE ADULT - ASSESSMENT
A/P-  79 year old Female with  pmh osteoporosis, MS, bedbound. infected decubitus ulcers presenting to the ED for reduced appetite x 2-3 days.   patient  also has chronic amezquita     Infectious disease consult called 11/22/2024 at 3:45 pm to help with antibiotic management.    +fever  +leukocytosis  CAUTI with urine cx>100K e.coli and K,pneumonia  Infected looking hip and sacral and  upper back decubiti    11/25  no fevers since 11/21  13K leukocytosis  UC #1 grew Klebsiella ( carbapanem resistant )  and ESBL E. Coli   UC #2 grew ESBL E. Coli ( repeat urine culture)  BCs x 2 NGTD  wound cx-ESBL, e.feaclis, strep Mitis from 11/22   abx were changed over the weekend to Meropenem, IV Vanco continued  Vanco level is 3.1 yesterday     11/26:   afebrile  on RA  leukocytosis is about the same 13.28  Cr ok  culture data reviewed, no change   pending vac dressing application  abx continued, Meropenem and Vancomycin     11/27: no fever,   is on  RA  no new cbc today  culture data reviewed:  Abscess cx grew ESBL E. Coli, Enterococcus and strep mitis   BCs NGTD x 2  Meropenem and Vancomycin continued  Vanco level is 8.0 today, spoke with pharmacy  pending vac dressing application     11/29: vac placed, continue on vancomycin and meropenem, vancomycin level tomorrow AM, AUC is good and will keep same dose of vancomycin, please ensure excellent wound care offloading nutrition optimization     12/2/2024-  afebrile  no leukocytosis  wbc-8 k today  wound vac dressing changed today by wound team  wound look clean   on vanco and meropenem    12/4-  afebrile today   low grade fever of 100 yesterday  on RA  New leukocytosis of 16 k today  on meropenem ( last dose today)  vanco was d/c 12/2 12/5: low grade temp yesterday 100.9, on NC, WBC better 14.36, RVP positive for covid 19, pt was started on Remdesivir and Decadron by medicine team,   wounds with vac dressing, s/p 10 days of Vancomycin, today is day #11 of Meropenem, will stop and start po Augmentin x 10 days as per attending recommendations.   CXR - Continued elevation of right hemidiaphragm. New right midlung platelike atelectasis. New bilateral perihilar lung opacities, of uncertain etiology. Atelectasis, central pulmonary edema or multifocal infection are possible.    12/6: no fevers today  comfortable on NC  no new cbc  Remdesivir and Dracdron continued (day #3)  was started on po Augmentin (day #2)  no new culture data     12/9: no fever, no new blood work  completed covid 19 treatment  Augmentin po continued for 10 days         Impression-  multiple infected deep decubiti , most likely OM  CAUTI from pre-admission  +covid 19      Plan-  s/p 11 days course of Meropenem IV - stopped on 12/5  s/p 10 days of Vancomycin   Amezquita exchanged on 12/4, if amezquita is needed please make sure it gets changed every 2-3 weeks   continue po Augmentin today for 10 days (day #5)  patient needs very close wound care to help prevent future infections as her decubiti are very deep and patient  is bedbound.  s/p debridement by wound care on 11/22/2024, wound vac is on  wound vac per PT  frequent offloading and turning   monitor for aspiration precautions as well, keep head of bed above 30 degrees   + COVID 19 - course of remdesivir and decadron is complete   pending possible home discharge with wound vac, pt will complete course of Augmentin at home    discussed with Dr. Escobar

## 2024-12-09 NOTE — PROGRESS NOTE ADULT - SUBJECTIVE AND OBJECTIVE BOX
SPENCER MIXON  MRN-[id number]  79y (1945)    Follow Up:  multiple wounds, covid 19    Interval History: The pt was seen and examined earlier, not in acute distress, resting in her bed, rousable, denies discomfort, pt's aid is present. Pt is afebrile, no new lab work.     PAST MEDICAL & SURGICAL HISTORY:  Osteoporosis      Uterine polyp      Herniated disc  lumbar      Functional quadriplegia secondary to multiple sclerosis      Bilateral Tubal ligation  48y/o          ROS:  limited, pt denies any sob, denies pain   [ ] Unobtainable because:  [x ] All other systems negative    Constitutional: no fever, no chills  Head: no trauma  Eyes: no vision changes, no eye pain  ENT:  no sore throat, no rhinorrhea  Cardiovascular:  no chest pain, no palpitation  Respiratory:  no SOB, no cough  GI:  no abd pain, no vomiting, no diarrhea  urinary: no dysuria, no hematuria, no flank pain  musculoskeletal:  no joint pain, no joint swelling  skin:  no rash  neurology:  no headache, no seizure, no change in mental status  psych: no anxiety, no depression         Allergies  No Known Allergies        ANTIMICROBIALS:  amoxicillin  875 milliGRAM(s)/clavulanate 1 two times a day      OTHER MEDS:  acetaminophen     Tablet .. 650 milliGRAM(s) Oral every 6 hours PRN  aluminum hydroxide/magnesium hydroxide/simethicone Suspension 30 milliLiter(s) Oral every 4 hours PRN  collagenase Ointment 1 Application(s) Topical daily  Dakins Solution - 1/2 Strength 1 Application(s) Topical daily  dexAMETHasone  Injectable 4 milliGRAM(s) IV Push daily  dextrose 5%. 1000 milliLiter(s) IV Continuous <Continuous>  dextrose 5%. 1000 milliLiter(s) IV Continuous <Continuous>  dextrose 50% Injectable 25 Gram(s) IV Push once  dextrose 50% Injectable 12.5 Gram(s) IV Push once  dextrose 50% Injectable 25 Gram(s) IV Push once  dextrose Oral Gel 15 Gram(s) Oral once PRN  glucagon  Injectable 1 milliGRAM(s) IntraMuscular once  heparin   Injectable 5000 Unit(s) SubCutaneous every 12 hours  insulin lispro (ADMELOG) corrective regimen sliding scale   SubCutaneous at bedtime  insulin lispro (ADMELOG) corrective regimen sliding scale   SubCutaneous three times a day before meals  melatonin 3 milliGRAM(s) Oral at bedtime PRN  ondansetron Injectable 4 milliGRAM(s) IV Push every 8 hours PRN  senna 2 Tablet(s) Oral at bedtime  sodium chloride 0.9%. 250 milliLiter(s) IV Continuous <Continuous>  vitamin A & D Ointment 1 Application(s) Topical three times a day      Vital Signs Last 24 Hrs  T(C): 36.9 (09 Dec 2024 12:17), Max: 37.1 (08 Dec 2024 17:17)  T(F): 98.4 (09 Dec 2024 12:17), Max: 98.7 (08 Dec 2024 17:17)  HR: 90 (09 Dec 2024 12:17) (84 - 90)  BP: 121/68 (09 Dec 2024 12:17) (121/68 - 134/78)  BP(mean): --  RR: 18 (09 Dec 2024 12:17) (17 - 18)  SpO2: 96% (09 Dec 2024 12:17) (95% - 96%)    Parameters below as of 09 Dec 2024 12:17  Patient On (Oxygen Delivery Method): room air        Physical Exam:  General:    weak looking, frail elderly contracted female  Head: atraumatic, normocephalic, bitemporal wasting   Eyes: normal sclera and conjunctiva  ENT:   no oropharyngeal lesions, no LAD, neck supple  Cardio:    regular S1,S2  Respiratory:   No wheezing, No rhonchi  abd:   soft, BS +, not tender, no distention  :     no suprapubic tenderness, has amezquita and urine draining   Musculoskeletal : contracted, multiple decubiti with vac dressing is on   Skin:    no rash, PIV ok  Neurologic:    awake, able to answer simple questions  psych: normal affect, calm    WBC Count: 14.36 K/uL (12-05 @ 07:30)  WBC Count: 16.32 K/uL (12-04 @ 06:54)  WBC Count: 9.19 K/uL (12-03 @ 07:45)      Creatinine Trend: 0.17<--, 0.19<--, 0.16<--, 0.22<--, 0.20<--, 0.27<--      MICROBIOLOGY:  v  .Abscess  11-22-24   Few Escherichia coli ESBL  Rare Enterococcus faecalis  Rare Streptococcus mitis/oralis group "Susceptibilities not performed"  --  Escherichia coli ESBL  Enterococcus faecalis      .Blood BLOOD  11-21-24   No growth at 5 days  --  --      .Blood BLOOD  11-21-24   No growth at 5 days  --  --      Clean Catch  11-20-24   >100,000 CFU/ml Escherichia coli ESBL  <10,000 CFU/ml Normal Urogenital korey present  --  Escherichia coli ESBL      Clean Catch  11-20-24   >100,000 CFU/ml Klebsiella pneumoniae (Carbapenem Resistant)  >100,000 CFU/ml Escherichia coli ESBL  --  Klebsiella pneumoniae (Carbapenem Resistant)  Escherichia coli ESBL          Rapid RVP Result: Detected (12-04 @ 11:00)              D-Dimer Assay, Quantitative: 1115 (12-05)        SARS-CoV-2: Detected (04 Dec 2024 11:00)    RADIOLOGY:     SPENCER MIXON  MRN-[id number]  79y (1945)    Follow Up:  multiple wounds, covid 19    Interval History: The pt was seen and examined earlier, not in acute distress, resting in her bed, rousable, denies discomfort, pt's aid is present. Pt is afebrile, no new lab work.     PAST MEDICAL & SURGICAL HISTORY:  Osteoporosis      Uterine polyp      Herniated disc  lumbar      Functional quadriplegia secondary to multiple sclerosis      Bilateral Tubal ligation  46y/o          ROS:  limited, pt denies any sob, denies pain   [ ] Unobtainable because:  [x ] All other systems negative    Constitutional: no fever, no chills  Head: no trauma  Eyes: no vision changes, no eye pain  ENT:  no sore throat, no rhinorrhea  Cardiovascular:  no chest pain, no palpitation  Respiratory:  no SOB, no cough  GI:  no abd pain, no vomiting, no diarrhea  urinary: no dysuria, no hematuria, no flank pain  musculoskeletal:  no joint pain, no joint swelling  skin:  no rash  neurology:  no headache, no seizure, no change in mental status  psych: no anxiety, no depression         Allergies  No Known Allergies        ANTIMICROBIALS:  amoxicillin  875 milliGRAM(s)/clavulanate 1 two times a day      OTHER MEDS:  acetaminophen     Tablet .. 650 milliGRAM(s) Oral every 6 hours PRN  aluminum hydroxide/magnesium hydroxide/simethicone Suspension 30 milliLiter(s) Oral every 4 hours PRN  collagenase Ointment 1 Application(s) Topical daily  Dakins Solution - 1/2 Strength 1 Application(s) Topical daily  dexAMETHasone  Injectable 4 milliGRAM(s) IV Push daily  dextrose 5%. 1000 milliLiter(s) IV Continuous <Continuous>  dextrose 5%. 1000 milliLiter(s) IV Continuous <Continuous>  dextrose 50% Injectable 25 Gram(s) IV Push once  dextrose 50% Injectable 12.5 Gram(s) IV Push once  dextrose 50% Injectable 25 Gram(s) IV Push once  dextrose Oral Gel 15 Gram(s) Oral once PRN  glucagon  Injectable 1 milliGRAM(s) IntraMuscular once  heparin   Injectable 5000 Unit(s) SubCutaneous every 12 hours  insulin lispro (ADMELOG) corrective regimen sliding scale   SubCutaneous at bedtime  insulin lispro (ADMELOG) corrective regimen sliding scale   SubCutaneous three times a day before meals  melatonin 3 milliGRAM(s) Oral at bedtime PRN  ondansetron Injectable 4 milliGRAM(s) IV Push every 8 hours PRN  senna 2 Tablet(s) Oral at bedtime  sodium chloride 0.9%. 250 milliLiter(s) IV Continuous <Continuous>  vitamin A & D Ointment 1 Application(s) Topical three times a day      Vital Signs Last 24 Hrs  T(C): 36.9 (09 Dec 2024 12:17), Max: 37.1 (08 Dec 2024 17:17)  T(F): 98.4 (09 Dec 2024 12:17), Max: 98.7 (08 Dec 2024 17:17)  HR: 90 (09 Dec 2024 12:17) (84 - 90)  BP: 121/68 (09 Dec 2024 12:17) (121/68 - 134/78)  BP(mean): --  RR: 18 (09 Dec 2024 12:17) (17 - 18)  SpO2: 96% (09 Dec 2024 12:17) (95% - 96%)    Parameters below as of 09 Dec 2024 12:17  Patient On (Oxygen Delivery Method): room air        Physical Exam:  General:    weak looking, frail elderly contracted female  Head: atraumatic, normocephalic, bitemporal wasting   Eyes: normal sclera and conjunctiva  ENT:   no oropharyngeal lesions, no LAD, neck supple  Cardio:    regular S1,S2  Respiratory:   No wheezing, No rhonchi  abd:   soft, BS +, not tender, no distention  :     no suprapubic tenderness, has amezquita and urine draining   Musculoskeletal : contracted, multiple decubiti with vac dressing is on   Skin:    no rash, PIV ok  Neurologic:    awake, able to answer simple questions  psych: normal affect, calm    WBC Count: 14.36 K/uL (12-05 @ 07:30)  WBC Count: 16.32 K/uL (12-04 @ 06:54)  WBC Count: 9.19 K/uL (12-03 @ 07:45)      Creatinine Trend: 0.17<--, 0.19<--, 0.16<--, 0.22<--, 0.20<--, 0.27<--      MICROBIOLOGY:    .Abscess  11-22-24   Few Escherichia coli ESBL  Rare Enterococcus faecalis  Rare Streptococcus mitis/oralis group "Susceptibilities not performed"  --  Escherichia coli ESBL  Enterococcus faecalis      .Blood BLOOD  11-21-24   No growth at 5 days  --  --      .Blood BLOOD  11-21-24   No growth at 5 days  --  --      Clean Catch  11-20-24   >100,000 CFU/ml Escherichia coli ESBL  <10,000 CFU/ml Normal Urogenital korey present  --  Escherichia coli ESBL      Clean Catch  11-20-24   >100,000 CFU/ml Klebsiella pneumoniae (Carbapenem Resistant)  >100,000 CFU/ml Escherichia coli ESBL  --  Klebsiella pneumoniae (Carbapenem Resistant)  Escherichia coli ESBL          Rapid RVP Result: Detected (12-04 @ 11:00)              D-Dimer Assay, Quantitative: 1115 (12-05)        SARS-CoV-2: Detected (04 Dec 2024 11:00)    RADIOLOGY:

## 2024-12-09 NOTE — PROGRESS NOTE ADULT - ASSESSMENT
79 F pmh osteoporosis, MS, bedbound. infected decubitus ulcers presenting to the ED for reduced appetite x 2-3 days. Son states that home health aide noted that patient has not been eating. Workup with UTI. Also noted hyponatremia to 128 .    Assessment and Plan:     #COVID + 12/4   continue with remdesavir day 5 and decadron day 5   currently doing well on room air     # Acute UTI.   ·  Plan: FU Ucx-- kleb carbapenem resistant / ecoli esbl  ID following, s/p meropenem- completed   12/4 amezquita was changed. replace every 2-3 weeks      #Functional quadriplegia secondary to multiple sclerosis.   bedbound    supportive care  frequent repositioning to prevent pressure wounds      #Decubitus ulcers.   ·  Plan: multiple ulcers unstageable s/p debridement 11/22  Dr. Whitehead and wound care input   IV meropenem and vanc as per ID stopped   now on augmentin for 10 days total   Wound vac in place.     #Acute on chronic anemia.   ·  Plan: Transfuse if repeat hgb <7.  H/H stable   no source of bleed     #Preventative Measures   Heparin SQ--dvt ppx  fall, aspiration precautions   HOBE   FULL CODE   palliative following     Dispo: discharge home once wound Vac delivered. Spoke w/ CM- still waiting

## 2024-12-09 NOTE — PROGRESS NOTE ADULT - NS ATTEND AMEND GEN_ALL_CORE FT
All labs and cultures and imaging and pertinent chart notes reviewed by me.    case d/w Np Crystal at length and agree with her assessment and plan.    wound care as per wound team.    complete augmentin therapy.  completed covid-19 treatment.    Dylan Escobar MD  Infectious Disease Attending    for any questions please do not hesitate to contact me either via teams or by calling [phone]
All labs and cultures and imaging and pertinent chart notes reviewed by me.    case d/w Np Crystal at length and agree with her assessment and plan.    Dylan Escobar MD  Infectious Disease Attending    for any questions please do not hesitate to contact me either via teams or by calling [phone]
All labs and cultures and imaging and pertinent chart notes reviewed by me.    case d/w Np Crystal at length and agree with her assessment and plan.    tested positive for Covid-19 yesterday and was started on remdesivir and decadron by medicine team.  patient on NC  advise total of 5 days of remdesivir.  monitor O2 sat.    d/c IV abx today.  start po augementin for the multiple decubiti.  has wound va con    needs close wound follow up .  monitor aspiration precautions.      Dylan Escobar MD  Infectious Disease Attending    for any questions please do not hesitate to contact me either via teams or by calling [phone]
All labs and cultures and imaging and pertinent chart notes reviewed by me.    case d/w Np Crystal at length and agree with her assessment and plan.      multiple infected deep decubiti , most likely OM  CAUTI from pre-admission  +covid 19    plan-  s/p 11 days course of Meropenem IV - stopped on 12/5  s/p 10 days of Vancomycin   Amezquita exchanged on 12/4 as per  RN, if amezquita is needed please make sure it gets changed every 2-3 weeks   continue po Augmentin today for 10 days (day #2)  patient needs very close wound care to help prevent future infections as her decubiti are very deep and patient  is bedbound.  s/p debridement by wound care on 11/22/2024, s/p wound vac application - changed today   wound vac per PT  frequent offloading and turning   monitor for aspiration precautions   + COVID 19 - continue remdesivir and decadron for 5 days  wean off supplemental O2 as able      Dylan Escobar MD  Infectious Disease Attending    for any questions please do not hesitate to contact me either via teams or by calling [phone]
All labs and cultures and imaging and pertinent chart notes reviewed by me.    case d/w Np Crystal at length and agree with her assessment and plan.    Dylan Escobar MD  Infectious Disease Attending    for any questions please do not hesitate to contact me either via teams or by calling [phone]
All labs and cultures and imaging and pertinent chart notes reviewed by me.    case d/w Np Crystal at length and agree with her assessment and plan.    s/p decubiti ebridement by vascular/wound team on 11/22-  wounds cx-  wound cx-ESBL, e.feaclis, strep Mitis from 11/22 11/25  no fevers since 11/21  13K leukocytosis  UC #1 grew Klebsiella and ESBL E. Coli  UC #2 grew ESBL E. Coli  BCs x 2 NGTD x 2  wound cx-ESBL e.coli , e.feaclis, strep Mitis from 11/22       Impression-  multiple infected deep decubiti , most likely OM  CAUTI from pre-admission      Plan-  I d/c Zosyn and  changed to Meropenem IV over the weekend (day #1) to cover the ESBL e.coli in wound cx and urien   patient has low albumin and hence opted for meropenem instead of ertapenem.  continue vanco for e.fecalis   keep vanco trough <15, -600  s/p debridement by wound care on 11/22/2024  duration of ABX TBD pending clinical response.  frequent offloading   if amezquita is needed please make sure it gets changed every 2 weeks .  monitor for aspiration precautions as well      Dylan Escobar MD  Infectious Disease Attending    for any questions please do not hesitate to contact me either via teams or by calling [phone]

## 2024-12-10 ENCOUNTER — TRANSCRIPTION ENCOUNTER (OUTPATIENT)
Age: 79
End: 2024-12-10

## 2024-12-10 LAB
GLUCOSE BLDC GLUCOMTR-MCNC: 150 MG/DL — HIGH (ref 70–99)
GLUCOSE BLDC GLUCOMTR-MCNC: 179 MG/DL — HIGH (ref 70–99)
GLUCOSE BLDC GLUCOMTR-MCNC: 227 MG/DL — HIGH (ref 70–99)
GLUCOSE BLDC GLUCOMTR-MCNC: 244 MG/DL — HIGH (ref 70–99)
GLUCOSE BLDC GLUCOMTR-MCNC: 295 MG/DL — HIGH (ref 70–99)

## 2024-12-10 PROCEDURE — ZZZZZ: CPT

## 2024-12-10 RX ORDER — AMOXICILLIN/POTASSIUM CLAV 250-125 MG
1 TABLET ORAL
Qty: 0 | Refills: 0 | DISCHARGE
Start: 2024-12-10

## 2024-12-10 RX ORDER — VITAMIN A
1 CREAM (GRAM) TOPICAL
Qty: 0 | Refills: 0 | DISCHARGE
Start: 2024-12-10

## 2024-12-10 RX ORDER — AMOXICILLIN/POTASSIUM CLAV 250-125 MG
1 TABLET ORAL
Qty: 10 | Refills: 0
Start: 2024-12-10 | End: 2024-12-14

## 2024-12-10 RX ORDER — SENNOSIDES 8.6 MG
2 TABLET ORAL
Qty: 0 | Refills: 0 | DISCHARGE
Start: 2024-12-10

## 2024-12-10 RX ADMIN — Medication 5000 UNIT(S): at 06:32

## 2024-12-10 RX ADMIN — DEXAMETHASONE 4 MILLIGRAM(S): 1.5 TABLET ORAL at 06:32

## 2024-12-10 RX ADMIN — Medication 2: at 12:11

## 2024-12-10 RX ADMIN — Medication 1 APPLICATION(S): at 14:02

## 2024-12-10 RX ADMIN — Medication 1 APPLICATION(S): at 22:12

## 2024-12-10 RX ADMIN — Medication 1 APPLICATION(S): at 12:13

## 2024-12-10 RX ADMIN — Medication 1 TABLET(S): at 18:19

## 2024-12-10 RX ADMIN — Medication 5000 UNIT(S): at 18:15

## 2024-12-10 RX ADMIN — Medication 1 APPLICATION(S): at 06:32

## 2024-12-10 RX ADMIN — ACETAMINOPHEN 500MG 650 MILLIGRAM(S): 500 TABLET, COATED ORAL at 23:13

## 2024-12-10 RX ADMIN — Medication 1 TABLET(S): at 06:32

## 2024-12-10 RX ADMIN — ACETAMINOPHEN 500MG 650 MILLIGRAM(S): 500 TABLET, COATED ORAL at 22:13

## 2024-12-10 RX ADMIN — COLLAGENASE SANTYL 1 APPLICATION(S): 250 OINTMENT TOPICAL at 12:13

## 2024-12-10 RX ADMIN — Medication 2 TABLET(S): at 22:13

## 2024-12-10 RX ADMIN — Medication 3: at 18:15

## 2024-12-10 NOTE — DISCHARGE NOTE PROVIDER - NSDCCPCAREPLAN_GEN_ALL_CORE_FT
PRINCIPAL DISCHARGE DIAGNOSIS  Diagnosis: Adult failure to thrive  Assessment and Plan of Treatment:       SECONDARY DISCHARGE DIAGNOSES  Diagnosis: Decubitus ulcers  Assessment and Plan of Treatment: Wound Vac Therapy:  Sponge: Black Granufoam  Pressure settinmmHg  Continuous Therapy    Diagnosis: Acute UTI  Assessment and Plan of Treatment: Landeros in place. To change every 2-3 weeks    Diagnosis: Acute on chronic anemia  Assessment and Plan of Treatment:     Diagnosis: Functional quadriplegia secondary to multiple sclerosis  Assessment and Plan of Treatment:

## 2024-12-10 NOTE — DISCHARGE NOTE PROVIDER - HOSPITAL COURSE
79 F pmh osteoporosis, MS, bedbound, infected decubitus ulcers presenting to the ED for reduced appetite x 2-3 days. Son states that home health aide noted that patient has not been eating. Patient without fever. Amezquita catheter changed last week. Pt denies any symptoms only saying she doesn't feel thirsty or hungry.    #COVID + 12/4   completed 5 day course of remdesavir and decadron  currently doing well on room air     # Acute UTI.   ·  Plan: FU Ucx-- kleb carbapenem resistant / ecoli esbl  ID following, s/p meropenem- completed   12/4 amezquita was changed. replace every 2-3 weeks     #Functional quadriplegia secondary to multiple sclerosis.   bedbound, frequent repositioning to prevent pressure wounds   supportive care    #Decubitus ulcers.   ·  Plan: multiple ulcers unstageable s/p debridement 11/22  Dr. Whitehead and wound care input   now on augmentin for 10 days total   Wound vac in place.    #Acute on chronic anemia.   ·  Plan: H/H stable, no source of bleed       Patient medically stable for discharge with homecare per discussion with attending    Wound Care:     bilateral hip daily dressing  -cleanse with dakins 1/4 strength   -pack with iodoform packing  -place allevyn dressing     Sacrum, Stage IV Pressure Injury; Lumbar Spine, Stage IV Pressure Injury; L PSIS, Stage IV Pressure Injury;   1. Clean with normal saline.   2. Apply cavilon to periwound.  3. Fill coiled hydrofiber in areas of undermining and depth, cover with hydrofiber.   4. Cover with foam.  5. Change every other day or if soiled/compromised.     L Rib Cage, Stage IV Pressure Injury; R Rib Cage, Stage IV Pressure Injury;   1. Clean with normal saline.   2. Apply cavilon to periwound.  3. Cover areas of necrotic tissue with nickel thick layer of collagenese- 1 tube per area (total 5 tubes).  4. Fill coiled hydrofiber in areas of undermining and depth, cover with hydrofiber.   5. Cover with foam.  6. Change daily or if soiled/compromised.    R Medial 1st Metarsal, Unstageble Pressure Injury;   1. Clean with normal saline.   2. Apply cavilon to periwound.  3. Cover areas of necrotic tissue with nickel thick layer of collagenese- 1 tube.  4. Cover with hydrofiber.   5. Cover with gauze, wrap with kerlix and secure with tegaderm.  6. Change daily or if soiled/compromised.    R Posterior Aspect of Humeral Head, Stage II Pressure Injury; R Ear, Stage IV Pressure Injury;   1. Clean with normal saline.  2. Apply cavilon to periwound.  3. Cover with hydrofiber.  4. Cover with foam.  5. Change every other day or if soiled/compromised.     L Lateral Knee, Unstageable Pressure Injury (stable eschar)  1. Paint with betadine.  2. Leave open to air.    L Medial Malleolus, Suspected DTI; R Heel, Suspected DTI;   Offload, CAIR Boots       79 F pmh osteoporosis, MS, bedbound, infected decubitus ulcers presenting to the ED for reduced appetite x 2-3 days. Son states that home health aide noted that patient has not been eating. Patient without fever. Amezquita catheter changed last week. Pt denies any symptoms only saying she doesn't feel thirsty or hungry.    #COVID + 12/4   completed 5 day course of remdesavir and decadron  currently doing well on room air     # Acute UTI.   ·  Plan: FU Ucx-- kleb carbapenem resistant / ecoli esbl  ID following, s/p meropenem- completed   12/4 amezquita was changed. replace every 2-3 weeks     #Functional quadriplegia secondary to multiple sclerosis.   bedbound, frequent repositioning to prevent pressure wounds   supportive care    #Decubitus ulcers.   ·  Plan: multiple ulcers unstageable s/p debridement 11/22  Dr. Whitehead and wound care input   now on augmentin for 10 days total   Wound vac in place.    #Acute on chronic anemia.   ·  Plan: H/H stable, no source of bleed       Patient medically stable for discharge with homecare per discussion with attending    Wound Care:     bilateral hip daily dressing  -cleanse with dakins 1/4 strength   -pack with iodoform packing  -place allevyn dressing     Sacrum, Stage IV Pressure Injury; Lumbar Spine, Stage IV Pressure Injury; L PSIS, Stage IV Pressure Injury;   1. Clean with normal saline.   2. Apply cavilon to periwound.  3. Fill coiled hydrofiber in areas of undermining and depth, cover with hydrofiber.   4. Cover with foam.  5. Change every other day or if soiled/compromised.     L Rib Cage, Stage IV Pressure Injury; R Rib Cage, Stage IV Pressure Injury;   1. Clean with normal saline.   2. Apply cavilon to periwound.  3. Cover areas of necrotic tissue with nickel thick layer of collagenese- 1 tube per area (total 5 tubes).  4. Fill coiled hydrofiber in areas of undermining and depth, cover with hydrofiber.   5. Cover with foam.  6. Change daily or if soiled/compromised.    R Medial 1st Metarsal, Unstageble Pressure Injury;   1. Clean with normal saline.   2. Apply cavilon to periwound.  3. Cover areas of necrotic tissue with nickel thick layer of collagenese- 1 tube.  4. Cover with hydrofiber.   5. Cover with gauze, wrap with kerlix and secure with tegaderm.  6. Change daily or if soiled/compromised.    R Posterior Aspect of Humeral Head, Stage II Pressure Injury; R Ear, Stage IV Pressure Injury;   1. Clean with normal saline.  2. Apply cavilon to periwound.  3. Cover with hydrofiber.  4. Cover with foam.  5. Change every other day or if soiled/compromised.     L Lateral Knee, Unstageable Pressure Injury (stable eschar)  1. Paint with betadine.  2. Leave open to air.    L Medial Malleolus, Suspected DTI; R Heel, Suspected DTI;   Offload, CAIR Boots      Patient seen and evaluated. DC home with home care.     DC time 37 mins

## 2024-12-10 NOTE — DISCHARGE NOTE PROVIDER - NSDCMRMEDTOKEN_GEN_ALL_CORE_FT
amoxicillin-clavulanate 500 mg-125 mg oral tablet: 500 milligram(s) orally 2 times a day  doxycycline hyclate 100 mg oral capsule: 1 cap(s) orally 2 times a day  senna leaf extract oral tablet: 2 tab(s) orally once a day (at bedtime)   amoxicillin-clavulanate 875 mg-125 mg oral tablet: 1 tab(s) orally 2 times a day end date 12/15/24  senna leaf extract oral tablet: 2 tab(s) orally once a day (at bedtime)  vitamin A and D topical ointment: 1 Apply topically to affected area 3 times a day

## 2024-12-10 NOTE — DISCHARGE NOTE PROVIDER - NSDCFUADDAPPT_GEN_ALL_CORE_FT
APPTS ARE READY TO BE MADE: [X] YES    Best Family or Patient Contact (if needed):    Additional Information about above appointments (if needed):    1: PCP  2:   3:     Other comments or requests:    APPTS ARE READY TO BE MADE: [X] YES    Best Family or Patient Contact (if needed):    Additional Information about above appointments (if needed):    1: PCP  2:   3:   Family member answered on behalf of the patient and advised they will pass forward our details for the patient to return our call.     Other comments or requests:

## 2024-12-10 NOTE — DISCHARGE NOTE PROVIDER - INSTRUCTIONS
Supplement Feeding Modality:  Oral  Glucerna Shake Cans or Servings Per Day:  1       Frequency:  Three Times a day

## 2024-12-11 LAB
GLUCOSE BLDC GLUCOMTR-MCNC: 191 MG/DL — HIGH (ref 70–99)
GLUCOSE BLDC GLUCOMTR-MCNC: 201 MG/DL — HIGH (ref 70–99)
GLUCOSE BLDC GLUCOMTR-MCNC: 209 MG/DL — HIGH (ref 70–99)
GLUCOSE BLDC GLUCOMTR-MCNC: 296 MG/DL — HIGH (ref 70–99)

## 2024-12-11 PROCEDURE — 99232 SBSQ HOSP IP/OBS MODERATE 35: CPT

## 2024-12-11 RX ADMIN — ACETAMINOPHEN 500MG 650 MILLIGRAM(S): 500 TABLET, COATED ORAL at 16:58

## 2024-12-11 RX ADMIN — ACETAMINOPHEN 500MG 650 MILLIGRAM(S): 500 TABLET, COATED ORAL at 07:00

## 2024-12-11 RX ADMIN — Medication 5000 UNIT(S): at 18:31

## 2024-12-11 RX ADMIN — Medication 1 TABLET(S): at 06:07

## 2024-12-11 RX ADMIN — COLLAGENASE SANTYL 1 APPLICATION(S): 250 OINTMENT TOPICAL at 12:12

## 2024-12-11 RX ADMIN — Medication 1 TABLET(S): at 18:31

## 2024-12-11 RX ADMIN — Medication 1 APPLICATION(S): at 13:30

## 2024-12-11 RX ADMIN — Medication 1: at 08:36

## 2024-12-11 RX ADMIN — Medication 3: at 16:58

## 2024-12-11 RX ADMIN — ACETAMINOPHEN 500MG 650 MILLIGRAM(S): 500 TABLET, COATED ORAL at 17:38

## 2024-12-11 RX ADMIN — Medication 5000 UNIT(S): at 06:08

## 2024-12-11 RX ADMIN — Medication 2 TABLET(S): at 23:04

## 2024-12-11 RX ADMIN — ACETAMINOPHEN 500MG 650 MILLIGRAM(S): 500 TABLET, COATED ORAL at 06:08

## 2024-12-11 RX ADMIN — Medication 1 APPLICATION(S): at 23:04

## 2024-12-11 RX ADMIN — DEXAMETHASONE 4 MILLIGRAM(S): 1.5 TABLET ORAL at 06:08

## 2024-12-11 RX ADMIN — Medication 1 APPLICATION(S): at 06:07

## 2024-12-11 RX ADMIN — Medication 2: at 12:12

## 2024-12-11 NOTE — CHART NOTE - NSCHARTNOTEFT_GEN_A_CORE
Per chart pt with PMH of MS, bedbound, infected decubitus ulcers, osteoporosis, chronic anemia. Pt presented for reduced appetite x several days PTA; admitted with UTI, FTT(possibly 2/2 infection), hyponatremia. Also COVID+ (on Remdesivir and Decadron; on COVID isolation; now on room air). Discharge planning in progress.    Factors impacting intake: [x] persistent lack of appetite; dysphagia; self-feed difficulty    Diet Prescription: Diet, Consistent Carbohydrate w/Evening Snack:   Pureed (PUREED)  Supplement Feeding Modality:  Oral  Glucerna Shake Cans or Servings Per Day:  1       Frequency:  Three Times a day (11-21-24 @ 11:27)    Intake: mostly 0-50% for documented meals as per flow sheets; mostly % for documented supplements as per flow sheets; requires total feeding assistance    Current Weight: 40.3kg(12/11), 68 kg(11/20), 43.1kg(11/19) - wt from 11/20 appears to be an error  % Weight Change: 6.5% decrease x 3 weeks  1+ edema b/l feet as per flow sheets(12/01)    Physical appearance: Unable to perform nutrition-focused physical exam due to pt sleeping at time of visit    Pertinent Medications: MEDICATIONS  (STANDING):  amoxicillin  875 milliGRAM(s)/clavulanate 1 Tablet(s) Oral two times a day  collagenase Ointment 1 Application(s) Topical daily  Dakins Solution - 1/2 Strength 1 Application(s) Topical daily  dexAMETHasone  Injectable 4 milliGRAM(s) IV Push daily  dextrose 5%. 1000 milliLiter(s) (100 mL/Hr) IV Continuous <Continuous>  dextrose 5%. 1000 milliLiter(s) (50 mL/Hr) IV Continuous <Continuous>  dextrose 50% Injectable 25 Gram(s) IV Push once  dextrose 50% Injectable 12.5 Gram(s) IV Push once  dextrose 50% Injectable 25 Gram(s) IV Push once  glucagon  Injectable 1 milliGRAM(s) IntraMuscular once  heparin   Injectable 5000 Unit(s) SubCutaneous every 12 hours  insulin lispro (ADMELOG) corrective regimen sliding scale   SubCutaneous at bedtime  insulin lispro (ADMELOG) corrective regimen sliding scale   SubCutaneous three times a day before meals  senna 2 Tablet(s) Oral at bedtime  sodium chloride 0.9%. 250 milliLiter(s) (250 mL/Hr) IV Continuous <Continuous>  vitamin A & D Ointment 1 Application(s) Topical three times a day    MEDICATIONS  (PRN):  acetaminophen     Tablet .. 650 milliGRAM(s) Oral every 6 hours PRN Temp greater or equal to 38C (100.4F), Mild Pain (1 - 3)  aluminum hydroxide/magnesium hydroxide/simethicone Suspension 30 milliLiter(s) Oral every 4 hours PRN Dyspepsia  dextrose Oral Gel 15 Gram(s) Oral once PRN Blood Glucose LESS THAN 70 milliGRAM(s)/deciliter  melatonin 3 milliGRAM(s) Oral at bedtime PRN Insomnia  ondansetron Injectable 4 milliGRAM(s) IV Push every 8 hours PRN Nausea and/or Vomiting    Pertinent Labs:  12-05 Alb 1.1 g/dL[L]  11-21 HgbA1c 7.0%    POCT Blood Glucose.: 209 mg/dL (11 Dec 2024 11:32)  POCT Blood Glucose.: 191 mg/dL (11 Dec 2024 08:05)  POCT Blood Glucose.: 179 mg/dL (10 Dec 2024 22:09)  POCT Blood Glucose.: 295 mg/dL (10 Dec 2024 18:04)  POCT Blood Glucose.: 244 mg/dL (10 Dec 2024 16:39)    Skin: Pt with multiple stage II or > pressure ulcers x 10    Estimated Needs:   [x] no change since previous assessment on 11/21  [ ] recalculated:     Previous Nutrition Diagnosis:   Severe Malnutrition in the context of chronic illness  Etiology: Increased energy / protein needs related to multiple pressure ulcers  Signs/Symptoms: Pt with severe muscle wasting and fat loss as noted on 11/21    Goal/Expected Outcome: Pt will Meet calorie and protein needs > 50 % via meals / supplements - mostly not met for meals; mostly met for supplements    Nutrition Diagnosis is [x] ongoing  [ ] resolved [ ] not applicable     New Nutrition Diagnosis: [x] not applicable       Interventions:   Recommend  [x] Continue with current diet rx as ordered  [ ] Change Diet To:  [ ] Nutrition Supplement  [ ] Nutrition Support  [x] Other:  1. Monitor and replace electrolytes as needed  2. Continue to provide encouragement and total assistance with PO intake    Monitoring and Evaluation:   [ x ] PO intake [ x ] Tolerance to diet prescription [ x ] weights [ x ] labs (glucose) [ x ] follow up per protocol  [ ] other: Per chart pt with PMH of MS, bedbound, infected decubitus ulcers, osteoporosis, chronic anemia. Pt presented for reduced appetite x several days PTA; admitted with UTI, FTT(possibly 2/2 infection), hyponatremia. Also COVID+ (on Remdesivir and Decadron; on COVID isolation; now on room air). Discharge planning in progress.    Factors impacting intake: [x] persistent lack of appetite; dysphagia; self-feed difficulty    Diet Prescription: Diet, Consistent Carbohydrate w/Evening Snack:   Pureed (PUREED)  Supplement Feeding Modality:  Oral  Glucerna Shake Cans or Servings Per Day:  1       Frequency:  Three Times a day (11-21-24 @ 11:27)    Intake: mostly 0-50% for documented meals as per flow sheets; mostly % for documented supplements as per flow sheets; requires total feeding assistance    Current Weight: 40.3kg(12/11), 68 kg(11/20), 43.1kg(11/19) - wt from 11/20 appears to be an error  % Weight Change: 6.5% decrease x 3 weeks  1+ edema b/l feet as per flow sheets(12/01)    Physical appearance: Unable to perform nutrition-focused physical exam due to pt sleeping at time of visit    Pertinent Medications: MEDICATIONS  (STANDING):  amoxicillin  875 milliGRAM(s)/clavulanate 1 Tablet(s) Oral two times a day  collagenase Ointment 1 Application(s) Topical daily  Dakins Solution - 1/2 Strength 1 Application(s) Topical daily  dexAMETHasone  Injectable 4 milliGRAM(s) IV Push daily  dextrose 5%. 1000 milliLiter(s) (100 mL/Hr) IV Continuous <Continuous>  dextrose 5%. 1000 milliLiter(s) (50 mL/Hr) IV Continuous <Continuous>  dextrose 50% Injectable 25 Gram(s) IV Push once  dextrose 50% Injectable 12.5 Gram(s) IV Push once  dextrose 50% Injectable 25 Gram(s) IV Push once  glucagon  Injectable 1 milliGRAM(s) IntraMuscular once  heparin   Injectable 5000 Unit(s) SubCutaneous every 12 hours  insulin lispro (ADMELOG) corrective regimen sliding scale   SubCutaneous at bedtime  insulin lispro (ADMELOG) corrective regimen sliding scale   SubCutaneous three times a day before meals  senna 2 Tablet(s) Oral at bedtime  sodium chloride 0.9%. 250 milliLiter(s) (250 mL/Hr) IV Continuous <Continuous>  vitamin A & D Ointment 1 Application(s) Topical three times a day    MEDICATIONS  (PRN):  acetaminophen     Tablet .. 650 milliGRAM(s) Oral every 6 hours PRN Temp greater or equal to 38C (100.4F), Mild Pain (1 - 3)  aluminum hydroxide/magnesium hydroxide/simethicone Suspension 30 milliLiter(s) Oral every 4 hours PRN Dyspepsia  dextrose Oral Gel 15 Gram(s) Oral once PRN Blood Glucose LESS THAN 70 milliGRAM(s)/deciliter  melatonin 3 milliGRAM(s) Oral at bedtime PRN Insomnia  ondansetron Injectable 4 milliGRAM(s) IV Push every 8 hours PRN Nausea and/or Vomiting    Pertinent Labs:  12-05 Alb 1.1 g/dL[L]  11-21 HgbA1c 7.0%    POCT Blood Glucose.: 209 mg/dL (11 Dec 2024 11:32)  POCT Blood Glucose.: 191 mg/dL (11 Dec 2024 08:05)  POCT Blood Glucose.: 179 mg/dL (10 Dec 2024 22:09)  POCT Blood Glucose.: 295 mg/dL (10 Dec 2024 18:04)  POCT Blood Glucose.: 244 mg/dL (10 Dec 2024 16:39)    Skin: Pt with multiple stage II or > pressure ulcers x 10    Estimated Needs:   [x] no change since previous assessment on 11/21  [ ] recalculated:     Previous Nutrition Diagnosis:   Severe Malnutrition in the context of chronic illness  Etiology(Updated 12/11): Inadequate protein-energy intake + increased needs related to MS + multiple pressure ulcers  Signs/Symptoms(Updated): Pt with severe muscle wasting and fat loss as noted on 11/21    Goal/Expected Outcome: Pt will Meet calorie and protein needs > 50 % via meals / supplements - mostly not met for meals; mostly met for supplements    Nutrition Diagnosis is [x] ongoing  [ ] resolved [ ] not applicable     New Nutrition Diagnosis: [x] not applicable       Interventions:   Recommend  [x] Continue with current diet rx as ordered  [ ] Change Diet To:  [ ] Nutrition Supplement  [ ] Nutrition Support  [x] Other:  1. Monitor and replace electrolytes as needed  2. Continue to provide encouragement and total assistance with PO intake    Monitoring and Evaluation:   [ x ] PO intake [ x ] Tolerance to diet prescription [ x ] weights [ x ] labs (glucose) [ x ] follow up per protocol  [ ] other:

## 2024-12-11 NOTE — PROGRESS NOTE ADULT - ASSESSMENT
79 F pmh osteoporosis, MS, bedbound. infected decubitus ulcers presenting to the ED for reduced appetite x 2-3 days. Son states that home health aide noted that patient has not been eating. Workup with UTI. Also noted hyponatremia to 128 .    Assessment and Plan:     #COVID + 12/4   continue with remdesavir day 5 and decadron day 5   currently doing well on room air     # Acute UTI.   ·  Plan: FU Ucx-- kleb carbapenem resistant / ecoli esbl  ID following, s/p meropenem- completed   12/4 amezquita was changed. replace every 2-3 weeks      #Functional quadriplegia secondary to multiple sclerosis.   bedbound    supportive care  frequent repositioning to prevent pressure wounds      #Decubitus ulcers.   ·  Plan: multiple ulcers unstageable s/p debridement 11/22  Dr. Whitehead and wound care input   IV meropenem and vanc as per ID stopped   now on augmentin for 10 days total   Wound vac in place.     #Acute on chronic anemia.   ·  Plan: Transfuse if repeat hgb <7.  H/H stable   no source of bleed     #Preventative Measures   Heparin SQ--dvt ppx  fall, aspiration precautions   HOBE   FULL CODE   palliative following     Dispo: No accepting home care, [facility] sent for [facility], attempted to call son, DENAE left.

## 2024-12-11 NOTE — PROGRESS NOTE ADULT - SUBJECTIVE AND OBJECTIVE BOX
Patient is a 79y old  Female who presents with a chief complaint of UTI, failure to thrive , hyponatremia. (10 Dec 2024 12:41)      INTERVAL HPI/OVERNIGHT EVENTS: No events.     MEDICATIONS  (STANDING):  amoxicillin  875 milliGRAM(s)/clavulanate 1 Tablet(s) Oral two times a day  collagenase Ointment 1 Application(s) Topical daily  Dakins Solution - 1/2 Strength 1 Application(s) Topical daily  dexAMETHasone  Injectable 4 milliGRAM(s) IV Push daily  dextrose 5%. 1000 milliLiter(s) (100 mL/Hr) IV Continuous <Continuous>  dextrose 5%. 1000 milliLiter(s) (50 mL/Hr) IV Continuous <Continuous>  dextrose 50% Injectable 25 Gram(s) IV Push once  dextrose 50% Injectable 12.5 Gram(s) IV Push once  dextrose 50% Injectable 25 Gram(s) IV Push once  glucagon  Injectable 1 milliGRAM(s) IntraMuscular once  heparin   Injectable 5000 Unit(s) SubCutaneous every 12 hours  insulin lispro (ADMELOG) corrective regimen sliding scale   SubCutaneous at bedtime  insulin lispro (ADMELOG) corrective regimen sliding scale   SubCutaneous three times a day before meals  senna 2 Tablet(s) Oral at bedtime  sodium chloride 0.9%. 250 milliLiter(s) (250 mL/Hr) IV Continuous <Continuous>  vitamin A & D Ointment 1 Application(s) Topical three times a day    MEDICATIONS  (PRN):  acetaminophen     Tablet .. 650 milliGRAM(s) Oral every 6 hours PRN Temp greater or equal to 38C (100.4F), Mild Pain (1 - 3)  aluminum hydroxide/magnesium hydroxide/simethicone Suspension 30 milliLiter(s) Oral every 4 hours PRN Dyspepsia  dextrose Oral Gel 15 Gram(s) Oral once PRN Blood Glucose LESS THAN 70 milliGRAM(s)/deciliter  melatonin 3 milliGRAM(s) Oral at bedtime PRN Insomnia  ondansetron Injectable 4 milliGRAM(s) IV Push every 8 hours PRN Nausea and/or Vomiting      Allergies    No Known Allergies    Intolerances        REVIEW OF SYSTEMS:  Unable to obtain     Vital Signs Last 24 Hrs  T(C): 36.3 (11 Dec 2024 11:47), Max: 36.7 (11 Dec 2024 05:19)  T(F): 97.4 (11 Dec 2024 11:47), Max: 98.1 (11 Dec 2024 05:19)  HR: 83 (11 Dec 2024 11:47) (78 - 92)  BP: 109/69 (11 Dec 2024 11:47) (109/69 - 129/81)  BP(mean): --  RR: 17 (11 Dec 2024 11:47) (17 - 19)  SpO2: 97% (11 Dec 2024 11:47) (96% - 97%)    Parameters below as of 11 Dec 2024 11:47  Patient On (Oxygen Delivery Method): room air        PHYSICAL EXAM:  GENERAL: NAD , no increased WOB  HEAD:  Atraumatic, Normocephalic  EYES: EOMI, PERRLA, conjunctiva and sclera clear  ENMT: No tonsillar erythema, exudates, or enlargement;   NECK: Supple, No JVD  NERVOUS SYSTEM:  sleepy but easily arousable, moving extremities   CHEST/LUNG: CTAB;  No rales, rhonchi, wheezing, or rubs  HEART: Regular rate and rhythm; No murmurs, rubs, or gallops  ABDOMEN: Soft, Nontender, Nondistended; Bowel sounds present  EXTREMITIES:  2+ Peripheral Pulses b/l, No clubbing, cyanosis, calf tenderness or edema b/l   wound vac in place     LABS:              CAPILLARY BLOOD GLUCOSE      POCT Blood Glucose.: 209 mg/dL (11 Dec 2024 11:32)  POCT Blood Glucose.: 191 mg/dL (11 Dec 2024 08:05)  POCT Blood Glucose.: 179 mg/dL (10 Dec 2024 22:09)  POCT Blood Glucose.: 295 mg/dL (10 Dec 2024 18:04)  POCT Blood Glucose.: 244 mg/dL (10 Dec 2024 16:39)      RADIOLOGY & ADDITIONAL TESTS:    Imaging Personally Reviewed:  [ X] YES  [ ] NO    Consultant(s) Notes Reviewed:  [ X] YES  [ ] NO    Care Discussed with Consultants/Other Providers [X ] YES  [ ] NO

## 2024-12-12 LAB
ANION GAP SERPL CALC-SCNC: 6 MMOL/L — SIGNIFICANT CHANGE UP (ref 5–17)
BUN SERPL-MCNC: 16 MG/DL — SIGNIFICANT CHANGE UP (ref 7–23)
CALCIUM SERPL-MCNC: 8.1 MG/DL — LOW (ref 8.5–10.1)
CHLORIDE SERPL-SCNC: 106 MMOL/L — SIGNIFICANT CHANGE UP (ref 96–108)
CO2 SERPL-SCNC: 26 MMOL/L — SIGNIFICANT CHANGE UP (ref 22–31)
CREAT SERPL-MCNC: 0.22 MG/DL — LOW (ref 0.5–1.3)
EGFR: 117 ML/MIN/1.73M2 — SIGNIFICANT CHANGE UP
FLUAV AG NPH QL: SIGNIFICANT CHANGE UP
FLUBV AG NPH QL: SIGNIFICANT CHANGE UP
GLUCOSE BLDC GLUCOMTR-MCNC: 177 MG/DL — HIGH (ref 70–99)
GLUCOSE BLDC GLUCOMTR-MCNC: 214 MG/DL — HIGH (ref 70–99)
GLUCOSE BLDC GLUCOMTR-MCNC: 218 MG/DL — HIGH (ref 70–99)
GLUCOSE BLDC GLUCOMTR-MCNC: 233 MG/DL — HIGH (ref 70–99)
GLUCOSE SERPL-MCNC: 156 MG/DL — HIGH (ref 70–99)
HCT VFR BLD CALC: 29.7 % — LOW (ref 34.5–45)
HGB BLD-MCNC: 9 G/DL — LOW (ref 11.5–15.5)
MCHC RBC-ENTMCNC: 23.6 PG — LOW (ref 27–34)
MCHC RBC-ENTMCNC: 30.3 G/DL — LOW (ref 32–36)
MCV RBC AUTO: 77.7 FL — LOW (ref 80–100)
NRBC # BLD: 0 /100 WBCS — SIGNIFICANT CHANGE UP (ref 0–0)
PLATELET # BLD AUTO: 387 K/UL — SIGNIFICANT CHANGE UP (ref 150–400)
POTASSIUM SERPL-MCNC: 4.4 MMOL/L — SIGNIFICANT CHANGE UP (ref 3.5–5.3)
POTASSIUM SERPL-SCNC: 4.4 MMOL/L — SIGNIFICANT CHANGE UP (ref 3.5–5.3)
RBC # BLD: 3.82 M/UL — SIGNIFICANT CHANGE UP (ref 3.8–5.2)
RBC # FLD: 22.3 % — HIGH (ref 10.3–14.5)
RSV RNA NPH QL NAA+NON-PROBE: SIGNIFICANT CHANGE UP
SARS-COV-2 RNA SPEC QL NAA+PROBE: SIGNIFICANT CHANGE UP
SODIUM SERPL-SCNC: 138 MMOL/L — SIGNIFICANT CHANGE UP (ref 135–145)
WBC # BLD: 16.74 K/UL — HIGH (ref 3.8–10.5)
WBC # FLD AUTO: 16.74 K/UL — HIGH (ref 3.8–10.5)

## 2024-12-12 PROCEDURE — 99232 SBSQ HOSP IP/OBS MODERATE 35: CPT

## 2024-12-12 RX ADMIN — Medication 1 APPLICATION(S): at 21:58

## 2024-12-12 RX ADMIN — Medication 5000 UNIT(S): at 05:57

## 2024-12-12 RX ADMIN — Medication 1: at 07:32

## 2024-12-12 RX ADMIN — Medication 5000 UNIT(S): at 17:24

## 2024-12-12 RX ADMIN — Medication 2 TABLET(S): at 21:57

## 2024-12-12 RX ADMIN — Medication 1 APPLICATION(S): at 13:24

## 2024-12-12 RX ADMIN — Medication 1 TABLET(S): at 05:57

## 2024-12-12 RX ADMIN — Medication 1 APPLICATION(S): at 05:57

## 2024-12-12 RX ADMIN — Medication 1 TABLET(S): at 17:22

## 2024-12-12 RX ADMIN — COLLAGENASE SANTYL 1 APPLICATION(S): 250 OINTMENT TOPICAL at 13:24

## 2024-12-12 RX ADMIN — DEXAMETHASONE 4 MILLIGRAM(S): 1.5 TABLET ORAL at 05:57

## 2024-12-12 RX ADMIN — Medication 2: at 16:31

## 2024-12-12 RX ADMIN — Medication 1 APPLICATION(S): at 14:19

## 2024-12-12 RX ADMIN — Medication 2: at 11:37

## 2024-12-12 NOTE — PROGRESS NOTE ADULT - SUBJECTIVE AND OBJECTIVE BOX
Patient is a 79y old  Female who presents with a chief complaint of UTI, failure to thrive , hyponatremia. (11 Dec 2024 15:01)      INTERVAL HPI/OVERNIGHT EVENTS: Overnight no acute events.     MEDICATIONS  (STANDING):  amoxicillin  875 milliGRAM(s)/clavulanate 1 Tablet(s) Oral two times a day  collagenase Ointment 1 Application(s) Topical daily  Dakins Solution - 1/2 Strength 1 Application(s) Topical daily  dexAMETHasone  Injectable 4 milliGRAM(s) IV Push daily  dextrose 5%. 1000 milliLiter(s) (100 mL/Hr) IV Continuous <Continuous>  dextrose 5%. 1000 milliLiter(s) (50 mL/Hr) IV Continuous <Continuous>  dextrose 50% Injectable 25 Gram(s) IV Push once  dextrose 50% Injectable 12.5 Gram(s) IV Push once  dextrose 50% Injectable 25 Gram(s) IV Push once  glucagon  Injectable 1 milliGRAM(s) IntraMuscular once  heparin   Injectable 5000 Unit(s) SubCutaneous every 12 hours  insulin lispro (ADMELOG) corrective regimen sliding scale   SubCutaneous three times a day before meals  insulin lispro (ADMELOG) corrective regimen sliding scale   SubCutaneous at bedtime  senna 2 Tablet(s) Oral at bedtime  sodium chloride 0.9%. 250 milliLiter(s) (250 mL/Hr) IV Continuous <Continuous>  vitamin A & D Ointment 1 Application(s) Topical three times a day    MEDICATIONS  (PRN):  acetaminophen     Tablet .. 650 milliGRAM(s) Oral every 6 hours PRN Temp greater or equal to 38C (100.4F), Mild Pain (1 - 3)  aluminum hydroxide/magnesium hydroxide/simethicone Suspension 30 milliLiter(s) Oral every 4 hours PRN Dyspepsia  dextrose Oral Gel 15 Gram(s) Oral once PRN Blood Glucose LESS THAN 70 milliGRAM(s)/deciliter  melatonin 3 milliGRAM(s) Oral at bedtime PRN Insomnia  ondansetron Injectable 4 milliGRAM(s) IV Push every 8 hours PRN Nausea and/or Vomiting      Allergies    No Known Allergies    Intolerances        REVIEW OF SYSTEMS:  ROS negative unless stated otherwise.    Vital Signs Last 24 Hrs  T(C): 36.7 (12 Dec 2024 05:24), Max: 36.7 (12 Dec 2024 05:24)  T(F): 98 (12 Dec 2024 05:24), Max: 98 (12 Dec 2024 05:24)  HR: 77 (12 Dec 2024 05:24) (69 - 88)  BP: 132/77 (12 Dec 2024 05:24) (119/71 - 132/77)  BP(mean): --  RR: 18 (12 Dec 2024 05:24) (18 - 18)  SpO2: 98% (12 Dec 2024 05:24) (98% - 98%)    Parameters below as of 12 Dec 2024 05:24  Patient On (Oxygen Delivery Method): room air        PHYSICAL EXAM:  GENERAL: NAD , no increased WOB  HEAD:  Atraumatic, Normocephalic  EYES: EOMI, PERRLA, conjunctiva and sclera clear  ENMT: No tonsillar erythema, exudates, or enlargement;   NECK: Supple, No JVD  NERVOUS SYSTEM:  sleepy but easily arousable, moving extremities   CHEST/LUNG: CTAB;  No rales, rhonchi, wheezing, or rubs  HEART: Regular rate and rhythm; No murmurs, rubs, or gallops  ABDOMEN: Soft, Nontender, Nondistended; Bowel sounds present  EXTREMITIES:  2+ Peripheral Pulses b/l, No clubbing, cyanosis, calf tenderness or edema b/l   wound vac in place     LABS:                        9.0    16.74 )-----------( 387      ( 12 Dec 2024 07:05 )             29.7     12-12    138  |  106  |  16  ----------------------------<  156[H]  4.4   |  26  |  0.22[L]    Ca    8.1[L]      12 Dec 2024 07:05        Urinalysis Basic - ( 12 Dec 2024 07:05 )    Color: x / Appearance: x / SG: x / pH: x  Gluc: 156 mg/dL / Ketone: x  / Bili: x / Urobili: x   Blood: x / Protein: x / Nitrite: x   Leuk Esterase: x / RBC: x / WBC x   Sq Epi: x / Non Sq Epi: x / Bacteria: x      CAPILLARY BLOOD GLUCOSE      POCT Blood Glucose.: 214 mg/dL (12 Dec 2024 11:21)  POCT Blood Glucose.: 177 mg/dL (12 Dec 2024 07:25)  POCT Blood Glucose.: 201 mg/dL (11 Dec 2024 21:13)  POCT Blood Glucose.: 296 mg/dL (11 Dec 2024 16:13)      RADIOLOGY & ADDITIONAL TESTS:    Imaging Personally Reviewed:  [ X] YES  [ ] NO    Consultant(s) Notes Reviewed:  [ X] YES  [ ] NO    Care Discussed with Consultants/Other Providers [X ] YES  [ ] NO

## 2024-12-12 NOTE — PROGRESS NOTE ADULT - PROVIDER SPECIALTY LIST ADULT
Infectious Disease
Nephrology
Hospitalist
Infectious Disease
Infectious Disease
Hospitalist
Infectious Disease
Infectious Disease
Hospitalist

## 2024-12-12 NOTE — PROGRESS NOTE ADULT - NUTRITIONAL ASSESSMENT
This patient has been assessed with a concern for Malnutrition and has been determined to have a diagnosis/diagnoses of Severe protein-calorie malnutrition.    This patient is being managed with:   Diet Consistent Carbohydrate w/Evening Snack-  Pureed (PUREED)  Supplement Feeding Modality:  Oral  Glucerna Shake Cans or Servings Per Day:  1       Frequency:  Three Times a day  Entered: Nov 21 2024 11:27AM  

## 2024-12-12 NOTE — PROGRESS NOTE ADULT - REASON FOR ADMISSION
UTI, failure to thrive , hyponatremia.

## 2024-12-12 NOTE — PROGRESS NOTE ADULT - ASSESSMENT
79 F pmh osteoporosis, MS, bedbound. infected decubitus ulcers presenting to the ED for reduced appetite x 2-3 days. Son states that home health aide noted that patient has not been eating. Workup with UTI. Also noted hyponatremia to 128 .    Assessment and Plan:     #COVID + 12/4   continue with remdesavir day 5 and decadron day 5   currently doing well on room air     # Acute UTI.   ·  Plan: FU Ucx-- kleb carbapenem resistant / ecoli esbl  ID following, s/p meropenem- completed   12/4 amezquita was changed. replace every 2-3 weeks      #Functional quadriplegia secondary to multiple sclerosis.   bedbound    supportive care  frequent repositioning to prevent pressure wounds      #Decubitus ulcers.   ·  Plan: multiple ulcers unstageable s/p debridement 11/22  Dr. Whitehead and wound care input   IV meropenem and vanc as per ID stopped    augmentin for 10 days total   Wound vac in place.     #Acute on chronic anemia.   ·  Plan: Transfuse if repeat hgb <7.  H/H stable   no source of bleed     #Preventative Measures   Heparin SQ--dvt ppx  fall, aspiration precautions   HOBE   FULL CODE   palliative following     Dispo: More referrals for home care sent out, and now have an accepting one. Possibly DC home tomorrow.

## 2024-12-13 VITALS
WEIGHT: 88.63 LBS | SYSTOLIC BLOOD PRESSURE: 147 MMHG | TEMPERATURE: 98 F | RESPIRATION RATE: 18 BRPM | DIASTOLIC BLOOD PRESSURE: 79 MMHG | HEART RATE: 81 BPM | OXYGEN SATURATION: 98 %

## 2024-12-13 LAB
GLUCOSE BLDC GLUCOMTR-MCNC: 197 MG/DL — HIGH (ref 70–99)
GLUCOSE BLDC GLUCOMTR-MCNC: 364 MG/DL — HIGH (ref 70–99)

## 2024-12-13 PROCEDURE — 99239 HOSP IP/OBS DSCHRG MGMT >30: CPT

## 2024-12-13 RX ADMIN — Medication 5000 UNIT(S): at 06:57

## 2024-12-13 RX ADMIN — Medication 1: at 08:23

## 2024-12-13 RX ADMIN — Medication 1 APPLICATION(S): at 06:49

## 2024-12-13 RX ADMIN — Medication 1 APPLICATION(S): at 12:38

## 2024-12-13 RX ADMIN — COLLAGENASE SANTYL 1 APPLICATION(S): 250 OINTMENT TOPICAL at 12:38

## 2024-12-13 RX ADMIN — DEXAMETHASONE 4 MILLIGRAM(S): 1.5 TABLET ORAL at 06:49

## 2024-12-13 RX ADMIN — Medication 5: at 12:39

## 2024-12-13 RX ADMIN — Medication 1 TABLET(S): at 06:50

## 2024-12-16 PROBLEM — G35 MULTIPLE SCLEROSIS: Chronic | Status: ACTIVE | Noted: 2024-11-20

## 2024-12-16 NOTE — H&P ADULT - ASSESSMENT
79-year-old female with PMH osteoporosis, MS, bedbound, who presents today for hematuria, admitted for urinary tract infection and chronic osteomyelitis

## 2024-12-16 NOTE — ED PROVIDER NOTE - CLINICAL SUMMARY MEDICAL DECISION MAKING FREE TEXT BOX
79F PMH MS BIBEMS accompanied by HHA d/t hematuria w/in Landeros catheter bag, urethral bleeding. Afebrile, VSS. Pt in NAD. Exam as noted in PE. Plan for CBC, CMP, INR, UA/C. Re-eval. 79F PMH MS BIBEMS accompanied by HHA d/t hematuria w/in Landeros catheter bag, urethral bleeding. Afebrile, VSS. Pt in NAD. Exam as noted in PE. Plan for CBC, CMP, INR, UA/C. Re-eval.  W/u significant for: + leukocytosis to 39. 4 (WBC 16.7 on 12/12). UA w/ + infection. CT w/ extensive decub ulcers overlying sacrum and greater trochanters, curvilinear fluid overlying L posterior acetabulum, foci of gas L hip joint, scattered subQ gas, scattered osseous sclerosis worrisome for chronic osteomyelitis. W/o evidence nec fasc nor gangrene on physical exam. Given IV Vanc / Zosyn. Will admit to medicine (d/w Dr Young), Vasc (pt follows w/ Dr Paige, multiple prior wound debridements) consulted. Pt, HHA updated to results, admission. They understand / agree w/ this plan.

## 2024-12-16 NOTE — ED ADULT NURSE NOTE - ED STAT RN HANDOFF DETAILS 2
report received from night shift RN,  pt stable and resting comfortably in bed; demonstrating no s/s of acute distress at this time. provider orders reviewed and addressed appropriately. RN inquired about pt current needs and addressed accordingly. pt safety maintained. awaiting results/additional orders and admission bed placement.

## 2024-12-16 NOTE — ED ADULT NURSE NOTE - ED STAT RN HANDOFF DETAILS 4
Report endorsed to oncoming gisselle Zamora RN. Safety checks compld this shift/Safety rounds completed hourly.  IV sites checked Q2+remains WDL. Meds given as ord with no s/s of adverse RXNs. Fall +skin precs in place. Any issues endorsed to onchillary RN for follow up.

## 2024-12-16 NOTE — H&P ADULT - NSHPPHYSICALEXAM_GEN_ALL_CORE
Vital Signs Last 24 Hrs  T(C): 36.6 (16 Dec 2024 18:42), Max: 36.6 (16 Dec 2024 18:42)  T(F): 97.8 (16 Dec 2024 18:42), Max: 97.8 (16 Dec 2024 18:42)  HR: 93 (16 Dec 2024 18:42) (93 - 98)  BP: 93/63 (16 Dec 2024 18:42) (93/63 - 109/68)  BP(mean): --  RR: 19 (16 Dec 2024 18:42) (18 - 19)  SpO2: 100% (16 Dec 2024 18:42) (99% - 100%)    Parameters below as of 16 Dec 2024 18:42  Patient On (Oxygen Delivery Method): room air    GENERAL: NAD, lying in bed comfortably  HEAD:  Atraumatic, normocephalic  EYES: EOMI, PERRL  NECK: Supple, trachea midline, no JVD  HEART: Regular rate and rhythm  LUNGS: Unlabored respirations.  Clear to auscultation bilaterally, no crackles, wheezing, or rhonchi  ABDOMEN: Soft, nontender, nondistended, +BS  EXTREMITIES: 2+ peripheral pulses bilaterally. No clubbing, cyanosis, or edema  NERVOUS SYSTEM:  A&Ox3, moving all extremities, no focal deficits

## 2024-12-16 NOTE — H&P ADULT - PROBLEM SELECTOR PLAN 1
Patient presents with hematuria.  WBC 39.41. UA showing +LE, +nitrite, +WBC, +bacteria.  - Continue zosyn  - IVF hydration  - Urine culture pending

## 2024-12-16 NOTE — ED ADULT TRIAGE NOTE - CHIEF COMPLAINT QUOTE
PUJA c/o hematuria in Landeros since this morning, hospitalized last week for UTI. Pt bedbound, pressure injuries to buttocks. Aide with pt. PMH MS.

## 2024-12-16 NOTE — ED PROVIDER NOTE - PHYSICAL EXAMINATION
GEN: Awake, alert, interactive, NAD. Cachetic.   HEAD AND NECK: NC/AT. Airway patent. Neck supple.   EYES:  Clear b/l. EOMI. PERRL.   ENT: Moist mucus membranes.   CARDIAC: Regular rate, regular rhythm. No evident pedal edema.    RESP/CHEST: Normal respiratory effort with no use of accessory muscles or retractions. Clear throughout on auscultation.  ABD: Soft, non-distended, non-tender. No rebound, no guarding.   BACK: No midline spinal TTP. No CVAT.   EXTREMITIES: Moving all extremities with no apparent deformities.   SKIN: Multiple dressed pressure wounds over BL elbows, knees, feet. Wound vac to sacrum.   NEURO: AOx3, CN II-XII grossly intact, no focal deficits.   PSYCH: Appropriate mood and affect. GEN: Awake, alert, interactive, NAD. Cachetic.   HEAD AND NECK: NC/AT. Airway patent. Neck supple.   EYES:  Clear b/l. EOMI. PERRL.   ENT: Moist mucus membranes.   CARDIAC: Regular rate, regular rhythm. No evident pedal edema.    RESP/CHEST: Normal respiratory effort with no use of accessory muscles or retractions. Clear throughout on auscultation.  ABD: Soft, non-distended, non-tender. No rebound, no guarding.   BACK: No midline spinal TTP. No CVAT.   EXTREMITIES: Functional quadriplegia w/o apparent deformities.   SKIN: Multiple dressed pressure wounds overlying BL elbows, knees, feet. Wound vac to sacrum and BL hips.   NEURO: AOx2, CN II-XII grossly intact, no focal deficits.   PSYCH: Appropriate mood and affect.

## 2024-12-16 NOTE — ED ADULT NURSE NOTE - NSFALLHARMRISKINTERV_ED_ALL_ED
Assistance OOB with selected safe patient handling equipment if applicable/Assistance with ambulation/Communicate risk of Fall with Harm to all staff, patient, and family/Monitor gait and stability/Provide visual cue: red socks, yellow wristband, yellow gown, etc/Reinforce activity limits and safety measures with patient and family/Bed in lowest position, wheels locked, appropriate side rails in place/Call bell, personal items and telephone in reach/Instruct patient to call for assistance before getting out of bed/chair/stretcher/Non-slip footwear applied when patient is off stretcher/[place] to call system/Physically safe environment - no spills, clutter or unnecessary equipment/Purposeful Proactive Rounding/Room/bathroom lighting operational, light cord in reach

## 2024-12-16 NOTE — H&P ADULT - NSHPLABSRESULTS_GEN_ALL_CORE
.  LABS:                         9.2    39.41 )-----------( 280      ( 16 Dec 2024 18:45 )             29.9     12-    136  |  102  |  21  ----------------------------<  187[H]  4.4   |  28  |  0.82    Ca    8.5      16 Dec 2024 18:45    TPro  5.6[L]  /  Alb  1.6[L]  /  TBili  0.5  /  DBili  x   /  AST  18  /  ALT  16  /  AlkPhos  166[H]  12-16    PT/INR - ( 16 Dec 2024 18:45 )   PT: 15.3 sec;   INR: 1.36 ratio         PTT - ( 16 Dec 2024 18:45 )  PTT:25.7 sec  Urinalysis Basic - ( 16 Dec 2024 18:45 )    Color: Orange / Appearance: Turbid / S.027 / pH: x  Gluc: 187 mg/dL / Ketone: Negative mg/dL  / Bili: Small / Urobili: 1.0 mg/dL   Blood: x / Protein: 100 mg/dL / Nitrite: Positive   Leuk Esterase: Large / RBC: 10 /HPF / WBC Too Numerous to count /HPF   Sq Epi: x / Non Sq Epi: x / Bacteria: Many /HPF            RADIOLOGY, EKG & ADDITIONAL TESTS: Reviewed.

## 2024-12-16 NOTE — H&P ADULT - HISTORY OF PRESENT ILLNESS
79-year-old female with PMH      On presentation, patient is hemodynamically stable and afebrile, although BP is soft at 109/68. Labs significant for elevated WBC 39.41, Hgb 9.2, elevated Alk Ph 166, UA showing +LE, +nitrite, +WBC, +bacteria. CT abdomen/pelvis shows extensive decubitus ulcers overlying the sacrum and the greater trochanters; Curvilinear fluid collection overlying the left posterior acetabulum; Foci of gas in the left hip joint; Scattered subcutaneous gas; Scattered osseous sclerosis worrisome for chronic osteomyelitis; Rectum distended by stool. Patient given zosyn & vancomycin.    79-year-old female with PMH osteoporosis, MS, bedbound    On presentation, patient is hemodynamically stable and afebrile, although BP is soft at 109/68. Labs significant for elevated WBC 39.41, Hgb 9.2, elevated Alk Ph 166, UA showing +LE, +nitrite, +WBC, +bacteria. CT abdomen/pelvis shows extensive decubitus ulcers overlying the sacrum and the greater trochanters; Curvilinear fluid collection overlying the left posterior acetabulum; Foci of gas in the left hip joint; Scattered subcutaneous gas; Scattered osseous sclerosis worrisome for chronic osteomyelitis; Rectum distended by stool. Patient given zosyn & vancomycin.    79-year-old female with PMH osteoporosis, MS, bedbound, who presents today for hematuria. As per patient, amezquita was changed 2 days ago and blood was seen in the catheter at the time. Patient reports dysuria, denies frequency and urgency. Patient denies abdominal pain, flank pain, fever, chills, nausea, vomiting, diarrhea. Patient has had similar episodes prior.     On presentation, patient is hemodynamically stable and afebrile, although BP is soft at 109/68. Labs significant for elevated WBC 39.41, Hgb 9.2, elevated Alk Ph 166, UA showing +LE, +nitrite, +WBC, +bacteria. CT abdomen/pelvis shows extensive decubitus ulcers overlying the sacrum and the greater trochanters; Curvilinear fluid collection overlying the left posterior acetabulum; Foci of gas in the left hip joint; Scattered subcutaneous gas; Scattered osseous sclerosis worrisome for chronic osteomyelitis; Rectum distended by stool. Patient given zosyn & vancomycin.

## 2024-12-16 NOTE — H&P ADULT - PROBLEM SELECTOR PLAN 2
CT abdomen/pelvis shows extensive decubitus ulcers overlying the sacrum and the greater trochanters; Curvilinear fluid collection overlying the left posterior acetabulum; Foci of gas in the left hip joint; Scattered subcutaneous gas; Scattered osseous sclerosis worrisome for chronic osteomyelitis; Rectum distended by stool  - Ortho consulted by ED  - Continue zosyn & vancomycin

## 2024-12-16 NOTE — ED PROVIDER NOTE - OBJECTIVE STATEMENT
79F PMH MS BIBEMS accompanied by HHA d/t hematuria. HHA no longer present on MD evaluation. Pt reports Landeros changed day before yesterday, w/ onset blood in catheter. Pt denies hx similar in the past. Pt endorses mild dysuria. Denies F/C, N/V, abd pain, flank pain. Pt bedbound w/ multiple pressure sores.    Spoke w/ 79F PMH MS LUO accompanied by HHA d/t hematuria. HHA no longer present on MD evaluation. Pt reports Landeros changed day before yesterday, w/ onset blood in catheter. Pt denies hx similar in the past. Pt endorses mild dysuria. Denies F/C, N/V, abd pain, flank pain. Pt bedbound w/ multiple pressure sores.    Spoke w/ HHA, pt d/c'd from hospital on Friday. Unsure when Landeros last exchanged. Pt w/ different HHA on weekend. HHA noted this AM w/ blood on sheets, coming from catheter, as well as noted blood w/in catheter bag. Visiting RN came in afternoon to change wound vac, but when told about bleeding, advised call 911 and return pt to hospital. Wound vac not changed. 79F PMH MS LUO accompanied by HHA d/t hematuria. HHA no longer present on MD evaluation. Pt reports Landeros changed day before yesterday, w/ onset blood in catheter. Pt denies hx similar in the past. Pt endorses mild dysuria. Denies F/C, N/V, abd pain, flank pain. Pt bedbound w/ multiple pressure sores.    HHA now at bedside, pt d/c'd from hospital on Friday. Unsure when Landeros last exchanged. Pt w/ different HHA on the weekends. HHA saw pt this AM, noted blood on her sheets, seemingly coming from urethra, as well as noted blood w/in catheter bag. Visiting RN came in the afternoon to change sacral wound vac, but when HHA informed her of bleeding, visiting RN advised call 911 and return pt to hospital. Wound vac was not exchanged.

## 2024-12-16 NOTE — ED ADULT NURSE NOTE - OBJECTIVE STATEMENT
Pt is a 79y F AOx2 with a pmh of MS, uterine polyp, bedbound and OA. Pt BIBA with HHA at the bedside. HHA providing history states BIBEMS c/o hematuria in Landeros since this morning, hospitalized last week for UTI. Pt bedbound, pressure injuries to buttocks. Aide with pt. PMH MS.

## 2024-12-17 NOTE — ED ADULT NURSE REASSESSMENT NOTE - NSFALLHARMRISKINTERV_ED_ALL_ED
Assistance OOB with selected safe patient handling equipment if applicable/Assistance with ambulation/Communicate risk of Fall with Harm to all staff, patient, and family/Monitor gait and stability/Monitor for mental status changes and reorient to person, place, and time, as needed/Move patient closer to nursing station/within visual sight of ED staff/Provide visual cue: red socks, yellow wristband, yellow gown, etc/Reinforce activity limits and safety measures with patient and family/Toileting schedule using arm’s reach rule for commode and bathroom/Use of alarms - bed, stretcher, chair and/or video monitoring/Bed in lowest position, wheels locked, appropriate side rails in place/Call bell, personal items and telephone in reach/Instruct patient to call for assistance before getting out of bed/chair/stretcher/Non-slip footwear applied when patient is off stretcher/[place] to call system/Physically safe environment - no spills, clutter or unnecessary equipment/Purposeful Proactive Rounding/Room/bathroom lighting operational, light cord in reach

## 2024-12-17 NOTE — PATIENT PROFILE ADULT - FUNCTIONAL ASSESSMENT - BASIC MOBILITY 6.
1-calculated by average/Not able to assess (calculate score using [facility] averaging method)

## 2024-12-17 NOTE — PROGRESS NOTE ADULT - SUBJECTIVE AND OBJECTIVE BOX
Hospitalist Daily Progress Note     *SUBJECTIVE*    Interval Events:  NAEON, Resting comfortably. HD Stable.      *OBJECTIVE*    PHYSICAL EXAM:  GENERAL: NAD, lying in bed comfortably  HEAD:  Atraumatic, normocephalic  EYES: EOMI, PERRL  NECK: Supple, trachea midline, no JVD  HEART: Regular rate and rhythm  LUNGS: Unlabored respirations.  Clear to auscultation bilaterally, no crackles, wheezing, or rhonchi  ABDOMEN: Soft, nontender, nondistended, +BS  EXTREMITIES: 2+ peripheral pulses bilaterally. No clubbing, cyanosis, or edema  NERVOUS SYSTEM:  A&Ox3, moving all extremities, no focal deficits    OBJECTIVE DATA:   Vital Signs Last 24 Hrs  T(C): 36.6 (17 Dec 2024 13:04), Max: 36.7 (17 Dec 2024 02:32)  T(F): 97.9 (17 Dec 2024 13:04), Max: 98 (17 Dec 2024 02:32)  HR: 98 (17 Dec 2024 13:04) (92 - 98)  BP: 116/73 (17 Dec 2024 13:04) (93/63 - 116/73)  BP(mean): --  RR: 19 (17 Dec 2024 13:04) (17 - 19)  SpO2: 100% (17 Dec 2024 13:04) (99% - 100%)    Parameters below as of 17 Dec 2024 13:04  Patient On (Oxygen Delivery Method): room air               Daily     Daily     Labs, Interval Radiology studies, Medications reviewed by me

## 2024-12-17 NOTE — CONSULT NOTE ADULT - NS ATTEND AMEND GEN_ALL_CORE FT
The patient is seen and examiend and agree with above. All the pressure ulcer wounds on the Bilat hips and the gluteal area are vevaluated and are clean and suitable for VAC, No need for debridement.  plan-- Cont the current care and will follow as needed. DOubt 30k WBC count is due to the wounds, No pus, no drainage and no cellulitis. 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

## 2024-12-17 NOTE — CONSULT NOTE ADULT - SUBJECTIVE AND OBJECTIVE BOX
[facility]  INFECTIOUS DISEASES   [address] [place] [address]  Tel: [phone]     Fax: [phone]  ==============================================================================  DO Dylan Prather MD Sehrish Shahid, MD Alexandra Gutman, NP   ==============================================================================    SPENCER MIXON  MRN-[id number]  Female  79y ([birth year])        Patient is a 79y old  Female who presents with a chief complaint of Urinary tract infection and chronic osteomyelitis (17 Dec 2024 00:29)      HPI:  The patient is 78 y/o woman with PMH osteoporosis, MS, bedbound, who presents today for hematuria. As per patient, amezquita was changed 2 days ago and blood was seen in the catheter at the time. Patient reports dysuria, denies frequency and urgency. Patient denies abdominal pain, flank pain, fever, chills, nausea, vomiting, diarrhea. Patient has had similar episodes prior. On presentation, patient is hemodynamically stable and afebrile, although BP is soft at 109/68. Labs significant for elevated WBC 39.41, Hgb 9.2, elevated Alk Ph 166, UA showing +LE, +nitrite, +WBC, +bacteria. CT abdomen/pelvis shows extensive decubitus ulcers overlying the sacrum and the greater trochanters; Curvilinear fluid collection overlying the left posterior acetabulum; Foci of gas in the left hip joint; Scattered subcutaneous gas; Scattered osseous sclerosis worrisome for chronic osteomyelitis; Rectum distended by stool. Patient given zosyn & vancomycin. (16 Dec 2024 22:58)  ID consulted for workup and antibiotic management     PAST MEDICAL & SURGICAL HISTORY:  Osteoporosis  Uterine polyp  Herniated disc lumbar  Functional quadriplegia secondary to multiple sclerosis  Bilateral Tubal ligation      Allergies  No Known Allergies        ANTIMICROBIALS:  piperacillin/tazobactam IVPB.. 3.375 every 8 hours      MEDICATIONS  (STANDING):  piperacillin/tazobactam IVPB..   25 mL/Hr IV Intermittent (12-17-24 @ 10:26)   25 mL/Hr IV Intermittent (12-17-24 @ 02:33)    piperacillin/tazobactam IVPB...   200 mL/Hr IV Intermittent (12-16-24 @ 21:37)    vancomycin  IVPB.   250 mL/Hr IV Intermittent (12-16-24 @ 21:40)        OTHER MEDS: MEDICATIONS  (STANDING):  acetaminophen     Tablet .. 650 every 6 hours PRN  aluminum hydroxide/magnesium hydroxide/simethicone Suspension 30 every 4 hours PRN  melatonin 3 at bedtime PRN  ondansetron Injectable 4 every 8 hours PRN      SOCIAL HISTORY:     Smoking Cigarettes [ ]Active [ ] Former [ ]Denies   ETOH [ ]denies [ ]Former [ ]Current Use denies   Drug Use [ ]Never [ ] Former [ ] Active     FAMILY HISTORY:      REVIEW OF SYSTEMS  [  ] ROS unobtainable because:    [  ] All other systems negative except as noted below:	    Constitutional:  [ ] fever [ ] chills  [ ] weight loss  [ ] weakness  Skin:  [ ] rash [ ] phlebitis	  Eyes: [ ] icterus [ ] pain  [ ] discharge	  ENMT: [ ] sore throat  [ ] thrush [ ] ulcers [ ] exudates  Respiratory: [ ] dyspnea [ ] hemoptysis [ ] cough [ ] sputum	  Cardiovascular:  [ ] chest pain [ ] palpitations [ ] edema	  Gastrointestinal:  [ ] nausea [ ] vomiting [ ] diarrhea [ ] constipation [ ] pain	  Genitourinary:  [ ] dysuria [ ] frequency [ ] hematuria [ ] discharge [ ] flank pain  [ ] incontinence  Musculoskeletal:  [ ] myalgias [ ] arthralgias [ ] arthritis  [ ] back pain  Neurological:  [ ] headache [ ] seizures  [ ] confusion/altered mental status  Psychiatric:  [ ] anxiety [ ] depression	  Hematology/Lymphatics:  [ ] lymphadenopathy  Endocrine:  [ ] adrenal [ ] thyroid  Allergic/Immunologic:	 [ ] transplant [ ] seasonal    Vital Signs Last 24 Hrs  T(F): 97.6 (12-17-24 @ 08:00), Max: 98.1 (12-11-24 @ 05:19)    Vital Signs Last 24 Hrs  HR: 92 (12-17-24 @ 08:00) (92 - 98)  BP: 94/65 (12-17-24 @ 08:00) (93/63 - 109/68)  RR: 17 (12-17-24 @ 08:00)  SpO2: 99% (12-17-24 @ 08:00) (99% - 100%)  Wt(kg): --    PHYSICAL EXAM:  Constitutional: non-toxic, no distress  HEAD/EYES: anicteric, no conjunctival injection  ENT:  supple, no thrush  Cardiovascular:   normal S1, S2, no murmur, no edema  Respiratory:  clear BS bilaterally, no wheezes, no rales  GI:  soft, non-tender, normal bowel sounds  :  no amezquita, no CVA tenderness  Musculoskeletal:  no synovitis, normal ROM  Neurologic: awake and alert, normal strength, no focal findings  Skin:  no rash, no erythema, no phlebitis  Heme/Onc: no lymphadenopathy   Psychiatric:  awake, alert, appropriate mood        WBC  WBC Count: 39.41 K/uL (12-16 @ 18:45)  WBC Count: 16.74 K/uL (12-12 @ 07:05)      Auto Neutrophil %: 98.0 % *H* (12-16-24 @ 18:45)  Auto Neutrophil #: 38.62 K/uL *H* (12-16-24 @ 18:45)    CBC                        9.2    39.41 )-----------( 280      ( 16 Dec 2024 18:45 )             29.9     BMP/CMP  12-16    136  |  102  |  21  ----------------------------<  187[H]  4.4   |  28  |  0.82    Ca    8.5      16 Dec 2024 18:45    TPro  5.6[L]  /  Alb  1.6[L]  /  TBili  0.5  /  DBili  x   /  AST  18  /  ALT  16  /  AlkPhos  166[H]  12-16    Creatinine Trend  Creatinine Trend: 0.82<--, 0.22<--, 0.17<--, 0.19<--, 0.16<--, 0.22<--    Urinalysis with Rflx Culture (12.16.24 @ 18:45)   Urine Appearance: Turbid   Color: Orange   Specific Gravity: 1.027   pH Urine: 5.5   Protein, Urine: 100 mg/dL   Glucose Qualitative, Urine: Negative mg/dL   Ketone - Urine: Negative mg/dL   Blood, Urine: Large   Bilirubin: Small   Urobilinogen: 1.0 mg/dL   Leukocyte Esterase Concentration: Large   Nitrite: Positive  Urine Microscopic-Add On (NC) (12.16.24 @ 18:45)   Comment - Urine: moderate yeast like cells noted   Red Blood Cell - Urine: 10 /HPF   White Blood Cell - Urine: Too Numerous to count /HPF   Bacteria: Many /HPF        MICROBIOLOGY:  .Abscess  11-22-24   Few Escherichia coli ESBL  Rare Enterococcus faecalis  Rare Streptococcus mitis/oralis group "Susceptibilities not performed"  --  Escherichia coli ESBL  Enterococcus faecalis      .Blood BLOOD  11-21-24   No growth at 5 days  --  --      .Blood BLOOD  11-21-24   No growth at 5 days  --  --      Clean Catch  11-20-24   >100,000 CFU/ml Escherichia coli ESBL  <10,000 CFU/ml Normal Urogenital korey present  --  Escherichia coli ESBL      Clean Catch  11-20-24   >100,000 CFU/ml Klebsiella pneumoniae (Carbapenem Resistant)  >100,000 CFU/ml Escherichia coli ESBL  --  Klebsiella pneumoniae (Carbapenem Resistant)  Escherichia coli ESBL      SARS-CoV-2: Detected (04 Dec 2024 11:00)      RADIOLOGY:    ACC: [id number] EXAM:  CT ABDOMEN AND PELVIS IC   ORDERED BY: VALARIE KING     PROCEDURE DATE:  12/16/2024      INTERPRETATION:  CLINICAL INFORMATION: leukocytosis to 40, UTI, multiple   pressure wounds, wound vac to sacrum FCT    COMPARISON: 8.4.24.    CONTRAST/COMPLICATIONS:  IV Contrast: Omnipaque 350  85 cc administered   15 cc discarded  Oral Contrast: NONE    PROCEDURE:  CT of the Abdomen and Pelvis was performed.  Sagittal and coronal reformats were performed.    FINDINGS:    LOWERCHEST: Small left pleural effusion.    LIVER: Within normal limits.  BILE DUCTS: Normal caliber.  GALLBLADDER: Within normal limits.  SPLEEN: Within normal limits.  PANCREAS: Within normal limits.  ADRENALS: Within normal limits.  KIDNEYS/URETERS: Cysts.    BLADDER: Amezquita catheter in place. Small bladder calculi.  REPRODUCTIVE ORGANS: Fibroid uterus.    BOWEL: No bowel obstruction. The rectum is distended by stool.  PERITONEUM/RETROPERITONEUM: Within normal limits.  VESSELS:  Within normal limits.  ABDOMINAL WALL: Sacral decubitus ulcer with soft tissue defect extending   to the bone. Soft tissue defect and ulceration overlying the posterior   right hip. Decubitus ulcer overlying the left greater trochanter with gas   extending into the soft tissues and musculature. There is a   circumferential fluid collection around the left posterior acetabulum   containing gas. Focus of gas within the medial left thigh muscle and left   hip joint.  BONES: Sclerosis of the proximal right femur greater trochanter.   Sclerosis of the posterior iliac bones. Sclerosis of the lower sacrum.   Erosive changes involving the right SI joint are again noted.    IMPRESSION:  Extensive decubitus ulcers overlying the sacrum and the greater   trochanters.  Curvilinear fluid collection overlying the left posterior acetabulum.  Foci of gas in the left hip joint.  Scattered subcutaneous gas.  Scattered osseous sclerosis worrisome for chronic osteomyelitis.  Rectum distended by stool.    --- End of Report ---      MAGGIE MALIK MD  This document has been electronically signed. Dec 16 2024  8:56PM    I have personally reviewed the above imaging  [facility]  INFECTIOUS DISEASES   [address] [place] [address]  Tel: [phone]     Fax: [phone]  ==============================================================================  DO Dylan Prather MD Sehrish Shahid, MD Alexandra Gutman, NP   ==============================================================================    SPENCER MIXON  MRN-[id number]  Female  79y ([birth year])        Patient is a 79y old  Female who presents with a chief complaint of Urinary tract infection and chronic osteomyelitis (17 Dec 2024 00:29)      HPI:  The patient is 80 y/o woman with PMH osteoporosis, MS, bedbound, who presents today for hematuria. Reportedly, amezquita was changed 2 days ago and blood was seen in the catheter at the time. Patient reports dysuria, denies frequency and urgency. Also c/o back pain. On presentation, patient is hemodynamically stable and afebrile. Labs significant for elevated WBC 39.41, UA showing large LE, +nitrite, +WBC, +bacteria. CT abdomen/pelvis shows extensive decubitus ulcers overlying the sacrum and the greater trochanters; curvilinear fluid collection overlying the left posterior acetabulum; foci of gas in the left hip joint; scattered subcutaneous gas; scattered osseous sclerosis worrisome for chronic osteomyelitis; rectum distended by stool. Patient given zosyn & vancomycin. (16 Dec 2024 22:58)  ID consulted for workup and antibiotic management       PAST MEDICAL & SURGICAL HISTORY:  Osteoporosis  Uterine polyp  Herniated disc lumbar  Functional quadriplegia secondary to multiple sclerosis  Bilateral Tubal ligation      Allergies  No Known Allergies        ANTIMICROBIALS:  piperacillin/tazobactam IVPB.. 3.375 every 8 hours      MEDICATIONS  (STANDING):  piperacillin/tazobactam IVPB..   25 mL/Hr IV Intermittent (12-17-24 @ 10:26)   25 mL/Hr IV Intermittent (12-17-24 @ 02:33)    piperacillin/tazobactam IVPB...   200 mL/Hr IV Intermittent (12-16-24 @ 21:37)    vancomycin  IVPB.   250 mL/Hr IV Intermittent (12-16-24 @ 21:40)        OTHER MEDS: MEDICATIONS  (STANDING):  acetaminophen     Tablet .. 650 every 6 hours PRN  aluminum hydroxide/magnesium hydroxide/simethicone Suspension 30 every 4 hours PRN  melatonin 3 at bedtime PRN  ondansetron Injectable 4 every 8 hours PRN      SOCIAL HISTORY:     Smoking Cigarettes [ ]Active [ ] Former [ ]Denies   ETOH [ ]denies [ ]Former [ ]Current Use denies   Drug Use [ ]Never [ ] Former [ ] Active     FAMILY HISTORY:  Unknown    REVIEW OF SYSTEMS  [  ] ROS unobtainable because:    [x ] All other systems negative except as noted below:	    Constitutional:  [ ] fever [ ] chills  [ ] weight loss  [ ] weakness  Skin:  [ ] rash [ ] phlebitis	  Eyes: [ ] icterus [ ] pain  [ ] discharge	  ENMT: [ ] sore throat  [ ] thrush [ ] ulcers [ ] exudates  Respiratory: [ ] dyspnea [ ] hemoptysis [ ] cough [ ] sputum	  Cardiovascular:  [ ] chest pain [ ] palpitations [ ] edema	  Gastrointestinal:  [ ] nausea [ ] vomiting [ ] diarrhea [ ] constipation [x ] pain	  Genitourinary:  [x ] dysuria [ ] frequency [ ] hematuria [ ] discharge [ ] flank pain  [ ] incontinence  Musculoskeletal:  [ ] myalgias [ ] arthralgias [ ] arthritis  [ x] back pain  Neurological:  [ ] headache [ ] seizures  [ ] confusion/altered mental status  Psychiatric:  [ ] anxiety [ ] depression	  Hematology/Lymphatics:  [ ] lymphadenopathy  Endocrine:  [ ] adrenal [ ] thyroid  Allergic/Immunologic:	 [ ] transplant [ ] seasonal    Vital Signs Last 24 Hrs  T(F): 97.6 (12-17-24 @ 08:00), Max: 98.1 (12-11-24 @ 05:19)    Vital Signs Last 24 Hrs  HR: 92 (12-17-24 @ 08:00) (92 - 98)  BP: 94/65 (12-17-24 @ 08:00) (93/63 - 109/68)  RR: 17 (12-17-24 @ 08:00)  SpO2: 99% (12-17-24 @ 08:00) (99% - 100%)  Wt(kg): --    PHYSICAL EXAM:  Constitutional: Elderly woman, non-toxic, no distress  HEENT: EOMI, no scleral icterus  Cardiovascular:   normal S1, S2, no edema  Respiratory:  clear BS bilaterally, no wheezes, no rales  GI:  soft, non-tender, non distended  :  + amezquita with clear urine in the bag.   Musculoskeletal:  curled up in bed. atrophic limbs. +pressure ulcers  Neurologic: awake and alert, oriented x 2, no focal findings  Skin:  warm, dry, no rash  Psychiatric:  appropriate mood        WBC  WBC Count: 39.41 K/uL (12-16 @ 18:45)  WBC Count: 16.74 K/uL (12-12 @ 07:05)      Auto Neutrophil %: 98.0 % *H* (12-16-24 @ 18:45)  Auto Neutrophil #: 38.62 K/uL *H* (12-16-24 @ 18:45)    CBC                        9.2    39.41 )-----------( 280      ( 16 Dec 2024 18:45 )             29.9     BMP/CMP  12-16    136  |  102  |  21  ----------------------------<  187[H]  4.4   |  28  |  0.82    Ca    8.5      16 Dec 2024 18:45    TPro  5.6[L]  /  Alb  1.6[L]  /  TBili  0.5  /  DBili  x   /  AST  18  /  ALT  16  /  AlkPhos  166[H]  12-16    Creatinine Trend  Creatinine Trend: 0.82<--, 0.22<--, 0.17<--, 0.19<--, 0.16<--, 0.22<--    Urinalysis with Rflx Culture (12.16.24 @ 18:45)   Urine Appearance: Turbid   Color: Orange   Specific Gravity: 1.027   pH Urine: 5.5   Protein, Urine: 100 mg/dL   Glucose Qualitative, Urine: Negative mg/dL   Ketone - Urine: Negative mg/dL   Blood, Urine: Large   Bilirubin: Small   Urobilinogen: 1.0 mg/dL   Leukocyte Esterase Concentration: Large   Nitrite: Positive  Urine Microscopic-Add On (NC) (12.16.24 @ 18:45)   Comment - Urine: moderate yeast like cells noted   Red Blood Cell - Urine: 10 /HPF   White Blood Cell - Urine: Too Numerous to count /HPF   Bacteria: Many /HPF        MICROBIOLOGY:  .Abscess  11-22-24   Few Escherichia coli ESBL  Rare Enterococcus faecalis  Rare Streptococcus mitis/oralis group "Susceptibilities not performed"  --  Escherichia coli ESBL  Enterococcus faecalis      .Blood BLOOD  11-21-24   No growth at 5 days  --  --      .Blood BLOOD  11-21-24   No growth at 5 days  --  --      Clean Catch  11-20-24   >100,000 CFU/ml Escherichia coli ESBL  <10,000 CFU/ml Normal Urogenital korey present  --  Escherichia coli ESBL      Clean Catch  11-20-24   >100,000 CFU/ml Klebsiella pneumoniae (Carbapenem Resistant)  >100,000 CFU/ml Escherichia coli ESBL  --  Klebsiella pneumoniae (Carbapenem Resistant)  Escherichia coli ESBL      SARS-CoV-2: Detected (04 Dec 2024 11:00)      RADIOLOGY:    ACC: [id number] EXAM:  CT ABDOMEN AND PELVIS IC   ORDERED BY: VALARIE KING     PROCEDURE DATE:  12/16/2024      INTERPRETATION:  CLINICAL INFORMATION: leukocytosis to 40, UTI, multiple   pressure wounds, wound vac to sacrum FCT    COMPARISON: 8.4.24.    CONTRAST/COMPLICATIONS:  IV Contrast: Omnipaque 350  85 cc administered   15 cc discarded  Oral Contrast: NONE    PROCEDURE:  CT of the Abdomen and Pelvis was performed.  Sagittal and coronal reformats were performed.    FINDINGS:    LOWERCHEST: Small left pleural effusion.    LIVER: Within normal limits.  BILE DUCTS: Normal caliber.  GALLBLADDER: Within normal limits.  SPLEEN: Within normal limits.  PANCREAS: Within normal limits.  ADRENALS: Within normal limits.  KIDNEYS/URETERS: Cysts.    BLADDER: Amezquita catheter in place. Small bladder calculi.  REPRODUCTIVE ORGANS: Fibroid uterus.    BOWEL: No bowel obstruction. The rectum is distended by stool.  PERITONEUM/RETROPERITONEUM: Within normal limits.  VESSELS:  Within normal limits.  ABDOMINAL WALL: Sacral decubitus ulcer with soft tissue defect extending   to the bone. Soft tissue defect and ulceration overlying the posterior   right hip. Decubitus ulcer overlying the left greater trochanter with gas   extending into the soft tissues and musculature. There is a   circumferential fluid collection around the left posterior acetabulum   containing gas. Focus of gas within the medial left thigh muscle and left   hip joint.  BONES: Sclerosis of the proximal right femur greater trochanter.   Sclerosis of the posterior iliac bones. Sclerosis of the lower sacrum.   Erosive changes involving the right SI joint are again noted.    IMPRESSION:  Extensive decubitus ulcers overlying the sacrum and the greater   trochanters.  Curvilinear fluid collection overlying the left posterior acetabulum.  Foci of gas in the left hip joint.  Scattered subcutaneous gas.  Scattered osseous sclerosis worrisome for chronic osteomyelitis.  Rectum distended by stool.    --- End of Report ---      MAGGIE MALIK MD  This document has been electronically signed. Dec 16 2024  8:56PM    I have personally reviewed the above imaging

## 2024-12-17 NOTE — PATIENT PROFILE ADULT - FALL HARM RISK - HARM RISK INTERVENTIONS

## 2024-12-17 NOTE — PROGRESS NOTE ADULT - ASSESSMENT
79-year-old female with PMH osteoporosis, MS, bedbound, who presents today for hematuria, admitted for urinary tract infection and chronic osteomyelitis      ##Sepsis  ##Catheter associated UTI  ## Hematuria  Presents with hematuria. Reportedly, amezquita was changed 2 days ago and blood was seen in the catheter at the time. Patient reports dysuria, denies frequency and urgency. Also c/o back pain. On presentation, patient is hemodynamically stable and afebrile. Labs significant for elevated WBC 39.41, UA showing large LE, +nitrite, +WBC, +bacteria. CT abdomen/pelvis shows extensive decubitus ulcers overlying the sacrum and the greater trochanters; curvilinear fluid collection overlying the left posterior acetabulum; foci of gas in the left hip joint; scattered subcutaneous gas; scattered osseous sclerosis worrisome for chronic osteomyelitis. Last admit here 11/20--12/10 and treated for infected decub ulcers, SARS-CoV-2 infection and CAUTI. 11/20 urine culture with ESBL E. Coli and Klebsiella. 11/22 wound culture with ESBL E. Coli, E. faecalis and strep. She was on meropenem and vancomycin x 10 days and was discharged on a course of Augmentin.  - C/w IV Zosyn   - IVF hydration  - Cultures pending     ##Chronic osteomyelitis   5 Stage 4 sacral ulcer. CT abdomen/pelvis shows extensive decubitus ulcers overlying the sacrum and the greater trochanters; Curvilinear fluid collection overlying the left posterior acetabulum; Foci of gas in the left hip joint; Scattered subcutaneous gas; Scattered osseous sclerosis worrisome for chronic osteomyelitis; Rectum distended by stool. Wound care evaluated no need for debridement. , No pus, no drainage and no cellulitis. Unlikely infection source.   - MRI sacrum to R/o Osteo   - C/w VAC     #Functional quadriplegia secondary to multiple sclerosis.   bedbound    supportive care  frequent repositioning to prevent pressure wounds          Diet:  DVT prophylaxis:  Dispo:  Code Status:    I have spent a total of  minutes to prepare to see the patient, obtaining and reviewing history, physical examination, explaining the diagnosis, prognosis and treatment plan with the patient/family/caregiver. I also have spent the time ordering studies and testing, interpreting results, medicine reconciliation, IDRs, subspecialty consultation and documentation as above.     79-year-old female with PMH osteoporosis, MS, bedbound, who presents today for hematuria, admitted for urinary tract infection and chronic osteomyelitis      ##Sepsis  ##Catheter associated UTI  ## Hematuria  Presents with hematuria. Reportedly, amezquita was changed 2 days ago and blood was seen in the catheter at the time. Patient reports dysuria, denies frequency and urgency. Also c/o back pain. On presentation, patient is hemodynamically stable and afebrile. Labs significant for elevated WBC 39.41, UA showing large LE, +nitrite, +WBC, +bacteria. CT abdomen/pelvis shows extensive decubitus ulcers overlying the sacrum and the greater trochanters; curvilinear fluid collection overlying the left posterior acetabulum; foci of gas in the left hip joint; scattered subcutaneous gas; scattered osseous sclerosis worrisome for chronic osteomyelitis. Last admit here 11/20--12/10 and treated for infected decub ulcers, SARS-CoV-2 infection and CAUTI. 11/20 urine culture with ESBL E. Coli and Klebsiella. 11/22 wound culture with ESBL E. Coli, E. faecalis and strep. She was on meropenem and vancomycin x 10 days and was discharged on a course of Augmentin.  - C/w IV Zosyn   - IVF hydration  - Cultures pending     ##Chronic osteomyelitis   5 Stage 4 sacral ulcer. CT abdomen/pelvis shows extensive decubitus ulcers overlying the sacrum and the greater trochanters; Curvilinear fluid collection overlying the left posterior acetabulum; Foci of gas in the left hip joint; Scattered subcutaneous gas; Scattered osseous sclerosis worrisome for chronic osteomyelitis; Rectum distended by stool. Wound care evaluated no need for debridement. , No pus, no drainage and no cellulitis. Unlikely infection source.   - MRI sacrum to R/o Osteo   - C/w VAC     #Functional quadriplegia secondary to multiple sclerosis.   bedbound    supportive care  frequent repositioning to prevent pressure wounds       I have spent a total of 50 minutes to prepare to see the patient, obtaining and reviewing history, physical examination, explaining the diagnosis, prognosis and treatment plan with the patient/family/caregiver. I also have spent the time ordering studies and testing, interpreting results, medicine reconciliation, IDRs, subspecialty consultation and documentation as above.

## 2024-12-17 NOTE — PATIENT PROFILE ADULT - NSPROGENPREVTRANSF_GEN_A_NUR
RX PROGRESS NOTE: Vancomycin Therapeutic Drug Monitoring    Day of therapy: 3    Indication and target trough: Febrile neutropenia (10-15 mcg/mL)    Current vancomycin dosing regimen: 1750  mg IVPB every 12 hours    Most recent height and weight information:  Weight: 98.7 kg (07/01/20 0500)  Height: 6' 3\" (190.5 cm)(stated) (06/13/20 0135)    The Following are the Calculated  Current Weights for Calvin Jeffers            Adjusted Ideal    90.2 kg 84.5 kg          Labs:  Serum Creatinine and Creatinine Clearance:  Serum creatinine: 0.58 mg/dL (L) 07/01/20 0335  Estimated creatinine clearance: 120 mL/min (A)    Vancomycin Serum Concentrations:  VANCOMYCIN (TROUGH) (mcg/mL)   Date/Time Value   07/01/2020 0335 26.1 (HH)       Assessment:  Serum concentration of 26.1 mcg/mL after the 4th dose.   Based on the serum concentration, will hold dose now. Will draw a random in 12 hours to calculate clearance.     Additional serum concentrations may be necessary depending on pathogen identified, risk factors for adverse events, and/or duration of therapy.  Pharmacy will continue to follow and adjust as needed.    Thank you,    MARIBEL Rich  7/1/2020 5:08 AM  Contact # [phone]     no history of blood product transfusion

## 2024-12-17 NOTE — ED ADULT NURSE REASSESSMENT NOTE - NS ED NURSE REASSESS COMMENT FT1
Safety checks compld + maintd. T+P Q encouraged. IV sites checked Q2+remains WDL. meds given as ord with no s/s of adverse RXNs. skin care and complete care rendered at this time. Pt changed by this RN and dressings soiled changed. Wound vac not working. Hospitalist contacted and awaiting PT to change wound Vac. Fall +skin precs in place. Call bell within reach and safety measures in place. Safety checks compld + maintd. T+P Q encouraged. IV sites checked Q2+remains WDL. meds given as ord with no s/s of adverse RXNs. skin care and complete care rendered at this time. Pt changed by this RN and dressings soiled changed. Wound vac not working. Hospitalist contacted and awaiting PT to change wound Vac. Fall +skin precs in place. Call bell within reach and safety measures in place.  Escalated to case management to start planning

## 2024-12-17 NOTE — CONSULT NOTE ADULT - SUBJECTIVE AND OBJECTIVE BOX
HPI:  79-year-old female with PMH osteoporosis, MS, bedbound, who presents today for hematuria. As per patient, amezquita was changed 2 days ago and blood was seen in the catheter at the time. Patient reports dysuria, denies frequency and urgency. Patient denies abdominal pain, flank pain, fever, chills, nausea, vomiting, diarrhea. Patient has had similar episodes prior.   On presentation, patient is hemodynamically stable and afebrile, although BP is soft at 109/68. Labs significant for elevated WBC 39.41, Hgb 9.2, elevated Alk Ph 166, UA showing +LE, +nitrite, +WBC, +bacteria. CT abdomen/pelvis shows extensive decubitus ulcers overlying the sacrum and the greater trochanters; Curvilinear fluid collection overlying the left posterior acetabulum; Foci of gas in the left hip joint; Scattered subcutaneous gas; Scattered osseous sclerosis worrisome for chronic osteomyelitis; Rectum distended by stool. Patient given zosyn & vancomycin. (16 Dec 2024 22:58). Wound care consulted for further evaluation.     Patient seen and examined at bedside resting comfortably. States she was brought to ED for UTI symptoms and hematuria, but denies any pain or worsening wounds. Patient with known sacral and greater trochanter ulcers, most recently debrided by Dr. Whitehead in 2024, and now with wound vac on sacral and greater trochanter wounds. Per ED attending, wound vac was removed for exam and then replaced. At time of exam, tissue at site of ulcers was noted to be pink/healthy, with no necrotic tissue, fluctuance, or drainage noted and no signs of necrotizing fasciitis or infected ulcers. Per patient, she denies any issues with her wounds and states they have been "doing well". Per HHA who was present earlier in ED, patient has been seen regularly by a wound care specialist since she was last discharged from [facility].  Denies fever, chills, N/V/D, CP, SOB.      PAST MEDICAL & SURGICAL HISTORY:  Osteoporosis      Uterine polyp      Herniated disc  lumbar      Functional quadriplegia secondary to multiple sclerosis      Bilateral Tubal ligation  48y/o          Review of Systems:  contained within HPI    MEDICATIONS  (STANDING):  dextrose 5% + sodium chloride 0.45%. 1000 milliLiter(s) (100 mL/Hr) IV Continuous <Continuous>  piperacillin/tazobactam IVPB.. 3.375 Gram(s) IV Intermittent every 8 hours  vancomycin  IVPB 1000 milliGRAM(s) IV Intermittent every 12 hours    MEDICATIONS  (PRN):  acetaminophen     Tablet .. 650 milliGRAM(s) Oral every 6 hours PRN Temp greater or equal to 38C (100.4F), Mild Pain (1 - 3)  aluminum hydroxide/magnesium hydroxide/simethicone Suspension 30 milliLiter(s) Oral every 4 hours PRN Dyspepsia  melatonin 3 milliGRAM(s) Oral at bedtime PRN Insomnia  ondansetron Injectable 4 milliGRAM(s) IV Push every 8 hours PRN Nausea and/or Vomiting      Allergies    No Known Allergies    Intolerances        SOCIAL HISTORY          Smoking: Yes [ ]  No [X]   ______pk yrs          ETOH  Yes [ ]  No [X]  Social [ ]          DRUGS:  Yes [ ]  No [X]  if so what______________    FAMILY HISTORY:      Vital Signs Last 24 Hrs  T(C): 36.6 (16 Dec 2024 18:42), Max: 36.6 (16 Dec 2024 18:42)  T(F): 97.8 (16 Dec 2024 18:42), Max: 97.8 (16 Dec 2024 18:42)  HR: 93 (16 Dec 2024 18:42) (93 - 98)  BP: 93/63 (16 Dec 2024 18:42) (93/63 - 109/68)  RR: 19 (16 Dec 2024 18:42) (18 - 19)  SpO2: 100% (16 Dec 2024 18:42) (99% - 100%)    Parameters below as of 16 Dec 2024 18:42  Patient On (Oxygen Delivery Method): room air        Physical Exam:    General:  Appears stated age, well-groomed, well-nourished, no distress  Eyes: EOMI  HENT: NCAT. WNL, no JVD  Chest: clear breath sounds  Cardiovascular: Regular rate & rhythm  Abdomen: soft, NT/ND  Extremities: contracted extremities  Skin: wound vac in place with adequate seal   Musculoskeletal: diminished strength  Neuro/Psych: AAO x3      LABS:                        9.2    39.41 )-----------( 280      ( 16 Dec 2024 18:45 )             29.9     12-16    136  |  102  |  21  ----------------------------<  187[H]  4.4   |  28  |  0.82    Ca    8.5      16 Dec 2024 18:45    TPro  5.6[L]  /  Alb  1.6[L]  /  TBili  0.5  /  DBili  x   /  AST  18  /  ALT  16  /  AlkPhos  166[H]  12-16    PT/INR - ( 16 Dec 2024 18:45 )   PT: 15.3 sec;   INR: 1.36 ratio         PTT - ( 16 Dec 2024 18:45 )  PTT:25.7 sec  Urinalysis Basic - ( 16 Dec 2024 18:45 )    Color: Orange / Appearance: Turbid / S.027 / pH: x  Gluc: 187 mg/dL / Ketone: Negative mg/dL  / Bili: Small / Urobili: 1.0 mg/dL   Blood: x / Protein: 100 mg/dL / Nitrite: Positive   Leuk Esterase: Large / RBC: 10 /HPF / WBC Too Numerous to count /HPF   Sq Epi: x / Non Sq Epi: x / Bacteria: Many /HPF    RADIOLOGY & ADDITIONAL STUDIES:  < from: CT Abdomen and Pelvis w/ IV Cont (24 @ 20:14) >  ACC: [id number] EXAM:  CT ABDOMEN AND PELVIS IC   ORDERED BY: VALARIE KING     PROCEDURE DATE:  2024          INTERPRETATION:  CLINICAL INFORMATION: leukocytosis to 40, UTI, multiple   pressure wounds, wound vac to sacrum FCT    COMPARISON: ..    CONTRAST/COMPLICATIONS:  IV Contrast: Omnipaque 350  85 cc administered   15 cc discarded  Oral Contrast: NONE  .    PROCEDURE:  CT of the Abdomen and Pelvis was performed.  Sagittal and coronal reformats were performed.    FINDINGS:    LOWERCHEST: Small left pleural effusion.    LIVER: Within normal limits.  BILE DUCTS: Normal caliber.  GALLBLADDER: Within normal limits.  SPLEEN: Within normal limits.  PANCREAS: Within normal limits.  ADRENALS: Within normal limits.  KIDNEYS/URETERS: Cysts.    BLADDER: Amezquita catheter in place. Small bladder calculi.  REPRODUCTIVE ORGANS: Fibroid uterus.    BOWEL: No bowel obstruction. The rectum is distended by stool.  PERITONEUM/RETROPERITONEUM: Within normal limits.  VESSELS:  Within normal limits.  ABDOMINAL WALL: Sacral decubitus ulcer with soft tissue defect extending   to the bone. Soft tissue defect and ulceration overlying the posterior   right hip. Decubitus ulcer overlying the left greater trochanter with gas   extending into the soft tissues and musculature. There is a   circumferential fluid collection around the left posterior acetabulum   containing gas. Focus of gas within the medial left thigh muscle and left   hip joint.  BONES: Sclerosis of the proximal right femur greater trochanter.   Sclerosis of the posterior iliac bones. Sclerosis of the lower sacrum.   Erosive changes involving the right SI joint are again noted.    IMPRESSION:  Extensive decubitus ulcers overlying the sacrum and the greater   trochanters.    Curvilinear fluid collection overlying the left posterior acetabulum.    Foci of gas in the left hip joint.    Scattered subcutaneous gas.    Scattered osseous sclerosis worrisome for chronic osteomyelitis.    Rectum distended by stool.    A/P  79-year-old female with PMH osteoporosis, MS, bedbound, who presents today for hematuria. Found with UTI, and CT findings of extensive decubitus ulcers overlying sacrum and greater trochanters.   atient with known sacral and greater trochanter ulcers, most recently debrided by Dr. Whitehead in 2024, and now with wound vac on sacral and greater trochanter wounds. Per ED attending, wound vac was removed for exam and then replaced. At time of exam, tissue at site of ulcers was noted to be pink/healthy, with no necrotic tissue, fluctuance, or drainage noted and no signs of necrotizing fasciitis or infected ulcers. Per patient, she denies any issues with her wounds and states they have been "doing well". Per HHA who was present earlier in ED, patient has been seen regularly by a wound care specialist since she was last discharged from [facility].  Afebrile, VSS. With leukocytosis   - continue abx   - pt wound care consult for vac management   - continue care per primary team   - will d/w Dr. Whitehead

## 2024-12-17 NOTE — CONSULT NOTE ADULT - ASSESSMENT
The patient is 78 y/o woman with PMH of osteoporosis, MS, bedbound, known to ID from last admission, who presents today for hematuria. Reportedly, amezquita was changed 2 days ago and blood was seen in the catheter at the time. Patient reports dysuria, denies frequency and urgency. Also c/o back pain. On presentation, patient is hemodynamically stable and afebrile. Labs significant for elevated WBC 39.41, UA showing large LE, +nitrite, +WBC, +bacteria. CT abdomen/pelvis shows extensive decubitus ulcers overlying the sacrum and the greater trochanters; curvilinear fluid collection overlying the left posterior acetabulum; foci of gas in the left hip joint; scattered subcutaneous gas; scattered osseous sclerosis worrisome for chronic osteomyelitis; rectum distended by stool. Patient given zosyn & vancomycin. ID consulted for workup and antibiotic management.    12/17: Last admit here 11/20--12/10 and treated for infected decub ulcers, SARS-CoV-2 infection and CAUTI. 11/20 urine culture with ESBL E. Coli and CRE Kleb was noted. 11/22 wound culture with ESBL E. Coli, E. faecalis and strep. She was on meropenem and vancomycin x 10 days and was discharged on a course of Augmentin. Now returns with another amezquita associated UTI. CT abd pelvis report and images reviewed, showing known pressure ulcers with concern for possible underlying chronic osteomyelitis.     Impression:  1. Catheter associated UTI-present on admission  2. Hematuria  3. Chronic pressure ulcers  4. Possible chronic sacral osteomyelitis  5. Leukocytosis- persistent since last admission  6. Old age, debility, bedbound    Recommendations:  --Continue piperacillin-tazobactam 3.375 g IV q8h for now  --Will follow urine culture and modify accordingly  --Would need MRI and surgery evaluation for osteomyelitis treatment   --Palliative care eval to establish goals of care  --Local wound care, off loading and nutritional optimization  --Closely follow white counts and temperature curve    Discussed with medical team    Yola Sood MD  Attending Physician  Division of Infectious Diseases   Available via [employer] Teams

## 2024-12-18 NOTE — PROGRESS NOTE ADULT - ASSESSMENT
79-year-old female with PMH osteoporosis, MS, bedbound, who presents today for hematuria, admitted for urinary tract infection and chronic osteomyelitis      ##Sepsis  ##Catheter associated UTI  ## Hematuria  Presents with hematuria. Reportedly, amezquita was changed 2 days ago and blood was seen in the catheter at the time. Patient reports dysuria, denies frequency and urgency. Also c/o back pain. On presentation, patient is hemodynamically stable and afebrile. Labs significant for elevated WBC 39.41, UA showing large LE, +nitrite, +WBC, +bacteria. CT abdomen/pelvis shows extensive decubitus ulcers overlying the sacrum and the greater trochanters; curvilinear fluid collection overlying the left posterior acetabulum; foci of gas in the left hip joint; scattered subcutaneous gas; scattered osseous sclerosis worrisome for chronic osteomyelitis. Last admit here 11/20--12/10 and treated for infected decub ulcers, SARS-CoV-2 infection and CAUTI. 11/20 urine culture with ESBL E. Coli and Klebsiella. 11/22 wound culture with ESBL E. Coli, E. faecalis and strep. She was on meropenem and vancomycin x 10 days and was discharged on a course of Augmentin.  - C/w IV Zosyn   - IVF hydration  - Cultures pending   wbc improved     ##Chronic osteomyelitis   5 Stage 4 sacral ulcer. CT abdomen/pelvis shows extensive decubitus ulcers overlying the sacrum and the greater trochanters; Curvilinear fluid collection overlying the left posterior acetabulum; Foci of gas in the left hip joint; Scattered subcutaneous gas; Scattered osseous sclerosis worrisome for chronic osteomyelitis; Rectum distended by stool. Wound care evaluated no need for debridement. , No pus, no drainage and no cellulitis. Unlikely infection source.   - MRI sacrum to R/o Osteo   - C/w VAC     #Functional quadriplegia secondary to multiple sclerosis.   bedbound    supportive care  frequent repositioning to prevent pressure wounds          Diet:  DVT prophylaxis:  Dispo:  Code Status:    I have spent a total of  minutes to prepare to see the patient, obtaining and reviewing history, physical examination, explaining the diagnosis, prognosis and treatment plan with the patient/family/caregiver. I also have spent the time ordering studies and testing, interpreting results, medicine reconciliation, IDRs, subspecialty consultation and documentation as above.

## 2024-12-18 NOTE — PROGRESS NOTE ADULT - SUBJECTIVE AND OBJECTIVE BOX
HPI:  79-year-old female with PMH osteoporosis, MS, bedbound, who presents today for hematuria. As per patient, amezquita was changed 2 days ago and blood was seen in the catheter at the time. Patient reports dysuria, denies frequency and urgency. Patient denies abdominal pain, flank pain, fever, chills, nausea, vomiting, diarrhea. Patient has had similar episodes prior.     On presentation, patient is hemodynamically stable and afebrile, although BP is soft at 109/68. Labs significant for elevated WBC 39.41, Hgb 9.2, elevated Alk Ph 166, UA showing +LE, +nitrite, +WBC, +bacteria. CT abdomen/pelvis shows extensive decubitus ulcers overlying the sacrum and the greater trochanters; Curvilinear fluid collection overlying the left posterior acetabulum; Foci of gas in the left hip joint; Scattered subcutaneous gas; Scattered osseous sclerosis worrisome for chronic osteomyelitis; Rectum distended by stool. Patient given zosyn & vancomycin.    (16 Dec 2024 22:58)  Patient is a 79y old  Female who presents with a chief complaint of Urinary tract infection and chronic osteomyelitis (17 Dec 2024 16:04)      INTERVAL HPI/OVERNIGHT EVENTS:    MEDICATIONS  (STANDING):  piperacillin/tazobactam IVPB.. 3.375 Gram(s) IV Intermittent every 8 hours    MEDICATIONS  (PRN):  acetaminophen     Tablet .. 650 milliGRAM(s) Oral every 6 hours PRN Temp greater or equal to 38C (100.4F), Mild Pain (1 - 3)  aluminum hydroxide/magnesium hydroxide/simethicone Suspension 30 milliLiter(s) Oral every 4 hours PRN Dyspepsia  melatonin 3 milliGRAM(s) Oral at bedtime PRN Insomnia  ondansetron Injectable 4 milliGRAM(s) IV Push every 8 hours PRN Nausea and/or Vomiting      Allergies    No Known Allergies    Intolerances        REVIEW OF SYSTEMS:  says  things are ok doesn't provide a review of systems     Vital Signs Last 24 Hrs  T(C): 36.3 (18 Dec 2024 10:17), Max: 37.1 (17 Dec 2024 19:47)  T(F): 97.3 (18 Dec 2024 10:17), Max: 98.8 (17 Dec 2024 19:47)  HR: 84 (18 Dec 2024 10:17) (84 - 96)  BP: 117/67 (18 Dec 2024 10:17) (94/59 - 117/67)  RR: 18 (18 Dec 2024 10:17) (18 - 18)  SpO2: 99% (18 Dec 2024 10:17) (99% - 100%)    Parameters below as of 18 Dec 2024 10:17  Patient On (Oxygen Delivery Method): room air        PHYSICAL EXAM:  GENERAL: NAD,  HEAD:  Atraumatic, Normocephalic  EYES: EOMI, PERRLA, conjunctiva and sclera clear  ENMT: No tonsillar erythema, exudates, or enlargement; Moist mucous membranes, Good dentition, No lesions  NECK: Supple, No JVD, Normal thyroid  NERVOUS SYSTEM:  Alert & Oriented X3, Good concentration; Motor Strength 5/5 B/L upper and lower extremities; DTRs 2+ intact and symmetric  CHEST/LUNG: Clear to ascultation  bilaterally; No rales, rhonchi, wheezing, or rubs  HEART: Regular rate and rhythm; No murmurs, rubs, or gallops  ABDOMEN: Soft, Nontender, Nondistended; Bowel sounds present  EXTREMITIES:  2+ Peripheral Pulses, No clubbing, cyanosis, or edema  LYMPH: No lymphadenopathy noted  SKIN: sacral ulcer   LABS:                        10.3   18.23 )-----------( 270      ( 17 Dec 2024 11:20 )             33.7     12-17    132[L]  |  100  |  16  ----------------------------<  216[H]  4.1   |  25  |  0.40[L]    Ca    8.4[L]      17 Dec 2024 11:20    TPro  5.9[L]  /  Alb  1.5[L]  /  TBili  0.4  /  DBili  x   /  AST  19  /  ALT  16  /  AlkPhos  166[H]  12-17    PT/INR - ( 16 Dec 2024 18:45 )   PT: 15.3 sec;   INR: 1.36 ratio         PTT - ( 16 Dec 2024 18:45 )  PTT:25.7 sec  Urinalysis Basic - ( 17 Dec 2024 11:20 )    Color: x / Appearance: x / SG: x / pH: x  Gluc: 216 mg/dL / Ketone: x  / Bili: x / Urobili: x   Blood: x / Protein: x / Nitrite: x   Leuk Esterase: x / RBC: x / WBC x   Sq Epi: x / Non Sq Epi: x / Bacteria: x      CAPILLARY BLOOD GLUCOSE          RADIOLOGY & ADDITIONAL TESTS:    Imaging Personally Reviewed:  [ X] YES  [ ] NO    Consultant(s) Notes Reviewed:  [X ] YES  [ ] NO    Care Discussed with Consultants/Other Providers X ] YES  [ ] NO

## 2024-12-18 NOTE — PROGRESS NOTE ADULT - ASSESSMENT
The patient is 78 y/o woman with PMH of osteoporosis, MS, bedbound, known to ID from last admission, who presents today for hematuria. Reportedly, amezquita was changed 2 days ago and blood was seen in the catheter at the time. Patient reports dysuria, denies frequency and urgency. Also c/o back pain. On presentation, patient is hemodynamically stable and afebrile. Labs significant for elevated WBC 39.41, UA showing large LE, +nitrite, +WBC, +bacteria. CT abdomen/pelvis shows extensive decubitus ulcers overlying the sacrum and the greater trochanters; curvilinear fluid collection overlying the left posterior acetabulum; foci of gas in the left hip joint; scattered subcutaneous gas; scattered osseous sclerosis worrisome for chronic osteomyelitis; rectum distended by stool. Patient given zosyn & vancomycin. ID consulted for workup and antibiotic management.    12/17: Last admit here 11/20--12/10 and treated for infected decub ulcers, SARS-CoV-2 infection and CAUTI. 11/20 urine culture with ESBL E. Coli and CRE Kleb was noted. 11/22 wound culture with ESBL E. Coli, E. faecalis and strep. She was on meropenem and vancomycin x 10 days and was discharged on a course of Augmentin. Now returns with another amezquita associated UTI. CT abd pelvis report and images reviewed, showing known pressure ulcers with concern for possible underlying chronic osteomyelitis.   12/18: no fever, RA, no new cbc, UC is growing E. Coli, no blood culture data is available, Zosyn IV continued.     Impression:  1. Catheter associated UTI-present on admission  2. Hematuria  3. Chronic pressure ulcers  4. Possible chronic sacral osteomyelitis  5. Leukocytosis- persistent since last admission  6. Old age, debility, bedbound    Recommendations:  --Continue piperacillin-tazobactam 3.375 g IV q8h for now  --Will follow urine culture and modify accordingly - pending sensitivity   --Would need MRI and surgery evaluation for osteomyelitis treatment   --Palliative care eval to establish goals of care  --vascular wound care evaluation is appreciated, all wounds stage 4, recommended PT/wound care evaluation for vac placement   --Closely follow white counts and temperature curve

## 2024-12-18 NOTE — PROGRESS NOTE ADULT - SUBJECTIVE AND OBJECTIVE BOX
SPENCER MIXON  MRN-[id number]  79y (1945)    Follow Up:  CAUTI, chronic sacral OM, multiple wounds     Interval History: The pt was seen and examined earlier, not in acute distress, pt is awake and alert, fatigued, appears mildly confused but able to answer simple questions.  Pt is afebrile, RA, no new cbc, yesterday's wbc 18.23.    PAST MEDICAL & SURGICAL HISTORY:  Osteoporosis      Uterine polyp      Herniated disc  lumbar      Functional quadriplegia secondary to multiple sclerosis      Bilateral Tubal ligation  46y/o          ROS:    [ ] Unobtainable because:  [x ] All other systems negative    Constitutional: no fever, no chills  Head: no trauma  Eyes: no vision changes, no eye pain  ENT:  no sore throat, no rhinorrhea  Cardiovascular:  no chest pain, no palpitation  Respiratory:  no SOB, no cough  GI:  no abd pain, no vomiting, no diarrhea  urinary: no dysuria, no hematuria, no flank pain  musculoskeletal:  no joint pain, no joint swelling  skin:  no rash  neurology:  no headache, no seizure, no change in mental status  psych: no anxiety, no depression         Allergies  No Known Allergies        ANTIMICROBIALS:  piperacillin/tazobactam IVPB.. 3.375 every 8 hours      OTHER MEDS:  acetaminophen     Tablet .. 650 milliGRAM(s) Oral every 6 hours PRN  aluminum hydroxide/magnesium hydroxide/simethicone Suspension 30 milliLiter(s) Oral every 4 hours PRN  melatonin 3 milliGRAM(s) Oral at bedtime PRN  ondansetron Injectable 4 milliGRAM(s) IV Push every 8 hours PRN      Vital Signs Last 24 Hrs  T(C): 36.3 (18 Dec 2024 10:17), Max: 37.1 (17 Dec 2024 19:47)  T(F): 97.3 (18 Dec 2024 10:17), Max: 98.8 (17 Dec 2024 19:47)  HR: 84 (18 Dec 2024 10:17) (84 - 96)  BP: 117/67 (18 Dec 2024 10:17) (94/59 - 117/67)  BP(mean): --  RR: 18 (18 Dec 2024 10:17) (18 - 18)  SpO2: 99% (18 Dec 2024 10:17) (99% - 100%)    Parameters below as of 18 Dec 2024 10:17  Patient On (Oxygen Delivery Method): room air        Physical Exam:  Constitutional: Elderly woman, non-toxic, no distress  HEENT: EOMI, no scleral icterus  Cardiovascular:   normal S1, S2, no edema  Respiratory:  clear BS bilaterally, no wheezes, no rales  GI:  soft, non-tender, non distended  :  + amezquita with clear urine in the bag.   Musculoskeletal:  curled up in bed. atrophic limbs, contracted. +pressure ulcers  Neurologic: awake and alert, oriented x 2, no focal findings  Skin:  warm, dry, no rash  Psychiatric:  appropriate mood    WBC Count: 18.23 K/uL (12-17 @ 11:20)  WBC Count: 39.41 K/uL (12-16 @ 18:45)  WBC Count: 16.74 K/uL (12-12 @ 07:05)                            10.3   18.23 )-----------( 270      ( 17 Dec 2024 11:20 )             33.7       12-17    132[L]  |  100  |  16  ----------------------------<  216[H]  4.1   |  25  |  0.40[L]    Ca    8.4[L]      17 Dec 2024 11:20    TPro  5.9[L]  /  Alb  1.5[L]  /  TBili  0.4  /  DBili  x   /  AST  19  /  ALT  16  /  AlkPhos  166[H]  12-17      Urinalysis Basic - ( 17 Dec 2024 11:20 )    Color: x / Appearance: x / SG: x / pH: x  Gluc: 216 mg/dL / Ketone: x  / Bili: x / Urobili: x   Blood: x / Protein: x / Nitrite: x   Leuk Esterase: x / RBC: x / WBC x   Sq Epi: x / Non Sq Epi: x / Bacteria: x        Creatinine Trend: 0.40<--, 0.82<--, 0.22<--, 0.17<--, 0.19<--, 0.16<--      MICROBIOLOGY:  v  Clean Catch  12-16-24   >100,000 CFU/ml Escherichia coli  --  --      .Abscess  11-22-24   Few Escherichia coli ESBL  Rare Enterococcus faecalis  Rare Streptococcus mitis/oralis group "Susceptibilities not performed"  --  Escherichia coli ESBL  Enterococcus faecalis      .Blood BLOOD  11-21-24   No growth at 5 days  --  --      .Blood BLOOD  11-21-24   No growth at 5 days  --  --      Clean Catch  11-20-24   >100,000 CFU/ml Escherichia coli ESBL  <10,000 CFU/ml Normal Urogenital korey present  --  Escherichia coli ESBL      Clean Catch  11-20-24   >100,000 CFU/ml Klebsiella pneumoniae (Carbapenem Resistant)  >100,000 CFU/ml Escherichia coli ESBL  --  Klebsiella pneumoniae (Carbapenem Resistant)  Escherichia coli ESBL      D-Dimer Assay, Quantitative: 1115 (12-05)        SARS-CoV-2: Detected (04 Dec 2024 11:00)    RADIOLOGY:     SPENCER MIXON  MRN-[id number]  79y (1945)    Follow Up:  CAUTI, chronic sacral OM, multiple wounds     Interval History: The pt was seen and examined earlier, not in acute distress, pt is awake and alert, fatigued, appears mildly confused but able to answer simple questions.  Pt is afebrile, RA, no new cbc, yesterday's wbc 18.23.      ROS:    [ ] Unobtainable because:  [x ] All other systems negative    Constitutional: no fever, no chills  Head: no trauma  Eyes: no vision changes, no eye pain  ENT:  no sore throat, no rhinorrhea  Cardiovascular:  no chest pain, no palpitation  Respiratory:  no SOB, no cough  GI:  no abd pain, no vomiting, no diarrhea  urinary: no dysuria, no hematuria, no flank pain  musculoskeletal:  no joint pain, no joint swelling  skin:  no rash  neurology:  no headache, no seizure, no change in mental status  psych: no anxiety, no depression         Allergies  No Known Allergies        ANTIMICROBIALS:  piperacillin/tazobactam IVPB.. 3.375 every 8 hours      OTHER MEDS:  acetaminophen     Tablet .. 650 milliGRAM(s) Oral every 6 hours PRN  aluminum hydroxide/magnesium hydroxide/simethicone Suspension 30 milliLiter(s) Oral every 4 hours PRN  melatonin 3 milliGRAM(s) Oral at bedtime PRN  ondansetron Injectable 4 milliGRAM(s) IV Push every 8 hours PRN      Vital Signs Last 24 Hrs  T(C): 36.3 (18 Dec 2024 10:17), Max: 37.1 (17 Dec 2024 19:47)  T(F): 97.3 (18 Dec 2024 10:17), Max: 98.8 (17 Dec 2024 19:47)  HR: 84 (18 Dec 2024 10:17) (84 - 96)  BP: 117/67 (18 Dec 2024 10:17) (94/59 - 117/67)  BP(mean): --  RR: 18 (18 Dec 2024 10:17) (18 - 18)  SpO2: 99% (18 Dec 2024 10:17) (99% - 100%)    Parameters below as of 18 Dec 2024 10:17  Patient On (Oxygen Delivery Method): room air        Physical Exam:  Constitutional: Elderly woman, non-toxic, no distress  HEENT: EOMI, no scleral icterus  Cardiovascular:   normal S1, S2, no edema  Respiratory:  clear BS bilaterally, no wheezes, no rales  GI:  soft, non-tender, non distended  :  + amezquita with clear urine in the bag.   Musculoskeletal:  curled up in bed. atrophic limbs, contracted. +pressure ulcers  Neurologic: awake and alert, oriented x 2, no focal findings  Skin:  warm, dry, no rash  Psychiatric:  appropriate mood    WBC Count: 18.23 K/uL (12-17 @ 11:20)  WBC Count: 39.41 K/uL (12-16 @ 18:45)  WBC Count: 16.74 K/uL (12-12 @ 07:05)                            10.3   18.23 )-----------( 270      ( 17 Dec 2024 11:20 )             33.7       12-17    132[L]  |  100  |  16  ----------------------------<  216[H]  4.1   |  25  |  0.40[L]    Ca    8.4[L]      17 Dec 2024 11:20    TPro  5.9[L]  /  Alb  1.5[L]  /  TBili  0.4  /  DBili  x   /  AST  19  /  ALT  16  /  AlkPhos  166[H]  12-17      Urinalysis Basic - ( 17 Dec 2024 11:20 )    Color: x / Appearance: x / SG: x / pH: x  Gluc: 216 mg/dL / Ketone: x  / Bili: x / Urobili: x   Blood: x / Protein: x / Nitrite: x   Leuk Esterase: x / RBC: x / WBC x   Sq Epi: x / Non Sq Epi: x / Bacteria: x        Creatinine Trend: 0.40<--, 0.82<--, 0.22<--, 0.17<--, 0.19<--, 0.16<--      MICROBIOLOGY:  v  Clean Catch  12-16-24   >100,000 CFU/ml Escherichia coli  --  --      .Abscess  11-22-24   Few Escherichia coli ESBL  Rare Enterococcus faecalis  Rare Streptococcus mitis/oralis group "Susceptibilities not performed"  --  Escherichia coli ESBL  Enterococcus faecalis      .Blood BLOOD  11-21-24   No growth at 5 days  --  --      .Blood BLOOD  11-21-24   No growth at 5 days  --  --      Clean Catch  11-20-24   >100,000 CFU/ml Escherichia coli ESBL  <10,000 CFU/ml Normal Urogenital korey present  --  Escherichia coli ESBL      Clean Catch  11-20-24   >100,000 CFU/ml Klebsiella pneumoniae (Carbapenem Resistant)  >100,000 CFU/ml Escherichia coli ESBL  --  Klebsiella pneumoniae (Carbapenem Resistant)  Escherichia coli ESBL      D-Dimer Assay, Quantitative: 1115 (12-05)        SARS-CoV-2: Detected (04 Dec 2024 11:00)    RADIOLOGY:

## 2024-12-18 NOTE — PHYSICAL THERAPY INITIAL EVALUATION ADULT - ORIENTATION, REHAB EVAL
oriented to person, place, time and situation oriented to person, place, time and situation/unable to assess

## 2024-12-18 NOTE — PHYSICAL THERAPY INITIAL EVALUATION ADULT - CRITERIA FOR SKILLED THERAPEUTIC INTERVENTIONS
impairments found/anticipated equipment needs at discharge/anticipated discharge recommendation impairments found/anticipated equipment needs at discharge

## 2024-12-18 NOTE — INPATIENT CERTIFICATION FOR MEDICARE PATIENTS - THE SEVERITY OF SIGNS/SYMPTOMS. (SEE ED/ADMIT DOCUMENTS)
[place] [facility] [email or web address]. Results not in epic. [facility] & no sample found. Contacted pt & she stated she was unable to get labs done yesterday because [facility] closed at 4p & she did not have time today. Pt states she will try to complete tomorrow.  Pt verbalized an understanding & agrees w/ plan    Routed to [facility] 1. The severity of signs/symptoms.(See ED/admit documents)

## 2024-12-18 NOTE — PHYSICAL THERAPY INITIAL EVALUATION ADULT - PATIENT/FAMILY/SIGNIFICANT OTHER GOALS STATEMENT, PT EVAL
Called and spoke to Megha.  She explained that she needs to have spironolactone and enulose scripts [facility]  [facility] also is having a problem with [facility].  She has spent a lot of time on the phone with them and Dr. Barahona needs to call the [facility] network and register as a provider.  Needs to call Winsome Roy at [phone] and give ref# [id number].  She had another number but she is not sure what it is for #[phone]  Routed to dr. barahona   To be wound free

## 2024-12-19 NOTE — DIETITIAN INITIAL EVALUATION ADULT - PERTINENT LABORATORY DATA
12-19    134[L]  |  102  |  9   ----------------------------<  150[H]  3.5   |  24  |  0.25[L]    Ca    8.5      19 Dec 2024 08:15  Phos  2.5     12-19  Mg     2.0     12-19    TPro  5.7[L]  /  Alb  1.5[L]  /  TBili  0.6  /  DBili  x   /  AST  19  /  ALT  13  /  AlkPhos  157[H]  12-19  A1C with Estimated Average Glucose Result: 7.0 % (11-21-24 @ 08:15)  A1C with Estimated Average Glucose Result: 7.4 % (08-06-24 @ 07:40)  A1C with Estimated Average Glucose Result: 7.5 % (08-05-24 @ 10:10)

## 2024-12-19 NOTE — DIETITIAN INITIAL EVALUATION ADULT - NSFNSPHYEXAMSKINFT_GEN_A_CORE
Pressure Injury 1: none, none  Pressure Injury 2: none, none  Pressure Injury 3: sacrum, none  Pressure Injury 4: none, none  Pressure Injury 5: none, none  Pressure Injury 6: none, none  Pressure Injury 7: none, none  Pressure Injury 8: none, none  Pressure Injury 9: none, none  Pressure Injury 10: none, none  Pressure Injury 11: none, none, Pressure Injury 1: Right:, hip , Stage IV  Pressure Injury 2: Left:, hip, Stage IV  Pressure Injury 3: sacrum, Stage IV  Pressure Injury 4: Left:, PSIS, Stage IV  Pressure Injury 5: Right:, PSIS, Stage IV  Pressure Injury 6: none, none  Pressure Injury 7: none, none  Pressure Injury 8: none, none  Pressure Injury 9: none, none  Pressure Injury 10: none, none  Pressure Injury 11: none, none

## 2024-12-19 NOTE — PROGRESS NOTE ADULT - ASSESSMENT
79-year-old female with PMH osteoporosis, MS, bedbound, who presents today for hematuria, admitted for urinary tract infection and chronic osteomyelitis        no further wound management recommendations  antibiotics switched to ertapenum   MRI pending will likely dc back [facility] with prior services        ##Sepsis  ##Catheter associated UTI  ## Hematuria  Presents with hematuria. Reportedly, amezquita was changed 2 days ago and blood was seen in the catheter at the time. Patient reports dysuria, denies frequency and urgency. Also c/o back pain. On presentation, patient is hemodynamically stable and afebrile. Labs significant for elevated WBC 39.41, UA showing large LE, +nitrite, +WBC, +bacteria. CT abdomen/pelvis shows extensive decubitus ulcers overlying the sacrum and the greater trochanters; curvilinear fluid collection overlying the left posterior acetabulum; foci of gas in the left hip joint; scattered subcutaneous gas; scattered osseous sclerosis worrisome for chronic osteomyelitis. Last admit here 11/20--12/10 and treated for infected decub ulcers, SARS-CoV-2 infection and CAUTI. 11/20 urine culture with ESBL E. Coli and Klebsiella. 11/22 wound culture with ESBL E. Coli, E. faecalis and strep. She was on meropenem and vancomycin x 10 days and was discharged on a course of Augmentin.  - C/w IV Zosyn   - IVF hydration  - Cultures pending   wbc improved     ##Chronic osteomyelitis   5 Stage 4 sacral ulcer. CT abdomen/pelvis shows extensive decubitus ulcers overlying the sacrum and the greater trochanters; Curvilinear fluid collection overlying the left posterior acetabulum; Foci of gas in the left hip joint; Scattered subcutaneous gas; Scattered osseous sclerosis worrisome for chronic osteomyelitis; Rectum distended by stool. Wound care evaluated no need for debridement. , No pus, no drainage and no cellulitis. Unlikely infection source.   - MRI sacrum to R/o Osteo   - C/w VAC     #Functional quadriplegia secondary to multiple sclerosis.   bedbound    supportive care  frequent repositioning to prevent pressure wounds          Diet:  DVT prophylaxis:  Dispo:  Code Status:    I have spent a total of  minutes to prepare to see the patient, obtaining and reviewing history, physical examination, explaining the diagnosis, prognosis and treatment plan with the patient/family/caregiver. I also have spent the time ordering studies and testing, interpreting results, medicine reconciliation, IDRs, subspecialty consultation and documentation as above.

## 2024-12-19 NOTE — DIETITIAN INITIAL EVALUATION ADULT - OTHER INFO
Pt seen on medical floor, adm w/ UTI & chronic osteomyelitis. Pt w/ cognitive limitations, alert to self only. unable to speak ( unrelated to language ). PMH osteoporosis ; MS ; Bedbound ; functional quadriplegia 2/2 to MS. Pt w/ multiple admissions & multiple medical needs. BMI = 13.3 on adm. 11/21 - A1c = 7.0 %. Pt unable to provide usual diet/ weight hx. No reports of N/V/D/C/Chewing/Swallowing issues, No food allergies. Currently no FS are being monitored, RN aware.   Pt seen on medical floor, adm w/ UTI & chronic osteomyelitis. Pt w/ cognitive limitations, alert to self only. unable to speak ( unrelated to language ). PMH osteoporosis ; MS ; Bedbound ; functional quadriplegia 2/2 to MS. Pt w/ multiple admissions & multiple medical needs. BMI = 13.3 on adm. 11/21 - A1c = 7.0 %. Pt unable to provide usual diet/ weight hx. No reports of N/V/D/C/Chewing/Swallowing issues, No food allergies. Currently no FS are being monitored, RN aware.  WT: 11/20/24 - 149.9 (68 kg )

## 2024-12-19 NOTE — PROGRESS NOTE ADULT - SUBJECTIVE AND OBJECTIVE BOX
HPI:  79-year-old female with PMH osteoporosis, MS, bedbound, who presents today for hematuria. As per patient, amezquita was changed 2 days ago and blood was seen in the catheter at the time. Patient reports dysuria, denies frequency and urgency. Patient denies abdominal pain, flank pain, fever, chills, nausea, vomiting, diarrhea. Patient has had similar episodes prior.     On presentation, patient is hemodynamically stable and afebrile, although BP is soft at 109/68. Labs significant for elevated WBC 39.41, Hgb 9.2, elevated Alk Ph 166, UA showing +LE, +nitrite, +WBC, +bacteria. CT abdomen/pelvis shows extensive decubitus ulcers overlying the sacrum and the greater trochanters; Curvilinear fluid collection overlying the left posterior acetabulum; Foci of gas in the left hip joint; Scattered subcutaneous gas; Scattered osseous sclerosis worrisome for chronic osteomyelitis; Rectum distended by stool. Patient given zosyn & vancomycin.    (16 Dec 2024 22:58)  Patient is a 79y old  Female who presents with a chief complaint of UTI     (19 Dec 2024 10:24)      INTERVAL HPI/OVERNIGHT EVENTS: no acute events     MEDICATIONS  (STANDING):  ertapenem  IVPB 1000 milliGRAM(s) IV Intermittent every 24 hours    MEDICATIONS  (PRN):  acetaminophen     Tablet .. 650 milliGRAM(s) Oral every 6 hours PRN Temp greater or equal to 38C (100.4F), Mild Pain (1 - 3)  aluminum hydroxide/magnesium hydroxide/simethicone Suspension 30 milliLiter(s) Oral every 4 hours PRN Dyspepsia  melatonin 3 milliGRAM(s) Oral at bedtime PRN Insomnia  ondansetron Injectable 4 milliGRAM(s) IV Push every 8 hours PRN Nausea and/or Vomiting      Allergies    No Known Allergies    Intolerances        REVIEW OF SYSTEMS:  CONSTITUTIONAL: No fever, weight loss, or fatigue  EYES: No eye pain, visual disturbances, or discharge  ENMT:  No difficulty hearing, tinnitus, vertigo; No sinus or throat pain  NECK: No pain or stiffness  BREASTS: No pain, masses, or nipple discharge  RESPIRATORY: No cough, wheezing, chills or hemoptysis; No shortness of breath  CARDIOVASCULAR: No chest pain, palpitations, dizziness, or leg swelling  GASTROINTESTINAL: No abdominal or epigastric pain. No nausea, vomiting, or hematemesis; No diarrhea or constipation. No melena or hematochezia.  GENITOURINARY: No dysuria, frequency, hematuria, or incontinence  NEUROLOGICAL: No headaches, memory loss, loss of strength, numbness, or tremors  SKIN: No itching, burning, rashes, or lesions   LYMPH NODES: No enlarged glands  ENDOCRINE: No heat or cold intolerance; No hair loss  MUSCULOSKELETAL: No joint pain or swelling; No muscle, back, or extremity pain  PSYCHIATRIC: No depression, anxiety, mood swings, or difficulty sleeping  HEME/LYMPH: No easy bruising, or bleeding gums  ALLERGY AND IMMUNOLOGIC: No hives or eczema    Vital Signs Last 24 Hrs  T(C): 36.8 (20 Dec 2024 05:03), Max: 37.1 (19 Dec 2024 23:41)  T(F): 98.2 (20 Dec 2024 05:03), Max: 98.7 (19 Dec 2024 23:41)  HR: 94 (20 Dec 2024 05:03) (94 - 103)  BP: 108/59 (20 Dec 2024 05:03) (108/59 - 133/79)  RR: 17 (20 Dec 2024 05:03) (16 - 18)  SpO2: 100% (20 Dec 2024 05:03) (97% - 100%)    Parameters below as of 20 Dec 2024 05:03  Patient On (Oxygen Delivery Method): room air        PHYSICAL EXAM:  encountered with family awake a  unable to give complaints   LABS:                        10.8   18.06 )-----------( 209      ( 19 Dec 2024 10:45 )             34.4     12-19    134[L]  |  102  |  9   ----------------------------<  150[H]  3.5   |  24  |  0.25[L]    Ca    8.5      19 Dec 2024 08:15  Phos  2.5     12-19  Mg     2.0     12-19    TPro  5.7[L]  /  Alb  1.5[L]  /  TBili  0.6  /  DBili  x   /  AST  19  /  ALT  13  /  AlkPhos  157[H]  12-19      Urinalysis Basic - ( 19 Dec 2024 08:15 )    Color: x / Appearance: x / SG: x / pH: x  Gluc: 150 mg/dL / Ketone: x  / Bili: x / Urobili: x   Blood: x / Protein: x / Nitrite: x   Leuk Esterase: x / RBC: x / WBC x   Sq Epi: x / Non Sq Epi: x / Bacteria: x      CAPILLARY BLOOD GLUCOSE          RADIOLOGY & ADDITIONAL TESTS:    Imaging Personally Reviewed:  [ ] YES  [ ] NO  < from: CT Abdomen and Pelvis w/ IV Cont (12.16.24 @ 20:14) >  ACC: [id number] EXAM:  CT ABDOMEN AND PELVIS IC   ORDERED BY: VALARIE KING     PROCEDURE DATE:  12/16/2024          INTERPRETATION:  CLINICAL INFORMATION: leukocytosis to 40, UTI, multiple   pressure wounds, wound vac to sacrum FCT    COMPARISON: 8.4.24.    CONTRAST/COMPLICATIONS:  IV Contrast: Omnipaque 350  85 cc administered   15 cc discarded  Oral Contrast: NONE  .    PROCEDURE:  CT of the Abdomen and Pelvis was performed.  Sagittal and coronal reformats were performed.    FINDINGS:    LOWERCHEST: Small left pleural effusion.    LIVER: Within normal limits.  BILE DUCTS: Normal caliber.  GALLBLADDER: Within normal limits.  SPLEEN: Within normal limits.  PANCREAS: Within normal limits.  ADRENALS: Within normal limits.  KIDNEYS/URETERS: Cysts.    BLADDER: Amezquita catheter in place. Small bladder calculi.  REPRODUCTIVE ORGANS: Fibroid uterus.    BOWEL: No bowel obstruction. The rectum is distended by stool.  PERITONEUM/RETROPERITONEUM: Within normal limits.  VESSELS:  Within normal limits.  ABDOMINAL WALL: Sacral decubitus ulcer with soft tissue defect extending   to the bone. Soft tissue defect and ulceration overlying the posterior   right hip. Decubitus ulcer overlying the left greater trochanter with gas   extending into the soft tissues and musculature. There is a   circumferential fluid collection around the left posterior acetabulum   containing gas. Focus of gas within the medial left thigh muscle and left   hip joint.  BONES: Sclerosis of the proximal right femur greater trochanter.   Sclerosis of the posterior iliac bones. Sclerosis of the lower sacrum.   Erosive changes involving the right SI joint are again noted.    IMPRESSION:  Extensive decubitus ulcers overlying the sacrum and the greater   trochanters.    Curvilinear fluid collection overlying the left posterior acetabulum.    Foci of gas in the left hip joint.    < end of copied text >    Consultant(s) Notes Reviewed:  [ ] YES  [ ] NO    Care Discussed with Consultants/Other Providers [ ] YES  [ ] NO

## 2024-12-19 NOTE — DIETITIAN INITIAL EVALUATION ADULT - PERTINENT MEDS FT
MEDICATIONS  (STANDING):  piperacillin/tazobactam IVPB.. 3.375 Gram(s) IV Intermittent every 8 hours    MEDICATIONS  (PRN):  acetaminophen     Tablet .. 650 milliGRAM(s) Oral every 6 hours PRN Temp greater or equal to 38C (100.4F), Mild Pain (1 - 3)  aluminum hydroxide/magnesium hydroxide/simethicone Suspension 30 milliLiter(s) Oral every 4 hours PRN Dyspepsia  melatonin 3 milliGRAM(s) Oral at bedtime PRN Insomnia  ondansetron Injectable 4 milliGRAM(s) IV Push every 8 hours PRN Nausea and/or Vomiting

## 2024-12-19 NOTE — DIETITIAN INITIAL EVALUATION ADULT - DIET TYPE
Glucerna Shake 8 oz 3 times per day (660 miguel angel, 30 gm pro)/consistent carbohydrate (evening snack)/supplement (specify)

## 2024-12-20 NOTE — CHART NOTE - NSCHARTNOTEFT_GEN_A_CORE
The patient is immobile, beneficiary cannot make changes in body position without assistance.     The patient has limited mobility, patient cannot independently make changes in body position significant enough to allow healing for multiple wounds

## 2024-12-20 NOTE — PROGRESS NOTE ADULT - SUBJECTIVE AND OBJECTIVE BOX
HPI:  79-year-old female with PMH osteoporosis, MS, bedbound, who presents today for hematuria. As per patient, amezquita was changed 2 days ago and blood was seen in the catheter at the time. Patient reports dysuria, denies frequency and urgency. Patient denies abdominal pain, flank pain, fever, chills, nausea, vomiting, diarrhea. Patient has had similar episodes prior.     On presentation, patient is hemodynamically stable and afebrile, although BP is soft at 109/68. Labs significant for elevated WBC 39.41, Hgb 9.2, elevated Alk Ph 166, UA showing +LE, +nitrite, +WBC, +bacteria. CT abdomen/pelvis shows extensive decubitus ulcers overlying the sacrum and the greater trochanters; Curvilinear fluid collection overlying the left posterior acetabulum; Foci of gas in the left hip joint; Scattered subcutaneous gas; Scattered osseous sclerosis worrisome for chronic osteomyelitis; Rectum distended by stool. Patient given zosyn & vancomycin.    (16 Dec 2024 22:58)  Patient is a 79y old  Female who presents with a chief complaint of UTI     (19 Dec 2024 10:24)      INTERVAL HPI/OVERNIGHT EVENTS: no acute events     MEDICATIONS  (STANDING):  ertapenem  IVPB 1000 milliGRAM(s) IV Intermittent every 24 hours    MEDICATIONS  (PRN):  acetaminophen     Tablet .. 650 milliGRAM(s) Oral every 6 hours PRN Temp greater or equal to 38C (100.4F), Mild Pain (1 - 3)  aluminum hydroxide/magnesium hydroxide/simethicone Suspension 30 milliLiter(s) Oral every 4 hours PRN Dyspepsia  melatonin 3 milliGRAM(s) Oral at bedtime PRN Insomnia  ondansetron Injectable 4 milliGRAM(s) IV Push every 8 hours PRN Nausea and/or Vomiting      Allergies    No Known Allergies    Intolerances        REVIEW OF SYSTEMS:  CONSTITUTIONAL: No fever, weight loss, or fatigue  EYES: No eye pain, visual disturbances, or discharge  ENMT:  No difficulty hearing, tinnitus, vertigo; No sinus or throat pain  NECK: No pain or stiffness  BREASTS: No pain, masses, or nipple discharge  RESPIRATORY: No cough, wheezing, chills or hemoptysis; No shortness of breath  CARDIOVASCULAR: No chest pain, palpitations, dizziness, or leg swelling  GASTROINTESTINAL: No abdominal or epigastric pain. No nausea, vomiting, or hematemesis; No diarrhea or constipation. No melena or hematochezia.  GENITOURINARY: No dysuria, frequency, hematuria, or incontinence  NEUROLOGICAL: No headaches, memory loss, loss of strength, numbness, or tremors  SKIN: No itching, burning, rashes, or lesions   LYMPH NODES: No enlarged glands  ENDOCRINE: No heat or cold intolerance; No hair loss  MUSCULOSKELETAL: No joint pain or swelling; No muscle, back, or extremity pain  PSYCHIATRIC: No depression, anxiety, mood swings, or difficulty sleeping  HEME/LYMPH: No easy bruising, or bleeding gums  ALLERGY AND IMMUNOLOGIC: No hives or eczema    Vital Signs Last 24 Hrs  T(C): 36.8 (20 Dec 2024 05:03), Max: 37.1 (19 Dec 2024 23:41)  T(F): 98.2 (20 Dec 2024 05:03), Max: 98.7 (19 Dec 2024 23:41)  HR: 94 (20 Dec 2024 05:03) (94 - 103)  BP: 108/59 (20 Dec 2024 05:03) (108/59 - 133/79)  RR: 17 (20 Dec 2024 05:03) (16 - 18)  SpO2: 100% (20 Dec 2024 05:03) (97% - 100%)    Parameters below as of 20 Dec 2024 05:03  Patient On (Oxygen Delivery Method): room air        PHYSICAL EXAM:  encountered with family awake a  unable to give complaints   LABS:                        10.8   18.06 )-----------( 209      ( 19 Dec 2024 10:45 )             34.4     12-19    134[L]  |  102  |  9   ----------------------------<  150[H]  3.5   |  24  |  0.25[L]    Ca    8.5      19 Dec 2024 08:15  Phos  2.5     12-19  Mg     2.0     12-19    TPro  5.7[L]  /  Alb  1.5[L]  /  TBili  0.6  /  DBili  x   /  AST  19  /  ALT  13  /  AlkPhos  157[H]  12-19      Urinalysis Basic - ( 19 Dec 2024 08:15 )    Color: x / Appearance: x / SG: x / pH: x  Gluc: 150 mg/dL / Ketone: x  / Bili: x / Urobili: x   Blood: x / Protein: x / Nitrite: x   Leuk Esterase: x / RBC: x / WBC x   Sq Epi: x / Non Sq Epi: x / Bacteria: x      CAPILLARY BLOOD GLUCOSE          RADIOLOGY & ADDITIONAL TESTS:    Imaging Personally Reviewed:  [ ] YES  [ ] NO  < from: CT Abdomen and Pelvis w/ IV Cont (12.16.24 @ 20:14) >  ACC: [id number] EXAM:  CT ABDOMEN AND PELVIS IC   ORDERED BY: VLAARIE KING     PROCEDURE DATE:  12/16/2024          INTERPRETATION:  CLINICAL INFORMATION: leukocytosis to 40, UTI, multiple   pressure wounds, wound vac to sacrum FCT    COMPARISON: 8.4.24.    CONTRAST/COMPLICATIONS:  IV Contrast: Omnipaque 350  85 cc administered   15 cc discarded  Oral Contrast: NONE  .    PROCEDURE:  CT of the Abdomen and Pelvis was performed.  Sagittal and coronal reformats were performed.    FINDINGS:    LOWERCHEST: Small left pleural effusion.    LIVER: Within normal limits.  BILE DUCTS: Normal caliber.  GALLBLADDER: Within normal limits.  SPLEEN: Within normal limits.  PANCREAS: Within normal limits.  ADRENALS: Within normal limits.  KIDNEYS/URETERS: Cysts.    BLADDER: Amezquita catheter in place. Small bladder calculi.  REPRODUCTIVE ORGANS: Fibroid uterus.    BOWEL: No bowel obstruction. The rectum is distended by stool.  PERITONEUM/RETROPERITONEUM: Within normal limits.  VESSELS:  Within normal limits.  ABDOMINAL WALL: Sacral decubitus ulcer with soft tissue defect extending   to the bone. Soft tissue defect and ulceration overlying the posterior   right hip. Decubitus ulcer overlying the left greater trochanter with gas   extending into the soft tissues and musculature. There is a   circumferential fluid collection around the left posterior acetabulum   containing gas. Focus of gas within the medial left thigh muscle and left   hip joint.  BONES: Sclerosis of the proximal right femur greater trochanter.   Sclerosis of the posterior iliac bones. Sclerosis of the lower sacrum.   Erosive changes involving the right SI joint are again noted.    IMPRESSION:  Extensive decubitus ulcers overlying the sacrum and the greater   trochanters.    Curvilinear fluid collection overlying the left posterior acetabulum.    Foci of gas in the left hip joint.    < end of copied text >    Consultant(s) Notes Reviewed:  [ ] YES  [ ] NO    Care Discussed with Consultants/Other Providers [ ] YES  [ ] NO

## 2024-12-21 NOTE — PROGRESS NOTE ADULT - ASSESSMENT
79-year-old female with PMH osteoporosis, MS, bedbound, who presents today for hematuria, admitted for urinary tract infection and chronic osteomyelitis        no further wound management recommendations  antibiotics switched to ertapenem   MRI pending will likely dc back home with prior services        ##Sepsis  ##Catheter associated UTI  ## Hematuria  Presents with hematuria. Reportedly, amezquita was changed 2 days ago and blood was seen in the catheter at the time. Patient reports dysuria, denies frequency and urgency. Also c/o back pain. On presentation, patient is hemodynamically stable and afebrile. Labs significant for elevated WBC 39.41, UA showing large LE, +nitrite, +WBC, +bacteria. CT abdomen/pelvis shows extensive decubitus ulcers overlying the sacrum and the greater trochanters; curvilinear fluid collection overlying the left posterior acetabulum; foci of gas in the left hip joint; scattered subcutaneous gas; scattered osseous sclerosis worrisome for chronic osteomyelitis. Last admit here 11/20--12/10 and treated for infected decub ulcers, SARS-CoV-2 infection and CAUTI. 11/20 urine culture with ESBL E. Coli and Klebsiella. 11/22 wound culture with ESBL E. Coli, E. faecalis and strep. She was on meropenem and vancomycin x 10 days and was discharged on a course of Augmentin.  - C/w IV Zosyn   - IVF hydration  - Cultures pending   wbc improved     ##Chronic osteomyelitis   5 Stage 4 sacral ulcer. CT abdomen/pelvis shows extensive decubitus ulcers overlying the sacrum and the greater trochanters; Curvilinear fluid collection overlying the left posterior acetabulum; Foci of gas in the left hip joint; Scattered subcutaneous gas; Scattered osseous sclerosis worrisome for chronic osteomyelitis; Rectum distended by stool. Wound care evaluated no need for debridement. , No pus, no drainage and no cellulitis. Unlikely infection source.   - MRI sacrum to R/o Osteo   - C/w VAC     #Functional quadriplegia secondary to multiple sclerosis.   bedbound    supportive care  frequent repositioning to prevent pressure wounds          Diet:  DVT prophylaxis:  Dispo:  Code Status:    I have spent a total of  minutes to prepare to see the patient, obtaining and reviewing history, physical examination, explaining the diagnosis, prognosis and treatment plan with the patient/family/caregiver. I also have spent the time ordering studies and testing, interpreting results, medicine reconciliation, IDRs, subspecialty consultation and documentation as above.

## 2024-12-21 NOTE — PROGRESS NOTE ADULT - SUBJECTIVE AND OBJECTIVE BOX
HPI:  79-year-old female with PMH osteoporosis, MS, bedbound, who presents today for hematuria. As per patient, amezquita was changed 2 days ago and blood was seen in the catheter at the time. Patient reports dysuria, denies frequency and urgency. Patient denies abdominal pain, flank pain, fever, chills, nausea, vomiting, diarrhea. Patient has had similar episodes prior.     On presentation, patient is hemodynamically stable and afebrile, although BP is soft at 109/68. Labs significant for elevated WBC 39.41, Hgb 9.2, elevated Alk Ph 166, UA showing +LE, +nitrite, +WBC, +bacteria. CT abdomen/pelvis shows extensive decubitus ulcers overlying the sacrum and the greater trochanters; Curvilinear fluid collection overlying the left posterior acetabulum; Foci of gas in the left hip joint; Scattered subcutaneous gas; Scattered osseous sclerosis worrisome for chronic osteomyelitis; Rectum distended by stool. Patient given zosyn & vancomycin.    (16 Dec 2024 22:58)  Patient is a 79y old  Female who presents with a chief complaint of Urinary tract infection and chronic osteomyelitis (20 Dec 2024 13:14)      INTERVAL HPI/OVERNIGHT EVENTS: no acute events     MEDICATIONS  (STANDING):  ertapenem  IVPB 1000 milliGRAM(s) IV Intermittent every 24 hours    MEDICATIONS  (PRN):  acetaminophen     Tablet .. 650 milliGRAM(s) Oral every 6 hours PRN Temp greater or equal to 38C (100.4F), Mild Pain (1 - 3)  aluminum hydroxide/magnesium hydroxide/simethicone Suspension 30 milliLiter(s) Oral every 4 hours PRN Dyspepsia  melatonin 3 milliGRAM(s) Oral at bedtime PRN Insomnia  ondansetron Injectable 4 milliGRAM(s) IV Push every 8 hours PRN Nausea and/or Vomiting      Allergies    No Known Allergies    Intolerances        REVIEW OF SYSTEMS:  dementia but gives no complaints     Vital Signs Last 24 Hrs  T(C): 36.9 (22 Dec 2024 05:05), Max: 37.2 (21 Dec 2024 17:54)  T(F): 98.4 (22 Dec 2024 05:05), Max: 98.9 (21 Dec 2024 17:54)  HR: 111 (22 Dec 2024 05:05) (105 - 111)  BP: 121/66 (22 Dec 2024 05:05) (110/68 - 128/78)  BP(mean): 85 (22 Dec 2024 05:05) (82 - 85)  RR: 16 (22 Dec 2024 05:05) (16 - 18)  SpO2: 93% (22 Dec 2024 05:05) (93% - 95%)    Parameters below as of 22 Dec 2024 05:05  Patient On (Oxygen Delivery Method): room air        PHYSICAL EXAM:  GENERAL: NAD  HEAD:  Atraumatic, Normocephalic  EYES: EOMI, PERRLA, conjunctiva and sclera clear  ENMT: No tonsillar erythema, exudates, or enlargement; Moist mucous membranes, Good dentition, No lesions  NECK: Supple, No JVD, Normal thyroid  NERVOUS SYSTEM:  Alert and Awake CHEST/LUNG: Clear to ascultation  bilaterally; No rales, rhonchi, wheezing, or rubs  HEART: Regular rate and rhythm; No murmurs, rubs, or gallops  ABDOMEN: Soft, Nontender, Nondistended; Bowel sounds present  EXTREMITIES:  2+ Peripheral Pulses, No clubbing, cyanosis, or edema  LYMPH: No lymphadenopathy noted  SKIN: vac in place  LABS:                        9.3    13.27 )-----------( 235      ( 21 Dec 2024 02:50 )             29.9     12-21    134[L]  |  98  |  15  ----------------------------<  167[H]  4.9   |  27  |  0.28[L]    Ca    8.4[L]      21 Dec 2024 02:50  Phos  1.5     12-21  Mg     1.8     12-21    TPro  5.7[L]  /  Alb  1.6[L]  /  TBili  0.3  /  DBili  x   /  AST  32  /  ALT  23  /  AlkPhos  244[H]  12-21      Urinalysis Basic - ( 21 Dec 2024 02:50 )    Color: x / Appearance: x / SG: x / pH: x  Gluc: 167 mg/dL / Ketone: x  / Bili: x / Urobili: x   Blood: x / Protein: x / Nitrite: x   Leuk Esterase: x / RBC: x / WBC x   Sq Epi: x / Non Sq Epi: x / Bacteria: x      CAPILLARY BLOOD GLUCOSE          RADIOLOGY & ADDITIONAL TESTS:    Imaging Personally Reviewed:  [ ] YES  [ ] NO    Consultant(s) Notes Reviewed:  [ ] YES  < from: CT Abdomen and Pelvis w/ IV Cont (12.16.24 @ 20:14) >  MPRESSION:  Extensive decubitus ulcers overlying the sacrum and the greater   trochanters.    Curvilinear fluid collection overlying the left posterior acetabulum.    Foci of gas in the left hip joint.    Scattered subcutaneous gas.    Scattered osseous sclerosis worrisome for chronic osteomyelitis.    Rectum distended by stool.    < end of copied text >  [ ] NO    Care Discussed with Consultants/Other Providers [ ] YES  [ ] NO

## 2024-12-22 NOTE — PROGRESS NOTE ADULT - SUBJECTIVE AND OBJECTIVE BOX
HPI:  79-year-old female with PMH osteoporosis, MS, bedbound, who presents today for hematuria. As per patient, amezquita was changed 2 days ago and blood was seen in the catheter at the time. Patient reports dysuria, denies frequency and urgency. Patient denies abdominal pain, flank pain, fever, chills, nausea, vomiting, diarrhea. Patient has had similar episodes prior.     On presentation, patient is hemodynamically stable and afebrile, although BP is soft at 109/68. Labs significant for elevated WBC 39.41, Hgb 9.2, elevated Alk Ph 166, UA showing +LE, +nitrite, +WBC, +bacteria. CT abdomen/pelvis shows extensive decubitus ulcers overlying the sacrum and the greater trochanters; Curvilinear fluid collection overlying the left posterior acetabulum; Foci of gas in the left hip joint; Scattered subcutaneous gas; Scattered osseous sclerosis worrisome for chronic osteomyelitis; Rectum distended by stool. Patient given zosyn & vancomycin.    (16 Dec 2024 22:58)  Patient is a 79y old  Female who presents with a chief complaint of Urinary tract infection and chronic osteomyelitis (21 Dec 2024 15:18)      INTERVAL HPI/OVERNIGHT EVENTS: no acute events     MEDICATIONS  (STANDING):  potassium phosphate IVPB 30 milliMole(s) IV Intermittent once    MEDICATIONS  (PRN):  acetaminophen     Tablet .. 650 milliGRAM(s) Oral every 6 hours PRN Temp greater or equal to 38C (100.4F), Mild Pain (1 - 3)  aluminum hydroxide/magnesium hydroxide/simethicone Suspension 30 milliLiter(s) Oral every 4 hours PRN Dyspepsia  melatonin 3 milliGRAM(s) Oral at bedtime PRN Insomnia  ondansetron Injectable 4 milliGRAM(s) IV Push every 8 hours PRN Nausea and/or Vomiting      Allergies    No Known Allergies    Intolerances        REVIEW OF SYSTEMS:  unable to give     Vital Signs Last 24 Hrs  T(C): 36.2 (22 Dec 2024 18:07), Max: 36.9 (22 Dec 2024 05:05)  T(F): 97.1 (22 Dec 2024 18:07), Max: 98.4 (22 Dec 2024 05:05)  HR: 107 (22 Dec 2024 18:07) (96 - 111)  BP: 128/71 (22 Dec 2024 18:07) (110/68 - 128/71)  BP(mean): 85 (22 Dec 2024 05:05) (82 - 85)  RR: 16 (22 Dec 2024 18:07) (16 - 17)  SpO2: 95% (22 Dec 2024 18:07) (89% - 95%)    Parameters below as of 22 Dec 2024 11:44  Patient On (Oxygen Delivery Method): room air        PHYSICAL EXAM:  GENERAL: NAD,   HEAD:  Atraumatic, Normocephalic  EYES: EOMI, PERRLA, conjunctiva and sclera clear  ENMT: No tonsillar erythema, exudates, or enlargement; Moist mucous membranes, Good dentition, No lesions  NECK: Supple, No JVD, Normal thyroid  NERVOUS SYSTEM: awake eating   CHEST/LUNG: Clear to ascultation  bilaterally; No rales, rhonchi, wheezing, or rubs  HEART: Regular rate and rhythm; No murmurs, rubs, or gallops  ABDOMEN: Soft, Nontender, Nondistended; Bowel sounds present  EXTREMITIES:  2+ Peripheral Pulses, No clubbing, cyanosis, or edema  LYMPH: No lymphadenopathy noted  SKIN: vasc in place   LABS:                        8.3    11.95 )-----------( 201      ( 22 Dec 2024 10:03 )             26.7     12-22    136  |  103  |  10  ----------------------------<  138[H]  3.6   |  27  |  <0.15[L]    Ca    8.0[L]      22 Dec 2024 10:03  Phos  2.1     12-22  Mg     1.9     12-22    TPro  5.1[L]  /  Alb  1.3[L]  /  TBili  0.4  /  DBili  x   /  AST  19  /  ALT  16  /  AlkPhos  214[H]  12-22      Urinalysis Basic - ( 22 Dec 2024 10:03 )    Color: x / Appearance: x / SG: x / pH: x  Gluc: 138 mg/dL / Ketone: x  / Bili: x / Urobili: x   Blood: x / Protein: x / Nitrite: x   Leuk Esterase: x / RBC: x / WBC x   Sq Epi: x / Non Sq Epi: x / Bacteria: x      CAPILLARY BLOOD GLUCOSE          RADIOLOGY & ADDITIONAL TESTS:    Imaging Personally Reviewed:  [ ] YES  [ ] NO    Consultant(s) Notes Reviewed:  [ ] YES  [ ] NO    Care Discussed with Consultants/Other Providers [ ] YES  [ ] NO

## 2024-12-23 NOTE — PROGRESS NOTE ADULT - ASSESSMENT
The patient is 80 y/o woman with PMH of osteoporosis, MS, bedbound, known to ID from last admission, who presents today for hematuria. Reportedly, amezquita was changed 2 days ago and blood was seen in the catheter at the time. Patient reports dysuria, denies frequency and urgency. Also c/o back pain. On presentation, patient is hemodynamically stable and afebrile. Labs significant for elevated WBC 39.41, UA showing large LE, +nitrite, +WBC, +bacteria. CT abdomen/pelvis shows extensive decubitus ulcers overlying the sacrum and the greater trochanters; curvilinear fluid collection overlying the left posterior acetabulum; foci of gas in the left hip joint; scattered subcutaneous gas; scattered osseous sclerosis worrisome for chronic osteomyelitis; rectum distended by stool. Patient given zosyn & vancomycin. ID consulted for workup and antibiotic management.    12/17: Last admit here 11/20--12/10 and treated for infected decub ulcers, SARS-CoV-2 infection and CAUTI. 11/20 urine culture with ESBL E. Coli and CRE Kleb was noted. 11/22 wound culture with ESBL E. Coli, E. faecalis and strep. She was on meropenem and vancomycin x 10 days and was discharged on a course of Augmentin. Now returns with another amezquita associated UTI. CT abd pelvis report and images reviewed, showing known pressure ulcers with concern for possible underlying chronic osteomyelitis.   12/18: no fever, RA, no new cbc, UC is growing E. Coli, no blood culture data is available, Zosyn IV continued.   12/23':     Impression:  1. Catheter associated UTI-present on admission  2. Hematuria  3. Chronic pressure ulcers  4. Possible chronic sacral osteomyelitis  5. Leukocytosis- persistent since last admission  6. Old age, debility, bedbound    Recommendations:  --Continue piperacillin-tazobactam 3.375 g IV q8h for now  --Will follow urine culture and modify accordingly - pending sensitivity   --Would need MRI and surgery evaluation for osteomyelitis treatment   --Palliative care eval to establish goals of care  --vascular wound care evaluation is appreciated, all wounds stage 4, recommended PT/wound care evaluation for vac placement   --Closely follow white counts and temperature curve     The patient is 78 y/o woman with PMH of osteoporosis, MS, bedbound, known to ID from last admission, who presents today for hematuria. Reportedly, amezquita was changed 2 days ago and blood was seen in the catheter at the time. Patient reports dysuria, denies frequency and urgency. Also c/o back pain. On presentation, patient is hemodynamically stable and afebrile. Labs significant for elevated WBC 39.41, UA showing large LE, +nitrite, +WBC, +bacteria. CT abdomen/pelvis shows extensive decubitus ulcers overlying the sacrum and the greater trochanters; curvilinear fluid collection overlying the left posterior acetabulum; foci of gas in the left hip joint; scattered subcutaneous gas; scattered osseous sclerosis worrisome for chronic osteomyelitis; rectum distended by stool. Patient given zosyn & vancomycin. ID consulted for workup and antibiotic management.    12/17: Last admit here 11/20--12/10 and treated for infected decub ulcers, SARS-CoV-2 infection and CAUTI. 11/20 urine culture with ESBL E. Coli and CRE Kleb was noted. 11/22 wound culture with ESBL E. Coli, E. faecalis and strep. She was on meropenem and vancomycin x 10 days and was discharged on a course of Augmentin. Now returns with another amezquita associated UTI. CT abd pelvis report and images reviewed, showing known pressure ulcers with concern for possible underlying chronic osteomyelitis.   12/18: no fever, RA, no new cbc, UC is growing E. Coli, no blood culture data is available, Zosyn IV continued.   12/23: Afebrile. No leukocytosis. Zosyn was switched to Ertapenem on 12/20 after ESBL reported in the urine culture. Sensitivity noted above. MRI pelvis today with acute on chronic osteomyelitis at the sacrum.    Impression:  1. Catheter associated UTI-present on admission  2. Hematuria  3. Chronic pressure ulcers  4. Acute ic sacral osteomyelitis  5. Leukocytosis- persistent since last admission  6. Old age, debility, bedbound    Recommendations:  --Continue ertapenem 1 g IV q24h (day # 4) and day#7 of the total antibiotics this admission  --WoulMRI and surgery evaluation for osteomyelitis treatment   --Palliative care eval to establish goals of care  --vascular wound care evaluation is appreciated, all wounds stage 4, recommended PT/wound care evaluation for vac placement   --Closely follow white counts and temperature curve     The patient is 80 y/o woman with PMH of osteoporosis, MS, bedbound, known to ID from last admission, who presents today for hematuria. Reportedly, amezquita was changed 2 days ago and blood was seen in the catheter at the time. Patient reports dysuria, denies frequency and urgency. Also c/o back pain. On presentation, patient is hemodynamically stable and afebrile. Labs significant for elevated WBC 39.41, UA showing large LE, +nitrite, +WBC, +bacteria. CT abdomen/pelvis shows extensive decubitus ulcers overlying the sacrum and the greater trochanters; curvilinear fluid collection overlying the left posterior acetabulum; foci of gas in the left hip joint; scattered subcutaneous gas; scattered osseous sclerosis worrisome for chronic osteomyelitis; rectum distended by stool. Patient given zosyn & vancomycin. ID consulted for workup and antibiotic management.    12/17: Last admit here 11/20--12/10 and treated for infected decub ulcers, SARS-CoV-2 infection and CAUTI. 11/20 urine culture with ESBL E. Coli and CRE Kleb was noted. 11/22 wound culture with ESBL E. Coli, E. faecalis and strep. She was on meropenem and vancomycin x 10 days and was discharged on a course of Augmentin. Now returns with another amezquita associated UTI. CT abd pelvis report and images reviewed, showing known pressure ulcers with concern for possible underlying chronic osteomyelitis.   12/18: no fever, RA, no new cbc, UC is growing E. Coli, no blood culture data is available, Zosyn IV continued.   12/23: Afebrile. No leukocytosis. Zosyn was switched to Ertapenem on 12/20 after ESBL reported in the urine culture. Sensitivity noted above. MRI pelvis today with acute on chronic osteomyelitis at the sacrum.    Impression:  1. Catheter associated UTI-present on admission  2. Hematuria  3. Chronic pressure ulcers  4. Acute on chronic sacral osteomyelitis  5. Leukocytosis- persistent since last admission  6. Old age, debility, bedbound    Recommendations:  --Continue ertapenem 1 g IV q24h (day # 4/5) and day#7 of the total antibiotics this admission  --Would follow plan for osteomyelitis treatment   --Palliative care eval to establish goals of care  --vascular wound care evaluation is appreciated  --Wound vac per wound care  --Closely follow white counts and temperature curve    Discussed with Son at bedside    Yola Sood MD  Attending Physician  Division of Infectious Diseases   Available via [employer] Teams

## 2024-12-23 NOTE — PROGRESS NOTE ADULT - ASSESSMENT
79-year-old female with PMH osteoporosis, MS, bedbound, who presents today for hematuria, admitted for urinary tract infection and chronic osteomyelitis        no further wound management recommendations  antibiotics switched to ertapenem   MRI reviewed will DC home with prior services once complete  with antibiotics     ##Sepsis  ##Catheter associated UTI  ## Hematuria  Presents with hematuria. Reportedly, amezquita was changed 2 days ago and blood was seen in the catheter at the time. Patient reports dysuria, denies frequency and urgency. Also c/o back pain. On presentation, patient is hemodynamically stable and afebrile. Labs significant for elevated WBC 39.41, UA showing large LE, +nitrite, +WBC, +bacteria. CT abdomen/pelvis shows extensive decubitus ulcers overlying the sacrum and the greater trochanters; curvilinear fluid collection overlying the left posterior acetabulum; foci of gas in the left hip joint; scattered subcutaneous gas; scattered osseous sclerosis worrisome for chronic osteomyelitis. Last admit here 11/20--12/10 and treated for infected decub ulcers, SARS-CoV-2 infection and CAUTI. 11/20 urine culture with ESBL E. Coli and Klebsiella. 11/22 wound culture with ESBL E. Coli, E. faecalis and strep. She was on meropenem and vancomycin x 10 days and was discharged on a course of Augmentin.  - C/w IV Zosyn   - IVF hydration  - Cultures pending   wbc improved     ##Chronic osteomyelitis   5 Stage 4 sacral ulcer. CT abdomen/pelvis shows extensive decubitus ulcers overlying the sacrum and the greater trochanters; Curvilinear fluid collection overlying the left posterior acetabulum; Foci of gas in the left hip joint; Scattered subcutaneous gas; Scattered osseous sclerosis worrisome for chronic osteomyelitis; Rectum distended by stool. Wound care evaluated no need for debridement. , No pus, no drainage and no cellulitis. Unlikely infection source.   - MRI sacrum to R/o Osteo   - C/w VAC     #Functional quadriplegia secondary to multiple sclerosis.   bedbound    supportive care  frequent repositioning to prevent pressure wounds          Diet:  DVT prophylaxis:  Dispo:  Code Status:    I have spent a total of  minutes to prepare to see the patient, obtaining and reviewing history, physical examination, explaining the diagnosis, prognosis and treatment plan with the patient/family/caregiver. I also have spent the time ordering studies and testing, interpreting results, medicine reconciliation, IDRs, subspecialty consultation and documentation as above.

## 2024-12-23 NOTE — PROGRESS NOTE ADULT - SUBJECTIVE AND OBJECTIVE BOX
SPENCER MIXON  MRN-[id number]  79y (1945)    Follow Up:  CAUTI, chronic sacral OM, multiple wounds     Interval History: The pt was seen and examined earlier, not in acute distress, pt is awake and alert, fatigued, appears mildly confused but able to answer simple questions.  Pt is afebrile, RA, no new cbc, yesterday's wbc 18.23.      ROS:    [ ] Unobtainable because:  [x ] All other systems negative    Constitutional: no fever, no chills  Head: no trauma  Eyes: no vision changes, no eye pain  ENT:  no sore throat, no rhinorrhea  Cardiovascular:  no chest pain, no palpitation  Respiratory:  no SOB, no cough  GI:  no abd pain, no vomiting, no diarrhea  urinary: no dysuria, no hematuria, no flank pain  musculoskeletal:  no joint pain, no joint swelling  skin:  no rash  neurology:  no headache, no seizure, no change in mental status  psych: no anxiety, no depression         Allergies  No Known Allergies        ANTIMICROBIALS:  piperacillin/tazobactam IVPB.. 3.375 every 8 hours      OTHER MEDS:  acetaminophen     Tablet .. 650 milliGRAM(s) Oral every 6 hours PRN  aluminum hydroxide/magnesium hydroxide/simethicone Suspension 30 milliLiter(s) Oral every 4 hours PRN  melatonin 3 milliGRAM(s) Oral at bedtime PRN  ondansetron Injectable 4 milliGRAM(s) IV Push every 8 hours PRN        Physical Exam:  Vital Signs Last 24 Hrs  T(C): 36.4 (23 Dec 2024 11:23), Max: 37.1 (23 Dec 2024 05:30)  T(F): 97.5 (23 Dec 2024 11:23), Max: 98.7 (23 Dec 2024 05:30)  HR: 103 (23 Dec 2024 11:23) (96 - 107)  BP: 163/78 (23 Dec 2024 11:23) (118/74 - 163/78)  BP(mean): --  RR: 17 (23 Dec 2024 11:23) (16 - 17)  SpO2: 94% (23 Dec 2024 11:23) (89% - 99%)    Parameters below as of 23 Dec 2024 11:23  Patient On (Oxygen Delivery Method): room air      Constitutional: Elderly woman, non-toxic, no distress  HEENT: EOMI, no scleral icterus  Cardiovascular:   normal S1, S2, no edema  Respiratory:  clear BS bilaterally, no wheezes, no rales  GI:  soft, non-tender, non distended  :  + amezquita with clear urine in the bag.   Musculoskeletal:  curled up in bed. atrophic limbs, contracted. +pressure ulcers  Neurologic: awake and alert, oriented x 2, no focal findings  Skin:  warm, dry, no rash  Psychiatric:  appropriate mood                          10.7   13.05 )-----------( 177      ( 23 Dec 2024 08:00 )             35.0     12-23    136  |  104  |  12  ----------------------------<  120[H]  4.8   |  24  |  0.32[L]    Ca    8.2[L]      23 Dec 2024 08:00  Phos  4.1     12-23  Mg     2.0     12-23    TPro  5.9[L]  /  Alb  1.4[L]  /  TBili  0.4  /  DBili  x   /  AST  22  /  ALT  14  /  AlkPhos  226[H]  12-23      Urinalysis Basic - ( 17 Dec 2024 11:20 )    Color: x / Appearance: x / SG: x / pH: x  Gluc: 216 mg/dL / Ketone: x  / Bili: x / Urobili: x   Blood: x / Protein: x / Nitrite: x   Leuk Esterase: x / RBC: x / WBC x   Sq Epi: x / Non Sq Epi: x / Bacteria: x        Creatinine Trend: 0.40<--, 0.82<--, 0.22<--, 0.17<--, 0.19<--, 0.16<--      MICROBIOLOGY:  Clean Catch  12-16-24   >100,000 CFU/ml Escherichia coli  --  --      .Abscess  11-22-24   Few Escherichia coli ESBL  Rare Enterococcus faecalis  Rare Streptococcus mitis/oralis group "Susceptibilities not performed"  --  Escherichia coli ESBL  Enterococcus faecalis      .Blood BLOOD  11-21-24   No growth at 5 days  --  --      .Blood BLOOD  11-21-24   No growth at 5 days  --  --      Clean Catch  11-20-24   >100,000 CFU/ml Escherichia coli ESBL  <10,000 CFU/ml Normal Urogenital korey present  --  Escherichia coli ESBL      Clean Catch  11-20-24   >100,000 CFU/ml Klebsiella pneumoniae (Carbapenem Resistant)  >100,000 CFU/ml Escherichia coli ESBL  --  Klebsiella pneumoniae (Carbapenem Resistant)  Escherichia coli ESBL      D-Dimer Assay, Quantitative: 1115 (12-05)        SARS-CoV-2: Detected (04 Dec 2024 11:00)    RADIOLOGY:     SPENCER MIXON  MRN-[id number]  79y (1945)    Follow Up:  CAUTI, chronic sacral OM, multiple wounds     Interval History: Seen and examined at bedside. Afebrile. Aid and son present. No new complaints.     ROS:    [ ] Unobtainable because:  [x ] All other systems negative    Constitutional: no fever, no chills  Head: no trauma  Eyes: no vision changes, no eye pain  ENT:  no sore throat, no rhinorrhea  Cardiovascular:  no chest pain, no palpitation  Respiratory:  no SOB, no cough  GI:  no abd pain, no vomiting, no diarrhea  urinary: no dysuria, no hematuria, no flank pain  musculoskeletal:  no joint pain, no joint swelling  skin:  no rash  neurology:  no headache, no seizure, no change in mental status  psych: no anxiety, no depression       Allergies  No Known Allergies      ANTIMICROBIALS:   ertapenem  IVPB 1000 milliGRAM(s) IV Intermittent every 24 hours        Physical Exam:  Vital Signs Last 24 Hrs  T(C): 36.4 (23 Dec 2024 11:23), Max: 37.1 (23 Dec 2024 05:30)  T(F): 97.5 (23 Dec 2024 11:23), Max: 98.7 (23 Dec 2024 05:30)  HR: 103 (23 Dec 2024 11:23) (96 - 107)  BP: 163/78 (23 Dec 2024 11:23) (118/74 - 163/78)  BP(mean): --  RR: 17 (23 Dec 2024 11:23) (16 - 17)  SpO2: 94% (23 Dec 2024 11:23) (89% - 99%)    Parameters below as of 23 Dec 2024 11:23  Patient On (Oxygen Delivery Method): room air      Constitutional: Elderly woman, non-toxic, no distress  HEENT: EOMI, no scleral icterus  Cardiovascular:   normal S1, S2, no edema  Respiratory:  clear BS bilaterally, no wheezes, no rales  GI:  soft, non-tender, non distended  :  + amezquita with dark urine in the bag.   Musculoskeletal:  curled up in bed. atrophic limbs, contracted. +pressure ulcers  Neurologic: awake and alert, oriented x 2, no focal findings  Skin:  warm, dry, no rash  Psychiatric:  appropriate mood                          10.7   13.05 )-----------( 177      ( 23 Dec 2024 08:00 )             35.0     12-23    136  |  104  |  12  ----------------------------<  120[H]  4.8   |  24  |  0.32[L]    Ca    8.2[L]      23 Dec 2024 08:00  Phos  4.1     12-23  Mg     2.0     12-23    TPro  5.9[L]  /  Alb  1.4[L]  /  TBili  0.4  /  DBili  x   /  AST  22  /  ALT  14  /  AlkPhos  226[H]  12-23      Urinalysis Basic - ( 17 Dec 2024 11:20 )    Color: x / Appearance: x / SG: x / pH: x  Gluc: 216 mg/dL / Ketone: x  / Bili: x / Urobili: x   Blood: x / Protein: x / Nitrite: x   Leuk Esterase: x / RBC: x / WBC x   Sq Epi: x / Non Sq Epi: x / Bacteria: x        Creatinine Trend: 0.40<--, 0.82<--, 0.22<--, 0.17<--, 0.19<--, 0.16<--      MICROBIOLOGY:  Clean Catch  12-16-24   >100,000 CFU/ml Escherichia coli  --  --  Culture - Urine (12.16.24 @ 18:45)   -  Piperacillin/Tazobactam: S <=8   -  Tobramycin: R >8   -  Trimethoprim/Sulfamethoxazole: R >2/38   -  Ampicillin/Sulbactam: S 8/4   -  Ampicillin: R >16 These ampicillin results predict results for amoxicillin   -  Aztreonam: R 16   -  Cefazolin: R >16   -  Ceftriaxone: R >32   -  Cefuroxime: R >16   -  Ciprofloxacin: R >2   -  Gentamicin: R >8   -  Ertapenem: S <=0.5   -  Imipenem: S <=1   -  Levofloxacin: R >4   -  Meropenem: S <=1   -  Nitrofurantoin: S <=32 Should not be used to treat pyelonephritis   Specimen Source: Clean Catch   Culture Results:   >100,000 CFU/ml Escherichia coli ESBL   Organism Identification: Escherichia coli ESBL   Organism: Escherichia coli ESBL   Method Type: KATHRYN        .Abscess  11-22-24   Few Escherichia coli ESBL  Rare Enterococcus faecalis  Rare Streptococcus mitis/oralis group "Susceptibilities not performed"  --  Escherichia coli ESBL  Enterococcus faecalis      .Blood BLOOD  11-21-24   No growth at 5 days  --  --      .Blood BLOOD  11-21-24   No growth at 5 days  --  --      Clean Catch  11-20-24   >100,000 CFU/ml Escherichia coli ESBL  <10,000 CFU/ml Normal Urogenital korey present  --  Escherichia coli ESBL      Clean Catch  11-20-24   >100,000 CFU/ml Klebsiella pneumoniae (Carbapenem Resistant)  >100,000 CFU/ml Escherichia coli ESBL  --  Klebsiella pneumoniae (Carbapenem Resistant)  Escherichia coli ESBL      D-Dimer Assay, Quantitative: 1115 (12-05)        SARS-CoV-2: Detected (04 Dec 2024 11:00)    RADIOLOGY:  < from: MR Pelvis w/ IV Cont (12.23.24 @ 10:34) >  IMPRESSION:  MRI of the bony pelvis demonstrates extensive soft tissue ulceration at   the posterior and lateral tissues with gas tracking to the sacrum, and   bilateral greater trochanters consistent with stage IV ulceration for   which correlation with physical exam is recommended. Intermediate to low   T1 signal, high STIR signal, and postcontrast enhancement at the S5   segment of the sacrum, coccyx, and less so at the greater trochanters and   posterior ischial spines suggestive of osteomyelitis. Findings may be   chronic or acute on chronic.  No walled off collection to suggest abscess.    < end of copied text >

## 2024-12-24 NOTE — PROGRESS NOTE ADULT - SUBJECTIVE AND OBJECTIVE BOX
SPENCER MIXON  MRN-[id number]  79y (1945)    Follow Up:  uti, multiple wounds, fever    Interval History: The pt was seen and examined earlier, not in acute distress, no new complaints. Pt is afebrile since 12/20, RA, no new cbc.     PAST MEDICAL & SURGICAL HISTORY:  Osteoporosis      Uterine polyp      Herniated disc  lumbar      Functional quadriplegia secondary to multiple sclerosis      Bilateral Tubal ligation  48y/o          ROS:  limited, pt denies any pain  [ ] Unobtainable because:  [x ] All other systems negative    Constitutional: no fever, no chills  Head: no trauma  Eyes: no vision changes, no eye pain  ENT:  no sore throat, no rhinorrhea  Cardiovascular:  no chest pain, no palpitation  Respiratory:  no SOB, no cough  GI:  no abd pain, no vomiting, no diarrhea  urinary: no dysuria, no hematuria, no flank pain  musculoskeletal:  no joint pain, no joint swelling  skin:  no rash  neurology:  no headache, no seizure, no change in mental status  psych: no anxiety, no depression         Allergies  No Known Allergies        ANTIMICROBIALS:  ertapenem  IVPB 1000 every 24 hours      OTHER MEDS:  acetaminophen     Tablet .. 650 milliGRAM(s) Oral every 6 hours PRN  aluminum hydroxide/magnesium hydroxide/simethicone Suspension 30 milliLiter(s) Oral every 4 hours PRN  dextrose 5% + sodium chloride 0.45%. 1000 milliLiter(s) IV Continuous <Continuous>  melatonin 3 milliGRAM(s) Oral at bedtime PRN  ondansetron Injectable 4 milliGRAM(s) IV Push every 8 hours PRN      Vital Signs Last 24 Hrs  T(C): 36.6 (24 Dec 2024 05:06), Max: 36.8 (23 Dec 2024 17:17)  T(F): 97.8 (24 Dec 2024 05:06), Max: 98.2 (23 Dec 2024 17:17)  HR: 99 (24 Dec 2024 05:06) (99 - 110)  BP: 102/64 (24 Dec 2024 05:06) (102/64 - 163/78)  BP(mean): --  RR: 17 (24 Dec 2024 05:06) (16 - 17)  SpO2: 96% (24 Dec 2024 05:06) (94% - 97%)    Parameters below as of 24 Dec 2024 05:06  Patient On (Oxygen Delivery Method): room air        Physical Exam:  Constitutional: Elderly woman, non-toxic, no distress  HEENT: EOMI, no scleral icterus  Cardiovascular:   normal S1, S2, no edema  Respiratory:  clear BS bilaterally, no wheezes, no rales  GI:  soft, non-tender, non distended  :  + amezquita    Musculoskeletal:  curled up in bed. atrophic limbs, contracted. +pressure ulcers with vac dressing on  Neurologic: awake and alert, oriented x 2, no focal findings  Skin:  warm, dry, no rash  Psychiatric:  appropriate mood    WBC Count: 13.05 K/uL (12-23 @ 08:00)  WBC Count: 11.95 K/uL (12-22 @ 10:03)  WBC Count: 13.27 K/uL (12-21 @ 02:50)  WBC Count: 18.06 K/uL (12-19 @ 10:45)  WBC Count: 18.23 K/uL (12-17 @ 11:20)                            10.7   13.05 )-----------( 177      ( 23 Dec 2024 08:00 )             35.0       12-23    136  |  104  |  12  ----------------------------<  120[H]  4.8   |  24  |  0.32[L]    Ca    8.2[L]      23 Dec 2024 08:00  Phos  4.1     12-23  Mg     2.0     12-23    TPro  5.9[L]  /  Alb  1.4[L]  /  TBili  0.4  /  DBili  x   /  AST  22  /  ALT  14  /  AlkPhos  226[H]  12-23      Urinalysis Basic - ( 23 Dec 2024 08:00 )    Color: x / Appearance: x / SG: x / pH: x  Gluc: 120 mg/dL / Ketone: x  / Bili: x / Urobili: x   Blood: x / Protein: x / Nitrite: x   Leuk Esterase: x / RBC: x / WBC x   Sq Epi: x / Non Sq Epi: x / Bacteria: x        Creatinine Trend: 0.32<--, <0.15<--, 0.28<--, 0.25<--, 0.40<--, 0.82<--      MICROBIOLOGY:  v  Clean Catch  12-16-24   >100,000 CFU/ml Escherichia coli ESBL  --  Escherichia coli ESBL    SARS-CoV-2: Detected (04 Dec 2024 11:00)    RADIOLOGY:     SPENCER MIXON  MRN-[id number]  79y (1945)    Follow Up:  uti, multiple wounds, fever    Interval History: The pt was seen and examined earlier, not in acute distress, no new complaints. Pt is afebrile since 12/20, RA, no new cbc.       ROS:  limited, pt denies any pain  [ ] Unobtainable because:  [x ] All other systems negative    Constitutional: no fever, no chills  Head: no trauma  Eyes: no vision changes, no eye pain  ENT:  no sore throat, no rhinorrhea  Cardiovascular:  no chest pain, no palpitation  Respiratory:  no SOB, no cough  GI:  no abd pain, no vomiting, no diarrhea  urinary: no dysuria, no hematuria, no flank pain  musculoskeletal:  no joint pain, no joint swelling  skin:  no rash  neurology:  no headache, no seizure, no change in mental status  psych: no anxiety, no depression       Allergies  No Known Allergies      ANTIMICROBIALS:  ertapenem  IVPB 1000 every 24 hours    OTHER MEDS:  acetaminophen     Tablet .. 650 milliGRAM(s) Oral every 6 hours PRN  aluminum hydroxide/magnesium hydroxide/simethicone Suspension 30 milliLiter(s) Oral every 4 hours PRN  dextrose 5% + sodium chloride 0.45%. 1000 milliLiter(s) IV Continuous <Continuous>  melatonin 3 milliGRAM(s) Oral at bedtime PRN  ondansetron Injectable 4 milliGRAM(s) IV Push every 8 hours PRN      Vital Signs Last 24 Hrs  T(C): 36.6 (24 Dec 2024 05:06), Max: 36.8 (23 Dec 2024 17:17)  T(F): 97.8 (24 Dec 2024 05:06), Max: 98.2 (23 Dec 2024 17:17)  HR: 99 (24 Dec 2024 05:06) (99 - 110)  BP: 102/64 (24 Dec 2024 05:06) (102/64 - 163/78)  BP(mean): --  RR: 17 (24 Dec 2024 05:06) (16 - 17)  SpO2: 96% (24 Dec 2024 05:06) (94% - 97%)    Parameters below as of 24 Dec 2024 05:06  Patient On (Oxygen Delivery Method): room air      Physical Exam:  Constitutional: Elderly woman, non-toxic, no distress  HEENT: EOMI, no scleral icterus  Cardiovascular:   normal S1, S2, no edema  Respiratory:  clear BS bilaterally, no wheezes, no rales  GI:  soft, non-tender, non distended  :  + amezquita    Musculoskeletal:  curled up in bed. atrophic limbs, contracted. +pressure ulcers with vac dressing on  Neurologic: awake and alert, oriented x 2, no focal findings  Skin:  warm, dry, no rash  Psychiatric:  appropriate mood    WBC Count: 13.05 K/uL (12-23 @ 08:00)  WBC Count: 11.95 K/uL (12-22 @ 10:03)  WBC Count: 13.27 K/uL (12-21 @ 02:50)  WBC Count: 18.06 K/uL (12-19 @ 10:45)  WBC Count: 18.23 K/uL (12-17 @ 11:20)                        10.7   13.05 )-----------( 177      ( 23 Dec 2024 08:00 )             35.0       12-23    136  |  104  |  12  ----------------------------<  120[H]  4.8   |  24  |  0.32[L]    Ca    8.2[L]      23 Dec 2024 08:00  Phos  4.1     12-23  Mg     2.0     12-23    TPro  5.9[L]  /  Alb  1.4[L]  /  TBili  0.4  /  DBili  x   /  AST  22  /  ALT  14  /  AlkPhos  226[H]  12-23      Urinalysis Basic - ( 23 Dec 2024 08:00 )    Color: x / Appearance: x / SG: x / pH: x  Gluc: 120 mg/dL / Ketone: x  / Bili: x / Urobili: x   Blood: x / Protein: x / Nitrite: x   Leuk Esterase: x / RBC: x / WBC x   Sq Epi: x / Non Sq Epi: x / Bacteria: x      Creatinine Trend: 0.32<--, <0.15<--, 0.28<--, 0.25<--, 0.40<--, 0.82<--      MICROBIOLOGY:  Clean Catch  12-16-24   >100,000 CFU/ml Escherichia coli ESBL  --  Escherichia coli ESBL    SARS-CoV-2: Detected (04 Dec 2024 11:00)    RADIOLOGY:

## 2024-12-24 NOTE — PROGRESS NOTE ADULT - ASSESSMENT
79-year-old female with PMH osteoporosis, MS, bedbound, who presents today for hematuria, admitted for urinary tract infection and chronic osteomyelitis        no further wound management recommendations from vascular continue vac   on  ertapenem ID appreciated now with flu and RSV   MRI reviewed will DC home with prior services once complete  with antibiotics   MOLST and DNR/DNI needs to be conirmed     ##Sepsis  ##Catheter associated UTI  ## Hematuria  Presents with hematuria. Reportedly, amezquita was changed 2 days ago and blood was seen in the catheter at the time. Patient reports dysuria, denies frequency and urgency. Also c/o back pain. On presentation, patient is hemodynamically stable and afebrile. Labs significant for elevated WBC 39.41, UA showing large LE, +nitrite, +WBC, +bacteria. CT abdomen/pelvis shows extensive decubitus ulcers overlying the sacrum and the greater trochanters; curvilinear fluid collection overlying the left posterior acetabulum; foci of gas in the left hip joint; scattered subcutaneous gas; scattered osseous sclerosis worrisome for chronic osteomyelitis. Last admit here 11/20--12/10 and treated for infected decub ulcers, SARS-CoV-2 infection and CAUTI. 11/20 urine culture with ESBL E. Coli and Klebsiella. 11/22 wound culture with ESBL E. Coli, E. faecalis and strep. She was on meropenem and vancomycin x 10 days and was discharged on a course of Augmentin.  resolving     ##Chronic osteomyelitis   5 Stage 4 sacral ulcer. CT abdomen/pelvis shows extensive decubitus ulcers overlying the sacrum and the greater trochanters; Curvilinear fluid collection overlying the left posterior acetabulum; Foci of gas in the left hip joint; Scattered subcutaneous gas; Scattered osseous sclerosis worrisome for chronic osteomyelitis; Rectum distended by stool. Wound care evaluated no need for debridement. , No pus, no drainage and no cellulitis. Unlikely infection source.   - MRI sacrum chronic osteo  - C/w VAC     #Functional quadriplegia secondary to multiple sclerosis.   bedbound    supportive care  frequent repositioning to prevent pressure wounds          Diet:  DVT prophylaxis:  Dispo:  Code Status: per palliative is DNR/DNI needs to be confirmed and MOLST o chart

## 2024-12-24 NOTE — PROGRESS NOTE ADULT - SUBJECTIVE AND OBJECTIVE BOX
HPI:  79-year-old female with PMH osteoporosis, MS, bedbound, who presents today for hematuria. As per patient, amezquita was changed 2 days ago and blood was seen in the catheter at the time. Patient reports dysuria, denies frequency and urgency. Patient denies abdominal pain, flank pain, fever, chills, nausea, vomiting, diarrhea. Patient has had similar episodes prior.     On presentation, patient is hemodynamically stable and afebrile, although BP is soft at 109/68. Labs significant for elevated WBC 39.41, Hgb 9.2, elevated Alk Ph 166, UA showing +LE, +nitrite, +WBC, +bacteria. CT abdomen/pelvis shows extensive decubitus ulcers overlying the sacrum and the greater trochanters; Curvilinear fluid collection overlying the left posterior acetabulum; Foci of gas in the left hip joint; Scattered subcutaneous gas; Scattered osseous sclerosis worrisome for chronic osteomyelitis; Rectum distended by stool. Patient given zosyn & vancomycin.    (16 Dec 2024 22:58)  Patient is a 79y old  Female who presents with a chief complaint of Urinary tract infection and chronic osteomyelitis (24 Dec 2024 09:54)      INTERVAL HPI/OVERNIGHT EVENTS: patient was found to be flu and RSV positive     MEDICATIONS  (STANDING):  collagenase Ointment 1 Application(s) Topical two times a day  dextrose 5% + sodium chloride 0.45%. 1000 milliLiter(s) (100 mL/Hr) IV Continuous <Continuous>  ertapenem  IVPB 1000 milliGRAM(s) IV Intermittent every 24 hours  nystatin Powder 1 Application(s) Topical two times a day    MEDICATIONS  (PRN):  acetaminophen     Tablet .. 650 milliGRAM(s) Oral every 6 hours PRN Temp greater or equal to 38C (100.4F), Mild Pain (1 - 3)  aluminum hydroxide/magnesium hydroxide/simethicone Suspension 30 milliLiter(s) Oral every 4 hours PRN Dyspepsia  melatonin 3 milliGRAM(s) Oral at bedtime PRN Insomnia  ondansetron Injectable 4 milliGRAM(s) IV Push every 8 hours PRN Nausea and/or Vomiting      Allergies    No Known Allergies    Intolerances        REVIEW OF SYSTEMS:  dementia but gives no complaints     Vital Signs Last 24 Hrs  T(C): 36.7 (25 Dec 2024 05:16), Max: 37.6 (24 Dec 2024 17:15)  T(F): 98 (25 Dec 2024 05:16), Max: 99.7 (24 Dec 2024 17:15)  HR: 89 (25 Dec 2024 05:16) (87 - 105)  BP: 103/59 (25 Dec 2024 05:16) (101/64 - 156/74)--  RR: 17 (25 Dec 2024 05:16) (16 - 18)  SpO2: 97% (25 Dec 2024 05:16) (93% - 99%)    Parameters below as of 24 Dec 2024 17:15  Patient On (Oxygen Delivery Method): room air        PHYSICAL EXAM:  contracted   awake and responsive   multiple vac dressings in palce     LABS:                        8.9    10.25 )-----------( 202      ( 24 Dec 2024 14:40 )             28.8     12-24    136  |  104  |  11  ----------------------------<  204[H]  4.1   |  24  |  0.28[L]    Ca    8.0[L]      24 Dec 2024 14:40  Phos  2.1     12-24  Mg     1.6     12-24    TPro  5.4[L]  /  Alb  1.3[L]  /  TBili  0.3  /  DBili  x   /  AST  27  /  ALT  20  /  AlkPhos  258[H]  12-24      Urinalysis Basic - ( 24 Dec 2024 14:40 )    Color: x / Appearance: x / SG: x / pH: x  Gluc: 204 mg/dL / Ketone: x  / Bili: x / Urobili: x   Blood: x / Protein: x / Nitrite: x   Leuk Esterase: x / RBC: x / WBC x   Sq Epi: x / Non Sq Epi: x / Bacteria: x      Rapid RVP Result: Detected (12.24.24 @ 11:48)    Influenza AH1 2009 (RapRVP): Detected (12.24.24 @ 11:48)    Resp Syncytial Virus (RapRVP): Detected (12.24.24 @ 11:48)            RADIOLOGY & ADDITIONAL TESTS:    Imaging Personally Reviewed:  [ ] YES  [ ] NO    Consultant(s) Notes Reviewed:  [ ] YES  [ ] NO    Care Discussed with Consultants/Other Providers [ ] YES  [ ] NO

## 2024-12-24 NOTE — PROGRESS NOTE ADULT - ASSESSMENT
The patient is 78 y/o woman with PMH of osteoporosis, MS, bedbound, known to ID from last admission, who presents today for hematuria. Reportedly, amezquita was changed 2 days ago and blood was seen in the catheter at the time. Patient reports dysuria, denies frequency and urgency. Also c/o back pain. On presentation, patient is hemodynamically stable and afebrile. Labs significant for elevated WBC 39.41, UA showing large LE, +nitrite, +WBC, +bacteria. CT abdomen/pelvis shows extensive decubitus ulcers overlying the sacrum and the greater trochanters; curvilinear fluid collection overlying the left posterior acetabulum; foci of gas in the left hip joint; scattered subcutaneous gas; scattered osseous sclerosis worrisome for chronic osteomyelitis; rectum distended by stool. Patient given zosyn & vancomycin. ID consulted for workup and antibiotic management.    12/17: Last admit here 11/20--12/10 and treated for infected decub ulcers, SARS-CoV-2 infection and CAUTI. 11/20 urine culture with ESBL E. Coli and CRE Kleb was noted. 11/22 wound culture with ESBL E. Coli, E. faecalis and strep. She was on meropenem and vancomycin x 10 days and was discharged on a course of Augmentin. Now returns with another amezquita associated UTI. CT abd pelvis report and images reviewed, showing known pressure ulcers with concern for possible underlying chronic osteomyelitis.   12/18: no fever, RA, no new cbc, UC is growing E. Coli, no blood culture data is available, Zosyn IV continued.   12/23: Afebrile. No leukocytosis. Zosyn was switched to Ertapenem on 12/20 after ESBL reported in the urine culture. Sensitivity noted above. MRI pelvis today with acute on chronic osteomyelitis at the sacrum.  12/24: remains afebrile since 12/20, RA, no new cbc, wounds with vac dressings on, + mild rhinorrhea on exam, will obtain RVP, pt had recent covid 19, ertapenem IV continued (day #5)    Impression:  1. Catheter associated UTI-present on admission  2. Hematuria  3. Chronic pressure ulcers  4. Acute on chronic sacral osteomyelitis  5. Leukocytosis- persistent since last admission  6. Old age, debility, bedbound    Recommendations:  --Continue ertapenem 1 g IV q24h (day # 5/5) and day#8 of the total antibiotics this admission  --Would follow plan for osteomyelitis treatment   --Palliative care eval to establish goals of care  --vascular wound care evaluation is appreciated  --Wound vac per wound care  --Closely follow white counts and temperature curve  --obtain RVP

## 2024-12-25 NOTE — PROGRESS NOTE ADULT - SUBJECTIVE AND OBJECTIVE BOX
Patient is a 79y old  Female who presents with a chief complaint of Urinary tract infection and chronic osteomyelitis (24 Dec 2024 09:54)      INTERVAL HPI/OVERNIGHT EVENTS:  -nothing acute overnight vitals stable   -seen and examined at bedside, son updated  -patient endorses HA    MEDICATIONS  (STANDING):  collagenase Ointment 1 Application(s) Topical two times a day  dextrose 5% + sodium chloride 0.45%. 1000 milliLiter(s) (100 mL/Hr) IV Continuous <Continuous>  ertapenem  IVPB 1000 milliGRAM(s) IV Intermittent every 24 hours  nystatin Powder 1 Application(s) Topical two times a day    MEDICATIONS  (PRN):  acetaminophen     Tablet .. 650 milliGRAM(s) Oral every 6 hours PRN Temp greater or equal to 38C (100.4F), Mild Pain (1 - 3)  aluminum hydroxide/magnesium hydroxide/simethicone Suspension 30 milliLiter(s) Oral every 4 hours PRN Dyspepsia  melatonin 3 milliGRAM(s) Oral at bedtime PRN Insomnia  ondansetron Injectable 4 milliGRAM(s) IV Push every 8 hours PRN Nausea and/or Vomiting      Allergies    No Known Allergies    Intolerances        REVIEW OF SYSTEMS:  no CP vision changes cough SOB abdominal pain     Vital Signs Last 24 Hrs  T(C): 36.3 (25 Dec 2024 11:49), Max: 37.6 (24 Dec 2024 17:15)  T(F): 97.4 (25 Dec 2024 11:49), Max: 99.7 (24 Dec 2024 17:15)  HR: 98 (25 Dec 2024 11:49) (87 - 105)  BP: 125/68 (25 Dec 2024 11:49) (103/59 - 156/74)  BP(mean): --  RR: 18 (25 Dec 2024 11:49) (17 - 18)  SpO2: 94% (25 Dec 2024 11:49) (94% - 99%)    Parameters below as of 25 Dec 2024 11:49  Patient On (Oxygen Delivery Method): room air        PHYSICAL EXAM:  GENERAL: NAD, well-groomed, well-developed  HEAD:  Atraumatic, Normocephalic  EYES: EOMI, sclera non-icteric  ENMT:  Moist mucous membranes  NERVOUS SYSTEM:  Alert & Oriented X3, Good concentration;  CHEST/LUNG: Clear to percussion bilaterall  HEART: Regular rate and rhythm; No murmurs, rubs, or gallops  ABDOMEN: Soft, Nontender, Nondistended; Bowel sounds present  EXTREMITIES:  +upper and lower extremity contractures   SKIN: No rashes or lesions      LABS:                        8.3    7.28  )-----------( 186      ( 25 Dec 2024 09:20 )             25.8     12-25    135  |  103  |  8   ----------------------------<  233[H]  3.5   |  24  |  0.20[L]    Ca    7.9[L]      25 Dec 2024 09:20  Phos  2.1     12-25  Mg     1.8     12-25    TPro  5.4[L]  /  Alb  1.3[L]  /  TBili  0.3  /  DBili  x   /  AST  27  /  ALT  20  /  AlkPhos  258[H]  12-24      Urinalysis Basic - ( 25 Dec 2024 09:20 )    Color: x / Appearance: x / SG: x / pH: x  Gluc: 233 mg/dL / Ketone: x  / Bili: x / Urobili: x   Blood: x / Protein: x / Nitrite: x   Leuk Esterase: x / RBC: x / WBC x   Sq Epi: x / Non Sq Epi: x / Bacteria: x      CAPILLARY BLOOD GLUCOSE          RADIOLOGY & ADDITIONAL TESTS:    Imaging Personally Reviewed:  [ X] YES  [ ] NO    Consultant(s) Notes Reviewed:  [ X] YES  [ ] NO    Care Discussed with Consultants/Other Providers [X ] YES  [ ] NO

## 2024-12-25 NOTE — PROGRESS NOTE ADULT - ASSESSMENT
79-year-old female with PMH osteoporosis, MS, bedbound, who presents today for hematuria, admitted for urinary tract infection and chronic osteomyelitis        ##Sepsis  ##Catheter associated UTI  ## Hematuria  Presents with hematuria. Reportedly, amezquita was changed 2 days ago and blood was seen in the catheter at the time. Patient reports dysuria, denies frequency and urgency. Also c/o back pain. On presentation, patient is hemodynamically stable and afebrile. Labs significant for elevated WBC 39.41, UA showing large LE, +nitrite, +WBC, +bacteria. CT abdomen/pelvis shows extensive decubitus ulcers overlying the sacrum and the greater trochanters; curvilinear fluid collection overlying the left posterior acetabulum; foci of gas in the left hip joint; scattered subcutaneous gas; scattered osseous sclerosis worrisome for chronic osteomyelitis. Last admit here 11/20--12/10 and treated for infected decub ulcers, SARS-CoV-2 infection and CAUTI. 11/20 urine culture with ESBL E. Coli and Klebsiella. 11/22 wound culture with ESBL E. Coli, E. faecalis and strep. She was on meropenem and vancomycin x 10 days and was discharged on a course of Augmentin.  -Id following, cont ertapenem last dose 12/26    ##Chronic osteomyelitis   5 Stage 4 sacral ulcer. CT abdomen/pelvis shows extensive decubitus ulcers overlying the sacrum and the greater trochanters; Curvilinear fluid collection overlying the left posterior acetabulum; Foci of gas in the left hip joint; Scattered subcutaneous gas; Scattered osseous sclerosis worrisome for chronic osteomyelitis; Rectum distended by stool. Wound care evaluated no need for debridement. , No pus, no drainage and no cellulitis. Unlikely infection source.   - MRI sacrum chronic osteo  - C/w VAC     #Functional quadriplegia secondary to multiple sclerosis.   bedbound    supportive care  frequent repositioning to prevent pressure wounds     #HA  Suspect tension Ha given location. No vision changes or unilateral distribution. Will trial tylenol        Diet:  DVT prophylaxis:  Dispo:  Code Status: per palliative is DNR/DNI needs to be confirmed and MOLST put in chart

## 2024-12-26 NOTE — CONSULT NOTE ADULT - ASSESSMENT
He states that he and his family will return to prior home care on discharge to for Home with [facility]FF[facility] , HA PH 12x7 and PA wound care visit 1x weekly. Pt will now go home with a wound vac Joe is anticipating that his mothers care needs  will be greater.     Per sons, pt is  and lives with her . Cognitively he is not with capacity to make complex medical decisions, so both of pts 2 sons collaborate before decision making. Family has hired  private HH  12x 7. The HA is the person who provides the wound care. Pt  is seen by  home wound care specialist whose name they could not readily provide, every Sunday.       · 79-year-old female well known to s service from previous admissions,  with PMH osteoporosis, MS, bedbound, presents to ED on 12/16 with  hematuria. As per family,  amezquita was changed 2 days ago and blood was seen in the catheter at the time. No  dysuria, denies frequency and urgency. Patient denies abdominal pain, flank pain, fever, chills, nausea, vomiting, diarrhea. Patient with previous extended admission fro 11/20-12/13 for treatment of infected decubitus and worsening adult FTT. Tested + COVID, RSV and FLU. Pt as failed her speech and swallow exam 12/26

## 2024-12-26 NOTE — CONSULT NOTE ADULT - PROBLEM SELECTOR RECOMMENDATION 3
CT abdomen/pelvis shows extensive decubitus ulcers overlying the sacrum and the greater trochanters; Curvilinear fluid collection overlying the left posterior acetabulum; Foci of gas in the left hip joint; Scattered subcutaneous gas; Scattered osseous sclerosis worrisome for chronic osteomyelitis; Rectum distended by stool.   Wound care evaluated no need for debridement at this time,   MRI sacrum chronic osteo  Wound VAC to sacrum present, draining copious amts of  drainage

## 2024-12-26 NOTE — CONSULT NOTE ADULT - PROBLEM SELECTOR RECOMMENDATION 9
Presents to ED  with hematuria. Landeros was changed 2 days ago and blood was seen in the catheter at the time.   Labs significant for elevated WBC 39.41, UA showing large LE, +nitrite, +WBC, +bacteria.   CT abdomen/pelvis shows extensive decubitus ulcers overlying the sacrum and the greater trochanters; curvilinear fluid collection overlying the left posterior acetabulum; foci of gas in the left hip joint; scattered subcutaneous gas; scattered osseous sclerosis worrisome for chronic osteomyelitis.   Last admit here 11/20--12/10 with treatment  for infected decub ulcers, SARS-CoV-2 infection and CAUTI.   11/20 urine culture with ESBL E. Coli and Klebsiella.   11/22 wound culture with ESBL E. Coli, E. faecalis and strep. She was on meropenem and vancomycin x 10 days and was discharged on a course of Augmentin.  Id following, cont ertapenem last dose today 12/26

## 2024-12-26 NOTE — SWALLOW BEDSIDE ASSESSMENT ADULT - COMMENTS
pt seen bedside seated upright on RA and alert. pt pleasantly confused, she responded to simple questions for assessment, verbalized wants "are u going to give me ensure" and noted difficulty executing instructions. positioning limited 2/2 head in flexion.

## 2024-12-26 NOTE — CONSULT NOTE ADULT - PROBLEM SELECTOR RECOMMENDATION 4
in context of chronic illness  Inadequate energy/protein intake + increased needed related to debility, chronic anemia, multiple stage IV pressure ulcers  Physical findings of severe fat depletion & muscle wasting   BMI 12.2 Albumin 1.3  at high risk for further breakdown, poor stores of Albumin and protein   pending discussion with family re: LT feeding goals

## 2024-12-26 NOTE — CONSULT NOTE ADULT - NS PANP COMMENT GEN_ALL_CORE FT
79 y F hx of MS, bedbound, chronic amezquita, recurrent UTIs, decubiti, recent adm for infected sacral decub/ ESBL UTI, tested + for COVID, Flu A, RSV, now admitted for hematuria, UTI, chronic osteomyelitis. Wound vac in place. Antibiotics per ID. Lethargic, NAD. SLP jcarlos noted, remains npo. Has 2 sons, patient known to Palliative team from previous admissions, hospice eligible, however not within goals of care from previous discussions. Palliative will follow for ongoing GOC discussions pending clinical course.

## 2024-12-26 NOTE — CONSULT NOTE ADULT - PROBLEM SELECTOR RECOMMENDATION 5
2/2 MS  baseline oriented to name and place, oriented to name only this afternoon  mostly bedbound for 4-5 years, does get OOB to chair,  Has 12x7 Medicaid provided HA who does wound care daily, visiting wound care PA visits weekly  dependent of ADL's.  PPSV2 20%.

## 2024-12-26 NOTE — SWALLOW BEDSIDE ASSESSMENT ADULT - PHARYNGEAL PHASE
Delayed pharyngeal swallow/Decreased laryngeal elevation/Throat clear post oral intake/Multiple swallows

## 2024-12-26 NOTE — CONSULT NOTE ADULT - SUBJECTIVE AND OBJECTIVE BOX
SPENCER MIXON          MRN-[id number]           : 1945    HPI:  79-year-old female well known to s service from previous admissions,  with PMH osteoporosis, MS, bedbound, presents to ED on  with  hematuria. As per family,  amezquita was changed 2 days ago and blood was seen in the catheter at the time. No  dysuria, denies frequency and urgency. Patient denies abdominal pain, flank pain, fever, chills, nausea, vomiting, diarrhea. Patient with previous extended admission fro - for treatment of infected decubitus and worsening adult FTT    On presentation, patien was hemodynamically stable and afebrile, although BP was soft at 109/68. Labs significant for elevated WBC 39.41, Hgb 9.2, elevated Alk Ph 166, UA showing +LE, +nitrite, +WBC, +bacteria. CT abdomen/pelvis shows extensive decubitus ulcers overlying the sacrum and the greater trochanters; Curvilinear fluid collection overlying the left posterior acetabulum; Foci of gas in the left hip joint; Scattered subcutaneous gas; Scattered osseous sclerosis worrisome for chronic osteomyelitis; Rectum distended by stool. Patient given zosyn & vancomycin.     Interval Events: pt has  tested + for RSV/COVID and FLU. She has been experiencing dysphagia over the past few days, seen by speech pathologist today and made NPO pending further C discussion surrounding LT feeding goals     PAST MEDICAL & SURGICAL HISTORY:  Osteoporosis  Uterine polyp  Herniated  lumbar  Functional quadriplegia secondary to multiple sclerosis  Bilateral Tubal ligation  Multiple Sclerosis  multiple Stage 3/4 decubitus ulcers     FAMILY HISTORY:   Reviewed and found non contributory in mother or father    SOCIAL HISTORY: , 2 sons, Spiritism     ROS:    Unable to attain further ROS due to:  AMS                    Dyspnea (Suzanne 0-10):                        N/V (Y/N):                              Secretions (Y/N) :                Agitation(Y/N):   Pain (Y/N):       http://geriatrictoolkit.[place].[facility]/cog/painad.pdf  https://www.aci.health.[place].gov.au/__data/assets/pdf_file/0018/183095/[place]_Pain_Scale_Final.pdf  -Provocation/Palliation:  -Quality/Quantity:  -Radiating:  -Severity:  -Timing/Frequency:  -Impact on ADLs:    General:  Denied  HEENT:    Denied  Neck:  Denied  CVS:  Denied  Resp:  Denied  GI:  Denied Last BM:   :  Denied  Musc:  Denied  Neuro:  Denied  Psych:  Denied  Skin:  Denied  Lymph:  Denied    Allergies    No Known Allergies    Intolerances      Opiate Naive (Y/N):   -iStop reviewed (Y/N): Yes. No Rx found on iStop review (Ref#:           )    Medications:      MEDICATIONS  (STANDING):  acetaminophen   IVPB .. 600 milliGRAM(s) IV Intermittent once  collagenase Ointment 1 Application(s) Topical two times a day  dextrose 5% + sodium chloride 0.45%. 1000 milliLiter(s) (100 mL/Hr) IV Continuous <Continuous>  nystatin Powder 1 Application(s) Topical two times a day    MEDICATIONS  (PRN):  acetaminophen     Tablet .. 650 milliGRAM(s) Oral every 6 hours PRN Temp greater or equal to 38C (100.4F), Mild Pain (1 - 3)  aluminum hydroxide/magnesium hydroxide/simethicone Suspension 30 milliLiter(s) Oral every 4 hours PRN Dyspepsia  melatonin 3 milliGRAM(s) Oral at bedtime PRN Insomnia  ondansetron Injectable 4 milliGRAM(s) IV Push every 8 hours PRN Nausea and/or Vomiting      Labs:    CBC:                        9.6    6.63  )-----------( 192      ( 26 Dec 2024 06:46 )             31.1     CMP:        131[L]  |  101  |  8   ----------------------------<  193[H]  3.6   |  24  |  0.22[L]    Ca    8.1[L]      26 Dec 2024 06:46  Phos  2.2       Mg     1.6            Urinalysis Basic - ( 26 Dec 2024 06:46 )    Color: x / Appearance: x / SG: x / pH: x  Gluc: 193 mg/dL / Ketone: x  / Bili: x / Urobili: x   Blood: x / Protein: x / Nitrite: x   Leuk Esterase: x / RBC: x / WBC x   Sq Epi: x / Non Sq Epi: x / Bacteria: x    Imaging:      < from: CT Abdomen and Pelvis w/ IV Cont (24 @ 20:14) >  ACC: [id number] EXAM:  CT ABDOMEN AND PELVIS IC   ORDERED BY: VALARIE KING     IMPRESSION:  Extensive decubitus ulcers overlying the sacrum and the greater   trochanters.    Curvilinear fluid collection overlying the left posterior acetabulum.    Foci of gas in the left hip joint.    Scattered subcutaneous gas.    Scattered osseous sclerosis worrisome for chronic osteomyelitis.    Rectum distended by stool.    < from: MR Pelvis w/ IV Cont (24 @ 10:34) >    ACC: [id number] EXAM:  MR PELVIS IC   ORDERED BY: JACKELYN GREER     PROCEDURE DATE:  2024      IMPRESSION:  MRI of the bony pelvis demonstrates extensive soft tissue ulceration at   the posterior and lateral tissues with gas tracking to the sacrum, and   bilateral greater trochanters consistent with stage IV ulceration for   which correlation with physical exam is recommended. Intermediate to low   T1 signal, high STIR signal, and postcontrast enhancement at the S5   segment of the sacrum, coccyx, and less so at the greater trochanters and   posterior ischial spines suggestive of osteomyelitis. Findings may be   chronic or acute on chronic.    No walled off collection to suggest abscess.    Respiratory Viral Panel with COVID-19 by SHIRLEY (24 @ 11:48)   Rapid RVP Result: Detected   SARS-CoV-2: Detected: This Respiratory Panel uses polymerase chain reaction (PCR) to detect for  adenovirus; coronavirus (HKU1, NL63, 229E, OC43); human metapneumovirus  (hMPV); human enterovirus/rhinovirus (Entero/RV); influenza A; influenza  A/H1; influenza A/H3; influenza A/H1-2009; influenza B; parainfluenza  viruses 1, 2, 3, 4; respiratory syncytial virus; Mycoplasma pneumoniae;  Chlamydophila pneumoniae; and SARS-CoV-2.   Influenza AH1 2009 (RapRVP): Detected   Resp Syncytial Virus (RapRVP): Detected  Urine Culture 2024  >100,000 CFU/ml Escherichia coli ESBL  Organism Identification: Escherichia coli ESBL  Organism: Escherichia coli ESBL  Method Type: KATHRYN  PEx:  T(C): 36.7 (24 @ 16:55), Max: 36.7 (24 @ 16:55)  HR: 114 (24 @ 16:55) (87 - 114)  BP: 156/90 (24 @ 16:55) (127/72 - 156/90)  RR: 18 (24 @ 16:55) (17 - 18)  SpO2: 96% (24 @ 16:55) (95% - 99%)  Wt(kg): --40.8    General: elderly, frail, chronically ill appearing female in bed asleep  HENT: A/T, N/C, anicteric MM dry   Neck: supple,, no jVD   CVS: S1S2, tachycardic  Resp: decreased BS B/L to bases CTAB  GI: soft, NT, hypoactive BS    : chronic indwelling catheter    Musc: BUE contractures, R>L     Neuro: oriented x 2, lethargic  Psych: calm and cooperative during assessment     Skin: multiple deep wounds + wound vac     Preadmit Karnofsky:  30%           Current Karnofsky:    20%  http://www.npcrc.org/files/news/karnofsky_performance_scale.pdf   http://www.npcrc.org/files/news/palliative_performance_scale_PPSv2.pdf  Cachexia (Y/N): Y  BMI: 13.3    Advanced Directives:   Full Code    Decision maker:  Legal surrogate:    GOALS OF CARE DISCUSSION       Palliative care info/counseling provided	           Family meeting       Advanced Directives addressed please see Advance Care Planning Note	           See previous Palliative Medicine Note       Documentation of GOC: 	    REFERRALS	        Palliative Med        Unit SW/Case Mgmt              Speech/Swallow       Patient/Family Support       Massage Therapy       Music Therapy       Pet Therapy       Hospice       Nutrition       Dietician       PT/OT SPENCER MIXON          MRN-[id number]           : 1945    HPI:  79-year-old female well known to s service from previous admissions,  with PMH osteoporosis, MS, bedbound, presents to ED on  with  hematuria. As per family,  amezquita was changed 2 days ago and blood was seen in the catheter at the time. No  dysuria, denies frequency and urgency. Patient denies abdominal pain, flank pain, fever, chills, nausea, vomiting, diarrhea. Patient with previous extended admission fro - for treatment of infected decubitus and worsening adult FTT    On presentation, twas hemodynamically stable and afebrile, although BP was soft at 109/68. Labs significant for elevated WBC 39.41, Hgb 9.2, elevated Alk Ph 166, UA showing +LE, +nitrite, +WBC, +bacteria. CT abdomen/pelvis shows extensive decubitus ulcers overlying the sacrum and the greater trochanters; Curvilinear fluid collection overlying the left posterior acetabulum; Foci of gas in the left hip joint; Scattered subcutaneous gas; Scattered osseous sclerosis worrisome for chronic osteomyelitis; Rectum distended by stool. Patient given zosyn & vancomycin.     Interval Events: pt has  tested + for RSV/COVID and FLU. She has been experiencing dysphagia over the past few days, seen by speech pathologist today and made NPO pending further C discussion surrounding LT feeding goals     PAST MEDICAL & SURGICAL HISTORY:  Osteoporosis  Uterine polyp  Herniated  lumbar  Functional quadriplegia secondary to multiple sclerosis  Bilateral Tubal ligation  Multiple Sclerosis  multiple Stage 3/4 decubitus ulcers     FAMILY HISTORY:   Reviewed and found non contributory in mother or father    SOCIAL HISTORY: , 2 sons, Rastafarian     ROS:    Unable to attain further ROS due to:  AMS                    Dyspnea (Suzanne 0-10):   0                     N/V (Y/N):     N                         Secretions (Y/N) :   N             Agitation(Y/N): N  Pain (Y/N):  appears without moaning or grunting      http://geriatrictoolkit.[place].[facility]/cog/painad.pdf  https://www.aci.health.[place].gov.au/__data/assets/pdf_file/0018/577533/[place]_Pain_Scale_Final.pdf  -Provocation/Palliation:  -Quality/Quantity:  -Radiating:  -Severity:  -Timing/Frequency:  -Impact on ADLs:    Unable to perform further ROS 2/2 AMS     Opiate Naive (Y/N): N  -iStop reviewed (Y/N): Yes. No Rx found on iStop review  | Reference #: [id number]    	Dispenser  A	N	N	O	10/23/2024	10/25/2024	tramadol hcl 25 mg tablet	28	14	Wally Sr	[id number]	Medicare	[id number]  A	N	N	O	2024	tramadol hcl 25 mg tablet	14	7	Wally Sr	[id number]	Medicare	[id number]       Medications:      MEDICATIONS  (STANDING):  acetaminophen   IVPB .. 600 milliGRAM(s) IV Intermittent once  collagenase Ointment 1 Application(s) Topical two times a day  dextrose 5% + sodium chloride 0.45%. 1000 milliLiter(s) (100 mL/Hr) IV Continuous <Continuous>  nystatin Powder 1 Application(s) Topical two times a day    MEDICATIONS  (PRN):  acetaminophen     Tablet .. 650 milliGRAM(s) Oral every 6 hours PRN Temp greater or equal to 38C (100.4F), Mild Pain (1 - 3)  aluminum hydroxide/magnesium hydroxide/simethicone Suspension 30 milliLiter(s) Oral every 4 hours PRN Dyspepsia  melatonin 3 milliGRAM(s) Oral at bedtime PRN Insomnia  ondansetron Injectable 4 milliGRAM(s) IV Push every 8 hours PRN Nausea and/or Vomiting      Labs:    CBC:                        9.6    6.63  )-----------( 192      ( 26 Dec 2024 06:46 )             31.1     CMP:    -    131[L]  |  101  |  8   ----------------------------<  193[H]  3.6   |  24  |  0.22[L]    Ca    8.1[L]      26 Dec 2024 06:46  Phos  2.2     12-  Mg     1.6     -       Urinalysis Basic - ( 26 Dec 2024 06:46 )    Color: x / Appearance: x / SG: x / pH: x  Gluc: 193 mg/dL / Ketone: x  / Bili: x / Urobili: x   Blood: x / Protein: x / Nitrite: x   Leuk Esterase: x / RBC: x / WBC x   Sq Epi: x / Non Sq Epi: x / Bacteria: x    Imaging:      < from: CT Abdomen and Pelvis w/ IV Cont (24 @ 20:14) >  ACC: [id number] EXAM:  CT ABDOMEN AND PELVIS IC   ORDERED BY: VALARIE KING     IMPRESSION:  Extensive decubitus ulcers overlying the sacrum and the greater   trochanters.    Curvilinear fluid collection overlying the left posterior acetabulum.    Foci of gas in the left hip joint.    Scattered subcutaneous gas.    Scattered osseous sclerosis worrisome for chronic osteomyelitis.    Rectum distended by stool.    < from: MR Pelvis w/ IV Cont (24 @ 10:34) >    ACC: [id number] EXAM:  MR PELVIS IC   ORDERED BY: JACKELYN GREER     PROCEDURE DATE:  2024      IMPRESSION:  MRI of the bony pelvis demonstrates extensive soft tissue ulceration at   the posterior and lateral tissues with gas tracking to the sacrum, and   bilateral greater trochanters consistent with stage IV ulceration for   which correlation with physical exam is recommended. Intermediate to low   T1 signal, high STIR signal, and postcontrast enhancement at the S5   segment of the sacrum, coccyx, and less so at the greater trochanters and   posterior ischial spines suggestive of osteomyelitis. Findings may be   chronic or acute on chronic.    No walled off collection to suggest abscess.    Respiratory Viral Panel with COVID-19 by SHIRLEY (24 @ 11:48)   Rapid RVP Result: Detected   SARS-CoV-2: Detected: This Respiratory Panel uses polymerase chain reaction (PCR) to detect for  adenovirus; coronavirus (HKU1, NL63, 229E, OC43); human metapneumovirus  (hMPV); human enterovirus/rhinovirus (Entero/RV); influenza A; influenza  A/H1; influenza A/H3; influenza A/H1-2009; influenza B; parainfluenza  viruses 1, 2, 3, 4; respiratory syncytial virus; Mycoplasma pneumoniae;  Chlamydophila pneumoniae; and SARS-CoV-2.   Influenza AH1 2009 (RapRVP): Detected   Resp Syncytial Virus (RapRVP): Detected  Urine Culture 2024  >100,000 CFU/ml Escherichia coli ESBL  Organism Identification: Escherichia coli ESBL  Organism: Escherichia coli ESBL  Method Type: KATHRYN  PEx:  T(C): 36.7 (24 @ 16:55), Max: 36.7 (24 @ 16:55)  HR: 114 (24 @ 16:55) (87 - 114)  BP: 156/90 (24 @ 16:55) (127/72 - 156/90)  RR: 18 (24 @ 16:55) (17 - 18)  SpO2: 96% (24 @ 16:55) (95% - 99%)  Wt(kg): --40.8    General: elderly, frail, chronically ill appearing female in bed asleep  HENT: A/T, N/C, anicteric MM dry   Neck: supple,, no jVD   CVS: S1S2, tachycardic  Resp: decreased BS B/L to bases CTAB  GI: soft, NT, hypoactive BS    : chronic indwelling catheter    Musc: BUE contractures, R>L     Neuro: oriented x 2, lethargic  Psych: calm and cooperative during assessment     Skin: multiple deep wounds + wound vac     Preadmit Karnofsky:  30%           Current Karnofsky:    20%  http://www.npcrc.org/files/news/karnofsky_performance_scale.pdf   http://www.npcrc.org/files/news/palliative_performance_scale_PPSv2.pdf  Cachexia (Y/N): Y  BMI: 13.3    Advanced Directives:   Full Code    Decision maker/Legal surrogate: Erika sims)       GOALS OF CARE : ongoing, pending LT feeding goals

## 2024-12-26 NOTE — CHART NOTE - NSCHARTNOTEFT_GEN_A_CORE
Per chart, pt with PMH of osteoporosis, MS, bedbound, and multiple pressure ulcers presented for hematuria; admitted for sepsis, CAUTI and chronic osteomyelitis. ID following.    Factors impacting intake: [ ] none [ ] nausea  [ ] vomiting [ ] diarrhea [ ] constipation  [ ]chewing problems [ ] swallowing issues  [ ] other:     Diet Prescription: Diet, NPO (12-25-24 @ 18:29)    Intake:     Current Weight:   % Weight Change    Pertinent Medications: MEDICATIONS  (STANDING):  acetaminophen   IVPB .. 600 milliGRAM(s) IV Intermittent once  collagenase Ointment 1 Application(s) Topical two times a day  dextrose 5% + sodium chloride 0.45%. 1000 milliLiter(s) (100 mL/Hr) IV Continuous <Continuous>  nystatin Powder 1 Application(s) Topical two times a day    MEDICATIONS  (PRN):  acetaminophen     Tablet .. 650 milliGRAM(s) Oral every 6 hours PRN Temp greater or equal to 38C (100.4F), Mild Pain (1 - 3)  aluminum hydroxide/magnesium hydroxide/simethicone Suspension 30 milliLiter(s) Oral every 4 hours PRN Dyspepsia  melatonin 3 milliGRAM(s) Oral at bedtime PRN Insomnia  ondansetron Injectable 4 milliGRAM(s) IV Push every 8 hours PRN Nausea and/or Vomiting    Pertinent Labs: 12-26 Na131 mmol/L[L] Glu 193 mg/dL[H] K+ 3.6 mmol/L Cr  0.22 mg/dL[L] BUN 8 mg/dL 12-26 Phos 2.2 mg/dL[L] 12-24 Alb 1.3 g/dL[L]    Skin:     Estimated Needs:   [ ] no change since previous assessment  [ ] recalculated:     Previous Nutrition Diagnosis:   [ ] Inadequate Energy Intake [ ]Inadequate Oral Intake [ ] Excessive Energy Intake   [ ] Underweight [ ] Increased Nutrient Needs [ ] Overweight/Obesity   [ ] Altered GI Function [ ] Unintended Weight Loss [ ] Food & Nutrition Related Knowledge Deficit [ ] Malnutrition     Nutrition Diagnosis is [ ] ongoing  [ ] resolved [ ] not applicable     New Nutrition Diagnosis: [ ] not applicable       Interventions:   Recommend  [ ] Change Diet To:  [ ] Nutrition Supplement  [ ] Nutrition Support  [ ] Other:     Monitoring and Evaluation:   [ ] PO intake [ x ] Tolerance to diet prescription [ x ] weights [ x ] labs[ x ] follow up per protocol  [ ] other: Per chart, pt with PMH of osteoporosis, MS, bedbound, and multiple pressure ulcers presented for hematuria; admitted for sepsis, CAUTI and chronic osteomyelitis. ID and wound care following. Also COVID-19+; on COVID isolation. s/p swallow eval on 12/26 with SLP recommendations for NPO.    Factors impacting intake: [x] lethargy; persistent lack of appetite; self-feed difficulty; now NPO    Diet Prescription: Diet, NPO (12-25-24 @ 18:29)    Intake: 0-50% for documented meals during LOS; now NPO x 1 day; on IV fluids    Current Weight: No recent weights to trend; request current and daily weights  % Weight Change: N/A  3+ edema L arm, L hand; 4+ edema L foot, R foot, generalized  as per flow sheets    Pertinent Medications: MEDICATIONS  (STANDING):  acetaminophen   IVPB .. 600 milliGRAM(s) IV Intermittent once  collagenase Ointment 1 Application(s) Topical two times a day  dextrose 5% + sodium chloride 0.45%. 1000 milliLiter(s) (100 mL/Hr) IV Continuous <Continuous>  nystatin Powder 1 Application(s) Topical two times a day    MEDICATIONS  (PRN):  acetaminophen     Tablet .. 650 milliGRAM(s) Oral every 6 hours PRN Temp greater or equal to 38C (100.4F), Mild Pain (1 - 3)  aluminum hydroxide/magnesium hydroxide/simethicone Suspension 30 milliLiter(s) Oral every 4 hours PRN Dyspepsia  melatonin 3 milliGRAM(s) Oral at bedtime PRN Insomnia  ondansetron Injectable 4 milliGRAM(s) IV Push every 8 hours PRN Nausea and/or Vomiting    Pertinent Labs: 12-26 Na131 mmol/L[L] Glu 193 mg/dL[H] K+ 3.6 mmol/L Cr  0.22 mg/dL[L] BUN 8 mg/dL 12-26 Phos 2.2 mg/dL[L] 12-24 Alb 1.3 g/dL[L]    Skin: Pt with multiple stage II or > pressure ulcers noted as per flow sheets    Estimated Needs:   [x] no change since previous assessment on 12/19  [ ] recalculated:     Previous Nutrition Diagnosis:   [x] Severe malnutrition in the context of chronic illness  Etiology: Inadequate protein-energy intake + increased needs related to hx of MS, bedbound, multiple pressure ulcers  Signs/Symptoms: Severe fat loss and muscle wasting as noted on 12/19    Goal: Pt will Meet calorie and protein needs > 75% via meals / supplements - not met    Nutrition Diagnosis is [x] ongoing  [ ] resolved [ ] not applicable     New Nutrition Diagnosis: [x] not applicable       Interventions:   Recommend continue with NPO as per SLP recommendations  [ ] Change Diet To:  [ ] Nutrition Supplement  [ ] Nutrition Support  [x] Other: Palliative care consult to discuss GOC    Monitoring and Evaluation:   [ ] PO intake [ x ] Tolerance to diet prescription [ x ] weights [ x ] labs[ x ] follow up per protocol  [ ] other:

## 2024-12-26 NOTE — SWALLOW BEDSIDE ASSESSMENT ADULT - SWALLOW EVAL: DIAGNOSIS
pt presented moderate to severe oropharyngeal dysphagia for puree, moderate thick liquid. oral phase marked by fair labial seal stripping utensil, reduced oral containment resulting in trace to mild anterior loss (may be 2/2 head position), slowed bolus manipulation and transport posterior. pharyngeal phase with suspected delay in initiation of pharyngeal swallow trigger and decreased laryngeal excursion to palpation. there were multiple swallow (5-6 post primary swallow), throat cleat and cough across texture and nasal backflow for puree pt presented moderate to severe oropharyngeal dysphagia for puree, moderate thick liquid. oral phase marked by fair labial seal stripping utensil, reduced oral containment resulting in trace to mild anterior loss (may be 2/2 head position), slowed bolus manipulation and transport posterior. pharyngeal phase with suspected delay in initiation of pharyngeal swallow trigger and decreased laryngeal excursion to palpation. there were multiple swallow (5-6 post primary swallow), throat cleat and cough across texture and nasal backflow/regurgition for puree suggestive of decreased velopharyngeal incompetence or pharyngeal contraction. suggest pt not safe for po diet at this time, she is at risk for aspiration. objective testing not indicated given presence of overt signs /symptoms of aspiration for trialed conservative consistencies.

## 2024-12-26 NOTE — SWALLOW BEDSIDE ASSESSMENT ADULT - H & P REVIEW
"79-year-old female with PMH osteoporosis, MS, bedbound, who presents today for hematuria. As per patient, amezquita was changed 2 days ago and blood was seen in the catheter at the time. Patient reports dysuria, denies frequency and urgency. Patient denies abdominal pain, flank pain, fever, chills, nausea, vomiting, diarrhea. Patient has had similar episodes prior."/yes

## 2024-12-26 NOTE — PROGRESS NOTE ADULT - ASSESSMENT
79-year-old female with PMH osteoporosis, MS, bedbound, who presents today for hematuria, admitted for urinary tract infection and chronic osteomyelitis        ##Sepsis  ##Catheter associated UTI  ## Hematuria  Presents with hematuria. Reportedly, amezquita was changed 2 days ago and blood was seen in the catheter at the time. Patient reports dysuria, denies frequency and urgency. Also c/o back pain. On presentation, patient is hemodynamically stable and afebrile. Labs significant for elevated WBC 39.41, UA showing large LE, +nitrite, +WBC, +bacteria. CT abdomen/pelvis shows extensive decubitus ulcers overlying the sacrum and the greater trochanters; curvilinear fluid collection overlying the left posterior acetabulum; foci of gas in the left hip joint; scattered subcutaneous gas; scattered osseous sclerosis worrisome for chronic osteomyelitis. Last admit here 11/20--12/10 and treated for infected decub ulcers, SARS-CoV-2 infection and CAUTI. 11/20 urine culture with ESBL E. Coli and Klebsiella. 11/22 wound culture with ESBL E. Coli, E. faecalis and strep. She was on meropenem and vancomycin x 10 days and was discharged on a course of Augmentin.  -Id following, cont ertapenem last dose today 12/26    ##Chronic osteomyelitis   5 Stage 4 sacral ulcer. CT abdomen/pelvis shows extensive decubitus ulcers overlying the sacrum and the greater trochanters; Curvilinear fluid collection overlying the left posterior acetabulum; Foci of gas in the left hip joint; Scattered subcutaneous gas; Scattered osseous sclerosis worrisome for chronic osteomyelitis; Rectum distended by stool. Wound care evaluated no need for debridement. , No pus, no drainage and no cellulitis. Unlikely infection source.   - MRI sacrum chronic osteo  - C/w VAC     #Functional quadriplegia secondary to multiple sclerosis.   bedbound    supportive care  frequent repositioning to prevent pressure wounds     #HA  Suspect tension Ha given location. No vision changes or unilateral distribution. Will trial tylenol   -denies HA today     #dysphagia   Failed speech eval. Will start mIVF, palliative to see to discuss feeding options     Diet:  DVT prophylaxis:  Dispo:  Code Status: per palliative is DNR/DNI needs to be confirmed and MOLST put in chart

## 2024-12-26 NOTE — CONSULT NOTE ADULT - PROBLEM SELECTOR RECOMMENDATION 6
called son Joe to discuss LT feeding goals, he was unable to speak and stated that he would call back.  2nd attempt at 4:45PM, unable to leave VM as VMB was full    Will follow up in AM  remains full code pending further discussion called son Joe to discuss LT feeding goals, he was unable to speak and stated that he would call back.  2nd attempt at 4:45PM, unable to leave VM as VMB was full  Son Yazan has deferred to his brother in the past     Will follow up in AM  remains full code pending further discussion

## 2024-12-26 NOTE — PROGRESS NOTE ADULT - SUBJECTIVE AND OBJECTIVE BOX
Patient is a 79y old  Female who presents with a chief complaint of Urinary tract infection and chronic osteomyelitis (25 Dec 2024 13:28)      INTERVAL HPI/OVERNIGHT EVENTS:  -failed speech eval  -palliative to see    MEDICATIONS  (STANDING):  acetaminophen   IVPB .. 600 milliGRAM(s) IV Intermittent once  collagenase Ointment 1 Application(s) Topical two times a day  dextrose 5% + sodium chloride 0.45%. 1000 milliLiter(s) (100 mL/Hr) IV Continuous <Continuous>  nystatin Powder 1 Application(s) Topical two times a day    MEDICATIONS  (PRN):  acetaminophen     Tablet .. 650 milliGRAM(s) Oral every 6 hours PRN Temp greater or equal to 38C (100.4F), Mild Pain (1 - 3)  aluminum hydroxide/magnesium hydroxide/simethicone Suspension 30 milliLiter(s) Oral every 4 hours PRN Dyspepsia  melatonin 3 milliGRAM(s) Oral at bedtime PRN Insomnia  ondansetron Injectable 4 milliGRAM(s) IV Push every 8 hours PRN Nausea and/or Vomiting      Allergies    No Known Allergies    Intolerances        REVIEW OF SYSTEMS:  CONSTITUTIONAL: No fever, weight loss, or fatigue  EYES: No eye pain, visual disturbances, or discharge  ENMT:  No difficulty hearing, tinnitus, vertigo; No sinus or throat pain  NECK: No pain or stiffness  BREASTS: No pain, masses, or nipple discharge  RESPIRATORY: No cough, wheezing, chills or hemoptysis; No shortness of breath  CARDIOVASCULAR: No chest pain, palpitations, dizziness, or leg swelling  GASTROINTESTINAL: No abdominal or epigastric pain. No nausea, vomiting, or hematemesis; No diarrhea or constipation. No melena or hematochezia.  GENITOURINARY: No dysuria, frequency, hematuria, or incontinence  NEUROLOGICAL: No headaches, memory loss, loss of strength, numbness, or tremors  SKIN: No itching, burning, rashes, or lesions   LYMPH NODES: No enlarged glands  ENDOCRINE: No heat or cold intolerance; No hair loss  MUSCULOSKELETAL: No joint pain or swelling; No muscle, back, or extremity pain  PSYCHIATRIC: No depression, anxiety, mood swings, or difficulty sleeping  HEME/LYMPH: No easy bruising, or bleeding gums  ALLERGY AND IMMUNOLOGIC: No hives or eczema    Vital Signs Last 24 Hrs  T(C): 36.3 (26 Dec 2024 11:24), Max: 36.6 (25 Dec 2024 16:46)  T(F): 97.4 (26 Dec 2024 11:24), Max: 97.8 (25 Dec 2024 16:46)  HR: 87 (26 Dec 2024 11:24) (87 - 98)  BP: 154/80 (26 Dec 2024 11:24) (101/64 - 154/80)  BP(mean): --  RR: 18 (26 Dec 2024 11:24) (16 - 18)  SpO2: 99% (26 Dec 2024 11:24) (92% - 99%)    Parameters below as of 26 Dec 2024 11:24  Patient On (Oxygen Delivery Method): room air        PHYSICAL EXAM:  GENERAL: NAD, well-groomed, well-developed  HEAD:  Atraumatic, Normocephalic  EYES: EOMI, PERRLA, conjunctiva and sclera clear  ENMT: No tonsillar erythema, exudates, or enlargement; Moist mucous membranes, Good dentition, No lesions  NECK: Supple, No JVD, Normal thyroid  NERVOUS SYSTEM:  Alert & Oriented X3, Good concentration; Motor Strength 5/5 B/L upper and lower extremities; DTRs 2+ intact and symmetric  CHEST/LUNG: Clear to percussion bilaterally; No rales, rhonchi, wheezing, or rubs  HEART: Regular rate and rhythm; No murmurs, rubs, or gallops  ABDOMEN: Soft, Nontender, Nondistended; Bowel sounds present  EXTREMITIES:  2+ Peripheral Pulses, No clubbing, cyanosis, or edema  LYMPH: No lymphadenopathy noted  SKIN: No rashes or lesions    LABS:                        9.6    6.63  )-----------( 192      ( 26 Dec 2024 06:46 )             31.1     12-26    131[L]  |  101  |  8   ----------------------------<  193[H]  3.6   |  24  |  0.22[L]    Ca    8.1[L]      26 Dec 2024 06:46  Phos  2.2     12-26  Mg     1.6     12-26        Urinalysis Basic - ( 26 Dec 2024 06:46 )    Color: x / Appearance: x / SG: x / pH: x  Gluc: 193 mg/dL / Ketone: x  / Bili: x / Urobili: x   Blood: x / Protein: x / Nitrite: x   Leuk Esterase: x / RBC: x / WBC x   Sq Epi: x / Non Sq Epi: x / Bacteria: x      CAPILLARY BLOOD GLUCOSE          RADIOLOGY & ADDITIONAL TESTS:    Imaging Personally Reviewed:  [ X] YES  [ ] NO    Consultant(s) Notes Reviewed:  [ X] YES  [ ] NO    Care Discussed with Consultants/Other Providers [X ] YES  [ ] NO Patient is a 79y old  Female who presents with a chief complaint of Urinary tract infection and chronic osteomyelitis (25 Dec 2024 13:28)      INTERVAL HPI/OVERNIGHT EVENTS:  -failed speech eval  -palliative to see to discuss feeding options   -pt seen and examined at bedside     MEDICATIONS  (STANDING):  acetaminophen   IVPB .. 600 milliGRAM(s) IV Intermittent once  collagenase Ointment 1 Application(s) Topical two times a day  dextrose 5% + sodium chloride 0.45%. 1000 milliLiter(s) (100 mL/Hr) IV Continuous <Continuous>  nystatin Powder 1 Application(s) Topical two times a day    MEDICATIONS  (PRN):  acetaminophen     Tablet .. 650 milliGRAM(s) Oral every 6 hours PRN Temp greater or equal to 38C (100.4F), Mild Pain (1 - 3)  aluminum hydroxide/magnesium hydroxide/simethicone Suspension 30 milliLiter(s) Oral every 4 hours PRN Dyspepsia  melatonin 3 milliGRAM(s) Oral at bedtime PRN Insomnia  ondansetron Injectable 4 milliGRAM(s) IV Push every 8 hours PRN Nausea and/or Vomiting      Allergies    No Known Allergies    Intolerances        REVIEW OF SYSTEMS:  no HA CP SOB abdominal pain     Vital Signs Last 24 Hrs  T(C): 36.3 (26 Dec 2024 11:24), Max: 36.6 (25 Dec 2024 16:46)  T(F): 97.4 (26 Dec 2024 11:24), Max: 97.8 (25 Dec 2024 16:46)  HR: 87 (26 Dec 2024 11:24) (87 - 98)  BP: 154/80 (26 Dec 2024 11:24) (101/64 - 154/80)  BP(mean): --  RR: 18 (26 Dec 2024 11:24) (16 - 18)  SpO2: 99% (26 Dec 2024 11:24) (92% - 99%)    Parameters below as of 26 Dec 2024 11:24  Patient On (Oxygen Delivery Method): room air        PHYSICAL EXAM:  GENERAL: NAD, well-groomed, well-developed  HEAD:  Atraumatic, Normocephalic  EYES: EOMI, sclera non-icteric  ENMT:  Moist mucous membranes  NERVOUS SYSTEM:  Alert & Oriented X3, Good concentration;  CHEST/LUNG: Clear to percussion bilaterall  HEART: Regular rate and rhythm; No murmurs, rubs, or gallops  ABDOMEN: Soft, Nontender, Nondistended; Bowel sounds present  EXTREMITIES:  +upper and lower extremity contractures   SKIN: No rashes or lesions    LABS:                        9.6    6.63  )-----------( 192      ( 26 Dec 2024 06:46 )             31.1     12-26    131[L]  |  101  |  8   ----------------------------<  193[H]  3.6   |  24  |  0.22[L]    Ca    8.1[L]      26 Dec 2024 06:46  Phos  2.2     12-26  Mg     1.6     12-26        Urinalysis Basic - ( 26 Dec 2024 06:46 )    Color: x / Appearance: x / SG: x / pH: x  Gluc: 193 mg/dL / Ketone: x  / Bili: x / Urobili: x   Blood: x / Protein: x / Nitrite: x   Leuk Esterase: x / RBC: x / WBC x   Sq Epi: x / Non Sq Epi: x / Bacteria: x      CAPILLARY BLOOD GLUCOSE          RADIOLOGY & ADDITIONAL TESTS:    Imaging Personally Reviewed:  [ X] YES  [ ] NO    Consultant(s) Notes Reviewed:  [ X] YES  [ ] NO    Care Discussed with Consultants/Other Providers [X ] YES  [ ] NO

## 2024-12-26 NOTE — SWALLOW BEDSIDE ASSESSMENT ADULT - ADDITIONAL RECOMMENDATIONS
Short term Goals pt/ family education regarding oral care /aspiration precautions and will demonstrate understanding and carryover.   Suggest medical team initiate GOC conversation for pt /family wishes regarding oral feeding and PEG tube.  Consider palliative care consult

## 2024-12-27 NOTE — PROGRESS NOTE ADULT - SUBJECTIVE AND OBJECTIVE BOX
follow up on:  complex medical decision making related to goals of care    OVERNIGHT EVENTS:  weak and frail  continues with copious amounts of drainage from wound vac     Review of systems:     Pain:  [ ] yes [ x] no  QOL impact -   Location -                    Aggravating factors -  Quality -  Radiation -  Timing-  Severity (0-10 scale):  Minimal acceptable level (0-10 scale):     Unable to obtain/Limited due to poor mental status    MEDICATIONS  (STANDING):  collagenase Ointment 1 Application(s) Topical two times a day  dextrose 5% + sodium chloride 0.45%. 1000 milliLiter(s) (100 mL/Hr) IV Continuous <Continuous>  nystatin Powder 1 Application(s) Topical two times a day  sodium phosphate 15 milliMole(s)/250 mL IVPB 15 milliMole(s) IV Intermittent once    MEDICATIONS  (PRN):  acetaminophen     Tablet .. 650 milliGRAM(s) Oral every 6 hours PRN Temp greater or equal to 38C (100.4F), Mild Pain (1 - 3)  aluminum hydroxide/magnesium hydroxide/simethicone Suspension 30 milliLiter(s) Oral every 4 hours PRN Dyspepsia  melatonin 3 milliGRAM(s) Oral at bedtime PRN Insomnia  ondansetron Injectable 4 milliGRAM(s) IV Push every 8 hours PRN Nausea and/or Vomiting    PHYSICAL EXAM:  Vital Signs Last 24 Hrs  T(C): 36.8 (27 Dec 2024 05:27), Max: 36.8 (27 Dec 2024 05:27)  T(F): 98.2 (27 Dec 2024 05:27), Max: 98.2 (27 Dec 2024 05:27)  HR: 78 (27 Dec 2024 05:27) (78 - 114)  BP: 145/83 (27 Dec 2024 05:27) (123/62 - 156/90)  BP(mean): --  RR: 18 (27 Dec 2024 05:27) (17 - 18)  SpO2: 98% (27 Dec 2024 05:27) (95% - 99%)    Parameters below as of 26 Dec 2024 16:55  Patient On (Oxygen Delivery Method): room air       Palliative Performance Scale/Karnofsky Score: 20%    General: elderly, frail, chronically ill appearing female in bed asleep  HENT: A/T, N/C, anicteric MM dry   Neck: supple,, no jVD   CVS: S1S2, tachycardic  Resp: decreased BS B/L to bases CTAB  GI: soft, NT, hypoactive BS    : chronic indwelling catheter    Musc: BUE contractures, R>L     Neuro: oriented x 2, lethargic  Psych: calm and cooperative during assessment     Skin: multiple deep wounds + wound vac   Oral intake: NPO    LABS:                          9.4    6.30  )-----------( 186      ( 27 Dec 2024 06:25 )             30.7     12-27    133[L]  |  102  |  5[L]  ----------------------------<  163[H]  3.5   |  23  |  0.20[L]    Ca    8.2[L]      27 Dec 2024 06:25  Phos  2.3     12-27  Mg     1.7     12-27    TPro  5.3[L]  /  Alb  1.2[L]  /  TBili  0.3  /  DBili  0.2  /  AST  10[L]  /  ALT  12  /  AlkPhos  208[H]  12-27    Urinalysis Basic - ( 27 Dec 2024 06:25 )    Color: x / Appearance: x / SG: x / pH: x  Gluc: 163 mg/dL / Ketone: x  / Bili: x / Urobili: x   Blood: x / Protein: x / Nitrite: x   Leuk Esterase: x / RBC: x / WBC x   Sq Epi: x / Non Sq Epi: x / Bacteria: x    RADIOLOGY & ADDITIONAL STUDIES: reviewed

## 2024-12-27 NOTE — PROGRESS NOTE ADULT - ASSESSMENT
79-year-old female with PMH osteoporosis, MS, bedbound, who presents today for hematuria, admitted for urinary tract infection and chronic osteomyelitis        ##Sepsis  ##Catheter associated UTI  ## Hematuria  Presents with hematuria. Reportedly, amezquita was changed 2 days ago and blood was seen in the catheter at the time. Patient reports dysuria, denies frequency and urgency. Also c/o back pain. On presentation, patient is hemodynamically stable and afebrile. Labs significant for elevated WBC 39.41, UA showing large LE, +nitrite, +WBC, +bacteria. CT abdomen/pelvis shows extensive decubitus ulcers overlying the sacrum and the greater trochanters; curvilinear fluid collection overlying the left posterior acetabulum; foci of gas in the left hip joint; scattered subcutaneous gas; scattered osseous sclerosis worrisome for chronic osteomyelitis. Last admit here 11/20--12/10 and treated for infected decub ulcers, SARS-CoV-2 infection and CAUTI. 11/20 urine culture with ESBL E. Coli and Klebsiella. 11/22 wound culture with ESBL E. Coli, E. faecalis and strep. She was on meropenem and vancomycin x 10 days and was discharged on a course of Augmentin.  -Id following, completed course of ertapenem this admission    ##Chronic osteomyelitis   5 Stage 4 sacral ulcer. CT abdomen/pelvis shows extensive decubitus ulcers overlying the sacrum and the greater trochanters; Curvilinear fluid collection overlying the left posterior acetabulum; Foci of gas in the left hip joint; Scattered subcutaneous gas; Scattered osseous sclerosis worrisome for chronic osteomyelitis; Rectum distended by stool. Wound care evaluated no need for debridement. , No pus, no drainage and no cellulitis. Unlikely infection source.   - MRI sacrum chronic osteo  - C/w VAC     #Functional quadriplegia secondary to multiple sclerosis.   bedbound    supportive care  frequent repositioning to prevent pressure wounds     #HA--resolved  Suspect tension Ha given location. No vision changes or unilateral distribution. Will trial tylenol   -denies HA today     #dysphagia   Failed speech eval. Will start mIVF, palliative to see to discuss feeding options   -family considering options, do not want NGT at this time    Diet:  DVT prophylaxis:  Dispo:  Code Status: per palliative is DNR/DNI needs to be confirmed and MOLST put in chart

## 2024-12-27 NOTE — PROGRESS NOTE ADULT - PROBLEM SELECTOR PLAN 3
CT abdomen/pelvis shows extensive decubitus ulcers overlying the sacrum and the greater trochanters; Curvilinear fluid collection overlying the left posterior acetabulum; Foci of gas in the left hip joint; Scattered subcutaneous gas; Scattered osseous sclerosis worrisome for chronic osteomyelitis; Rectum distended by stool.   Wound care evaluated no need for debridement at this time,   MRI sacrum chronic osteo  Wound VAC to sacrum present, emptied  this AM,  draining copious amts of  drainage.

## 2024-12-27 NOTE — PROGRESS NOTE ADULT - ASSESSMENT
79-year-old female well known to s service from previous admissions,  with PMH osteoporosis, MS, bedbound, presents to ED on 12/16 with  hematuria. As per family,  amezquita was changed 2 days ago and blood was seen in the catheter at the time. No  dysuria, denies frequency and urgency. Patient denies abdominal pain, flank pain, fever, chills, nausea, vomiting, diarrhea. Patient with previous extended admission fro 11/20-12/13 for treatment of infected decubitus and worsening adult FTT. Tested + COVID, RSV and FLU. Pt as failed her speech and swallow exam 12/26

## 2024-12-27 NOTE — PROGRESS NOTE ADULT - CONVERSATION DETAILS
Spoke with pt son Joe via T/C this AM as he was unable to be in the hospital at this time. He has good understanding of pt present clinical situation. He is aware that pt had not passed her speech and  swallow exam on 12/26. Again briefly discussed options for LT feeding, PGT vs comfort feedings. Son states that he has a close friend who is a physician and he has been in touch with him as well as with his brother Yazan. He asks that NGT not be placed for feeding at this time and asks that the family be given some time to come to a more permanent decision    Discussed concerns that pt reserves to fight infection are weakened, with + COVID/FLU/RSV, pt is at higher risk cardiac event. Recommended pt be made DNR/DNI at this time as burden will likely be much greater than benefit to patient. Awaiting family decision.

## 2024-12-27 NOTE — PROGRESS NOTE ADULT - PROBLEM SELECTOR PLAN 2
failed speech and swallow exam 12/26  continues NPO  per son Joe, family requests no NGT placement as they discuss their options for LT feeding

## 2024-12-27 NOTE — PROGRESS NOTE ADULT - SUBJECTIVE AND OBJECTIVE BOX
Patient is a 79y old  Female who presents with a chief complaint of Urinary tract infection and chronic osteomyelitis (27 Dec 2024 09:34)      INTERVAL HPI/OVERNIGHT EVENTS:  -nothing acute overnight   -seen and examined at bedside, no complaints   -family does not want NGT, still thinking over palliative options    MEDICATIONS  (STANDING):  collagenase Ointment 1 Application(s) Topical two times a day  dextrose 5% + sodium chloride 0.45%. 1000 milliLiter(s) (100 mL/Hr) IV Continuous <Continuous>  nystatin Powder 1 Application(s) Topical two times a day    MEDICATIONS  (PRN):  acetaminophen     Tablet .. 650 milliGRAM(s) Oral every 6 hours PRN Temp greater or equal to 38C (100.4F), Mild Pain (1 - 3)  aluminum hydroxide/magnesium hydroxide/simethicone Suspension 30 milliLiter(s) Oral every 4 hours PRN Dyspepsia  melatonin 3 milliGRAM(s) Oral at bedtime PRN Insomnia  ondansetron Injectable 4 milliGRAM(s) IV Push every 8 hours PRN Nausea and/or Vomiting      Allergies    No Known Allergies    Intolerances        REVIEW OF SYSTEMS:  no CP SOB abdominal pain    Vital Signs Last 24 Hrs  T(C): 36.6 (27 Dec 2024 17:51), Max: 37 (27 Dec 2024 13:36)  T(F): 97.9 (27 Dec 2024 17:51), Max: 98.6 (27 Dec 2024 13:36)  HR: 95 (27 Dec 2024 17:51) (72 - 95)  BP: 155/83 (27 Dec 2024 17:51) (123/62 - 155/83)  BP(mean): --  RR: 18 (27 Dec 2024 17:51) (17 - 18)  SpO2: 95% (27 Dec 2024 17:51) (95% - 98%)    Parameters below as of 27 Dec 2024 17:51  Patient On (Oxygen Delivery Method): room air        PHYSICAL EXAM:  GENERAL: NAD, well-groomed, well-developed  HEAD:  Atraumatic, Normocephalic  EYES: EOMI, sclera non-icteric  ENMT:  Moist mucous membranes  NERVOUS SYSTEM:  Alert & Oriented X3, Good concentration;  CHEST/LUNG: Clear to percussion bilaterall  HEART: Regular rate and rhythm; No murmurs, rubs, or gallops  ABDOMEN: Soft, Nontender, Nondistended; Bowel sounds present  EXTREMITIES:  +upper and lower extremity contractures   SKIN: No rashes or lesions    LABS:                        9.4    6.30  )-----------( 186      ( 27 Dec 2024 06:25 )             30.7     12-27    133[L]  |  102  |  5[L]  ----------------------------<  163[H]  3.5   |  23  |  0.20[L]    Ca    8.2[L]      27 Dec 2024 06:25  Phos  2.3     12-27  Mg     1.7     12-27    TPro  5.3[L]  /  Alb  1.2[L]  /  TBili  0.3  /  DBili  0.2  /  AST  10[L]  /  ALT  12  /  AlkPhos  208[H]  12-27      Urinalysis Basic - ( 27 Dec 2024 06:25 )    Color: x / Appearance: x / SG: x / pH: x  Gluc: 163 mg/dL / Ketone: x  / Bili: x / Urobili: x   Blood: x / Protein: x / Nitrite: x   Leuk Esterase: x / RBC: x / WBC x   Sq Epi: x / Non Sq Epi: x / Bacteria: x      CAPILLARY BLOOD GLUCOSE          RADIOLOGY & ADDITIONAL TESTS:    Imaging Personally Reviewed:  [ X] YES  [ ] NO    Consultant(s) Notes Reviewed:  [ X] YES  [ ] NO    Care Discussed with Consultants/Other Providers [X ] YES  [ ] NO

## 2024-12-27 NOTE — PROGRESS NOTE ADULT - PROBLEM SELECTOR PLAN 1
resents to ED  with hematuria. Landeros was changed 2 days ago and blood was seen in the catheter at the time.   Labs significant for elevated WBC 39.41, UA showing large LE, +nitrite, +WBC, +bacteria.   CT abdomen/pelvis shows extensive decubitus ulcers overlying the sacrum and the greater trochanters; curvilinear fluid collection overlying the left posterior acetabulum; foci of gas in the left hip joint; scattered subcutaneous gas; scattered osseous sclerosis worrisome for chronic osteomyelitis.   Last admit here 11/20--12/10 with treatment  for infected decub ulcers, SARS-CoV-2 infection and CAUTI.   11/20 urine culture with ESBL E. Coli and Klebsiella.   11/22 wound culture with ESBL E. Coli, E. faecalis and strep. She was on meropenem and vancomycin x 10 days and was discharged on a course of Augmentin.  Id following, completed course of  ertapenem  12/26.

## 2024-12-27 NOTE — PROGRESS NOTE ADULT - PROBLEM SELECTOR PLAN 6
continued discussion re: GOC with family  presently full code status as family discusses further GOC

## 2024-12-27 NOTE — PROGRESS NOTE ADULT - PROBLEM SELECTOR PLAN 4
Severe protein-calorie malnutrition.   ·  Recommendation: in context of chronic illness  Inadequate energy/protein intake + increased needed related to debility, chronic anemia, multiple stage IV pressure ulcers  Physical findings of severe fat depletion & muscle wasting   BMI 12.2 Albumin 1.2  at high risk for further breakdown, poor stores of Albumin and protein   pending  family decsions re: LT feeding goals.

## 2024-12-28 NOTE — PROGRESS NOTE ADULT - ASSESSMENT
79-year-old female with PMH osteoporosis, MS, bedbound, who presents today for hematuria, admitted for urinary tract infection and chronic osteomyelitis        ##Sepsis  ##Catheter associated UTI  ## Hematuria  Presents with hematuria. Reportedly, amezquita was changed 2 days ago and blood was seen in the catheter at the time. Patient reports dysuria, denies frequency and urgency. Also c/o back pain. On presentation, patient is hemodynamically stable and afebrile. Labs significant for elevated WBC 39.41, UA showing large LE, +nitrite, +WBC, +bacteria. CT abdomen/pelvis shows extensive decubitus ulcers overlying the sacrum and the greater trochanters; curvilinear fluid collection overlying the left posterior acetabulum; foci of gas in the left hip joint; scattered subcutaneous gas; scattered osseous sclerosis worrisome for chronic osteomyelitis. Last admit here 11/20--12/10 and treated for infected decub ulcers, SARS-CoV-2 infection and CAUTI. 11/20 urine culture with ESBL E. Coli and Klebsiella. 11/22 wound culture with ESBL E. Coli, E. faecalis and strep. She was on meropenem and vancomycin x 10 days and was discharged on a course of Augmentin.  -Id following, completed course of ertapenem this admission    ##Chronic osteomyelitis   5 Stage 4 sacral ulcer. CT abdomen/pelvis shows extensive decubitus ulcers overlying the sacrum and the greater trochanters; Curvilinear fluid collection overlying the left posterior acetabulum; Foci of gas in the left hip joint; Scattered subcutaneous gas; Scattered osseous sclerosis worrisome for chronic osteomyelitis; Rectum distended by stool. Wound care evaluated no need for debridement. , No pus, no drainage and no cellulitis. Unlikely infection source.   - MRI sacrum chronic osteo  - C/w VAC     #Functional quadriplegia secondary to multiple sclerosis.   bedbound    supportive care  frequent repositioning to prevent pressure wounds     #HA--resolved  Suspect tension Ha given location. No vision changes or unilateral distribution. Will trial tylenol   -denies HA today     #dysphagia   Failed speech eval. Will start mIVF, palliative to see to discuss feeding options   -family considering options, do not want NGT at this time    #hypokalemia   replete as needed     Diet:  DVT prophylaxis: SCD  Dispo:  Code Status: per palliative is DNR/DNI needs to be confirmed and MOLST put in chart

## 2024-12-28 NOTE — PROGRESS NOTE ADULT - SUBJECTIVE AND OBJECTIVE BOX
Patient is a 79y old  Female who presents with a chief complaint of Urinary tract infection and chronic osteomyelitis (27 Dec 2024 18:50)      INTERVAL HPI/OVERNIGHT EVENTS:  -nothing acute overnight   -seen and examined at bedside no complaints     MEDICATIONS  (STANDING):  collagenase Ointment 1 Application(s) Topical two times a day  dextrose 5% + sodium chloride 0.45%. 1000 milliLiter(s) (100 mL/Hr) IV Continuous <Continuous>  nystatin Powder 1 Application(s) Topical two times a day  potassium chloride  20 mEq/100 mL IVPB 20 milliEquivalent(s) IV Intermittent every 2 hours    MEDICATIONS  (PRN):  acetaminophen     Tablet .. 650 milliGRAM(s) Oral every 6 hours PRN Temp greater or equal to 38C (100.4F), Mild Pain (1 - 3)  aluminum hydroxide/magnesium hydroxide/simethicone Suspension 30 milliLiter(s) Oral every 4 hours PRN Dyspepsia  melatonin 3 milliGRAM(s) Oral at bedtime PRN Insomnia  ondansetron Injectable 4 milliGRAM(s) IV Push every 8 hours PRN Nausea and/or Vomiting      Allergies    No Known Allergies    Intolerances        REVIEW OF SYSTEMS:  denies pain SOB nausea     Vital Signs Last 24 Hrs  T(C): 36.6 (28 Dec 2024 11:32), Max: 36.6 (27 Dec 2024 17:51)  T(F): 97.8 (28 Dec 2024 11:32), Max: 97.9 (27 Dec 2024 17:51)  HR: 95 (28 Dec 2024 11:32) (92 - 95)  BP: 116/70 (28 Dec 2024 11:32) (110/65 - 155/83)  BP(mean): --  RR: 19 (28 Dec 2024 11:32) (18 - 19)  SpO2: 96% (28 Dec 2024 11:32) (95% - 99%)    Parameters below as of 28 Dec 2024 05:00  Patient On (Oxygen Delivery Method): room air        PHYSICAL EXAM:  GENERAL: NAD, well-groomed, well-developed  HEAD:  Atraumatic, Normocephalic  EYES: EOMI, sclera non-icteric  ENMT:  Moist mucous membranes  NERVOUS SYSTEM:  Alert & Oriented X3, Good concentration;  CHEST/LUNG: Clear to percussion bilaterall  HEART: Regular rate and rhythm; No murmurs, rubs, or gallops  ABDOMEN: Soft, Nontender, Nondistended; Bowel sounds present  EXTREMITIES:  +upper and lower extremity contractures   SKIN: No rashes or lesions    LABS:                        8.3    6.15  )-----------( 168      ( 28 Dec 2024 10:20 )             26.0     12-28    134[L]  |  104  |  4[L]  ----------------------------<  167[H]  2.9[LL]   |  26  |  0.22[L]    Ca    7.7[L]      28 Dec 2024 10:20  Phos  2.6     12-28  Mg     1.6     12-28    TPro  5.3[L]  /  Alb  1.2[L]  /  TBili  0.3  /  DBili  0.2  /  AST  10[L]  /  ALT  12  /  AlkPhos  208[H]  12-27      Urinalysis Basic - ( 28 Dec 2024 10:20 )    Color: x / Appearance: x / SG: x / pH: x  Gluc: 167 mg/dL / Ketone: x  / Bili: x / Urobili: x   Blood: x / Protein: x / Nitrite: x   Leuk Esterase: x / RBC: x / WBC x   Sq Epi: x / Non Sq Epi: x / Bacteria: x      CAPILLARY BLOOD GLUCOSE          RADIOLOGY & ADDITIONAL TESTS:    Imaging Personally Reviewed:  [ X] YES  [ ] NO    Consultant(s) Notes Reviewed:  [ X] YES  [ ] NO    Care Discussed with Consultants/Other Providers [X ] YES  [ ] NO

## 2024-12-29 NOTE — PROGRESS NOTE ADULT - SUBJECTIVE AND OBJECTIVE BOX
Patient is a 79y old  Female who presents with a chief complaint of Urinary tract infection and chronic osteomyelitis (28 Dec 2024 14:13)      INTERVAL HPI/OVERNIGHT EVENTS:  -nothing acute overnight vitals stable     MEDICATIONS  (STANDING):  collagenase Ointment 1 Application(s) Topical two times a day  dextrose 5% + sodium chloride 0.45%. 1000 milliLiter(s) (100 mL/Hr) IV Continuous <Continuous>  nystatin Powder 1 Application(s) Topical two times a day    MEDICATIONS  (PRN):  acetaminophen     Tablet .. 650 milliGRAM(s) Oral every 6 hours PRN Temp greater or equal to 38C (100.4F), Mild Pain (1 - 3)  aluminum hydroxide/magnesium hydroxide/simethicone Suspension 30 milliLiter(s) Oral every 4 hours PRN Dyspepsia  melatonin 3 milliGRAM(s) Oral at bedtime PRN Insomnia  ondansetron Injectable 4 milliGRAM(s) IV Push every 8 hours PRN Nausea and/or Vomiting      Allergies    No Known Allergies    Intolerances        REVIEW OF SYSTEMS:  CONSTITUTIONAL: No fever, weight loss, or fatigue  EYES: No eye pain, visual disturbances, or discharge  ENMT:  No difficulty hearing, tinnitus, vertigo; No sinus or throat pain  NECK: No pain or stiffness  BREASTS: No pain, masses, or nipple discharge  RESPIRATORY: No cough, wheezing, chills or hemoptysis; No shortness of breath  CARDIOVASCULAR: No chest pain, palpitations, dizziness, or leg swelling  GASTROINTESTINAL: No abdominal or epigastric pain. No nausea, vomiting, or hematemesis; No diarrhea or constipation. No melena or hematochezia.  GENITOURINARY: No dysuria, frequency, hematuria, or incontinence  NEUROLOGICAL: No headaches, memory loss, loss of strength, numbness, or tremors  SKIN: No itching, burning, rashes, or lesions   LYMPH NODES: No enlarged glands  ENDOCRINE: No heat or cold intolerance; No hair loss  MUSCULOSKELETAL: No joint pain or swelling; No muscle, back, or extremity pain  PSYCHIATRIC: No depression, anxiety, mood swings, or difficulty sleeping  HEME/LYMPH: No easy bruising, or bleeding gums  ALLERGY AND IMMUNOLOGIC: No hives or eczema    Vital Signs Last 24 Hrs  T(C): 36.4 (29 Dec 2024 05:38), Max: 37.7 (28 Dec 2024 17:03)  T(F): 97.5 (29 Dec 2024 05:38), Max: 99.9 (28 Dec 2024 17:03)  HR: 98 (29 Dec 2024 05:38) (78 - 98)  BP: 102/65 (29 Dec 2024 05:38) (95/56 - 147/78)  BP(mean): --  RR: 17 (29 Dec 2024 05:38) (17 - 19)  SpO2: 98% (29 Dec 2024 05:38) (93% - 99%)    Parameters below as of 28 Dec 2024 17:03  Patient On (Oxygen Delivery Method): room air        PHYSICAL EXAM:  GENERAL: NAD, well-groomed, well-developed  HEAD:  Atraumatic, Normocephalic  EYES: EOMI, sclera non-icteric  ENMT:  Moist mucous membranes  NERVOUS SYSTEM:  Alert & Oriented X3, Good concentration;  CHEST/LUNG: Clear to percussion bilaterall  HEART: Regular rate and rhythm; No murmurs, rubs, or gallops  ABDOMEN: Soft, Nontender, Nondistended; Bowel sounds present  EXTREMITIES:  +upper and lower extremity contractures   SKIN: No rashes or lesions    LABS:                        8.3    6.15  )-----------( 168      ( 28 Dec 2024 10:20 )             26.0     12-28    134[L]  |  104  |  4[L]  ----------------------------<  167[H]  2.9[LL]   |  26  |  0.22[L]    Ca    7.7[L]      28 Dec 2024 10:20  Phos  2.6     12-28  Mg     1.6     12-28        Urinalysis Basic - ( 28 Dec 2024 10:20 )    Color: x / Appearance: x / SG: x / pH: x  Gluc: 167 mg/dL / Ketone: x  / Bili: x / Urobili: x   Blood: x / Protein: x / Nitrite: x   Leuk Esterase: x / RBC: x / WBC x   Sq Epi: x / Non Sq Epi: x / Bacteria: x      CAPILLARY BLOOD GLUCOSE          RADIOLOGY & ADDITIONAL TESTS:    Imaging Personally Reviewed:  [ X] YES  [ ] NO    Consultant(s) Notes Reviewed:  [ X] YES  [ ] NO    Care Discussed with Consultants/Other Providers [X ] YES  [ ] NO Patient is a 79y old  Female who presents with a chief complaint of Urinary tract infection and chronic osteomyelitis (28 Dec 2024 14:13)      INTERVAL HPI/OVERNIGHT EVENTS:  -nothing acute overnight vitals stable   -seen and examined at bedside no complaints     MEDICATIONS  (STANDING):  collagenase Ointment 1 Application(s) Topical two times a day  dextrose 5% + sodium chloride 0.45%. 1000 milliLiter(s) (100 mL/Hr) IV Continuous <Continuous>  nystatin Powder 1 Application(s) Topical two times a day    MEDICATIONS  (PRN):  acetaminophen     Tablet .. 650 milliGRAM(s) Oral every 6 hours PRN Temp greater or equal to 38C (100.4F), Mild Pain (1 - 3)  aluminum hydroxide/magnesium hydroxide/simethicone Suspension 30 milliLiter(s) Oral every 4 hours PRN Dyspepsia  melatonin 3 milliGRAM(s) Oral at bedtime PRN Insomnia  ondansetron Injectable 4 milliGRAM(s) IV Push every 8 hours PRN Nausea and/or Vomiting      Allergies    No Known Allergies    Intolerances        REVIEW OF SYSTEMS:  no Ha abdominal pain nausea vomiting     Vital Signs Last 24 Hrs  T(C): 36.4 (29 Dec 2024 05:38), Max: 37.7 (28 Dec 2024 17:03)  T(F): 97.5 (29 Dec 2024 05:38), Max: 99.9 (28 Dec 2024 17:03)  HR: 98 (29 Dec 2024 05:38) (78 - 98)  BP: 102/65 (29 Dec 2024 05:38) (95/56 - 147/78)  BP(mean): --  RR: 17 (29 Dec 2024 05:38) (17 - 19)  SpO2: 98% (29 Dec 2024 05:38) (93% - 99%)    Parameters below as of 28 Dec 2024 17:03  Patient On (Oxygen Delivery Method): room air        PHYSICAL EXAM:  GENERAL: NAD,thin  HEAD:  Atraumatic, Normocephalic  EYES: EOMI, sclera non-icteric  ENMT:  Moist mucous membranes  NERVOUS SYSTEM:  Alert & Oriented X3, Good concentration;  CHEST/LUNG: Clear to percussion bilaterall  HEART: Regular rate and rhythm; No murmurs, rubs, or gallops  ABDOMEN: Soft, Nontender, Nondistended; Bowel sounds present  EXTREMITIES:  +upper and lower extremity contractures   SKIN: No rashes or lesions    LABS:                        8.3    6.15  )-----------( 168      ( 28 Dec 2024 10:20 )             26.0     12-28    134[L]  |  104  |  4[L]  ----------------------------<  167[H]  2.9[LL]   |  26  |  0.22[L]    Ca    7.7[L]      28 Dec 2024 10:20  Phos  2.6     12-28  Mg     1.6     12-28        Urinalysis Basic - ( 28 Dec 2024 10:20 )    Color: x / Appearance: x / SG: x / pH: x  Gluc: 167 mg/dL / Ketone: x  / Bili: x / Urobili: x   Blood: x / Protein: x / Nitrite: x   Leuk Esterase: x / RBC: x / WBC x   Sq Epi: x / Non Sq Epi: x / Bacteria: x      CAPILLARY BLOOD GLUCOSE          RADIOLOGY & ADDITIONAL TESTS:    Imaging Personally Reviewed:  [ X] YES  [ ] NO    Consultant(s) Notes Reviewed:  [ X] YES  [ ] NO    Care Discussed with Consultants/Other Providers [X ] YES  [ ] NO

## 2024-12-29 NOTE — PROGRESS NOTE ADULT - ASSESSMENT
79-year-old female with PMH osteoporosis, MS, bedbound, who presents today for hematuria, admitted for urinary tract infection and chronic osteomyelitis        ##Sepsis  ##Catheter associated UTI  ## Hematuria  Presents with hematuria. Reportedly, amezquita was changed 2 days ago and blood was seen in the catheter at the time. Patient reports dysuria, denies frequency and urgency. Also c/o back pain. On presentation, patient is hemodynamically stable and afebrile. Labs significant for elevated WBC 39.41, UA showing large LE, +nitrite, +WBC, +bacteria. CT abdomen/pelvis shows extensive decubitus ulcers overlying the sacrum and the greater trochanters; curvilinear fluid collection overlying the left posterior acetabulum; foci of gas in the left hip joint; scattered subcutaneous gas; scattered osseous sclerosis worrisome for chronic osteomyelitis. Last admit here 11/20--12/10 and treated for infected decub ulcers, SARS-CoV-2 infection and CAUTI. 11/20 urine culture with ESBL E. Coli and Klebsiella. 11/22 wound culture with ESBL E. Coli, E. faecalis and strep. She was on meropenem and vancomycin x 10 days and was discharged on a course of Augmentin.  -Id following, completed course of ertapenem this admission    ##Chronic osteomyelitis   5 Stage 4 sacral ulcer. CT abdomen/pelvis shows extensive decubitus ulcers overlying the sacrum and the greater trochanters; Curvilinear fluid collection overlying the left posterior acetabulum; Foci of gas in the left hip joint; Scattered subcutaneous gas; Scattered osseous sclerosis worrisome for chronic osteomyelitis; Rectum distended by stool. Wound care evaluated no need for debridement. , No pus, no drainage and no cellulitis. Unlikely infection source.   - MRI sacrum chronic osteo  - C/w VAC     #Functional quadriplegia secondary to multiple sclerosis.   bedbound    supportive care  frequent repositioning to prevent pressure wounds     #HA--resolved  Suspect tension Ha given location. No vision changes or unilateral distribution. Will trial tylenol   -denies HA today     #dysphagia   Failed speech eval. Will start mIVF, palliative to see to discuss feeding options   -family considering options, do not want NGT at this time    #hypokalemia   replete as needed     Diet:  DVT prophylaxis: SCD  Dispo:  Code Status: per palliative is DNR/DNI needs to be confirmed and MOLST put in chart      79-year-old female with PMH osteoporosis, MS, bedbound, who presents today for hematuria, admitted for urinary tract infection and chronic osteomyelitis        #leukocytosis   WBC 25K this Am although patient is afebrile and without symptoms. Will repeat to r/o erroneous lab, if elevated will reach out to ID given patient just completed antibiotics as below for UTI    ##Sepsis  ##Catheter associated UTI  ## Hematuria  Presents with hematuria. Reportedly, amezquita was changed 2 days ago and blood was seen in the catheter at the time. Patient reports dysuria, denies frequency and urgency. Also c/o back pain. On presentation, patient is hemodynamically stable and afebrile. Labs significant for elevated WBC 39.41, UA showing large LE, +nitrite, +WBC, +bacteria. CT abdomen/pelvis shows extensive decubitus ulcers overlying the sacrum and the greater trochanters; curvilinear fluid collection overlying the left posterior acetabulum; foci of gas in the left hip joint; scattered subcutaneous gas; scattered osseous sclerosis worrisome for chronic osteomyelitis. Last admit here 11/20--12/10 and treated for infected decub ulcers, SARS-CoV-2 infection and CAUTI. 11/20 urine culture with ESBL E. Coli and Klebsiella. 11/22 wound culture with ESBL E. Coli, E. faecalis and strep. She was on meropenem and vancomycin x 10 days and was discharged on a course of Augmentin.  -Id following, completed course of ertapenem this admission    ##Chronic osteomyelitis   5 Stage 4 sacral ulcer. CT abdomen/pelvis shows extensive decubitus ulcers overlying the sacrum and the greater trochanters; Curvilinear fluid collection overlying the left posterior acetabulum; Foci of gas in the left hip joint; Scattered subcutaneous gas; Scattered osseous sclerosis worrisome for chronic osteomyelitis; Rectum distended by stool. Wound care evaluated no need for debridement. , No pus, no drainage and no cellulitis. Unlikely infection source.   - MRI sacrum chronic osteo  - C/w VAC     #Functional quadriplegia secondary to multiple sclerosis.   bedbound    supportive care  frequent repositioning to prevent pressure wounds     #HA--resolved  Suspect tension Ha given location. No vision changes or unilateral distribution. Will trial tylenol   -denies HA today     #dysphagia   Failed speech eval. Will start mIVF, palliative to see to discuss feeding options   -family considering options, do not want NGT at this time    #hypokalemia   replete as needed     Diet:  DVT prophylaxis: SCD  Dispo:  Code Status: per palliative is DNR/DNI needs to be confirmed and MOLST put in chart

## 2024-12-30 NOTE — PROGRESS NOTE ADULT - ASSESSMENT
The patient is 80 y/o woman with PMH of osteoporosis, MS, bedbound, known to ID from last admission, who presents today for hematuria. Reportedly, aemzquita was changed 2 days ago and blood was seen in the catheter at the time. Patient reports dysuria, denies frequency and urgency. Also c/o back pain. On presentation, patient is hemodynamically stable and afebrile. Labs significant for elevated WBC 39.41, UA showing large LE, +nitrite, +WBC, +bacteria. CT abdomen/pelvis shows extensive decubitus ulcers overlying the sacrum and the greater trochanters; curvilinear fluid collection overlying the left posterior acetabulum; foci of gas in the left hip joint; scattered subcutaneous gas; scattered osseous sclerosis worrisome for chronic osteomyelitis; rectum distended by stool. Patient given zosyn & vancomycin. ID consulted for workup and antibiotic management.    12/17: Last admit here 11/20--12/10 and treated for infected decub ulcers, SARS-CoV-2 infection and CAUTI. 11/20 urine culture with ESBL E. Coli and CRE Kleb was noted. 11/22 wound culture with ESBL E. Coli, E. faecalis and strep. She was on meropenem and vancomycin x 10 days and was discharged on a course of Augmentin. Now returns with another amezquita associated UTI. CT abd pelvis report and images reviewed, showing known pressure ulcers with concern for possible underlying chronic osteomyelitis.   12/18: no fever, RA, no new cbc, UC is growing E. Coli, no blood culture data is available, Zosyn IV continued.   12/23: Afebrile. No leukocytosis. Zosyn was switched to Ertapenem on 12/20 after ESBL reported in the urine culture. Sensitivity noted above. MRI pelvis today with acute on chronic osteomyelitis at the sacrum.  12/24: remains afebrile since 12/20, RA, no new cbc, wounds with vac dressings on, + mild rhinorrhea on exam, will obtain RVP, pt had recent covid 19, ertapenem IV continued (day #5)  12/30:     Impression:  1. Septic shock  2. Hematuria/ Catheter associated UTI-present on admission  3. Chronic pressure ulcers  4. Acute on chronic sacral osteomyelitis  5. Leukocytosis  6. Old age, debility, bedbound    Recommendations:        Yola Sood MD  Attending Physician  Division of Infectious Diseases   Available via [employer] Teams   The patient is 80 y/o woman with PMH of osteoporosis, MS, bedbound, known to ID from last admission, who presents today for hematuria. Reportedly, amezquita was changed 2 days ago and blood was seen in the catheter at the time. Patient reports dysuria, denies frequency and urgency. Also c/o back pain. On presentation, patient is hemodynamically stable and afebrile. Labs significant for elevated WBC 39.41, UA showing large LE, +nitrite, +WBC, +bacteria. CT abdomen/pelvis shows extensive decubitus ulcers overlying the sacrum and the greater trochanters; curvilinear fluid collection overlying the left posterior acetabulum; foci of gas in the left hip joint; scattered subcutaneous gas; scattered osseous sclerosis worrisome for chronic osteomyelitis; rectum distended by stool. Patient given zosyn & vancomycin. ID consulted for workup and antibiotic management.    12/17: Last admit here 11/20--12/10 and treated for infected decub ulcers, SARS-CoV-2 infection and CAUTI. 11/20 urine culture with ESBL E. Coli and CRE Kleb was noted. 11/22 wound culture with ESBL E. Coli, E. faecalis and strep. She was on meropenem and vancomycin x 10 days and was discharged on a course of Augmentin. Now returns with another amezquita associated UTI. CT abd pelvis report and images reviewed, showing known pressure ulcers with concern for possible underlying chronic osteomyelitis.   12/18: no fever, RA, no new cbc, UC is growing E. Coli, no blood culture data is available, Zosyn IV continued.   12/23: Afebrile. No leukocytosis. Zosyn was switched to Ertapenem on 12/20 after ESBL reported in the urine culture. Sensitivity noted above. MRI pelvis today with acute on chronic osteomyelitis at the sacrum.  12/24: remains afebrile since 12/20, RA, no new cbc, wounds with vac dressings on, + mild rhinorrhea on exam, will obtain RVP, pt had recent covid 19, ertapenem IV continued (day #5)  12/30: RRT today due to hypotension requiring levophed. Transferred to MICU. Leukocytosis WBC: 23k. CXR image and report reviewed. She has bibasilar opacities R>L. Looks worse than from 12/4. Given a dose of vancomycin. Meropenem started. Blood cultures obtained. Recent RVP 12/24 positive for RSV and again SARS-CoV-2 (likely persistent viral shedding, not a new infection, was positive on 12/4 and 12/16)    Impression:  1. Septic shock  2. Catheter associated UTI-s/p treatment for ESBL E.Coli  3. Chronic pressure ulcers - on wound vac  4. Acute on chronic sacral osteomyelitis  5. Leukocytosis  6. Old age, debility, bedbound  7. Acute encephalopathy  8. Recent SARS-Billy-2 infection s/p treatment  9. RSV    Recommendations:  --Given vancomycin 750 mg IV x 1 today  --Meropenem 500 mg IV q12h for now  --Follow blood cultures  --Obtain sputum culture  --Check MRSA nares PCR  --Wean from pressors per protocol  --Wound vac management  --Vascular surgery/wound care follow up  --Aspiration precautions  --Palliative eval/goals of care    Discussed with ICU team    Yola Sood MD  Attending Physician  Division of Infectious Diseases   Available via [employer] Teams

## 2024-12-30 NOTE — PROGRESS NOTE ADULT - ASSESSMENT
78 yo f pmhx MS, OP, bedbound, chronic pressure wounds, contracted, recent COVID 19+ (12/4/24) admitted to medicine with hematuria subsequently found to be RSV/Flu A+.     NEURO:  CV:  RESP:  RENAL:  GI:  ENDO:  ID:  HEME:  DISPO: Full code, family seems to be unrealistic     Critical Care time: 45 mins assessing presenting problems of acute illness that poses high probability of life threatening deterioration or end organ damage/dysfunction.  Medical decision making including Initiating plan of care, reviewing data, reviewing radiology, discussing with multidisciplinary team, non inclusive of procedures, discussing goals of care with patient/family    DATE OF DOCUMENTATION EQUIVALENT TO DATE OF SERVICES RENDERED  80 yo f pmhx MS, OP, bedbound, chronic pressure wounds, contracted, recent COVID 19+ (12/4/24) admitted to medicine with hematuria subsequently found to be RSV/Flu A+.     NEURO: at baseline MS  CV: distributive shock requiring vasopressor therpay, actively titrating levophed for MAP >65  RESP: hob elevated, aspiration precuations  RENAL: Poor urine output, 500 cc bolus ordered.  amezquita in place  GI: NPO  ENDO: ISS for glycemic control   ID: bacteroides fragilis, k pneumoniae, proteus bacteremia, ESBL, CRE,  CTX-M and KPC resistance gene.  ABX changed to AVYCAZ as discussed with clinical pharmacist  HEME: DIC, trend labs, transfuse as needed, T&S sent, awaiting call back for blood consent  DISPO: Full code, family seems to be unrealistic     Critical Care time: 45 mins assessing presenting problems of acute illness that poses high probability of life threatening deterioration or end organ damage/dysfunction.  Medical decision making including Initiating plan of care, reviewing data, reviewing radiology, discussing with multidisciplinary team, non inclusive of procedures, discussing goals of care with patient/family    DATE OF DOCUMENTATION EQUIVALENT TO DATE OF SERVICES RENDERED

## 2024-12-30 NOTE — CONSULT NOTE ADULT - ASSESSMENT
MrSocorro/Mrs. is a __ year old __ presenting with __ found to have__admitted to the ICU for further management.    # Neuro:  //  -A/Ox3  - sedation:  - pain control:   -mobilize oob to chair     #Resp:  //  -satting adequately on RA, maintain sats>92%   - incentive spirometry     #CV:  //  -HD stable without vasopressor requirements  - MAP goal>65    #GI:  //  -Diet:  - GI ppx:  -last BM  - Bowel regimen    #Renal:  //  - fluids/IVL:   - amezquita, cont to monitor strict I/Os  - voiding freely, making adequate UO  - replete lytes as appropriate     #ID:  //  - afebrile, wbc nl  - cont to monitor off abx     #Heme:  //DVT ppx  - cbc stable  -SCDs/chemoppx:    #Endo:  //sliding scale   - maintaining goal glucose<180 with ISS  - cont to monitor FS 79-year-old female with PMH osteoporosis, MS, bedbound, who presents with hematuria and sepsis from acute on chronic osteo with ESBL ecoli and UTI. Pt completed course of IV abx but now with worsening hypotension c/f septic shock. Pt started on levo and accepted to ICU for further mngmt.     # Neuro:  -poor MS at baseline with Functional quadriplegia secondary to multiple sclerosis  - bed bound at baseline  - current AMS likely from sepsis   - pain control: prn tylenol      #Resp:  //HRF  - POCUS with B lines on R and A lines on left with bibasilar consolidations  - likely from R sided pna + atelectasis  -satting adequately on NC, maintain sats>92%   - ABG wnl  - f/u CXR  - protecting her airway   - IV abx as below    #CV:  //septic shock  - with worsening wbc ct and chronic osteo likely shock from   - POCUS with LV> RV mildly reduced LV function w/o WMA and plethoric IVC without variability   - s/p 2.5L IVF  - started on levo. currently on low dose. cont for MAP goal>65  - f/u official TTE    #GI:  -Diet: npo for now but family previously didnt want ngt  - will discuss with family about enteral nutrition as has failed s/s  - Bowel regimen    #Renal:  //MARCY  - likely MARCY from ATN from sepsis  - amezquita, cont to monitor strict I/Os  - replete lytes as appropriate     #ID:  //sepsis 2/2 osteo +/- pna  - audible rhonci and + B lines on R c/f new onset asp pna with poor MS  - treated for acute on chronic osteo seen on MRI pelvis and ESBL UTI with ertapenem but still may be septic from osteo aswell  - has wound vac for stage IV sacral wound. will discuss with wound care whether needs debridment. previously didnt need on initial eval  - recx with bcx and ucx and start on broad spectrum abx with Vanc/Rocío      #Heme:  //DVT ppx  - cbc stable  - hematuria likely from uti resolved  -SCDs/chemoppx: hsq    #Endo:  //sliding scale   - maintaining goal glucose<180 on bmp  - cont to monitor FS    #ethics  - primary team discussed code status with pt's son lorraine who endorsed he wasnted all necessary measures done to keep her alive and maintain full code    d/w dr Archer  79-year-old female with PMH osteoporosis, MS, bedbound, who presents with hematuria and sepsis from acute on chronic osteo with ESBL ecoli and UTI. Pt completed course of IV abx but now with worsening hypotension c/f septic shock. Pt started on levo and accepted to ICU for further mngmt.     # Neuro:  -poor MS at baseline with Functional quadriplegia secondary to multiple sclerosis  - bed bound at baseline  - current AMS likely from sepsis   - pain control: prn tylenol      #Resp:  //HRF  - POCUS with B lines on R and A lines on left with bibasilar consolidations  - likely from R sided pna + atelectasis  -satting adequately on NC, maintain sats>92%   - ABG wnl  - f/u CXR  - protecting her airway   - IV abx as below    #CV:  //septic shock  - with worsening wbc ct and chronic osteo likely shock from   - POCUS with LV> RV mildly reduced LV function w/o WMA and plethoric IVC without variability   - s/p 2.5L IVF  - started on levo. currently on low dose. cont for MAP goal>65  - f/u official TTE    #GI:  -Diet: npo for now but family previously didnt want ngt  - will discuss with family about enteral nutrition as has failed s/s  - Bowel regimen    #Renal:  //MARCY  - likely MARCY from ATN from sepsis  - amezquita, cont to monitor strict I/Os  - replete lytes as appropriate     #ID:  //sepsis 2/2 osteo +/- pna  - audible rhonci and + B lines on R c/f new onset asp pna with poor MS  - treated for acute on chronic osteo seen on MRI pelvis and ESBL UTI with ertapenem but still may be septic from osteo aswell  - has wound vac for stage IV sacral wound. will discuss with wound care whether needs debridement previously didnt need on initial eval per wound care team  - recx with bcx and ucx and start on broad spectrum abx with Vanc/Rocío  - pt admitted with RSV/covid/and flu 6 days ago. with now acute rise in wbc ct dont believe it is cause for acute decompensation. will cont with just supportive tx for now  - f/u ID recs      #Heme:  //DVT ppx  - cbc stable  - hematuria likely from uti resolved  -SCDs/chemoppx: hsq    #Endo:  //sliding scale   - maintaining goal glucose<180 on bmp  - cont to monitor FS    #ethics  - primary team discussed code status with pt's son lorraine who endorsed he wasnted all necessary measures done to keep her alive and maintain full code    d/w dr Archer

## 2024-12-30 NOTE — PROGRESS NOTE ADULT - SUBJECTIVE AND OBJECTIVE BOX
SPENCER MIXON  MRN-[id number]  79y (1945)    Patient is a 79y old  Female who presents with a chief complaint of Urinary tract infection and chronic osteomyelitis (12-30-24)    Interval History:       ROS:  limited, pt denies any pain  [ ] Unobtainable because:  [x ] All other systems negative    Constitutional: no fever, no chills  Head: no trauma  Eyes: no vision changes, no eye pain  ENT:  no sore throat, no rhinorrhea  Cardiovascular:  no chest pain, no palpitation  Respiratory:  no SOB, no cough  GI:  no abd pain, no vomiting, no diarrhea  urinary: no dysuria, no hematuria, no flank pain  musculoskeletal:  no joint pain, no joint swelling  skin:  no rash  neurology:  no headache, no seizure, no change in mental status  psych: no anxiety, no depression       Allergies  No Known Allergies      ANTIMICROBIALS: MEDICATIONS  (STANDING):  ertapenem  IVPB   120 mL/Hr IV Intermittent (12-22-24 @ 14:58)   120 mL/Hr IV Intermittent (12-21-24 @ 14:09)   120 mL/Hr IV Intermittent (12-20-24 @ 13:48)    ertapenem  IVPB   120 mL/Hr IV Intermittent (12-25-24 @ 14:12)   120 mL/Hr IV Intermittent (12-24-24 @ 15:07)   120 mL/Hr IV Intermittent (12-23-24 @ 13:11)    piperacillin/tazobactam IVPB..   25 mL/Hr IV Intermittent (12-19-24 @ 16:56)   25 mL/Hr IV Intermittent (12-19-24 @ 09:44)   25 mL/Hr IV Intermittent (12-19-24 @ 02:26)   25 mL/Hr IV Intermittent (12-18-24 @ 19:00)   25 mL/Hr IV Intermittent (12-18-24 @ 06:02)   25 mL/Hr IV Intermittent (12-18-24 @ 00:31)   25 mL/Hr IV Intermittent (12-17-24 @ 18:42)   25 mL/Hr IV Intermittent (12-17-24 @ 10:26)   25 mL/Hr IV Intermittent (12-17-24 @ 02:33)    piperacillin/tazobactam IVPB...   200 mL/Hr IV Intermittent (12-16-24 @ 21:37)    vancomycin  IVPB.   250 mL/Hr IV Intermittent (12-16-24 @ 21:40)      Physical Exam:  Vital Signs Last 24 Hrs  T(C): 36.6 (30 Dec 2024 06:44), Max: 37.4 (29 Dec 2024 16:48)  T(F): 97.9 (30 Dec 2024 06:44), Max: 99.3 (29 Dec 2024 16:48)  HR: 116 (30 Dec 2024 08:40) (96 - 117)  BP: 69/33 (30 Dec 2024 08:40) (58/33 - 131/74)  BP(mean): --  RR: 18 (30 Dec 2024 08:40) (16 - 18)  SpO2: 100% (30 Dec 2024 08:40) (91% - 100%)      Constitutional: Elderly woman, non-toxic, no distress  HEENT: EOMI, no scleral icterus  Cardiovascular:   normal S1, S2, no edema  Respiratory:  clear BS bilaterally, no wheezes, no rales  GI:  soft, non-tender, non distended  :  + amezquita    Musculoskeletal:  curled up in bed. atrophic limbs, contracted. +pressure ulcers with vac dressing on  Neurologic: awake and alert, oriented x 2, no focal findings  Skin:  warm, dry, no rash  Psychiatric:  appropriate mood    WBC Count: 23.63 K/uL (12-29 @ 15:10)  WBC Count: 25.87 K/uL (12-29 @ 11:58)  WBC Count: 6.15 K/uL (12-28 @ 10:20)  WBC Count: 6.30 K/uL (12-27 @ 06:25)  WBC Count: 6.63 K/uL (12-26 @ 06:46)  WBC Count: 7.28 K/uL (12-25 @ 09:20)  WBC Count: 10.25 K/uL (12-24 @ 14:40)                          8.7    23.63 )-----------( 101      ( 29 Dec 2024 15:10 )             27.4             12-30    131[L]  |  103  |  6[L]  ----------------------------<  171[H]  3.4[L]   |  16[L]  |  0.45[L]    Ca    7.1[L]      30 Dec 2024 06:02  Phos  1.9     12-30  Mg     1.6     12-30    Urinalysis with Rflx Culture (12.16.24 @ 18:45)   Urine Appearance: Turbid   Color: Orange   Specific Gravity: 1.027   pH Urine: 5.5   Protein, Urine: 100 mg/dL   Glucose Qualitative, Urine: Negative mg/dL   Ketone - Urine: Negative mg/dL   Blood, Urine: Large   Bilirubin: Small   Urobilinogen: 1.0 mg/dL   Leukocyte Esterase Concentration: Large   Nitrite: Positive  Urine Microscopic-Add On (NC) (12.16.24 @ 18:45)   Bacteria: Many /HPF   Comment - Urine: moderate yeast like cells noted   Red Blood Cell - Urine: 10 /HPF   White Blood Cell - Urine: Too Numerous to count /HPF        MICROBIOLOGY:  Clean Catch  12-16-24   >100,000 CFU/ml Escherichia coli ESBL  --  Escherichia coli ESBL    Respiratory Viral Panel with COVID-19 by SHIRLEY (12.24.24 @ 11:48)   Rapid RVP Result: Detected   SARS-CoV-2: Detected: This Respiratory Panel uses polymerase chain reaction (PCR) to detect for  adenovirus; coronavirus (HKU1, NL63, 229E, OC43); human metapneumovirus  (hMPV); human enterovirus/rhinovirus (Entero/RV); influenza A; influenza  A/H1; influenza A/H3; influenza A/H1-2009; influenza B; parainfluenza  viruses 1, 2, 3, 4; respiratory syncytial virus; Mycoplasma pneumoniae;  Chlamydophila pneumoniae; and SARS-CoV-2.      SARS-CoV-2: Detected (04 Dec 2024 11:00)    RADIOLOGY:  < from: MR Pelvis w/ IV Cont (12.23.24 @ 10:34) >  IMPRESSION:  MRI of the bony pelvis demonstrates extensive soft tissue ulceration at   the posterior and lateral tissues with gas tracking to the sacrum, and   bilateral greater trochanters consistent with stage IV ulceration for   which correlation with physical exam is recommended. Intermediate to low   T1 signal, high STIR signal, and postcontrast enhancement at the S5   segment of the sacrum, coccyx, and less so at the greater trochanters and   posterior ischial spines suggestive of osteomyelitis. Findings may be   chronic or acute on chronic.  No walled off collection to suggest abscess.    --- End of Report ---      < from: CT Abdomen and Pelvis w/ IV Cont (12.16.24 @ 20:14) >  IMPRESSION:  Extensive decubitus ulcers overlying the sacrum and the greater   trochanters.  Curvilinear fluid collection overlying the left posterior acetabulum.  Foci of gas in the left hip joint.  Scattered subcutaneous gas.  Scattered osseous sclerosis worrisome for chronic osteomyelitis.  Rectum distended by stool.    --- End of Report ---    < end of copied text >     SPENCER MIXON  MRN-[id number]  79y (1945)    Patient is a 79y old  Female who presents with a chief complaint of Urinary tract infection and chronic osteomyelitis (12-30-24)    Interval History: Events noted. RRT this am due to hypotension. Started on levophed. Upgraded to MICU.       ROS:  limited, pt denies any pain  [ ] Unobtainable because:  [x ] All other systems negative    Constitutional: no fever, no chills  Head: no trauma  Eyes: no vision changes, no eye pain  ENT:  no sore throat, no rhinorrhea  Cardiovascular:  no chest pain, no palpitation  Respiratory:  no SOB, no cough  GI:  no abd pain, no vomiting, no diarrhea  urinary: no dysuria, no hematuria, no flank pain  musculoskeletal:  no joint pain, no joint swelling  skin:  no rash  neurology:  no headache, no seizure, no change in mental status  psych: no anxiety, no depression       Allergies  No Known Allergies      ANTIMICROBIALS: MEDICATIONS  (STANDING):  ertapenem  IVPB   120 mL/Hr IV Intermittent (12-22-24 @ 14:58)   120 mL/Hr IV Intermittent (12-21-24 @ 14:09)   120 mL/Hr IV Intermittent (12-20-24 @ 13:48)    ertapenem  IVPB   120 mL/Hr IV Intermittent (12-25-24 @ 14:12)   120 mL/Hr IV Intermittent (12-24-24 @ 15:07)   120 mL/Hr IV Intermittent (12-23-24 @ 13:11)    piperacillin/tazobactam IVPB..   25 mL/Hr IV Intermittent (12-19-24 @ 16:56)   25 mL/Hr IV Intermittent (12-19-24 @ 09:44)   25 mL/Hr IV Intermittent (12-19-24 @ 02:26)   25 mL/Hr IV Intermittent (12-18-24 @ 19:00)   25 mL/Hr IV Intermittent (12-18-24 @ 06:02)   25 mL/Hr IV Intermittent (12-18-24 @ 00:31)   25 mL/Hr IV Intermittent (12-17-24 @ 18:42)   25 mL/Hr IV Intermittent (12-17-24 @ 10:26)   25 mL/Hr IV Intermittent (12-17-24 @ 02:33)    piperacillin/tazobactam IVPB...   200 mL/Hr IV Intermittent (12-16-24 @ 21:37)    vancomycin  IVPB.   250 mL/Hr IV Intermittent (12-16-24 @ 21:40)      Physical Exam:  Vital Signs Last 24 Hrs  T(C): 36.6 (30 Dec 2024 06:44), Max: 37.4 (29 Dec 2024 16:48)  T(F): 97.9 (30 Dec 2024 06:44), Max: 99.3 (29 Dec 2024 16:48)  HR: 116 (30 Dec 2024 08:40) (96 - 117)  BP: 69/33 (30 Dec 2024 08:40) (58/33 - 131/74)  BP(mean): --  RR: 18 (30 Dec 2024 08:40) (16 - 18)  SpO2: 100% (30 Dec 2024 08:40) (91% - 100%)      Constitutional: Elderly woman chronic ill appearing  HEENT: Unable to examine oral cavity. On NC  Cardiovascular:   normal S1, S2, no edema  Respiratory:  clear BS bilaterally, no wheezes, no rales  GI:  soft, non distended  :  + amezquita with clear urine in the bag    Musculoskeletal: Atrophic limbs, contracted. +pressure ulcers with vac dressing on  Neurologic: Lethargic, Does not open eyes or follow any commands  Skin:  warm, dry, no rash  Psychiatric:  obtunded      WBC Count: 23.63 K/uL (12-29 @ 15:10)  WBC Count: 25.87 K/uL (12-29 @ 11:58)  WBC Count: 6.15 K/uL (12-28 @ 10:20)  WBC Count: 6.30 K/uL (12-27 @ 06:25)  WBC Count: 6.63 K/uL (12-26 @ 06:46)  WBC Count: 7.28 K/uL (12-25 @ 09:20)  WBC Count: 10.25 K/uL (12-24 @ 14:40)                          8.7    23.63 )-----------( 101      ( 29 Dec 2024 15:10 )             27.4             12-30    131[L]  |  103  |  6[L]  ----------------------------<  171[H]  3.4[L]   |  16[L]  |  0.45[L]    Ca    7.1[L]      30 Dec 2024 06:02  Phos  1.9     12-30  Mg     1.6     12-30      Urinalysis with Rflx Culture (12.16.24 @ 18:45)   Urine Appearance: Turbid   Color: Orange   Specific Gravity: 1.027   pH Urine: 5.5   Protein, Urine: 100 mg/dL   Glucose Qualitative, Urine: Negative mg/dL   Ketone - Urine: Negative mg/dL   Blood, Urine: Large   Bilirubin: Small   Urobilinogen: 1.0 mg/dL   Leukocyte Esterase Concentration: Large   Nitrite: Positive  Urine Microscopic-Add On (NC) (12.16.24 @ 18:45)   Bacteria: Many /HPF   Comment - Urine: moderate yeast like cells noted   Red Blood Cell - Urine: 10 /HPF   White Blood Cell - Urine: Too Numerous to count /HPF        MICROBIOLOGY:  Clean Catch  12-16-24   >100,000 CFU/ml Escherichia coli ESBL  --  Escherichia coli ESBL    Respiratory Viral Panel with COVID-19 by SHIRLEY (12.24.24 @ 11:48)   Rapid RVP Result: Detected   SARS-CoV-2: Detected: This Respiratory Panel uses polymerase chain reaction (PCR) to detect for  adenovirus; coronavirus (HKU1, NL63, 229E, OC43); human metapneumovirus  (hMPV); human enterovirus/rhinovirus (Entero/RV); influenza A; influenza  A/H1; influenza A/H3; influenza A/H1-2009; influenza B; parainfluenza  viruses 1, 2, 3, 4; respiratory syncytial virus; Mycoplasma pneumoniae;  Chlamydophila pneumoniae; and SARS-CoV-2.      SARS-CoV-2: Detected (04 Dec 2024 11:00)    RADIOLOGY:  < from: Xray Chest 1 View- PORTABLE-Urgent (Xray Chest 1 View- PORTABLE-Urgent .) (12.30.24 @ 09:17) >  ACC: [id number] EXAM:  XR CHEST PORTABLE URGENT 1V   ORDERED BY: YAYA GUERRIER     PROCEDURE DATE:  12/30/2024          INTERPRETATION:  AP chest on December 30, 2024 at 9:05 AM. She has sepsis.    Heart magnified by technique.    There is a persistent left perihilar infiltrate similar to December 4.    On December 4 there was a right perihilar infiltrates which shows   improvement. Elevated right hemidiaphragm unchanged.    IMPRESSION: Improved right perihilar infiltrate from December 4.   Persistent left perihilar infiltrate.    --- End of Report ---    < end of copied text >    < from: MR Pelvis w/ IV Cont (12.23.24 @ 10:34) >  IMPRESSION:  MRI of the bony pelvis demonstrates extensive soft tissue ulceration at   the posterior and lateral tissues with gas tracking to the sacrum, and   bilateral greater trochanters consistent with stage IV ulceration for   which correlation with physical exam is recommended. Intermediate to low   T1 signal, high STIR signal, and postcontrast enhancement at the S5   segment of the sacrum, coccyx, and less so at the greater trochanters and   posterior ischial spines suggestive of osteomyelitis. Findings may be   chronic or acute on chronic.  No walled off collection to suggest abscess.    --- End of Report ---      < from: CT Abdomen and Pelvis w/ IV Cont (12.16.24 @ 20:14) >  IMPRESSION:  Extensive decubitus ulcers overlying the sacrum and the greater   trochanters.  Curvilinear fluid collection overlying the left posterior acetabulum.  Foci of gas in the left hip joint.  Scattered subcutaneous gas.  Scattered osseous sclerosis worrisome for chronic osteomyelitis.  Rectum distended by stool.    --- End of Report ---    < end of copied text >

## 2024-12-30 NOTE — PROGRESS NOTE ADULT - SUBJECTIVE AND OBJECTIVE BOX
79F w/ PMHx OP, MS, bedbound admitted with hematuria and chronic osteomyelitis.  Seen previously and recommended PT wound vac.  Patient now septic, CCU team asking for reevaluation of chronic wounds as possible source.  Patient seen and examined at bedside with Dr. Whitehead.    T(F): 98.6 (12-30-24 @ 16:00), Max: 98.6 (12-30-24 @ 13:00)  HR: 118 (12-30-24 @ 17:30) (82 - 128)  BP: 156/90 (12-30-24 @ 17:30) (58/33 - 156/90)  RR: 29 (12-30-24 @ 17:30) (16 - 31)  SpO2: 94% (12-30-24 @ 17:30) (91% - 100%)  Wt(kg): --  CAPILLARY BLOOD GLUCOSE      POCT Blood Glucose.: 170 mg/dL (30 Dec 2024 11:30)  POCT Blood Glucose.: 171 mg/dL (30 Dec 2024 08:18)      PHYSICAL EXAM:  General: lying in bed  Neuro: opens eyes to voice  Head: NCAT  EENT: EOMI, conjunctiva clear, moist mucous membranes  CV: tachycardic  Lung: respirations nonlabored, good inspiratory effort  Abdomen: soft, NTND.  Extremities: Upper and lower extremity contractures noted  Skin: 3 wounds on sacrum and lower back, b/l hip wounds with tracking, all with granulation tissue, no active purulent drainage, no evidence of eschar    LABS:                        8.7    23.63 )-----------( 101      ( 29 Dec 2024 15:10 )             27.4     12-30    131[L]  |  103  |  6[L]  ----------------------------<  171[H]  3.4[L]   |  16[L]  |  0.45[L]    Ca    7.1[L]      30 Dec 2024 06:02  Phos  1.9     12-30  Mg     1.6     12-30

## 2024-12-30 NOTE — PROGRESS NOTE ADULT - SUBJECTIVE AND OBJECTIVE BOX
Patient is a 79y old  Female who presents with a chief complaint of Urinary tract infection and chronic osteomyelitis (30 Dec 2024 18:08)      BRIEF HOSPITAL COURSE:   80 yo f pmhx MS, OP, bedbound, chronic pressure wounds, contracted,     Events last 24 hours:       PAST MEDICAL & SURGICAL HISTORY:  Osteoporosis  Uterine polyp  Herniated disc  lumbar  Functional quadriplegia secondary to multiple sclerosis  Bilateral Tubal ligation  48y/o      Allergies  No Known Allergies      FAMILY HISTORY:  unable to obtain       Social History:   from home      Review of Systems:  unable to obtain 2/2 baseline ms      Physical Examination:    General: No acute distress.      HEENT: Pupils equal, reactive to light.  Symmetric.    PULM: Clear to auscultation bilaterally, no significant sputum production    CVS: Regular rate and rhythm, no murmurs, rubs, or gallops    ABD: Soft, nondistended, nontender, normoactive bowel sounds, no masses    EXT: No edema, nontender    SKIN: Warm and well perfused, no rashes noted.    NEURO: Alert, oriented, interactive, nonfocal      Medications:  meropenem  IVPB 500 milliGRAM(s) IV Intermittent every 12 hours  midodrine 20 milliGRAM(s) Oral every 8 hours  norepinephrine Infusion 0.05 MICROgram(s)/kG/Min IV Continuous <Continuous>  acetaminophen   IVPB .. 625 milliGRAM(s) IV Intermittent once  ondansetron Injectable 4 milliGRAM(s) IV Push every 8 hours PRN  insulin lispro (ADMELOG) corrective regimen sliding scale   SubCutaneous three times a day before meals  sodium chloride 0.9% lock flush 10 milliLiter(s) IV Push every 1 hour PRN  chlorhexidine 2% Cloths 1 Application(s) Topical <User Schedule>  nystatin Powder 1 Application(s) Topical two times a day      ICU Vital Signs Last 24 Hrs  T(C): 36.6 (30 Dec 2024 19:00), Max: 37 (30 Dec 2024 13:00)  T(F): 97.9 (30 Dec 2024 19:00), Max: 98.6 (30 Dec 2024 13:00)  HR: 108 (30 Dec 2024 20:30) (82 - 128)  BP: 119/65 (30 Dec 2024 20:30) (58/33 - 156/90)  BP(mean): 82 (30 Dec 2024 20:30) (49 - 111)  ABP: --  ABP(mean): --  RR: 26 (30 Dec 2024 20:30) (16 - 31)  SpO2: 93% (30 Dec 2024 20:30) (89% - 100%)    O2 Parameters below as of 30 Dec 2024 19:01  Patient On (Oxygen Delivery Method): room air      Vital Signs Last 24 Hrs  T(C): 36.6 (30 Dec 2024 19:00), Max: 37 (30 Dec 2024 13:00)  T(F): 97.9 (30 Dec 2024 19:00), Max: 98.6 (30 Dec 2024 13:00)  HR: 108 (30 Dec 2024 20:30) (82 - 128)  BP: 119/65 (30 Dec 2024 20:30) (58/33 - 156/90)  BP(mean): 82 (30 Dec 2024 20:30) (49 - 111)  RR: 26 (30 Dec 2024 20:30) (16 - 31)  SpO2: 93% (30 Dec 2024 20:30) (89% - 100%)    Parameters below as of 30 Dec 2024 19:01  Patient On (Oxygen Delivery Method): room air      I&O's Detail    29 Dec 2024 07:01  -  30 Dec 2024 07:00  --------------------------------------------------------  IN:    dextrose 5% + sodium chloride 0.45%: 2200 mL    IV PiggyBack: 250 mL    IV PiggyBack: 100 mL    IV PiggyBack: 50 mL    Lactated Ringers Bolus: 1250 mL  Total IN: 3850 mL    OUT:    Indwelling Catheter - Urethral (mL): 750 mL    VAC (Vacuum Assisted Closure) System (mL): 450 mL  Total OUT: 1200 mL  Total NET: 2650 mL      30 Dec 2024 07:01  -  30 Dec 2024 21:55  --------------------------------------------------------  IN:    IV PiggyBack: 100 mL    Norepinephrine: 79.9 mL  Total IN: 179.9 mL    OUT:    Indwelling Catheter - Urethral (mL): 120 mL    VAC (Vacuum Assisted Closure) System (mL): 300 mL  Total OUT: 420 mL  Total NET: -240.1 mL      LABS:                        7.1    45.18 )-----------( 13       ( 30 Dec 2024 19:30 )             21.5     12-30    130[L]  |  103  |  9   ----------------------------<  114[H]  4.0   |  19[L]  |  0.48[L]    Ca    7.3[L]      30 Dec 2024 19:30  Phos  4.1     12-30  Mg     2.0     12-30      CAPILLARY BLOOD GLUCOSE  POCT Blood Glucose.: 146 mg/dL (30 Dec 2024 18:19)      Urinalysis Basic - ( 30 Dec 2024 19:30 )  Color: x / Appearance: x / SG: x / pH: x  Gluc: 114 mg/dL / Ketone: x  / Bili: x / Urobili: x   Blood: x / Protein: x / Nitrite: x   Leuk Esterase: x / RBC: x / WBC x   Sq Epi: x / Non Sq Epi: x / Bacteria: x      CULTURES:  pending      RADIOLOGY:   < from: Xray Chest 1 View- PORTABLE-Urgent (Xray Chest 1 View- PORTABLE-Urgent .) (12.30.24 @ 15:59) >  ACC: [id number] EXAM:  XR CHEST PORTABLE URGENT 1V   ORDERED BY: CANDICE NAVARRETE     ACC: [id number] EXAM:  XR CHEST PORTABLE URGENT 1V   ORDERED BY: AZAM GENTILE     PROCEDURE DATE:  12/30/2024      INTERPRETATION:  AP chest on December 30,2024 at 2:52 PM. Patient should   obscures right apex.    Heart magnified by technique.    There is a persistent moderate left perihilar infiltrate similar to   December 30 earlier study. Mild right base fluid is again noted.    Follow-up AP chest onDecember 30, 2024 at 3:43 PM.    Left subclavian line is been inserted with tip at the cavoatrial   junction. Lung findings improved.    IMPRESSION: Left subclavian line inserted on the latest study.    Improving bibasilar lung and pleural findings.    --- End of Report ---    FIFI MCKINLEY MD  This document has been electronically signed. Dec 30 2024  4:17PM    < end of copied text >   Patient is a 79y old  Female who presents with a chief complaint of Urinary tract infection and chronic osteomyelitis (30 Dec 2024 18:08)      BRIEF HOSPITAL COURSE:   80 yo f pmhx MS, OP, bedbound, chronic pressure wounds, contracted, recent COVID 19+ (12/4/24) admitted to medicine with hematuria subsequently found to be RSV/Flu A+.     Events last 24 hours:   Patient upgraded to ICU in setting of septic shock requiring levophed.  Repeat labs this evening, wbc doubled, H&H downtrending and plt drop to 13 from 100.  Heparin ppx discontinued, T&S, HIT and DIC panels ordered. remains on levophed for bp support      PAST MEDICAL & SURGICAL HISTORY:  Osteoporosis  Uterine polyp  Herniated disc  lumbar  Functional quadriplegia secondary to multiple sclerosis  Bilateral Tubal ligation  46y/o      Allergies  No Known Allergies      FAMILY HISTORY:  unable to obtain       Social History:   from home      Review of Systems:  unable to obtain 2/2 baseline ms      Physical Examination:    General: No acute distress.      HEENT: Pupils equal, reactive to light.  Symmetric.    PULM: Clear to auscultation bilaterally, no significant sputum production    CVS: Regular rate and rhythm, no murmurs, rubs, or gallops    ABD: Soft, nondistended, nontender, normoactive bowel sounds, no masses    EXT: No edema, nontender    SKIN: Warm and well perfused, no rashes noted.    NEURO: Alert, oriented, interactive, nonfocal      Medications:  meropenem  IVPB 500 milliGRAM(s) IV Intermittent every 12 hours  midodrine 20 milliGRAM(s) Oral every 8 hours  norepinephrine Infusion 0.05 MICROgram(s)/kG/Min IV Continuous <Continuous>  acetaminophen   IVPB .. 625 milliGRAM(s) IV Intermittent once  ondansetron Injectable 4 milliGRAM(s) IV Push every 8 hours PRN  insulin lispro (ADMELOG) corrective regimen sliding scale   SubCutaneous three times a day before meals  sodium chloride 0.9% lock flush 10 milliLiter(s) IV Push every 1 hour PRN  chlorhexidine 2% Cloths 1 Application(s) Topical <User Schedule>  nystatin Powder 1 Application(s) Topical two times a day      ICU Vital Signs Last 24 Hrs  T(C): 36.6 (30 Dec 2024 19:00), Max: 37 (30 Dec 2024 13:00)  T(F): 97.9 (30 Dec 2024 19:00), Max: 98.6 (30 Dec 2024 13:00)  HR: 108 (30 Dec 2024 20:30) (82 - 128)  BP: 119/65 (30 Dec 2024 20:30) (58/33 - 156/90)  BP(mean): 82 (30 Dec 2024 20:30) (49 - 111)  ABP: --  ABP(mean): --  RR: 26 (30 Dec 2024 20:30) (16 - 31)  SpO2: 93% (30 Dec 2024 20:30) (89% - 100%)    O2 Parameters below as of 30 Dec 2024 19:01  Patient On (Oxygen Delivery Method): room air      Vital Signs Last 24 Hrs  T(C): 36.6 (30 Dec 2024 19:00), Max: 37 (30 Dec 2024 13:00)  T(F): 97.9 (30 Dec 2024 19:00), Max: 98.6 (30 Dec 2024 13:00)  HR: 108 (30 Dec 2024 20:30) (82 - 128)  BP: 119/65 (30 Dec 2024 20:30) (58/33 - 156/90)  BP(mean): 82 (30 Dec 2024 20:30) (49 - 111)  RR: 26 (30 Dec 2024 20:30) (16 - 31)  SpO2: 93% (30 Dec 2024 20:30) (89% - 100%)    Parameters below as of 30 Dec 2024 19:01  Patient On (Oxygen Delivery Method): room air      I&O's Detail    29 Dec 2024 07:01  -  30 Dec 2024 07:00  --------------------------------------------------------  IN:    dextrose 5% + sodium chloride 0.45%: 2200 mL    IV PiggyBack: 250 mL    IV PiggyBack: 100 mL    IV PiggyBack: 50 mL    Lactated Ringers Bolus: 1250 mL  Total IN: 3850 mL    OUT:    Indwelling Catheter - Urethral (mL): 750 mL    VAC (Vacuum Assisted Closure) System (mL): 450 mL  Total OUT: 1200 mL  Total NET: 2650 mL      30 Dec 2024 07:01  -  30 Dec 2024 21:55  --------------------------------------------------------  IN:    IV PiggyBack: 100 mL    Norepinephrine: 79.9 mL  Total IN: 179.9 mL    OUT:    Indwelling Catheter - Urethral (mL): 120 mL    VAC (Vacuum Assisted Closure) System (mL): 300 mL  Total OUT: 420 mL  Total NET: -240.1 mL      LABS:                        7.1    45.18 )-----------( 13       ( 30 Dec 2024 19:30 )             21.5     12-30    130[L]  |  103  |  9   ----------------------------<  114[H]  4.0   |  19[L]  |  0.48[L]    Ca    7.3[L]      30 Dec 2024 19:30  Phos  4.1     12-30  Mg     2.0     12-30      CAPILLARY BLOOD GLUCOSE  POCT Blood Glucose.: 146 mg/dL (30 Dec 2024 18:19)      Urinalysis Basic - ( 30 Dec 2024 19:30 )  Color: x / Appearance: x / SG: x / pH: x  Gluc: 114 mg/dL / Ketone: x  / Bili: x / Urobili: x   Blood: x / Protein: x / Nitrite: x   Leuk Esterase: x / RBC: x / WBC x   Sq Epi: x / Non Sq Epi: x / Bacteria: x      CULTURES:  pending      RADIOLOGY:   < from: Xray Chest 1 View- PORTABLE-Urgent (Xray Chest 1 View- PORTABLE-Urgent .) (12.30.24 @ 15:59) >  ACC: [id number] EXAM:  XR CHEST PORTABLE URGENT 1V   ORDERED BY: CANDICE NAVARRETE     ACC: [id number] EXAM:  XR CHEST PORTABLE URGENT 1V   ORDERED BY: AZAM GENTILE     PROCEDURE DATE:  12/30/2024      INTERPRETATION:  AP chest on December 30,2024 at 2:52 PM. Patient should   obscures right apex.    Heart magnified by technique.    There is a persistent moderate left perihilar infiltrate similar to   December 30 earlier study. Mild right base fluid is again noted.    Follow-up AP chest onDecember 30, 2024 at 3:43 PM.    Left subclavian line is been inserted with tip at the cavoatrial   junction. Lung findings improved.    IMPRESSION: Left subclavian line inserted on the latest study.    Improving bibasilar lung and pleural findings.    --- End of Report ---    FIFI MCKINLEY MD  This document has been electronically signed. Dec 30 2024  4:17PM    < end of copied text >   Patient is a 79y old  Female who presents with a chief complaint of Urinary tract infection and chronic osteomyelitis (30 Dec 2024 18:08)      BRIEF HOSPITAL COURSE:   78 yo f pmhx MS, OP, bedbound, chronic pressure wounds, contracted, recent COVID 19+ (12/4/24) admitted to medicine with hematuria subsequently found to be RSV/Flu A+.     Events last 24 hours:   Patient upgraded to ICU in setting of septic shock requiring levophed.  Repeat labs this evening, wbc doubled, H&H downtrending and plt drop to 13 from 100.  Heparin ppx discontinued, T&S, HIT and DIC panels ordered. remains on levophed for bp support    ADDENDUM:   Gm -  bacteremia, called by clinical pharmacist, carabpenem resistant, patient changed to Avycaz. Repeat lab work consistent DIC.  Repeat plt 8, attempted to call family for blood consent, no answer, vm left with call back number.     PAST MEDICAL & SURGICAL HISTORY:  Osteoporosis  Uterine polyp  Herniated disc  lumbar  Functional quadriplegia secondary to multiple sclerosis  Bilateral Tubal ligation  48y/o      Allergies  No Known Allergies      FAMILY HISTORY:  unable to obtain       Social History:   from home      Review of Systems:  unable to obtain 2/2 baseline ms      Physical Examination:    General: elderly cachectic female, lying in bed, nad    HEENT: nc    PULM: diminished b/l    CVS: rrr    ABD: Soft, nondistended, nontender, +bs    EXT: contracted    SKIN: Warm    NEURO: baseline poor ms      Medications:  meropenem  IVPB 500 milliGRAM(s) IV Intermittent every 12 hours  midodrine 20 milliGRAM(s) Oral every 8 hours  norepinephrine Infusion 0.05 MICROgram(s)/kG/Min IV Continuous <Continuous>  acetaminophen   IVPB .. 625 milliGRAM(s) IV Intermittent once  ondansetron Injectable 4 milliGRAM(s) IV Push every 8 hours PRN  insulin lispro (ADMELOG) corrective regimen sliding scale   SubCutaneous three times a day before meals  sodium chloride 0.9% lock flush 10 milliLiter(s) IV Push every 1 hour PRN  chlorhexidine 2% Cloths 1 Application(s) Topical <User Schedule>  nystatin Powder 1 Application(s) Topical two times a day      ICU Vital Signs Last 24 Hrs  T(C): 36.6 (30 Dec 2024 19:00), Max: 37 (30 Dec 2024 13:00)  T(F): 97.9 (30 Dec 2024 19:00), Max: 98.6 (30 Dec 2024 13:00)  HR: 108 (30 Dec 2024 20:30) (82 - 128)  BP: 119/65 (30 Dec 2024 20:30) (58/33 - 156/90)  BP(mean): 82 (30 Dec 2024 20:30) (49 - 111)  ABP: --  ABP(mean): --  RR: 26 (30 Dec 2024 20:30) (16 - 31)  SpO2: 93% (30 Dec 2024 20:30) (89% - 100%)    O2 Parameters below as of 30 Dec 2024 19:01  Patient On (Oxygen Delivery Method): room air      Vital Signs Last 24 Hrs  T(C): 36.6 (30 Dec 2024 19:00), Max: 37 (30 Dec 2024 13:00)  T(F): 97.9 (30 Dec 2024 19:00), Max: 98.6 (30 Dec 2024 13:00)  HR: 108 (30 Dec 2024 20:30) (82 - 128)  BP: 119/65 (30 Dec 2024 20:30) (58/33 - 156/90)  BP(mean): 82 (30 Dec 2024 20:30) (49 - 111)  RR: 26 (30 Dec 2024 20:30) (16 - 31)  SpO2: 93% (30 Dec 2024 20:30) (89% - 100%)    Parameters below as of 30 Dec 2024 19:01  Patient On (Oxygen Delivery Method): room air      I&O's Detail    29 Dec 2024 07:01  -  30 Dec 2024 07:00  --------------------------------------------------------  IN:    dextrose 5% + sodium chloride 0.45%: 2200 mL    IV PiggyBack: 250 mL    IV PiggyBack: 100 mL    IV PiggyBack: 50 mL    Lactated Ringers Bolus: 1250 mL  Total IN: 3850 mL    OUT:    Indwelling Catheter - Urethral (mL): 750 mL    VAC (Vacuum Assisted Closure) System (mL): 450 mL  Total OUT: 1200 mL  Total NET: 2650 mL      30 Dec 2024 07:01  -  30 Dec 2024 21:55  --------------------------------------------------------  IN:    IV PiggyBack: 100 mL    Norepinephrine: 79.9 mL  Total IN: 179.9 mL    OUT:    Indwelling Catheter - Urethral (mL): 120 mL    VAC (Vacuum Assisted Closure) System (mL): 300 mL  Total OUT: 420 mL  Total NET: -240.1 mL      LABS:                        7.1    45.18 )-----------( 13       ( 30 Dec 2024 19:30 )             21.5     12-30    130[L]  |  103  |  9   ----------------------------<  114[H]  4.0   |  19[L]  |  0.48[L]    Ca    7.3[L]      30 Dec 2024 19:30  Phos  4.1     12-30  Mg     2.0     12-30      CAPILLARY BLOOD GLUCOSE  POCT Blood Glucose.: 146 mg/dL (30 Dec 2024 18:19)      Urinalysis Basic - ( 30 Dec 2024 19:30 )  Color: x / Appearance: x / SG: x / pH: x  Gluc: 114 mg/dL / Ketone: x  / Bili: x / Urobili: x   Blood: x / Protein: x / Nitrite: x   Leuk Esterase: x / RBC: x / WBC x   Sq Epi: x / Non Sq Epi: x / Bacteria: x      CULTURES:  pending      RADIOLOGY:   < from: Xray Chest 1 View- PORTABLE-Urgent (Xray Chest 1 View- PORTABLE-Urgent .) (12.30.24 @ 15:59) >  ACC: [id number] EXAM:  XR CHEST PORTABLE URGENT 1V   ORDERED BY: CANDICE NAVARRETE     ACC: [id number] EXAM:  XR CHEST PORTABLE URGENT 1V   ORDERED BY: AZAM GENTILE     PROCEDURE DATE:  12/30/2024      INTERPRETATION:  AP chest on December 30,2024 at 2:52 PM. Patient should   obscures right apex.    Heart magnified by technique.    There is a persistent moderate left perihilar infiltrate similar to   December 30 earlier study. Mild right base fluid is again noted.    Follow-up AP chest onDecember 30, 2024 at 3:43 PM.    Left subclavian line is been inserted with tip at the cavoatrial   junction. Lung findings improved.    IMPRESSION: Left subclavian line inserted on the latest study.    Improving bibasilar lung and pleural findings.    --- End of Report ---    FIFI MCKINLEY MD  This document has been electronically signed. Dec 30 2024  4:17PM    < end of copied text >

## 2024-12-30 NOTE — PROGRESS NOTE ADULT - SUBJECTIVE AND OBJECTIVE BOX
Patient is a 79y old  Female who presents with a chief complaint of Urinary tract infection and chronic osteomyelitis (29 Dec 2024 08:22)      INTERVAL HPI/OVERNIGHT EVENTS:  -afebrile overnight   -hypotensive early this morning -->rapid response called   -BP not fluid responsive, levo started this AM with plan to transfer to the ICU    MEDICATIONS  (STANDING):  albumin human 25% IVPB 100 milliLiter(s) IV Intermittent once  dextrose 5% + sodium chloride 0.45%. 1000 milliLiter(s) (100 mL/Hr) IV Continuous <Continuous>  lactated ringers Bolus 1000 milliLiter(s) IV Bolus once  meropenem  IVPB 500 milliGRAM(s) IV Intermittent every 24 hours  norepinephrine Infusion 0.05 MICROgram(s)/kG/Min (3.83 mL/Hr) IV Continuous <Continuous>  nystatin Powder 1 Application(s) Topical two times a day  vancomycin  IVPB 1000 milliGRAM(s) IV Intermittent once    MEDICATIONS  (PRN):  ondansetron Injectable 4 milliGRAM(s) IV Push every 8 hours PRN Nausea and/or Vomiting      Allergies    No Known Allergies    Intolerances        REVIEW OF SYSTEMS:  tired, unable to assess    Vital Signs Last 24 Hrs  T(C): 36.6 (30 Dec 2024 06:44), Max: 37.4 (29 Dec 2024 16:48)  T(F): 97.9 (30 Dec 2024 06:44), Max: 99.3 (29 Dec 2024 16:48)  HR: 116 (30 Dec 2024 06:44) (96 - 117)  BP: 71/45 (30 Dec 2024 07:40) (58/33 - 131/74)  BP(mean): --  RR: 16 (30 Dec 2024 06:44) (16 - 18)  SpO2: 91% (29 Dec 2024 23:45) (91% - 97%)        PHYSICAL EXAM:  GENERAL: NAD,thin  HEAD:  Atraumatic, Normocephalic  EYES: EOMI, sclera non-icteric  ENMT:  Moist mucous membranes  NERVOUS SYSTEM: tired but opens eyes to name   CHEST/LUNG: Clear to percussion bilaterall  HEART: Regular rate and rhythm; No murmurs, rubs, or gallops  ABDOMEN: Soft, Nontender, Nondistended; Bowel sounds present  EXTREMITIES:  +upper and lower extremity contractures   SKIN: No rashes or lesions    LABS:                        8.7    23.63 )-----------( 101      ( 29 Dec 2024 15:10 )             27.4     12-30    131[L]  |  103  |  6[L]  ----------------------------<  171[H]  3.4[L]   |  16[L]  |  0.45[L]    Ca    7.1[L]      30 Dec 2024 06:02  Phos  1.9     12-30  Mg     1.6     12-30        Urinalysis Basic - ( 30 Dec 2024 06:02 )    Color: x / Appearance: x / SG: x / pH: x  Gluc: 171 mg/dL / Ketone: x  / Bili: x / Urobili: x   Blood: x / Protein: x / Nitrite: x   Leuk Esterase: x / RBC: x / WBC x   Sq Epi: x / Non Sq Epi: x / Bacteria: x      CAPILLARY BLOOD GLUCOSE      POCT Blood Glucose.: 171 mg/dL (30 Dec 2024 08:18)      RADIOLOGY & ADDITIONAL TESTS:    Imaging Personally Reviewed:  [ X] YES  [ ] NO    Consultant(s) Notes Reviewed:  [ X] YES  [ ] NO    Care Discussed with Consultants/Other Providers [X ] YES  [ ] NO

## 2024-12-30 NOTE — PROGRESS NOTE ADULT - ASSESSMENT
79F w/ PMHx OP, MS, bedbound admitted with hematuria, UTI, and chronic OM now septic in CCU requiring pressors.  PT has been changing wound vac.  Wounds appear with good granulation tissue and no signs of active drainage or purulence.    Plan as discussed with Dr. Whitehead:  - No excisional debridement needed  - Continue wound care via PT wound vac  - Continue care per ICU team  - No further wound vare intervention at this time, please reconsult PRN

## 2024-12-30 NOTE — CONSULT NOTE ADULT - SUBJECTIVE AND OBJECTIVE BOX
----- Message from RT Jenifer sent at 8/7/2019  2:54 PM CDT -----  Regarding: pt needs STAT creatinine and lab appointment booked  Mr. Gomes needs a STAT creatinine ordered and lab appointment booked before his CT scan Friday at 2:50pm. Please book lab appointment and STAT creatinine at least 1 1/2 hours before CT appointment or if patient wants to come in tomorrow.  Please make sure they are STAT.     Please contact patient to make them aware of the changes. We can not do patient until lab results are received.     Patients 60 years and older must have labs within a 30 day window.         Thanks Kylah Kasper    CHIEF COMPLAINT: AMS    HPI:  79-year-old female with PMH osteoporosis, MS, bedbound, who presents today for hematuria. As per patient, amezquita was changed 2 days ago and blood was seen in the catheter at the time. Patient reports dysuria, denies frequency and urgency. Patient denies abdominal pain, flank pain, fever, chills, nausea, vomiting, diarrhea. Patient has had similar episodes prior.     On presentation, patient is hemodynamically stable and afebrile, although BP is soft at 109/68. Labs significant for elevated WBC 39.41, Hgb 9.2, elevated Alk Ph 166, UA showing +LE, +nitrite, +WBC, +bacteria. CT abdomen/pelvis shows extensive decubitus ulcers overlying the sacrum and the greater trochanters; Curvilinear fluid collection overlying the left posterior acetabulum; Foci of gas in the left hip joint; Scattered subcutaneous gas; Scattered osseous sclerosis worrisome for chronic osteomyelitis; Rectum distended by stool.   Pt was admitted to medicine for acute on chronic ostemoyelitis and UTI on broad spectrum abx. Pt seen by wound care team and vascular and deemed no need for debridment. Wound vac placed. Wound cx with ESBL ecoli and Ucx with Eesbl EColi. Pt seen by ID and started on ertapenem. MRI pelvis with acute on chronic osteo. Pall care team was discussing GOC with pt's son lorraine but was still undecided as to code status but didnt want feeding tube as pt was failing s/s evals.   Yesterday pt had worsening leukocytosis and early this AM pt was RRT for hypotension. Pt recieved 2.5L IVF and albumin but still hypotensive to 60s systolic. ICU consutled for further eval.  Primary team had GOC discussion with pt's son lorraine who endorsed pt is full code and wants all necessary measures done.     Subjective: Pt seen at bedside. Pt arousable to voice hypotensive to 70s systolic satting 100% on NC and mildly tachycardic. Pt started on levophed and accepted to icu for further mngmt.    PAST MEDICAL & SURGICAL HISTORY:  Osteoporosis      Uterine polyp      Herniated disc  lumbar      Functional quadriplegia secondary to multiple sclerosis      Bilateral Tubal ligation  48y/o          FAMILY HISTORY:      SOCIAL HISTORY:  Smoking: unable to obtain  EtOH Use: unable to obtain  Marital Status:  Occupation:  Recent Travel:  Country of Birth:  Advance Directives: full code     Allergies    No Known Allergies    Intolerances        HOME MEDICATIONS:    REVIEW OF SYSTEMS:    [x ] Unable to assess ROS because  AMS    OBJECTIVE:  ICU Vital Signs Last 24 Hrs  T(C): 36.6 (30 Dec 2024 06:44), Max: 37.4 (29 Dec 2024 16:48)  T(F): 97.9 (30 Dec 2024 06:44), Max: 99.3 (29 Dec 2024 16:48)  HR: 116 (30 Dec 2024 08:40) (96 - 117)  BP: 69/33 (30 Dec 2024 08:40) (58/33 - 131/74)  BP(mean): --  ABP: --  ABP(mean): --  RR: 18 (30 Dec 2024 08:40) (16 - 18)  SpO2: 100% (30 Dec 2024 08:40) (91% - 100%)          12-29 @ 07:01  -  12-30 @ 07:00  --------------------------------------------------------  IN: 3850 mL / OUT: 1200 mL / NET: 2650 mL      CAPILLARY BLOOD GLUCOSE      POCT Blood Glucose.: 171 mg/dL (30 Dec 2024 08:18)      PHYSICAL EXAM:  GENERAL: lethargic but NAD  EYES: EOMI, PERRL  NECK: Supple, trachea midline, no JVD  HEART: Regular rate and rhythm  LUNGS: Unlabored respirations. rhonci on R  ABDOMEN: Soft, nontender, nondistended,  EXTREMITIES: warm, contracted, no LE edema   NERVOUS SYSTEM:  arouable to voice, answers on to two words, not following commands but moves purposefully         HOSPITAL MEDICATIONS:  MEDICATIONS  (STANDING):  albumin human 25% IVPB 100 milliLiter(s) IV Intermittent once  chlorhexidine 2% Cloths 1 Application(s) Topical <User Schedule>  dextrose 5% + sodium chloride 0.45%. 1000 milliLiter(s) (100 mL/Hr) IV Continuous <Continuous>  heparin   Injectable 5000 Unit(s) SubCutaneous every 12 hours  lactated ringers Bolus 1000 milliLiter(s) IV Bolus once  meropenem  IVPB 500 milliGRAM(s) IV Intermittent every 24 hours  norepinephrine Infusion 0.05 MICROgram(s)/kG/Min (3.83 mL/Hr) IV Continuous <Continuous>  nystatin Powder 1 Application(s) Topical two times a day  potassium phosphate IVPB 15 milliMole(s) IV Intermittent once  vancomycin  IVPB 1000 milliGRAM(s) IV Intermittent once    MEDICATIONS  (PRN):  ondansetron Injectable 4 milliGRAM(s) IV Push every 8 hours PRN Nausea and/or Vomiting      LABS:                        8.7    23.63 )-----------( 101      ( 29 Dec 2024 15:10 )             27.4     12-30    131[L]  |  103  |  6[L]  ----------------------------<  171[H]  3.4[L]   |  16[L]  |  0.45[L]    Ca    7.1[L]      30 Dec 2024 06:02  Phos  1.9     12-30  Mg     1.6     12-30        Urinalysis Basic - ( 30 Dec 2024 06:02 )    Color: x / Appearance: x / SG: x / pH: x  Gluc: 171 mg/dL / Ketone: x  / Bili: x / Urobili: x   Blood: x / Protein: x / Nitrite: x   Leuk Esterase: x / RBC: x / WBC x   Sq Epi: x / Non Sq Epi: x / Bacteria: x            MICROBIOLOGY: reviewed     RADIOLOGY:  [x ] Reviewed and interpreted by me    EKG: reviewed

## 2024-12-30 NOTE — RAPID RESPONSE TEAM SUMMARY - NSSITUATIONBACKGROUNDRRT_GEN_ALL_CORE
79-year-old female with PMH osteoporosis, MS, bedbound, hypoalbuminemia , chronic sacral osteomyelitis

## 2024-12-30 NOTE — RAPID RESPONSE TEAM SUMMARY - NSADDTLFINDINGSRRT_GEN_ALL_CORE
pt with hypotension 6 am s/p albumin x 1 and 1 liter lr bolus  repeat vitals 68/33 rrt fro hypotension pt was ordered for albumin and 1 liter bolus of LR   vitals during rrt did not improve  and pt was started on levophed infusion

## 2024-12-30 NOTE — PROGRESS NOTE ADULT - ASSESSMENT
79-year-old female with PMH osteoporosis, MS, bedbound, who presents today for hematuria, admitted for urinary tract infection and chronic osteomyelitis        #Hypotension  #leukocytosis   WBC 25K yesterday although patient is afebrile and without symptoms. repeat WBC yesterday elevated. Patient hypotensive this AM not fluid responsive. Concern for underlying septic/distributive shock given elevated WBC. Started on vasoactives with plan to transfer to the ICU,     ##Sepsis  ##Catheter associated UTI  ## Hematuria  Presents with hematuria. Reportedly, amezquita was changed 2 days ago and blood was seen in the catheter at the time. Patient reports dysuria, denies frequency and urgency. Also c/o back pain. On presentation, patient is hemodynamically stable and afebrile. Labs significant for elevated WBC 39.41, UA showing large LE, +nitrite, +WBC, +bacteria. CT abdomen/pelvis shows extensive decubitus ulcers overlying the sacrum and the greater trochanters; curvilinear fluid collection overlying the left posterior acetabulum; foci of gas in the left hip joint; scattered subcutaneous gas; scattered osseous sclerosis worrisome for chronic osteomyelitis. Last admit here 11/20--12/10 and treated for infected decub ulcers, SARS-CoV-2 infection and CAUTI. 11/20 urine culture with ESBL E. Coli and Klebsiella. 11/22 wound culture with ESBL E. Coli, E. faecalis and strep. She was on meropenem and vancomycin x 10 days and was discharged on a course of Augmentin.  -Id following, completed course of ertapenem this admission    ##Chronic osteomyelitis   5 Stage 4 sacral ulcer. CT abdomen/pelvis shows extensive decubitus ulcers overlying the sacrum and the greater trochanters; Curvilinear fluid collection overlying the left posterior acetabulum; Foci of gas in the left hip joint; Scattered subcutaneous gas; Scattered osseous sclerosis worrisome for chronic osteomyelitis; Rectum distended by stool. Wound care evaluated no need for debridement. , No pus, no drainage and no cellulitis. Unlikely infection source.   - MRI sacrum chronic osteo  - C/w VAC     #Functional quadriplegia secondary to multiple sclerosis.   bedbound    supportive care  frequent repositioning to prevent pressure wounds     #HA--resolved  Suspect tension Ha given location. No vision changes or unilateral distribution. Will trial tylenol   -denies HA today     #dysphagia   Failed speech eval. Will start mIVF, palliative to see to discuss feeding options   -family considering options, do not want NGT at this time    #hypokalemia   replete as needed     Diet:  DVT prophylaxis: SCD  Dispo:  Code Status: per palliative is DNR/DNI needs to be confirmed and MOLST put in chart -->overnight PA called family who states they now want everything done including pressors and escalation to the ICU

## 2024-12-30 NOTE — PROGRESS NOTE ADULT - TIME BILLING
Lab test review, Radiology Review, Vitals review, Consultant review and discussion, Physical examination, IDR, Assessment and plan; Plan discussion with patient and family
mTime spent for extensive review of the physical chart, electronic health record, and documentation to obtain collateral information including but not limited to:    - Current inpatient records (ED, H&P, primary team, and consultants if applicable)   - Inpatient values/results (biomarkers, immunoassays, imaging, and microbiology results)   - Current or proposed treatment plans   - Pharmacotherapy review   Time spent for counseling and education with patient/family  Time spent discussing and coordinating care with primary team and interdisciplinary staff and floor staff.

## 2024-12-30 NOTE — CONSULT NOTE ADULT - CRITICAL CARE ATTENDING COMMENT
80 y/o F w/MS, bedbound at baseline, chronic osteomyelitis admitted for sepsis secondary to E. coli UTI, chronic osteo, and COVID/influenza/and RSV infection s/p course of abx now w/new hypotension likely severe sepsis with septic shock possibly secondary to worsening osteo vs PNA vs other infectious source.    - Transfer to ICU  - Titrate pressors as needed goal MAP >= 65  - Midodrine 20mg q8hrs  - Abx as per ID  - Appreciate vascular consult, possible debridement  - DVT prophylaxis  - Full code 78 y/o F w/MS, bedbound at baseline, chronic osteomyelitis admitted for sepsis secondary to E. coli UTI, chronic osteo, and COVID/influenza/and RSV infection s/p course of abx now w/new hypotension likely severe sepsis with septic shock possibly secondary to worsening osteo vs PNA vs other infectious source.    - Transfer to ICU  - Titrate pressors as needed goal MAP >= 65  - Bedside POCUS grossly normal RV, LV w/mild to moderate reduced contractility. Plethoric IVC without variability consistent with vasodilatory etiology of hypotension  - Midodrine 20mg q8hrs  - Abx as per ID  - Appreciate vascular consult, possible debridement  - DVT prophylaxis  - Full code

## 2024-12-31 NOTE — PROGRESS NOTE ADULT - SUBJECTIVE AND OBJECTIVE BOX
HPI:  79-year-old female with PMH osteoporosis, MS, bedbound, who presents today for hematuria. As per patient, amezquita was changed 2 days ago and blood was seen in the catheter at the time. Patient reports dysuria, denies frequency and urgency. Patient denies abdominal pain, flank pain, fever, chills, nausea, vomiting, diarrhea. Patient has had similar episodes prior.     On presentation, patient is hemodynamically stable and afebrile, although BP is soft at 109/68. Labs significant for elevated WBC 39.41, Hgb 9.2, elevated Alk Ph 166, UA showing +LE, +nitrite, +WBC, +bacteria. CT abdomen/pelvis shows extensive decubitus ulcers overlying the sacrum and the greater trochanters; Curvilinear fluid collection overlying the left posterior acetabulum; Foci of gas in the left hip joint; Scattered subcutaneous gas; Scattered osseous sclerosis worrisome for chronic osteomyelitis; Rectum distended by stool. Patient given zosyn & vancomycin.    (16 Dec 2024 22:58)      24 hr events: Transferred to ICU for hypotension, started on pressors.     ## ROS:  [x ] unable to obtain    ## Vitals  ICU Vital Signs Last 24 Hrs  T(C): 35.6 (31 Dec 2024 07:16), Max: 37 (30 Dec 2024 13:00)  T(F): 96 (31 Dec 2024 07:16), Max: 98.6 (30 Dec 2024 13:00)  HR: 97 (31 Dec 2024 07:16) (82 - 128)  BP: 102/65 (31 Dec 2024 07:00) (66/41 - 156/90)  BP(mean): 77 (31 Dec 2024 07:00) (49 - 111)  ABP: --  ABP(mean): --  RR: 31 (31 Dec 2024 07:16) (18 - 37)  SpO2: 98% (31 Dec 2024 07:16) (89% - 100%)    O2 Parameters below as of 30 Dec 2024 19:01  Patient On (Oxygen Delivery Method): room air            ## Physical Exam:  Gen: Elderly female lying in bed, NAD  HEENT: NC/AT sclerae anicteric  Resp: No increased WOB, CTAB  CV: S1, S2  Abd: Soft, + BS  Ext: Contracted, WWP  Neuro: Arousable, confused    ## Vent Data      ## Labs:  Chem:  12-31    133[L]  |  103  |  10  ----------------------------<  97  3.8   |  21[L]  |  0.44[L]    Ca    7.4[L]      31 Dec 2024 02:19  Phos  3.5     12-31  Mg     2.0     12-31    TPro  4.6[L]  /  Alb  1.8[L]  /  TBili  1.4[H]  /  DBili  x   /  AST  8[L]  /  ALT  8[L]  /  AlkPhos  219[H]  12-31    LIVER FUNCTIONS - ( 31 Dec 2024 02:19 )  Alb: 1.8 g/dL / Pro: 4.6 gm/dL / ALK PHOS: 219 U/L / ALT: 8 U/L / AST: 8 U/L / GGT: x           CBC:                        7.5    40.74 )-----------( 8        ( 31 Dec 2024 02:19 )             23.3     Coags:  PT/INR - ( 30 Dec 2024 22:03 )   PT: 27.3 sec;   INR: 2.44 ratio         PTT - ( 30 Dec 2024 22:03 )  PTT:58.0 sec    Urinalysis Basic - ( 31 Dec 2024 02:19 )    Color: x / Appearance: x / SG: x / pH: x  Gluc: 97 mg/dL / Ketone: x  / Bili: x / Urobili: x   Blood: x / Protein: x / Nitrite: x   Leuk Esterase: x / RBC: x / WBC x   Sq Epi: x / Non Sq Epi: x / Bacteria: x        ## Cardiac        ## Blood Gas      #I/Os  I&O's Detail    30 Dec 2024 07:01  -  31 Dec 2024 07:00  --------------------------------------------------------  IN:    IV PiggyBack: 100 mL    IV PiggyBack: 62.5 mL    IV PiggyBack: 400 mL    Lactated Ringers Bolus: 500 mL    Norepinephrine: 139.8 mL  Total IN: 1202.3 mL    OUT:    Indwelling Catheter - Urethral (mL): 185 mL    VAC (Vacuum Assisted Closure) System (mL): 300 mL  Total OUT: 485 mL    Total NET: 717.3 mL      31 Dec 2024 07:01  -  31 Dec 2024 08:20  --------------------------------------------------------  IN:    Norepinephrine: 1.7 mL  Total IN: 1.7 mL    OUT:    Indwelling Catheter - Urethral (mL): 10 mL  Total OUT: 10 mL    Total NET: -8.3 mL          ## Imaging:    ## Medications:  MEDICATIONS  (STANDING):  ceftazidime/avibactam IVPB 2.5 Gram(s) IV Intermittent every 8 hours  chlorhexidine 2% Cloths 1 Application(s) Topical <User Schedule>  insulin lispro (ADMELOG) corrective regimen sliding scale   SubCutaneous every 6 hours  midodrine 20 milliGRAM(s) Oral every 8 hours  norepinephrine Infusion 0.05 MICROgram(s)/kG/Min (3.83 mL/Hr) IV Continuous <Continuous>  nystatin Powder 1 Application(s) Topical two times a day    MEDICATIONS  (PRN):  ondansetron Injectable 4 milliGRAM(s) IV Push every 8 hours PRN Nausea and/or Vomiting  sodium chloride 0.9% lock flush 10 milliLiter(s) IV Push every 1 hour PRN Pre/post blood products, medications, blood draw, and to maintain line patency

## 2024-12-31 NOTE — CHART NOTE - NSCHARTNOTEFT_GEN_A_CORE
Plt 8 this am, attempted to find phone nubmer for Joe who is the surrogate decision maker (deferred to by Yazan) however unable to locate phone number. Attempted to call Yazan however call went straight to voicemail, left message with call back number.

## 2024-12-31 NOTE — PROGRESS NOTE ADULT - ASSESSMENT
78 y/o F w/MS, bedbound at baseline, chronic osteomyelitis admitted for sepsis secondary to E. coli UTI, chronic osteo, and COVID/influenza/and RSV infection s/p course of abx now w/new hypotension likely severe sepsis with septic shock possibly secondary to worsening osteo vs PNA vs other infectious source.    - Transfer to ICU  - Titrate pressors as needed goal MAP >= 65  - Midodrine 20mg q8hrs  - Abx as per ID  - Appreciate vascular consult, possible debridement  - DVT prophylaxis  - Full code.    I have personally provided 35 minutes of attending critical care time excluding procedures. 80 y/o F w/MS, bedbound at baseline, chronic osteomyelitis admitted for sepsis secondary to E. coli UTI, chronic osteo, and COVID/influenza/and RSV infection s/p course of abx now w/new hypotension likely severe sepsis with septic shock possibly secondary to worsening osteo and MDR Klebsiella bacteremia.    - Transfer to ICU  - Titrate pressors as needed goal MAP >= 65  - Midodrine 20mg q8hrs  - Abx as per ID  - Appreciate vascular consult, possible debridement  - DVT prophylaxis  - Full code.    I have personally provided 35 minutes of attending critical care time excluding procedures. 78 y/o F w/MS, bedbound at baseline, chronic osteomyelitis admitted for sepsis secondary to E. coli UTI, chronic osteo, and COVID/influenza/and RSV infection s/p course of abx now w/new hypotension likely severe sepsis with septic shock possibly secondary to worsening osteo and MDR Klebsiella bacteremia.    - Titrate pressors as needed goal MAP >= 65  - Midodrine 20mg q8hrs  - Abx as per ID  - Appreciate vascular consult, possible debridement  - DVT prophylaxis  - Full code.    I have personally provided 35 minutes of attending critical care time excluding procedures.

## 2024-12-31 NOTE — PROGRESS NOTE ADULT - SUBJECTIVE AND OBJECTIVE BOX
SPENCER MIXON  MRN-[id number]  79y (1945)    Patient is a 79y old  Female who presents with a chief complaint of Urinary tract infection and chronic osteomyelitis (12-30-24)    Interval History:       ROS:  limited  [x] Unobtainable because:  [ ] All other systems negative    Constitutional: no fever, no chills  Head: no trauma  Eyes: no vision changes, no eye pain  ENT:  no sore throat, no rhinorrhea  Cardiovascular:  no chest pain, no palpitation  Respiratory:  no SOB, no cough  GI:  no abd pain, no vomiting, no diarrhea  urinary: no dysuria, no hematuria, no flank pain  musculoskeletal:  no joint pain, no joint swelling  skin:  no rash  neurology:  no headache, no seizure, no change in mental status  psych: no anxiety, no depression     Allergies  No Known Allergies    ANTIMICROBIALS:  MEDICATIONS  (STANDING):  ceftazidime/avibactam IVPB   33.33 mL/Hr IV Intermittent (12-31-24 @ 05:31)    Physical Exam:  Vital Signs Last 24 Hrs  T(C): 35.8 (31 Dec 2024 10:00), Max: 37 (30 Dec 2024 13:00)  T(F): 96.4 (31 Dec 2024 10:00), Max: 98.6 (30 Dec 2024 13:00)  HR: 93 (31 Dec 2024 10:20) (82 - 127)  BP: 113/64 (31 Dec 2024 10:20) (66/41 - 156/90)  BP(mean): 78 (31 Dec 2024 10:20) (49 - 111)  RR: 33 (31 Dec 2024 10:20) (19 - 37)  SpO2: 96% (31 Dec 2024 10:20) (89% - 99%)    Parameters below as of 30 Dec 2024 19:01  Patient On (Oxygen Delivery Method): room air    Constitutional: Elderly woman chronic ill appearing  HEENT: Unable to examine oral cavity. On NC  Cardiovascular:   normal S1, S2, no edema  Respiratory:  clear BS bilaterally, no wheezes, no rales  GI:  soft, non distended  :  + amezquita with clear urine in the bag    Musculoskeletal: Atrophic limbs, contracted. +pressure ulcers with vac dressing on  Neurologic: Lethargic, Does not open eyes or follow any commands  Skin:  warm, dry, no rash  Psychiatric:  obtunded      WBC Count: 40.74 K/uL (12-31 @ 02:19)  WBC Count: 45.18 K/uL (12-30 @ 19:30)  WBC Count: 23.63 K/uL (12-29 @ 15:10)  WBC Count: 25.87 K/uL (12-29 @ 11:58)  WBC Count: 6.15 K/uL (12-28 @ 10:20)  WBC Count: 6.30 K/uL (12-27 @ 06:25)  WBC Count: 6.63 K/uL (12-26 @ 06:46)                                   7.5    40.74 )-----------( 8        ( 31 Dec 2024 02:19 )             23.3     12-31    133[L]  |  103  |  10  ----------------------------<  97  3.8   |  21[L]  |  0.44[L]    Ca    7.4[L]      31 Dec 2024 02:19  Phos  3.5     12-31  Mg     2.0     12-31    TPro  4.6[L]  /  Alb  1.8[L]  /  TBili  1.4[H]  /  DBili  x   /  AST  8[L]  /  ALT  8[L]  /  AlkPhos  219[H]  12-31      Urinalysis with Rflx Culture (12.16.24 @ 18:45)   Urine Appearance: Turbid   Color: Orange   Specific Gravity: 1.027   pH Urine: 5.5   Protein, Urine: 100 mg/dL   Glucose Qualitative, Urine: Negative mg/dL   Ketone - Urine: Negative mg/dL   Blood, Urine: Large   Bilirubin: Small   Urobilinogen: 1.0 mg/dL   Leukocyte Esterase Concentration: Large   Nitrite: Positive  Urine Microscopic-Add On (NC) (12.16.24 @ 18:45)   Bacteria: Many /HPF   Comment - Urine: moderate yeast like cells noted   Red Blood Cell - Urine: 10 /HPF   White Blood Cell - Urine: Too Numerous to count /HPF        MICROBIOLOGY:  .Blood BLOOD  12-30-24   Growth in aerobic bottle: Gram Negative Rods  Direct identification is available within approximately 3-5  hours either by Blood Panel Multiplexed PCR or Direct  MALDI-TOF. Details: [email or web address]  --  Blood Culture PCR      .Blood BLOOD  12-30-24   Growth in aerobic bottle: Gram Negative Rods  --    Growth in aerobic bottle: Gram Negative Rods      Clean Catch  12-16-24   >100,000 CFU/ml Escherichia coli ESBL  --  Escherichia coli ESBL      Respiratory Viral Panel with COVID-19 by SHIRLEY (12.24.24 @ 11:48)   Rapid RVP Result: Detected   SARS-CoV-2: Detected: This Respiratory Panel uses polymerase chain reaction (PCR) to detect for  adenovirus; coronavirus (HKU1, NL63, 229E, OC43); human metapneumovirus  (hMPV); human enterovirus/rhinovirus (Entero/RV); influenza A; influenza  A/H1; influenza A/H3; influenza A/H1-2009; influenza B; parainfluenza  viruses 1, 2, 3, 4; respiratory syncytial virus; Mycoplasma pneumoniae;  Chlamydophila pneumoniae; and SARS-CoV-2.      SARS-CoV-2: Detected (04 Dec 2024 11:00)    RADIOLOGY:  < from: Xray Chest 1 View- PORTABLE-Urgent (Xray Chest 1 View- PORTABLE-Urgent .) (12.30.24 @ 09:17) >  ACC: [id number] EXAM:  XR CHEST PORTABLE URGENT 1V   ORDERED BY: YAYA GUERRIER     PROCEDURE DATE:  12/30/2024      INTERPRETATION:  AP chest on December 30, 2024 at 9:05 AM. She has sepsis.    Heart magnified by technique.    There is a persistent left perihilar infiltrate similar to December 4.    On December 4 there was a right perihilar infiltrates which shows   improvement. Elevated right hemidiaphragm unchanged.    IMPRESSION: Improved right perihilar infiltrate from December 4.   Persistent left perihilar infiltrate.    --- End of Report ---    < end of copied text >    < from: MR Pelvis w/ IV Cont (12.23.24 @ 10:34) >  IMPRESSION:  MRI of the bony pelvis demonstrates extensive soft tissue ulceration at   the posterior and lateral tissues with gas tracking to the sacrum, and   bilateral greater trochanters consistent with stage IV ulceration for   which correlation with physical exam is recommended. Intermediate to low   T1 signal, high STIR signal, and postcontrast enhancement at the S5   segment of the sacrum, coccyx, and less so at the greater trochanters and   posterior ischial spines suggestive of osteomyelitis. Findings may be   chronic or acute on chronic.  No walled off collection to suggest abscess.    --- End of Report ---      < from: CT Abdomen and Pelvis w/ IV Cont (12.16.24 @ 20:14) >  IMPRESSION:  Extensive decubitus ulcers overlying the sacrum and the greater   trochanters.  Curvilinear fluid collection overlying the left posterior acetabulum.  Foci of gas in the left hip joint.  Scattered subcutaneous gas.  Scattered osseous sclerosis worrisome for chronic osteomyelitis.  Rectum distended by stool.    --- End of Report ---    < end of copied text >     SPENCER MIXON  MRN-[id number]  79y (1945)    Patient is a 79y old  Female who presents with a chief complaint of Urinary tract infection and chronic osteomyelitis (12-30-24)    Interval History: Seen and examined at bedside. Son present in the room. Patient is afebrile. Awake and answering simple questions. On one pressor.      ROS:  limited due to patient's medical condition. Says yes to cough and back pain  [ ] Unobtainable because:  [x ] All other systems negative    Constitutional: no fever, no chills  Head: no trauma  Eyes: no vision changes, no eye pain  ENT:  no sore throat, no rhinorrhea  Cardiovascular:  no chest pain, no palpitation  Respiratory:  no SOB, + cough  GI:  no abd pain, no vomiting, no diarrhea  urinary: no dysuria, no hematuria, no flank pain  musculoskeletal: back pain  skin:  no rash  neurology:  no headache        Allergies  No Known Allergies    ANTIMICROBIALS:  MEDICATIONS  (STANDING):  ceftazidime/avibactam IVPB   33.33 mL/Hr IV Intermittent (12-31-24 @ 05:31)    Physical Exam:  Vital Signs Last 24 Hrs  T(C): 35.8 (31 Dec 2024 10:00), Max: 37 (30 Dec 2024 13:00)  T(F): 96.4 (31 Dec 2024 10:00), Max: 98.6 (30 Dec 2024 13:00)  HR: 93 (31 Dec 2024 10:20) (82 - 127)  BP: 113/64 (31 Dec 2024 10:20) (66/41 - 156/90)  BP(mean): 78 (31 Dec 2024 10:20) (49 - 111)  RR: 33 (31 Dec 2024 10:20) (19 - 37)  SpO2: 96% (31 Dec 2024 10:20) (89% - 99%)    Parameters below as of 30 Dec 2024 19:01  Patient On (Oxygen Delivery Method): room air    Constitutional: Elderly woman chronic ill appearing  HEENT: Dry oral mucosa  Cardiovascular:   normal S1, S2, no edema  Respiratory:  clear BS bilaterally, no wheezes, no rales  GI:  soft, non distended  :  + amezquita with clear urine in the bag    Musculoskeletal: Atrophic limbs, contracted. +pressure ulcers with vac dressing on  Neurologic: Awake oriented x 2. Answers yes/no questions only  Skin:  warm, dry, no rash  Psychiatric:  appropriate      WBC Count: 40.74 K/uL (12-31 @ 02:19)  WBC Count: 45.18 K/uL (12-30 @ 19:30)  WBC Count: 23.63 K/uL (12-29 @ 15:10)  WBC Count: 25.87 K/uL (12-29 @ 11:58)  WBC Count: 6.15 K/uL (12-28 @ 10:20)  WBC Count: 6.30 K/uL (12-27 @ 06:25)  WBC Count: 6.63 K/uL (12-26 @ 06:46)                                   7.5    40.74 )-----------( 8        ( 31 Dec 2024 02:19 )             23.3     12-31    133[L]  |  103  |  10  ----------------------------<  97  3.8   |  21[L]  |  0.44[L]    Ca    7.4[L]      31 Dec 2024 02:19  Phos  3.5     12-31  Mg     2.0     12-31    TPro  4.6[L]  /  Alb  1.8[L]  /  TBili  1.4[H]  /  DBili  x   /  AST  8[L]  /  ALT  8[L]  /  AlkPhos  219[H]  12-31      Urinalysis with Rflx Culture (12.16.24 @ 18:45)   Urine Appearance: Turbid   Color: Orange   Specific Gravity: 1.027   pH Urine: 5.5   Protein, Urine: 100 mg/dL   Glucose Qualitative, Urine: Negative mg/dL   Ketone - Urine: Negative mg/dL   Blood, Urine: Large   Bilirubin: Small   Urobilinogen: 1.0 mg/dL   Leukocyte Esterase Concentration: Large   Nitrite: Positive  Urine Microscopic-Add On (NC) (12.16.24 @ 18:45)   Bacteria: Many /HPF   Comment - Urine: moderate yeast like cells noted   Red Blood Cell - Urine: 10 /HPF   White Blood Cell - Urine: Too Numerous to count /HPF        MICROBIOLOGY:  .Blood BLOOD  12-30-24   Growth in aerobic bottle: Gram Negative Rods  Direct identification is available within approximately 3-5  hours either by Blood Panel Multiplexed PCR or Direct  MALDI-TOF. Details: [email or web address]  --  Blood Culture PCR      .Blood BLOOD  12-30-24   Growth in aerobic bottle: Gram Negative Rods  --    Growth in aerobic bottle: Gram Negative Rods      Clean Catch  12-16-24   >100,000 CFU/ml Escherichia coli ESBL  --  Escherichia coli ESBL      Respiratory Viral Panel with COVID-19 by SHIRLEY (12.24.24 @ 11:48)   Rapid RVP Result: Detected   SARS-CoV-2: Detected: This Respiratory Panel uses polymerase chain reaction (PCR) to detect for  adenovirus; coronavirus (HKU1, NL63, 229E, OC43); human metapneumovirus  (hMPV); human enterovirus/rhinovirus (Entero/RV); influenza A; influenza  A/H1; influenza A/H3; influenza A/H1-2009; influenza B; parainfluenza  viruses 1, 2, 3, 4; respiratory syncytial virus; Mycoplasma pneumoniae;  Chlamydophila pneumoniae; and SARS-CoV-2.      SARS-CoV-2: Detected (04 Dec 2024 11:00)    RADIOLOGY:  < from: Xray Chest 1 View- PORTABLE-Urgent (Xray Chest 1 View- PORTABLE-Urgent .) (12.30.24 @ 09:17) >  IMPRESSION: Improved right perihilar infiltrate from December 4.   Persistent left perihilar infiltrate.  --- End of Report ---    < from: MR Pelvis w/ IV Cont (12.23.24 @ 10:34) >  IMPRESSION:  MRI of the bony pelvis demonstrates extensive soft tissue ulceration at   the posterior and lateral tissues with gas tracking to the sacrum, and   bilateral greater trochanters consistent with stage IV ulceration for   which correlation with physical exam is recommended. Intermediate to low   T1 signal, high STIR signal, and postcontrast enhancement at the S5   segment of the sacrum, coccyx, and less so at the greater trochanters and   posterior ischial spines suggestive of osteomyelitis. Findings may be   chronic or acute on chronic.  No walled off collection to suggest abscess.  --- End of Report ---      < from: CT Abdomen and Pelvis w/ IV Cont (12.16.24 @ 20:14) >  IMPRESSION:  Extensive decubitus ulcers overlying the sacrum and the greater   trochanters.  Curvilinear fluid collection overlying the left posterior acetabulum.  Foci of gas in the left hip joint.  Scattered subcutaneous gas.  Scattered osseous sclerosis worrisome for chronic osteomyelitis.  Rectum distended by stool.  --- End of Report ---    < end of copied text >

## 2024-12-31 NOTE — PROGRESS NOTE ADULT - ASSESSMENT
80 yo f pmhx MS, OP, bedbound, chronic pressure wounds, contracted, recent COVID 19+ (12/4/24) admitted to medicine with hematuria subsequently found to be RSV/Flu A+.     NEURO: at baseline MS, pain control with current regimen  CV: distributive shock requiring vasopressor therpay, actively titrating levophed for MAP >65  RESP: hob elevated, aspiration precuations  RENAL: Poor urine output, amezquita in place  GI: NPO, failed S&S, unable to place ngt in setting of severe thrombocytopenia  ENDO: ISS for glycemic control   ID: bacteroides fragilis, k pneumoniae, proteus bacteremia, ESBL, CRE,  CTX-M and KPC resistance gene.  AVYCAZ and flagyl   HEME: DIC, trend labs, transfuse as needed  DISPO: Full code, family having difficult time with decision making.     Critical Care time: 40 mins assessing presenting problems of acute illness that poses high probability of life threatening deterioration or end organ damage/dysfunction.  Medical decision making including Initiating plan of care, reviewing data, reviewing radiology, discussing with multidisciplinary team, non inclusive of procedures, discussing goals of care with patient/family    DATE OF DOCUMENTATION EQUIVALENT TO DATE OF SERVICES RENDERED

## 2024-12-31 NOTE — PROGRESS NOTE ADULT - ASSESSMENT
The patient is 78 y/o woman with PMH of osteoporosis, MS, bedbound, known to ID from last admission, who presents today for hematuria. Reportedly, amezquita was changed 2 days ago and blood was seen in the catheter at the time. Patient reports dysuria, denies frequency and urgency. Also c/o back pain. On presentation, patient is hemodynamically stable and afebrile. Labs significant for elevated WBC 39.41, UA showing large LE, +nitrite, +WBC, +bacteria. CT abdomen/pelvis shows extensive decubitus ulcers overlying the sacrum and the greater trochanters; curvilinear fluid collection overlying the left posterior acetabulum; foci of gas in the left hip joint; scattered subcutaneous gas; scattered osseous sclerosis worrisome for chronic osteomyelitis; rectum distended by stool. Patient given zosyn & vancomycin. ID consulted for workup and antibiotic management.    12/17: Last admit here 11/20--12/10 and treated for infected decub ulcers, SARS-CoV-2 infection and CAUTI. 11/20 urine culture with ESBL E. Coli and CRE Kleb was noted. 11/22 wound culture with ESBL E. Coli, E. faecalis and strep. She was on meropenem and vancomycin x 10 days and was discharged on a course of Augmentin. Now returns with another amezquita associated UTI. CT abd pelvis report and images reviewed, showing known pressure ulcers with concern for possible underlying chronic osteomyelitis.   12/18: no fever, RA, no new cbc, UC is growing E. Coli, no blood culture data is available, Zosyn IV continued.   12/23: Afebrile. No leukocytosis. Zosyn was switched to Ertapenem on 12/20 after ESBL reported in the urine culture. Sensitivity noted above. MRI pelvis today with acute on chronic osteomyelitis at the sacrum.  12/24: remains afebrile since 12/20, RA, no new cbc, wounds with vac dressings on, + mild rhinorrhea on exam, will obtain RVP, pt had recent covid 19, ertapenem IV continued (day #5)  12/30: RRT today due to hypotension requiring levophed. Transferred to MICU. Leukocytosis WBC: 23k. CXR image and report reviewed. She has bibasilar opacities R>L. Looks worse than from 12/4. Given a dose of vancomycin. Meropenem started. Blood cultures obtained. Recent RVP 12/24 positive for RSV and again SARS-CoV-2 (likely persistent viral shedding, not a new infection, was positive on 12/4 and 12/16)  12/31:    Impression:  1. Septic shock secondary to polymicrobial bacteremia - likely source decubitus ulcers  2. Catheter associated UTI-s/p treatment for ESBL E.Coli  3. Chronic pressure ulcers - on wound vac  4. Acute on chronic sacral osteomyelitis  5. Leukocytosis  6. Old age, debility, bedbound  7. Acute encephalopathy  8. Recent SARS-Billy-2 infection s/p treatment  9. RSV    Recommendations:  --DC meropenem  --Start ceftazidime-avibactam 2.5g IV q8h  --Add metronidazole 500 mg IV q8h  --Follow blood cultures  --Obtain sputum culture  --Wean from pressors per protocol  --Wound vac management  --Vascular surgery/wound care follow up  --Aspiration precautions  --Palliative eval/goals of care    Discussed with ICU team    Yola Sood MD  Attending Physician  Division of Infectious Diseases   Available via [employer] Teams   The patient is 80 y/o woman with PMH of osteoporosis, MS, bedbound, known to ID from last admission, who presents today for hematuria. Reportedly, amezquita was changed 2 days ago and blood was seen in the catheter at the time. Patient reports dysuria, denies frequency and urgency. Also c/o back pain. On presentation, patient is hemodynamically stable and afebrile. Labs significant for elevated WBC 39.41, UA showing large LE, +nitrite, +WBC, +bacteria. CT abdomen/pelvis shows extensive decubitus ulcers overlying the sacrum and the greater trochanters; curvilinear fluid collection overlying the left posterior acetabulum; foci of gas in the left hip joint; scattered subcutaneous gas; scattered osseous sclerosis worrisome for chronic osteomyelitis; rectum distended by stool. Patient given zosyn & vancomycin. ID consulted for workup and antibiotic management.    12/17: Last admit here 11/20--12/10 and treated for infected decub ulcers, SARS-CoV-2 infection and CAUTI. 11/20 urine culture with ESBL E. Coli and CRE Kleb was noted. 11/22 wound culture with ESBL E. Coli, E. faecalis and strep. She was on meropenem and vancomycin x 10 days and was discharged on a course of Augmentin. Now returns with another amezquita associated UTI. CT abd pelvis report and images reviewed, showing known pressure ulcers with concern for possible underlying chronic osteomyelitis.   12/18: no fever, RA, no new cbc, UC is growing E. Coli, no blood culture data is available, Zosyn IV continued.   12/23: Afebrile. No leukocytosis. Zosyn was switched to Ertapenem on 12/20 after ESBL reported in the urine culture. Sensitivity noted above. MRI pelvis today with acute on chronic osteomyelitis at the sacrum.  12/24: remains afebrile since 12/20, RA, no new cbc, wounds with vac dressings on, + mild rhinorrhea on exam, will obtain RVP, pt had recent covid 19, ertapenem IV continued (day #5)  12/30: RRT today due to hypotension requiring levophed. Transferred to MICU. Leukocytosis WBC: 23k. CXR image and report reviewed. She has bibasilar opacities R>L. Looks worse than from 12/4. Given a dose of vancomycin. Meropenem started. Blood cultures obtained. Recent RVP 12/24 positive for RSV and again SARS-CoV-2 (likely persistent viral shedding, not a new infection, was positive on 12/4 and 12/16)  12/31: Remains in ICU on one pressor. Afebrile. Awake today. Leukocytosis up trending, WBC 40K. MRSA screen negative. 12/30 blood cultures with GNRs, polymicrobial PCR detection of Bacteroides, Proteus and Klebsiella species with ESBL and KPC resistance genes identified. Meropenem was stopped. Avycaz started.    Impression:  1. Septic shock secondary to polymicrobial bacteremia - likely source decubitus ulcers  2. Catheter associated UTI-s/p treatment for ESBL E.Coli  3. Chronic pressure ulcers - on wound vac  4. Acute on chronic sacral osteomyelitis  5. Leukocytosis  6. Old age, debility, bedbound  7. Acute encephalopathy  8. Recent SARS-Billy-2 infection s/p treatment  9. RSV    Recommendations:  --DC meropenem  --Start ceftazidime-avibactam 2.5g IV q8h  --Add metronidazole 500 mg IV q8h  --Follow blood cultures to completion  --Wean from pressors per protocol  --Wound vac management  --Vascular surgery/wound care follow up  --Aspiration precautions  --Palliative eval/goals of care    Discussed with ICU team    Yola Sood MD  Attending Physician  Division of Infectious Diseases   Available via [employer] Teams

## 2024-12-31 NOTE — PROGRESS NOTE ADULT - SUBJECTIVE AND OBJECTIVE BOX
Patient is a 79y old  Female who presents with a chief complaint of Urinary tract infection and chronic osteomyelitis (31 Dec 2024 10:40)      BRIEF HOSPITAL COURSE:   80 yo f pmhx MS, OP, bedbound, chronic pressure wounds, contracted, recent COVID 19+ (12/4/24) admitted to medicine with hematuria subsequently found to be RSV/Flu A+.     12/30: labs consistent with development of DIC    Events last 24 hours:   plt 8, given 1U plt and cryo on day shift.  discussion with son, now ok with ngt placement however unable to do so 2/2 low plt count, has not received PO meds or nutrition >48 hours. this evening patient remains on levophed for bp support.  patient endorsing generalized pain, improves with current pain medication.  urine output poor during day, minimal u/o this shift as well, bladder scan.        PAST MEDICAL & SURGICAL HISTORY:  Osteoporosis  Uterine polyp  Herniated disc  lumbar  Functional quadriplegia secondary to multiple sclerosis  Bilateral Tubal ligation  48y/o      Allergies  No Known Allergies      FAMILY HISTORY:  unknown      Social History:   from home       Review of Systems:  +generalized pain       Physical Examination:    General: elderly cachectic female, lying in bed, nad    HEENT: nc    PULM: diminished b/l    CVS: rrr    ABD: Soft, nondistended, nontender, +bs    EXT: contracted    SKIN: Warm    NEURO: endorses pain, otherwise doesn't make much sense      Medications:  ceftazidime/avibactam IVPB 2.5 Gram(s) IV Intermittent every 8 hours  metroNIDAZOLE  IVPB 500 milliGRAM(s) IV Intermittent every 8 hours  midodrine 20 milliGRAM(s) Oral every 8 hours  norepinephrine Infusion 0.05 MICROgram(s)/kG/Min IV Continuous <Continuous>  ondansetron Injectable 4 milliGRAM(s) IV Push every 8 hours PRN  polyethylene glycol 3350 17 Gram(s) Oral daily  senna 2 Tablet(s) Oral at bedtime  insulin lispro (ADMELOG) corrective regimen sliding scale   SubCutaneous every 6 hours  sodium chloride 0.9% lock flush 10 milliLiter(s) IV Push every 1 hour PRN  chlorhexidine 2% Cloths 1 Application(s) Topical <User Schedule>  nystatin Powder 1 Application(s) Topical two times a day      ICU Vital Signs Last 24 Hrs  T(C): 36.2 (31 Dec 2024 21:30), Max: 36.3 (31 Dec 2024 14:00)  T(F): 97.2 (31 Dec 2024 21:30), Max: 97.3 (31 Dec 2024 14:00)  HR: 96 (01 Jan 2025 03:30) (90 - 106)  BP: 100/55 (01 Jan 2025 03:30) (100/55 - 129/72)  BP(mean): 69 (01 Jan 2025 03:30) (69 - 92)  ABP: --  ABP(mean): --  RR: 40 (01 Jan 2025 03:30) (29 - 45)  SpO2: 99% (01 Jan 2025 03:30) (93% - 100%)    O2 Parameters below as of 31 Dec 2024 19:30  Patient On (Oxygen Delivery Method): room air      Vital Signs Last 24 Hrs  T(C): 36.2 (31 Dec 2024 21:30), Max: 36.3 (31 Dec 2024 14:00)  T(F): 97.2 (31 Dec 2024 21:30), Max: 97.3 (31 Dec 2024 14:00)  HR: 96 (01 Jan 2025 03:30) (90 - 106)  BP: 100/55 (01 Jan 2025 03:30) (100/55 - 129/72)  BP(mean): 69 (01 Jan 2025 03:30) (69 - 92)  RR: 40 (01 Jan 2025 03:30) (29 - 45)  SpO2: 99% (01 Jan 2025 03:30) (93% - 100%)    Parameters below as of 31 Dec 2024 19:30  Patient On (Oxygen Delivery Method): room air      I&O's Detail    30 Dec 2024 07:01  -  31 Dec 2024 07:00  --------------------------------------------------------  IN:    IV PiggyBack: 100 mL    IV PiggyBack: 62.5 mL    IV PiggyBack: 400 mL    Lactated Ringers Bolus: 500 mL    Norepinephrine: 139.8 mL  Total IN: 1202.3 mL    OUT:    Indwelling Catheter - Urethral (mL): 185 mL    VAC (Vacuum Assisted Closure) System (mL): 300 mL  Total OUT: 485 mL  Total NET: 717.3 mL      31 Dec 2024 07:01  -  01 Jan 2025 04:47  --------------------------------------------------------  IN:    Cryoprecipitate: 139 mL    IV PiggyBack: 100 mL    Norepinephrine: 20.4 mL    Platelets - Single Donor: 225 mL  Total IN: 484.4 mL    OUT:    Indwelling Catheter - Urethral (mL): 120 mL  Total OUT: 120 mL  Total NET: 364.4 mL      LABS:                        7.5    40.74 )-----------( 8        ( 31 Dec 2024 02:19 )             23.3     12-31    133[L]  |  103  |  10  ----------------------------<  97  3.8   |  21[L]  |  0.44[L]    Ca    7.4[L]      31 Dec 2024 02:19  Phos  3.5     12-31  Mg     2.0     12-31    TPro  4.6[L]  /  Alb  1.8[L]  /  TBili  1.4[H]  /  DBili  x   /  AST  8[L]  /  ALT  8[L]  /  AlkPhos  219[H]  12-31      CAPILLARY BLOOD GLUCOSE  POCT Blood Glucose.: 101 mg/dL (31 Dec 2024 23:20)      PT/INR - ( 01 Jan 2025 03:25 )   PT: 19.2 sec;   INR: 1.66 ratio    PTT - ( 01 Jan 2025 03:25 )  PTT:45.7 sec      Urinalysis Basic - ( 31 Dec 2024 02:19 )  Color: x / Appearance: x / SG: x / pH: x  Gluc: 97 mg/dL / Ketone: x  / Bili: x / Urobili: x   Blood: x / Protein: x / Nitrite: x   Leuk Esterase: x / RBC: x / WBC x   Sq Epi: x / Non Sq Epi: x / Bacteria: x      CULTURES:  Culture Results:   Growth in aerobic bottle: Proteus mirabilis  Growth in aerobic bottle: Klebsiella pneumoniae  Growth in aerobic bottle: Escherichia coli  Direct identification is available within approximately 3-5  hours either by Blood Panel Multiplexed PCR or Direct  MALDI-TOF. Details: [email or web address] (12-30 @ 09:33)  Culture Results:   Growth in aerobic bottle: Klebsiella pneumoniae  Growth in aerobic bottle: Escherichia coli  See previous culture [id number] (12-30 @ 09:15)      RADIOLOGY:  < from: Xray Chest 1 View- PORTABLE-Urgent (Xray Chest 1 View- PORTABLE-Urgent .) (12.30.24 @ 15:59) >    ACC: [id number] EXAM:  XR CHEST PORTABLE URGENT 1V   ORDERED BY: CANDICE NAVARRETE     ACC: [id number] EXAM:  XR CHEST PORTABLE URGENT 1V   ORDERED BY: AZAM GENTILE     PROCEDURE DATE:  12/30/2024      INTERPRETATION:  AP chest on December 30,2024 at 2:52 PM. Patient should   obscures right apex.    Heart magnified by technique.    There is a persistent moderate left perihilar infiltrate similar to   December 30 earlier study. Mild right base fluid is again noted.    Follow-up AP chest onDecember 30, 2024 at 3:43 PM.    Left subclavian line is been inserted with tip at the cavoatrial   junction. Lung findings improved.    IMPRESSION: Left subclavian line inserted on the latest study.    Improving bibasilar lung and pleural findings.    --- End of Report ---      FIFI MCKINLEY MD  This document has been electronically signed. Dec 30 2024  4:17PM    < end of copied text >

## 2025-01-01 VITALS — TEMPERATURE: 96 F

## 2025-01-01 LAB
-  AMPICILLIN/SULBACTAM: SIGNIFICANT CHANGE UP
-  AMPICILLIN: SIGNIFICANT CHANGE UP
-  AZTREONAM: SIGNIFICANT CHANGE UP
-  CEFAZOLIN: SIGNIFICANT CHANGE UP
-  CEFEPIME: SIGNIFICANT CHANGE UP
-  CEFOXITIN: SIGNIFICANT CHANGE UP
-  CEFTAZIDIME/AVIBACTAM: SIGNIFICANT CHANGE UP
-  CEFTOLOZANE/TAZOBACTAM: SIGNIFICANT CHANGE UP
-  CEFTRIAXONE: SIGNIFICANT CHANGE UP
-  CIPROFLOXACIN: SIGNIFICANT CHANGE UP
-  ERTAPENEM: SIGNIFICANT CHANGE UP
-  GENTAMICIN: SIGNIFICANT CHANGE UP
-  IMIPENEM: SIGNIFICANT CHANGE UP
-  IMIPENEM: SIGNIFICANT CHANGE UP
-  LEVOFLOXACIN: SIGNIFICANT CHANGE UP
-  MEROPENEM/VABORBACTAM: SIGNIFICANT CHANGE UP
-  MEROPENEM: SIGNIFICANT CHANGE UP
-  PIPERACILLIN/TAZOBACTAM: SIGNIFICANT CHANGE UP
-  TOBRAMYCIN: SIGNIFICANT CHANGE UP
-  TRIMETHOPRIM/SULFAMETHOXAZOLE: SIGNIFICANT CHANGE UP
ALBUMIN SERPL ELPH-MCNC: 1.2 G/DL — LOW (ref 3.3–5)
ALBUMIN SERPL ELPH-MCNC: 1.3 G/DL — LOW (ref 3.3–5)
ALBUMIN SERPL ELPH-MCNC: 1.4 G/DL — LOW (ref 3.3–5)
ALBUMIN SERPL ELPH-MCNC: 1.6 G/DL — LOW (ref 3.3–5)
ALBUMIN SERPL ELPH-MCNC: 2.5 G/DL — LOW (ref 3.3–5)
ALP SERPL-CCNC: 172 U/L — HIGH (ref 40–120)
ALP SERPL-CCNC: 227 U/L — HIGH (ref 40–120)
ALP SERPL-CCNC: 256 U/L — HIGH (ref 40–120)
ALP SERPL-CCNC: 364 U/L — HIGH (ref 40–120)
ALP SERPL-CCNC: 408 U/L — HIGH (ref 40–120)
ALP SERPL-CCNC: 421 U/L — HIGH (ref 40–120)
ALP SERPL-CCNC: 433 U/L — HIGH (ref 40–120)
ALP SERPL-CCNC: 568 U/L — HIGH (ref 40–120)
ALP SERPL-CCNC: 588 U/L — HIGH (ref 40–120)
ALP SERPL-CCNC: 642 U/L — HIGH (ref 40–120)
ALP SERPL-CCNC: 654 U/L — HIGH (ref 40–120)
ALP SERPL-CCNC: 696 U/L — HIGH (ref 40–120)
ALT FLD-CCNC: 11 U/L — LOW (ref 12–78)
ALT FLD-CCNC: 11 U/L — LOW (ref 12–78)
ALT FLD-CCNC: 14 U/L — SIGNIFICANT CHANGE UP (ref 12–78)
ALT FLD-CCNC: 8 U/L — LOW (ref 12–78)
ALT FLD-CCNC: 81 U/L — HIGH (ref 12–78)
ALT FLD-CCNC: 9 U/L — LOW (ref 12–78)
ANION GAP SERPL CALC-SCNC: 10 MMOL/L — SIGNIFICANT CHANGE UP (ref 5–17)
ANION GAP SERPL CALC-SCNC: 12 MMOL/L — SIGNIFICANT CHANGE UP (ref 5–17)
ANION GAP SERPL CALC-SCNC: 12 MMOL/L — SIGNIFICANT CHANGE UP (ref 5–17)
ANION GAP SERPL CALC-SCNC: 4 MMOL/L — LOW (ref 5–17)
ANION GAP SERPL CALC-SCNC: 5 MMOL/L — SIGNIFICANT CHANGE UP (ref 5–17)
ANION GAP SERPL CALC-SCNC: 7 MMOL/L — SIGNIFICANT CHANGE UP (ref 5–17)
ANION GAP SERPL CALC-SCNC: 7 MMOL/L — SIGNIFICANT CHANGE UP (ref 5–17)
ANION GAP SERPL CALC-SCNC: 8 MMOL/L — SIGNIFICANT CHANGE UP (ref 5–17)
ANION GAP SERPL CALC-SCNC: 9 MMOL/L — SIGNIFICANT CHANGE UP (ref 5–17)
ANISOCYTOSIS BLD QL: SLIGHT — SIGNIFICANT CHANGE UP
APTT BLD: 45.7 SEC — HIGH (ref 24.5–35.6)
AST SERPL-CCNC: 10 U/L — LOW (ref 15–37)
AST SERPL-CCNC: 12 U/L — LOW (ref 15–37)
AST SERPL-CCNC: 13 U/L — LOW (ref 15–37)
AST SERPL-CCNC: 14 U/L — LOW (ref 15–37)
AST SERPL-CCNC: 15 U/L — SIGNIFICANT CHANGE UP (ref 15–37)
AST SERPL-CCNC: 17 U/L — SIGNIFICANT CHANGE UP (ref 15–37)
AST SERPL-CCNC: 21 U/L — SIGNIFICANT CHANGE UP (ref 15–37)
AST SERPL-CCNC: 28 U/L — SIGNIFICANT CHANGE UP (ref 15–37)
AST SERPL-CCNC: 37 U/L — SIGNIFICANT CHANGE UP (ref 15–37)
AST SERPL-CCNC: 8 U/L — LOW (ref 15–37)
BASE EXCESS BLDA CALC-SCNC: 0.5 MMOL/L — SIGNIFICANT CHANGE UP (ref -2–3)
BASE EXCESS BLDA CALC-SCNC: 1 MMOL/L — SIGNIFICANT CHANGE UP (ref -2–3)
BASE EXCESS BLDA CALC-SCNC: 1.6 MMOL/L — SIGNIFICANT CHANGE UP (ref -2–3)
BASE EXCESS BLDV CALC-SCNC: -1.1 MMOL/L — SIGNIFICANT CHANGE UP (ref -2–3)
BASOPHILS # BLD AUTO: 0 K/UL — SIGNIFICANT CHANGE UP (ref 0–0.2)
BASOPHILS # BLD AUTO: 0.02 K/UL — SIGNIFICANT CHANGE UP (ref 0–0.2)
BASOPHILS # BLD AUTO: 0.03 K/UL — SIGNIFICANT CHANGE UP (ref 0–0.2)
BASOPHILS # BLD AUTO: 0.03 K/UL — SIGNIFICANT CHANGE UP (ref 0–0.2)
BASOPHILS # BLD AUTO: 0.05 K/UL — SIGNIFICANT CHANGE UP (ref 0–0.2)
BASOPHILS # BLD AUTO: 0.07 K/UL — SIGNIFICANT CHANGE UP (ref 0–0.2)
BASOPHILS # BLD AUTO: 0.08 K/UL — SIGNIFICANT CHANGE UP (ref 0–0.2)
BASOPHILS # BLD AUTO: 0.08 K/UL — SIGNIFICANT CHANGE UP (ref 0–0.2)
BASOPHILS # BLD AUTO: 0.14 K/UL — SIGNIFICANT CHANGE UP (ref 0–0.2)
BASOPHILS NFR BLD AUTO: 0 % — SIGNIFICANT CHANGE UP (ref 0–2)
BASOPHILS NFR BLD AUTO: 0.1 % — SIGNIFICANT CHANGE UP (ref 0–2)
BASOPHILS NFR BLD AUTO: 0.2 % — SIGNIFICANT CHANGE UP (ref 0–2)
BASOPHILS NFR BLD AUTO: 0.3 % — SIGNIFICANT CHANGE UP (ref 0–2)
BASOPHILS NFR BLD AUTO: 0.5 % — SIGNIFICANT CHANGE UP (ref 0–2)
BASOPHILS NFR BLD AUTO: 0.5 % — SIGNIFICANT CHANGE UP (ref 0–2)
BASOPHILS NFR BLD AUTO: 0.8 % — SIGNIFICANT CHANGE UP (ref 0–2)
BILIRUB SERPL-MCNC: 0.5 MG/DL — SIGNIFICANT CHANGE UP (ref 0.2–1.2)
BILIRUB SERPL-MCNC: 0.6 MG/DL — SIGNIFICANT CHANGE UP (ref 0.2–1.2)
BILIRUB SERPL-MCNC: 0.6 MG/DL — SIGNIFICANT CHANGE UP (ref 0.2–1.2)
BILIRUB SERPL-MCNC: 0.8 MG/DL — SIGNIFICANT CHANGE UP (ref 0.2–1.2)
BILIRUB SERPL-MCNC: 0.8 MG/DL — SIGNIFICANT CHANGE UP (ref 0.2–1.2)
BILIRUB SERPL-MCNC: 1 MG/DL — SIGNIFICANT CHANGE UP (ref 0.2–1.2)
BILIRUB SERPL-MCNC: 1.1 MG/DL — SIGNIFICANT CHANGE UP (ref 0.2–1.2)
BILIRUB SERPL-MCNC: 1.4 MG/DL — HIGH (ref 0.2–1.2)
BILIRUB SERPL-MCNC: 1.5 MG/DL — HIGH (ref 0.2–1.2)
BILIRUB SERPL-MCNC: 1.7 MG/DL — HIGH (ref 0.2–1.2)
BILIRUB SERPL-MCNC: 1.8 MG/DL — HIGH (ref 0.2–1.2)
BILIRUB SERPL-MCNC: 2 MG/DL — HIGH (ref 0.2–1.2)
BLD GP AB SCN SERPL QL: SIGNIFICANT CHANGE UP
BLD GP AB SCN SERPL QL: SIGNIFICANT CHANGE UP
BLOOD GAS COMMENTS ARTERIAL: SIGNIFICANT CHANGE UP
BLOOD GAS COMMENTS, VENOUS: SIGNIFICANT CHANGE UP
BUN SERPL-MCNC: 17 MG/DL — SIGNIFICANT CHANGE UP (ref 7–23)
BUN SERPL-MCNC: 17 MG/DL — SIGNIFICANT CHANGE UP (ref 7–23)
BUN SERPL-MCNC: 18 MG/DL — SIGNIFICANT CHANGE UP (ref 7–23)
BUN SERPL-MCNC: 19 MG/DL — SIGNIFICANT CHANGE UP (ref 7–23)
BUN SERPL-MCNC: 21 MG/DL — SIGNIFICANT CHANGE UP (ref 7–23)
BUN SERPL-MCNC: 21 MG/DL — SIGNIFICANT CHANGE UP (ref 7–23)
BUN SERPL-MCNC: 22 MG/DL — SIGNIFICANT CHANGE UP (ref 7–23)
BUN SERPL-MCNC: 23 MG/DL — SIGNIFICANT CHANGE UP (ref 7–23)
BUN SERPL-MCNC: 26 MG/DL — HIGH (ref 7–23)
BUN SERPL-MCNC: 29 MG/DL — HIGH (ref 7–23)
BUN SERPL-MCNC: 35 MG/DL — HIGH (ref 7–23)
BUN SERPL-MCNC: 72 MG/DL — HIGH (ref 7–23)
CALCIUM SERPL-MCNC: 7.4 MG/DL — LOW (ref 8.5–10.1)
CALCIUM SERPL-MCNC: 7.5 MG/DL — LOW (ref 8.5–10.1)
CALCIUM SERPL-MCNC: 7.6 MG/DL — LOW (ref 8.5–10.1)
CALCIUM SERPL-MCNC: 7.7 MG/DL — LOW (ref 8.5–10.1)
CALCIUM SERPL-MCNC: 7.7 MG/DL — LOW (ref 8.5–10.1)
CALCIUM SERPL-MCNC: 7.8 MG/DL — LOW (ref 8.5–10.1)
CALCIUM SERPL-MCNC: 8 MG/DL — LOW (ref 8.5–10.1)
CALCIUM SERPL-MCNC: 8.9 MG/DL — SIGNIFICANT CHANGE UP (ref 8.5–10.1)
CHLORIDE BLDV-SCNC: 102 MMOL/L — SIGNIFICANT CHANGE UP (ref 98–107)
CHLORIDE SERPL-SCNC: 103 MMOL/L — SIGNIFICANT CHANGE UP (ref 96–108)
CHLORIDE SERPL-SCNC: 104 MMOL/L — SIGNIFICANT CHANGE UP (ref 96–108)
CHLORIDE SERPL-SCNC: 105 MMOL/L — SIGNIFICANT CHANGE UP (ref 96–108)
CHLORIDE SERPL-SCNC: 106 MMOL/L — SIGNIFICANT CHANGE UP (ref 96–108)
CHLORIDE SERPL-SCNC: 106 MMOL/L — SIGNIFICANT CHANGE UP (ref 96–108)
CHLORIDE SERPL-SCNC: 107 MMOL/L — SIGNIFICANT CHANGE UP (ref 96–108)
CHLORIDE SERPL-SCNC: 107 MMOL/L — SIGNIFICANT CHANGE UP (ref 96–108)
CHLORIDE SERPL-SCNC: 108 MMOL/L — SIGNIFICANT CHANGE UP (ref 96–108)
CHLORIDE SERPL-SCNC: 109 MMOL/L — HIGH (ref 96–108)
CO2 BLDA-SCNC: 25 MMOL/L — HIGH (ref 19–24)
CO2 BLDA-SCNC: 31 MMOL/L — HIGH (ref 19–24)
CO2 BLDA-SCNC: 31 MMOL/L — HIGH (ref 19–24)
CO2 BLDV-SCNC: 25 MMOL/L — SIGNIFICANT CHANGE UP (ref 22–26)
CO2 SERPL-SCNC: 18 MMOL/L — LOW (ref 22–31)
CO2 SERPL-SCNC: 21 MMOL/L — LOW (ref 22–31)
CO2 SERPL-SCNC: 22 MMOL/L — SIGNIFICANT CHANGE UP (ref 22–31)
CO2 SERPL-SCNC: 23 MMOL/L — SIGNIFICANT CHANGE UP (ref 22–31)
CO2 SERPL-SCNC: 24 MMOL/L — SIGNIFICANT CHANGE UP (ref 22–31)
CO2 SERPL-SCNC: 24 MMOL/L — SIGNIFICANT CHANGE UP (ref 22–31)
CO2 SERPL-SCNC: 25 MMOL/L — SIGNIFICANT CHANGE UP (ref 22–31)
CO2 SERPL-SCNC: 25 MMOL/L — SIGNIFICANT CHANGE UP (ref 22–31)
CO2 SERPL-SCNC: 26 MMOL/L — SIGNIFICANT CHANGE UP (ref 22–31)
CO2 SERPL-SCNC: 27 MMOL/L — SIGNIFICANT CHANGE UP (ref 22–31)
CORTIS F PM SERPL-MCNC: 24.1 UG/DL — HIGH (ref 2.7–10.5)
CREAT SERPL-MCNC: 0.39 MG/DL — LOW (ref 0.5–1.3)
CREAT SERPL-MCNC: 0.4 MG/DL — LOW (ref 0.5–1.3)
CREAT SERPL-MCNC: 0.45 MG/DL — LOW (ref 0.5–1.3)
CREAT SERPL-MCNC: 0.45 MG/DL — LOW (ref 0.5–1.3)
CREAT SERPL-MCNC: 0.48 MG/DL — LOW (ref 0.5–1.3)
CREAT SERPL-MCNC: 0.5 MG/DL — SIGNIFICANT CHANGE UP (ref 0.5–1.3)
CREAT SERPL-MCNC: 0.53 MG/DL — SIGNIFICANT CHANGE UP (ref 0.5–1.3)
CREAT SERPL-MCNC: 0.55 MG/DL — SIGNIFICANT CHANGE UP (ref 0.5–1.3)
CREAT SERPL-MCNC: 0.59 MG/DL — SIGNIFICANT CHANGE UP (ref 0.5–1.3)
CREAT SERPL-MCNC: 0.66 MG/DL — SIGNIFICANT CHANGE UP (ref 0.5–1.3)
CREAT SERPL-MCNC: 0.76 MG/DL — SIGNIFICANT CHANGE UP (ref 0.5–1.3)
CREAT SERPL-MCNC: 0.79 MG/DL — SIGNIFICANT CHANGE UP (ref 0.5–1.3)
CREAT SERPL-MCNC: 1 MG/DL — SIGNIFICANT CHANGE UP (ref 0.5–1.3)
CREAT SERPL-MCNC: 2.74 MG/DL — HIGH (ref 0.5–1.3)
CULTURE RESULTS: ABNORMAL
CULTURE RESULTS: ABNORMAL
CULTURE RESULTS: SIGNIFICANT CHANGE UP
D DIMER BLD IA.RAPID-MCNC: 1533 NG/ML DDU — HIGH
EGFR: 101 ML/MIN/1.73M2 — SIGNIFICANT CHANGE UP
EGFR: 102 ML/MIN/1.73M2 — SIGNIFICANT CHANGE UP
EGFR: 17 ML/MIN/1.73M2 — LOW
EGFR: 58 ML/MIN/1.73M2 — LOW
EGFR: 77 ML/MIN/1.73M2 — SIGNIFICANT CHANGE UP
EGFR: 80 ML/MIN/1.73M2 — SIGNIFICANT CHANGE UP
EGFR: 90 ML/MIN/1.73M2 — SIGNIFICANT CHANGE UP
EGFR: 92 ML/MIN/1.73M2 — SIGNIFICANT CHANGE UP
EGFR: 94 ML/MIN/1.73M2 — SIGNIFICANT CHANGE UP
EGFR: 95 ML/MIN/1.73M2 — SIGNIFICANT CHANGE UP
EGFR: 96 ML/MIN/1.73M2 — SIGNIFICANT CHANGE UP
EGFR: 97 ML/MIN/1.73M2 — SIGNIFICANT CHANGE UP
EGFR: 98 ML/MIN/1.73M2 — SIGNIFICANT CHANGE UP
EGFR: 98 ML/MIN/1.73M2 — SIGNIFICANT CHANGE UP
ELLIPTOCYTES BLD QL SMEAR: SLIGHT — SIGNIFICANT CHANGE UP
EOSINOPHIL # BLD AUTO: 0 K/UL — SIGNIFICANT CHANGE UP (ref 0–0.5)
EOSINOPHIL # BLD AUTO: 0.01 K/UL — SIGNIFICANT CHANGE UP (ref 0–0.5)
EOSINOPHIL # BLD AUTO: 0.01 K/UL — SIGNIFICANT CHANGE UP (ref 0–0.5)
EOSINOPHIL # BLD AUTO: 0.05 K/UL — SIGNIFICANT CHANGE UP (ref 0–0.5)
EOSINOPHIL # BLD AUTO: 0.11 K/UL — SIGNIFICANT CHANGE UP (ref 0–0.5)
EOSINOPHIL NFR BLD AUTO: 0 % — SIGNIFICANT CHANGE UP (ref 0–6)
EOSINOPHIL NFR BLD AUTO: 0.1 % — SIGNIFICANT CHANGE UP (ref 0–6)
EOSINOPHIL NFR BLD AUTO: 0.1 % — SIGNIFICANT CHANGE UP (ref 0–6)
EOSINOPHIL NFR BLD AUTO: 0.3 % — SIGNIFICANT CHANGE UP (ref 0–6)
EOSINOPHIL NFR BLD AUTO: 0.8 % — SIGNIFICANT CHANGE UP (ref 0–6)
FIBRINOGEN AG PPP IA-MCNC: 196 MG/DL — LOW (ref 233–496)
GAS PNL BLDA: SIGNIFICANT CHANGE UP
GAS PNL BLDV: 134 MMOL/L — LOW (ref 136–145)
GAS PNL BLDV: SIGNIFICANT CHANGE UP
GAS PNL BLDV: SIGNIFICANT CHANGE UP
GLUCOSE BLDC GLUCOMTR-MCNC: 117 MG/DL — HIGH (ref 70–99)
GLUCOSE BLDC GLUCOMTR-MCNC: 119 MG/DL — HIGH (ref 70–99)
GLUCOSE BLDC GLUCOMTR-MCNC: 120 MG/DL — HIGH (ref 70–99)
GLUCOSE BLDC GLUCOMTR-MCNC: 124 MG/DL — HIGH (ref 70–99)
GLUCOSE BLDC GLUCOMTR-MCNC: 129 MG/DL — HIGH (ref 70–99)
GLUCOSE BLDC GLUCOMTR-MCNC: 130 MG/DL — HIGH (ref 70–99)
GLUCOSE BLDC GLUCOMTR-MCNC: 132 MG/DL — HIGH (ref 70–99)
GLUCOSE BLDC GLUCOMTR-MCNC: 143 MG/DL — HIGH (ref 70–99)
GLUCOSE BLDC GLUCOMTR-MCNC: 146 MG/DL — HIGH (ref 70–99)
GLUCOSE BLDC GLUCOMTR-MCNC: 147 MG/DL — HIGH (ref 70–99)
GLUCOSE BLDC GLUCOMTR-MCNC: 148 MG/DL — HIGH (ref 70–99)
GLUCOSE BLDC GLUCOMTR-MCNC: 149 MG/DL — HIGH (ref 70–99)
GLUCOSE BLDC GLUCOMTR-MCNC: 153 MG/DL — HIGH (ref 70–99)
GLUCOSE BLDC GLUCOMTR-MCNC: 156 MG/DL — HIGH (ref 70–99)
GLUCOSE BLDC GLUCOMTR-MCNC: 162 MG/DL — HIGH (ref 70–99)
GLUCOSE BLDC GLUCOMTR-MCNC: 164 MG/DL — HIGH (ref 70–99)
GLUCOSE BLDC GLUCOMTR-MCNC: 170 MG/DL — HIGH (ref 70–99)
GLUCOSE BLDC GLUCOMTR-MCNC: 171 MG/DL — HIGH (ref 70–99)
GLUCOSE BLDC GLUCOMTR-MCNC: 172 MG/DL — HIGH (ref 70–99)
GLUCOSE BLDC GLUCOMTR-MCNC: 173 MG/DL — HIGH (ref 70–99)
GLUCOSE BLDC GLUCOMTR-MCNC: 180 MG/DL — HIGH (ref 70–99)
GLUCOSE BLDC GLUCOMTR-MCNC: 188 MG/DL — HIGH (ref 70–99)
GLUCOSE BLDC GLUCOMTR-MCNC: 191 MG/DL — HIGH (ref 70–99)
GLUCOSE BLDC GLUCOMTR-MCNC: 192 MG/DL — HIGH (ref 70–99)
GLUCOSE BLDC GLUCOMTR-MCNC: 194 MG/DL — HIGH (ref 70–99)
GLUCOSE BLDC GLUCOMTR-MCNC: 199 MG/DL — HIGH (ref 70–99)
GLUCOSE BLDC GLUCOMTR-MCNC: 206 MG/DL — HIGH (ref 70–99)
GLUCOSE BLDC GLUCOMTR-MCNC: 207 MG/DL — HIGH (ref 70–99)
GLUCOSE BLDC GLUCOMTR-MCNC: 211 MG/DL — HIGH (ref 70–99)
GLUCOSE BLDC GLUCOMTR-MCNC: 226 MG/DL — HIGH (ref 70–99)
GLUCOSE BLDC GLUCOMTR-MCNC: 237 MG/DL — HIGH (ref 70–99)
GLUCOSE BLDC GLUCOMTR-MCNC: 241 MG/DL — HIGH (ref 70–99)
GLUCOSE BLDC GLUCOMTR-MCNC: 243 MG/DL — HIGH (ref 70–99)
GLUCOSE BLDC GLUCOMTR-MCNC: 245 MG/DL — HIGH (ref 70–99)
GLUCOSE BLDC GLUCOMTR-MCNC: 246 MG/DL — HIGH (ref 70–99)
GLUCOSE BLDC GLUCOMTR-MCNC: 257 MG/DL — HIGH (ref 70–99)
GLUCOSE BLDC GLUCOMTR-MCNC: 261 MG/DL — HIGH (ref 70–99)
GLUCOSE BLDC GLUCOMTR-MCNC: 278 MG/DL — HIGH (ref 70–99)
GLUCOSE BLDC GLUCOMTR-MCNC: 280 MG/DL — HIGH (ref 70–99)
GLUCOSE BLDC GLUCOMTR-MCNC: 91 MG/DL — SIGNIFICANT CHANGE UP (ref 70–99)
GLUCOSE BLDV-MCNC: 144 MG/DL — HIGH (ref 65–95)
GLUCOSE SERPL-MCNC: 118 MG/DL — HIGH (ref 70–99)
GLUCOSE SERPL-MCNC: 135 MG/DL — HIGH (ref 70–99)
GLUCOSE SERPL-MCNC: 163 MG/DL — HIGH (ref 70–99)
GLUCOSE SERPL-MCNC: 170 MG/DL — HIGH (ref 70–99)
GLUCOSE SERPL-MCNC: 171 MG/DL — HIGH (ref 70–99)
GLUCOSE SERPL-MCNC: 179 MG/DL — HIGH (ref 70–99)
GLUCOSE SERPL-MCNC: 181 MG/DL — HIGH (ref 70–99)
GLUCOSE SERPL-MCNC: 182 MG/DL — HIGH (ref 70–99)
GLUCOSE SERPL-MCNC: 210 MG/DL — HIGH (ref 70–99)
GLUCOSE SERPL-MCNC: 212 MG/DL — HIGH (ref 70–99)
GLUCOSE SERPL-MCNC: 216 MG/DL — HIGH (ref 70–99)
GLUCOSE SERPL-MCNC: 242 MG/DL — HIGH (ref 70–99)
GLUCOSE SERPL-MCNC: 76 MG/DL — SIGNIFICANT CHANGE UP (ref 70–99)
GLUCOSE SERPL-MCNC: 83 MG/DL — SIGNIFICANT CHANGE UP (ref 70–99)
GRAM STN FLD: ABNORMAL
GRAM STN FLD: ABNORMAL
HCO3 BLDA-SCNC: 24 MMOL/L — SIGNIFICANT CHANGE UP (ref 21–28)
HCO3 BLDA-SCNC: 29 MMOL/L — HIGH (ref 21–28)
HCO3 BLDA-SCNC: 29 MMOL/L — HIGH (ref 21–28)
HCO3 BLDV-SCNC: 24 MMOL/L — SIGNIFICANT CHANGE UP (ref 22–28)
HCT VFR BLD CALC: 20.2 % — CRITICAL LOW (ref 34.5–45)
HCT VFR BLD CALC: 23.2 % — LOW (ref 34.5–45)
HCT VFR BLD CALC: 23.4 % — LOW (ref 34.5–45)
HCT VFR BLD CALC: 23.7 % — LOW (ref 34.5–45)
HCT VFR BLD CALC: 24 % — LOW (ref 34.5–45)
HCT VFR BLD CALC: 25.5 % — LOW (ref 34.5–45)
HCT VFR BLD CALC: 26 % — LOW (ref 34.5–45)
HCT VFR BLD CALC: 27.1 % — LOW (ref 34.5–45)
HCT VFR BLD CALC: 27.2 % — LOW (ref 34.5–45)
HCT VFR BLD CALC: 27.6 % — LOW (ref 34.5–45)
HCT VFR BLD CALC: 27.9 % — LOW (ref 34.5–45)
HCT VFR BLD CALC: 28.4 % — LOW (ref 34.5–45)
HCT VFR BLDA CALC: 26 % — LOW (ref 37–47)
HGB BLD CALC-MCNC: 8.7 G/DL — LOW (ref 11.7–16.1)
HGB BLD-MCNC: 6.4 G/DL — CRITICAL LOW (ref 11.5–15.5)
HGB BLD-MCNC: 7.3 G/DL — LOW (ref 11.5–15.5)
HGB BLD-MCNC: 7.3 G/DL — LOW (ref 11.5–15.5)
HGB BLD-MCNC: 7.7 G/DL — LOW (ref 11.5–15.5)
HGB BLD-MCNC: 7.8 G/DL — LOW (ref 11.5–15.5)
HGB BLD-MCNC: 8.2 G/DL — LOW (ref 11.5–15.5)
HGB BLD-MCNC: 8.2 G/DL — LOW (ref 11.5–15.5)
HGB BLD-MCNC: 8.3 G/DL — LOW (ref 11.5–15.5)
HGB BLD-MCNC: 8.6 G/DL — LOW (ref 11.5–15.5)
HGB BLD-MCNC: 8.7 G/DL — LOW (ref 11.5–15.5)
HGB BLD-MCNC: 9 G/DL — LOW (ref 11.5–15.5)
HGB BLD-MCNC: 9.3 G/DL — LOW (ref 11.5–15.5)
HOROWITZ INDEX BLDA+IHG-RTO: 1 — SIGNIFICANT CHANGE UP
HOROWITZ INDEX BLDA+IHG-RTO: 100 — SIGNIFICANT CHANGE UP
HOROWITZ INDEX BLDA+IHG-RTO: 35 — SIGNIFICANT CHANGE UP
HOROWITZ INDEX BLDV+IHG-RTO: 21 — SIGNIFICANT CHANGE UP
HYPOCHROMIA BLD QL: SLIGHT — SIGNIFICANT CHANGE UP
IMM GRANULOCYTES NFR BLD AUTO: 0.4 % — SIGNIFICANT CHANGE UP (ref 0–0.9)
IMM GRANULOCYTES NFR BLD AUTO: 0.6 % — SIGNIFICANT CHANGE UP (ref 0–0.9)
IMM GRANULOCYTES NFR BLD AUTO: 0.6 % — SIGNIFICANT CHANGE UP (ref 0–0.9)
IMM GRANULOCYTES NFR BLD AUTO: 0.8 % — SIGNIFICANT CHANGE UP (ref 0–0.9)
IMM GRANULOCYTES NFR BLD AUTO: 0.9 % — SIGNIFICANT CHANGE UP (ref 0–0.9)
IMM GRANULOCYTES NFR BLD AUTO: 1.4 % — HIGH (ref 0–0.9)
IMM GRANULOCYTES NFR BLD AUTO: 1.6 % — HIGH (ref 0–0.9)
IMM GRANULOCYTES NFR BLD AUTO: 2.1 % — HIGH (ref 0–0.9)
INR BLD: 1.66 RATIO — HIGH (ref 0.85–1.16)
LACTATE BLDV-MCNC: 3 MMOL/L — HIGH (ref 0.56–1.39)
LACTATE SERPL-SCNC: 2 MMOL/L — SIGNIFICANT CHANGE UP (ref 0.7–2)
LYMPHOCYTES # BLD AUTO: 0.33 K/UL — LOW (ref 1–3.3)
LYMPHOCYTES # BLD AUTO: 0.37 K/UL — LOW (ref 1–3.3)
LYMPHOCYTES # BLD AUTO: 0.46 K/UL — LOW (ref 1–3.3)
LYMPHOCYTES # BLD AUTO: 0.59 K/UL — LOW (ref 1–3.3)
LYMPHOCYTES # BLD AUTO: 0.61 K/UL — LOW (ref 1–3.3)
LYMPHOCYTES # BLD AUTO: 0.68 K/UL — LOW (ref 1–3.3)
LYMPHOCYTES # BLD AUTO: 0.72 K/UL — LOW (ref 1–3.3)
LYMPHOCYTES # BLD AUTO: 0.8 K/UL — LOW (ref 1–3.3)
LYMPHOCYTES # BLD AUTO: 1.24 K/UL — SIGNIFICANT CHANGE UP (ref 1–3.3)
LYMPHOCYTES # BLD AUTO: 1.4 % — LOW (ref 13–44)
LYMPHOCYTES # BLD AUTO: 1.7 % — LOW (ref 13–44)
LYMPHOCYTES # BLD AUTO: 1.8 % — LOW (ref 13–44)
LYMPHOCYTES # BLD AUTO: 10 % — LOW (ref 13–44)
LYMPHOCYTES # BLD AUTO: 3.4 % — LOW (ref 13–44)
LYMPHOCYTES # BLD AUTO: 3.9 % — LOW (ref 13–44)
LYMPHOCYTES # BLD AUTO: 4.1 % — LOW (ref 13–44)
LYMPHOCYTES # BLD AUTO: 4.5 % — LOW (ref 13–44)
LYMPHOCYTES # BLD AUTO: 5.2 % — LOW (ref 13–44)
MACROCYTES BLD QL: SLIGHT — SIGNIFICANT CHANGE UP
MAGNESIUM SERPL-MCNC: 1.7 MG/DL — SIGNIFICANT CHANGE UP (ref 1.6–2.6)
MAGNESIUM SERPL-MCNC: 1.7 MG/DL — SIGNIFICANT CHANGE UP (ref 1.6–2.6)
MAGNESIUM SERPL-MCNC: 1.8 MG/DL — SIGNIFICANT CHANGE UP (ref 1.6–2.6)
MAGNESIUM SERPL-MCNC: 1.8 MG/DL — SIGNIFICANT CHANGE UP (ref 1.6–2.6)
MAGNESIUM SERPL-MCNC: 1.9 MG/DL — SIGNIFICANT CHANGE UP (ref 1.6–2.6)
MAGNESIUM SERPL-MCNC: 2 MG/DL — SIGNIFICANT CHANGE UP (ref 1.6–2.6)
MAGNESIUM SERPL-MCNC: 2 MG/DL — SIGNIFICANT CHANGE UP (ref 1.6–2.6)
MAGNESIUM SERPL-MCNC: 2.1 MG/DL — SIGNIFICANT CHANGE UP (ref 1.6–2.6)
MAGNESIUM SERPL-MCNC: 2.2 MG/DL — SIGNIFICANT CHANGE UP (ref 1.6–2.6)
MAGNESIUM SERPL-MCNC: 2.2 MG/DL — SIGNIFICANT CHANGE UP (ref 1.6–2.6)
MAGNESIUM SERPL-MCNC: 2.3 MG/DL — SIGNIFICANT CHANGE UP (ref 1.6–2.6)
MANUAL SMEAR VERIFICATION: SIGNIFICANT CHANGE UP
MCHC RBC-ENTMCNC: 23 PG — LOW (ref 27–34)
MCHC RBC-ENTMCNC: 23 PG — LOW (ref 27–34)
MCHC RBC-ENTMCNC: 23.2 PG — LOW (ref 27–34)
MCHC RBC-ENTMCNC: 23.2 PG — LOW (ref 27–34)
MCHC RBC-ENTMCNC: 23.3 PG — LOW (ref 27–34)
MCHC RBC-ENTMCNC: 23.4 PG — LOW (ref 27–34)
MCHC RBC-ENTMCNC: 23.4 PG — LOW (ref 27–34)
MCHC RBC-ENTMCNC: 23.5 PG — LOW (ref 27–34)
MCHC RBC-ENTMCNC: 23.5 PG — LOW (ref 27–34)
MCHC RBC-ENTMCNC: 23.6 PG — LOW (ref 27–34)
MCHC RBC-ENTMCNC: 24.2 PG — LOW (ref 27–34)
MCHC RBC-ENTMCNC: 27.4 PG — SIGNIFICANT CHANGE UP (ref 27–34)
MCHC RBC-ENTMCNC: 30.1 G/DL — LOW (ref 32–36)
MCHC RBC-ENTMCNC: 31.2 G/DL — LOW (ref 32–36)
MCHC RBC-ENTMCNC: 31.2 G/DL — LOW (ref 32–36)
MCHC RBC-ENTMCNC: 31.5 G/DL — LOW (ref 32–36)
MCHC RBC-ENTMCNC: 31.7 G/DL — LOW (ref 32–36)
MCHC RBC-ENTMCNC: 31.7 G/DL — LOW (ref 32–36)
MCHC RBC-ENTMCNC: 31.9 G/DL — LOW (ref 32–36)
MCHC RBC-ENTMCNC: 32.2 G/DL — SIGNIFICANT CHANGE UP (ref 32–36)
MCHC RBC-ENTMCNC: 32.5 G/DL — SIGNIFICANT CHANGE UP (ref 32–36)
MCHC RBC-ENTMCNC: 32.5 G/DL — SIGNIFICANT CHANGE UP (ref 32–36)
MCHC RBC-ENTMCNC: 32.6 G/DL — SIGNIFICANT CHANGE UP (ref 32–36)
MCHC RBC-ENTMCNC: 32.7 G/DL — SIGNIFICANT CHANGE UP (ref 32–36)
MCV RBC AUTO: 71.1 FL — LOW (ref 80–100)
MCV RBC AUTO: 71.9 FL — LOW (ref 80–100)
MCV RBC AUTO: 72 FL — LOW (ref 80–100)
MCV RBC AUTO: 73.2 FL — LOW (ref 80–100)
MCV RBC AUTO: 73.5 FL — LOW (ref 80–100)
MCV RBC AUTO: 73.8 FL — LOW (ref 80–100)
MCV RBC AUTO: 74.2 FL — LOW (ref 80–100)
MCV RBC AUTO: 75.2 FL — LOW (ref 80–100)
MCV RBC AUTO: 77.9 FL — LOW (ref 80–100)
MCV RBC AUTO: 88 FL — SIGNIFICANT CHANGE UP (ref 80–100)
METHOD TYPE: SIGNIFICANT CHANGE UP
MONOCYTES # BLD AUTO: 0.28 K/UL — SIGNIFICANT CHANGE UP (ref 0–0.9)
MONOCYTES # BLD AUTO: 0.31 K/UL — SIGNIFICANT CHANGE UP (ref 0–0.9)
MONOCYTES # BLD AUTO: 0.31 K/UL — SIGNIFICANT CHANGE UP (ref 0–0.9)
MONOCYTES # BLD AUTO: 0.37 K/UL — SIGNIFICANT CHANGE UP (ref 0–0.9)
MONOCYTES # BLD AUTO: 0.42 K/UL — SIGNIFICANT CHANGE UP (ref 0–0.9)
MONOCYTES # BLD AUTO: 0.43 K/UL — SIGNIFICANT CHANGE UP (ref 0–0.9)
MONOCYTES # BLD AUTO: 0.45 K/UL — SIGNIFICANT CHANGE UP (ref 0–0.9)
MONOCYTES # BLD AUTO: 0.5 K/UL — SIGNIFICANT CHANGE UP (ref 0–0.9)
MONOCYTES # BLD AUTO: 1.33 K/UL — HIGH (ref 0–0.9)
MONOCYTES NFR BLD AUTO: 1.5 % — LOW (ref 2–14)
MONOCYTES NFR BLD AUTO: 1.6 % — LOW (ref 2–14)
MONOCYTES NFR BLD AUTO: 1.8 % — LOW (ref 2–14)
MONOCYTES NFR BLD AUTO: 1.8 % — LOW (ref 2–14)
MONOCYTES NFR BLD AUTO: 2.1 % — SIGNIFICANT CHANGE UP (ref 2–14)
MONOCYTES NFR BLD AUTO: 2.3 % — SIGNIFICANT CHANGE UP (ref 2–14)
MONOCYTES NFR BLD AUTO: 2.6 % — SIGNIFICANT CHANGE UP (ref 2–14)
MONOCYTES NFR BLD AUTO: 4 % — SIGNIFICANT CHANGE UP (ref 2–14)
MONOCYTES NFR BLD AUTO: 7.5 % — SIGNIFICANT CHANGE UP (ref 2–14)
NEUTROPHILS # BLD AUTO: 10.66 K/UL — HIGH (ref 1.8–7.4)
NEUTROPHILS # BLD AUTO: 13.36 K/UL — HIGH (ref 1.8–7.4)
NEUTROPHILS # BLD AUTO: 14.12 K/UL — HIGH (ref 1.8–7.4)
NEUTROPHILS # BLD AUTO: 14.75 K/UL — HIGH (ref 1.8–7.4)
NEUTROPHILS # BLD AUTO: 15.73 K/UL — HIGH (ref 1.8–7.4)
NEUTROPHILS # BLD AUTO: 15.94 K/UL — HIGH (ref 1.8–7.4)
NEUTROPHILS # BLD AUTO: 18.78 K/UL — HIGH (ref 1.8–7.4)
NEUTROPHILS # BLD AUTO: 23.59 K/UL — HIGH (ref 1.8–7.4)
NEUTROPHILS # BLD AUTO: 25.5 K/UL — HIGH (ref 1.8–7.4)
NEUTROPHILS NFR BLD AUTO: 83 % — HIGH (ref 43–77)
NEUTROPHILS NFR BLD AUTO: 88.4 % — HIGH (ref 43–77)
NEUTROPHILS NFR BLD AUTO: 91.1 % — HIGH (ref 43–77)
NEUTROPHILS NFR BLD AUTO: 91.8 % — HIGH (ref 43–77)
NEUTROPHILS NFR BLD AUTO: 91.9 % — HIGH (ref 43–77)
NEUTROPHILS NFR BLD AUTO: 92.4 % — HIGH (ref 43–77)
NEUTROPHILS NFR BLD AUTO: 94.6 % — HIGH (ref 43–77)
NEUTROPHILS NFR BLD AUTO: 94.9 % — HIGH (ref 43–77)
NEUTROPHILS NFR BLD AUTO: 95.2 % — HIGH (ref 43–77)
NEUTS BAND # BLD: 3 % — SIGNIFICANT CHANGE UP (ref 0–8)
NRBC # BLD: 0 /100 WBCS — SIGNIFICANT CHANGE UP (ref 0–0)
NRBC # BLD: 1 /100 WBCS — HIGH (ref 0–0)
NRBC # BLD: 1 /100 WBCS — HIGH (ref 0–0)
NRBC # BLD: SIGNIFICANT CHANGE UP /100 WBCS (ref 0–0)
ORGANISM # SPEC MICROSCOPIC CNT: ABNORMAL
ORGANISM # SPEC MICROSCOPIC CNT: SIGNIFICANT CHANGE UP
OTHER CELLS CSF MANUAL: SIGNIFICANT CHANGE UP ML/DL (ref 18–22)
OVALOCYTES BLD QL SMEAR: SLIGHT — SIGNIFICANT CHANGE UP
PCO2 BLDA: 31 MMHG — LOW (ref 32–46)
PCO2 BLDA: 56 MMHG — HIGH (ref 32–46)
PCO2 BLDA: 65 MMHG — HIGH (ref 32–46)
PCO2 BLDV: 38 MMHG — LOW (ref 42–55)
PH BLDA: 7.26 — LOW (ref 7.35–7.45)
PH BLDA: 7.32 — LOW (ref 7.35–7.45)
PH BLDA: 7.49 — HIGH (ref 7.35–7.45)
PH BLDV: 7.4 — SIGNIFICANT CHANGE UP (ref 7.32–7.43)
PHOSPHATE SERPL-MCNC: 1.6 MG/DL — LOW (ref 2.5–4.5)
PHOSPHATE SERPL-MCNC: 1.7 MG/DL — LOW (ref 2.5–4.5)
PHOSPHATE SERPL-MCNC: 2.3 MG/DL — LOW (ref 2.5–4.5)
PHOSPHATE SERPL-MCNC: 2.4 MG/DL — LOW (ref 2.5–4.5)
PHOSPHATE SERPL-MCNC: 2.6 MG/DL — SIGNIFICANT CHANGE UP (ref 2.5–4.5)
PHOSPHATE SERPL-MCNC: 2.6 MG/DL — SIGNIFICANT CHANGE UP (ref 2.5–4.5)
PHOSPHATE SERPL-MCNC: 2.8 MG/DL — SIGNIFICANT CHANGE UP (ref 2.5–4.5)
PHOSPHATE SERPL-MCNC: 2.8 MG/DL — SIGNIFICANT CHANGE UP (ref 2.5–4.5)
PHOSPHATE SERPL-MCNC: 2.9 MG/DL — SIGNIFICANT CHANGE UP (ref 2.5–4.5)
PHOSPHATE SERPL-MCNC: 3 MG/DL — SIGNIFICANT CHANGE UP (ref 2.5–4.5)
PHOSPHATE SERPL-MCNC: 3.2 MG/DL — SIGNIFICANT CHANGE UP (ref 2.5–4.5)
PHOSPHATE SERPL-MCNC: 3.2 MG/DL — SIGNIFICANT CHANGE UP (ref 2.5–4.5)
PHOSPHATE SERPL-MCNC: 3.4 MG/DL — SIGNIFICANT CHANGE UP (ref 2.5–4.5)
PHOSPHATE SERPL-MCNC: 4 MG/DL — SIGNIFICANT CHANGE UP (ref 2.5–4.5)
PLAT MORPH BLD: NORMAL — SIGNIFICANT CHANGE UP
PLATELET # BLD AUTO: 11 K/UL — CRITICAL LOW (ref 150–400)
PLATELET # BLD AUTO: 12 K/UL — CRITICAL LOW (ref 150–400)
PLATELET # BLD AUTO: 12 K/UL — CRITICAL LOW (ref 150–400)
PLATELET # BLD AUTO: 13 K/UL — CRITICAL LOW (ref 150–400)
PLATELET # BLD AUTO: 147 K/UL — LOW (ref 150–400)
PLATELET # BLD AUTO: 15 K/UL — CRITICAL LOW (ref 150–400)
PLATELET # BLD AUTO: 15 K/UL — CRITICAL LOW (ref 150–400)
PLATELET # BLD AUTO: 18 K/UL — CRITICAL LOW (ref 150–400)
PLATELET # BLD AUTO: 24 K/UL — LOW (ref 150–400)
PLATELET # BLD AUTO: 28 K/UL — LOW (ref 150–400)
PLATELET # BLD AUTO: 32 K/UL — LOW (ref 150–400)
PLATELET # BLD AUTO: 47 K/UL — LOW (ref 150–400)
PO2 BLDA: 349 MMHG — HIGH (ref 83–108)
PO2 BLDA: 80 MMHG — LOW (ref 83–108)
PO2 BLDA: 94 MMHG — SIGNIFICANT CHANGE UP (ref 83–108)
PO2 BLDV: 39 MMHG — SIGNIFICANT CHANGE UP (ref 25–45)
POIKILOCYTOSIS BLD QL AUTO: SLIGHT — SIGNIFICANT CHANGE UP
POLYCHROMASIA BLD QL SMEAR: SLIGHT — SIGNIFICANT CHANGE UP
POTASSIUM BLDV-SCNC: 4 MMOL/L — SIGNIFICANT CHANGE UP (ref 3.5–5.1)
POTASSIUM SERPL-MCNC: 2.9 MMOL/L — CRITICAL LOW (ref 3.5–5.3)
POTASSIUM SERPL-MCNC: 3.3 MMOL/L — LOW (ref 3.5–5.3)
POTASSIUM SERPL-MCNC: 3.6 MMOL/L — SIGNIFICANT CHANGE UP (ref 3.5–5.3)
POTASSIUM SERPL-MCNC: 3.7 MMOL/L — SIGNIFICANT CHANGE UP (ref 3.5–5.3)
POTASSIUM SERPL-MCNC: 3.7 MMOL/L — SIGNIFICANT CHANGE UP (ref 3.5–5.3)
POTASSIUM SERPL-MCNC: 3.8 MMOL/L — SIGNIFICANT CHANGE UP (ref 3.5–5.3)
POTASSIUM SERPL-MCNC: 3.9 MMOL/L — SIGNIFICANT CHANGE UP (ref 3.5–5.3)
POTASSIUM SERPL-MCNC: 4 MMOL/L — SIGNIFICANT CHANGE UP (ref 3.5–5.3)
POTASSIUM SERPL-MCNC: 4 MMOL/L — SIGNIFICANT CHANGE UP (ref 3.5–5.3)
POTASSIUM SERPL-MCNC: 4.1 MMOL/L — SIGNIFICANT CHANGE UP (ref 3.5–5.3)
POTASSIUM SERPL-MCNC: 4.1 MMOL/L — SIGNIFICANT CHANGE UP (ref 3.5–5.3)
POTASSIUM SERPL-MCNC: 4.3 MMOL/L — SIGNIFICANT CHANGE UP (ref 3.5–5.3)
POTASSIUM SERPL-MCNC: 4.3 MMOL/L — SIGNIFICANT CHANGE UP (ref 3.5–5.3)
POTASSIUM SERPL-MCNC: 5.8 MMOL/L — HIGH (ref 3.5–5.3)
POTASSIUM SERPL-SCNC: 2.9 MMOL/L — CRITICAL LOW (ref 3.5–5.3)
POTASSIUM SERPL-SCNC: 3.3 MMOL/L — LOW (ref 3.5–5.3)
POTASSIUM SERPL-SCNC: 3.6 MMOL/L — SIGNIFICANT CHANGE UP (ref 3.5–5.3)
POTASSIUM SERPL-SCNC: 3.7 MMOL/L — SIGNIFICANT CHANGE UP (ref 3.5–5.3)
POTASSIUM SERPL-SCNC: 3.7 MMOL/L — SIGNIFICANT CHANGE UP (ref 3.5–5.3)
POTASSIUM SERPL-SCNC: 3.8 MMOL/L — SIGNIFICANT CHANGE UP (ref 3.5–5.3)
POTASSIUM SERPL-SCNC: 3.9 MMOL/L — SIGNIFICANT CHANGE UP (ref 3.5–5.3)
POTASSIUM SERPL-SCNC: 4 MMOL/L — SIGNIFICANT CHANGE UP (ref 3.5–5.3)
POTASSIUM SERPL-SCNC: 4 MMOL/L — SIGNIFICANT CHANGE UP (ref 3.5–5.3)
POTASSIUM SERPL-SCNC: 4.1 MMOL/L — SIGNIFICANT CHANGE UP (ref 3.5–5.3)
POTASSIUM SERPL-SCNC: 4.1 MMOL/L — SIGNIFICANT CHANGE UP (ref 3.5–5.3)
POTASSIUM SERPL-SCNC: 4.3 MMOL/L — SIGNIFICANT CHANGE UP (ref 3.5–5.3)
POTASSIUM SERPL-SCNC: 4.3 MMOL/L — SIGNIFICANT CHANGE UP (ref 3.5–5.3)
POTASSIUM SERPL-SCNC: 5.8 MMOL/L — HIGH (ref 3.5–5.3)
PROCALCITONIN SERPL-MCNC: 0.66 NG/ML — HIGH (ref 0.02–0.1)
PROT SERPL-MCNC: 4.2 GM/DL — LOW (ref 6–8.3)
PROT SERPL-MCNC: 4.4 GM/DL — LOW (ref 6–8.3)
PROT SERPL-MCNC: 4.4 GM/DL — LOW (ref 6–8.3)
PROT SERPL-MCNC: 4.5 GM/DL — LOW (ref 6–8.3)
PROT SERPL-MCNC: 4.6 GM/DL — LOW (ref 6–8.3)
PROT SERPL-MCNC: 4.6 GM/DL — LOW (ref 6–8.3)
PROT SERPL-MCNC: 4.7 GM/DL — LOW (ref 6–8.3)
PROT SERPL-MCNC: 4.8 GM/DL — LOW (ref 6–8.3)
PROT SERPL-MCNC: 4.8 GM/DL — LOW (ref 6–8.3)
PROT SERPL-MCNC: 5.5 GM/DL — LOW (ref 6–8.3)
PROTHROM AB SERPL-ACNC: 19.2 SEC — HIGH (ref 9.9–13.4)
RBC # BLD: 2.75 M/UL — LOW (ref 3.8–5.2)
RBC # BLD: 3.11 M/UL — LOW (ref 3.8–5.2)
RBC # BLD: 3.17 M/UL — LOW (ref 3.8–5.2)
RBC # BLD: 3.17 M/UL — LOW (ref 3.8–5.2)
RBC # BLD: 3.29 M/UL — LOW (ref 3.8–5.2)
RBC # BLD: 3.34 M/UL — LOW (ref 3.8–5.2)
RBC # BLD: 3.49 M/UL — LOW (ref 3.8–5.2)
RBC # BLD: 3.54 M/UL — LOW (ref 3.8–5.2)
RBC # BLD: 3.61 M/UL — LOW (ref 3.8–5.2)
RBC # BLD: 3.65 M/UL — LOW (ref 3.8–5.2)
RBC # BLD: 3.85 M/UL — SIGNIFICANT CHANGE UP (ref 3.8–5.2)
RBC # BLD: 3.88 M/UL — SIGNIFICANT CHANGE UP (ref 3.8–5.2)
RBC # FLD: 16.9 % — HIGH (ref 10.3–14.5)
RBC # FLD: 21.4 % — HIGH (ref 10.3–14.5)
RBC # FLD: 21.4 % — HIGH (ref 10.3–14.5)
RBC # FLD: 21.5 % — HIGH (ref 10.3–14.5)
RBC # FLD: 21.6 % — HIGH (ref 10.3–14.5)
RBC # FLD: 21.7 % — HIGH (ref 10.3–14.5)
RBC # FLD: 21.8 % — HIGH (ref 10.3–14.5)
RBC # FLD: 22 % — HIGH (ref 10.3–14.5)
RBC BLD AUTO: ABNORMAL
SAO2 % BLDA: 97.1 % — SIGNIFICANT CHANGE UP (ref 94–98)
SAO2 % BLDA: 97.9 % — SIGNIFICANT CHANGE UP (ref 94–98)
SAO2 % BLDA: 99.5 % — HIGH (ref 94–98)
SAO2 % BLDV: 56.4 % — LOW (ref 94–98)
SODIUM SERPL-SCNC: 134 MMOL/L — LOW (ref 135–145)
SODIUM SERPL-SCNC: 135 MMOL/L — SIGNIFICANT CHANGE UP (ref 135–145)
SODIUM SERPL-SCNC: 137 MMOL/L — SIGNIFICANT CHANGE UP (ref 135–145)
SODIUM SERPL-SCNC: 137 MMOL/L — SIGNIFICANT CHANGE UP (ref 135–145)
SODIUM SERPL-SCNC: 138 MMOL/L — SIGNIFICANT CHANGE UP (ref 135–145)
SODIUM SERPL-SCNC: 139 MMOL/L — SIGNIFICANT CHANGE UP (ref 135–145)
SPECIMEN SOURCE: SIGNIFICANT CHANGE UP
T4 AB SER-ACNC: 8.1 UG/DL — SIGNIFICANT CHANGE UP (ref 4.6–12)
TARGETS BLD QL SMEAR: SLIGHT — SIGNIFICANT CHANGE UP
TSH SERPL-MCNC: 4.36 UIU/ML — HIGH (ref 0.36–3.74)
UNFRACTIONATED HEPARIN INTERPRETATION: SIGNIFICANT CHANGE UP
UNFRACTIONATED HEPARIN RESULT: NEGATIVE — SIGNIFICANT CHANGE UP
UNFRACTIONATED HEPARIN-HIGH DOSE: 1 % — SIGNIFICANT CHANGE UP
UNFRACTIONATED HEPARIN-LOW DOSE: 5 % — SIGNIFICANT CHANGE UP
WBC # BLD: 11.4 K/UL — HIGH (ref 3.8–10.5)
WBC # BLD: 12.39 K/UL — HIGH (ref 3.8–10.5)
WBC # BLD: 12.65 K/UL — HIGH (ref 3.8–10.5)
WBC # BLD: 14.52 K/UL — HIGH (ref 3.8–10.5)
WBC # BLD: 15.49 K/UL — HIGH (ref 3.8–10.5)
WBC # BLD: 15.49 K/UL — HIGH (ref 3.8–10.5)
WBC # BLD: 16.06 K/UL — HIGH (ref 3.8–10.5)
WBC # BLD: 17.26 K/UL — HIGH (ref 3.8–10.5)
WBC # BLD: 17.79 K/UL — HIGH (ref 3.8–10.5)
WBC # BLD: 19.7 K/UL — HIGH (ref 3.8–10.5)
WBC # BLD: 24.88 K/UL — HIGH (ref 3.8–10.5)
WBC # BLD: 26.95 K/UL — HIGH (ref 3.8–10.5)
WBC # FLD AUTO: 11.4 K/UL — HIGH (ref 3.8–10.5)
WBC # FLD AUTO: 12.39 K/UL — HIGH (ref 3.8–10.5)
WBC # FLD AUTO: 12.65 K/UL — HIGH (ref 3.8–10.5)
WBC # FLD AUTO: 14.52 K/UL — HIGH (ref 3.8–10.5)
WBC # FLD AUTO: 15.49 K/UL — HIGH (ref 3.8–10.5)
WBC # FLD AUTO: 15.49 K/UL — HIGH (ref 3.8–10.5)
WBC # FLD AUTO: 16.06 K/UL — HIGH (ref 3.8–10.5)
WBC # FLD AUTO: 17.26 K/UL — HIGH (ref 3.8–10.5)
WBC # FLD AUTO: 17.79 K/UL — HIGH (ref 3.8–10.5)
WBC # FLD AUTO: 19.7 K/UL — HIGH (ref 3.8–10.5)
WBC # FLD AUTO: 24.88 K/UL — HIGH (ref 3.8–10.5)
WBC # FLD AUTO: 26.95 K/UL — HIGH (ref 3.8–10.5)

## 2025-01-01 PROCEDURE — 71045 X-RAY EXAM CHEST 1 VIEW: CPT | Mod: 26,77

## 2025-01-01 PROCEDURE — G0545: CPT

## 2025-01-01 PROCEDURE — 99497 ADVNCD CARE PLAN 30 MIN: CPT

## 2025-01-01 PROCEDURE — 99291 CRITICAL CARE FIRST HOUR: CPT

## 2025-01-01 PROCEDURE — 36556 INSERT NON-TUNNEL CV CATH: CPT

## 2025-01-01 PROCEDURE — 99232 SBSQ HOSP IP/OBS MODERATE 35: CPT

## 2025-01-01 PROCEDURE — 76937 US GUIDE VASCULAR ACCESS: CPT | Mod: 26

## 2025-01-01 PROCEDURE — 71045 X-RAY EXAM CHEST 1 VIEW: CPT | Mod: 26

## 2025-01-01 PROCEDURE — 99497 ADVNCD CARE PLAN 30 MIN: CPT | Mod: 25

## 2025-01-01 RX ORDER — MIDODRINE HYDROCHLORIDE 5 MG/1
20 TABLET ORAL EVERY 8 HOURS
Refills: 0 | Status: DISCONTINUED | OUTPATIENT
Start: 2025-01-01 | End: 2025-01-01

## 2025-01-01 RX ORDER — PANTOPRAZOLE 40 MG/1
40 TABLET, DELAYED RELEASE ORAL DAILY
Refills: 0 | Status: DISCONTINUED | OUTPATIENT
Start: 2025-01-01 | End: 2025-01-01

## 2025-01-01 RX ORDER — BUMETANIDE 2 MG/1
2 TABLET ORAL ONCE
Refills: 0 | Status: COMPLETED | OUTPATIENT
Start: 2025-01-01 | End: 2025-01-01

## 2025-01-01 RX ORDER — POTASSIUM CHLORIDE 600 MG/1
10 TABLET, FILM COATED, EXTENDED RELEASE ORAL
Refills: 0 | Status: DISCONTINUED | OUTPATIENT
Start: 2025-01-01 | End: 2025-01-01

## 2025-01-01 RX ORDER — FLUDROCORTISONE ACETATE 0.1 MG/1
0.1 TABLET ORAL EVERY 12 HOURS
Refills: 0 | Status: DISCONTINUED | OUTPATIENT
Start: 2025-01-01 | End: 2025-01-01

## 2025-01-01 RX ORDER — MORPHINE SULFATE 15 MG
2 TABLET, EXTENDED RELEASE ORAL EVERY 4 HOURS
Refills: 0 | Status: DISCONTINUED | OUTPATIENT
Start: 2025-01-01 | End: 2025-01-01

## 2025-01-01 RX ORDER — NOREPINEPHRINE BITARTRATE 1 MG/ML
0.05 INJECTION INTRAVENOUS
Qty: 16 | Refills: 0 | Status: DISCONTINUED | OUTPATIENT
Start: 2025-01-01 | End: 2025-01-01

## 2025-01-01 RX ORDER — FLUDROCORTISONE ACETATE 0.1 MG/1
0.1 TABLET ORAL DAILY
Refills: 0 | Status: DISCONTINUED | OUTPATIENT
Start: 2025-01-01 | End: 2025-01-01

## 2025-01-01 RX ORDER — BUMETANIDE 2 MG/1
2 TABLET ORAL ONCE
Refills: 0 | Status: DISCONTINUED | OUTPATIENT
Start: 2025-01-01 | End: 2025-01-01

## 2025-01-01 RX ORDER — SODIUM PHOSPHATE, MONOBASIC, MONOHYDRATE AND SODIUM PHOSPHATE, DIBASIC ANHYDROUS 142; 276 MG/ML; MG/ML
15 INJECTION, SOLUTION INTRAVENOUS ONCE
Refills: 0 | Status: COMPLETED | OUTPATIENT
Start: 2025-01-01 | End: 2025-01-01

## 2025-01-01 RX ORDER — MAGNESIUM SULFATE 500 MG/ML
1 INJECTION, SOLUTION INTRAMUSCULAR; INTRAVENOUS ONCE
Refills: 0 | Status: COMPLETED | OUTPATIENT
Start: 2025-01-01 | End: 2025-01-01

## 2025-01-01 RX ORDER — POTASSIUM CHLORIDE 600 MG/1
20 TABLET, FILM COATED, EXTENDED RELEASE ORAL
Refills: 0 | Status: COMPLETED | OUTPATIENT
Start: 2025-01-01 | End: 2025-01-01

## 2025-01-01 RX ORDER — SODIUM ZIRCONIUM CYCLOSILICATE 10 G/10G
5 POWDER, FOR SUSPENSION ORAL ONCE
Refills: 0 | Status: COMPLETED | OUTPATIENT
Start: 2025-01-01 | End: 2025-01-01

## 2025-01-01 RX ORDER — MIDODRINE HYDROCHLORIDE 5 MG/1
10 TABLET ORAL EVERY 8 HOURS
Refills: 0 | Status: DISCONTINUED | OUTPATIENT
Start: 2025-01-01 | End: 2025-01-01

## 2025-01-01 RX ORDER — DROXIDOPA 100 MG/1
100 CAPSULE ORAL THREE TIMES A DAY
Refills: 0 | Status: DISCONTINUED | OUTPATIENT
Start: 2025-01-01 | End: 2025-01-01

## 2025-01-01 RX ORDER — FENTANYL 75 UG/H
25 PATCH, EXTENDED RELEASE TRANSDERMAL EVERY 8 HOURS
Refills: 0 | Status: DISCONTINUED | OUTPATIENT
Start: 2025-01-01 | End: 2025-01-01

## 2025-01-01 RX ORDER — HYDROCORTISONE 100 MG/60ML
50 ENEMA RECTAL EVERY 6 HOURS
Refills: 0 | Status: DISCONTINUED | OUTPATIENT
Start: 2025-01-01 | End: 2025-01-01

## 2025-01-01 RX ORDER — VASOPRESSIN 20 UNIT/ML
0.04 AMPUL (ML) INJECTION
Qty: 40 | Refills: 0 | Status: DISCONTINUED | OUTPATIENT
Start: 2025-01-01 | End: 2025-01-01

## 2025-01-01 RX ORDER — MIDODRINE HYDROCHLORIDE 5 MG/1
30 TABLET ORAL EVERY 8 HOURS
Refills: 0 | Status: DISCONTINUED | OUTPATIENT
Start: 2025-01-01 | End: 2025-01-01

## 2025-01-01 RX ORDER — POTASSIUM CHLORIDE 600 MG/1
40 TABLET, FILM COATED, EXTENDED RELEASE ORAL ONCE
Refills: 0 | Status: COMPLETED | OUTPATIENT
Start: 2025-01-01 | End: 2025-01-01

## 2025-01-01 RX ORDER — SENNOSIDES 8.6 MG/1
2 TABLET, FILM COATED ORAL AT BEDTIME
Refills: 0 | Status: DISCONTINUED | OUTPATIENT
Start: 2025-01-01 | End: 2025-01-01

## 2025-01-01 RX ORDER — INSULIN LISPRO 100/ML
VIAL (ML) SUBCUTANEOUS EVERY 6 HOURS
Refills: 0 | Status: DISCONTINUED | OUTPATIENT
Start: 2025-01-01 | End: 2025-01-01

## 2025-01-01 RX ORDER — MAGNESIUM SULFATE 500 MG/ML
2 INJECTION, SOLUTION INTRAMUSCULAR; INTRAVENOUS ONCE
Refills: 0 | Status: COMPLETED | OUTPATIENT
Start: 2025-01-01 | End: 2025-01-01

## 2025-01-01 RX ORDER — SOD PHOS DI, MONO/K PHOS MONO 250 MG
1 TABLET ORAL ONCE
Refills: 0 | Status: COMPLETED | OUTPATIENT
Start: 2025-01-01 | End: 2025-01-01

## 2025-01-01 RX ORDER — POLYETHYLENE GLYCOL 3350 17 G/DOSE
17 POWDER (GRAM) ORAL DAILY
Refills: 0 | Status: DISCONTINUED | OUTPATIENT
Start: 2025-01-01 | End: 2025-01-01

## 2025-01-01 RX ORDER — BUMETANIDE 2 MG/1
2 TABLET ORAL EVERY 12 HOURS
Refills: 0 | Status: COMPLETED | OUTPATIENT
Start: 2025-01-01 | End: 2025-01-01

## 2025-01-01 RX ORDER — MIDODRINE HYDROCHLORIDE 5 MG/1
20 TABLET ORAL ONCE
Refills: 0 | Status: COMPLETED | OUTPATIENT
Start: 2025-01-01 | End: 2025-01-01

## 2025-01-01 RX ADMIN — FLUDROCORTISONE ACETATE 0.1 MILLIGRAM(S): 0.1 TABLET ORAL at 06:03

## 2025-01-01 RX ADMIN — METRONIDAZOLE 100 MILLIGRAM(S): 250 TABLET ORAL at 05:58

## 2025-01-01 RX ADMIN — MIDODRINE HYDROCHLORIDE 30 MILLIGRAM(S): 5 TABLET ORAL at 14:46

## 2025-01-01 RX ADMIN — NOREPINEPHRINE BITARTRATE 1.98 MICROGRAM(S)/KG/MIN: 1 INJECTION INTRAVENOUS at 22:59

## 2025-01-01 RX ADMIN — POTASSIUM CHLORIDE 100 MILLIEQUIVALENT(S): 600 TABLET, FILM COATED, EXTENDED RELEASE ORAL at 19:32

## 2025-01-01 RX ADMIN — METRONIDAZOLE 100 MILLIGRAM(S): 250 TABLET ORAL at 21:58

## 2025-01-01 RX ADMIN — DROXIDOPA 100 MILLIGRAM(S): 100 CAPSULE ORAL at 11:30

## 2025-01-01 RX ADMIN — HYDROCORTISONE 50 MILLIGRAM(S): 100 ENEMA RECTAL at 23:27

## 2025-01-01 RX ADMIN — NOREPINEPHRINE BITARTRATE 3.83 MICROGRAM(S)/KG/MIN: 1 INJECTION INTRAVENOUS at 22:37

## 2025-01-01 RX ADMIN — MIDODRINE HYDROCHLORIDE 30 MILLIGRAM(S): 5 TABLET ORAL at 14:50

## 2025-01-01 RX ADMIN — Medication 1: at 00:10

## 2025-01-01 RX ADMIN — METRONIDAZOLE 100 MILLIGRAM(S): 250 TABLET ORAL at 06:37

## 2025-01-01 RX ADMIN — METRONIDAZOLE 100 MILLIGRAM(S): 250 TABLET ORAL at 13:14

## 2025-01-01 RX ADMIN — NOREPINEPHRINE BITARTRATE 3.83 MICROGRAM(S)/KG/MIN: 1 INJECTION INTRAVENOUS at 21:11

## 2025-01-01 RX ADMIN — BUMETANIDE 2 MILLIGRAM(S): 2 TABLET ORAL at 18:46

## 2025-01-01 RX ADMIN — METRONIDAZOLE 100 MILLIGRAM(S): 250 TABLET ORAL at 22:58

## 2025-01-01 RX ADMIN — Medication 6 UNIT(S)/MIN: at 01:44

## 2025-01-01 RX ADMIN — DROXIDOPA 100 MILLIGRAM(S): 100 CAPSULE ORAL at 05:59

## 2025-01-01 RX ADMIN — NOREPINEPHRINE BITARTRATE 3.83 MICROGRAM(S)/KG/MIN: 1 INJECTION INTRAVENOUS at 12:52

## 2025-01-01 RX ADMIN — HYDROCORTISONE 50 MILLIGRAM(S): 100 ENEMA RECTAL at 05:31

## 2025-01-01 RX ADMIN — Medication 1: at 23:41

## 2025-01-01 RX ADMIN — Medication 1 PACKET(S): at 11:36

## 2025-01-01 RX ADMIN — HYDROCORTISONE 50 MILLIGRAM(S): 100 ENEMA RECTAL at 13:13

## 2025-01-01 RX ADMIN — DROXIDOPA 100 MILLIGRAM(S): 100 CAPSULE ORAL at 11:25

## 2025-01-01 RX ADMIN — Medication 2: at 11:24

## 2025-01-01 RX ADMIN — DROXIDOPA 100 MILLIGRAM(S): 100 CAPSULE ORAL at 17:54

## 2025-01-01 RX ADMIN — METRONIDAZOLE 100 MILLIGRAM(S): 250 TABLET ORAL at 13:30

## 2025-01-01 RX ADMIN — CHLORHEXIDINE GLUCONATE 1 APPLICATION(S): 1.2 RINSE ORAL at 05:34

## 2025-01-01 RX ADMIN — Medication 4: at 11:24

## 2025-01-01 RX ADMIN — METRONIDAZOLE 100 MILLIGRAM(S): 250 TABLET ORAL at 05:59

## 2025-01-01 RX ADMIN — SENNOSIDES 2 TABLET(S): 8.6 TABLET, FILM COATED ORAL at 21:58

## 2025-01-01 RX ADMIN — MIDODRINE HYDROCHLORIDE 10 MILLIGRAM(S): 5 TABLET ORAL at 16:09

## 2025-01-01 RX ADMIN — METRONIDAZOLE 100 MILLIGRAM(S): 250 TABLET ORAL at 22:03

## 2025-01-01 RX ADMIN — PANTOPRAZOLE 40 MILLIGRAM(S): 40 TABLET, DELAYED RELEASE ORAL at 11:34

## 2025-01-01 RX ADMIN — CHLORHEXIDINE GLUCONATE 1 APPLICATION(S): 1.2 RINSE ORAL at 06:01

## 2025-01-01 RX ADMIN — Medication 1: at 05:57

## 2025-01-01 RX ADMIN — Medication 2: at 17:35

## 2025-01-01 RX ADMIN — HYDROCORTISONE 50 MILLIGRAM(S): 100 ENEMA RECTAL at 11:25

## 2025-01-01 RX ADMIN — NOREPINEPHRINE BITARTRATE 3.83 MICROGRAM(S)/KG/MIN: 1 INJECTION INTRAVENOUS at 17:14

## 2025-01-01 RX ADMIN — CHLORHEXIDINE GLUCONATE 1 APPLICATION(S): 1.2 RINSE ORAL at 06:02

## 2025-01-01 RX ADMIN — HYDROCORTISONE 50 MILLIGRAM(S): 100 ENEMA RECTAL at 00:10

## 2025-01-01 RX ADMIN — METRONIDAZOLE 100 MILLIGRAM(S): 250 TABLET ORAL at 05:31

## 2025-01-01 RX ADMIN — METRONIDAZOLE 100 MILLIGRAM(S): 250 TABLET ORAL at 21:47

## 2025-01-01 RX ADMIN — METRONIDAZOLE 100 MILLIGRAM(S): 250 TABLET ORAL at 05:50

## 2025-01-01 RX ADMIN — NOREPINEPHRINE BITARTRATE 3.83 MICROGRAM(S)/KG/MIN: 1 INJECTION INTRAVENOUS at 11:23

## 2025-01-01 RX ADMIN — METRONIDAZOLE 100 MILLIGRAM(S): 250 TABLET ORAL at 06:22

## 2025-01-01 RX ADMIN — METRONIDAZOLE 100 MILLIGRAM(S): 250 TABLET ORAL at 13:26

## 2025-01-01 RX ADMIN — MIDODRINE HYDROCHLORIDE 30 MILLIGRAM(S): 5 TABLET ORAL at 13:58

## 2025-01-01 RX ADMIN — NOREPINEPHRINE BITARTRATE 3.83 MICROGRAM(S)/KG/MIN: 1 INJECTION INTRAVENOUS at 23:05

## 2025-01-01 RX ADMIN — CHLORHEXIDINE GLUCONATE 1 APPLICATION(S): 1.2 RINSE ORAL at 06:22

## 2025-01-01 RX ADMIN — DROXIDOPA 100 MILLIGRAM(S): 100 CAPSULE ORAL at 18:06

## 2025-01-01 RX ADMIN — NYSTATIN TOPICAL POWDER 1 APPLICATION(S): 100000 POWDER TOPICAL at 06:36

## 2025-01-01 RX ADMIN — Medication 1 PACKET(S): at 05:57

## 2025-01-01 RX ADMIN — Medication 1: at 12:09

## 2025-01-01 RX ADMIN — DROXIDOPA 100 MILLIGRAM(S): 100 CAPSULE ORAL at 17:14

## 2025-01-01 RX ADMIN — MIDODRINE HYDROCHLORIDE 10 MILLIGRAM(S): 5 TABLET ORAL at 13:30

## 2025-01-01 RX ADMIN — METRONIDAZOLE 100 MILLIGRAM(S): 250 TABLET ORAL at 06:04

## 2025-01-01 RX ADMIN — PANTOPRAZOLE 40 MILLIGRAM(S): 40 TABLET, DELAYED RELEASE ORAL at 11:38

## 2025-01-01 RX ADMIN — HYDROCORTISONE 50 MILLIGRAM(S): 100 ENEMA RECTAL at 17:45

## 2025-01-01 RX ADMIN — METRONIDAZOLE 100 MILLIGRAM(S): 250 TABLET ORAL at 22:16

## 2025-01-01 RX ADMIN — DROXIDOPA 100 MILLIGRAM(S): 100 CAPSULE ORAL at 18:47

## 2025-01-01 RX ADMIN — NOREPINEPHRINE BITARTRATE 3.83 MICROGRAM(S)/KG/MIN: 1 INJECTION INTRAVENOUS at 06:02

## 2025-01-01 RX ADMIN — NOREPINEPHRINE BITARTRATE 3.83 MICROGRAM(S)/KG/MIN: 1 INJECTION INTRAVENOUS at 04:37

## 2025-01-01 RX ADMIN — SENNOSIDES 2 TABLET(S): 8.6 TABLET, FILM COATED ORAL at 22:48

## 2025-01-01 RX ADMIN — HYDROCORTISONE 50 MILLIGRAM(S): 100 ENEMA RECTAL at 13:04

## 2025-01-01 RX ADMIN — NYSTATIN TOPICAL POWDER 1 APPLICATION(S): 100000 POWDER TOPICAL at 05:50

## 2025-01-01 RX ADMIN — DROXIDOPA 100 MILLIGRAM(S): 100 CAPSULE ORAL at 05:13

## 2025-01-01 RX ADMIN — Medication 2: at 06:22

## 2025-01-01 RX ADMIN — Medication 2: at 23:55

## 2025-01-01 RX ADMIN — DROXIDOPA 100 MILLIGRAM(S): 100 CAPSULE ORAL at 06:49

## 2025-01-01 RX ADMIN — MIDODRINE HYDROCHLORIDE 30 MILLIGRAM(S): 5 TABLET ORAL at 13:25

## 2025-01-01 RX ADMIN — PANTOPRAZOLE 40 MILLIGRAM(S): 40 TABLET, DELAYED RELEASE ORAL at 12:10

## 2025-01-01 RX ADMIN — MIDODRINE HYDROCHLORIDE 30 MILLIGRAM(S): 5 TABLET ORAL at 22:17

## 2025-01-01 RX ADMIN — BUMETANIDE 2 MILLIGRAM(S): 2 TABLET ORAL at 11:38

## 2025-01-01 RX ADMIN — Medication 17 GRAM(S): at 11:25

## 2025-01-01 RX ADMIN — SODIUM PHOSPHATE, MONOBASIC, MONOHYDRATE AND SODIUM PHOSPHATE, DIBASIC ANHYDROUS 62.5 MILLIMOLE(S): 142; 276 INJECTION, SOLUTION INTRAVENOUS at 12:53

## 2025-01-01 RX ADMIN — HYDROCORTISONE 50 MILLIGRAM(S): 100 ENEMA RECTAL at 18:05

## 2025-01-01 RX ADMIN — HYDROCORTISONE 50 MILLIGRAM(S): 100 ENEMA RECTAL at 23:40

## 2025-01-01 RX ADMIN — POTASSIUM CHLORIDE 100 MILLIEQUIVALENT(S): 600 TABLET, FILM COATED, EXTENDED RELEASE ORAL at 05:31

## 2025-01-01 RX ADMIN — HYDROCORTISONE 50 MILLIGRAM(S): 100 ENEMA RECTAL at 06:22

## 2025-01-01 RX ADMIN — Medication 1: at 11:37

## 2025-01-01 RX ADMIN — DROXIDOPA 100 MILLIGRAM(S): 100 CAPSULE ORAL at 06:21

## 2025-01-01 RX ADMIN — METRONIDAZOLE 100 MILLIGRAM(S): 250 TABLET ORAL at 05:42

## 2025-01-01 RX ADMIN — HYDROCORTISONE 50 MILLIGRAM(S): 100 ENEMA RECTAL at 11:30

## 2025-01-01 RX ADMIN — MIDODRINE HYDROCHLORIDE 30 MILLIGRAM(S): 5 TABLET ORAL at 22:22

## 2025-01-01 RX ADMIN — PANTOPRAZOLE 40 MILLIGRAM(S): 40 TABLET, DELAYED RELEASE ORAL at 13:04

## 2025-01-01 RX ADMIN — HYDROCORTISONE 50 MILLIGRAM(S): 100 ENEMA RECTAL at 18:40

## 2025-01-01 RX ADMIN — HYDROCORTISONE 50 MILLIGRAM(S): 100 ENEMA RECTAL at 11:15

## 2025-01-01 RX ADMIN — METRONIDAZOLE 100 MILLIGRAM(S): 250 TABLET ORAL at 21:12

## 2025-01-01 RX ADMIN — Medication 17 GRAM(S): at 17:14

## 2025-01-01 RX ADMIN — NOREPINEPHRINE BITARTRATE 1.98 MICROGRAM(S)/KG/MIN: 1 INJECTION INTRAVENOUS at 12:55

## 2025-01-01 RX ADMIN — Medication 6 UNIT(S)/MIN: at 10:36

## 2025-01-01 RX ADMIN — POTASSIUM CHLORIDE 100 MILLIEQUIVALENT(S): 600 TABLET, FILM COATED, EXTENDED RELEASE ORAL at 03:34

## 2025-01-01 RX ADMIN — MIDODRINE HYDROCHLORIDE 30 MILLIGRAM(S): 5 TABLET ORAL at 22:49

## 2025-01-01 RX ADMIN — METRONIDAZOLE 100 MILLIGRAM(S): 250 TABLET ORAL at 13:05

## 2025-01-01 RX ADMIN — MIDODRINE HYDROCHLORIDE 30 MILLIGRAM(S): 5 TABLET ORAL at 21:12

## 2025-01-01 RX ADMIN — MAGNESIUM SULFATE 25 GRAM(S): 500 INJECTION, SOLUTION INTRAMUSCULAR; INTRAVENOUS at 03:34

## 2025-01-01 RX ADMIN — DROXIDOPA 100 MILLIGRAM(S): 100 CAPSULE ORAL at 12:19

## 2025-01-01 RX ADMIN — NOREPINEPHRINE BITARTRATE 3.83 MICROGRAM(S)/KG/MIN: 1 INJECTION INTRAVENOUS at 21:55

## 2025-01-01 RX ADMIN — MIDODRINE HYDROCHLORIDE 30 MILLIGRAM(S): 5 TABLET ORAL at 22:02

## 2025-01-01 RX ADMIN — METRONIDAZOLE 100 MILLIGRAM(S): 250 TABLET ORAL at 13:58

## 2025-01-01 RX ADMIN — Medication 6 UNIT(S)/MIN: at 07:30

## 2025-01-01 RX ADMIN — CHLORHEXIDINE GLUCONATE 1 APPLICATION(S): 1.2 RINSE ORAL at 06:36

## 2025-01-01 RX ADMIN — SENNOSIDES 2 TABLET(S): 8.6 TABLET, FILM COATED ORAL at 22:16

## 2025-01-01 RX ADMIN — Medication 1: at 17:44

## 2025-01-01 RX ADMIN — Medication 1: at 23:23

## 2025-01-01 RX ADMIN — Medication 1: at 17:13

## 2025-01-01 RX ADMIN — MIDODRINE HYDROCHLORIDE 30 MILLIGRAM(S): 5 TABLET ORAL at 05:59

## 2025-01-01 RX ADMIN — NYSTATIN TOPICAL POWDER 1 APPLICATION(S): 100000 POWDER TOPICAL at 17:11

## 2025-01-01 RX ADMIN — NYSTATIN TOPICAL POWDER 1 APPLICATION(S): 100000 POWDER TOPICAL at 05:58

## 2025-01-01 RX ADMIN — MIDODRINE HYDROCHLORIDE 10 MILLIGRAM(S): 5 TABLET ORAL at 05:59

## 2025-01-01 RX ADMIN — HYDROCORTISONE 50 MILLIGRAM(S): 100 ENEMA RECTAL at 06:00

## 2025-01-01 RX ADMIN — Medication 17 GRAM(S): at 12:53

## 2025-01-01 RX ADMIN — METRONIDAZOLE 100 MILLIGRAM(S): 250 TABLET ORAL at 21:11

## 2025-01-01 RX ADMIN — HYDROCORTISONE 50 MILLIGRAM(S): 100 ENEMA RECTAL at 12:53

## 2025-01-01 RX ADMIN — HYDROCORTISONE 50 MILLIGRAM(S): 100 ENEMA RECTAL at 06:05

## 2025-01-01 RX ADMIN — Medication 17 GRAM(S): at 11:23

## 2025-01-01 RX ADMIN — Medication 17 GRAM(S): at 13:07

## 2025-01-01 RX ADMIN — FLUDROCORTISONE ACETATE 0.1 MILLIGRAM(S): 0.1 TABLET ORAL at 18:01

## 2025-01-01 RX ADMIN — DROXIDOPA 100 MILLIGRAM(S): 100 CAPSULE ORAL at 06:20

## 2025-01-01 RX ADMIN — CHLORHEXIDINE GLUCONATE 1 APPLICATION(S): 1.2 RINSE ORAL at 05:49

## 2025-01-01 RX ADMIN — Medication 1: at 06:03

## 2025-01-01 RX ADMIN — PANTOPRAZOLE 40 MILLIGRAM(S): 40 TABLET, DELAYED RELEASE ORAL at 12:53

## 2025-01-01 RX ADMIN — PANTOPRAZOLE 40 MILLIGRAM(S): 40 TABLET, DELAYED RELEASE ORAL at 11:25

## 2025-01-01 RX ADMIN — Medication 2: at 23:46

## 2025-01-01 RX ADMIN — POTASSIUM CHLORIDE 40 MILLIEQUIVALENT(S): 600 TABLET, FILM COATED, EXTENDED RELEASE ORAL at 06:21

## 2025-01-01 RX ADMIN — NYSTATIN TOPICAL POWDER 1 APPLICATION(S): 100000 POWDER TOPICAL at 16:10

## 2025-01-01 RX ADMIN — POTASSIUM CHLORIDE 100 MILLIEQUIVALENT(S): 600 TABLET, FILM COATED, EXTENDED RELEASE ORAL at 04:37

## 2025-01-01 RX ADMIN — METRONIDAZOLE 100 MILLIGRAM(S): 250 TABLET ORAL at 13:25

## 2025-01-01 RX ADMIN — Medication 400 MILLIGRAM(S): at 19:28

## 2025-01-01 RX ADMIN — SENNOSIDES 2 TABLET(S): 8.6 TABLET, FILM COATED ORAL at 21:12

## 2025-01-01 RX ADMIN — FLUDROCORTISONE ACETATE 0.1 MILLIGRAM(S): 0.1 TABLET ORAL at 06:21

## 2025-01-01 RX ADMIN — HYDROCORTISONE 50 MILLIGRAM(S): 100 ENEMA RECTAL at 23:23

## 2025-01-01 RX ADMIN — DROXIDOPA 100 MILLIGRAM(S): 100 CAPSULE ORAL at 16:01

## 2025-01-01 RX ADMIN — PANTOPRAZOLE 40 MILLIGRAM(S): 40 TABLET, DELAYED RELEASE ORAL at 11:37

## 2025-01-01 RX ADMIN — Medication 1: at 06:17

## 2025-01-01 RX ADMIN — DROXIDOPA 100 MILLIGRAM(S): 100 CAPSULE ORAL at 11:16

## 2025-01-01 RX ADMIN — HYDROCORTISONE 50 MILLIGRAM(S): 100 ENEMA RECTAL at 23:47

## 2025-01-01 RX ADMIN — MIDODRINE HYDROCHLORIDE 30 MILLIGRAM(S): 5 TABLET ORAL at 06:21

## 2025-01-01 RX ADMIN — Medication 6 UNIT(S)/MIN: at 21:28

## 2025-01-01 RX ADMIN — MIDODRINE HYDROCHLORIDE 10 MILLIGRAM(S): 5 TABLET ORAL at 21:58

## 2025-01-01 RX ADMIN — METRONIDAZOLE 100 MILLIGRAM(S): 250 TABLET ORAL at 14:46

## 2025-01-01 RX ADMIN — SODIUM ZIRCONIUM CYCLOSILICATE 5 GRAM(S): 10 POWDER, FOR SUSPENSION ORAL at 05:59

## 2025-01-01 RX ADMIN — Medication 3: at 11:15

## 2025-01-01 RX ADMIN — NOREPINEPHRINE BITARTRATE 1.98 MICROGRAM(S)/KG/MIN: 1 INJECTION INTRAVENOUS at 19:31

## 2025-01-01 RX ADMIN — POTASSIUM CHLORIDE 100 MILLIEQUIVALENT(S): 600 TABLET, FILM COATED, EXTENDED RELEASE ORAL at 20:38

## 2025-01-01 RX ADMIN — DROXIDOPA 100 MILLIGRAM(S): 100 CAPSULE ORAL at 13:07

## 2025-01-01 RX ADMIN — HYDROCORTISONE 50 MILLIGRAM(S): 100 ENEMA RECTAL at 15:50

## 2025-01-01 RX ADMIN — CHLORHEXIDINE GLUCONATE 1 APPLICATION(S): 1.2 RINSE ORAL at 05:15

## 2025-01-01 RX ADMIN — NOREPINEPHRINE BITARTRATE 1.98 MICROGRAM(S)/KG/MIN: 1 INJECTION INTRAVENOUS at 07:30

## 2025-01-01 RX ADMIN — MIDODRINE HYDROCHLORIDE 30 MILLIGRAM(S): 5 TABLET ORAL at 06:02

## 2025-01-01 RX ADMIN — CHLORHEXIDINE GLUCONATE 1 APPLICATION(S): 1.2 RINSE ORAL at 06:00

## 2025-01-01 RX ADMIN — Medication 1: at 15:57

## 2025-01-01 RX ADMIN — METRONIDAZOLE 100 MILLIGRAM(S): 250 TABLET ORAL at 06:02

## 2025-01-01 RX ADMIN — NYSTATIN TOPICAL POWDER 1 APPLICATION(S): 100000 POWDER TOPICAL at 17:35

## 2025-01-01 RX ADMIN — FLUDROCORTISONE ACETATE 0.1 MILLIGRAM(S): 0.1 TABLET ORAL at 06:04

## 2025-01-01 RX ADMIN — HYDROCORTISONE 50 MILLIGRAM(S): 100 ENEMA RECTAL at 06:03

## 2025-01-01 RX ADMIN — SENNOSIDES 2 TABLET(S): 8.6 TABLET, FILM COATED ORAL at 22:02

## 2025-01-01 RX ADMIN — DROXIDOPA 100 MILLIGRAM(S): 100 CAPSULE ORAL at 18:40

## 2025-01-01 RX ADMIN — BUMETANIDE 2 MILLIGRAM(S): 2 TABLET ORAL at 17:45

## 2025-01-01 RX ADMIN — Medication 2: at 23:27

## 2025-01-01 RX ADMIN — MIDODRINE HYDROCHLORIDE 30 MILLIGRAM(S): 5 TABLET ORAL at 13:26

## 2025-01-01 RX ADMIN — Medication 2: at 18:06

## 2025-01-01 RX ADMIN — NOREPINEPHRINE BITARTRATE 3.83 MICROGRAM(S)/KG/MIN: 1 INJECTION INTRAVENOUS at 20:39

## 2025-01-01 RX ADMIN — SODIUM PHOSPHATE, MONOBASIC, MONOHYDRATE AND SODIUM PHOSPHATE, DIBASIC ANHYDROUS 62.5 MILLIMOLE(S): 142; 276 INJECTION, SOLUTION INTRAVENOUS at 08:00

## 2025-01-01 RX ADMIN — FLUDROCORTISONE ACETATE 0.1 MILLIGRAM(S): 0.1 TABLET ORAL at 11:37

## 2025-01-01 RX ADMIN — HYDROCORTISONE 50 MILLIGRAM(S): 100 ENEMA RECTAL at 17:46

## 2025-01-01 RX ADMIN — MIDODRINE HYDROCHLORIDE 30 MILLIGRAM(S): 5 TABLET ORAL at 05:41

## 2025-01-01 RX ADMIN — Medication 1: at 05:13

## 2025-01-01 RX ADMIN — MIDODRINE HYDROCHLORIDE 30 MILLIGRAM(S): 5 TABLET ORAL at 21:47

## 2025-01-01 RX ADMIN — MAGNESIUM SULFATE 100 GRAM(S): 500 INJECTION, SOLUTION INTRAMUSCULAR; INTRAVENOUS at 10:01

## 2025-01-01 RX ADMIN — DROXIDOPA 100 MILLIGRAM(S): 100 CAPSULE ORAL at 06:03

## 2025-01-01 RX ADMIN — DROXIDOPA 100 MILLIGRAM(S): 100 CAPSULE ORAL at 12:53

## 2025-01-01 RX ADMIN — Medication 1: at 23:16

## 2025-01-01 RX ADMIN — Medication 3: at 18:41

## 2025-01-01 RX ADMIN — Medication 1: at 00:24

## 2025-01-01 RX ADMIN — Medication 3: at 05:44

## 2025-01-01 RX ADMIN — BUMETANIDE 2 MILLIGRAM(S): 2 TABLET ORAL at 10:30

## 2025-01-01 RX ADMIN — PANTOPRAZOLE 40 MILLIGRAM(S): 40 TABLET, DELAYED RELEASE ORAL at 11:23

## 2025-01-01 RX ADMIN — DROXIDOPA 100 MILLIGRAM(S): 100 CAPSULE ORAL at 12:09

## 2025-01-01 RX ADMIN — MIDODRINE HYDROCHLORIDE 30 MILLIGRAM(S): 5 TABLET ORAL at 21:10

## 2025-01-01 RX ADMIN — METRONIDAZOLE 100 MILLIGRAM(S): 250 TABLET ORAL at 14:50

## 2025-01-01 RX ADMIN — FLUDROCORTISONE ACETATE 0.1 MILLIGRAM(S): 0.1 TABLET ORAL at 18:06

## 2025-01-01 RX ADMIN — METRONIDAZOLE 100 MILLIGRAM(S): 250 TABLET ORAL at 14:03

## 2025-01-01 RX ADMIN — MIDODRINE HYDROCHLORIDE 20 MILLIGRAM(S): 5 TABLET ORAL at 11:23

## 2025-01-01 RX ADMIN — MIDODRINE HYDROCHLORIDE 30 MILLIGRAM(S): 5 TABLET ORAL at 05:12

## 2025-01-01 RX ADMIN — METRONIDAZOLE 100 MILLIGRAM(S): 250 TABLET ORAL at 22:48

## 2025-01-01 RX ADMIN — CHLORHEXIDINE GLUCONATE 1 APPLICATION(S): 1.2 RINSE ORAL at 05:58

## 2025-01-01 RX ADMIN — CHLORHEXIDINE GLUCONATE 1 APPLICATION(S): 1.2 RINSE ORAL at 05:42

## 2025-01-01 RX ADMIN — Medication 6 UNIT(S)/MIN: at 19:31

## 2025-01-01 RX ADMIN — MAGNESIUM SULFATE 100 GRAM(S): 500 INJECTION, SOLUTION INTRAMUSCULAR; INTRAVENOUS at 09:27

## 2025-01-01 RX ADMIN — MIDODRINE HYDROCHLORIDE 30 MILLIGRAM(S): 5 TABLET ORAL at 06:04

## 2025-01-01 RX ADMIN — MIDODRINE HYDROCHLORIDE 30 MILLIGRAM(S): 5 TABLET ORAL at 13:06

## 2025-01-01 RX ADMIN — MIDODRINE HYDROCHLORIDE 30 MILLIGRAM(S): 5 TABLET ORAL at 13:13

## 2025-01-01 RX ADMIN — NOREPINEPHRINE BITARTRATE 3.83 MICROGRAM(S)/KG/MIN: 1 INJECTION INTRAVENOUS at 19:46

## 2025-01-01 RX ADMIN — METRONIDAZOLE 100 MILLIGRAM(S): 250 TABLET ORAL at 22:22

## 2025-01-01 RX ADMIN — SODIUM PHOSPHATE, MONOBASIC, MONOHYDRATE AND SODIUM PHOSPHATE, DIBASIC ANHYDROUS 62.5 MILLIMOLE(S): 142; 276 INJECTION, SOLUTION INTRAVENOUS at 13:25

## 2025-01-01 NOTE — PROGRESS NOTE ADULT - SUBJECTIVE AND OBJECTIVE BOX
HPI:  79-year-old female with PMH osteoporosis, MS, bedbound, who presents today for hematuria. As per patient, amezquita was changed 2 days ago and blood was seen in the catheter at the time. Patient reports dysuria, denies frequency and urgency. Patient denies abdominal pain, flank pain, fever, chills, nausea, vomiting, diarrhea. Patient has had similar episodes prior.     On presentation, patient is hemodynamically stable and afebrile, although BP is soft at 109/68. Labs significant for elevated WBC 39.41, Hgb 9.2, elevated Alk Ph 166, UA showing +LE, +nitrite, +WBC, +bacteria. CT abdomen/pelvis shows extensive decubitus ulcers overlying the sacrum and the greater trochanters; Curvilinear fluid collection overlying the left posterior acetabulum; Foci of gas in the left hip joint; Scattered subcutaneous gas; Scattered osseous sclerosis worrisome for chronic osteomyelitis; Rectum distended by stool. Patient given zosyn & vancomycin.    (16 Dec 2024 22:58)      24 hr events: Remains on low dose pressors.     ## ROS:  [x ] unable to obtain    ## Vitals  ICU Vital Signs Last 24 Hrs  T(C): 36 (01 Jan 2025 07:30), Max: 36.5 (01 Jan 2025 04:30)  T(F): 96.8 (01 Jan 2025 07:30), Max: 97.7 (01 Jan 2025 04:30)  HR: 88 (01 Jan 2025 07:30) (88 - 106)  BP: 85/55 (01 Jan 2025 07:30) (82/58 - 129/72)  BP(mean): 65 (01 Jan 2025 07:30) (64 - 92)  ABP: --  ABP(mean): --  RR: 32 (01 Jan 2025 07:30) (29 - 45)  SpO2: 100% (01 Jan 2025 07:30) (95% - 100%)    O2 Parameters below as of 31 Dec 2024 19:30  Patient On (Oxygen Delivery Method): room air            ## Physical Exam:  Gen: Elderly female lying in bed, NAD  HEENT: NC/AT sclerae anicteric  Resp: No increased WOB, CTAB  CV: S1, S2  Abd: Soft, + BS  Ext: Contracted, WWP  Neuro: Arousable, confused      ## Vent Data      ## Labs:  Chem:  01-01    134[L]  |  103  |  17  ----------------------------<  83  4.3   |  22  |  0.45[L]    Ca    7.5[L]      01 Jan 2025 03:25  Phos  3.0     01-01  Mg     1.9     01-01    TPro  4.6[L]  /  Alb  1.6[L]  /  TBili  1.7[H]  /  DBili  x   /  AST  8[L]  /  ALT  9[L]  /  AlkPhos  227[H]  01-01    LIVER FUNCTIONS - ( 01 Jan 2025 03:25 )  Alb: 1.6 g/dL / Pro: 4.6 gm/dL / ALK PHOS: 227 U/L / ALT: 9 U/L / AST: 8 U/L / GGT: x           CBC:                        8.3    15.49 )-----------( 13       ( 01 Jan 2025 03:25 )             26.0     Coags:  PT/INR - ( 01 Jan 2025 03:25 )   PT: 19.2 sec;   INR: 1.66 ratio         PTT - ( 01 Jan 2025 03:25 )  PTT:45.7 sec    Urinalysis Basic - ( 01 Jan 2025 03:25 )    Color: x / Appearance: x / SG: x / pH: x  Gluc: 83 mg/dL / Ketone: x  / Bili: x / Urobili: x   Blood: x / Protein: x / Nitrite: x   Leuk Esterase: x / RBC: x / WBC x   Sq Epi: x / Non Sq Epi: x / Bacteria: x        ## Cardiac        ## Blood Gas      #I/Os  I&O's Detail    31 Dec 2024 07:01  -  01 Jan 2025 07:00  --------------------------------------------------------  IN:    Cryoprecipitate: 139 mL    IV PiggyBack: 100 mL    Norepinephrine: 37.8 mL    Platelets - Single Donor: 225 mL  Total IN: 501.8 mL    OUT:    Indwelling Catheter - Urethral (mL): 210 mL    VAC (Vacuum Assisted Closure) System (mL): 50 mL  Total OUT: 260 mL    Total NET: 241.8 mL          ## Imaging:    ## Medications:  MEDICATIONS  (STANDING):  ceftazidime/avibactam IVPB 2.5 Gram(s) IV Intermittent every 8 hours  chlorhexidine 2% Cloths 1 Application(s) Topical <User Schedule>  insulin lispro (ADMELOG) corrective regimen sliding scale   SubCutaneous every 6 hours  metroNIDAZOLE  IVPB 500 milliGRAM(s) IV Intermittent every 8 hours  norepinephrine Infusion 0.05 MICROgram(s)/kG/Min (3.83 mL/Hr) IV Continuous <Continuous>  nystatin Powder 1 Application(s) Topical two times a day    MEDICATIONS  (PRN):  ondansetron Injectable 4 milliGRAM(s) IV Push every 8 hours PRN Nausea and/or Vomiting  sodium chloride 0.9% lock flush 10 milliLiter(s) IV Push every 1 hour PRN Pre/post blood products, medications, blood draw, and to maintain line patency

## 2025-01-01 NOTE — PROGRESS NOTE ADULT - ASSESSMENT
78 y/o female with pmhx of MS, bedbound with chronic pressure ulcers c/b OM, contracted, recent RSV/FLuA pna who was admitted on 12/16 for hematuria found to be COVID-19 positive c/b septic shock transferred to ICU requiring pressors found to have polymicrobial bacteremia (CR klebsiella, ESBL E coli, proteus) likely due to pressure wounds.       1. septic shock  2. bacteremia   3. COVID-19   4. thrombocytopenia likely DIC      - off vasopressin now. actively titrating levophed for goal MAP 65-70  - on avycaz and flagyl. follow surveillance cultures  - transfuse platelets as needed if < 10,000. no active bleeding. SCDs only

## 2025-01-01 NOTE — PROGRESS NOTE ADULT - SUBJECTIVE AND OBJECTIVE BOX
Patient is a 79y old  Female who presents with a chief complaint of Urinary tract infection and chronic osteomyelitis (01 Jan 2025 08:15)      BRIEF HOSPITAL COURSE: 78 y/o female with pmhx of MS, bedbound with chronic pressure ulcers c/b OM, contracted, recent RSV/FLuA pna who was admitted on 12/16 for hematuria found to be COVID-19 positive c/b septic shock transferred to ICU requiring pressors found to have polymicrobial bacteremia (CR klebsiella, ESBL E coli, proteus) likely due to pressure wounds.     Events last 24 hours: ***    PAST MEDICAL & SURGICAL HISTORY:  Osteoporosis      Uterine polyp      Herniated disc  lumbar      Functional quadriplegia secondary to multiple sclerosis      Bilateral Tubal ligation  48y/o          Review of Systems:  CONSTITUTIONAL: No fever, chills, or fatigue  EYES: No eye pain, visual disturbances, or discharge  ENMT:  No difficulty hearing, tinnitus, vertigo; No sinus or throat pain  NECK: No pain or stiffness  RESPIRATORY: No cough, wheezing, chills or hemoptysis; No shortness of breath  CARDIOVASCULAR: No chest pain, palpitations, dizziness, or leg swelling  GASTROINTESTINAL: No abdominal or epigastric pain. No nausea, vomiting, or hematemesis; No diarrhea or constipation. No melena or hematochezia.  GENITOURINARY: No dysuria, frequency, hematuria, or incontinence  NEUROLOGICAL: No headaches, memory loss, loss of strength, numbness, or tremors  SKIN: No itching, burning, rashes, or lesions   MUSCULOSKELETAL: No joint pain or swelling; No muscle, back, or extremity pain  PSYCHIATRIC: No depression, anxiety, mood swings, or difficulty sleeping      Medications:  ceftazidime/avibactam IVPB 2.5 Gram(s) IV Intermittent every 8 hours  metroNIDAZOLE  IVPB 500 milliGRAM(s) IV Intermittent every 8 hours    midodrine 10 milliGRAM(s) Oral every 8 hours  norepinephrine Infusion 0.05 MICROgram(s)/kG/Min IV Continuous <Continuous>      ondansetron Injectable 4 milliGRAM(s) IV Push every 8 hours PRN            insulin lispro (ADMELOG) corrective regimen sliding scale   SubCutaneous every 6 hours    sodium chloride 0.9% lock flush 10 milliLiter(s) IV Push every 1 hour PRN      chlorhexidine 2% Cloths 1 Application(s) Topical <User Schedule>  nystatin Powder 1 Application(s) Topical two times a day            ICU Vital Signs Last 24 Hrs  T(C): 35.9 (01 Jan 2025 23:00), Max: 36.5 (01 Jan 2025 04:30)  T(F): 96.6 (01 Jan 2025 23:00), Max: 97.7 (01 Jan 2025 04:30)  HR: 103 (01 Jan 2025 23:30) (88 - 109)  BP: 100/56 (01 Jan 2025 23:30) (72/51 - 139/123)  BP(mean): 70 (01 Jan 2025 23:30) (21 - 130)  ABP: --  ABP(mean): --  RR: 25 (01 Jan 2025 23:30) (23 - 42)  SpO2: 100% (01 Jan 2025 23:30) (96% - 100%)    O2 Parameters below as of 01 Jan 2025 19:01  Patient On (Oxygen Delivery Method): room air                I&O's Detail    31 Dec 2024 07:01  -  01 Jan 2025 07:00  --------------------------------------------------------  IN:    Cryoprecipitate: 139 mL    IV PiggyBack: 100 mL    Norepinephrine: 37.8 mL    Platelets - Single Donor: 225 mL  Total IN: 501.8 mL    OUT:    Indwelling Catheter - Urethral (mL): 220 mL    VAC (Vacuum Assisted Closure) System (mL): 110 mL  Total OUT: 330 mL    Total NET: 171.8 mL      01 Jan 2025 07:01  -  01 Jan 2025 23:47  --------------------------------------------------------  IN:    IV PiggyBack: 100 mL    IV PiggyBack: 400 mL    Norepinephrine: 80.8 mL    Platelets - Single Donor: 225 mL  Total IN: 805.8 mL    OUT:    Indwelling Catheter - Urethral (mL): 110 mL    VAC (Vacuum Assisted Closure) System (mL): 80 mL  Total OUT: 190 mL    Total NET: 615.8 mL            LABS:                        8.3    15.49 )-----------( 13       ( 01 Jan 2025 03:25 )             26.0     01-01    134[L]  |  103  |  17  ----------------------------<  83  4.3   |  22  |  0.45[L]    Ca    7.5[L]      01 Jan 2025 03:25  Phos  3.0     01-01  Mg     1.9     01-01    TPro  4.6[L]  /  Alb  1.6[L]  /  TBili  1.7[H]  /  DBili  x   /  AST  8[L]  /  ALT  9[L]  /  AlkPhos  227[H]  01-01          CAPILLARY BLOOD GLUCOSE      POCT Blood Glucose.: 129 mg/dL (01 Jan 2025 23:07)    PT/INR - ( 01 Jan 2025 03:25 )   PT: 19.2 sec;   INR: 1.66 ratio         PTT - ( 01 Jan 2025 03:25 )  PTT:45.7 sec  Urinalysis Basic - ( 01 Jan 2025 03:25 )    Color: x / Appearance: x / SG: x / pH: x  Gluc: 83 mg/dL / Ketone: x  / Bili: x / Urobili: x   Blood: x / Protein: x / Nitrite: x   Leuk Esterase: x / RBC: x / WBC x   Sq Epi: x / Non Sq Epi: x / Bacteria: x      CULTURES:  Culture Results:   Growth in aerobic bottle: Proteus mirabilis  Growth in aerobic bottle: Klebsiella pneumoniae (Carbapenem Resistant)  Growth in aerobic bottle: Escherichia coli ESBL  Direct identification is available within approximately 3-5  hours either by BloodPanel Multiplexed PCR or Direct  MALDI-TOF. Details: [email or web address] (12-30 @ 09:33)  Culture Results:   Growth in aerobic bottle: Klebsiella pneumoniae (Carbapenem Resistant)  Growth in aerobic bottle: Escherichia coli ESBL  See previous culture [id number] (12-30 @ 09:15)      Physical Examination:    General: No acute distress.      HEENT: Pupils equal, reactive to light.  Symmetric.    PULM: Clear to auscultation bilaterally, no significant sputum production    NECK: Supple, no lymphadenopathy, trachea midline    CVS: Regular rate and rhythm, no murmurs, rubs, or gallops    ABD: Soft, nondistended, nontender, normoactive bowel sounds, no masses    EXT: contracted      NEURO: awake. delirious     DEVICES: L subclavian cvc    RADIOLOGY:   IMPRESSION:  MRI of the bony pelvis demonstrates extensive soft tissue ulceration at   the posterior and lateral tissues with gas tracking to the sacrum, and   bilateral greater trochanters consistent with stage IV ulceration for   which correlation with physical exam is recommended. Intermediate to low   T1 signal, high STIR signal, and postcontrast enhancement at the S5   segment of the sacrum, coccyx, and less so at the greater trochanters and   posterior ischial spines suggestive of osteomyelitis. Findings may be   chronic or acute on chronic.    No walled off collection to suggest abscess.      --- End of Report ---            LEE ANN ENNIS MD  This document has been electronically signed. Dec 23 2024 11:04AM      CRITICAL CARE TIME SPENT: 35 minutes of critical care time spent providing medical care for patient's acute illness/conditions that impairs at least one vital organ system and/or poses a high risk of imminent or life threatening deterioration in the patient's condition. It includes time spent evaluating and treating the patient's acute illness as well as time spent reviewing labs, radiology, discussing goals of care with patient and/or patient's family, and discussing the case with a multidisciplinary team in an effort to prevent further life threatening deterioration or end organ damage. This time is independent of any procedures performed.    Please note: Date of entry of this note is equal to the date of services rendered.

## 2025-01-01 NOTE — PROGRESS NOTE ADULT - ASSESSMENT
78 y/o F w/MS, bedbound at baseline, chronic osteomyelitis admitted for sepsis secondary to E. coli UTI, chronic osteo, and COVID/influenza/and RSV infection s/p course of abx now w/new hypotension likely severe sepsis with septic shock possibly secondary to worsening osteo and MDR Klebsiella bacteremia.    - Transfer to ICU  - Titrate pressors as needed goal MAP >= 65  - Midodrine 20mg q8hrs  - Abx as per ID  - Appreciate vascular consult, possible debridement  - DVT prophylaxis  - Full code.    I have personally provided 35 minutes of attending critical care time excluding procedures.   80 y/o F w/MS, bedbound at baseline, chronic osteomyelitis admitted for sepsis secondary to E. coli UTI, chronic osteo, and COVID/influenza/and RSV infection s/p course of abx now w/new hypotension likely severe sepsis with septic shock possibly secondary to worsening osteo and MDR Klebsiella bacteremia.    - Titrate pressors as needed goal MAP >= 65  - Midodrine 20mg q8hrs  - Abx as per ID  - Appreciate vascular consult, possible debridement  - DVT prophylaxis  - Full code.    I have personally provided 35 minutes of attending critical care time excluding procedures.

## 2025-01-02 NOTE — PROGRESS NOTE ADULT - SUBJECTIVE AND OBJECTIVE BOX
HPI:  79-year-old female with PMH osteoporosis, MS, bedbound, who presents today for hematuria. As per patient, amezquita was changed 2 days ago and blood was seen in the catheter at the time. Patient reports dysuria, denies frequency and urgency. Patient denies abdominal pain, flank pain, fever, chills, nausea, vomiting, diarrhea. Patient has had similar episodes prior.     On presentation, patient is hemodynamically stable and afebrile, although BP is soft at 109/68. Labs significant for elevated WBC 39.41, Hgb 9.2, elevated Alk Ph 166, UA showing +LE, +nitrite, +WBC, +bacteria. CT abdomen/pelvis shows extensive decubitus ulcers overlying the sacrum and the greater trochanters; Curvilinear fluid collection overlying the left posterior acetabulum; Foci of gas in the left hip joint; Scattered subcutaneous gas; Scattered osseous sclerosis worrisome for chronic osteomyelitis; Rectum distended by stool. Patient given zosyn & vancomycin.    (16 Dec 2024 22:58)      24 hr events: No acute events.     ## ROS:  [x ] unable to obtain      ## Vitals  ICU Vital Signs Last 24 Hrs  T(C): 37.6 (02 Jan 2025 04:00), Max: 37.6 (02 Jan 2025 04:00)  T(F): 99.7 (02 Jan 2025 04:00), Max: 99.7 (02 Jan 2025 04:00)  HR: 112 (02 Jan 2025 07:00) (90 - 119)  BP: 105/54 (02 Jan 2025 07:00) (72/51 - 139/123)  BP(mean): 70 (02 Jan 2025 07:00) (21 - 130)  ABP: --  ABP(mean): --  RR: 22 (02 Jan 2025 07:00) (22 - 34)  SpO2: 100% (02 Jan 2025 07:00) (96% - 100%)    O2 Parameters below as of 01 Jan 2025 19:01  Patient On (Oxygen Delivery Method): room air            ## Physical Exam:  Gen: Elderly female lying in bed, NAD  HEENT: NC/AT sclerae anicteric  Resp: No increased WOB, CTAB  CV: S1, S2  Abd: Soft, + BS  Ext: Contracted, WWP  Neuro: Arousable, confused    ## Vent Data      ## Labs:  Chem:  01-02    135  |  105  |  21  ----------------------------<  118[H]  4.3   |  18[L]  |  0.39[L]    Ca    7.6[L]      02 Jan 2025 03:18  Phos  2.8     01-02  Mg     2.1     01-02    TPro  4.4[L]  /  Alb  1.4[L]  /  TBili  1.4[H]  /  DBili  x   /  AST  12[L]  /  ALT  8[L]  /  AlkPhos  256[H]  01-02    LIVER FUNCTIONS - ( 02 Jan 2025 03:18 )  Alb: 1.4 g/dL / Pro: 4.4 gm/dL / ALK PHOS: 256 U/L / ALT: 8 U/L / AST: 12 U/L / GGT: x           CBC:                        7.7    11.40 )-----------( 32       ( 02 Jan 2025 03:18 )             23.7     Coags:  PT/INR - ( 01 Jan 2025 03:25 )   PT: 19.2 sec;   INR: 1.66 ratio         PTT - ( 01 Jan 2025 03:25 )  PTT:45.7 sec    Urinalysis Basic - ( 02 Jan 2025 03:18 )    Color: x / Appearance: x / SG: x / pH: x  Gluc: 118 mg/dL / Ketone: x  / Bili: x / Urobili: x   Blood: x / Protein: x / Nitrite: x   Leuk Esterase: x / RBC: x / WBC x   Sq Epi: x / Non Sq Epi: x / Bacteria: x        ## Cardiac        ## Blood Gas      #I/Os  I&O's Detail    01 Jan 2025 07:01  -  02 Jan 2025 07:00  --------------------------------------------------------  IN:    IV PiggyBack: 100 mL    IV PiggyBack: 600 mL    Norepinephrine: 139.5 mL    Platelets - Single Donor: 225 mL  Total IN: 1064.5 mL    OUT:    Indwelling Catheter - Urethral (mL): 210 mL    VAC (Vacuum Assisted Closure) System (mL): 80 mL  Total OUT: 290 mL    Total NET: 774.5 mL          ## Imaging:    ## Medications:  MEDICATIONS  (STANDING):  ceftazidime/avibactam IVPB 2.5 Gram(s) IV Intermittent every 8 hours  chlorhexidine 2% Cloths 1 Application(s) Topical <User Schedule>  insulin lispro (ADMELOG) corrective regimen sliding scale   SubCutaneous every 6 hours  metroNIDAZOLE  IVPB 500 milliGRAM(s) IV Intermittent every 8 hours  midodrine 10 milliGRAM(s) Oral every 8 hours  norepinephrine Infusion 0.05 MICROgram(s)/kG/Min (3.83 mL/Hr) IV Continuous <Continuous>  nystatin Powder 1 Application(s) Topical two times a day    MEDICATIONS  (PRN):  ondansetron Injectable 4 milliGRAM(s) IV Push every 8 hours PRN Nausea and/or Vomiting  sodium chloride 0.9% lock flush 10 milliLiter(s) IV Push every 1 hour PRN Pre/post blood products, medications, blood draw, and to maintain line patency

## 2025-01-02 NOTE — PROGRESS NOTE ADULT - SUBJECTIVE AND OBJECTIVE BOX
SPENCER MIXON  MRN-[id number]  79y (1945)    Patient is a 79y old  Female who presents with a chief complaint of Urinary tract infection and chronic osteomyelitis (12-30-24)    Interval History:       ROS:  limited due to patient's medical condition. Says yes to cough and back pain  [ ] Unobtainable because:  [x ] All other systems negative    Constitutional: no fever, no chills  Head: no trauma  Eyes: no vision changes, no eye pain  ENT:  no sore throat, no rhinorrhea  Cardiovascular:  no chest pain, no palpitation  Respiratory:  no SOB, + cough  GI:  no abd pain, no vomiting, no diarrhea  urinary: no dysuria, no hematuria, no flank pain  musculoskeletal: back pain  skin:  no rash  neurology:  no headache        Allergies  No Known Allergies    ANTIMICROBIALS:  MEDICATIONS  (STANDING):  ceftazidime/avibactam IVPB   33.33 mL/Hr IV Intermittent (12-31-24 @ 05:31)    Physical Exam:  Vital Signs Last 24 Hrs  T(C): 35.8 (02 Jan 2025 09:30), Max: 37.6 (02 Jan 2025 04:00)  T(F): 96.4 (02 Jan 2025 09:30), Max: 99.7 (02 Jan 2025 04:00)  HR: 101 (02 Jan 2025 10:05) (90 - 119)  BP: 90/47 (02 Jan 2025 10:05) (72/51 - 139/123)  BP(mean): 61 (02 Jan 2025 10:05) (21 - 130)  RR: 27 (02 Jan 2025 10:05) (22 - 44)  SpO2: 100% (02 Jan 2025 10:05) (96% - 100%)    Parameters below as of 01 Jan 2025 19:01  Patient On (Oxygen Delivery Method): room air    Constitutional: Elderly woman chronic ill appearing  HEENT: Dry oral mucosa  Cardiovascular:   normal S1, S2, no edema  Respiratory:  clear BS bilaterally, no wheezes, no rales  GI:  soft, non distended  :  + amezquita with clear urine in the bag    Musculoskeletal: Atrophic limbs, contracted. +pressure ulcers with vac dressing on  Neurologic: Awake oriented x 2. Answers yes/no questions only  Skin:  warm, dry, no rash  Psychiatric:  appropriate    WBC Count: 11.40 K/uL (01-02 @ 03:18)  WBC Count: 15.49 K/uL (01-01 @ 03:25)  WBC Count: 40.74 K/uL (12-31 @ 02:19)  WBC Count: 45.18 K/uL (12-30 @ 19:30)  WBC Count: 23.63 K/uL (12-29 @ 15:10)  WBC Count: 25.87 K/uL (12-29 @ 11:58)  WBC Count: 6.15 K/uL (12-28 @ 10:20)                          7.7    11.40 )-----------( 32       ( 02 Jan 2025 03:18 )             23.7   01-02    135  |  105  |  21  ----------------------------<  118[H]  4.3   |  18[L]  |  0.39[L]    Ca    7.6[L]      02 Jan 2025 03:18  Phos  2.8     01-02  Mg     2.1     01-02    TPro  4.4[L]  /  Alb  1.4[L]  /  TBili  1.4[H]  /  DBili  x   /  AST  12[L]  /  ALT  8[L]  /  AlkPhos  256[H]  01-02      Urinalysis with Rflx Culture (12.16.24 @ 18:45)   Urine Appearance: Turbid   Color: Orange   Specific Gravity: 1.027   pH Urine: 5.5   Protein, Urine: 100 mg/dL   Glucose Qualitative, Urine: Negative mg/dL   Ketone - Urine: Negative mg/dL   Blood, Urine: Large   Bilirubin: Small   Urobilinogen: 1.0 mg/dL   Leukocyte Esterase Concentration: Large   Nitrite: Positive  Urine Microscopic-Add On (NC) (12.16.24 @ 18:45)   Bacteria: Many /HPF   Comment - Urine: moderate yeast like cells noted   Red Blood Cell - Urine: 10 /HPF   White Blood Cell - Urine: Too Numerous to count /HPF        MICROBIOLOGY:  .Blood BLOOD  12-30-24   Growth in aerobic bottle: Gram Negative Rods  Direct identification is available within approximately 3-5  hours either by Blood Panel Multiplexed PCR or Direct  MALDI-TOF. Details: [email or web address]  --  Blood Culture PCR   -  ESBL: Detec   -  KPC resistance gene: Detec The KPC gene confers resistance to all penicillins, cephalosporins, carbapenems and aztreonam. Additional resistance to fluoroquinolones and aminoglycosides is likely.   -  K. pneumoniae group: Detec (K. pneumoniae, K. quasipneumoniae, K. variicola)   -  Proteus species: Detec  Culture - Blood (12.30.24 @ 09:33)   -  Bacteroides fragilis: Detec   -  Carbapenem Resistance: Detec   -  CTX-M Resistance Marker: Detec   Organism: Escherichia coli ESBL   Organism: Proteus mirabilis   Organism: Klebsiella pneumoniae (Carbapenem Resistant)   -  Ceftolozane/tazobactam: R >8   -  Ceftazidime/Avibactam: S <=4   -  Meropenem/Vaborbactam: S <=2   -  Piperacillin/Tazobactam: R >64   -  Piperacillin/Tazobactam: R <=8   -  Piperacillin/Tazobactam: S <=8   -  Tobramycin: S <=2   -  Tobramycin: R >8   -  Tobramycin: S <=2   -  Trimethoprim/Sulfamethoxazole: S 2/38   -  Trimethoprim/Sulfamethoxazole: S 2/38   -  Trimethoprim/Sulfamethoxazole: S <=0.5/9.5   -  Gentamicin: S <=2   -  Gentamicin: R >8   -  Gentamicin: S <=2   -  Imipenem: S <=1   -  Imipenem: R >8   -  Levofloxacin: S <=0.5   -  Levofloxacin: R >4   -  Levofloxacin: S <=0.5   -  Meropenem: S <=1   -  Meropenem: S <=1   -  Meropenem: R >8   -  Ampicillin: R >16 These ampicillin results predict results for amoxicillin   -  Ampicillin: S <=8 These ampicillin results predict results for amoxicillin   -  Ampicillin: R >16 These ampicillin results predict results for amoxicillin   -  Ampicillin/Sulbactam: R 16/8   -  Ampicillin/Sulbactam: S <=4/2   -  Ampicillin/Sulbactam: R >16/8   -  Aztreonam: R >16   -  Aztreonam: S <=4   -  Aztreonam: R >16   -  Cefazolin: R >16   -  Cefazolin: S <=2   -  Cefazolin: R >16   -  Cefepime: R 16   -  Cefepime: S <=2   -  Cefepime: R >16   -  Cefoxitin: S <=8   -  Ceftriaxone: S <=1   -  Ceftriaxone: R >32   -  Ceftriaxone: R >32   -  Ciprofloxacin: S <=0.25   -  Ciprofloxacin: R >2   -  Ciprofloxacin: S <=0.25   -  Ertapenem: S <=0.5   -  Ertapenem: S <=0.5   -  Ertapenem: R >1      .Blood BLOOD  12-30-24   Growth in aerobic bottle: Gram Negative Rods  --    Growth in aerobic bottle: Gram Negative Rods      Clean Catch  12-16-24   >100,000 CFU/ml Escherichia coli ESBL  --  Escherichia coli ESBL      Respiratory Viral Panel with COVID-19 by SHIRLEY (12.24.24 @ 11:48)   Rapid RVP Result: Detected   SARS-CoV-2: Detected: This Respiratory Panel uses polymerase chain reaction (PCR) to detect for  adenovirus; coronavirus (HKU1, NL63, 229E, OC43); human metapneumovirus  (hMPV); human enterovirus/rhinovirus (Entero/RV); influenza A; influenza  A/H1; influenza A/H3; influenza A/H1-2009; influenza B; parainfluenza  viruses 1, 2, 3, 4; respiratory syncytial virus; Mycoplasma pneumoniae;  Chlamydophila pneumoniae; and SARS-CoV-2.      SARS-CoV-2: Detected (04 Dec 2024 11:00)    RADIOLOGY:  < from: Xray Chest 1 View- PORTABLE-Urgent (Xray Chest 1 View- PORTABLE-Urgent .) (12.30.24 @ 09:17) >  IMPRESSION: Improved right perihilar infiltrate from December 4.   Persistent left perihilar infiltrate.  --- End of Report ---    < from: MR Pelvis w/ IV Cont (12.23.24 @ 10:34) >  IMPRESSION:  MRI of the bony pelvis demonstrates extensive soft tissue ulceration at   the posterior and lateral tissues with gas tracking to the sacrum, and   bilateral greater trochanters consistent with stage IV ulceration for   which correlation with physical exam is recommended. Intermediate to low   T1 signal, high STIR signal, and postcontrast enhancement at the S5   segment of the sacrum, coccyx, and less so at the greater trochanters and   posterior ischial spines suggestive of osteomyelitis. Findings may be   chronic or acute on chronic.  No walled off collection to suggest abscess.  --- End of Report ---      < from: CT Abdomen and Pelvis w/ IV Cont (12.16.24 @ 20:14) >  IMPRESSION:  Extensive decubitus ulcers overlying the sacrum and the greater   trochanters.  Curvilinear fluid collection overlying the left posterior acetabulum.  Foci of gas in the left hip joint.  Scattered subcutaneous gas.  Scattered osseous sclerosis worrisome for chronic osteomyelitis.  Rectum distended by stool.  --- End of Report ---    < end of copied text >     SPENCER MIXON  MRN-[id number]  79y (1945)    Patient is a 79y old  Female who presents with a chief complaint of Urinary tract infection and chronic osteomyelitis (12-30-24)    Interval History: Seen and examined at bedside in ICU. Slightly hypothermic. On minimal levophed. Remains lethargic. NPO. Plan for NGT      ROS:  limited due to patient's medical condition.  [x] Unobtainable because:  [ ] All other systems negative      Allergies  No Known Allergies    ANTIMICROBIALS:  MEDICATIONS  (STANDING):  ceftazidime/avibactam IVPB   33.33 mL/Hr IV Intermittent (12-31-24 @ 05:31)    Physical Exam:  Vital Signs Last 24 Hrs  T(C): 35.8 (02 Jan 2025 09:30), Max: 37.6 (02 Jan 2025 04:00)  T(F): 96.4 (02 Jan 2025 09:30), Max: 99.7 (02 Jan 2025 04:00)  HR: 101 (02 Jan 2025 10:05) (90 - 119)  BP: 90/47 (02 Jan 2025 10:05) (72/51 - 139/123)  BP(mean): 61 (02 Jan 2025 10:05) (21 - 130)  RR: 27 (02 Jan 2025 10:05) (22 - 44)  SpO2: 100% (02 Jan 2025 10:05) (96% - 100%)    Parameters below as of 01 Jan 2025 19:01  Patient On (Oxygen Delivery Method): room air    Constitutional: Elderly woman chronic ill appearing  HEENT: Dry oral mucosa  Cardiovascular:   normal S1, S2, no edema  Respiratory:  clear BS bilaterally, no wheezes, no rales  GI:  soft, non distended  :  + amezquita with clear urine in the bag    Musculoskeletal: Atrophic limbs, contracted. +pressure ulcers with vac dressing on  Neurologic: Lethargic. Answers yes/no questions only  Skin:  warm, dry, no rash  Psychiatric:  appropriate    WBC Count: 11.40 K/uL (01-02 @ 03:18)  WBC Count: 15.49 K/uL (01-01 @ 03:25)  WBC Count: 40.74 K/uL (12-31 @ 02:19)  WBC Count: 45.18 K/uL (12-30 @ 19:30)  WBC Count: 23.63 K/uL (12-29 @ 15:10)  WBC Count: 25.87 K/uL (12-29 @ 11:58)  WBC Count: 6.15 K/uL (12-28 @ 10:20)                          7.7    11.40 )-----------( 32       ( 02 Jan 2025 03:18 )             23.7   01-02    135  |  105  |  21  ----------------------------<  118[H]  4.3   |  18[L]  |  0.39[L]    Ca    7.6[L]      02 Jan 2025 03:18  Phos  2.8     01-02  Mg     2.1     01-02    TPro  4.4[L]  /  Alb  1.4[L]  /  TBili  1.4[H]  /  DBili  x   /  AST  12[L]  /  ALT  8[L]  /  AlkPhos  256[H]  01-02      Urinalysis with Rflx Culture (12.16.24 @ 18:45)   Urine Appearance: Turbid   Color: Orange   Specific Gravity: 1.027   pH Urine: 5.5   Protein, Urine: 100 mg/dL   Glucose Qualitative, Urine: Negative mg/dL   Ketone - Urine: Negative mg/dL   Blood, Urine: Large   Bilirubin: Small   Urobilinogen: 1.0 mg/dL   Leukocyte Esterase Concentration: Large   Nitrite: Positive  Urine Microscopic-Add On (NC) (12.16.24 @ 18:45)   Bacteria: Many /HPF   Comment - Urine: moderate yeast like cells noted   Red Blood Cell - Urine: 10 /HPF   White Blood Cell - Urine: Too Numerous to count /HPF        MICROBIOLOGY:  .Blood BLOOD  12-30-24   Growth in aerobic bottle: Gram Negative Rods  Direct identification is available within approximately 3-5  hours either by Blood Panel Multiplexed PCR or Direct  MALDI-TOF. Details: [email or web address]  --  Blood Culture PCR   -  ESBL: Detec   -  KPC resistance gene: Detec The KPC gene confers resistance to all penicillins, cephalosporins, carbapenems and aztreonam. Additional resistance to fluoroquinolones and aminoglycosides is likely.   -  K. pneumoniae group: Detec (K. pneumoniae, K. quasipneumoniae, K. variicola)   -  Proteus species: Detec  Culture - Blood (12.30.24 @ 09:33)   -  Bacteroides fragilis: Detec   -  Carbapenem Resistance: Detec   -  CTX-M Resistance Marker: Detec   Organism: Escherichia coli ESBL   Organism: Proteus mirabilis   Organism: Klebsiella pneumoniae (Carbapenem Resistant)   -  Ceftolozane/tazobactam: R >8   -  Ceftazidime/Avibactam: S <=4   -  Meropenem/Vaborbactam: S <=2   -  Piperacillin/Tazobactam: R >64   -  Piperacillin/Tazobactam: R <=8   -  Piperacillin/Tazobactam: S <=8   -  Tobramycin: S <=2   -  Tobramycin: R >8   -  Tobramycin: S <=2   -  Trimethoprim/Sulfamethoxazole: S 2/38   -  Trimethoprim/Sulfamethoxazole: S 2/38   -  Trimethoprim/Sulfamethoxazole: S <=0.5/9.5   -  Gentamicin: S <=2   -  Gentamicin: R >8   -  Gentamicin: S <=2   -  Imipenem: S <=1   -  Imipenem: R >8   -  Levofloxacin: S <=0.5   -  Levofloxacin: R >4   -  Levofloxacin: S <=0.5   -  Meropenem: S <=1   -  Meropenem: S <=1   -  Meropenem: R >8   -  Ampicillin: R >16 These ampicillin results predict results for amoxicillin   -  Ampicillin: S <=8 These ampicillin results predict results for amoxicillin   -  Ampicillin: R >16 These ampicillin results predict results for amoxicillin   -  Ampicillin/Sulbactam: R 16/8   -  Ampicillin/Sulbactam: S <=4/2   -  Ampicillin/Sulbactam: R >16/8   -  Aztreonam: R >16   -  Aztreonam: S <=4   -  Aztreonam: R >16   -  Cefazolin: R >16   -  Cefazolin: S <=2   -  Cefazolin: R >16   -  Cefepime: R 16   -  Cefepime: S <=2   -  Cefepime: R >16   -  Cefoxitin: S <=8   -  Ceftriaxone: S <=1   -  Ceftriaxone: R >32   -  Ceftriaxone: R >32   -  Ciprofloxacin: S <=0.25   -  Ciprofloxacin: R >2   -  Ciprofloxacin: S <=0.25   -  Ertapenem: S <=0.5   -  Ertapenem: S <=0.5   -  Ertapenem: R >1      .Blood BLOOD  12-30-24   Growth in aerobic bottle: Gram Negative Rods  --    Growth in aerobic bottle: Gram Negative Rods      Clean Catch  12-16-24   >100,000 CFU/ml Escherichia coli ESBL  --  Escherichia coli ESBL      Respiratory Viral Panel with COVID-19 by SHIRLEY (12.24.24 @ 11:48)   Rapid RVP Result: Detected   SARS-CoV-2: Detected: This Respiratory Panel uses polymerase chain reaction (PCR) to detect for  adenovirus; coronavirus (HKU1, NL63, 229E, OC43); human metapneumovirus  (hMPV); human enterovirus/rhinovirus (Entero/RV); influenza A; influenza  A/H1; influenza A/H3; influenza A/H1-2009; influenza B; parainfluenza  viruses 1, 2, 3, 4; respiratory syncytial virus; Mycoplasma pneumoniae;  Chlamydophila pneumoniae; and SARS-CoV-2.      SARS-CoV-2: Detected (04 Dec 2024 11:00)    RADIOLOGY:  < from: Xray Chest 1 View- PORTABLE-Urgent (Xray Chest 1 View- PORTABLE-Urgent .) (12.30.24 @ 09:17) >  IMPRESSION: Improved right perihilar infiltrate from December 4.   Persistent left perihilar infiltrate.  --- End of Report ---    < from: MR Pelvis w/ IV Cont (12.23.24 @ 10:34) >  IMPRESSION:  MRI of the bony pelvis demonstrates extensive soft tissue ulceration at   the posterior and lateral tissues with gas tracking to the sacrum, and   bilateral greater trochanters consistent with stage IV ulceration for   which correlation with physical exam is recommended. Intermediate to low   T1 signal, high STIR signal, and postcontrast enhancement at the S5   segment of the sacrum, coccyx, and less so at the greater trochanters and   posterior ischial spines suggestive of osteomyelitis. Findings may be   chronic or acute on chronic.  No walled off collection to suggest abscess.  --- End of Report ---      < from: CT Abdomen and Pelvis w/ IV Cont (12.16.24 @ 20:14) >  IMPRESSION:  Extensive decubitus ulcers overlying the sacrum and the greater   trochanters.  Curvilinear fluid collection overlying the left posterior acetabulum.  Foci of gas in the left hip joint.  Scattered subcutaneous gas.  Scattered osseous sclerosis worrisome for chronic osteomyelitis.  Rectum distended by stool.  --- End of Report ---    < end of copied text >

## 2025-01-02 NOTE — PROGRESS NOTE ADULT - ASSESSMENT
The patient is 78 y/o woman with PMH of osteoporosis, MS, bedbound, known to ID from last admission, who presents today for hematuria. Reportedly, amezquita was changed 2 days ago and blood was seen in the catheter at the time. Patient reports dysuria, denies frequency and urgency. Also c/o back pain. On presentation, patient is hemodynamically stable and afebrile. Labs significant for elevated WBC 39.41, UA showing large LE, +nitrite, +WBC, +bacteria. CT abdomen/pelvis shows extensive decubitus ulcers overlying the sacrum and the greater trochanters; curvilinear fluid collection overlying the left posterior acetabulum; foci of gas in the left hip joint; scattered subcutaneous gas; scattered osseous sclerosis worrisome for chronic osteomyelitis; rectum distended by stool. Patient given zosyn & vancomycin. ID consulted for workup and antibiotic management.    12/17: Last admit here 11/20--12/10 and treated for infected decub ulcers, SARS-CoV-2 infection and CAUTI. 11/20 urine culture with ESBL E. Coli and CRE Kleb was noted. 11/22 wound culture with ESBL E. Coli, E. faecalis and strep. She was on meropenem and vancomycin x 10 days and was discharged on a course of Augmentin. Now returns with another amezquita associated UTI. CT abd pelvis report and images reviewed, showing known pressure ulcers with concern for possible underlying chronic osteomyelitis.   12/18: no fever, RA, no new cbc, UC is growing E. Coli, no blood culture data is available, Zosyn IV continued.   12/23: Afebrile. No leukocytosis. Zosyn was switched to Ertapenem on 12/20 after ESBL reported in the urine culture. Sensitivity noted above. MRI pelvis today with acute on chronic osteomyelitis at the sacrum.  12/24: remains afebrile since 12/20, RA, no new cbc, wounds with vac dressings on, + mild rhinorrhea on exam, will obtain RVP, pt had recent covid 19, ertapenem IV continued (day #5)  12/30: RRT today due to hypotension requiring levophed. Transferred to MICU. Leukocytosis WBC: 23k. CXR image and report reviewed. She has bibasilar opacities R>L. Looks worse than from 12/4. Given a dose of vancomycin. Meropenem started. Blood cultures obtained. Recent RVP 12/24 positive for RSV and again SARS-CoV-2 (likely persistent viral shedding, not a new infection, was positive on 12/4 and 12/16)  12/31: Remains in ICU on one pressor. Afebrile. Awake today. Leukocytosis up trending, WBC 40K. MRSA screen negative. 12/30 blood cultures with GNRs, polymicrobial PCR detection of Bacteroides, Proteus and Klebsiella species with ESBL and KPC resistance genes identified. Meropenem was stopped. Avycaz started.  1/2:     Impression:  1. Septic shock secondary to polymicrobial bacteremia - likely source decubitus ulcers  2. Catheter associated UTI-s/p treatment for ESBL E.Coli  3. Chronic pressure ulcers - on wound vac  4. Acute on chronic sacral osteomyelitis  5. Leukocytosis  6. Old age, debility, bedbound  7. Acute encephalopathy  8. Recent SARS-Billy-2 infection s/p treatment  9. RSV    Recommendations:  --Continue ceftazidime-avibactam 2.5g IV q8h  --Continue metronidazole 500 mg IV q8h  --Wound vac management  --Vascular surgery/wound care follow up  --Aspiration precautions  --Palliative eval/goals of care    Discussed with ICU team    Yola Sood MD  Attending Physician  Division of Infectious Diseases   Available via [employer] Teams   The patient is 80 y/o woman with PMH of osteoporosis, MS, bedbound, known to ID from last admission, who presents today for hematuria. Reportedly, amezquita was changed 2 days ago and blood was seen in the catheter at the time. Patient reports dysuria, denies frequency and urgency. Also c/o back pain. On presentation, patient is hemodynamically stable and afebrile. Labs significant for elevated WBC 39.41, UA showing large LE, +nitrite, +WBC, +bacteria. CT abdomen/pelvis shows extensive decubitus ulcers overlying the sacrum and the greater trochanters; curvilinear fluid collection overlying the left posterior acetabulum; foci of gas in the left hip joint; scattered subcutaneous gas; scattered osseous sclerosis worrisome for chronic osteomyelitis; rectum distended by stool. Patient given zosyn & vancomycin. ID consulted for workup and antibiotic management.    12/17: Last admit here 11/20--12/10 and treated for infected decub ulcers, SARS-CoV-2 infection and CAUTI. 11/20 urine culture with ESBL E. Coli and CRE Kleb was noted. 11/22 wound culture with ESBL E. Coli, E. faecalis and strep. She was on meropenem and vancomycin x 10 days and was discharged on a course of Augmentin. Now returns with another amezquita associated UTI. CT abd pelvis report and images reviewed, showing known pressure ulcers with concern for possible underlying chronic osteomyelitis.   12/18: no fever, RA, no new cbc, UC is growing E. Coli, no blood culture data is available, Zosyn IV continued.   12/23: Afebrile. No leukocytosis. Zosyn was switched to Ertapenem on 12/20 after ESBL reported in the urine culture. Sensitivity noted above. MRI pelvis today with acute on chronic osteomyelitis at the sacrum.  12/24: remains afebrile since 12/20, RA, no new cbc, wounds with vac dressings on, + mild rhinorrhea on exam, will obtain RVP, pt had recent covid 19, ertapenem IV continued (day #5)  12/30: RRT today due to hypotension requiring levophed. Transferred to MICU. Leukocytosis WBC: 23k. CXR image and report reviewed. She has bibasilar opacities R>L. Looks worse than from 12/4. Given a dose of vancomycin. Meropenem started. Blood cultures obtained. Recent RVP 12/24 positive for RSV and again SARS-CoV-2 (likely persistent viral shedding, not a new infection, was positive on 12/4 and 12/16)  12/31: Remains in ICU on one pressor. Afebrile. Awake today. Leukocytosis up trending, WBC 40K. MRSA screen negative. 12/30 blood cultures with GNRs, polymicrobial PCR detection of Bacteroides, Proteus and Klebsiella species with ESBL and KPC resistance genes identified. Meropenem was stopped. Avycaz started.  1/2: Slight hypothermia, T: 96.4F. Leukocytosis improved, WBC: 11k today. On minimal pressor support. Remains lethargic and NPO. Possibly getting NGT. No wound debridement needed per surgery. No new events per bedside RN. The susceptibility report reviewed as noted above. Avycaz and metronidazole continued.    Impression:  1. Septic shock secondary to polymicrobial bacteremia - likely source decubitus ulcers  2. Catheter associated UTI-s/p treatment for ESBL E.Coli  3. Chronic pressure ulcers - on wound vac  4. Acute on chronic sacral osteomyelitis  5. Leukocytosis  6. Old age, debility, bedbound  7. Acute encephalopathy  8. Recent SARS-Billy-2 infection s/p treatment  9. RSV    Recommendations:  --Continue ceftazidime-avibactam 2.5g IV q8h  --Continue metronidazole 500 mg IV q8h  --Wound vac management  --Vascular surgery/wound care follow up  --Aspiration precautions  --Palliative eval/goals of care    Discussed with ICU team    Yola Sood MD  Attending Physician  Division of Infectious Diseases   Available via [employer] Teams

## 2025-01-02 NOTE — CHART NOTE - NSCHARTNOTEFT_GEN_A_CORE
Per chart pt with PMH: osteoporosis, MS, bedbound, and multiple pressure ulcers presented for hematuria, admitted for sepsis, CAUTI and chronic osteomyelitis. ID and wound care following. Also COVID-19+, RSV+, and Flu A+. s/p swallow eval on 12/26 with SLP recommendations for NPO. Course complicated by admission to CCU for hypotension/pressor support 12/30 likely secondary to severe sepsis with septic shock possibly secondary to worsening osteo and MDR Klebsiella bacteremia..     Palliative following for GOC; pt remains full code at this time.    Factors impacting intake: [ ] none [ ] nausea  [ ] vomiting [ ] diarrhea [ ] constipation  [ ]chewing problems [ ] swallowing issues  [x] other: GOC discussions ongoing    Diet Prescription: Diet, NPO (12-25-24 @ 18:29)    Intake: Pt NPO x 8 days    Current Weight: Weight (kg): (01/02) 42.6kg (12/30) 42.3kg  % Weight Change: weight stable    Edema: 2+ right and left feet as per flow sheets    Pertinent Medications: MEDICATIONS  (STANDING):  ceftazidime/avibactam IVPB 2.5 Gram(s) IV Intermittent every 8 hours  chlorhexidine 2% Cloths 1 Application(s) Topical <User Schedule>  insulin lispro (ADMELOG) corrective regimen sliding scale   SubCutaneous every 6 hours  metroNIDAZOLE  IVPB 500 milliGRAM(s) IV Intermittent every 8 hours  midodrine 10 milliGRAM(s) Oral every 8 hours  norepinephrine Infusion 0.05 MICROgram(s)/kG/Min (3.83 mL/Hr) IV Continuous <Continuous>  nystatin Powder 1 Application(s) Topical two times a day  polyethylene glycol 3350 17 Gram(s) Oral daily  senna 2 Tablet(s) Oral at bedtime    MEDICATIONS  (PRN):  ondansetron Injectable 4 milliGRAM(s) IV Push every 8 hours PRN Nausea and/or Vomiting  sodium chloride 0.9% lock flush 10 milliLiter(s) IV Push every 1 hour PRN Pre/post blood products, medications, blood draw, and to maintain line patency    Pertinent Labs: 01-02 Na135 mmol/L Glu 118 mg/dL[H] K+ 4.3 mmol/L Cr  0.39 mg/dL[L] BUN 21 mg/dL 01-02 Phos 2.8 mg/dL 01-02 Alb 1.4 g/dL[L]      Skin: stage IV pressure injuries x 8 and unstageable pressure injuries x 2 as per flow sheets    Estimated Needs:   [x] no change since previous assessment: 12/19  [ ] recalculated:     Previous Nutrition Diagnosis:   [x] Severe malnutrition in the context of chronic illness    Etiology: Inadequate protein-energy intake + increased needs related to hx of MS, bedbound, multiple pressure ulcers    Signs/Symptoms: Severe fat loss and muscle wasting as noted on 12/19    Goal: Pt will Meet calorie and protein needs > 75% via meals / supplements - not met    New Goal: Pt to meet >75% needs via tolerated route during LOS    Nutrition Diagnosis is [x] ongoing  [ ] resolved [ ] not applicable     New Nutrition Diagnosis: [x] not applicable      Interventions:   Recommend  [ ] Change Diet To:  [ ] Nutrition Supplement  [x] Nutrition Support: If EN warranted consider Glucerna 1.2 @ 20 mL/hr, increase to goal rate as tolerated to 60 mL/hr x 24 hrs which provides 1440ml total volume, 1728 kcal, 86 g protein, 1166 mL free water; additional fluids as per CCU team  [x] Other: nutrition/hydration as per family's/pt's wishes as medically feasible     Monitoring and Evaluation:   [ ] PO intake [ x ] Tolerance to diet prescription [ x ] weights [ x ] labs[ x ] follow up per protocol  [ ] other:

## 2025-01-02 NOTE — PROGRESS NOTE ADULT - ASSESSMENT
78 y/o F w/MS, bedbound at baseline, chronic osteomyelitis admitted for sepsis secondary to E. coli UTI, chronic osteo, and COVID/influenza/and RSV infection s/p course of abx now w/new hypotension likely severe sepsis with septic shock possibly secondary to worsening osteo and MDR Klebsiella bacteremia.    - Transfer to ICU  - Titrate pressors as needed goal MAP >= 65  - Midodrine 20mg q8hrs  - Abx as per ID  - Appreciate vascular consult, possible debridement  - DVT prophylaxis  - Full code.    I have personally provided 35 minutes of attending critical care time excluding procedures.     78 y/o F w/MS, bedbound at baseline, chronic osteomyelitis admitted for sepsis secondary to E. coli UTI, chronic osteo, and COVID/influenza/and RSV infection s/p course of abx now w/new hypotension likely severe sepsis with septic shock possibly secondary to worsening osteo and MDR Klebsiella bacteremia.    - Titrate pressors as needed goal MAP >= 65  - Midodrine 20mg q8hrs  - Abx as per ID  - Appreciate vascular consult, possible debridement  - DVT prophylaxis  - Full code.    I have personally provided 35 minutes of attending critical care time excluding procedures.

## 2025-01-02 NOTE — PROCEDURE NOTE - NSPOSTPRCRAD_GEN_A_CORE
Initial line going to right subclavian, rewired with second CXR showing good position/central line located in the/central line located in the superior vena cava/post-procedure radiography performed
post-procedure radiography performed

## 2025-01-03 NOTE — PROGRESS NOTE ADULT - SUBJECTIVE AND OBJECTIVE BOX
SPENCER MIXON  MRN-[id number]  79y (1945)    Follow Up:  OM, wounds, bacteremia, uti, hypothermia    Interval History: The pt was seen and examined earlier in CCU, chronically ill appearing, pt is not awake or alert, not interactive, not in acute distress. Pt was hypothermic earlier 94.6, under the warming blanket now, NC, WBC increased 12.39.    PAST MEDICAL & SURGICAL HISTORY:  Osteoporosis      Uterine polyp      Herniated disc  lumbar      Functional quadriplegia secondary to multiple sclerosis      Bilateral Tubal ligation  46y/o          ROS:    [x ] Unobtainable because: pt is lethargic, not interactive   [ ] All other systems negative    Constitutional: no fever, no chills  Head: no trauma  Eyes: no vision changes, no eye pain  ENT:  no sore throat, no rhinorrhea  Cardiovascular:  no chest pain, no palpitation  Respiratory:  no SOB, no cough  GI:  no abd pain, no vomiting, no diarrhea  urinary: no dysuria, no hematuria, no flank pain  musculoskeletal:  no joint pain, no joint swelling  skin:  no rash  neurology:  no headache, no seizure, no change in mental status  psych: no anxiety, no depression         Allergies  No Known Allergies        ANTIMICROBIALS:  ceftazidime/avibactam IVPB 2.5 every 8 hours  metroNIDAZOLE  IVPB 500 every 8 hours      OTHER MEDS:  chlorhexidine 2% Cloths 1 Application(s) Topical <User Schedule>  insulin lispro (ADMELOG) corrective regimen sliding scale   SubCutaneous every 6 hours  midodrine 10 milliGRAM(s) Oral every 8 hours  norepinephrine Infusion 0.05 MICROgram(s)/kG/Min IV Continuous <Continuous>  nystatin Powder 1 Application(s) Topical two times a day  ondansetron Injectable 4 milliGRAM(s) IV Push every 8 hours PRN  pantoprazole  Injectable 40 milliGRAM(s) IV Push daily  polyethylene glycol 3350 17 Gram(s) Oral daily  senna 2 Tablet(s) Oral at bedtime  sodium chloride 0.9% lock flush 10 milliLiter(s) IV Push every 1 hour PRN      Vital Signs Last 24 Hrs  T(C): 34.8 (03 Jan 2025 07:30), Max: 36.9 (02 Jan 2025 23:00)  T(F): 94.6 (03 Jan 2025 07:30), Max: 98.4 (02 Jan 2025 23:00)  HR: 104 (03 Jan 2025 13:00) (88 - 121)  BP: 110/52 (03 Jan 2025 13:00) (87/50 - 117/56)  BP(mean): 69 (03 Jan 2025 13:00) (57 - 90)  RR: 16 (03 Jan 2025 13:00) (11 - 27)  SpO2: 100% (03 Jan 2025 13:00) (95% - 100%)    Parameters below as of 03 Jan 2025 13:00  Patient On (Oxygen Delivery Method): nasal cannula  O2 Flow (L/min): 2      Physical Exam:  Constitutional: Elderly woman chronic ill appearing, lethargic, not interactive   HEENT: Dry oral mucosa, unable to assess pt's eyes due to non-compliance, NGT in place  Cardiovascular:   normal S1, S2, no edema  Respiratory:  coarse breath sounds bilaterally, no wheezes  GI:  soft, non distended  :  + amezquita with clear urine in the bag    Musculoskeletal: Atrophic / wasted limbs, contracted. +multiple pressure ulcers with vac dressing on  Neurologic: Lethargic. Pt does not answer any of my questions, does not follow commands   Skin:  warm, dry, no rash  Psychiatric: unable     WBC Count: 12.39 K/uL (01-03 @ 03:15)  WBC Count: 11.40 K/uL (01-02 @ 03:18)  WBC Count: 15.49 K/uL (01-01 @ 03:25)  WBC Count: 40.74 K/uL (12-31 @ 02:19)  WBC Count: 45.18 K/uL (12-30 @ 19:30)  WBC Count: 23.63 K/uL (12-29 @ 15:10)  WBC Count: 25.87 K/uL (12-29 @ 11:58)                            7.3    12.39 )-----------( 12       ( 03 Jan 2025 03:15 )             23.2       01-03    137  |  106  |  22  ----------------------------<  181[H]  4.1   |  21[L]  |  0.40[L]    Ca    8.0[L]      03 Jan 2025 03:15  Phos  2.8     01-03  Mg     2.1     01-03    TPro  4.4[L]  /  Alb  1.3[L]  /  TBili  1.1  /  DBili  x   /  AST  8[L]  /  ALT  8[L]  /  AlkPhos  364[H]  01-03      Urinalysis Basic - ( 03 Jan 2025 03:15 )    Color: x / Appearance: x / SG: x / pH: x  Gluc: 181 mg/dL / Ketone: x  / Bili: x / Urobili: x   Blood: x / Protein: x / Nitrite: x   Leuk Esterase: x / RBC: x / WBC x   Sq Epi: x / Non Sq Epi: x / Bacteria: x        Creatinine Trend: 0.40<--, 0.39<--, 0.45<--, 0.44<--, 0.48<--, 0.45<--      MICROBIOLOGY:  v  .Blood BLOOD  12-30-24   Growth in aerobic bottle: Proteus mirabilis  Growth in aerobic bottle: Klebsiella pneumoniae (Carbapenem Resistant)  Growth in aerobic bottle: Escherichia coli ESBL  Direct identification is available within approximately 3-5  hours either by BloodPanel Multiplexed PCR or Direct  MALDI-TOF. Details: [email or web address]  --  Blood Culture PCR  Proteus mirabilis  Klebsiella pneumoniae (Carbapenem Resistant)  Escherichia coli ESBL      .Blood BLOOD  12-30-24   Growth in aerobic bottle: Klebsiella pneumoniae (Carbapenem Resistant)  Growth in aerobic bottle: Escherichia coli ESBL  See previous culture [id number]  --    Growth in aerobic bottle: Gram Negative Rods      Clean Catch  12-16-24   >100,000 CFU/ml Escherichia coli ESBL  --  Escherichia coli ESBL    Ferritin: 445 (12-25)      D-Dimer Assay, Quantitative: 1533 (01-01)  D-Dimer Assay, Quantitative: 2047 (12-30)    Procalcitonin: 7.74 (12-31-24 @ 02:19)  Procalcitonin: 6.42 (12-30-24 @ 19:30)      SARS-CoV-2: Detected (24 Dec 2024 11:48)  SARS-CoV-2: Detected (04 Dec 2024 11:00)    RADIOLOGY:     SPENCER MIXON  MRN-[id number]  79y (1945)    Follow Up:  OM, wounds, bacteremia, uti, hypothermia    Interval History: The pt was seen and examined earlier in CCU, chronically ill appearing, pt is not awake or alert, not interactive, not in acute distress. Pt was hypothermic earlier 94.6, under the warming blanket now, NC, WBC increased 12.39.    ROS:    [x ] Unobtainable because: pt is lethargic, not interactive   [ ] All other systems negative    Constitutional: no fever, no chills  Head: no trauma  Eyes: no vision changes, no eye pain  ENT:  no sore throat, no rhinorrhea  Cardiovascular:  no chest pain, no palpitation  Respiratory:  no SOB, no cough  GI:  no abd pain, no vomiting, no diarrhea  urinary: no dysuria, no hematuria, no flank pain  musculoskeletal:  no joint pain, no joint swelling  skin:  no rash  neurology:  no headache, no seizure, no change in mental status  psych: no anxiety, no depression       Allergies  No Known Allergies      ANTIMICROBIALS:  ceftazidime/avibactam IVPB 2.5 every 8 hours  metroNIDAZOLE  IVPB 500 every 8 hours    OTHER MEDS:  chlorhexidine 2% Cloths 1 Application(s) Topical <User Schedule>  insulin lispro (ADMELOG) corrective regimen sliding scale   SubCutaneous every 6 hours  midodrine 10 milliGRAM(s) Oral every 8 hours  norepinephrine Infusion 0.05 MICROgram(s)/kG/Min IV Continuous <Continuous>  nystatin Powder 1 Application(s) Topical two times a day  ondansetron Injectable 4 milliGRAM(s) IV Push every 8 hours PRN  pantoprazole  Injectable 40 milliGRAM(s) IV Push daily  polyethylene glycol 3350 17 Gram(s) Oral daily  senna 2 Tablet(s) Oral at bedtime  sodium chloride 0.9% lock flush 10 milliLiter(s) IV Push every 1 hour PRN      Vital Signs Last 24 Hrs  T(C): 34.8 (03 Jan 2025 07:30), Max: 36.9 (02 Jan 2025 23:00)  T(F): 94.6 (03 Jan 2025 07:30), Max: 98.4 (02 Jan 2025 23:00)  HR: 104 (03 Jan 2025 13:00) (88 - 121)  BP: 110/52 (03 Jan 2025 13:00) (87/50 - 117/56)  BP(mean): 69 (03 Jan 2025 13:00) (57 - 90)  RR: 16 (03 Jan 2025 13:00) (11 - 27)  SpO2: 100% (03 Jan 2025 13:00) (95% - 100%)    Parameters below as of 03 Jan 2025 13:00  Patient On (Oxygen Delivery Method): nasal cannula  O2 Flow (L/min): 2      Physical Exam:  Constitutional: Elderly woman chronic ill appearing, lethargic, not interactive   HEENT: Dry oral mucosa, unable to assess pt's eyes due to non-compliance, NGT in place  Cardiovascular:   normal S1, S2, no edema  Respiratory:  coarse breath sounds bilaterally, no wheezes  GI:  soft, non distended  :  + amezquita with clear urine in the bag    Musculoskeletal: Atrophic / wasted limbs, contracted. +multiple pressure ulcers with vac dressing on  Neurologic: Lethargic. Pt does not answer any of my questions, does not follow commands   Skin:  warm, dry, no rash  Psychiatric: unable     WBC Count: 12.39 K/uL (01-03 @ 03:15)  WBC Count: 11.40 K/uL (01-02 @ 03:18)  WBC Count: 15.49 K/uL (01-01 @ 03:25)  WBC Count: 40.74 K/uL (12-31 @ 02:19)  WBC Count: 45.18 K/uL (12-30 @ 19:30)  WBC Count: 23.63 K/uL (12-29 @ 15:10)  WBC Count: 25.87 K/uL (12-29 @ 11:58)                        7.3    12.39 )-----------( 12       ( 03 Jan 2025 03:15 )             23.2     01-03    137  |  106  |  22  ----------------------------<  181[H]  4.1   |  21[L]  |  0.40[L]    Ca    8.0[L]      03 Jan 2025 03:15  Phos  2.8     01-03  Mg     2.1     01-03    TPro  4.4[L]  /  Alb  1.3[L]  /  TBili  1.1  /  DBili  x   /  AST  8[L]  /  ALT  8[L]  /  AlkPhos  364[H]  01-03    Urinalysis Basic - ( 03 Jan 2025 03:15 )    Color: x / Appearance: x / SG: x / pH: x  Gluc: 181 mg/dL / Ketone: x  / Bili: x / Urobili: x   Blood: x / Protein: x / Nitrite: x   Leuk Esterase: x / RBC: x / WBC x   Sq Epi: x / Non Sq Epi: x / Bacteria: x        Creatinine Trend: 0.40<--, 0.39<--, 0.45<--, 0.44<--, 0.48<--, 0.45<--      MICROBIOLOGY:  .Blood BLOOD  12-30-24   Growth in aerobic bottle: Proteus mirabilis  Growth in aerobic bottle: Klebsiella pneumoniae (Carbapenem Resistant)  Growth in aerobic bottle: Escherichia coli ESBL  Direct identification is available within approximately 3-5  hours either by BloodPanel Multiplexed PCR or Direct  MALDI-TOF. Details: [email or web address]  --  Blood Culture PCR  Proteus mirabilis  Klebsiella pneumoniae (Carbapenem Resistant)  Escherichia coli ESBL    .Blood BLOOD  12-30-24   Growth in aerobic bottle: Klebsiella pneumoniae (Carbapenem Resistant)  Growth in aerobic bottle: Escherichia coli ESBL  See previous culture [id number]  --    Growth in aerobic bottle: Gram Negative Rods    Clean Catch  12-16-24   >100,000 CFU/ml Escherichia coli ESBL  --  Escherichia coli ESBL    Ferritin: 445 (12-25)      D-Dimer Assay, Quantitative: 1533 (01-01)  D-Dimer Assay, Quantitative: 2047 (12-30)    Procalcitonin: 7.74 (12-31-24 @ 02:19)  Procalcitonin: 6.42 (12-30-24 @ 19:30)      SARS-CoV-2: Detected (24 Dec 2024 11:48)  SARS-CoV-2: Detected (04 Dec 2024 11:00)    RADIOLOGY:

## 2025-01-03 NOTE — PROGRESS NOTE ADULT - SUBJECTIVE AND OBJECTIVE BOX
CHIEF COMPLAINT:Patient is a 79y old  Female who presents with a chief complaint of Urinary tract infection and chronic osteomyelitis (02 Jan 2025 10:18)        Interval Events:    REVIEW OF SYSTEMS:    [ ] All other systems negative  [ ] Unable to assess ROS because ________delirium /demetia    OBJECTIVE:  ICU Vital Signs Last 24 Hrs  T(C): 34.8 (03 Jan 2025 07:30), Max: 36.9 (02 Jan 2025 23:00)  T(F): 94.6 (03 Jan 2025 07:30), Max: 98.4 (02 Jan 2025 23:00)  HR: 94 (03 Jan 2025 08:00) (88 - 121)  BP: 106/66 (03 Jan 2025 07:30) (82/50 - 117/56)  BP(mean): 78 (03 Jan 2025 07:30) (57 - 90)  ABP: --  ABP(mean): --  RR: 14 (03 Jan 2025 08:00) (11 - 44)  SpO2: 100% (03 Jan 2025 08:00) (95% - 100%)    O2 Parameters below as of 03 Jan 2025 07:30  Patient On (Oxygen Delivery Method): nasal cannula  O2 Flow (L/min): 2            01-02 @ 07:01  -  01-03 @ 07:00  --------------------------------------------------------  IN: 1308.4 mL / OUT: 400 mL / NET: 908.4 mL    01-03 @ 07:01  -  01-03 @ 08:01  --------------------------------------------------------  IN: 52.7 mL / OUT: 20 mL / NET: 32.7 mL      CAPILLARY BLOOD GLUCOSE      POCT Blood Glucose.: 191 mg/dL (03 Jan 2025 05:56)      PHYSICAL EXAM:  Gen: Elderly female lying in bed, NAD  HEENT: NC/AT sclerae anicteric  Resp: No increased WOB, CTAB  CV: S1, S2  Abd: Soft, + BS  Ext: Contracted, WWP  Neuro: Arousable, confused    LINES:    HOSPITAL MEDICATIONS:  Standing Meds:  ceftazidime/avibactam IVPB 2.5 Gram(s) IV Intermittent every 8 hours  chlorhexidine 2% Cloths 1 Application(s) Topical <User Schedule>  insulin lispro (ADMELOG) corrective regimen sliding scale   SubCutaneous every 6 hours  metroNIDAZOLE  IVPB 500 milliGRAM(s) IV Intermittent every 8 hours  midodrine 10 milliGRAM(s) Oral every 8 hours  norepinephrine Infusion 0.05 MICROgram(s)/kG/Min IV Continuous <Continuous>  nystatin Powder 1 Application(s) Topical two times a day  polyethylene glycol 3350 17 Gram(s) Oral daily  senna 2 Tablet(s) Oral at bedtime      PRN Meds:  ondansetron Injectable 4 milliGRAM(s) IV Push every 8 hours PRN  sodium chloride 0.9% lock flush 10 milliLiter(s) IV Push every 1 hour PRN      LABS:                        7.3    12.39 )-----------( 12       ( 03 Jan 2025 03:15 )             23.2     Hgb Trend: 7.3<--, 7.7<--, 8.3<--, 7.5<--, 7.1<--  01-03    137  |  106  |  22  ----------------------------<  181[H]  4.1   |  21[L]  |  0.40[L]    Ca    8.0[L]      03 Jan 2025 03:15  Phos  2.8     01-03  Mg     2.1     01-03    TPro  4.4[L]  /  Alb  1.3[L]  /  TBili  1.1  /  DBili  x   /  AST  8[L]  /  ALT  8[L]  /  AlkPhos  364[H]  01-03    Creatinine Trend: 0.40<--, 0.39<--, 0.45<--, 0.44<--, 0.48<--, 0.45<--    Urinalysis Basic - ( 03 Jan 2025 03:15 )    Color: x / Appearance: x / SG: x / pH: x  Gluc: 181 mg/dL / Ketone: x  / Bili: x / Urobili: x   Blood: x / Protein: x / Nitrite: x   Leuk Esterase: x / RBC: x / WBC x   Sq Epi: x / Non Sq Epi: x / Bacteria: x            MICROBIOLOGY:     RADIOLOGY:  [ ] Reviewed and interpreted by me    EKG:

## 2025-01-03 NOTE — PROVIDER CONTACT NOTE (CRITICAL VALUE NOTIFICATION) - DATE AND TIME:
[address]    HISTORY AND PHYSICAL EXAMINATION            Date:   12/13/2023  Patient name:  Katy Parikh  Date of admission:  12/13/2023  2:12 PM  MRN:   [id number]  Account:  [de-identified]  YOB: 1953  PCP:    Chuy Brown MD  Room:   09/09  07/08/2014    NO RX    Cancer ([facility]) 2020    LUNG-radiation    Cervical cancer ([facility]) 1990    s/p ALISON/BSO    Chronic back pain     COPD (chronic obstructive pulmonary disease) ([facility]) 2015    INHALER USE DAILY AND AS 

## 2025-01-03 NOTE — PROGRESS NOTE ADULT - ASSESSMENT
78 y/o F w/MS, bedbound at baseline, chronic osteomyelitis admitted for sepsis secondary to E. coli UTI, chronic osteo, and COVID/influenza/and RSV infection s/p course of abx now w/new hypotension likely severe sepsis with septic shock possibly secondary to worsening osteo and MDR Klebsiella/ESBL ECOLI/PRoteous bacteremia.    - Titrate pressors as needed goal MAP >= 65  - Midodrine 20mg q8hrs  - Abx as per ID  - Appreciate vascular consult, possible debridement  - DVT prophylaxis  - Full code. with extremely poor long term prognosis.     I have personally provided 35 minutes of attending critical care time excluding procedures.     78 y/o F w/MS, bedbound at baseline, chronic osteomyelitis admitted for sepsis secondary to E. coli UTI, chronic osteo, and COVID/influenza/and RSV infection s/p course of abx now w/new hypotension likely severe sepsis with septic shock possibly secondary to worsening osteo and MDR Klebsiella/ESBL ECOLI/PRoteous bacteremia.    Neuro: BAseline severe dementia, with some component of delirium,   CV:Septic shock improving   - continue with levophed to map >65  - will add midodrine and droxy dopa.   Pulm: on O2 with atleast some degre of kyphosis related restriction but NIV is far more likely to cause complication than benefit.   - Keep O2 >92 and suction as needed  Renal: Consider 0 muscle mass and baseline cr ov <0.2 she is actually in luis from septic ATN wiht Cr of 0.4   - though now has 20/cc per hour of out put that is likely appropriate for her wieght.   - monitor lytes and out put.   GI: Tube feeds, protonix, senna miralax.  Endo: Fs q6 + ISS  ID: Polymicrobial infection despite all the viruses no respiratroy issues, mostly polymicrobial sepsis some how improving  now on avycaz, with sepsis related DIC, ATN shock.  - repeat culture in am for clearence.   Prophylaxis: SCDs  Ethics: Extremely poor prognosis, will try to schdule GOC, based on past conversations patient is full code with unrealistic expectations.   Will try to minimize her suffering as we treat her to achieve her families goal.  Very poor long term prognosis.

## 2025-01-03 NOTE — PROGRESS NOTE ADULT - ASSESSMENT
The patient is 80 y/o woman with PMH of osteoporosis, MS, bedbound, known to ID from last admission, who presents today for hematuria. Reportedly, amezquita was changed 2 days ago and blood was seen in the catheter at the time. Patient reports dysuria, denies frequency and urgency. Also c/o back pain. On presentation, patient is hemodynamically stable and afebrile. Labs significant for elevated WBC 39.41, UA showing large LE, +nitrite, +WBC, +bacteria. CT abdomen/pelvis shows extensive decubitus ulcers overlying the sacrum and the greater trochanters; curvilinear fluid collection overlying the left posterior acetabulum; foci of gas in the left hip joint; scattered subcutaneous gas; scattered osseous sclerosis worrisome for chronic osteomyelitis; rectum distended by stool. Patient given zosyn & vancomycin. ID consulted for workup and antibiotic management.    12/17: Last admit here 11/20--12/10 and treated for infected decub ulcers, SARS-CoV-2 infection and CAUTI. 11/20 urine culture with ESBL E. Coli and CRE Kleb was noted. 11/22 wound culture with ESBL E. Coli, E. faecalis and strep. She was on meropenem and vancomycin x 10 days and was discharged on a course of Augmentin. Now returns with another amezquita associated UTI. CT abd pelvis report and images reviewed, showing known pressure ulcers with concern for possible underlying chronic osteomyelitis.   12/18: no fever, RA, no new cbc, UC is growing E. Coli, no blood culture data is available, Zosyn IV continued.   12/23: Afebrile. No leukocytosis. Zosyn was switched to Ertapenem on 12/20 after ESBL reported in the urine culture. Sensitivity noted above. MRI pelvis today with acute on chronic osteomyelitis at the sacrum.  12/24: remains afebrile since 12/20, RA, no new cbc, wounds with vac dressings on, + mild rhinorrhea on exam, will obtain RVP, pt had recent covid 19, ertapenem IV continued (day #5)  12/30: RRT today due to hypotension requiring levophed. Transferred to MICU. Leukocytosis WBC: 23k. CXR image and report reviewed. She has bibasilar opacities R>L. Looks worse than from 12/4. Given a dose of vancomycin. Meropenem started. Blood cultures obtained. Recent RVP 12/24 positive for RSV and again SARS-CoV-2 (likely persistent viral shedding, not a new infection, was positive on 12/4 and 12/16)  12/31: Remains in ICU on one pressor. Afebrile. Awake today. Leukocytosis up trending, WBC 40K. MRSA screen negative. 12/30 blood cultures with GNRs, polymicrobial PCR detection of Bacteroides, Proteus and Klebsiella species with ESBL and KPC resistance genes identified. Meropenem was stopped. Avycaz started.  1/2: Slight hypothermia, T: 96.4F. Leukocytosis improved, WBC: 11k today. On minimal pressor support. Remains lethargic and NPO. Possibly getting NGT. No wound debridement needed per surgery. No new events per bedside RN. The susceptibility report reviewed as noted above. Avycaz and metronidazole continued.  1/3: remains in CCU, on pressors, hypothermic this morning 94.6, NC, WBC increased 12.39, Cr ok, BCs with  Klebsiella and ESBL E. Coli, abx continued Avycaz and Flagyl.     Impression:  1. Septic shock secondary to polymicrobial bacteremia - likely source decubitus ulcers  2. Catheter associated UTI-s/p treatment for ESBL E.Coli  3. Chronic pressure ulcers - on wound vac  4. Acute on chronic sacral osteomyelitis  5. Leukocytosis  6. Old age, debility, bedbound  7. Acute encephalopathy  8. Recent SARS-Billy-2 infection s/p treatment  9. RSV    Recommendations:  --Continue ceftazidime-avibactam 2.5g IV q8h  --Continue metronidazole 500 mg IV q8h  --Wound vac management per PT  --Vascular surgery/wound care follow up  --Aspiration precautions  --Palliative eval/goals of care - pt remains full code at this time    discussed with Dr. Sood  discussed with CCU team

## 2025-01-04 NOTE — PROGRESS NOTE ADULT - ASSESSMENT
78 y/o F w/MS, bedbound at baseline, chronic osteomyelitis admitted for sepsis secondary to E. coli UTI, chronic osteo, and COVID/influenza/and RSV infection s/p course of abx now w/new hypotension likely severe sepsis with septic shock possibly secondary to worsening osteo and MDR Klebsiella/ESBL ECOLI/PRoteous bacteremia.    Neuro: BAseline severe dementia, with some component of delirium,   CV:Septic shock improving   - continue with levophed to map >65  - will add midodrine and droxy dopa.   Pulm: on O2 with atleast some degre of kyphosis related restriction but NIV is far more likely to cause complication than benefit.   - Keep O2 >92 and suction as needed  Renal: Consider 0 muscle mass and baseline cr ov <0.2 she is actually in luis from septic ATN wiht Cr of 0.4   - though now has 20/cc per hour of out put that is likely appropriate for her wieght.   - monitor lytes and out put.   GI: Tube feeds, protonix, senna miralax.  Endo: Fs q6 + ISS  ID: Polymicrobial infection despite all the viruses no respiratroy issues, mostly polymicrobial sepsis some how improving  now on avycaz, with sepsis related DIC, ATN shock.  - repeat culture in am for clearence.   Prophylaxis: SCDs  Ethics: Extremely poor prognosis, will try to schdule GOC, based on past conversations patient is full code with unrealistic expectations.   Will try to minimize her suffering as we treat her to achieve her families goal.  Very poor long term prognosis.          80 y/o F w/MS, bedbound at baseline, chronic osteomyelitis admitted for sepsis secondary to E. coli UTI, chronic osteo, and COVID/influenza/and RSV infection s/p course of abx now w/new hypotension likely severe sepsis with septic shock possibly secondary to worsening osteo and MDR Klebsiella/ESBL ECOLI/PRoteous bacteremia.    Neuro: BAseline severe dementia, with some component of delirium,   CV:Septic shock improving   - continue with levophed to map >65  - will add midodrine and droxy dopa.   Pulm: on O2 with atleast some degre of kyphosis related restriction but NIV is far more likely to cause complication than benefit.   - Keep O2 >92 and suction as needed  Renal: Consider 0 muscle mass and baseline cr ov <0.2 she is actually in luis from septic ATN wiht Cr of 0.4   - though now has 20/cc per hour of out put that is likely appropriate for her wieght.   - monitor lytes and out put.   GI: Tube feeds, protonix, senna miralax.  Endo: Fs q6 + ISS  ID: Polymicrobial infection despite all the viruses no respiratroy issues, mostly polymicrobial sepsis some how improving  now on avycaz, with sepsis related DIC, ATN shock.  - repeat culture for clearence.   Heme: DIC and bone marrow suppression from sepsis improving will transfuse to plt >10 or >20 if febrile, and HGB>7  Prophylaxis: SCDs  Ethics: Extremely poor prognosis, will try to schdule GOC, based on past conversations patient is full code with unrealistic expectations.   Will try to minimize her suffering as we treat her to achieve her families goal.  Very poor long term prognosis.          78 y/o F w/MS, bedbound at baseline, chronic osteomyelitis admitted for sepsis secondary to E. coli UTI, chronic osteo, and COVID/influenza/and RSV infection s/p course of abx now w/new hypotension likely severe sepsis with septic shock possibly secondary to worsening osteo and MDR Klebsiella/ESBL ECOLI/PRoteous bacteremia.    Neuro: Baseline severe dementia, with some component of delirium,   CV: Septic shock improving   - continue with levophed to map >65  - will add midodrine and droxy dopa.   Pulm: on O2 with atleast some degre of kyphosis related restriction but NIV is far more likely to cause complication than benefit.   - Keep O2 >92 and suction as needed  Renal: Consider 0 muscle mass and baseline cr ov <0.2 she is actually in luis from septic ATN wiht Cr of 0.4   - though now has 20/cc per hour of out put that is likely appropriate for her wieght.   - monitor lytes and out put.   GI: Tube feeds, protonix, senna miralax.  Endo: Fs q6 + ISS  ID: Polymicrobial infection despite all the viruses no respiratory issues, mostly polymicrobial sepsis some how improving  now on avycaz, with sepsis related DIC, ATN shock.  - repeat culture for clearance   Heme: DIC and bone marrow suppression from sepsis improving will transfuse to plt >10 or >20 if febrile, and HGB>7  Prophylaxis: SCDs  Ethics: Extremely poor prognosis, will try to schedule GOC, based on past conversations patient is full code with unrealistic expectations.   Will try to minimize her suffering as we treat her to achieve her families goal.  Very poor long term prognosis.          80 y/o F w/MS, bedbound at baseline, chronic osteomyelitis admitted for sepsis secondary to E. coli UTI, chronic osteo, and COVID/influenza/and RSV infection s/p course of abx now w/new hypotension likely severe sepsis with septic shock possibly secondary to worsening osteo and MDR Klebsiella/ESBL ECOLI/PRoteous bacteremia.    Neuro: Baseline severe dementia, with some component of delirium,   CV: Septic shock improving   - continue with levophed to map >65  - will add midodrine and droxy dopa.   Pulm: on O2 with atleast some degree of kyphosis related restriction but NIV is far more likely to cause complication than benefit.   - Keep O2 >92 and suction as needed will try to minimize today givne bleeding.   Renal: Consider 0 muscle mass and baseline cr ov <0.2 she is actually in luis from septic ATN wiht Cr of 0.4   - though now has 20/cc per hour of out put that is likely appropriate for her wieght.   - monitor lytes and out put.   GI: Tube feeds, protonix, senna miralax.  Endo: Fs q6 + ISS  ID: Polymicrobial infection despite all the viruses no respiratory issues, mostly polymicrobial sepsis some how improving  now on avycaz, with sepsis related DIC, ATN shock.  - repeat culture for clearance   Heme: DIC and bone marrow suppression from sepsis improving will transfuse to plt >10 or >20 if febrile, and HGB>7  Prophylaxis: SCDs  Ethics: Extremely poor prognosis, will try to schedule GOC, based on past conversations patient is full code with unrealistic expectations.   Will try to minimize her suffering as we treat her to achieve her families goal.  Very poor long term prognosis.

## 2025-01-04 NOTE — PROVIDER CONTACT NOTE (OTHER) - ACTION/TREATMENT ORDERED:
no interventions ordered at this time, will follow up with ICU team upon pt rounds, pt monitoring continuous.

## 2025-01-04 NOTE — PROGRESS NOTE ADULT - SUBJECTIVE AND OBJECTIVE BOX
CHIEF COMPLAINT:Patient is a 79y old  Female who presents with a chief complaint of Urinary tract infection and chronic osteomyelitis (03 Jan 2025 14:00)        Interval Events:    REVIEW OF SYSTEMS:    [ ] All other systems negative  [ ] Unable to assess ROS because ________end stage dementia    OBJECTIVE:  ICU Vital Signs Last 24 Hrs  T(C): 36.5 (04 Jan 2025 07:00), Max: 36.5 (04 Jan 2025 07:00)  T(F): 97.7 (04 Jan 2025 07:00), Max: 97.7 (04 Jan 2025 07:00)  HR: 108 (04 Jan 2025 07:00) (78 - 114)  BP: 122/61 (04 Jan 2025 07:00) (89/41 - 131/63)  BP(mean): 75 (04 Jan 2025 07:00) (54 - 83)  ABP: --  ABP(mean): --  RR: 23 (04 Jan 2025 07:00) (12 - 29)  SpO2: 100% (04 Jan 2025 07:00) (85% - 100%)    O2 Parameters below as of 04 Jan 2025 07:00  Patient On (Oxygen Delivery Method): nasal cannula  O2 Flow (L/min): 6            01-03 @ 07:01  -  01-04 @ 07:00  --------------------------------------------------------  IN: 1741.3 mL / OUT: 650 mL / NET: 1091.3 mL      CAPILLARY BLOOD GLUCOSE      POCT Blood Glucose.: 278 mg/dL (04 Jan 2025 05:28)      PHYSICAL EXAM:  Gen: Elderly female lying in bed, NAD  HEENT: NC/AT sclerae anicteric  Resp: No increased WOB, CTAB  CV: S1, S2  Abd: Soft, + BS  Ext: Contracted, WWP  Neuro: Arousable, confused      LINES:    HOSPITAL MEDICATIONS:  Standing Meds:  ceftazidime/avibactam IVPB 2.5 Gram(s) IV Intermittent every 8 hours  chlorhexidine 2% Cloths 1 Application(s) Topical <User Schedule>  insulin lispro (ADMELOG) corrective regimen sliding scale   SubCutaneous every 6 hours  metroNIDAZOLE  IVPB 500 milliGRAM(s) IV Intermittent every 8 hours  midodrine 30 milliGRAM(s) Oral every 8 hours  norepinephrine Infusion 0.05 MICROgram(s)/kG/Min IV Continuous <Continuous>  pantoprazole  Injectable 40 milliGRAM(s) IV Push daily  polyethylene glycol 3350 17 Gram(s) Oral daily  senna 2 Tablet(s) Oral at bedtime  sodium phosphate 15 milliMole(s)/250 mL IVPB 15 milliMole(s) IV Intermittent once      PRN Meds:  ondansetron Injectable 4 milliGRAM(s) IV Push every 8 hours PRN  sodium chloride 0.9% lock flush 10 milliLiter(s) IV Push every 1 hour PRN      LABS:                        6.4    12.65 )-----------( 24       ( 04 Jan 2025 03:25 )             20.2     Hgb Trend: 6.4<--, 7.3<--, 7.7<--, 8.3<--, 7.5<--  01-04    138  |  108  |  19  ----------------------------<  242[H]  3.8   |  26  |  0.55    Ca    7.8[L]      04 Jan 2025 03:25  Phos  1.7     01-04  Mg     2.0     01-04    TPro  4.2[L]  /  Alb  1.3[L]  /  TBili  0.6  /  DBili  x   /  AST  10[L]  /  ALT  8[L]  /  AlkPhos  421[H]  01-04    Creatinine Trend: 0.55<--, 0.40<--, 0.39<--, 0.45<--, 0.44<--, 0.48<--    Urinalysis Basic - ( 04 Jan 2025 03:25 )    Color: x / Appearance: x / SG: x / pH: x  Gluc: 242 mg/dL / Ketone: x  / Bili: x / Urobili: x   Blood: x / Protein: x / Nitrite: x   Leuk Esterase: x / RBC: x / WBC x   Sq Epi: x / Non Sq Epi: x / Bacteria: x            MICROBIOLOGY:     RADIOLOGY:  [ ] Reviewed and interpreted by me    EKG:   CHIEF COMPLAINT: Patient is a 79y old  Female who presents with a chief complaint of Urinary tract infection and chronic osteomyelitis (03 Jan 2025 14:00)        Interval Events:    REVIEW OF SYSTEMS:    [ ] All other systems negative  [ ] Unable to assess ROS because ________end stage dementia    OBJECTIVE:  ICU Vital Signs Last 24 Hrs  T(C): 36.5 (04 Jan 2025 07:00), Max: 36.5 (04 Jan 2025 07:00)  T(F): 97.7 (04 Jan 2025 07:00), Max: 97.7 (04 Jan 2025 07:00)  HR: 108 (04 Jan 2025 07:00) (78 - 114)  BP: 122/61 (04 Jan 2025 07:00) (89/41 - 131/63)  BP(mean): 75 (04 Jan 2025 07:00) (54 - 83)  ABP: --  ABP(mean): --  RR: 23 (04 Jan 2025 07:00) (12 - 29)  SpO2: 100% (04 Jan 2025 07:00) (85% - 100%)    O2 Parameters below as of 04 Jan 2025 07:00  Patient On (Oxygen Delivery Method): nasal cannula  O2 Flow (L/min): 6            01-03 @ 07:01  -  01-04 @ 07:00  --------------------------------------------------------  IN: 1741.3 mL / OUT: 650 mL / NET: 1091.3 mL      CAPILLARY BLOOD GLUCOSE      POCT Blood Glucose.: 278 mg/dL (04 Jan 2025 05:28)      PHYSICAL EXAM:  Gen: Elderly female lying in bed, NAD  HEENT: NC/AT sclerae anicteric. NT suction throuh nasal airway with blood around nare.   Resp: No increased WOB, CTAB  CV: S1, S2  Abd: Soft, + BS  Ext: Contracted, WWP  Neuro: Arousable, confused      LINES:    HOSPITAL MEDICATIONS:  Standing Meds:  ceftazidime/avibactam IVPB 2.5 Gram(s) IV Intermittent every 8 hours  chlorhexidine 2% Cloths 1 Application(s) Topical <User Schedule>  insulin lispro (ADMELOG) corrective regimen sliding scale   SubCutaneous every 6 hours  metroNIDAZOLE  IVPB 500 milliGRAM(s) IV Intermittent every 8 hours  midodrine 30 milliGRAM(s) Oral every 8 hours  norepinephrine Infusion 0.05 MICROgram(s)/kG/Min IV Continuous <Continuous>  pantoprazole  Injectable 40 milliGRAM(s) IV Push daily  polyethylene glycol 3350 17 Gram(s) Oral daily  senna 2 Tablet(s) Oral at bedtime  sodium phosphate 15 milliMole(s)/250 mL IVPB 15 milliMole(s) IV Intermittent once      PRN Meds:  ondansetron Injectable 4 milliGRAM(s) IV Push every 8 hours PRN  sodium chloride 0.9% lock flush 10 milliLiter(s) IV Push every 1 hour PRN      LABS:                        6.4    12.65 )-----------( 24       ( 04 Jan 2025 03:25 )             20.2     Hgb Trend: 6.4<--, 7.3<--, 7.7<--, 8.3<--, 7.5<--  01-04    138  |  108  |  19  ----------------------------<  242[H]  3.8   |  26  |  0.55    Ca    7.8[L]      04 Jan 2025 03:25  Phos  1.7     01-04  Mg     2.0     01-04    TPro  4.2[L]  /  Alb  1.3[L]  /  TBili  0.6  /  DBili  x   /  AST  10[L]  /  ALT  8[L]  /  AlkPhos  421[H]  01-04    Creatinine Trend: 0.55<--, 0.40<--, 0.39<--, 0.45<--, 0.44<--, 0.48<--    Urinalysis Basic - ( 04 Jan 2025 03:25 )    Color: x / Appearance: x / SG: x / pH: x  Gluc: 242 mg/dL / Ketone: x  / Bili: x / Urobili: x   Blood: x / Protein: x / Nitrite: x   Leuk Esterase: x / RBC: x / WBC x   Sq Epi: x / Non Sq Epi: x / Bacteria: x            MICROBIOLOGY:     RADIOLOGY:  [ ] Reviewed and interpreted by me    EKG:

## 2025-01-05 NOTE — CHART NOTE - NSCHARTNOTEFT_GEN_A_CORE
Spoke to patient's son in law Randy, discussed patient's current critical status, we discussed our current treatment plan and what the next 24 hours are likely to look like.  He endorses understanding, endorses that they have [place]. He endorses he will let Nasir know and ask him to call to further discuss.

## 2025-01-05 NOTE — PROGRESS NOTE ADULT - ASSESSMENT
80 yo f pmhx MS, OP, bedbound, chronic pressure wounds, contracted, recent COVID 19+ (12/4/24) admitted to medicine with hematuria subsequently found to be RSV/Flu A+, course c/b septic shock in setting of polymicrobial resistant bacteremia, acute hypoxic respiratory failure.      NEURO: poor MS, pain control as needed  CV: Septic shock requiring vasopressor therapy, actively titrating levophed for map >65, high dose midodrine and droxidopa as adjunctive therapy. stress dose steroids   RESP: acute hypoxic respiratory failure, NRB in place, intermittent episodes of desatturation, aggressive suctioning/lavage as tolerated. Mouth breathing, not a candidate for NIPPV 2/2 mental status, keep hob >30 degrees, aspiration precautions.  Extremely high risk for decompensation requiring intubation   RENAL: monitor urine output, bun/cr and electrolytes, replace lytes as needed, amezquita for strict I&Os  GI: NPO, tf on hold in setting of impending respiratory decompensation   ENDO: ISS for glycemic control   ID: Avycaz and flagyl   HEME: thrombocytopenia, 1U plt ordered   DISPO: Full code.  On going goc with patient's son Nasir and extended family. Poor prognosis    Critical Care time: 60 mins assessing presenting problems of acute illness that poses high probability of life threatening deterioration or end organ damage/dysfunction.  Medical decision making including Initiating plan of care, reviewing data, reviewing radiology, discussing with multidisciplinary team, non inclusive of procedures, discussing goals of care with patient/family    DATE OF DOCUMENTATION EQUIVALENT TO DATE OF SERVICES RENDERED  Pt wishes to get home

## 2025-01-05 NOTE — PROGRESS NOTE ADULT - SUBJECTIVE AND OBJECTIVE BOX
Patient is a 79y old  Female who presents with a chief complaint of Urinary tract infection and chronic osteomyelitis (05 Jan 2025 15:01)      BRIEF HOSPITAL COURSE:   80 yo f pmhx MS, OP, bedbound, chronic pressure wounds, contracted, recent COVID 19+ (12/4/24) admitted to medicine with hematuria subsequently found to be RSV/Flu A+.     12/30: labs consistent with development of DIC      Events last 24 hours:   This am patient had episode of desatturation to the 70s, patient placed on 15L NC and NRB at that time.  Throughout the day NC removed, remains on NRB, intermittent episodes of desatturation occuring.  This evening patient with episode of desatturation to the 70s, patient appears slightly more labored than this am as well.  TF remain to be held 2/2 likely aspiration component.        PAST MEDICAL & SURGICAL HISTORY:  Osteoporosis      Uterine polyp      Herniated disc  lumbar      Functional quadriplegia secondary to multiple sclerosis      Bilateral Tubal ligation  46y/o        Allergies    No Known Allergies    Intolerances      FAMILY HISTORY:      Social History:     Review of Systems:  CONSTITUTIONAL: No fever, chills, or fatigue  EYES: No eye pain, visual disturbances, or discharge  ENMT:  No difficulty hearing, tinnitus, vertigo; No sinus or throat pain  NECK: No pain or stiffness  RESPIRATORY: No cough, wheezing, chills or hemoptysis; No shortness of breath  CARDIOVASCULAR: No chest pain, palpitations, dizziness, or leg swelling  GASTROINTESTINAL: No abdominal or epigastric pain. No nausea, vomiting, or hematemesis; No diarrhea or constipation. No melena or hematochezia.  GENITOURINARY: No dysuria, frequency, hematuria, or incontinence  NEUROLOGICAL: No headaches, memory loss, loss of strength, numbness, or tremors  SKIN: No itching, burning, rashes, or lesions   MUSCULOSKELETAL: No joint pain or swelling; No muscle, back, or extremity pain  PSYCHIATRIC: No depression, anxiety, mood swings, or difficulty sleeping      Vitals During Exam:   HR:   BP:   RR:  sPO2:     Physical Examination:    General: No acute distress.      HEENT: Pupils equal, reactive to light.  Symmetric.    PULM: Clear to auscultation bilaterally, no significant sputum production    CVS: Regular rate and rhythm, no murmurs, rubs, or gallops    ABD: Soft, nondistended, nontender, normoactive bowel sounds, no masses    EXT: No edema, nontender    SKIN: Warm and well perfused, no rashes noted.    NEURO: Alert, oriented, interactive, nonfocal      Medications:  ceftazidime/avibactam IVPB 2.5 Gram(s) IV Intermittent every 8 hours  metroNIDAZOLE  IVPB 500 milliGRAM(s) IV Intermittent every 8 hours    droxidopa 100 milliGRAM(s) Oral three times a day  midodrine 30 milliGRAM(s) Oral every 8 hours  norepinephrine Infusion 0.05 MICROgram(s)/kG/Min IV Continuous <Continuous>      fentaNYL    Injectable 25 MICROGram(s) IV Push every 8 hours PRN  ondansetron Injectable 4 milliGRAM(s) IV Push every 8 hours PRN        pantoprazole  Injectable 40 milliGRAM(s) IV Push daily  polyethylene glycol 3350 17 Gram(s) Oral daily  senna 2 Tablet(s) Oral at bedtime      hydrocortisone sodium succinate Injectable 50 milliGRAM(s) IV Push every 6 hours  insulin lispro (ADMELOG) corrective regimen sliding scale   SubCutaneous every 6 hours    sodium chloride 0.9% lock flush 10 milliLiter(s) IV Push every 1 hour PRN      chlorhexidine 2% Cloths 1 Application(s) Topical <User Schedule>            ICU Vital Signs Last 24 Hrs  T(C): 35.7 (05 Jan 2025 20:00), Max: 37 (05 Jan 2025 04:00)  T(F): 96.3 (05 Jan 2025 20:00), Max: 98.6 (05 Jan 2025 04:00)  HR: 97 (05 Jan 2025 20:00) (67 - 112)  BP: 101/45 (05 Jan 2025 20:00) (84/42 - 145/64)  BP(mean): 62 (05 Jan 2025 20:00) (54 - 88)  ABP: --  ABP(mean): --  RR: 31 (05 Jan 2025 20:00) (12 - 34)  SpO2: 99% (05 Jan 2025 20:00) (76% - 100%)      Vital Signs Last 24 Hrs  T(C): 35.7 (05 Jan 2025 20:00), Max: 37 (05 Jan 2025 04:00)  T(F): 96.3 (05 Jan 2025 20:00), Max: 98.6 (05 Jan 2025 04:00)  HR: 97 (05 Jan 2025 20:00) (67 - 112)  BP: 101/45 (05 Jan 2025 20:00) (84/42 - 145/64)  BP(mean): 62 (05 Jan 2025 20:00) (54 - 88)  RR: 31 (05 Jan 2025 20:00) (12 - 34)  SpO2: 99% (05 Jan 2025 20:00) (76% - 100%)            I&O's Detail    04 Jan 2025 07:01  -  05 Jan 2025 07:00  --------------------------------------------------------  IN:    Enteral Tube Flush: 285 mL    IV PiggyBack: 250 mL    IV PiggyBack: 200 mL    IV PiggyBack: 433.2 mL    Jevity 1.5: 700 mL    Norepinephrine: 145.7 mL    PRBCs (Packed Red Blood Cells): 200 mL  Total IN: 2213.9 mL    OUT:    Indwelling Catheter - Urethral (mL): 370 mL    Nasogastric/Oral tube (mL): 450 mL    VAC (Vacuum Assisted Closure) System (mL): 160 mL  Total OUT: 980 mL    Total NET: 1233.9 mL      05 Jan 2025 07:01  -  05 Jan 2025 20:12  --------------------------------------------------------  IN:    Enteral Tube Flush: 570 mL    IV PiggyBack: 100 mL    IV PiggyBack: 350 mL    Norepinephrine: 19.2 mL  Total IN: 1039.2 mL    OUT:    Indwelling Catheter - Urethral (mL): 175 mL    Jevity 1.5: 0 mL    Nasogastric/Oral tube (mL): 365 mL    VAC (Vacuum Assisted Closure) System (mL): 90 mL  Total OUT: 630 mL    Total NET: 409.2 mL            LABS:                        9.3    16.06 )-----------( 11       ( 05 Jan 2025 04:00 )             28.4     01-05    138  |  107  |  17  ----------------------------<  216[H]  4.0   |  23  |  0.48[L]    Ca    7.8[L]      05 Jan 2025 04:00  Phos  1.6     01-05  Mg     1.8     01-05    TPro  4.8[L]  /  Alb  1.3[L]  /  TBili  0.8  /  DBili  x   /  AST  13[L]  /  ALT  9[L]  /  AlkPhos  568[H]  01-05          CAPILLARY BLOOD GLUCOSE      POCT Blood Glucose.: 257 mg/dL (05 Jan 2025 18:17)      Urinalysis Basic - ( 05 Jan 2025 04:00 )    Color: x / Appearance: x / SG: x / pH: x  Gluc: 216 mg/dL / Ketone: x  / Bili: x / Urobili: x   Blood: x / Protein: x / Nitrite: x   Leuk Esterase: x / RBC: x / WBC x   Sq Epi: x / Non Sq Epi: x / Bacteria: x      CULTURES:  Culture Results:   No growth at 24 hours (01-04 @ 03:20)  Culture Results:   Growth in aerobic bottle: Proteus mirabilis  Growth in aerobic bottle: Klebsiella pneumoniae (Carbapenem Resistant)  Growth in aerobic bottle: Escherichia coli ESBL  Direct identification is available within approximately 3-5  hours either by BloodPanel Multiplexed PCR or Direct  MALDI-TOF. Details: [email or web address] (12-30 @ 09:33)  Culture Results:   Growth in aerobic bottle: Klebsiella pneumoniae (Carbapenem Resistant)  Growth in aerobic bottle: Escherichia coli ESBL  See previous culture [id number] (12-30 @ 09:15)        RADIOLOGY: ***      SUPPLEMENTAL O2:   LINES:  IVF:  GALLARDO:   PPx:   CONTACT: Patient is a 79y old  Female who presents with a chief complaint of Urinary tract infection and chronic osteomyelitis (05 Jan 2025 15:01)      BRIEF HOSPITAL COURSE:   80 yo f pmhx MS, OP, bedbound, chronic pressure wounds, contracted, recent COVID 19+ (12/4/24) admitted to medicine with hematuria subsequently found to be RSV/Flu A+.     12/30: labs consistent with development of DIC      Events last 24 hours:   This am patient had episode of desatturation to the 70s, patient placed on 15L NC and NRB at that time.  Throughout the day NC removed, remains on NRB, intermittent episodes of desatturation occuring.  This evening patient with episode of desatturation to the 70s, patient appears slightly more labored than this am as well.  TF remain to be held 2/2 likely aspiration component.        PAST MEDICAL & SURGICAL HISTORY:  Osteoporosis  Uterine polyp  Herniated disc  lumbar  Functional quadriplegia secondary to multiple sclerosis  Bilateral Tubal ligation  48y/o      Allergies    No Known Allergies    Intolerances      FAMILY HISTORY:      Social History:     Review of Systems:  CONSTITUTIONAL: No fever, chills, or fatigue  EYES: No eye pain, visual disturbances, or discharge  ENMT:  No difficulty hearing, tinnitus, vertigo; No sinus or throat pain  NECK: No pain or stiffness  RESPIRATORY: No cough, wheezing, chills or hemoptysis; No shortness of breath  CARDIOVASCULAR: No chest pain, palpitations, dizziness, or leg swelling  GASTROINTESTINAL: No abdominal or epigastric pain. No nausea, vomiting, or hematemesis; No diarrhea or constipation. No melena or hematochezia.  GENITOURINARY: No dysuria, frequency, hematuria, or incontinence  NEUROLOGICAL: No headaches, memory loss, loss of strength, numbness, or tremors  SKIN: No itching, burning, rashes, or lesions   MUSCULOSKELETAL: No joint pain or swelling; No muscle, back, or extremity pain  PSYCHIATRIC: No depression, anxiety, mood swings, or difficulty sleeping      Vitals During Exam:   HR:   BP:   RR:  sPO2:     Physical Examination:    General: No acute distress.      HEENT: Pupils equal, reactive to light.  Symmetric.    PULM: Clear to auscultation bilaterally, no significant sputum production    CVS: Regular rate and rhythm, no murmurs, rubs, or gallops    ABD: Soft, nondistended, nontender, normoactive bowel sounds, no masses    EXT: No edema, nontender    SKIN: Warm and well perfused, no rashes noted.    NEURO: Alert, oriented, interactive, nonfocal      Medications:  ceftazidime/avibactam IVPB 2.5 Gram(s) IV Intermittent every 8 hours  metroNIDAZOLE  IVPB 500 milliGRAM(s) IV Intermittent every 8 hours    droxidopa 100 milliGRAM(s) Oral three times a day  midodrine 30 milliGRAM(s) Oral every 8 hours  norepinephrine Infusion 0.05 MICROgram(s)/kG/Min IV Continuous <Continuous>      fentaNYL    Injectable 25 MICROGram(s) IV Push every 8 hours PRN  ondansetron Injectable 4 milliGRAM(s) IV Push every 8 hours PRN        pantoprazole  Injectable 40 milliGRAM(s) IV Push daily  polyethylene glycol 3350 17 Gram(s) Oral daily  senna 2 Tablet(s) Oral at bedtime      hydrocortisone sodium succinate Injectable 50 milliGRAM(s) IV Push every 6 hours  insulin lispro (ADMELOG) corrective regimen sliding scale   SubCutaneous every 6 hours    sodium chloride 0.9% lock flush 10 milliLiter(s) IV Push every 1 hour PRN      chlorhexidine 2% Cloths 1 Application(s) Topical <User Schedule>            ICU Vital Signs Last 24 Hrs  T(C): 35.7 (05 Jan 2025 20:00), Max: 37 (05 Jan 2025 04:00)  T(F): 96.3 (05 Jan 2025 20:00), Max: 98.6 (05 Jan 2025 04:00)  HR: 97 (05 Jan 2025 20:00) (67 - 112)  BP: 101/45 (05 Jan 2025 20:00) (84/42 - 145/64)  BP(mean): 62 (05 Jan 2025 20:00) (54 - 88)  ABP: --  ABP(mean): --  RR: 31 (05 Jan 2025 20:00) (12 - 34)  SpO2: 99% (05 Jan 2025 20:00) (76% - 100%)      Vital Signs Last 24 Hrs  T(C): 35.7 (05 Jan 2025 20:00), Max: 37 (05 Jan 2025 04:00)  T(F): 96.3 (05 Jan 2025 20:00), Max: 98.6 (05 Jan 2025 04:00)  HR: 97 (05 Jan 2025 20:00) (67 - 112)  BP: 101/45 (05 Jan 2025 20:00) (84/42 - 145/64)  BP(mean): 62 (05 Jan 2025 20:00) (54 - 88)  RR: 31 (05 Jan 2025 20:00) (12 - 34)  SpO2: 99% (05 Jan 2025 20:00) (76% - 100%)            I&O's Detail    04 Jan 2025 07:01  -  05 Jan 2025 07:00  --------------------------------------------------------  IN:    Enteral Tube Flush: 285 mL    IV PiggyBack: 250 mL    IV PiggyBack: 200 mL    IV PiggyBack: 433.2 mL    Jevity 1.5: 700 mL    Norepinephrine: 145.7 mL    PRBCs (Packed Red Blood Cells): 200 mL  Total IN: 2213.9 mL    OUT:    Indwelling Catheter - Urethral (mL): 370 mL    Nasogastric/Oral tube (mL): 450 mL    VAC (Vacuum Assisted Closure) System (mL): 160 mL  Total OUT: 980 mL    Total NET: 1233.9 mL      05 Jan 2025 07:01  -  05 Jan 2025 20:12  --------------------------------------------------------  IN:    Enteral Tube Flush: 570 mL    IV PiggyBack: 100 mL    IV PiggyBack: 350 mL    Norepinephrine: 19.2 mL  Total IN: 1039.2 mL    OUT:    Indwelling Catheter - Urethral (mL): 175 mL    Jevity 1.5: 0 mL    Nasogastric/Oral tube (mL): 365 mL    VAC (Vacuum Assisted Closure) System (mL): 90 mL  Total OUT: 630 mL    Total NET: 409.2 mL            LABS:                        9.3    16.06 )-----------( 11       ( 05 Jan 2025 04:00 )             28.4     01-05    138  |  107  |  17  ----------------------------<  216[H]  4.0   |  23  |  0.48[L]    Ca    7.8[L]      05 Jan 2025 04:00  Phos  1.6     01-05  Mg     1.8     01-05    TPro  4.8[L]  /  Alb  1.3[L]  /  TBili  0.8  /  DBili  x   /  AST  13[L]  /  ALT  9[L]  /  AlkPhos  568[H]  01-05          CAPILLARY BLOOD GLUCOSE      POCT Blood Glucose.: 257 mg/dL (05 Jan 2025 18:17)      Urinalysis Basic - ( 05 Jan 2025 04:00 )    Color: x / Appearance: x / SG: x / pH: x  Gluc: 216 mg/dL / Ketone: x  / Bili: x / Urobili: x   Blood: x / Protein: x / Nitrite: x   Leuk Esterase: x / RBC: x / WBC x   Sq Epi: x / Non Sq Epi: x / Bacteria: x      CULTURES:  Culture Results:   No growth at 24 hours (01-04 @ 03:20)  Culture Results:   Growth in aerobic bottle: Proteus mirabilis  Growth in aerobic bottle: Klebsiella pneumoniae (Carbapenem Resistant)  Growth in aerobic bottle: Escherichia coli ESBL  Direct identification is available within approximately 3-5  hours either by BloodPanel Multiplexed PCR or Direct  MALDI-TOF. Details: [email or web address] (12-30 @ 09:33)  Culture Results:   Growth in aerobic bottle: Klebsiella pneumoniae (Carbapenem Resistant)  Growth in aerobic bottle: Escherichia coli ESBL  See previous culture [id number] (12-30 @ 09:15)        RADIOLOGY: ***      SUPPLEMENTAL O2:   LINES:  IVF:  GALLARDO:   PPx:   CONTACT: Patient is a 79y old  Female who presents with a chief complaint of Urinary tract infection and chronic osteomyelitis (05 Jan 2025 15:01)      BRIEF HOSPITAL COURSE:   80 yo f pmhx MS, OP, bedbound, chronic pressure wounds, contracted, recent COVID 19+ (12/4/24) admitted to medicine with hematuria subsequently found to be RSV/Flu A+.     12/30: labs consistent with development of DIC      Events last 24 hours:   This am patient had episode of desatturation to the 70s, patient placed on 15L NC and NRB at that time.  Throughout the day NC removed, remains on NRB, intermittent episodes of desatturation occuring.  This evening patient with episode of desatturation to the 80s, patient appears slightly more labored than this am as well.  TF remain to be held 2/2 likely aspiration component.  Plt 11 this am, 1 unit platelets ordered this evening.       PAST MEDICAL & SURGICAL HISTORY:  Osteoporosis  Uterine polyp  Herniated disc  lumbar  Functional quadriplegia secondary to multiple sclerosis  Bilateral Tubal ligation  46y/o      Allergies  No Known Allergies      FAMILY HISTORY:  unable to obtain       Social History:   from home      Review of Systems:  +pain, poor ms this evening      Physical Examination:    General: Frail, cachectic elderly female, lying in bed, mild distress    HEENT: temporal wasting, +kyphotic, NRB in place, ngt in place    PULM: diminished b/l     CVS: sinus tach     ABD: Soft, nondistended, nontender, diminished bs    EXT: No edema, nontender    SKIN: Warm     NEURO: poor ms      Medications:  ceftazidime/avibactam IVPB 2.5 Gram(s) IV Intermittent every 8 hours  metroNIDAZOLE  IVPB 500 milliGRAM(s) IV Intermittent every 8 hours  droxidopa 100 milliGRAM(s) Oral three times a day  midodrine 30 milliGRAM(s) Oral every 8 hours  norepinephrine Infusion 0.05 MICROgram(s)/kG/Min IV Continuous <Continuous>  fentaNYL    Injectable 25 MICROGram(s) IV Push every 8 hours PRN  ondansetron Injectable 4 milliGRAM(s) IV Push every 8 hours PRN  pantoprazole  Injectable 40 milliGRAM(s) IV Push daily  polyethylene glycol 3350 17 Gram(s) Oral daily  senna 2 Tablet(s) Oral at bedtime  hydrocortisone sodium succinate Injectable 50 milliGRAM(s) IV Push every 6 hours  insulin lispro (ADMELOG) corrective regimen sliding scale   SubCutaneous every 6 hours  sodium chloride 0.9% lock flush 10 milliLiter(s) IV Push every 1 hour PRN  chlorhexidine 2% Cloths 1 Application(s) Topical <User Schedule      ICU Vital Signs Last 24 Hrs  T(C): 35.7 (05 Jan 2025 20:00), Max: 37 (05 Jan 2025 04:00)  T(F): 96.3 (05 Jan 2025 20:00), Max: 98.6 (05 Jan 2025 04:00)  HR: 97 (05 Jan 2025 20:00) (67 - 112)  BP: 101/45 (05 Jan 2025 20:00) (84/42 - 145/64)  BP(mean): 62 (05 Jan 2025 20:00) (54 - 88)  ABP: --  ABP(mean): --  RR: 31 (05 Jan 2025 20:00) (12 - 34)  SpO2: 99% (05 Jan 2025 20:00) (76% - 100%)      Vital Signs Last 24 Hrs  T(C): 35.7 (05 Jan 2025 20:00), Max: 37 (05 Jan 2025 04:00)  T(F): 96.3 (05 Jan 2025 20:00), Max: 98.6 (05 Jan 2025 04:00)  HR: 97 (05 Jan 2025 20:00) (67 - 112)  BP: 101/45 (05 Jan 2025 20:00) (84/42 - 145/64)  BP(mean): 62 (05 Jan 2025 20:00) (54 - 88)  RR: 31 (05 Jan 2025 20:00) (12 - 34)  SpO2: 99% (05 Jan 2025 20:00) (76% - 100%)      I&O's Detail    04 Jan 2025 07:01  -  05 Jan 2025 07:00  --------------------------------------------------------  IN:    Enteral Tube Flush: 285 mL    IV PiggyBack: 250 mL    IV PiggyBack: 200 mL    IV PiggyBack: 433.2 mL    Jevity 1.5: 700 mL    Norepinephrine: 145.7 mL    PRBCs (Packed Red Blood Cells): 200 mL  Total IN: 2213.9 mL    OUT:    Indwelling Catheter - Urethral (mL): 370 mL    Nasogastric/Oral tube (mL): 450 mL    VAC (Vacuum Assisted Closure) System (mL): 160 mL  Total OUT: 980 mL  Total NET: 1233.9 mL      05 Jan 2025 07:01  -  05 Jan 2025 20:12  --------------------------------------------------------  IN:    Enteral Tube Flush: 570 mL    IV PiggyBack: 100 mL    IV PiggyBack: 350 mL    Norepinephrine: 19.2 mL  Total IN: 1039.2 mL    OUT:    Indwelling Catheter - Urethral (mL): 175 mL    Jevity 1.5: 0 mL    Nasogastric/Oral tube (mL): 365 mL    VAC (Vacuum Assisted Closure) System (mL): 90 mL  Total OUT: 630 mL  Total NET: 409.2 mL      LABS:                        9.3    16.06 )-----------( 11       ( 05 Jan 2025 04:00 )             28.4     01-05    138  |  107  |  17  ----------------------------<  216[H]  4.0   |  23  |  0.48[L]    Ca    7.8[L]      05 Jan 2025 04:00  Phos  1.6     01-05  Mg     1.8     01-05    TPro  4.8[L]  /  Alb  1.3[L]  /  TBili  0.8  /  DBili  x   /  AST  13[L]  /  ALT  9[L]  /  AlkPhos  568[H]  01-05      CAPILLARY BLOOD GLUCOSE  POCT Blood Glucose.: 257 mg/dL (05 Jan 2025 18:17)      Urinalysis Basic - ( 05 Jan 2025 04:00 )  Color: x / Appearance: x / SG: x / pH: x  Gluc: 216 mg/dL / Ketone: x  / Bili: x / Urobili: x   Blood: x / Protein: x / Nitrite: x   Leuk Esterase: x / RBC: x / WBC x   Sq Epi: x / Non Sq Epi: x / Bacteria: x      CULTURES:  Culture Results:   No growth at 24 hours (01-04 @ 03:20)  Culture Results:   Growth in aerobic bottle: Proteus mirabilis  Growth in aerobic bottle: Klebsiella pneumoniae (Carbapenem Resistant)  Growth in aerobic bottle: Escherichia coli ESBL  Direct identification is available within approximately 3-5  hours either by BloodPanel Multiplexed PCR or Direct  MALDI-TOF. Details: [email or web address] (12-30 @ 09:33)  Culture Results:   Growth in aerobic bottle: Klebsiella pneumoniae (Carbapenem Resistant)  Growth in aerobic bottle: Escherichia coli ESBL  See previous culture [id number] (12-30 @ 09:15)      RADIOLOGY:  < from: Xray Chest 1 View- PORTABLE-Urgent (Xray Chest 1 View- PORTABLE-Urgent .) (01.02.25 @ 15:45) >    ACC: [id number] EXAM:  XR CHEST PORTABLE URGENT 1V   ORDERED BY: JOSE ELIAS HAN     PROCEDURE DATE:  01/02/2025      INTERPRETATION:  Portable AP chest radiograph    COMPARISON:  NONE.    CLINICAL INFORMATION: NG tube placement.    FINDINGS:  CATHETERS AND TUBES: NG tube tip in distal stomach.    PULMONARY AND HEART:  Diagnostic evaluation of heart and LEFT lungs due to patient's flexed   calvarium..  Visualized RIGHT lung parenchyma grossly clear.    BONES: The visualized osseous thorax isintact.    IMPRESSION:  NG tube tip in distal stomach.    --- End of Report ---    FIFI NORMAN MD  This document has been electronically signed. Andre  3 2025  1:53PM    < end of copied text >

## 2025-01-05 NOTE — PROGRESS NOTE ADULT - ASSESSMENT
80 y/o F w/MS, bedbound at baseline, chronic osteomyelitis admitted for sepsis secondary to E. coli UTI, chronic osteo, and COVID/influenza/and RSV infection s/p course of abx now w/new hypotension likely severe sepsis with septic shock possibly secondary to worsening osteo and MDR Klebsiella/ESBL ECOLI/PRoteous bacteremia.    Neuro: Baseline severe dementia, with some component of delirium,   CV: Septic shock improving, still hypothermic  - continue with levophed to map >65  - will add midodrine and droxy dopa.   Pulm: on O2 with atleast some degree of kyphosis related restriction but NIV is far more likely to cause complication than benefit.   - Keep O2 >92 and suction as needed will try to minimize today givne bleeding.   Renal: Consider 0 muscle mass and baseline cr ov <0.2 she is actually in luis from septic ATN wiht Cr of 0.4   - though now has 20/cc per hour of out put that is likely appropriate for her wieght.   - monitor lytes and out put.   GI: Tube feeds, protonix, senna miralax.  Endo: Fs q6 + ISS  - no record of checked TFTs ot cortisol given persistent hypothermia will check now and start cortisol.   ID: Polymicrobial infection despite all the viruses no respiratory issues, mostly polymicrobial sepsis some how improving  now on avycaz, with sepsis related DIC, ATN shock.  - repeat culture for clearance   Heme: DIC and bone marrow suppression from sepsis improving will transfuse to plt >10 or >20 if febrile, and HGB>7  Prophylaxis: SCDs  Ethics: Extremely poor prognosis, no family at bedside.

## 2025-01-05 NOTE — PROGRESS NOTE ADULT - SUBJECTIVE AND OBJECTIVE BOX
CHIEF COMPLAINT:Patient is a 79y old  Female who presents with a chief complaint of Urinary tract infection and chronic osteomyelitis (04 Jan 2025 08:07)        Interval Events:    REVIEW OF SYSTEMS:  [ ] All other systems negative  [x] Unable to assess ROS because ________end stage dementia.    OBJECTIVE:  ICU Vital Signs Last 24 Hrs  T(C): 35.2 (05 Jan 2025 14:00), Max: 37 (05 Jan 2025 04:00)  T(F): 95.4 (05 Jan 2025 14:00), Max: 98.6 (05 Jan 2025 04:00)  HR: 97 (05 Jan 2025 14:00) (74 - 112)  BP: 87/39 (05 Jan 2025 14:00) (84/42 - 145/64)  BP(mean): 54 (05 Jan 2025 14:00) (54 - 87)  ABP: --  ABP(mean): --  RR: 25 (05 Jan 2025 14:00) (12 - 32)  SpO2: 99% (05 Jan 2025 14:00) (76% - 100%)    O2 Parameters below as of 04 Jan 2025 20:00  Patient On (Oxygen Delivery Method): nasal cannula  O2 Flow (L/min): 6            01-04 @ 07:01  -  01-05 @ 07:00  --------------------------------------------------------  IN: 2213.9 mL / OUT: 980 mL / NET: 1233.9 mL    01-05 @ 07:01  -  01-05 @ 15:02  --------------------------------------------------------  IN: 429.6 mL / OUT: 445 mL / NET: -15.4 mL      CAPILLARY BLOOD GLUCOSE      POCT Blood Glucose.: 243 mg/dL (05 Jan 2025 15:01)      PHYSICAL EXAM:  Gen: Elderly female lying in bed, NAD  HEENT: NC/AT sclerae anicteric. NT suction throuh nasal airway with blood around nare.   Resp: No increased WOB, CTAB  CV: S1, S2  Abd: Soft, + BS  Ext: Contracted, WWP  Neuro: Arousable, confused    LINES:    HOSPITAL MEDICATIONS:  Standing Meds:  ceftazidime/avibactam IVPB 2.5 Gram(s) IV Intermittent every 8 hours  chlorhexidine 2% Cloths 1 Application(s) Topical <User Schedule>  droxidopa 100 milliGRAM(s) Oral three times a day  hydrocortisone sodium succinate Injectable 50 milliGRAM(s) IV Push every 6 hours  insulin lispro (ADMELOG) corrective regimen sliding scale   SubCutaneous every 6 hours  metroNIDAZOLE  IVPB 500 milliGRAM(s) IV Intermittent every 8 hours  midodrine 30 milliGRAM(s) Oral every 8 hours  norepinephrine Infusion 0.05 MICROgram(s)/kG/Min IV Continuous <Continuous>  pantoprazole  Injectable 40 milliGRAM(s) IV Push daily  polyethylene glycol 3350 17 Gram(s) Oral daily  senna 2 Tablet(s) Oral at bedtime      PRN Meds:  fentaNYL    Injectable 25 MICROGram(s) IV Push every 8 hours PRN  ondansetron Injectable 4 milliGRAM(s) IV Push every 8 hours PRN  sodium chloride 0.9% lock flush 10 milliLiter(s) IV Push every 1 hour PRN      LABS:                        9.3    16.06 )-----------( 11       ( 05 Jan 2025 04:00 )             28.4     Hgb Trend: 9.3<--, 6.4<--, 7.3<--, 7.7<--, 8.3<--  01-05    138  |  107  |  17  ----------------------------<  216[H]  4.0   |  23  |  0.48[L]    Ca    7.8[L]      05 Jan 2025 04:00  Phos  1.6     01-05  Mg     1.8     01-05    TPro  4.8[L]  /  Alb  1.3[L]  /  TBili  0.8  /  DBili  x   /  AST  13[L]  /  ALT  9[L]  /  AlkPhos  568[H]  01-05    Creatinine Trend: 0.48<--, 0.55<--, 0.40<--, 0.39<--, 0.45<--, 0.44<--    Urinalysis Basic - ( 05 Jan 2025 04:00 )    Color: x / Appearance: x / SG: x / pH: x  Gluc: 216 mg/dL / Ketone: x  / Bili: x / Urobili: x   Blood: x / Protein: x / Nitrite: x   Leuk Esterase: x / RBC: x / WBC x   Sq Epi: x / Non Sq Epi: x / Bacteria: x            MICROBIOLOGY:     Culture - Blood (collected 04 Jan 2025 03:20)  Source: .Blood BLOOD  Preliminary Report (05 Jan 2025 13:01):    No growth at 24 hours      RADIOLOGY:  [ ] Reviewed and interpreted by me    EKG:

## 2025-01-06 NOTE — PROGRESS NOTE ADULT - SUBJECTIVE AND OBJECTIVE BOX
HPI:  Pt is a 80 yo F with h/o MS, bedbound at baseline, chronic osteomyelitis admitted to medicine for acute on chronic osteomoyelitis and UTI. During this hospitalization pt (+) Covid-19, Influenza A and RSV. Pt transferred to the ICU s/p RRT 2 to hypotension. Pt found to be in septic shock 2 to Klebsiella, E. coli, Proteus and Bacteroides. Most recently pt on increasing Levophed and O2 requirement (pt with worsening R sided infiltrate). ICU dx: Septic shock and acute hypoxic resp failure 2 to Klebsiella, E. coli, Proteus and Bacteroides and PNA      ## Labs:  CBC:                        8.2    26.95 )-----------( 28       ( 2025 10:12 )             27.2     Chem:      137  |  109[H]  |  18  ----------------------------<  182[H]  4.0   |  23  |  0.45[L]    Ca    7.7[L]      2025 10:12  Phos  2.3       Mg     1.8         TPro  4.7[L]  /  Alb  1.2[L]  /  TBili  0.5  /  DBili  x   /  AST  14[L]  /  ALT  8[L]  /  AlkPhos  433[H]      Coags:          ## Imaging:    ## Medications:  ceftazidime/avibactam IVPB 2.5 Gram(s) IV Intermittent every 8 hours  metroNIDAZOLE  IVPB 500 milliGRAM(s) IV Intermittent every 8 hours    droxidopa 100 milliGRAM(s) Oral three times a day  midodrine 30 milliGRAM(s) Oral every 8 hours  norepinephrine Infusion 0.05 MICROgram(s)/kG/Min IV Continuous <Continuous>      hydrocortisone sodium succinate Injectable 50 milliGRAM(s) IV Push every 6 hours  insulin lispro (ADMELOG) corrective regimen sliding scale   SubCutaneous every 6 hours      pantoprazole  Injectable 40 milliGRAM(s) IV Push daily  polyethylene glycol 3350 17 Gram(s) Oral daily  senna 2 Tablet(s) Oral at bedtime    fentaNYL    Injectable 25 MICROGram(s) IV Push every 8 hours PRN  ondansetron Injectable 4 milliGRAM(s) IV Push every 8 hours PRN      ## Vitals:  T(C): 36.8 (25 @ 14:00), Max: 36.9 (25 @ 13:30)  HR: 106 (25 @ 14:00) (67 - 112)  BP: 78/68 (25 @ 14:00) (78/68 - 156/65)  BP(mean): 73 (25 @ 14:00) (52 - 89)  RR: 22 (25 @ 14:00) (16 - 34)  SpO2: 100% (25 @ 14:00) (82% - 100%)  Wt(kg): --  Vent:   AB-05 @ 07:01  -   @ 07:00  --------------------------------------------------------  IN: 1469.6 mL / OUT: 740 mL / NET: 729.6 mL     @ 07:01  -   @ 14:13  --------------------------------------------------------  IN: 446.7 mL / OUT: 45 mL / NET: 401.7 mL          ## P/E:  Gen: lying comfortably in bed in no apparent distress  Face: (+) non-rebreather mask  Lungs: B/l rhonchi  Heart: RRR  Abd: Soft/+BS/ Non-tender  Ext: B/l hand edema  Neuro: Obtunded    CENTRAL LINE: [ ] YES [ ] NO  LOCATION:   DATE INSERTED:  REMOVE: [ ] YES [ ] NO      GALLARDO: [ ] YES [ ] NO    DATE INSERTED:  REMOVE:  [ ] YES [ ] NO      A-LINE:  [ ] YES [ ] NO  LOCATION:   DATE INSERTED:  REMOVE:  [ ] YES [ ] NO  EXPLAIN:    CODE STATUS: [x ] full code  [ ] DNR  [ ] DNI  [ ] MOLST  Goals of care discussion: [x ] yes Intubation if necessary

## 2025-01-06 NOTE — PROGRESS NOTE ADULT - ASSESSMENT
Pt is a 78 yo F with h/o MS, bedbound at baseline, chronic osteomyelitis admitted to medicine for acute on chronic osteomoyelitis and UTI. During this hospitalization pt (+) Covid-19, Influenza A and RSV. Pt transferred to the ICU s/p RRT 2 to hypotension. Pt found to be in septic shock 2 to Klebsiella, E. coli, Proteus and Bacteroides. Most recently pt on increasing Levophed and O2 requirement (pt with worsening R sided infiltrate). ICU dx: Septic shock and acute hypoxic resp failure 2 to Klebsiella, E. coli, Proteus and Bacteroides and PNA    Resp: Supplemental O2 prn to maintain O2 sat >92%   ID: Abx as per ID  CVS: Levophed to maintain MAP >65   HEME: DVT prophylaxis with Venodynes  FEN:  GI:  Neuro:     Pt is a 78 yo F with h/o MS, bedbound at baseline, chronic osteomyelitis admitted to medicine for acute on chronic osteomoyelitis and UTI. During this hospitalization pt (+) Covid-19, Influenza A and RSV. Pt transferred to the ICU s/p RRT 2 to hypotension. Pt found to be in septic shock 2 to Klebsiella, E. coli, Proteus and Bacteroides. Most recently pt on increasing Levophed and O2 requirement (pt with worsening R sided infiltrate). ICU dx: Septic shock and acute hypoxic resp failure 2 to Klebsiella, E. coli, Proteus and Bacteroides and PNA    Resp: Supplemental O2 prn to maintain O2 sat >92%   ID: Abx as per ID  CVS: Midodrine + Droxidopa + Levophed to maintain MAP >65   HEME: DVT prophylaxis with Venodynes/ Thrombocytopenia; trend platelets  FEN: Severe protein-calorie malnutrition and Underweight (BMI < 19); cont enteral feeds/ Replace Phos; pt hypophosphatemic   Endo: Follow FS and cover with Lispro  Neuro: Toxic/metabolic encephalopathy  Social: Pt is full code and son agrees for intubation if necessary

## 2025-01-06 NOTE — PROGRESS NOTE ADULT - ASSESSMENT
The patient is 80 y/o woman with PMH of osteoporosis, MS, bedbound, known to ID from last admission, who presents today for hematuria. Reportedly, amezquita was changed 2 days ago and blood was seen in the catheter at the time. Patient reports dysuria, denies frequency and urgency. Also c/o back pain. On presentation, patient is hemodynamically stable and afebrile. Labs significant for elevated WBC 39.41, UA showing large LE, +nitrite, +WBC, +bacteria. CT abdomen/pelvis shows extensive decubitus ulcers overlying the sacrum and the greater trochanters; curvilinear fluid collection overlying the left posterior acetabulum; foci of gas in the left hip joint; scattered subcutaneous gas; scattered osseous sclerosis worrisome for chronic osteomyelitis; rectum distended by stool. Patient given zosyn & vancomycin. ID consulted for workup and antibiotic management.    12/17: Last admit here 11/20--12/10 and treated for infected decub ulcers, SARS-CoV-2 infection and CAUTI. 11/20 urine culture with ESBL E. Coli and CRE Kleb was noted. 11/22 wound culture with ESBL E. Coli, E. faecalis and strep. She was on meropenem and vancomycin x 10 days and was discharged on a course of Augmentin. Now returns with another amezquita associated UTI. CT abd pelvis report and images reviewed, showing known pressure ulcers with concern for possible underlying chronic osteomyelitis.   12/18: no fever, RA, no new cbc, UC is growing E. Coli, no blood culture data is available, Zosyn IV continued.   12/23: Afebrile. No leukocytosis. Zosyn was switched to Ertapenem on 12/20 after ESBL reported in the urine culture. Sensitivity noted above. MRI pelvis today with acute on chronic osteomyelitis at the sacrum.  12/24: remains afebrile since 12/20, RA, no new cbc, wounds with vac dressings on, + mild rhinorrhea on exam, will obtain RVP, pt had recent covid 19, ertapenem IV continued (day #5)  12/30: RRT today due to hypotension requiring levophed. Transferred to MICU. Leukocytosis WBC: 23k. CXR image and report reviewed. She has bibasilar opacities R>L. Looks worse than from 12/4. Given a dose of vancomycin. Meropenem started. Blood cultures obtained. Recent RVP 12/24 positive for RSV and again SARS-CoV-2 (likely persistent viral shedding, not a new infection, was positive on 12/4 and 12/16)  12/31: Remains in ICU on one pressor. Afebrile. Awake today. Leukocytosis up trending, WBC 40K. MRSA screen negative. 12/30 blood cultures with GNRs, polymicrobial PCR detection of Bacteroides, Proteus and Klebsiella species with ESBL and KPC resistance genes identified. Meropenem was stopped. Avycaz started.  1/2: Slight hypothermia, T: 96.4F. Leukocytosis improved, WBC: 11k today. On minimal pressor support. Remains lethargic and NPO. Possibly getting NGT. No wound debridement needed per surgery. No new events per bedside RN. The susceptibility report reviewed as noted above. Avycaz and metronidazole continued.  1/3: remains in CCU, on pressors, hypothermic this morning 94.6, NC, WBC increased 12.39, Cr ok, BCs with  Klebsiella and ESBL E. Coli, abx continued Avycaz and Flagyl.   1/6: Afebrile. Rising leukocytosis, WBC: 26k today. Ongoing goals of care discussion with family. Patient is full code. On Avycaz and flagyl day#7    Impression:  1. Septic shock secondary to polymicrobial bacteremia - likely source decubitus ulcers  2. Catheter associated UTI-s/p treatment for ESBL E.Coli  3. Chronic pressure ulcers - on wound vac  4. Acute on chronic sacral osteomyelitis  5. Leukocytosis  6. Old age, debility, bedbound  7. Acute encephalopathy  8. Recent SARS-CoV-2 infection s/p treatment  9. RSV    Recommendations:  --Continue ceftazidime-avibactam 2.5g IV q8h  --Continue metronidazole 500 mg IV q8h  --Wound vac management per PT  --Vascular surgery/wound care follow up  --Aspiration precautions  --Palliative eval/goals of care -  full code at this time  --Guarded prognosis    Discussed with CCU team    Yola Sood MD  Attending Physician  Division of Infectious Diseases   Available via [employer] Teams   The patient is 80 y/o woman with PMH of osteoporosis, MS, bedbound, known to ID from last admission, who presents today for hematuria. Reportedly, amezquita was changed 2 days ago and blood was seen in the catheter at the time. Patient reports dysuria, denies frequency and urgency. Also c/o back pain. On presentation, patient is hemodynamically stable and afebrile. Labs significant for elevated WBC 39.41, UA showing large LE, +nitrite, +WBC, +bacteria. CT abdomen/pelvis shows extensive decubitus ulcers overlying the sacrum and the greater trochanters; curvilinear fluid collection overlying the left posterior acetabulum; foci of gas in the left hip joint; scattered subcutaneous gas; scattered osseous sclerosis worrisome for chronic osteomyelitis; rectum distended by stool. Patient given zosyn & vancomycin. ID consulted for workup and antibiotic management.    12/17: Last admit here 11/20--12/10 and treated for infected decub ulcers, SARS-CoV-2 infection and CAUTI. 11/20 urine culture with ESBL E. Coli and CRE Kleb was noted. 11/22 wound culture with ESBL E. Coli, E. faecalis and strep. She was on meropenem and vancomycin x 10 days and was discharged on a course of Augmentin. Now returns with another amezquita associated UTI. CT abd pelvis report and images reviewed, showing known pressure ulcers with concern for possible underlying chronic osteomyelitis.   12/18: no fever, RA, no new cbc, UC is growing E. Coli, no blood culture data is available, Zosyn IV continued.   12/23: Afebrile. No leukocytosis. Zosyn was switched to Ertapenem on 12/20 after ESBL reported in the urine culture. Sensitivity noted above. MRI pelvis today with acute on chronic osteomyelitis at the sacrum.  12/24: remains afebrile since 12/20, RA, no new cbc, wounds with vac dressings on, + mild rhinorrhea on exam, will obtain RVP, pt had recent covid 19, ertapenem IV continued (day #5)  12/30: RRT today due to hypotension requiring levophed. Transferred to MICU. Leukocytosis WBC: 23k. CXR image and report reviewed. She has bibasilar opacities R>L. Looks worse than from 12/4. Given a dose of vancomycin. Meropenem started. Blood cultures obtained. Recent RVP 12/24 positive for RSV and again SARS-CoV-2 (likely persistent viral shedding, not a new infection, was positive on 12/4 and 12/16)  12/31: Remains in ICU on one pressor. Afebrile. Awake today. Leukocytosis up trending, WBC 40K. MRSA screen negative. 12/30 blood cultures with GNRs, polymicrobial PCR detection of Bacteroides, Proteus and Klebsiella species with ESBL and KPC resistance genes identified. Meropenem was stopped. Avycaz started.  1/2: Slight hypothermia, T: 96.4F. Leukocytosis improved, WBC: 11k today. On minimal pressor support. Remains lethargic and NPO. Possibly getting NGT. No wound debridement needed per surgery. No new events per bedside RN. The susceptibility report reviewed as noted above. Avycaz and metronidazole continued.  1/3: remains in CCU, on pressors, hypothermic this morning 94.6, NC, WBC increased 12.39, Cr ok, BCs with  Klebsiella and ESBL E. Coli, abx continued Avycaz and Flagyl.   1/6: Afebrile. Rising leukocytosis, WBC: 26k today. Requiring levophed, stress steroids. On warming blanket due to episodes of hypothermia. Ongoing goals of care discussion with family. Patient is full code. On Avycaz and flagyl day#7    Impression:  1. Septic shock secondary to polymicrobial bacteremia - likely source decubitus ulcers  2. Catheter associated UTI-s/p treatment for ESBL E.Coli  3. Chronic pressure ulcers - on wound vac  4. Acute on chronic sacral osteomyelitis  5. Leukocytosis  6. Old age, debility, bedbound  7. Acute encephalopathy  8. Recent SARS-CoV-2 infection s/p treatment  9. RSV    Recommendations:  --Continue ceftazidime-avibactam 2.5g IV q8h  --Continue metronidazole 500 mg IV q8h  --Wound vac management per PT  --Vascular surgery/wound care follow up  --Aspiration precautions  --Palliative eval/goals of care -  full code at this time  --Guarded prognosis    Discussed with CCU team    Yola Sood MD  Attending Physician  Division of Infectious Diseases   Available via [employer] Teams

## 2025-01-06 NOTE — PROGRESS NOTE ADULT - SUBJECTIVE AND OBJECTIVE BOX
SPENCER MIXON  MRN-[id number]  79y (1945)    Follow Up:  OM, wounds, bacteremia, uti, hypothermia    Interval History: Seen and examined in CCU, chronically ill appearing, not awake or alert, not interactive, not in acute distress.     ROS:    [x ] Unobtainable because: pt is lethargic, not interactive   [ ] All other systems negative    Constitutional: no fever, no chills  Head: no trauma  Eyes: no vision changes, no eye pain  ENT:  no sore throat, no rhinorrhea  Cardiovascular:  no chest pain, no palpitation  Respiratory:  no SOB, no cough  GI:  no abd pain, no vomiting, no diarrhea  urinary: no dysuria, no hematuria, no flank pain  musculoskeletal:  no joint pain, no joint swelling  skin:  no rash  neurology:  no headache, no seizure, no change in mental status  psych: no anxiety, no depression       Allergies  No Known Allergies      ANTIMICROBIALS:  ceftazidime/avibactam IVPB 2.5 every 8 hours  metroNIDAZOLE  IVPB 500 every 8 hours    OTHER MEDS:  chlorhexidine 2% Cloths 1 Application(s) Topical <User Schedule>  insulin lispro (ADMELOG) corrective regimen sliding scale   SubCutaneous every 6 hours  midodrine 10 milliGRAM(s) Oral every 8 hours  norepinephrine Infusion 0.05 MICROgram(s)/kG/Min IV Continuous <Continuous>  nystatin Powder 1 Application(s) Topical two times a day  ondansetron Injectable 4 milliGRAM(s) IV Push every 8 hours PRN  pantoprazole  Injectable 40 milliGRAM(s) IV Push daily  polyethylene glycol 3350 17 Gram(s) Oral daily  senna 2 Tablet(s) Oral at bedtime  sodium chloride 0.9% lock flush 10 milliLiter(s) IV Push every 1 hour PRN      Physical Exam:  Vital Signs Last 24 Hrs  T(C): 36.9 (06 Jan 2025 13:30), Max: 36.9 (06 Jan 2025 13:30)  T(F): 98.4 (06 Jan 2025 13:30), Max: 98.4 (06 Jan 2025 13:30)  HR: 102 (06 Jan 2025 13:30) (67 - 112)  BP: 135/68 (06 Jan 2025 13:30) (87/39 - 156/65)  BP(mean): 86 (06 Jan 2025 13:30) (52 - 89)  RR: 24 (06 Jan 2025 13:30) (16 - 34)  SpO2: 100% (06 Jan 2025 13:30) (82% - 100%)      Constitutional: Elderly woman chronic ill appearing, lethargic, not interactive   HEENT: Dry oral mucosa, unable to assess pt's eyes due to non-compliance, NGT in place  Cardiovascular:   normal S1, S2, no edema  Respiratory:  coarse breath sounds bilaterally, no wheezes  GI:  soft, non distended  :  + amezquita with clear urine in the bag    Musculoskeletal: Atrophic / wasted limbs, contracted. +multiple pressure ulcers with vac dressing on  Neurologic: Lethargic. Pt does not answer any of my questions, does not follow commands   Skin:  warm, dry, no rash  Psychiatric: unable     WBC Count: 26.95 K/uL (01-06 @ 10:12)  WBC Count: 17.79 K/uL (01-06 @ 02:57)  WBC Count: 16.06 K/uL (01-05 @ 04:00)  WBC Count: 12.65 K/uL (01-04 @ 03:25)  WBC Count: 12.39 K/uL (01-03 @ 03:15)  WBC Count: 11.40 K/uL (01-02 @ 03:18)  WBC Count: 15.49 K/uL (01-01 @ 03:25)                          8.2    26.95 )-----------( 28       ( 06 Jan 2025 10:12 )             27.2     01-06    137  |  109[H]  |  18  ----------------------------<  182[H]  4.0   |  23  |  0.45[L]    Ca    7.7[L]      06 Jan 2025 10:12  Phos  2.3     01-06  Mg     1.8     01-06    TPro  4.7[L]  /  Alb  1.2[L]  /  TBili  0.5  /  DBili  x   /  AST  14[L]  /  ALT  8[L]  /  AlkPhos  433[H]  01-06    Creatinine Trend: 0.40<--, 0.39<--, 0.45<--, 0.44<--, 0.48<--, 0.45<--    Urinalysis (12.30.24 @ 13:00)   pH Urine: 6.0   Glucose Qualitative, Urine: Negative mg/dL   Blood, Urine: Small   Color: Yellow   Urine Appearance: Cloudy   Bilirubin: Negative   Ketone - Urine: Negative mg/dL   Specific Gravity: 1.009   Protein, Urine: 30 mg/dL   Urobilinogen: 1.0 mg/dL   Nitrite: Negative   Leukocyte Esterase Concentration: Moderate  Urine Microscopic-Add On (NC) (12.30.24 @ 13:00)   Bacteria: Few /HPF   Squamous Epithelial Cells: few   Comment - Urine 2: many yeast seen   Red Blood Cell - Urine: 0-2 /HPF   White Blood Cell - Urine: 3-5 /HPF      MICROBIOLOGY:  .Blood BLOOD  12-30-24   Growth in aerobic bottle: Proteus mirabilis  Growth in aerobic bottle: Klebsiella pneumoniae (Carbapenem Resistant)  Growth in aerobic bottle: Escherichia coli ESBL  Culture - Blood (12.30.24 @ 09:33)   -  Bacteroides fragilis: Detec   -  Carbapenem Resistance: Detec   -  CTX-M Resistance Marker: Detec   -  Ceftolozane/tazobactam: R >8   -  Ceftazidime/Avibactam: S <=4   -  ESBL: Detec   -  KPC resistance gene: Detec The KPC gene confers resistance to all penicillins, cephalosporins, carbapenems and aztreonam. Additional resistance to fluoroquinolones and aminoglycosides is likely.   -  K. pneumoniae group: Detec (K. pneumoniae, K. quasipneumoniae, K. variicola)   -  Proteus species: Detec   -  Meropenem/Vaborbactam: S <=2   -  Trimethoprim/Sulfamethoxazole: S 2/38   -  Trimethoprim/Sulfamethoxazole: S 2/38   -  Trimethoprim/Sulfamethoxazole: S <=0.5/9.5   -  Piperacillin/Tazobactam: R >64   -  Piperacillin/Tazobactam: R <=8   -  Piperacillin/Tazobactam: S <=8   -  Tobramycin: S <=2   -  Tobramycin: R >8   -  Tobramycin: S <=2   -  Gentamicin: S <=2   -  Gentamicin: R >8   -  Gentamicin: S <=2   -  Imipenem: S <=1   -  Imipenem: R >8   -  Levofloxacin: S <=0.5   -  Levofloxacin: R >4   -  Levofloxacin: S <=0.5   -  Meropenem: S <=1   -  Meropenem: S <=1   -  Meropenem: R >8   Gram Stain:   Growth in aerobic bottle: Gram Negative Rods   -  Ampicillin: S <=8 These ampicillin results predict results for amoxicillin   -  Ampicillin: R >16 These ampicillin results predict results for amoxicillin   -  Ampicillin: R >16 These ampicillin results predict results for amoxicillin   -  Ampicillin/Sulbactam: R 16/8   -  Ampicillin/Sulbactam: R >16/8   -  Ampicillin/Sulbactam: S <=4/2   -  Aztreonam: S <=4   -  Aztreonam: R >16   -  Aztreonam: R >16   -  Cefazolin: R >16   -  Cefazolin: R >16   -  Cefazolin: S <=2   -  Cefepime: S <=2   -  Cefepime: R 16   -  Cefepime: R >16   -  Cefoxitin: S <=8   -  Ceftriaxone: S <=1   -  Ceftriaxone: R >32   -  Ceftriaxone: R >32   -  Ciprofloxacin: S <=0.25   -  Ciprofloxacin: R >2   -  Ciprofloxacin: S <=0.25   -  Ertapenem: S <=0.5   -  Ertapenem: S <=0.5   -  Ertapenem: R >1        .Blood BLOOD  12-30-24   Growth in aerobic bottle: Klebsiella pneumoniae (Carbapenem Resistant)  Growth in aerobic bottle: Escherichia coli ESBL  See previous culture [id number]  --    Growth in aerobic bottle: Gram Negative Rods    Clean Catch  12-16-24   >100,000 CFU/ml Escherichia coli ESBL  --  Escherichia coli ESBL    Ferritin: 445 (12-25)  D-Dimer Assay, Quantitative: 1533 (01-01)  D-Dimer Assay, Quantitative: 2047 (12-30)    Procalcitonin: 7.74 (12-31-24 @ 02:19)  Procalcitonin: 6.42 (12-30-24 @ 19:30)      SARS-CoV-2: Detected (24 Dec 2024 11:48)  SARS-CoV-2: Detected (04 Dec 2024 11:00)    RADIOLOGY:  < from: Xray Chest 1 View- PORTABLE-Urgent (Xray Chest 1 View- PORTABLE-Urgent .) (01.06.25 @ 10:46) >  IMPRESSION:  Worsening diffuse right lung infiltrate consistent with pneumonia.  Stable moderate bilateral pleural effusions.  Left central line and enteric catheter in satisfactory position.    --- End of Report ---    < end of copied text >   SPENCER MIXON  MRN-[id number]  79y (1945)    Follow Up:  OM, wounds, bacteremia, uti, hypothermia    Interval History: Seen and examined in CCU, chronically ill appearing, not awake or alert, not interactive, not in acute distress. On nonrebreather. Remains on levophed.    ROS:    [x ] Unobtainable because: pt is lethargic, not interactive   [ ] All other systems negative    Constitutional: no fever, no chills  Head: no trauma  Eyes: no vision changes, no eye pain  ENT:  no sore throat, no rhinorrhea  Cardiovascular:  no chest pain, no palpitation  Respiratory:  no SOB, no cough  GI:  no abd pain, no vomiting, no diarrhea  urinary: no dysuria, no hematuria, no flank pain  musculoskeletal:  no joint pain, no joint swelling  skin:  no rash  neurology:  no headache, no seizure, no change in mental status  psych: no anxiety, no depression       Allergies  No Known Allergies      ANTIMICROBIALS:  ceftazidime/avibactam IVPB 2.5 every 8 hours  metroNIDAZOLE  IVPB 500 every 8 hours    OTHER MEDS:  chlorhexidine 2% Cloths 1 Application(s) Topical <User Schedule>  insulin lispro (ADMELOG) corrective regimen sliding scale   SubCutaneous every 6 hours  midodrine 10 milliGRAM(s) Oral every 8 hours  norepinephrine Infusion 0.05 MICROgram(s)/kG/Min IV Continuous <Continuous>  nystatin Powder 1 Application(s) Topical two times a day  ondansetron Injectable 4 milliGRAM(s) IV Push every 8 hours PRN  pantoprazole  Injectable 40 milliGRAM(s) IV Push daily  polyethylene glycol 3350 17 Gram(s) Oral daily  senna 2 Tablet(s) Oral at bedtime  sodium chloride 0.9% lock flush 10 milliLiter(s) IV Push every 1 hour PRN      Physical Exam:  Vital Signs Last 24 Hrs  T(C): 36.9 (06 Jan 2025 13:30), Max: 36.9 (06 Jan 2025 13:30)  T(F): 98.4 (06 Jan 2025 13:30), Max: 98.4 (06 Jan 2025 13:30)  HR: 102 (06 Jan 2025 13:30) (67 - 112)  BP: 135/68 (06 Jan 2025 13:30) (87/39 - 156/65)  BP(mean): 86 (06 Jan 2025 13:30) (52 - 89)  RR: 24 (06 Jan 2025 13:30) (16 - 34)  SpO2: 100% (06 Jan 2025 13:30) (82% - 100%)      Constitutional: Elderly woman chronic ill appearing, lethargic, not interactive   HEENT: Dry oral mucosa, unable to assess pt's eyes due to non-compliance, NGT in place  Cardiovascular:   normal S1, S2, no edema  Respiratory:  coarse breath sounds bilaterally, no wheezes  GI:  soft, non distended  :  + amezquita with clear urine in the bag    Musculoskeletal: Atrophic / wasted limbs, contracted. +multiple pressure ulcers with vac dressing on  Neurologic: Lethargic. Pt does not answer any of my questions, does not follow commands   Skin:  warm, dry, no rash  Psychiatric: unable     WBC Count: 26.95 K/uL (01-06 @ 10:12)  WBC Count: 17.79 K/uL (01-06 @ 02:57)  WBC Count: 16.06 K/uL (01-05 @ 04:00)  WBC Count: 12.65 K/uL (01-04 @ 03:25)  WBC Count: 12.39 K/uL (01-03 @ 03:15)  WBC Count: 11.40 K/uL (01-02 @ 03:18)  WBC Count: 15.49 K/uL (01-01 @ 03:25)                          8.2    26.95 )-----------( 28       ( 06 Jan 2025 10:12 )             27.2     01-06    137  |  109[H]  |  18  ----------------------------<  182[H]  4.0   |  23  |  0.45[L]    Ca    7.7[L]      06 Jan 2025 10:12  Phos  2.3     01-06  Mg     1.8     01-06    TPro  4.7[L]  /  Alb  1.2[L]  /  TBili  0.5  /  DBili  x   /  AST  14[L]  /  ALT  8[L]  /  AlkPhos  433[H]  01-06    Creatinine Trend: 0.40<--, 0.39<--, 0.45<--, 0.44<--, 0.48<--, 0.45<--    Urinalysis (12.30.24 @ 13:00)   pH Urine: 6.0   Glucose Qualitative, Urine: Negative mg/dL   Blood, Urine: Small   Color: Yellow   Urine Appearance: Cloudy   Bilirubin: Negative   Ketone - Urine: Negative mg/dL   Specific Gravity: 1.009   Protein, Urine: 30 mg/dL   Urobilinogen: 1.0 mg/dL   Nitrite: Negative   Leukocyte Esterase Concentration: Moderate  Urine Microscopic-Add On (NC) (12.30.24 @ 13:00)   Bacteria: Few /HPF   Squamous Epithelial Cells: few   Comment - Urine 2: many yeast seen   Red Blood Cell - Urine: 0-2 /HPF   White Blood Cell - Urine: 3-5 /HPF      MICROBIOLOGY:  .Blood BLOOD  12-30-24   Growth in aerobic bottle: Proteus mirabilis  Growth in aerobic bottle: Klebsiella pneumoniae (Carbapenem Resistant)  Growth in aerobic bottle: Escherichia coli ESBL  Culture - Blood (12.30.24 @ 09:33)   -  Bacteroides fragilis: Detec   -  Carbapenem Resistance: Detec   -  CTX-M Resistance Marker: Detec   -  Ceftolozane/tazobactam: R >8   -  Ceftazidime/Avibactam: S <=4   -  ESBL: Detec   -  KPC resistance gene: Detec The KPC gene confers resistance to all penicillins, cephalosporins, carbapenems and aztreonam. Additional resistance to fluoroquinolones and aminoglycosides is likely.   -  K. pneumoniae group: Detec (K. pneumoniae, K. quasipneumoniae, K. variicola)   -  Proteus species: Detec   -  Meropenem/Vaborbactam: S <=2   -  Trimethoprim/Sulfamethoxazole: S 2/38   -  Trimethoprim/Sulfamethoxazole: S 2/38   -  Trimethoprim/Sulfamethoxazole: S <=0.5/9.5   -  Piperacillin/Tazobactam: R >64   -  Piperacillin/Tazobactam: R <=8   -  Piperacillin/Tazobactam: S <=8   -  Tobramycin: S <=2   -  Tobramycin: R >8   -  Tobramycin: S <=2   -  Gentamicin: S <=2   -  Gentamicin: R >8   -  Gentamicin: S <=2   -  Imipenem: S <=1   -  Imipenem: R >8   -  Levofloxacin: S <=0.5   -  Levofloxacin: R >4   -  Levofloxacin: S <=0.5   -  Meropenem: S <=1   -  Meropenem: S <=1   -  Meropenem: R >8   Gram Stain:   Growth in aerobic bottle: Gram Negative Rods   -  Ampicillin: S <=8 These ampicillin results predict results for amoxicillin   -  Ampicillin: R >16 These ampicillin results predict results for amoxicillin   -  Ampicillin: R >16 These ampicillin results predict results for amoxicillin   -  Ampicillin/Sulbactam: R 16/8   -  Ampicillin/Sulbactam: R >16/8   -  Ampicillin/Sulbactam: S <=4/2   -  Aztreonam: S <=4   -  Aztreonam: R >16   -  Aztreonam: R >16   -  Cefazolin: R >16   -  Cefazolin: R >16   -  Cefazolin: S <=2   -  Cefepime: S <=2   -  Cefepime: R 16   -  Cefepime: R >16   -  Cefoxitin: S <=8   -  Ceftriaxone: S <=1   -  Ceftriaxone: R >32   -  Ceftriaxone: R >32   -  Ciprofloxacin: S <=0.25   -  Ciprofloxacin: R >2   -  Ciprofloxacin: S <=0.25   -  Ertapenem: S <=0.5   -  Ertapenem: S <=0.5   -  Ertapenem: R >1        .Blood BLOOD  12-30-24   Growth in aerobic bottle: Klebsiella pneumoniae (Carbapenem Resistant)  Growth in aerobic bottle: Escherichia coli ESBL  See previous culture [id number]  --    Growth in aerobic bottle: Gram Negative Rods    Clean Catch  12-16-24   >100,000 CFU/ml Escherichia coli ESBL  --  Escherichia coli ESBL    Ferritin: 445 (12-25)  D-Dimer Assay, Quantitative: 1533 (01-01)  D-Dimer Assay, Quantitative: 2047 (12-30)    Procalcitonin: 7.74 (12-31-24 @ 02:19)  Procalcitonin: 6.42 (12-30-24 @ 19:30)      SARS-CoV-2: Detected (24 Dec 2024 11:48)  SARS-CoV-2: Detected (04 Dec 2024 11:00)    RADIOLOGY:  < from: Xray Chest 1 View- PORTABLE-Urgent (Xray Chest 1 View- PORTABLE-Urgent .) (01.06.25 @ 10:46) >  IMPRESSION:  Worsening diffuse right lung infiltrate consistent with pneumonia.  Stable moderate bilateral pleural effusions.  Left central line and enteric catheter in satisfactory position.    --- End of Report ---    < end of copied text >

## 2025-01-07 NOTE — CONSULT NOTE ADULT - ASSESSMENT
CONSULT IN PROGRESS    Merline Coker D.Min., RN-BSN, [facility]  Medical Ethics  [phone] The central ethical issue that exists in this case is (A) the patient’s autonomy versus (B) the providers’ desire for beneficence and non-maleficence. Autonomy centers on letting patients with capacity decide what is best for their health after being fully informed of the options available, as well as the risks and benefits of those options. Non-maleficence focuses on the clinician’s desire to do no harm. Similarly, beneficence focuses on the clinicians’ desire to do what is best for their patient, requiring that health care providers consider what is best for the patient given the individual patient’s unique circumstances.    Beneficence calls on clinicians to honor and preserve a patient’s sense of dignity and personhood, to promote the best possible health or quality of living or dying with aggressive interventions when these help to prolong life with "good quality," with comfort care when a cure is not possible and the aim is to achieve the best "quality of dying."     The clinician’s duty to non-maleficence means avoiding medical interventions whose risk and burden exceeds their benefit ("first do no harm" or primum non nocere) in weighing the risks and benefits of proposed interventions over alternatives.     Also in this case, the patient's  (according to the [facility] Family Health Care Decision Act is the appropriate decision maker, and the son's are aware. The family is utilizing shared decision making as a support to each other as decisions regarding Advance Directives are involved. As the family is discerning best interests for the patient, the providers are reminded that Mrs. Li has an innate MORAL STATUS –that is, her distinct personhood, personal experience and interpretation of suffering, life-story, etc.– which must be respected. Decisions concerning treatment are complex and must be taken into consideration given the higher rates of mortality and morbidity associated in this specific case. It is also important that "quality of life and death" decisions rest with the surrogate decision maker.     Decisions concerning treatment are complex and must be taken into consideration given the higher rates of mortality and morbidity associated in this specific case. The patient’s prognosis is poor, and Mrs. Li is unlikely to recover. The benefits of LSTs must be weighed against the harm LSTs may cause when they no longer improve prognosis but introduce suffering and risk. In this case, proportionality (the assessment of benefits versus risks) leads her physicians to recommend comfort care as the patient continues to decline. What makes this case complex is the patient's family is also struggling with another family member who is in transitioning towards death. This adds to the level of stress and the struggle of coming to terms with decisions that put in front of them.     Physicians are under no legal or ethical obligation to offer treatments that would 1) provide no medically indicated benefit or 2) impose unnecessary risk or burden to the patient. The benefits of interventions (e.g. cardiopulmonary resuscitation, vasopressors, hemodialysis, blood transfusions) and other life-sustaining treatments (LSTs) must be weighed against the harm from LSTs that may cause when it no longer improves prognosis but introduces suffering and risk .   Certain life-sustaining measures, such as cardiopulmonary resuscitation, vasopressors, hemodialysis, blood transfusion may be considered non-beneficial by the health providers. The benefits of each life sustaining measure must be weighed against the possible harm of each procedure or intervention when it no longer improves a patient’s prognosis but introduces risk of suffering. At this juncture, comfort measures could be considered “everything that is medically possible at present.”       RECOMMENDATIONS  Ethics will remain available to the family as they navigate these discussions and will follow up accordingly .      Merline Coker D.Min., RN-BSN, [facility]  Medical Ethics  Dept of Medicine  [phone]      MORE THAN 50% OF THE TIME OF THIS CONSULTATION WAS SPENT IN COORDINATION OF CARE OF PATIENT    REFERENCES     Rajendra RODRIGUEZ, Mariela B, Pepito HARRIS. Ethical considerations at the end-of-life care. [facility] Med. 2021 Mar 12;9:[id number]. doi: 10.1177/49436208750634225. PMID: 88842428; PMCID: [id number].     Julio Cesar TL & Caryn JF. Principles of Biomedical Ethics. [place]: [employer]; 2019     Grant LJ, Som SM, O'Brady M, Howsuraj D, Mateo BALBUENA. Family Caregivers' Preparations for Death: A Qualitative Analysis. J Pain Symptom Manage. 2018 Jun;55(6):3688-9175. doi: 10.1016/j.jpainsymman.2018.02.018. Epub 2018 Feb 27. PMID:    Camille GAVIN & Pina HORTON. Easing the burden of surrogate decision making: the role of a do-not-escalate-treatment order. J pall med 2015;18(3):306-309.      Eladia Y, Nydia M, Kalani K. The experiences and perspectives of family surrogate decision-makers: A systematic review of qualitative studies. Patient Educ Couns. 2020 Taco;103(6):5278-1816. doi: 10.1016/j.pec.2019.12.011. Epub 2019 Dec 16. PMID: 57693698.

## 2025-01-07 NOTE — CONSULT NOTE ADULT - CONSULT REASON
UTI
shock
chronic osteo
79 female with complex history and family is struggling with Advance Directives
ongoing GOC in setting of new onset dysphagia

## 2025-01-07 NOTE — PROGRESS NOTE ADULT - ASSESSMENT
The patient is 80 y/o woman with PMH of osteoporosis, MS, bedbound, known to ID from last admission, who presents today for hematuria. Reportedly, amezquita was changed 2 days ago and blood was seen in the catheter at the time. Patient reports dysuria, denies frequency and urgency. Also c/o back pain. On presentation, patient is hemodynamically stable and afebrile. Labs significant for elevated WBC 39.41, UA showing large LE, +nitrite, +WBC, +bacteria. CT abdomen/pelvis shows extensive decubitus ulcers overlying the sacrum and the greater trochanters; curvilinear fluid collection overlying the left posterior acetabulum; foci of gas in the left hip joint; scattered subcutaneous gas; scattered osseous sclerosis worrisome for chronic osteomyelitis; rectum distended by stool. Patient given zosyn & vancomycin. ID consulted for workup and antibiotic management.    12/17: Last admit here 11/20--12/10 and treated for infected decub ulcers, SARS-CoV-2 infection and CAUTI. 11/20 urine culture with ESBL E. Coli and CRE Kleb was noted. 11/22 wound culture with ESBL E. Coli, E. faecalis and strep. She was on meropenem and vancomycin x 10 days and was discharged on a course of Augmentin. Now returns with another amezquita associated UTI. CT abd pelvis report and images reviewed, showing known pressure ulcers with concern for possible underlying chronic osteomyelitis.   12/18: no fever, RA, no new cbc, UC is growing E. Coli, no blood culture data is available, Zosyn IV continued.   12/23: Afebrile. No leukocytosis. Zosyn was switched to Ertapenem on 12/20 after ESBL reported in the urine culture. Sensitivity noted above. MRI pelvis today with acute on chronic osteomyelitis at the sacrum.  12/24: remains afebrile since 12/20, RA, no new cbc, wounds with vac dressings on, + mild rhinorrhea on exam, will obtain RVP, pt had recent covid 19, ertapenem IV continued (day #5)  12/30: RRT today due to hypotension requiring levophed. Transferred to MICU. Leukocytosis WBC: 23k. CXR image and report reviewed. She has bibasilar opacities R>L. Looks worse than from 12/4. Given a dose of vancomycin. Meropenem started. Blood cultures obtained. Recent RVP 12/24 positive for RSV and again SARS-CoV-2 (likely persistent viral shedding, not a new infection, was positive on 12/4 and 12/16)  12/31: Remains in ICU on one pressor. Afebrile. Awake today. Leukocytosis up trending, WBC 40K. MRSA screen negative. 12/30 blood cultures with GNRs, polymicrobial PCR detection of Bacteroides, Proteus and Klebsiella species with ESBL and KPC resistance genes identified. Meropenem was stopped. Avycaz started.  1/2: Slight hypothermia, T: 96.4F. Leukocytosis improved, WBC: 11k today. On minimal pressor support. Remains lethargic and NPO. Possibly getting NGT. No wound debridement needed per surgery. No new events per bedside RN. The susceptibility report reviewed as noted above. Avycaz and metronidazole continued.  1/3: remains in CCU, on pressors, hypothermic this morning 94.6, NC, WBC increased 12.39, Cr ok, BCs with  Klebsiella and ESBL E. Coli, abx continued Avycaz and Flagyl.   1/6: Afebrile. Rising leukocytosis, WBC: 26k today. Requiring levophed, stress steroids. On warming blanket due to episodes of hypothermia. Ongoing goals of care discussion with family. Patient is full code. On Avycaz and flagyl day#7  1/7: remains in CCU, full code, hypothermic, WBC is high but slightly better 24.88, on bipap, PLT low 12, Cr ok, MRSA PCR negative, repeat BC x 1 NGTD, Avycaz and Flagyl continued day #8, Ethics involved in the case.     Impression:  1. Septic shock secondary to polymicrobial bacteremia - likely source decubitus ulcers  2. Catheter associated UTI-s/p treatment for ESBL E.Coli  3. Chronic pressure ulcers - on wound vac  4. Acute on chronic sacral osteomyelitis  5. Leukocytosis  6. Old age, debility, bedbound  7. Acute encephalopathy  8. Recent SARS-CoV-2 infection s/p treatment  9. RSV    Recommendations:  --Continue ceftazidime-avibactam 2.5g IV q8h  --Continue metronidazole 500 mg IV q8h  --Wound vac management per PT  --Vascular surgery/wound care follow up  --Aspiration precautions  --Palliative eval/goals of care discussion is on going -  full code at this time  -- Ethics input is appreciated   --Guarded prognosis      Discussed with CCU team The patient is 80 y/o woman with PMH of osteoporosis, MS, bedbound, known to ID from last admission, who presents today for hematuria. Reportedly, amezquita was changed 2 days ago and blood was seen in the catheter at the time. Patient reports dysuria, denies frequency and urgency. Also c/o back pain. On presentation, patient is hemodynamically stable and afebrile. Labs significant for elevated WBC 39.41, UA showing large LE, +nitrite, +WBC, +bacteria. CT abdomen/pelvis shows extensive decubitus ulcers overlying the sacrum and the greater trochanters; curvilinear fluid collection overlying the left posterior acetabulum; foci of gas in the left hip joint; scattered subcutaneous gas; scattered osseous sclerosis worrisome for chronic osteomyelitis; rectum distended by stool. Patient given zosyn & vancomycin. ID consulted for workup and antibiotic management.    12/17: Last admit here 11/20--12/10 and treated for infected decub ulcers, SARS-CoV-2 infection and CAUTI. 11/20 urine culture with ESBL E. Coli and CRE Kleb was noted. 11/22 wound culture with ESBL E. Coli, E. faecalis and strep. She was on meropenem and vancomycin x 10 days and was discharged on a course of Augmentin. Now returns with another amezquita associated UTI. CT abd pelvis report and images reviewed, showing known pressure ulcers with concern for possible underlying chronic osteomyelitis.   12/18: no fever, RA, no new cbc, UC is growing E. Coli, no blood culture data is available, Zosyn IV continued.   12/23: Afebrile. No leukocytosis. Zosyn was switched to Ertapenem on 12/20 after ESBL reported in the urine culture. Sensitivity noted above. MRI pelvis today with acute on chronic osteomyelitis at the sacrum.  12/24: remains afebrile since 12/20, RA, no new cbc, wounds with vac dressings on, + mild rhinorrhea on exam, will obtain RVP, pt had recent covid 19, ertapenem IV continued (day #5)  12/30: RRT today due to hypotension requiring levophed. Transferred to MICU. Leukocytosis WBC: 23k. CXR image and report reviewed. She has bibasilar opacities R>L. Looks worse than from 12/4. Given a dose of vancomycin. Meropenem started. Blood cultures obtained. Recent RVP 12/24 positive for RSV and again SARS-CoV-2 (likely persistent viral shedding, not a new infection, was positive on 12/4 and 12/16)  12/31: Remains in ICU on one pressor. Afebrile. Awake today. Leukocytosis up trending, WBC 40K. MRSA screen negative. 12/30 blood cultures with GNRs, polymicrobial PCR detection of Bacteroides, Proteus and Klebsiella species with ESBL and KPC resistance genes identified. Meropenem was stopped. Avycaz started.  1/2: Slight hypothermia, T: 96.4F. Leukocytosis improved, WBC: 11k today. On minimal pressor support. Remains lethargic and NPO. Possibly getting NGT. No wound debridement needed per surgery. No new events per bedside RN. The susceptibility report reviewed as noted above. Avycaz and metronidazole continued.  1/3: remains in CCU, on pressors, hypothermic this morning 94.6, NC, WBC increased 12.39, Cr ok, BCs with  Klebsiella and ESBL E. Coli, abx continued Avycaz and Flagyl.   1/6: Afebrile. Rising leukocytosis, WBC: 26k today. Requiring levophed, stress steroids. On warming blanket due to episodes of hypothermia. Ongoing goals of care discussion with family. Patient is full code. On Avycaz and flagyl day#7  1/7: remains in CCU, full code, hypothermic, WBC is high but slightly better 24.88, on bipap, PLT low 12, Cr ok, MRSA PCR negative, repeat BC x 1 NGTD, Avycaz and Flagyl continued day #8, Ethics involved in the case.     Impression:  1. Septic shock secondary to polymicrobial bacteremia - likely source decubitus ulcers  2. Catheter associated UTI-s/p treatment for ESBL E.Coli  3. Chronic pressure ulcers - on wound vac  4. Acute on chronic sacral osteomyelitis  5. Leukocytosis  6. Old age, debility, bedbound  7. Acute encephalopathy  8. Recent SARS-CoV-2 infection s/p treatment  9. RSV    Recommendations:  --Continue ceftazidime-avibactam 2.5g IV q8h  --Continue metronidazole 500 mg IV q8h  --Wound vac management per PT  --Vascular surgery/wound care follow up  --Aspiration precautions  --Palliative eval/goals of care discussion is on going -  full code at this time  --Ethics input is appreciated   --Guarded prognosis    Discussed with CCU team

## 2025-01-07 NOTE — CONSULT NOTE ADULT - REASON FOR ADMISSION
Urinary tract infection and chronic osteomyelitis

## 2025-01-07 NOTE — PROGRESS NOTE ADULT - SUBJECTIVE AND OBJECTIVE BOX
SPENCER MIXON  MRN-[id number]  79y (1945)    Follow Up:  PNA, hypothermia, bacteremia, wounds     Interval History: The pt was seen and examined earlier, remains in CCU, full code, lethargic, weak and ill appearing, not interactive, hypothermic, under the warming blanket, on bipap, tachycardic, leukocytosis is high 24.88.      PAST MEDICAL & SURGICAL HISTORY:  Osteoporosis      Uterine polyp      Herniated disc  lumbar      Functional quadriplegia secondary to multiple sclerosis      Bilateral Tubal ligation  46y/o          ROS:    [x ] Unobtainable because: pt is not interactive, lethargic   [ ] All other systems negative    Constitutional: no fever, no chills  Head: no trauma  Eyes: no vision changes, no eye pain  ENT:  no sore throat, no rhinorrhea  Cardiovascular:  no chest pain, no palpitation  Respiratory:  no SOB, no cough  GI:  no abd pain, no vomiting, no diarrhea  urinary: no dysuria, no hematuria, no flank pain  musculoskeletal:  no joint pain, no joint swelling  skin:  no rash  neurology:  no headache, no seizure, no change in mental status  psych: no anxiety, no depression         Allergies  No Known Allergies        ANTIMICROBIALS:  ceftazidime/avibactam IVPB 2.5 every 8 hours  metroNIDAZOLE  IVPB 500 every 8 hours      OTHER MEDS:  buMETAnide Injectable 2 milliGRAM(s) IV Push every 12 hours  chlorhexidine 2% Cloths 1 Application(s) Topical <User Schedule>  droxidopa 100 milliGRAM(s) Oral three times a day  fentaNYL    Injectable 25 MICROGram(s) IV Push every 8 hours PRN  fludroCORTISONE 0.1 milliGRAM(s) Oral daily  hydrocortisone sodium succinate Injectable 50 milliGRAM(s) IV Push every 6 hours  insulin lispro (ADMELOG) corrective regimen sliding scale   SubCutaneous every 6 hours  midodrine 30 milliGRAM(s) Oral every 8 hours  norepinephrine Infusion 0.05 MICROgram(s)/kG/Min IV Continuous <Continuous>  ondansetron Injectable 4 milliGRAM(s) IV Push every 8 hours PRN  pantoprazole  Injectable 40 milliGRAM(s) IV Push daily  polyethylene glycol 3350 17 Gram(s) Oral daily  senna 2 Tablet(s) Oral at bedtime  sodium chloride 0.9% lock flush 10 milliLiter(s) IV Push every 1 hour PRN      Vital Signs Last 24 Hrs  T(C): 35.3 (07 Jan 2025 12:30), Max: 36.8 (06 Jan 2025 14:00)  T(F): 95.5 (07 Jan 2025 12:30), Max: 98.2 (06 Jan 2025 14:00)  HR: 106 (07 Jan 2025 12:30) (90 - 117)  BP: 93/63 (07 Jan 2025 12:30) (72/51 - 136/52)  BP(mean): 73 (07 Jan 2025 12:30) (58 - 93)  RR: 30 (07 Jan 2025 12:30) (0 - 34)  SpO2: 99% (07 Jan 2025 12:30) (95% - 100%)    Parameters below as of 07 Jan 2025 07:30      O2 Concentration (%): 45    Physical Exam:  Constitutional: Elderly woman ill appearing, lethargic, not interactive   HEENT: Dry oral mucosa, bipap is on, unable to assess pt's eyes due to non-compliance, NGT in place  Cardiovascular:  tachycardic S1, S2, no edema  Respiratory:  coarse breath sounds bilaterally, no wheezes  GI:  soft, non distended  :  + amezquita    Musculoskeletal: Atrophic / wasted limbs, contracted. +multiple pressure ulcers    Neurologic: Lethargic. Pt does not answer any of my questions, does not follow commands   Skin:  warm, dry, under the warming blanket   Psychiatric: unable     WBC Count: 24.88 K/uL (01-07 @ 02:30)  WBC Count: 26.95 K/uL (01-06 @ 10:12)  WBC Count: 17.79 K/uL (01-06 @ 02:57)  WBC Count: 16.06 K/uL (01-05 @ 04:00)  WBC Count: 12.65 K/uL (01-04 @ 03:25)  WBC Count: 12.39 K/uL (01-03 @ 03:15)  WBC Count: 11.40 K/uL (01-02 @ 03:18)                            8.6    24.88 )-----------( 12       ( 07 Jan 2025 02:30 )             27.1       01-07    138  |  105  |  19  ----------------------------<  170[H]  3.7   |  26  |  0.53    Ca    7.8[L]      07 Jan 2025 09:00  Phos  2.4     01-07  Mg     2.3     01-07    TPro  4.6[L]  /  Alb  1.3[L]  /  TBili  0.8  /  DBili  x   /  AST  8[L]  /  ALT  9[L]  /  AlkPhos  408[H]  01-07      Urinalysis Basic - ( 07 Jan 2025 09:00 )    Color: x / Appearance: x / SG: x / pH: x  Gluc: 170 mg/dL / Ketone: x  / Bili: x / Urobili: x   Blood: x / Protein: x / Nitrite: x   Leuk Esterase: x / RBC: x / WBC x   Sq Epi: x / Non Sq Epi: x / Bacteria: x        Creatinine Trend: 0.53<--, 0.50<--, 0.45<--, 2.74<--, 0.48<--, 0.55<--      MICROBIOLOGY:  v  .Blood BLOOD  01-04-25   No growth at 72 Hours  --  --      .Blood BLOOD  12-30-24   Growth in aerobic bottle: Proteus mirabilis  Growth in aerobic bottle: Klebsiella pneumoniae (Carbapenem Resistant)  Growth in aerobic bottle: Escherichia coli ESBL  Direct identification is available within approximately 3-5  hours either by BloodPanel Multiplexed PCR or Direct  MALDI-TOF. Details: [email or web address]  --  Blood Culture PCR  Proteus mirabilis  Klebsiella pneumoniae (Carbapenem Resistant)  Escherichia coli ESBL      .Blood BLOOD  12-30-24   Growth in aerobic bottle: Klebsiella pneumoniae (Carbapenem Resistant)  Growth in aerobic bottle: Escherichia coli ESBL  See previous culture [id number]  --    Growth in aerobic bottle: Gram Negative Rods      Clean Catch  12-16-24   >100,000 CFU/ml Escherichia coli ESBL  --  Escherichia coli ESBL    Ferritin: 445 (12-25)      D-Dimer Assay, Quantitative: 1533 (01-01)  D-Dimer Assay, Quantitative: 2047 (12-30)    Procalcitonin: 0.66 (01-07-25 @ 02:30)      SARS-CoV-2: Detected (24 Dec 2024 11:48)  SARS-CoV-2: Detected (04 Dec 2024 11:00)    RADIOLOGY:     SPENCER MIXON  MRN-[id number]  79y (1945)    Follow Up:  PNA, hypothermia, bacteremia, wounds     Interval History: The pt was seen and examined earlier, remains in CCU, full code, lethargic, weak and ill appearing, not interactive, hypothermic, under the warming blanket, on bipap, tachycardic, leukocytosis is high 24.88.        ROS:    [x ] Unobtainable because: pt is not interactive, lethargic   [ ] All other systems negative    Constitutional: no fever, no chills  Head: no trauma  Eyes: no vision changes, no eye pain  ENT:  no sore throat, no rhinorrhea  Cardiovascular:  no chest pain, no palpitation  Respiratory:  no SOB, no cough  GI:  no abd pain, no vomiting, no diarrhea  urinary: no dysuria, no hematuria, no flank pain  musculoskeletal:  no joint pain, no joint swelling  skin:  no rash  neurology:  no headache, no seizure, no change in mental status  psych: no anxiety, no depression       Allergies  No Known Allergies      ANTIMICROBIALS:  ceftazidime/avibactam IVPB 2.5 every 8 hours  metroNIDAZOLE  IVPB 500 every 8 hours    OTHER MEDS:  buMETAnide Injectable 2 milliGRAM(s) IV Push every 12 hours  chlorhexidine 2% Cloths 1 Application(s) Topical <User Schedule>  droxidopa 100 milliGRAM(s) Oral three times a day  fentaNYL    Injectable 25 MICROGram(s) IV Push every 8 hours PRN  fludroCORTISONE 0.1 milliGRAM(s) Oral daily  hydrocortisone sodium succinate Injectable 50 milliGRAM(s) IV Push every 6 hours  insulin lispro (ADMELOG) corrective regimen sliding scale   SubCutaneous every 6 hours  midodrine 30 milliGRAM(s) Oral every 8 hours  norepinephrine Infusion 0.05 MICROgram(s)/kG/Min IV Continuous <Continuous>  ondansetron Injectable 4 milliGRAM(s) IV Push every 8 hours PRN  pantoprazole  Injectable 40 milliGRAM(s) IV Push daily  polyethylene glycol 3350 17 Gram(s) Oral daily  senna 2 Tablet(s) Oral at bedtime  sodium chloride 0.9% lock flush 10 milliLiter(s) IV Push every 1 hour PRN      Vital Signs Last 24 Hrs  T(C): 35.3 (07 Jan 2025 12:30), Max: 36.8 (06 Jan 2025 14:00)  T(F): 95.5 (07 Jan 2025 12:30), Max: 98.2 (06 Jan 2025 14:00)  HR: 106 (07 Jan 2025 12:30) (90 - 117)  BP: 93/63 (07 Jan 2025 12:30) (72/51 - 136/52)  BP(mean): 73 (07 Jan 2025 12:30) (58 - 93)  RR: 30 (07 Jan 2025 12:30) (0 - 34)  SpO2: 99% (07 Jan 2025 12:30) (95% - 100%)    Parameters below as of 07 Jan 2025 07:30  O2 Concentration (%): 45    Physical Exam:  Constitutional: Elderly woman ill appearing, lethargic, not interactive   HEENT: Dry oral mucosa, bipap is on, unable to assess pt's eyes due to non-compliance, NGT in place  Cardiovascular:  tachycardic S1, S2, no edema  Respiratory:  coarse breath sounds bilaterally, no wheezes  GI:  soft, non distended  :  + amezquita    Musculoskeletal: Atrophic / wasted limbs, contracted. +multiple pressure ulcers    Neurologic: Lethargic. Pt does not answer any of my questions, does not follow commands   Skin:  warm, dry, under the warming blanket   Psychiatric: unable     WBC Count: 24.88 K/uL (01-07 @ 02:30)  WBC Count: 26.95 K/uL (01-06 @ 10:12)  WBC Count: 17.79 K/uL (01-06 @ 02:57)  WBC Count: 16.06 K/uL (01-05 @ 04:00)  WBC Count: 12.65 K/uL (01-04 @ 03:25)  WBC Count: 12.39 K/uL (01-03 @ 03:15)  WBC Count: 11.40 K/uL (01-02 @ 03:18)                        8.6    24.88 )-----------( 12       ( 07 Jan 2025 02:30 )             27.1     01-07    138  |  105  |  19  ----------------------------<  170[H]  3.7   |  26  |  0.53    Ca    7.8[L]      07 Jan 2025 09:00  Phos  2.4     01-07  Mg     2.3     01-07    TPro  4.6[L]  /  Alb  1.3[L]  /  TBili  0.8  /  DBili  x   /  AST  8[L]  /  ALT  9[L]  /  AlkPhos  408[H]  01-07      Urinalysis Basic - ( 07 Jan 2025 09:00 )    Color: x / Appearance: x / SG: x / pH: x  Gluc: 170 mg/dL / Ketone: x  / Bili: x / Urobili: x   Blood: x / Protein: x / Nitrite: x   Leuk Esterase: x / RBC: x / WBC x   Sq Epi: x / Non Sq Epi: x / Bacteria: x    Creatinine Trend: 0.53<--, 0.50<--, 0.45<--, 2.74<--, 0.48<--, 0.55<--    MICROBIOLOGY:  .Blood BLOOD  01-04-25   No growth at 72 Hours  --  --      .Blood BLOOD  12-30-24   Growth in aerobic bottle: Proteus mirabilis  Growth in aerobic bottle: Klebsiella pneumoniae (Carbapenem Resistant)  Growth in aerobic bottle: Escherichia coli ESBL  Direct identification is available within approximately 3-5  hours either by BloodPanel Multiplexed PCR or Direct  MALDI-TOF. Details: [email or web address]  --  Blood Culture PCR  Proteus mirabilis  Klebsiella pneumoniae (Carbapenem Resistant)  Escherichia coli ESBL      .Blood BLOOD  12-30-24   Growth in aerobic bottle: Klebsiella pneumoniae (Carbapenem Resistant)  Growth in aerobic bottle: Escherichia coli ESBL  See previous culture [id number]  --    Growth in aerobic bottle: Gram Negative Rods      Clean Catch  12-16-24   >100,000 CFU/ml Escherichia coli ESBL  --  Escherichia coli ESBL    Ferritin: 445 (12-25)      D-Dimer Assay, Quantitative: 1533 (01-01)  D-Dimer Assay, Quantitative: 2047 (12-30)    Procalcitonin: 0.66 (01-07-25 @ 02:30)      SARS-CoV-2: Detected (24 Dec 2024 11:48)  SARS-CoV-2: Detected (04 Dec 2024 11:00)    RADIOLOGY:

## 2025-01-07 NOTE — PROGRESS NOTE ADULT - ASSESSMENT
Pt is a 78 yo F with h/o MS, bedbound at baseline, chronic osteomyelitis admitted to medicine for acute on chronic osteomoyelitis and UTI. During this hospitalization pt (+) Covid-19, Influenza A and RSV. Pt transferred to the ICU s/p RRT 2 to hypotension. Pt found to be in septic shock 2 to Klebsiella, E. coli, Proteus and Bacteroides. Most recently pt on increasing Levophed and O2 requirement (pt with worsening R sided infiltrate). ICU dx: Septic shock and acute hypoxic resp failure 2 to Klebsiella, E. coli, Proteus and Bacteroides and PNA. Overnight placed on BiPAP 2 to worsening hypercapnia    Resp: Pt with worsening hypercapnia overnight and placed on BiPAP (pt at increased risk for intubation)  ID: Abx as per F/u ID  CVS: Midodrine + Droxidopa + Levophed to maintain MAP >65   HEME: DVT prophylaxis with Venodynes/ Thrombocytopenia; trend platelets  FEN: Severe protein-calorie malnutrition and Underweight (BMI < 19); feeds on hold 2 to pt being on BiPAP  Endo: Follow FS and cover with Lispro/ Cont Hydrocortisone and add Florinef  Neuro: MS slightly improved today  Social: Pt is full code and son agrees for intubation if necessary (pt at increased risk for intubation)/ Overall long term prognosis is poor

## 2025-01-07 NOTE — PROGRESS NOTE ADULT - SUBJECTIVE AND OBJECTIVE BOX
HPI:  Pt is a 78 yo F with h/o MS, bedbound at baseline, chronic osteomyelitis admitted to medicine for acute on chronic osteomoyelitis and UTI. During this hospitalization pt (+) Covid-19, Influenza A and RSV. Pt transferred to the ICU s/p RRT 2 to hypotension. Pt found to be in septic shock 2 to Klebsiella, E. coli, Proteus and Bacteroides. Most recently pt on increasing Levophed and O2 requirement (pt with worsening R sided infiltrate). ICU dx: Septic shock and acute hypoxic resp failure 2 to Klebsiella, E. coli, Proteus and Bacteroides and PNA. Overnight placed on BiPAP 2 to worsening hypercapnia    ## Labs:  CBC:                        8.6    24.88 )-----------( 12       ( 07 Jan 2025 02:30 )             27.1     Chem:  01-07    138  |  105  |  19  ----------------------------<  170[H]  3.7   |  26  |  0.53    Ca    7.8[L]      07 Jan 2025 09:00  Phos  2.4     01-07  Mg     2.3     01-07    TPro  4.6[L]  /  Alb  1.3[L]  /  TBili  0.8  /  DBili  x   /  AST  8[L]  /  ALT  9[L]  /  AlkPhos  408[H]  01-07    Coags:          ## Imaging:    ## Medications:  ceftazidime/avibactam IVPB 2.5 Gram(s) IV Intermittent every 8 hours  metroNIDAZOLE  IVPB 500 milliGRAM(s) IV Intermittent every 8 hours    buMETAnide Injectable 2 milliGRAM(s) IV Push every 12 hours  droxidopa 100 milliGRAM(s) Oral three times a day  midodrine 30 milliGRAM(s) Oral every 8 hours  norepinephrine Infusion 0.05 MICROgram(s)/kG/Min IV Continuous <Continuous>      fludroCORTISONE 0.1 milliGRAM(s) Oral daily  hydrocortisone sodium succinate Injectable 50 milliGRAM(s) IV Push every 6 hours  insulin lispro (ADMELOG) corrective regimen sliding scale   SubCutaneous every 6 hours      pantoprazole  Injectable 40 milliGRAM(s) IV Push daily  polyethylene glycol 3350 17 Gram(s) Oral daily  senna 2 Tablet(s) Oral at bedtime    fentaNYL    Injectable 25 MICROGram(s) IV Push every 8 hours PRN  ondansetron Injectable 4 milliGRAM(s) IV Push every 8 hours PRN      ## Vitals:  T(C): 35.2 (01-07-25 @ 16:00), Max: 35.9 (01-06-25 @ 17:30)  HR: 103 (01-07-25 @ 16:00) (90 - 117)  BP: 104/90 (01-07-25 @ 16:00) (72/51 - 136/52)  BP(mean): 95 (01-07-25 @ 16:00) (58 - 95)  RR: 28 (01-07-25 @ 16:00) (0 - 34)  SpO2: 93% (01-07-25 @ 16:00) (93% - 100%)  Wt(kg): --  Vent:   ABG: ABG - ( 07 Jan 2025 01:12 )  pH, Arterial: 7.32  pH, Blood: x     /  pCO2: 56    /  pO2: 349   / HCO3: 29    / Base Excess: 1.6   /  SaO2: 99.5                  01-06 @ 07:01  -  01-07 @ 07:00  --------------------------------------------------------  IN: 1440.8 mL / OUT: 1510 mL / NET: -69.2 mL    01-07 @ 07:01 - 01-07 @ 17:01  --------------------------------------------------------  IN: 272 mL / OUT: 400 mL / NET: -128 mL          ## P/E:  Gen: lying comfortably in bed in no apparent distress  Face: (+) BiPAP mask  Lungs:CTA  Heart: Tachy  Abd: Soft/+BS/ Non-tender  Ext: Dependent edema  Neuro: Arousable to verbal stimuli and tracking    CENTRAL LINE: [ ] YES [ ] NO  LOCATION:   DATE INSERTED:  REMOVE: [ ] YES [ ] NO      SAMI: [ ] YES [ ] NO    DATE INSERTED:  REMOVE:  [ ] YES [ ] NO      A-LINE:  [ ] YES [ ] NO  LOCATION:   DATE INSERTED:  REMOVE:  [ ] YES [ ] NO  EXPLAIN:    CODE STATUS: [x ] full code  [ ] DNR  [ ] DNI  [ ] MOLST  Goals of care discussion: [x ] yes Intubation if necessary

## 2025-01-07 NOTE — PROVIDER CONTACT NOTE (CRITICAL VALUE NOTIFICATION) - ACTION/TREATMENT ORDERED:
Discontinue heparin, send coagulation labs and type and screen.
patient is already on antibiotic coverage
Awaiting orders
IV tylenol
1 unit PRBC's ordered
Awaiting orders
No new orders at this time
No orders at this time

## 2025-01-07 NOTE — CONSULT NOTE ADULT - SUBJECTIVE AND OBJECTIVE BOX
CLINICAL SUMMARY:        						:      Prognosis Estimate (survival in days, wks, mos, yrs):									    Patient Decision-Making Capacity:  [  ] Has capacity    [  ] Lacks capacity because					     Patient Aware of:  Diagnosis:  [  ] Yes   [  ] No  [  ] Unknown    Prognosis:  [  ] Yes   [  ] No  [  ] Unknown           Name of medical decision-maker should patient lack capacity:                     Relationship:   				       Role:  [  ] Health Care Agent     [  ] Legal Surrogate   Contact #(s):                (c)                            (h)				       Other ‘stakeholders’:  											      Other Decision-Maker (i.e., HCA or Surrogate) Aware of:  Diagnosis: [  ]Yes   [  ]No      Prognosis:  [  ] Yes    [  ] No     Evidence of Patient’s Preference of Life-Sustaining Treatment (Written or Oral):				               	     Resuscitation status:   DNR:  [  ]Yes   [  ]No      DNI:  [  ]Yes   [  ]No        Discussion (DATE and TIMEFRAME OF DISCUSSION AND WITH WHO) with patient (and/or agent and/or other stakeholders)	  BIOETHICS ANALYSIS:												  	      									  											                                							  CLINICAL SUMMARY:      79 year old female with hematuria. Past medical history includes osteoporosis, multiple sclerosis, extensive decubitus ulcers overlying the sacrum and the greater trochanters; Curvilinear fluid collection overlying the left posterior acetabulum; bedbound with chronic amezquita, Per patient it was changed two days prior to admission.   Upon admission, labs significant for elevated WBC 39.41, Hgb 9.2, elevated Alk Ph 166, UA showing +LE, +nitrite, +WBC, +bacteria. CT abdomen/pelvis shows extensive decubitus ulcers overlying the sacrum and the greater trochanters; Curvilinear fluid collection overlying the left posterior acetabulum; Foci of gas in the left hip joint; Scattered subcutaneous gas; Scattered osseous sclerosis worrisome for chronic osteomyelitis; Rectum distended by stool. Patient given zosyn & vancomycin.    (16 Dec 2024 22:58)  Infectious disease consult this admission notes previous admissions  11/20--12/10 and treated for infected decub ulcers, SARS-CoV-2 infection and CAUTI. 11/20 urine culture with ESBL E. Coli and CRE Kleb was noted. 11/22 wound culture with ESBL E. Coli, E. faecalis and strep. She was on meropenem and vancomycin x 10 days and was discharged on a course of Augmentin. Now returns with another amezquita associated UTI. CT abd pelvis report and images reviewed, showing known pressure ulcers with concern for possible underlying chronic osteomyelitis.       Patient is awake and alert at times and able to answer simple questions. Ethics chart review notes that the patient has a  Robbi and two sons Rex and Yazan who are integral to the patient's care. Multiple conversations regarding advance directives have resulted in the family struggling with decisions. Palliative medicine has consulted and signed off. Ethics assistance requested at this time.  						    Prognosis Estimate (survival in days, wks, mos, yrs): weeks/months 								    Patient Decision-Making Capacity:  [  ] Has capacity    [  ] Lacks capacity because					     Patient Aware of:  Diagnosis:  [  ] Yes   [  ] No  [  ] Unknown    Prognosis:  [  ] Yes   [  ] No  [  ] Unknown           Name of medical decision-maker should patient lack capacity:   Robbi Li                   Relationship:   				       Role:  [  ] Health Care Agent     [  ] Legal Surrogate   Contact #(s):                (c)                            (h)				       Other ‘stakeholders’:  	Yazan Raymond, arleen- Randy Jose- son in law (Rex's )										      Other Decision-Maker (i.e., HCA or Surrogate) Aware of:  Diagnosis: [  ]Yes   [  ]No      Prognosis:  [  ] Yes    [  ] No     Evidence of Patient’s Preference of Life-Sustaining Treatment (Written or Oral):	NONE			               	     Resuscitation status:   DNR:  [  ]Yes   [x  ]No      DNI:  [  ]Yes   [ x ]No        Discussion  1/7/2025--             									  											                                							  CLINICAL SUMMARY:      79 year old female with hematuria. Past medical history includes osteoporosis, multiple sclerosis, extensive decubitus ulcers overlying the sacrum and the greater trochanters; Curvilinear fluid collection overlying the left posterior acetabulum; bedbound with chronic amezquita, Per patient it was changed two days prior to admission.   Upon admission, labs significant for elevated WBC 39.41, Hgb 9.2, elevated Alk Ph 166, UA showing +LE, +nitrite, +WBC, +bacteria. CT abdomen/pelvis shows extensive decubitus ulcers overlying the sacrum and the greater trochanters; Curvilinear fluid collection overlying the left posterior acetabulum; Foci of gas in the left hip joint; Scattered subcutaneous gas; Scattered osseous sclerosis worrisome for chronic osteomyelitis; Rectum distended by stool. Patient given zosyn & vancomycin.    (16 Dec 2024 22:58)  Infectious disease consult this admission notes previous admissions  11/20--12/10 and treated for infected decub ulcers, SARS-CoV-2 infection and CAUTI. 11/20 urine culture with ESBL E. Coli and CRE Kleb was noted. 11/22 wound culture with ESBL E. Coli, E. faecalis and strep. She was on meropenem and vancomycin x 10 days and was discharged on a course of Augmentin. Now returns with another amezquita associated UTI. CT abd pelvis report and images reviewed, showing known pressure ulcers with concern for possible underlying chronic osteomyelitis.       Patient is awake and alert at times and able to answer simple questions. Ethics chart review notes that the patient has a  Robbi and two sons Rex and Yazan who are integral to the patient's care. Multiple conversations regarding advance directives have resulted in the family struggling with decisions. Palliative medicine has consulted and signed off. Ethics assistance requested at this time.  						    Prognosis Estimate (survival in days, wks, mos, yrs): weeks/months 								    Patient Decision-Making Capacity:  [  ] Has capacity    [  ] Lacks capacity because					     Patient Aware of:  Diagnosis:  [  ] Yes   [  ] No  [  ] Unknown    Prognosis:  [  ] Yes   [  ] No  [  ] Unknown           Name of medical decision-maker should patient lack capacity:   Robbi Li                   Relationship:   				       Role:  [  ] Health Care Agent     [  ] Legal Surrogate   Contact #(s):                (c)                            (h)				       Other ‘stakeholders’:  	Yazan Raymond, arleen- Randy Jeremias- son in law (Rex's )										      Other Decision-Maker (i.e., HCA or Surrogate) Aware of:  Diagnosis: [  ]Yes   [  ]No      Prognosis:  [  ] Yes    [  ] No     Evidence of Patient’s Preference of Life-Sustaining Treatment (Written or Oral):	NONE			               	     Resuscitation status:   DNR:  [  ]Yes   [x  ]No      DNI:  [  ]Yes   [ x ]No        Discussion  1/7/2025--[id number] as this ethicist was leaving the unit, the patient's son in law Randy Muñoz came into the unit and was readily engaged in conversation after I introduced myself and my role. Randy is Rex's  and explained a very close and private family dynamic. Both Rex and Yazan are both involved in their parents care and he indicated that Robbi is struggling with his wife's current condition and only wants her home. Randy described Rhys as a wonderful mom and person, warm and loving and very protective of her family. He also explained that the family is in constant discussion regarding [place], and the common denominator is that they don't want her to suffer. He was also able to verbalize her current condition which is frail, and she's doing poorly. He was able to discuss her various medical conditions and he asked about CPR etc. his questions were answered and he indicated that no one wants her to suffer, but they are struggling. Discussed the aspects of natural death and he appreciated the different wording and he will discuss with the family.  He himself once he was done visiting [place], he was driving to [facility] where his own father was "transitioning" and dying.  Gave him my contact information for both Rex and Yazan to all me.                 									  											                                							  CLINICAL SUMMARY:      79 year old female with hematuria. Past medical history includes osteoporosis, multiple sclerosis, extensive decubitus ulcers overlying the sacrum and the greater trochanters; Curvilinear fluid collection overlying the left posterior acetabulum; bedbound with chronic amezquita, Per patient it was changed two days prior to admission.   Upon admission, labs significant for elevated WBC 39.41, Hgb 9.2, elevated Alk Ph 166, UA showing +LE, +nitrite, +WBC, +bacteria. CT abdomen/pelvis shows extensive decubitus ulcers overlying the sacrum and the greater trochanters; Curvilinear fluid collection overlying the left posterior acetabulum; Foci of gas in the left hip joint; Scattered subcutaneous gas; Scattered osseous sclerosis worrisome for chronic osteomyelitis; Rectum distended by stool. Patient given zosyn & vancomycin.    (16 Dec 2024 22:58)  Infectious disease consult this admission notes previous admissions  11/20--12/10 and treated for infected decub ulcers, SARS-CoV-2 infection and CAUTI. 11/20 urine culture with ESBL E. Coli and CRE Kleb was noted. 11/22 wound culture with ESBL E. Coli, E. faecalis and strep. She was on meropenem and vancomycin x 10 days and was discharged on a course of Augmentin. Now returns with another amezquita associated UTI. CT abd pelvis report and images reviewed, showing known pressure ulcers with concern for possible underlying chronic osteomyelitis.       Patient is awake and alert at times and able to answer simple questions. Ethics chart review notes that the patient has a  Robbi and two sons Rex and Yazan who are integral to the patient's care. Multiple conversations regarding advance directives have resulted in the family struggling with decisions. Palliative medicine has consulted and signed off. Ethics assistance requested at this time.  						    Prognosis Estimate (survival in days, wks, mos, yrs): weeks/months 								    Patient Decision-Making Capacity:  [  ] Has capacity    [x  ] Lacks capacity because	medical condition			     Patient Aware of:  Diagnosis:  [  ] Yes   [  ] No  [ x ] Unknown    Prognosis:  [  ] Yes   [  ] No  [x  ] Unknown           Name of medical decision-maker should patient lack capacity:   Robbi Li                   Relationship:   				       Role:  [  ] Health Care Agent     [  ] Legal Surrogate   Contact #(s):      [phone]			       Other ‘stakeholders’:  	Rex Li, [phone] Yazan arleen Li- [phone] Randy Mcdowell- son in law (Rex's )  [phone]										      Other Decision-Maker (i.e., HCA or Surrogate) Aware of:  Diagnosis: [ x ]Yes   [  ]No      Prognosis:  [ x ] Yes    [  ] No     Evidence of Patient’s Preference of Life-Sustaining Treatment (Written or Oral):	NONE			               	     Resuscitation status:   DNR:  [  ]Yes   [x  ]No      DNI:  [  ]Yes   [ x ]No        Discussion  1/7/2025--[id number] as this ethicist was leaving the unit, the patient's son in law Randy Muñoz came into the unit and was readily engaged in conversation after I introduced myself and my role. Randy is Rex's  and explained a very close and private family dynamic. Both Rex and Yazan are both involved in their parents care and he indicated that Robbi is struggling with his wife's current condition and only wants her home. Randy described Rhys as a wonderful mom and person, warm and loving and very protective of her family. He also explained that the family is in constant discussion regarding [place], and the common denominator is that they don't want her to suffer. He was also able to verbalize her current condition which is frail, and she's doing poorly. He was able to discuss her various medical conditions and he asked about CPR etc. his questions were answered and he indicated that no one wants her to suffer, but they are struggling. Discussed the aspects of natural death and he appreciated the different wording and he will discuss with the family.  He himself once he was done visiting [place], he was driving to [facility] where his own father was "transitioning" and dying.  Gave him my contact information for both Rex and Yazan to all me.       1/8/[id number]-phone discussion with Rex, patients son.  After explaining my role he readily engaged in conversation. He acknowledged his mother's condition and her continued deterioration, he's concerned with his dad's response, and is sitting down with him later today. I offered to be a resource and he expressed gratitude.  Emotional support provided as he spoke about his responsibility in this family matter.

## 2025-01-08 NOTE — PROGRESS NOTE ADULT - SUBJECTIVE AND OBJECTIVE BOX
HPI:  Pt is a 80 yo F with h/o MS, bedbound at baseline, chronic osteomyelitis admitted to medicine for acute on chronic osteomoyelitis and UTI. During this hospitalization pt (+) Covid-19, Influenza A and RSV. Pt transferred to the ICU s/p RRT 2 to hypotension. Pt found to be in septic shock 2 to Klebsiella, E. coli, Proteus and Bacteroides. Most recently pt on increasing Levophed and O2 requirement (pt with worsening R sided infiltrate). ICU dx: Septic shock and acute hypoxic resp failure 2 to Klebsiella, E. coli, Proteus and Bacteroides and PNA. 1/6/2025 overnight placed on BiPAP 2 to worsening hypercapnia      ## Labs:  CBC:                        9.0    17.26 )-----------( 18       ( 08 Jan 2025 03:45 )             27.6     Chem:  01-08    138  |  106  |  23  ----------------------------<  135[H]  4.1   |  25  |  0.66    Ca    7.6[L]      08 Jan 2025 03:45  Phos  2.6     01-08  Mg     2.1     01-08    TPro  4.8[L]  /  Alb  1.3[L]  /  TBili  1.5[H]  /  DBili  x   /  AST  21  /  ALT  11[L]  /  AlkPhos  642[H]  01-08    Coags:          ## Imaging:    ## Medications:  ceftazidime/avibactam IVPB 2.5 Gram(s) IV Intermittent every 8 hours  metroNIDAZOLE  IVPB 500 milliGRAM(s) IV Intermittent every 8 hours    droxidopa 100 milliGRAM(s) Oral three times a day  midodrine 30 milliGRAM(s) Oral every 8 hours  norepinephrine Infusion 0.05 MICROgram(s)/kG/Min IV Continuous <Continuous>      fludroCORTISONE 0.1 milliGRAM(s) Oral every 12 hours  hydrocortisone sodium succinate Injectable 50 milliGRAM(s) IV Push every 6 hours  insulin lispro (ADMELOG) corrective regimen sliding scale   SubCutaneous every 6 hours      pantoprazole  Injectable 40 milliGRAM(s) IV Push daily  polyethylene glycol 3350 17 Gram(s) Oral daily  senna 2 Tablet(s) Oral at bedtime    fentaNYL    Injectable 25 MICROGram(s) IV Push every 8 hours PRN  ondansetron Injectable 4 milliGRAM(s) IV Push every 8 hours PRN      ## Vitals:  T(C): 35.9 (01-08-25 @ 12:00), Max: 37.2 (01-08-25 @ 04:00)  HR: 117 (01-08-25 @ 12:00) (96 - 122)  BP: 110/79 (01-08-25 @ 12:00) (74/59 - 119/59)  BP(mean): 91 (01-08-25 @ 12:00) (64 - 95)  RR: 21 (01-08-25 @ 12:00) (21 - 45)  SpO2: 99% (01-08-25 @ 12:00) (92% - 100%)  Wt(kg): --  Vent:   ABG: ABG - ( 08 Jan 2025 08:21 )  pH, Arterial: 7.49  pH, Blood: x     /  pCO2: 31    /  pO2: 94    / HCO3: 24    / Base Excess: 1.0   /  SaO2: 97.9                  01-07 @ 07:01  -  01-08 @ 07:00  --------------------------------------------------------  IN: 1038 mL / OUT: 1015 mL / NET: 23 mL    01-08 @ 07:01  -  01-08 @ 12:32  --------------------------------------------------------  IN: 59.5 mL / OUT: 35 mL / NET: 24.5 mL          ## P/E:  Gen: lying comfortably in bed in no apparent distress  Face: (+) BiPAP mask  Lungs: CTA  Heart: Tachy  Abd: Soft/+BS/ Non-tender  Ext: Dependent edema  Neuro: Slightly lethargic    CENTRAL LINE: [ ] YES [ ] NO  LOCATION:   DATE INSERTED:  REMOVE: [ ] YES [ ] NO      GALLARDO: [ ] YES [ ] NO    DATE INSERTED:  REMOVE:  [ ] YES [ ] NO      A-LINE:  [ ] YES [ ] NO  LOCATION:   DATE INSERTED:  REMOVE:  [ ] YES [ ] NO  EXPLAIN:    CODE STATUS: [x ] full code  [ ] DNR  [ ] DNI  [ ] MOLST  Goals of care discussion: [x ] yes Intubation if necessary

## 2025-01-08 NOTE — PROGRESS NOTE ADULT - ASSESSMENT
78 yo f pmhx MS, OP, bedbound, chronic pressure wounds, contracted, recent COVID 19+ (12/4/24) admitted to medicine with hematuria subsequently found to be RSV/Flu A+, course c/b septic shock in setting of polymicrobial resistant bacteremia, acute hypoxic respiratory failure, DIC.      NEURO: poor MS, pain control as needed  CV: Septic shock requiring vasopressor therapy, actively titrating levophed for map >65, vasopressin added as adjunctive therapy, high dose midodrine and droxidopa. stress dose steroids   RESP: acute hypoxic respiratory failure requiring HFNC, actively titrating settings for spo2 >92%,  intermittent episodes of desatturation, aggressive suctioning/lavage as tolerated.   RENAL: poor urine output, monitor urine output, bun/cr and electrolytes, replace lytes as needed, amezquita for strict I&Os  GI: NPO, tf   ENDO: ISS for glycemic control   ID: Avycaz and flagyl  HEME: thrombocytopenia, trend levels, transfuse as needed  DISPO: Full code.  Poor prognosis    Critical Care time: 60 mins assessing presenting problems of acute illness that poses high probability of life threatening deterioration or end organ damage/dysfunction.  Medical decision making including Initiating plan of care, reviewing data, reviewing radiology, discussing with multidisciplinary team, non inclusive of procedures, discussing goals of care with patient/family    DATE OF DOCUMENTATION EQUIVALENT TO DATE OF SERVICES RENDERED

## 2025-01-08 NOTE — PROGRESS NOTE ADULT - ASSESSMENT
Pt is a 78 yo F with h/o MS, bedbound at baseline, chronic osteomyelitis admitted to medicine for acute on chronic osteomoyelitis and UTI. During this hospitalization pt (+) Covid-19, Influenza A and RSV. Pt transferred to the ICU s/p RRT 2 to hypotension. Pt found to be in septic shock 2 to Klebsiella, E. coli, Proteus and Bacteroides. Most recently pt on increasing Levophed and O2 requirement (pt with worsening R sided infiltrate). ICU dx: Septic shock and acute hypoxic resp failure 2 to Klebsiella, E. coli, Proteus and Bacteroides and PNA. 1/6/2025 overnight placed on BiPAP 2 to worsening hypercapnia    Resp: Pt with worsening hypercapnia overnight 1/6/2025 and placed on BiPAP (pt at increased risk for intubation); may give a break off BiPAP today  ID: Abx as per F/u ID  CVS: Midodrine + Droxidopa + Levophed to maintain MAP >65   HEME: DVT prophylaxis with Venodynes/ Thrombocytopenia; trend platelets  FEN: Severe protein-calorie malnutrition and Underweight (BMI < 19); feeds on hold 2 to pt being on BiPAP  Endo: Follow FS and cover with Lispro/ Cont Hydrocortisone and increase Florinef  Neuro: MS slightly worse today  Social: Pt is full code and son agrees for intubation if necessary (pt at increased risk for intubation)/ Overall long term prognosis is poor

## 2025-01-08 NOTE — PROGRESS NOTE ADULT - SUBJECTIVE AND OBJECTIVE BOX
Patient is a 79y old  Female who presents with a chief complaint of Urinary tract infection and chronic osteomyelitis (08 Jan 2025 12:32)      BRIEF HOSPITAL COURSE:   78 yo f pmhx MS, OP, bedbound, chronic pressure wounds, contracted, recent COVID 19+ (12/4/24) admitted to medicine with hematuria subsequently found to be RSV/Flu A+.     12/30: labs consistent with development of DIC    Events last 24 hours:   Escalating pressor requirements, added vasopressin.  stat labs ordered lactate 3, cbc pending, otherwise unremarkable, remains on hfnc for spo2 support      PAST MEDICAL & SURGICAL HISTORY:  Osteoporosis  Uterine polyp  Herniated disc  lumbar  Functional quadriplegia secondary to multiple sclerosis  Bilateral Tubal ligation  48y/o      Allergies  No Known Allergies      FAMILY HISTORY:  unable to obtain       Social History:   from home      Review of Systems:  unable to obtain 2/2 ms      Physical Examination:    General: Frail elderly female, lying in bed, nad    HEENT: temporal wasting, hfnc in place, +kyphosis     PULM: diminished b/l    CVS: sinus tach    ABD: Soft, nondistended, nontender, diminished bs    EXT: dependent edema, nontender    SKIN: Warm     NEURO: poor MS      Medications:  ceftazidime/avibactam IVPB 2.5 Gram(s) IV Intermittent every 8 hours  metroNIDAZOLE  IVPB 500 milliGRAM(s) IV Intermittent every 8 hours  droxidopa 100 milliGRAM(s) Oral three times a day  midodrine 30 milliGRAM(s) Oral every 8 hours  norepinephrine Infusion 0.05 MICROgram(s)/kG/Min IV Continuous <Continuous>  fentaNYL    Injectable 25 MICROGram(s) IV Push every 8 hours PRN  ondansetron Injectable 4 milliGRAM(s) IV Push every 8 hours PRN  pantoprazole  Injectable 40 milliGRAM(s) IV Push daily  polyethylene glycol 3350 17 Gram(s) Oral daily  senna 2 Tablet(s) Oral at bedtime  fludroCORTISONE 0.1 milliGRAM(s) Oral every 12 hours  hydrocortisone sodium succinate Injectable 50 milliGRAM(s) IV Push every 6 hours  insulin lispro (ADMELOG) corrective regimen sliding scale   SubCutaneous every 6 hours  vasopressin Infusion 0.04 Unit(s)/Min IV Continuous <Continuous>  sodium chloride 0.9% lock flush 10 milliLiter(s) IV Push every 1 hour PRN  chlorhexidine 2% Cloths 1 Application(s) Topical <User Schedule>      ICU Vital Signs Last 24 Hrs  T(C): 36.4 (08 Jan 2025 22:30), Max: 37.2 (08 Jan 2025 04:00)  T(F): 97.5 (08 Jan 2025 22:30), Max: 99 (08 Jan 2025 04:00)  HR: 104 (08 Jan 2025 22:30) (98 - 122)  BP: 133/71 (08 Jan 2025 22:30) (55/25 - 133/71)  BP(mean): 88 (08 Jan 2025 22:30) (34 - 100)  ABP: --  ABP(mean): --  RR: 30 (08 Jan 2025 22:30) (21 - 45)  SpO2: 100% (08 Jan 2025 22:30) (92% - 100%)      Vital Signs Last 24 Hrs  T(C): 36.4 (08 Jan 2025 22:30), Max: 37.2 (08 Jan 2025 04:00)  T(F): 97.5 (08 Jan 2025 22:30), Max: 99 (08 Jan 2025 04:00)  HR: 104 (08 Jan 2025 22:30) (98 - 122)  BP: 133/71 (08 Jan 2025 22:30) (55/25 - 133/71)  BP(mean): 88 (08 Jan 2025 22:30) (34 - 100)  RR: 30 (08 Jan 2025 22:30) (21 - 45)  SpO2: 100% (08 Jan 2025 22:30) (92% - 100%)      ABG - ( 08 Jan 2025 08:21 )  pH, Arterial: 7.49  pH, Blood: x     /  pCO2: 31    /  pO2: 94    / HCO3: 24    / Base Excess: 1.0   /  SaO2: 97.9        I&O's Detail    07 Jan 2025 07:01  -  08 Jan 2025 07:00  --------------------------------------------------------  IN:    IV PiggyBack: 500 mL    IV PiggyBack: 300 mL    Norepinephrine: 238 mL  Total IN: 1038 mL    OUT:    Indwelling Catheter - Urethral (mL): 815 mL    VAC (Vacuum Assisted Closure) System (mL): 200 mL  Total OUT: 1015 mL  Total NET: 23 mL      08 Jan 2025 07:01  -  08 Jan 2025 22:46  --------------------------------------------------------  IN:    IV PiggyBack: 100 mL    IV PiggyBack: 100 mL    Norepinephrine: 130.9 mL  Total IN: 330.9 mL    OUT:    Indwelling Catheter - Urethral (mL): 150 mL    VAC (Vacuum Assisted Closure) System (mL): 50 mL  Total OUT: 200 mL  Total NET: 130.9 mL      LABS:                        9.0    17.26 )-----------( 18       ( 08 Jan 2025 03:45 )             27.6     01-08    139  |  104  |  26[H]  ----------------------------<  163[H]  3.9   |  23  |  0.76    Ca    7.7[L]      08 Jan 2025 21:05  Phos  3.2     01-08  Mg     2.1     01-08    TPro  4.7[L]  /  Alb  1.2[L]  /  TBili  2.0[H]  /  DBili  x   /  AST  15  /  ALT  9[L]  /  AlkPhos  588[H]  01-08      CAPILLARY BLOOD GLUCOSE  POCT Blood Glucose.: 147 mg/dL (08 Jan 2025 17:42)      Urinalysis Basic - ( 08 Jan 2025 21:05 )  Color: x / Appearance: x / SG: x / pH: x  Gluc: 163 mg/dL / Ketone: x  / Bili: x / Urobili: x   Blood: x / Protein: x / Nitrite: x   Leuk Esterase: x / RBC: x / WBC x   Sq Epi: x / Non Sq Epi: x / Bacteria: x      CULTURES:  Culture Results:   No growth at 4 days (01-04 @ 03:20)      RADIOLOGY:   < from: Xray Chest 1 View- PORTABLE-Urgent (Xray Chest 1 View- PORTABLE-Urgent .) (01.06.25 @ 10:46) >  ACC: [id number] EXAM:  XR CHEST PORTABLE URGENT 1V   ORDERED BY: AZAM GENTILE     PROCEDURE DATE:  01/06/2025      INTERPRETATION:  CLINICAL INDICATION: 79 years  Female with desaturation,   now on 100% NRB.    COMPARISON: 1/2/2025    The enteric tube extends below the diaphragm. The tip is not included in   the image. Left subclavian catheter tip at the cavoatrial junction.    AP view of the chest demonstrates worsening diffuse right lung   infiltrate. Stable moderate bilateral pleural effusions.. The pulmonary   vasculature is normal. There is no pneumothorax.    The heart, mediastinum and aric cannot be assessed.    Mild thoracic degenerative changes are present. Old right rib fractures.    IMPRESSION:    Worsening diffuse right lung infiltrate consistent with pneumonia.    Stable moderate bilateral pleural effusions.    Left central line and enteric catheter in satisfactory position.    --- End of Report ---    GAURI FIGUEROA MD; Attending Radiologist  This document has been electronically signed. Jan 6 2025 11:43AM    < end of copied text >

## 2025-01-09 NOTE — PROGRESS NOTE ADULT - NS ATTEND AMEND GEN_ALL_CORE FT
79 F with polymicrobial bacteremia most likely source decub and acute on chronic osteomyelitis of the sacrum  Remains in the ICU. Has wound vac  On Avycaz and Flagyl as noted above  Guarded prognosis  GOC
I have reviewed all pertinent clinical information and agree with the NP's note.  Any new labs, recent cultures, new imaging (if applicable) and vitals have been reviewed today.  All necessary adjustments to management have been made.  Agree with the above assessment and plan.    CAUTI- present on admission  Acute on chronic sacral OM  Completes ertapenem course after today's dose  Follow goals of care for OM treatment    Yola Sood MD  Attending Physician  Division of Infectious Diseases   Available via [employer] Teams
80 y/o woman with chronic sacral pressure ulcers and osteomyelitis, admitted to ICU with polymicrobial bacteremia including MDRO  On Avycaz and Flagyl , day # 10  Will continue for total 10 days and then stop antibiotics  Little to no role of antibiotics here without meaningful source control or recovery hopes  Guarded prognosis    Yola Sood MD  Attending Physician  Division of Infectious Diseases   Available via [employer] Teams
The patient is seen and examiend, agree with above multiple pressure ulcers are evaluated, No need for surgical debridement nad cont the NPWT. Poor prognosis for healing.
80 y/o woman with multiple co-morbidities, bed bound and with chronic sacral decub, now in ICU with septic shock secondary to polymicrobial bacteremia, multi drug resistant organisms, requiring vasopressor, on BiPAP now and rising leukocytosis. Has very poor prognosis given debility. Unsure how much benefit with antibiotics without any source control. Would continue Avycaz and Flagyl for now.     Yola Sood MD  Attending Physician  Division of Infectious Diseases   Available via [employer] Teams
I have reviewed all pertinent clinical information and agree with the NP's note.  Any new labs, recent cultures, new imaging (if applicable) and vitals have been reviewed today.  All necessary adjustments to management have been made.  Agree with the above assessment and plan.    Follow urine culture, E. Coli sensitivity  Continue zosyn for now  Wound care, wound vac    Yola Sood MD  Attending Physician  Division of Infectious Diseases   Available via [employer] Teams

## 2025-01-09 NOTE — PROCEDURE NOTE - NSINDICATIONS_GEN_A_CORE
critical illness/venous access
critical illness
antibiotic therapy
venous access
feeds
monitoring purposes

## 2025-01-09 NOTE — PROGRESS NOTE ADULT - NUTRITIONAL ASSESSMENT
This patient has been assessed with a concern for Malnutrition and has been determined to have a diagnosis/diagnoses of Severe protein-calorie malnutrition and Underweight (BMI < 19).    This patient is being managed with:   Diet Consistent Carbohydrate w/Evening Snack-  Supplement Feeding Modality:  Oral  Glucerna Shake Cans or Servings Per Day:  1       Frequency:  Three Times a day  Entered: Dec 19 2024 11:57AM  
This patient has been assessed with a concern for Malnutrition and has been determined to have a diagnosis/diagnoses of Severe protein-calorie malnutrition and Underweight (BMI < 19).    This patient is being managed with:   Diet Consistent Carbohydrate w/Evening Snack-  Supplement Feeding Modality:  Oral  Glucerna Shake Cans or Servings Per Day:  1       Frequency:  Three Times a day  Entered: Dec 19 2024 11:57AM  
This patient has been assessed with a concern for Malnutrition and has been determined to have a diagnosis/diagnoses of Severe protein-calorie malnutrition and Underweight (BMI < 19).    This patient is being managed with:   Diet NPO-  Entered: Dec 25 2024  6:29PM  
This patient has been assessed with a concern for Malnutrition and has been determined to have a diagnosis/diagnoses of Severe protein-calorie malnutrition and Underweight (BMI < 19).    This patient is being managed with:   Diet Consistent Carbohydrate w/Evening Snack-  Supplement Feeding Modality:  Oral  Glucerna Shake Cans or Servings Per Day:  1       Frequency:  Three Times a day  Entered: Dec 19 2024 11:57AM  
This patient has been assessed with a concern for Malnutrition and has been determined to have a diagnosis/diagnoses of Severe protein-calorie malnutrition and Underweight (BMI < 19).    This patient is being managed with:   Diet NPO with Tube Feed-  Tube Feeding Modality: Nasogastric  Jevity 1.2 Zacarias  Total Volume for 24 Hours (mL): 180  Intermittent  Starting Tube Feed Rate {mL per Hour}: 10  Until Goal Tube Feed Rate (mL per Hour): 10  Tube Feeding Hours ON: 18  Tube Feeding OFF (Hours): 6  Tube Feed Start Time: 17:00  Entered: Jan 5 2025 11:37AM  
This patient has been assessed with a concern for Malnutrition and has been determined to have a diagnosis/diagnoses of Severe protein-calorie malnutrition and Underweight (BMI < 19).    This patient is being managed with:   Diet NPO with Tube Feed-  Tube Feeding Modality: Nasogastric  Jevity 1.2 Zacarias  Total Volume for 24 Hours (mL): 180  Intermittent  Starting Tube Feed Rate {mL per Hour}: 10  Until Goal Tube Feed Rate (mL per Hour): 10  Tube Feeding Hours ON: 18  Tube Feeding OFF (Hours): 6  Tube Feed Start Time: 17:00  Entered: Jan 8 2025  5:02PM  
This patient has been assessed with a concern for Malnutrition and has been determined to have a diagnosis/diagnoses of Severe protein-calorie malnutrition and Underweight (BMI < 19).    This patient is being managed with:   Diet Consistent Carbohydrate w/Evening Snack-  Supplement Feeding Modality:  Oral  Glucerna Shake Cans or Servings Per Day:  1       Frequency:  Three Times a day  Entered: Dec 19 2024 11:57AM  
This patient has been assessed with a concern for Malnutrition and has been determined to have a diagnosis/diagnoses of Severe protein-calorie malnutrition and Underweight (BMI < 19).    This patient is being managed with:   Diet NPO with Tube Feed-  Tube Feeding Modality: Nasogastric  Jevity 1.2 Zacarias  Total Volume for 24 Hours (mL): 180  Intermittent  Starting Tube Feed Rate {mL per Hour}: 10  Until Goal Tube Feed Rate (mL per Hour): 10  Tube Feeding Hours ON: 18  Tube Feeding OFF (Hours): 6  Tube Feed Start Time: 17:00  Entered: Jan 5 2025 11:37AM  
This patient has been assessed with a concern for Malnutrition and has been determined to have a diagnosis/diagnoses of Severe protein-calorie malnutrition and Underweight (BMI < 19).    This patient is being managed with:   Diet NPO-  Entered: Dec 25 2024  6:29PM  
This patient has been assessed with a concern for Malnutrition and has been determined to have a diagnosis/diagnoses of Severe protein-calorie malnutrition and Underweight (BMI < 19).    This patient is being managed with:   Diet NPO-  Entered: Dec 25 2024  6:29PM  
This patient has been assessed with a concern for Malnutrition and has been determined to have a diagnosis/diagnoses of Severe protein-calorie malnutrition and Underweight (BMI < 19).    This patient is being managed with:   Diet NPO with Tube Feed-  Tube Feeding Modality: Nasogastric  Jevity 1.2 Zacarias  Total Volume for 24 Hours (mL): 180  Intermittent  Starting Tube Feed Rate {mL per Hour}: 10  Until Goal Tube Feed Rate (mL per Hour): 10  Tube Feeding Hours ON: 18  Tube Feeding OFF (Hours): 6  Tube Feed Start Time: 17:00  Entered: Jan 5 2025 11:37AM  
This patient has been assessed with a concern for Malnutrition and has been determined to have a diagnosis/diagnoses of Severe protein-calorie malnutrition and Underweight (BMI < 19).    This patient is being managed with:   Diet NPO with Tube Feed-  Tube Feeding Modality: Nasogastric  Jevity 1.2 Zacarias  Total Volume for 24 Hours (mL): 180  Intermittent  Starting Tube Feed Rate {mL per Hour}: 10  Until Goal Tube Feed Rate (mL per Hour): 10  Tube Feeding Hours ON: 18  Tube Feeding OFF (Hours): 6  Tube Feed Start Time: 17:00  Entered: Jan 8 2025  5:02PM  
This patient has been assessed with a concern for Malnutrition and has been determined to have a diagnosis/diagnoses of Severe protein-calorie malnutrition and Underweight (BMI < 19).    This patient is being managed with:   Diet NPO with Tube Feed-  Tube Feeding Modality: Nasogastric  Jevity 1.5 Zacarias  Total Volume for 24 Hours (mL): 1080  Continuous  Starting Tube Feed Rate {mL per Hour}: 10  Increase Tube Feed Rate by (mL): 10     Every 4 hours  Until Goal Tube Feed Rate (mL per Hour): 45  Tube Feed Duration (in Hours): 24  Tube Feed Start Time: 17:00  Entered: Jan 2 2025  3:41PM  
This patient has been assessed with a concern for Malnutrition and has been determined to have a diagnosis/diagnoses of Severe protein-calorie malnutrition and Underweight (BMI < 19).    This patient is being managed with:   Diet NPO-  Entered: Dec 25 2024  6:29PM  
This patient has been assessed with a concern for Malnutrition and has been determined to have a diagnosis/diagnoses of Severe protein-calorie malnutrition and Underweight (BMI < 19).    This patient is being managed with:   Diet NPO-  Except Medications  Entered: Jan 7 2025  3:37AM  
This patient has been assessed with a concern for Malnutrition and has been determined to have a diagnosis/diagnoses of Severe protein-calorie malnutrition and Underweight (BMI < 19).    This patient is being managed with:   Diet Consistent Carbohydrate w/Evening Snack-  Supplement Feeding Modality:  Oral  Glucerna Shake Cans or Servings Per Day:  1       Frequency:  Three Times a day  Entered: Dec 19 2024 11:57AM  
This patient has been assessed with a concern for Malnutrition and has been determined to have a diagnosis/diagnoses of Severe protein-calorie malnutrition and Underweight (BMI < 19).    This patient is being managed with:   Diet NPO with Tube Feed-  Tube Feeding Modality: Nasogastric  Jevity 1.2 Zacarias  Total Volume for 24 Hours (mL): 180  Intermittent  Starting Tube Feed Rate {mL per Hour}: 10  Until Goal Tube Feed Rate (mL per Hour): 10  Tube Feeding Hours ON: 18  Tube Feeding OFF (Hours): 6  Tube Feed Start Time: 17:00  Entered: Jan 8 2025  5:02PM  
This patient has been assessed with a concern for Malnutrition and has been determined to have a diagnosis/diagnoses of Severe protein-calorie malnutrition and Underweight (BMI < 19).    This patient is being managed with:   Diet NPO-  Entered: Dec 25 2024  6:29PM  
This patient has been assessed with a concern for Malnutrition and has been determined to have a diagnosis/diagnoses of Severe protein-calorie malnutrition and Underweight (BMI < 19).    This patient is being managed with:   Diet NPO with Tube Feed-  Tube Feeding Modality: Nasogastric  Jevity 1.5 Zacarias  Total Volume for 24 Hours (mL): 1170  Continuous  Starting Tube Feed Rate {mL per Hour}: 30  Increase Tube Feed Rate by (mL): 10     Every 8 hours  Until Goal Tube Feed Rate (mL per Hour): 65  Tube Feed Duration (in Hours): 18  Tube Feed Start Time: 17:00  Entered: Andre  3 2025  2:22PM  
This patient has been assessed with a concern for Malnutrition and has been determined to have a diagnosis/diagnoses of Severe protein-calorie malnutrition and Underweight (BMI < 19).    This patient is being managed with:   Diet NPO-  Except Medications  Entered: Jan 7 2025  3:37AM

## 2025-01-09 NOTE — PROCEDURE NOTE - NSCATHTYPE_GEN_A_CORE
Prescription eprescribed via computer as listed on medical record per protocol. Insulin pens.  Spironolactone was filled on 11-20-19, #90. Denied refill.  
[facility]

## 2025-01-09 NOTE — PROCEDURE NOTE - NSPROCNAME_GEN_A_CORE
Gastric Intubation/Gastric Lavage
Central Line Insertion
Midline Insertion
Arterial Puncture/Cannulation
Peripheral Line Insertion
Central Line Insertion

## 2025-01-09 NOTE — GOALS OF CARE CONVERSATION - ADVANCED CARE PLANNING - WHAT MATTERS MOST
Addended by: AYAZ PRINCE on: 6/13/2018 12:29 PM     Modules accepted: Orders    
unclear
conservative medical mngmt with transition to comfort if gets worse

## 2025-01-09 NOTE — PROVIDER CONTACT NOTE (CRITICAL VALUE NOTIFICATION) - PERSON GIVING RESULT:
Jessica Cantu, 
Milly Pryor, Lab
Slime Bo
omar adame
Lab
Milly Pryor - lab
Antonieta Acosta
Anjum Dangelo
Arturo Gee -calderon
Lilia Mejia
Milly Reid/lab
Paola Marquis from lab
Praneeth Earl
Slime Bo

## 2025-01-09 NOTE — PROCEDURE NOTE - NSPROCDETAILS_GEN_ALL_CORE
location identified, draped/prepped, sterile technique used, needle inserted/introduced/positive blood return obtained via catheter/all materials/supplies accounted for at end of procedure
guidewire recovered/lumen(s) aspirated and flushed/sterile dressing applied/sterile technique, catheter placed/ultrasound guidance with use of sterile gel and probe cove
guidewire recovered/lumen(s) aspirated and flushed/sterile dressing applied/sterile technique, catheter placed/ultrasound guidance with use of sterile gel and probe cove
Trendelenburg position/ultrasound guidance
nasogastric

## 2025-01-09 NOTE — PROGRESS NOTE ADULT - SUBJECTIVE AND OBJECTIVE BOX
SPENCER MIXON  MRN-[id number]  79y (1945)    Follow Up:  bacteremia, multiple wounds, hypothermia, leukocytosis    Interval History: The pt was seen and examined earlier in CCU, not in acute distress, ill appearing, not interactive, pt's son is present.  T96.4, NC, RR elevated, Hypotensive, WBC better 14.52.    PAST MEDICAL & SURGICAL HISTORY:  Osteoporosis      Uterine polyp      Herniated disc  lumbar      Functional quadriplegia secondary to multiple sclerosis      Bilateral Tubal ligation  48y/o          ROS:    [x ] Unobtainable because: pt is not interactive   [ ] All other systems negative    Constitutional: no fever, no chills  Head: no trauma  Eyes: no vision changes, no eye pain  ENT:  no sore throat, no rhinorrhea  Cardiovascular:  no chest pain, no palpitation  Respiratory:  no SOB, no cough  GI:  no abd pain, no vomiting, no diarrhea  urinary: no dysuria, no hematuria, no flank pain  musculoskeletal:  no joint pain, no joint swelling  skin:  no rash  neurology:  no headache, no seizure, no change in mental status  psych: no anxiety, no depression         Allergies  No Known Allergies        ANTIMICROBIALS:  ceftazidime/avibactam IVPB 2.5 every 8 hours  metroNIDAZOLE  IVPB 500 every 8 hours      OTHER MEDS:  chlorhexidine 2% Cloths 1 Application(s) Topical <User Schedule>  droxidopa 100 milliGRAM(s) Oral three times a day  fentaNYL    Injectable 25 MICROGram(s) IV Push every 8 hours PRN  fludroCORTISONE 0.1 milliGRAM(s) Oral every 12 hours  hydrocortisone sodium succinate Injectable 50 milliGRAM(s) IV Push every 6 hours  insulin lispro (ADMELOG) corrective regimen sliding scale   SubCutaneous every 6 hours  midodrine 30 milliGRAM(s) Oral every 8 hours  norepinephrine Infusion 0.05 MICROgram(s)/kG/Min IV Continuous <Continuous>  ondansetron Injectable 4 milliGRAM(s) IV Push every 8 hours PRN  pantoprazole  Injectable 40 milliGRAM(s) IV Push daily  polyethylene glycol 3350 17 Gram(s) Oral daily  senna 2 Tablet(s) Oral at bedtime  sodium chloride 0.9% lock flush 10 milliLiter(s) IV Push every 1 hour PRN  vasopressin Infusion 0.04 Unit(s)/Min IV Continuous <Continuous>      Vital Signs Last 24 Hrs  T(C): 35.7 (09 Jan 2025 11:30), Max: 37.2 (09 Jan 2025 05:00)  T(F): 96.3 (09 Jan 2025 11:30), Max: 99 (09 Jan 2025 05:00)  HR: 93 (09 Jan 2025 11:30) (85 - 116)  BP: 106/68 (09 Jan 2025 11:30) (55/25 - 159/77)  BP(mean): 79 (09 Jan 2025 11:30) (34 - 105)  RR: 33 (09 Jan 2025 11:30) (22 - 39)  SpO2: 100% (09 Jan 2025 11:30) (94% - 100%)    Parameters below as of 09 Jan 2025 09:40  Patient On (Oxygen Delivery Method): nasal cannula  O2 Flow (L/min): 4      Physical Exam:  Constitutional: Elderly woman ill appearing, lethargic, not interactive   HEENT: Dry oral mucosa, NC,  unable to assess pt's eyes due to non-compliance, NGT in place  Cardiovascular:  tachycardic S1, S2, no edema  Respiratory: diminished breath sounds bilaterally, no wheezes  GI:  soft, non distended  :  + amezquita    Musculoskeletal: Atrophic / wasted limbs, contracted. +multiple pressure ulcers    Neurologic: Lethargic. Pt does not answer any of my questions, does not follow commands   Skin:  warm, dry   Psychiatric: unable     WBC Count: 14.52 K/uL (01-09 @ 03:50)  WBC Count: 15.49 K/uL (01-08 @ 21:05)  WBC Count: 17.26 K/uL (01-08 @ 03:45)  WBC Count: 24.88 K/uL (01-07 @ 02:30)  WBC Count: 26.95 K/uL (01-06 @ 10:12)  WBC Count: 17.79 K/uL (01-06 @ 02:57)  WBC Count: 16.06 K/uL (01-05 @ 04:00)                            7.8    14.52 )-----------( 15       ( 09 Jan 2025 03:50 )             24.0       01-09    139  |  104  |  29[H]  ----------------------------<  212[H]  3.6   |  25  |  0.79    Ca    7.4[L]      09 Jan 2025 03:50  Phos  3.4     01-09  Mg     2.0     01-09    TPro  4.5[L]  /  Alb  1.2[L]  /  TBili  1.8[H]  /  DBili  x   /  AST  17  /  ALT  11[L]  /  AlkPhos  696[H]  01-09      Urinalysis Basic - ( 09 Jan 2025 03:50 )    Color: x / Appearance: x / SG: x / pH: x  Gluc: 212 mg/dL / Ketone: x  / Bili: x / Urobili: x   Blood: x / Protein: x / Nitrite: x   Leuk Esterase: x / RBC: x / WBC x   Sq Epi: x / Non Sq Epi: x / Bacteria: x        Creatinine Trend: 0.79<--, 0.76<--, 0.66<--, 0.59<--, 0.53<--, 0.50<--  Lactate, Blood: 2.0 mmol/L (01-09-25 @ 03:50)  Blood Gas Venous - Lactate: 3.00 mmol/L (01-08-25 @ 21:24)      MICROBIOLOGY:  v  .Blood BLOOD  01-04-25   No growth at 5 days  --  --      .Blood BLOOD  12-30-24   Growth in aerobic bottle: Proteus mirabilis  Growth in aerobic bottle: Klebsiella pneumoniae (Carbapenem Resistant)  Growth in aerobic bottle: Escherichia coli ESBL  Direct identification is available within approximately 3-5  hours either by BloodPanel Multiplexed PCR or Direct  MALDI-TOF. Details: [email or web address]  --  Blood Culture PCR  Proteus mirabilis  Klebsiella pneumoniae (Carbapenem Resistant)  Escherichia coli ESBL      .Blood BLOOD  12-30-24   Growth in aerobic bottle: Klebsiella pneumoniae (Carbapenem Resistant)  Growth in aerobic bottle: Escherichia coli ESBL  See previous culture [id number]  --    Growth in aerobic bottle: Gram Negative Rods      Clean Catch  12-16-24   >100,000 CFU/ml Escherichia coli ESBL  --  Escherichia coli ESBL        D-Dimer Assay, Quantitative: 1533 (01-01)  D-Dimer Assay, Quantitative: 2047 (12-30)    Procalcitonin: 0.66 (01-07-25 @ 02:30)      SARS-CoV-2: Detected (24 Dec 2024 11:48)  SARS-CoV-2: Detected (04 Dec 2024 11:00)    RADIOLOGY:     SPENCER MIXON  [id number]  79y (1945)    Follow Up:  bacteremia, multiple wounds, hypothermia, leukocytosis    Interval History: The pt was seen and examined earlier in CCU, not in acute distress, ill appearing, not interactive, pt's son is present.  T96.4, NC, RR elevated, Hypotensive, WBC better 14.52.      ROS:    [x ] Unobtainable because: pt is not interactive   [ ] All other systems negative    Constitutional: no fever, no chills  Head: no trauma  Eyes: no vision changes, no eye pain  ENT:  no sore throat, no rhinorrhea  Cardiovascular:  no chest pain, no palpitation  Respiratory:  no SOB, no cough  GI:  no abd pain, no vomiting, no diarrhea  urinary: no dysuria, no hematuria, no flank pain  musculoskeletal:  no joint pain, no joint swelling  skin:  no rash  neurology:  no headache, no seizure, no change in mental status  psych: no anxiety, no depression         Allergies  No Known Allergies        ANTIMICROBIALS:  ceftazidime/avibactam IVPB 2.5 every 8 hours  metroNIDAZOLE  IVPB 500 every 8 hours      OTHER MEDS:  chlorhexidine 2% Cloths 1 Application(s) Topical <User Schedule>  droxidopa 100 milliGRAM(s) Oral three times a day  fentaNYL    Injectable 25 MICROGram(s) IV Push every 8 hours PRN  fludroCORTISONE 0.1 milliGRAM(s) Oral every 12 hours  hydrocortisone sodium succinate Injectable 50 milliGRAM(s) IV Push every 6 hours  insulin lispro (ADMELOG) corrective regimen sliding scale   SubCutaneous every 6 hours  midodrine 30 milliGRAM(s) Oral every 8 hours  norepinephrine Infusion 0.05 MICROgram(s)/kG/Min IV Continuous <Continuous>  ondansetron Injectable 4 milliGRAM(s) IV Push every 8 hours PRN  pantoprazole  Injectable 40 milliGRAM(s) IV Push daily  polyethylene glycol 3350 17 Gram(s) Oral daily  senna 2 Tablet(s) Oral at bedtime  sodium chloride 0.9% lock flush 10 milliLiter(s) IV Push every 1 hour PRN  vasopressin Infusion 0.04 Unit(s)/Min IV Continuous <Continuous>      Vital Signs Last 24 Hrs  T(C): 35.7 (09 Jan 2025 11:30), Max: 37.2 (09 Jan 2025 05:00)  T(F): 96.3 (09 Jan 2025 11:30), Max: 99 (09 Jan 2025 05:00)  HR: 93 (09 Jan 2025 11:30) (85 - 116)  BP: 106/68 (09 Jan 2025 11:30) (55/25 - 159/77)  BP(mean): 79 (09 Jan 2025 11:30) (34 - 105)  RR: 33 (09 Jan 2025 11:30) (22 - 39)  SpO2: 100% (09 Jan 2025 11:30) (94% - 100%)    Parameters below as of 09 Jan 2025 09:40  Patient On (Oxygen Delivery Method): nasal cannula  O2 Flow (L/min): 4      Physical Exam:  Constitutional: Elderly woman ill appearing, lethargic, not interactive   HEENT: Dry oral mucosa, NC,  unable to assess pt's eyes due to non-compliance, NGT in place  Cardiovascular:  tachycardic S1, S2, no edema  Respiratory: diminished breath sounds bilaterally, no wheezes  GI:  soft, non distended  :  + amezquita    Musculoskeletal: Atrophic / wasted limbs, contracted. +multiple pressure ulcers    Neurologic: Lethargic. Pt does not answer any of my questions, does not follow commands   Skin:  warm, dry   Psychiatric: unable     WBC Count: 14.52 K/uL (01-09 @ 03:50)  WBC Count: 15.49 K/uL (01-08 @ 21:05)  WBC Count: 17.26 K/uL (01-08 @ 03:45)  WBC Count: 24.88 K/uL (01-07 @ 02:30)  WBC Count: 26.95 K/uL (01-06 @ 10:12)  WBC Count: 17.79 K/uL (01-06 @ 02:57)  WBC Count: 16.06 K/uL (01-05 @ 04:00)                            7.8    14.52 )-----------( 15       ( 09 Jan 2025 03:50 )             24.0       01-09    139  |  104  |  29[H]  ----------------------------<  212[H]  3.6   |  25  |  0.79    Ca    7.4[L]      09 Jan 2025 03:50  Phos  3.4     01-09  Mg     2.0     01-09    TPro  4.5[L]  /  Alb  1.2[L]  /  TBili  1.8[H]  /  DBili  x   /  AST  17  /  ALT  11[L]  /  AlkPhos  696[H]  01-09      Urinalysis Basic - ( 09 Jan 2025 03:50 )    Color: x / Appearance: x / SG: x / pH: x  Gluc: 212 mg/dL / Ketone: x  / Bili: x / Urobili: x   Blood: x / Protein: x / Nitrite: x   Leuk Esterase: x / RBC: x / WBC x   Sq Epi: x / Non Sq Epi: x / Bacteria: x        Creatinine Trend: 0.79<--, 0.76<--, 0.66<--, 0.59<--, 0.53<--, 0.50<--  Lactate, Blood: 2.0 mmol/L (01-09-25 @ 03:50)  Blood Gas Venous - Lactate: 3.00 mmol/L (01-08-25 @ 21:24)      MICROBIOLOGY:    .Blood BLOOD  01-04-25   No growth at 5 days  --  --      .Blood BLOOD  12-30-24   Growth in aerobic bottle: Proteus mirabilis  Growth in aerobic bottle: Klebsiella pneumoniae (Carbapenem Resistant)  Growth in aerobic bottle: Escherichia coli ESBL  Direct identification is available within approximately 3-5  hours either by BloodPanel Multiplexed PCR or Direct  MALDI-TOF. Details: [email or web address]  --  Blood Culture PCR  Proteus mirabilis  Klebsiella pneumoniae (Carbapenem Resistant)  Escherichia coli ESBL      .Blood BLOOD  12-30-24   Growth in aerobic bottle: Klebsiella pneumoniae (Carbapenem Resistant)  Growth in aerobic bottle: Escherichia coli ESBL  See previous culture [id number]  --    Growth in aerobic bottle: Gram Negative Rods      Clean Catch  12-16-24   >100,000 CFU/ml Escherichia coli ESBL  --  Escherichia coli ESBL        D-Dimer Assay, Quantitative: 1533 (01-01)  D-Dimer Assay, Quantitative: 2047 (12-30)    Procalcitonin: 0.66 (01-07-25 @ 02:30)      SARS-CoV-2: Detected (24 Dec 2024 11:48)  SARS-CoV-2: Detected (04 Dec 2024 11:00)    RADIOLOGY:

## 2025-01-09 NOTE — PROGRESS NOTE ADULT - ASSESSMENT
The patient is 78 y/o woman with PMH of osteoporosis, MS, bedbound, known to ID from last admission, who presents today for hematuria. Reportedly, amezquita was changed 2 days ago and blood was seen in the catheter at the time. Patient reports dysuria, denies frequency and urgency. Also c/o back pain. On presentation, patient is hemodynamically stable and afebrile. Labs significant for elevated WBC 39.41, UA showing large LE, +nitrite, +WBC, +bacteria. CT abdomen/pelvis shows extensive decubitus ulcers overlying the sacrum and the greater trochanters; curvilinear fluid collection overlying the left posterior acetabulum; foci of gas in the left hip joint; scattered subcutaneous gas; scattered osseous sclerosis worrisome for chronic osteomyelitis; rectum distended by stool. Patient given zosyn & vancomycin. ID consulted for workup and antibiotic management.    12/17: Last admit here 11/20--12/10 and treated for infected decub ulcers, SARS-CoV-2 infection and CAUTI. 11/20 urine culture with ESBL E. Coli and CRE Kleb was noted. 11/22 wound culture with ESBL E. Coli, E. faecalis and strep. She was on meropenem and vancomycin x 10 days and was discharged on a course of Augmentin. Now returns with another amezquita associated UTI. CT abd pelvis report and images reviewed, showing known pressure ulcers with concern for possible underlying chronic osteomyelitis.   12/18: no fever, RA, no new cbc, UC is growing E. Coli, no blood culture data is available, Zosyn IV continued.   12/23: Afebrile. No leukocytosis. Zosyn was switched to Ertapenem on 12/20 after ESBL reported in the urine culture. Sensitivity noted above. MRI pelvis today with acute on chronic osteomyelitis at the sacrum.  12/24: remains afebrile since 12/20, RA, no new cbc, wounds with vac dressings on, + mild rhinorrhea on exam, will obtain RVP, pt had recent covid 19, ertapenem IV continued (day #5)  12/30: RRT today due to hypotension requiring levophed. Transferred to MICU. Leukocytosis WBC: 23k. CXR image and report reviewed. She has bibasilar opacities R>L. Looks worse than from 12/4. Given a dose of vancomycin. Meropenem started. Blood cultures obtained. Recent RVP 12/24 positive for RSV and again SARS-CoV-2 (likely persistent viral shedding, not a new infection, was positive on 12/4 and 12/16)  12/31: Remains in ICU on one pressor. Afebrile. Awake today. Leukocytosis up trending, WBC 40K. MRSA screen negative. 12/30 blood cultures with GNRs, polymicrobial PCR detection of Bacteroides, Proteus and Klebsiella species with ESBL and KPC resistance genes identified. Meropenem was stopped. Avycaz started.  1/2: Slight hypothermia, T: 96.4F. Leukocytosis improved, WBC: 11k today. On minimal pressor support. Remains lethargic and NPO. Possibly getting NGT. No wound debridement needed per surgery. No new events per bedside RN. The susceptibility report reviewed as noted above. Avycaz and metronidazole continued.  1/3: remains in CCU, on pressors, hypothermic this morning 94.6, NC, WBC increased 12.39, Cr ok, BCs with  Klebsiella and ESBL E. Coli, abx continued Avycaz and Flagyl.   1/6: Afebrile. Rising leukocytosis, WBC: 26k today. Requiring levophed, stress steroids. On warming blanket due to episodes of hypothermia. Ongoing goals of care discussion with family. Patient is full code. On Avycaz and flagyl day#7  1/7: remains in CCU, full code, hypothermic, WBC is high but slightly better 24.88, on bipap, PLT low 12, Cr ok, MRSA PCR negative, repeat BC x 1 NGTD, Avycaz and Flagyl continued day #8, Ethics involved in the case.   1/9: seen in CCU, T96.4, NC, RR elevated, Hypotensive, leukocytosis is better 14.52, Cr ok, PLT low 15, repeat BC x 1 NGTD, antibiotics continued Flagyl and Avycaz - day #10. GOC discussion is ongoing.    Impression:  1. Septic shock secondary to polymicrobial bacteremia - likely source decubitus ulcers  2. Catheter associated UTI-s/p treatment for ESBL E.Coli  3. Chronic pressure ulcers - on wound vac  4. Acute on chronic sacral osteomyelitis  5. Leukocytosis  6. Old age, debility, bedbound  7. Acute encephalopathy  8. Recent SARS-CoV-2 infection s/p treatment  9. RSV    Recommendations:  --Continue ceftazidime-avibactam 2.5g IV q8h (day #10)  --Continue metronidazole 500 mg IV q8h (day #10)  --Wound vac management per PT  --Vascular surgery/wound care follow up  --Aspiration precautions  --Palliative eval/goals of care discussion is on going -  full code at this time  --Guarded prognosis    Discussed with Dr. Sood   Discussed with CCU team

## 2025-01-09 NOTE — PROVIDER CONTACT NOTE (CRITICAL VALUE NOTIFICATION) - SITUATION
PA aware. no new orders at this time
provider made aware. no new orders at this time
Thrombocytopenia
Potassium 2.9
Pt axo x1, reoriented. Pt receiving ABX for UTI. Pt had 102.9 temp, PO tylenol given, cold packs applied

## 2025-01-09 NOTE — PROCEDURE NOTE - NSINFORMCONSENT_GEN_A_CORE
in chart and discussed with son mehrdad/Benefits, risks, and possible complications of procedure explained to patient/caregiver who verbalized understanding and gave written consent.
Benefits, risks, and possible complications of procedure explained to patient/caregiver who verbalized understanding and gave verbal consent.
This was an emergent procedure.
Benefits, risks, and possible complications of procedure explained to patient/caregiver who verbalized understanding and gave written consent.
This was an emergent procedure.
Benefits, risks, and possible complications of procedure explained to patient/caregiver who verbalized understanding and gave verbal consent.

## 2025-01-09 NOTE — PROCEDURE NOTE - ADDITIONAL PROCEDURE DETAILS
Called to bedside by nurse stating she was unable to get Blood sample for am labs. Pt seen at bedside in supine position. Verbal consent obtained. Area prepped with alcohol, Artery in Left wrist was identified 22G butterfly needle was advanced into Artery under US guidance and blood return noted, labs drawn and given to nurse. Needle removed and Gauze dressing placed. Pt tolerated procedure well.
20G x 1.88" US guided PIV inserted
left subclavian TLC placed on 12/30 needed to be changed as pt still on two pressors.      Unable to visualize IJ veins on either side with US and R subclavian too small to attempt with plt ct of 15.    Pt given 1u plt to reduce risk of bleeding and placed left femoral central line without complication.  Left subclavian TLC then removed with hemostasis achieved with direction pressure of 15min.
Compressible vessel confirmed under US, needle access visualized within lumen on short axis. Wire visualized in vessel on long axis, catheter inserted smoothly with brisk blood return

## 2025-01-09 NOTE — PROGRESS NOTE ADULT - SUBJECTIVE AND OBJECTIVE BOX
HPI:  Pt is a 80 yo F with h/o MS, bedbound at baseline, chronic osteomyelitis admitted to medicine for acute on chronic osteomoyelitis and UTI. During this hospitalization pt (+) Covid-19, Influenza A and RSV. Pt transferred to the ICU s/p RRT 2 to hypotension. Pt found to be in septic shock 2 to Klebsiella, E. coli, Proteus and Bacteroides. Most recently pt on increasing Levophed and O2 requirement (pt with worsening R sided infiltrate). ICU dx: Septic shock and acute hypoxic resp failure 2 to Klebsiella, E. coli, Proteus and Bacteroides and PNA. 1/6/2025 overnight placed on BiPAP 2 to worsening hypercapnia. 1/8/2025 pt taken off BiPAP and placed on nc O2      ## Labs:  CBC:                        7.8    14.52 )-----------( 15       ( 09 Jan 2025 03:50 )             24.0     Chem:  01-09    139  |  104  |  29[H]  ----------------------------<  212[H]  3.6   |  25  |  0.79    Ca    7.4[L]      09 Jan 2025 03:50  Phos  3.4     01-09  Mg     2.0     01-09    TPro  4.5[L]  /  Alb  1.2[L]  /  TBili  1.8[H]  /  DBili  x   /  AST  17  /  ALT  11[L]  /  AlkPhos  696[H]  01-09    Coags:          ## Imaging:    ## Medications:  ceftazidime/avibactam IVPB 2.5 Gram(s) IV Intermittent every 8 hours  metroNIDAZOLE  IVPB 500 milliGRAM(s) IV Intermittent every 8 hours    droxidopa 100 milliGRAM(s) Oral three times a day  midodrine 30 milliGRAM(s) Oral every 8 hours  norepinephrine Infusion 0.05 MICROgram(s)/kG/Min IV Continuous <Continuous>      fludroCORTISONE 0.1 milliGRAM(s) Oral every 12 hours  hydrocortisone sodium succinate Injectable 50 milliGRAM(s) IV Push every 6 hours  insulin lispro (ADMELOG) corrective regimen sliding scale   SubCutaneous every 6 hours  vasopressin Infusion 0.04 Unit(s)/Min IV Continuous <Continuous>      pantoprazole  Injectable 40 milliGRAM(s) IV Push daily  polyethylene glycol 3350 17 Gram(s) Oral daily  senna 2 Tablet(s) Oral at bedtime    fentaNYL    Injectable 25 MICROGram(s) IV Push every 8 hours PRN  ondansetron Injectable 4 milliGRAM(s) IV Push every 8 hours PRN      ## Vitals:  T(C): 35.9 (01-09-25 @ 10:00), Max: 37.2 (01-09-25 @ 05:00)  HR: 87 (01-09-25 @ 10:00) (85 - 117)  BP: 105/62 (01-09-25 @ 10:00) (55/25 - 159/77)  BP(mean): 76 (01-09-25 @ 10:00) (34 - 105)  RR: 26 (01-09-25 @ 10:00) (21 - 39)  SpO2: 100% (01-09-25 @ 09:40) (95% - 100%)  Wt(kg): --  Vent:   ABG: ABG - ( 08 Jan 2025 08:21 )  pH, Arterial: 7.49  pH, Blood: x     /  pCO2: 31    /  pO2: 94    / HCO3: 24    / Base Excess: 1.0   /  SaO2: 97.9                  01-08 @ 07:01  -  01-09 @ 07:00  --------------------------------------------------------  IN: 1034.1 mL / OUT: 395 mL / NET: 639.1 mL    01-09 @ 07:01  -  01-09 @ 10:33  --------------------------------------------------------  IN: 19.6 mL / OUT: 0 mL / NET: 19.6 mL          ## P/E:  Gen: lying comfortably in bed in no apparent distress  Face: (+) nc O2  Lungs: R CTA/ L slightly decreased BS  Heart: RRR  Abd: Soft/+BS/ Non-tender  Ext: Dependent edema  Neuro: Lethargic    CENTRAL LINE: [ ] YES [ ] NO  LOCATION:   DATE INSERTED:  REMOVE: [ ] YES [ ] NO      GALLARDO: [ ] YES [ ] NO    DATE INSERTED:  REMOVE:  [ ] YES [ ] NO      A-LINE:  [ ] YES [ ] NO  LOCATION:   DATE INSERTED:  REMOVE:  [ ] YES [ ] NO  EXPLAIN:    CODE STATUS: [x ] full code  [ ] DNR  [ ] DNI  [ ] MOLST  Goals of care discussion: [x ] yes Intubation if necessary

## 2025-01-09 NOTE — PROGRESS NOTE ADULT - SUBJECTIVE AND OBJECTIVE BOX
Patient is a 79y old  Female who presents with a chief complaint of Urinary tract infection and chronic osteomyelitis (09 Jan 2025 14:13)      BRIEF HOSPITAL COURSE: 80 y/o female with pmhx of MS, bedbound with chronic pressure ulcers c/b OM, contracted, recent RSV/FLuA pna who was admitted on 12/16 for hematuria found to be COVID-19 positive c/b septic shock transferred to ICU requiring pressors found to have polymicrobial bacteremia (CR klebsiella, ESBL E coli, proteus) likely due to pressure wounds.     Events last 24 hours: remains in dual pressor shock. now DNR DNI    PAST MEDICAL & SURGICAL HISTORY:  Osteoporosis      Uterine polyp      Herniated disc  lumbar      Functional quadriplegia secondary to multiple sclerosis      Bilateral Tubal ligation  46y/o          Review of Systems:  unable to obtain due to AMS      Medications:  ceftazidime/avibactam IVPB 2.5 Gram(s) IV Intermittent every 8 hours  metroNIDAZOLE  IVPB 500 milliGRAM(s) IV Intermittent every 8 hours    droxidopa 100 milliGRAM(s) Oral three times a day  midodrine 30 milliGRAM(s) Oral every 8 hours  norepinephrine Infusion 0.05 MICROgram(s)/kG/Min IV Continuous <Continuous>      morphine  - Injectable 2 milliGRAM(s) IV Push every 4 hours PRN  ondansetron Injectable 4 milliGRAM(s) IV Push every 8 hours PRN        pantoprazole  Injectable 40 milliGRAM(s) IV Push daily  polyethylene glycol 3350 17 Gram(s) Oral daily  senna 2 Tablet(s) Oral at bedtime      fludroCORTISONE 0.1 milliGRAM(s) Oral every 12 hours  hydrocortisone sodium succinate Injectable 50 milliGRAM(s) IV Push every 6 hours  insulin lispro (ADMELOG) corrective regimen sliding scale   SubCutaneous every 6 hours  vasopressin Infusion 0.04 Unit(s)/Min IV Continuous <Continuous>    sodium chloride 0.9% lock flush 10 milliLiter(s) IV Push every 1 hour PRN      chlorhexidine 2% Cloths 1 Application(s) Topical <User Schedule>            ICU Vital Signs Last 24 Hrs  T(C): 35.2 (09 Jan 2025 21:00), Max: 37.2 (09 Jan 2025 05:00)  T(F): 95.4 (09 Jan 2025 21:00), Max: 99 (09 Jan 2025 05:00)  HR: 86 (09 Jan 2025 21:00) (81 - 115)  BP: 101/52 (09 Jan 2025 21:00) (84/54 - 159/77)  BP(mean): 68 (09 Jan 2025 21:00) (53 - 105)  ABP: --  ABP(mean): --  RR: 29 (09 Jan 2025 21:00) (22 - 39)  SpO2: 99% (09 Jan 2025 21:00) (92% - 100%)    O2 Parameters below as of 09 Jan 2025 09:40  Patient On (Oxygen Delivery Method): nasal cannula  O2 Flow (L/min): 4          ABG - ( 08 Jan 2025 08:21 )  pH, Arterial: 7.49  pH, Blood: x     /  pCO2: 31    /  pO2: 94    / HCO3: 24    / Base Excess: 1.0   /  SaO2: 97.9                I&O's Detail    08 Jan 2025 07:01  -  09 Jan 2025 07:00  --------------------------------------------------------  IN:    IV PiggyBack: 300 mL    IV PiggyBack: 300 mL    Jevity 1.2: 130 mL    Norepinephrine: 200.7 mL    Norepinephrine: 43.4 mL    Vasopressin: 60 mL  Total IN: 1034.1 mL    OUT:    Indwelling Catheter - Urethral (mL): 270 mL    VAC (Vacuum Assisted Closure) System (mL): 125 mL  Total OUT: 395 mL    Total NET: 639.1 mL      09 Jan 2025 07:01  -  09 Jan 2025 21:04  --------------------------------------------------------  IN:    IV PiggyBack: 100 mL    IV PiggyBack: 100 mL    Jevity 1.2: 80 mL    Norepinephrine: 64.4 mL    Platelets - Single Donor: 200 mL    Vasopressin: 78 mL  Total IN: 622.4 mL    OUT:    Indwelling Catheter - Urethral (mL): 235 mL  Total OUT: 235 mL    Total NET: 387.4 mL            LABS:                        7.8    14.52 )-----------( 15       ( 09 Jan 2025 03:50 )             24.0     01-09    139  |  104  |  29[H]  ----------------------------<  212[H]  3.6   |  25  |  0.79    Ca    7.4[L]      09 Jan 2025 03:50  Phos  3.4     01-09  Mg     2.0     01-09    TPro  4.5[L]  /  Alb  1.2[L]  /  TBili  1.8[H]  /  DBili  x   /  AST  17  /  ALT  11[L]  /  AlkPhos  696[H]  01-09          CAPILLARY BLOOD GLUCOSE      POCT Blood Glucose.: 226 mg/dL (09 Jan 2025 17:58)      Urinalysis Basic - ( 09 Jan 2025 03:50 )    Color: x / Appearance: x / SG: x / pH: x  Gluc: 212 mg/dL / Ketone: x  / Bili: x / Urobili: x   Blood: x / Protein: x / Nitrite: x   Leuk Esterase: x / RBC: x / WBC x   Sq Epi: x / Non Sq Epi: x / Bacteria: x      CULTURES:  Culture Results:   No growth at 5 days (01-04 @ 03:20)      Physical Examination:    General: frail, cachectic . ill appearing. no distress     HEENT: Pupils equal, reactive to light.  Symmetric.    PULM: Clear to auscultation bilaterally, no significant sputum production    NECK: Supple, no lymphadenopathy, trachea midline    CVS: Regular rate and rhythm, no murmurs, rubs, or gallops    ABD: Soft, nondistended, nontender, normoactive bowel sounds, no masses    EXT: No edema, nontender    SKIN: Warm and well perfused, no rashes noted.    NEURO: lethargic but arousable. does not follow commands    DEVICES: CVC    RADIOLOGY:   IMPRESSION:  MRI of the bony pelvis demonstrates extensive soft tissue ulceration at   the posterior and lateral tissues with gas tracking to the sacrum, and   bilateral greater trochanters consistent with stage IV ulceration for   which correlation with physical exam is recommended. Intermediate to low   T1 signal, high STIR signal, and postcontrast enhancement at the S5   segment of the sacrum, coccyx, and less so at the greater trochanters and   posterior ischial spines suggestive of osteomyelitis. Findings may be   chronic or acute on chronic.    No walled off collection to suggest abscess.      --- End of Report ---            LEE ANN ENNIS MD  This document has been electronically signed. Dec 23 2024 11:04AM    CRITICAL CARE TIME SPENT: 33 minutes of critical care time spent providing medical care for patient's acute illness/conditions that impairs at least one vital organ system and/or poses a high risk of imminent or life threatening deterioration in the patient's condition. It includes time spent evaluating and treating the patient's acute illness as well as time spent reviewing labs, radiology, discussing goals of care with patient and/or patient's family, and discussing the case with a multidisciplinary team in an effort to prevent further life threatening deterioration or end organ damage. This time is independent of any procedures performed.    Please note: Date of entry of this note is equal to the date of services rendered.

## 2025-01-09 NOTE — CHART NOTE - NSCHARTNOTEFT_GEN_A_CORE
Per chart pt with PMH of osteoporosis, MS, bedbound, and multiple pressure ulcers presented for hematuria, admitted for sepsis, CAUTI and chronic osteomyelitis. Also COVID-19+, RSV+, Flu+ on this admission. s/p swallow eval on 12/26 with SLP recommendations for NPO. Admitted to ICU 12/30 for hypotension, with worsening OM, and MDR Klebsiella bacteremia. Palliative consulted for GOC. Ethics now involved as family struggled to make decisions; GOC discussions ongoing and pt full code at this time.     Factors impacting intake: [ ] none [ ] nausea  [ ] vomiting [ ] diarrhea [ ] constipation  [ ]chewing problems [ ] swallowing issues  [x] other: trickle feeds    Diet Prescription: Diet, NPO with Tube Feed:   Tube Feeding Modality: Nasogastric  Jevity 1.2 Zacarias  Total Volume for 24 Hours (mL): 180  Intermittent  Starting Tube Feed Rate {mL per Hour}: 10  Until Goal Tube Feed Rate (mL per Hour): 10  Tube Feeding Hours ON: 18  Tube Feeding OFF (Hours): 6  Tube Feed Start Time: 17:00 (01-08-25 @ 17:02)    Intake: Pt ordered for trickle feeds at this time; not meeting needs as estimated 12/19 on initial assessment.    Current Weight: (01/09) 42.9kg (12/31) 42.3kg  % Weight Change: weight stable    1+ generalized edema as per flow sheets    Pertinent Medications: MEDICATIONS  (STANDING):  ceftazidime/avibactam IVPB 2.5 Gram(s) IV Intermittent every 8 hours  chlorhexidine 2% Cloths 1 Application(s) Topical <User Schedule>  droxidopa 100 milliGRAM(s) Oral three times a day  fludroCORTISONE 0.1 milliGRAM(s) Oral every 12 hours  hydrocortisone sodium succinate Injectable 50 milliGRAM(s) IV Push every 6 hours  insulin lispro (ADMELOG) corrective regimen sliding scale   SubCutaneous every 6 hours  metroNIDAZOLE  IVPB 500 milliGRAM(s) IV Intermittent every 8 hours  midodrine 30 milliGRAM(s) Oral every 8 hours  norepinephrine Infusion 0.05 MICROgram(s)/kG/Min (1.98 mL/Hr) IV Continuous <Continuous>  pantoprazole  Injectable 40 milliGRAM(s) IV Push daily  polyethylene glycol 3350 17 Gram(s) Oral daily  senna 2 Tablet(s) Oral at bedtime  vasopressin Infusion 0.04 Unit(s)/Min (6 mL/Hr) IV Continuous <Continuous>    MEDICATIONS  (PRN):  fentaNYL    Injectable 25 MICROGram(s) IV Push every 8 hours PRN pain medication for when cleaning/turning  ondansetron Injectable 4 milliGRAM(s) IV Push every 8 hours PRN Nausea and/or Vomiting  sodium chloride 0.9% lock flush 10 milliLiter(s) IV Push every 1 hour PRN Pre/post blood products, medications, blood draw, and to maintain line patency    Pertinent Labs: 01-09 Na139 mmol/L Glu 212 mg/dL[H] K+ 3.6 mmol/L Cr  0.79 mg/dL BUN 29 mg/dL[H] 01-09 Phos 3.4 mg/dL 01-09 Alb 1.2 g/dL[L]  CAPILLARY BLOOD GLUCOSE      POCT Blood Glucose.: 261 mg/dL (09 Jan 2025 11:00)  POCT Blood Glucose.: 206 mg/dL (09 Jan 2025 06:18)  POCT Blood Glucose.: 180 mg/dL (09 Jan 2025 00:07)  POCT Blood Glucose.: 147 mg/dL (08 Jan 2025 17:42)      Skin: Multiple stage II or greater pressure injuries as per flow sheets    Estimated Needs:   [x] no change since previous assessment: 12/19  [ ] recalculated:     Previous Nutrition Diagnosis:   [x] Severe malnutrition in the context of chronic illness  Etiology: Inadequate protein-energy intake + increased needs related to hx of MS, bedbound, multiple pressure ulcers  Signs/Symptoms: Severe fat loss and muscle wasting as noted on 12/19    Goal: Pt will Meet calorie and protein needs > 75% via meals / supplements - not met    Nutrition Diagnosis is [x] ongoing  [ ] resolved [ ] not applicable     New Nutrition Diagnosis: [x] not applicable       Interventions:   Recommend  [ ] Change Diet To:  [ ] Nutrition Supplement  [x] Nutrition Support: If/when medically appropriate and within family's GOC consider change in formula Glucerna 1.2 @ 65 mL/hr x 18 hrs 1170ml total volume, 1404kcal, 70 g protein, 942 mL free water + LPS x 1/day to provide additional 100kcal and 15g protein  [ ] Other:     Monitoring and Evaluation:   [ ] PO intake [ x ] Tolerance to diet prescription [ x ] weights [ x ] labs[ x ] follow up per protocol  [ ] other:

## 2025-01-09 NOTE — PROVIDER CONTACT NOTE (CRITICAL VALUE NOTIFICATION) - NAME OF MD/NP/PA/DO NOTIFIED:
AMEE Geronimo
AMEE Geronimo
AMEE Tolbert
BRAYAN Duke
Dr. Davi Cazares
AMEE Geronimo
Grazyna LUBIN
AMEE Tolbert
MD and PA notified via teams
Jaylen LUBIN
Margaret LUBIN
AMEE Mejía
AMEE Tolbert
AMEE Tolbert

## 2025-01-09 NOTE — PROGRESS NOTE ADULT - ASSESSMENT
Pt is a 80 yo F with h/o MS, bedbound at baseline, chronic osteomyelitis admitted to medicine for acute on chronic osteomoyelitis and UTI. During this hospitalization pt (+) Covid-19, Influenza A and RSV. Pt transferred to the ICU s/p RRT 2 to hypotension. Pt found to be in septic shock 2 to Klebsiella, E. coli, Proteus and Bacteroides. Most recently pt on increasing Levophed and O2 requirement (pt with worsening R sided infiltrate). ICU dx: Septic shock and acute hypoxic resp failure 2 to Klebsiella, E. coli, Proteus and Bacteroides and PNA. 1/6/2025 overnight placed on BiPAP 2 to worsening hypercapnia. 1/8/2025 pt taken off BiPAP and placed on nc O2    Resp: Supplemental O2 prn to maintain O2 sat >92%   ID: Abx as per F/u ID  CVS: Midodrine + Droxidopa + Levophed to maintain MAP >65 and Vasopressin added last pm  HEME: DVT prophylaxis with Venodynes/ Thrombocytopenia; trend platelets  FEN: Severe protein-calorie malnutrition and Underweight (BMI < 19); Resume feeds off BiPAP  Endo: Follow FS and cover with Lispro/ Cont Hydrocortisone and Florinef  Neuro: Toxic/metabolic encephalopathy; follow MS  Social: Multiple conversations with pt's son; pt is full code and son agrees for intubation if necessary (pt at increased risk for intubation)/ Overall long term prognosis is poor     Ear Wedge Repair Text: A wedge excision was completed by carrying down an excision through the full thickness of the ear and cartilage with an inward facing Burow's triangle. The wound was then closed in a layered fashion.

## 2025-01-09 NOTE — PROGRESS NOTE ADULT - ASSESSMENT
78 y/o female with pmhx of MS, bedbound with chronic pressure ulcers c/b OM, contracted, recent RSV/FLuA pna who was admitted on 12/16 for hematuria found to be COVID-19 positive c/b septic shock transferred to ICU requiring pressors found to have polymicrobial bacteremia (CR klebsiella, ESBL E coli, proteus) likely due to pressure wounds.    1. septic shock  2. polymicrobial bacteremia  3. OM      - actively titrating levophed for goal MAP 65-70. on fixed dose vasopressin  - stress dose steroids with solucortef 50 q6  - florinef  - midodrine and droxidopa in attempt to lower pressor requirements  - aspiration precautions  - keep plt > 10,000  - f/u cultures from 1/4 NGTD. on avycaz and flagyl

## 2025-01-09 NOTE — PROVIDER CONTACT NOTE (CRITICAL VALUE NOTIFICATION) - TEST AND RESULT REPORTED:
Platelet count 1200
WBC 40.74  Platelet 8
platelets - 18
Potassium 2.9
12/16- urine culture final >100,000 cfu ecoli esbl
Hgb 6.4, HCT 20.2
Platelet 12
WBC 45.18
platelet 15
Platelet 11
Platelet 13
Platelets - lab
blood culture 12/30 , growth in aeorbic bottle gram negative rods
[id number]

## 2025-01-09 NOTE — PROCEDURE NOTE - PROCEDURE DATE TIME, MLM
18-Dec-2024 18:58
02-Jan-2025 14:24
09-Jan-2025 18:03
30-Dec-2024 15:52
30-Dec-2024 09:44
20-Dec-2024 16:47

## 2025-01-10 NOTE — PROGRESS NOTE ADULT - ASSESSMENT
79F w/ MS, bedbound, chronic osteomyelitis with multiple large sacral and other pressure wounds. Prolonged hospital course w/ Flu A and RSV, as well as septic shock due to poly-microbial bacteremia w/ ESBL E coli, CRE klebsiella, proteus, bacteroides, as well as ESBL E coli UTI in setting of large decubitus ulcers. remains in shock state on 2 pressors and ss mostly unresponsive.    #Neuro - unresponsive but in NAD, morphine PRN  #CV - remains in shock state on norepi and vasopressin, as well as hydrocortisone, droxidopa, midodrine and fludrocortisone, maintain MAP >/65  #Pulm - satting well on 2LNC; BiPAP PRN for hypercapnia  #ID- polymicrobial bacteremia in setting of sacral wounds - on avycaz and flagyl to cover MDR organisms, d/c today for total of 10 day course, as recommended by ID; pt is at high risk for re-infection due to lack of source control and will likely never have source control, future use of abx is of limited to no efficacy  #Renal/metabolic - mild worsening of renal function, oliguric; monitor I/Os and electrolytes  #GI- continue NGT feeds, PPI, bowel regimen  #Heme - remains thrombocytopenic likely sepsis related, remains anemic as well likely due to critical illness  #Endo - med ISS w/ FS   #Skin - wound vac in place, pt is not candidate for debridement of sacral wounds  #PPx - SCDs due to thrombocytopenia  #Dispo- remains in ICU pressor dependent w/ poorly controlled infection despite abx, prognosis is poor and do not believe that pt will survive hospitalization; DNR/DNI

## 2025-01-10 NOTE — PHARMACOTHERAPY INTERVENTION NOTE - NSPHARMCOMMASP
ASP - Lab/ test recommended
ASP - Clarify indication
ASP - Dose optimization/Non-Renal dose adjustment
ASP - Drug-Bug mismatch
ASP - Dose optimization/Non-Renal dose adjustment
ASP - Duration of therapy
ASP - Therapy recommended/ Alternative therapy

## 2025-01-10 NOTE — PROGRESS NOTE ADULT - REASON FOR ADMISSION
Urinary tract infection and chronic osteomyelitis

## 2025-01-10 NOTE — PROGRESS NOTE ADULT - SUBJECTIVE AND OBJECTIVE BOX
CHIEF COMPLAINT:    Interval Events:    REVIEW OF SYSTEMS:  Constitutional: [ ] fevers [ ] chills [ ] weight loss [ ] weight gain  HEENT: [ ] dry eyes [ ] eye irritation [ ] postnasal drip [ ] nasal congestion  CV: [ ] chest pain [ ] orthopnea [ ] palpitations [ ] murmur  Resp: [ ] cough [ ] shortness of breath [ ] dyspnea [ ] wheezing [ ] sputum [ ] hemoptysis  GI: [ ] nausea [ ] vomiting [ ] diarrhea [ ] constipation [ ] abd pain [ ] dysphagia   : [ ] dysuria [ ] nocturia [ ] hematuria [ ] increased urinary frequency  Musculoskeletal: [ ] back pain [ ] myalgias [ ] arthralgias [ ] fracture  Skin: [ ] rash [ ] itch  Neurological: [ ] headache [ ] dizziness [ ] syncope [ ] weakness [ ] numbness  Hematologic/Lymphatic: [ ] anemia [ ] bleeding problem  Allergic/Immunologic: [ ] itchy eyes [ ] nasal discharge [ ] hives [ ] angioedema  [ ] All other systems negative  [ ] Unable to assess ROS because ________    OBJECTIVE:  ICU Vital Signs Last 24 Hrs  T(C): 35.4 (10 Andre 2025 07:30), Max: 36.1 (09 Jan 2025 08:00)  T(F): 95.7 (10 Andre 2025 07:30), Max: 97 (09 Jan 2025 08:00)  HR: 94 (10 Andre 2025 07:30) (81 - 108)  BP: 107/64 (10 Andre 2025 07:30) (81/55 - 122/80)  BP(mean): 75 (10 Andre 2025 07:30) (59 - 108)  ABP: --  ABP(mean): --  RR: 29 (10 Andre 2025 07:30) (22 - 38)  SpO2: 100% (10 Andre 2025 07:30) (92% - 100%)    O2 Parameters below as of 09 Jan 2025 09:40  Patient On (Oxygen Delivery Method): nasal cannula  O2 Flow (L/min): 4            01-09 @ 07:01  -  01-10 @ 07:00  --------------------------------------------------------  IN: 1269 mL / OUT: 455 mL / NET: 814 mL      CAPILLARY BLOOD GLUCOSE      POCT Blood Glucose.: 194 mg/dL (10 Andre 2025 06:00)      PHYSICAL EXAM:  General:   HEENT:   Neck:   Respiratory:   Cardiovascular:   Abdomen:   Extremities:   Skin:   Neurological:  Psychiatry:    LINES:    HOSPITAL MEDICATIONS:  MEDICATIONS  (STANDING):  ceftazidime/avibactam IVPB 2.5 Gram(s) IV Intermittent every 8 hours  chlorhexidine 2% Cloths 1 Application(s) Topical <User Schedule>  droxidopa 100 milliGRAM(s) Oral three times a day  fludroCORTISONE 0.1 milliGRAM(s) Oral every 12 hours  hydrocortisone sodium succinate Injectable 50 milliGRAM(s) IV Push every 6 hours  insulin lispro (ADMELOG) corrective regimen sliding scale   SubCutaneous every 6 hours  metroNIDAZOLE  IVPB 500 milliGRAM(s) IV Intermittent every 8 hours  midodrine 30 milliGRAM(s) Oral every 8 hours  norepinephrine Infusion 0.05 MICROgram(s)/kG/Min (1.98 mL/Hr) IV Continuous <Continuous>  pantoprazole  Injectable 40 milliGRAM(s) IV Push daily  polyethylene glycol 3350 17 Gram(s) Oral daily  senna 2 Tablet(s) Oral at bedtime  vasopressin Infusion 0.04 Unit(s)/Min (6 mL/Hr) IV Continuous <Continuous>    MEDICATIONS  (PRN):  morphine  - Injectable 2 milliGRAM(s) IV Push every 4 hours PRN Severe Pain (7 - 10)/SOB  ondansetron Injectable 4 milliGRAM(s) IV Push every 8 hours PRN Nausea and/or Vomiting  sodium chloride 0.9% lock flush 10 milliLiter(s) IV Push every 1 hour PRN Pre/post blood products, medications, blood draw, and to maintain line patency      LABS:                        7.3    19.70 )-----------( 47       ( 10 Andre 2025 03:30 )             23.4     Hgb Trend: 7.3<--, 7.8<--, 8.2<--, 9.0<--, 8.6<--  01-10    139  |  105  |  35[H]  ----------------------------<  210[H]  3.7   |  24  |  1.00    Ca    7.6[L]      10 Andre 2025 03:30  Phos  3.2     01-10  Mg     1.7     01-10    TPro  4.7[L]  /  Alb  1.3[L]  /  TBili  1.0  /  DBili  x   /  AST  28  /  ALT  14  /  AlkPhos  654[H]  01-10      Urinalysis Basic - ( 10 Andre 2025 03:30 )    Color: x / Appearance: x / SG: x / pH: x  Gluc: 210 mg/dL / Ketone: x  / Bili: x / Urobili: x   Blood: x / Protein: x / Nitrite: x   Leuk Esterase: x / RBC: x / WBC x   Sq Epi: x / Non Sq Epi: x / Bacteria: x      Arterial Blood Gas:  01-08 @ 08:21  7.49/31/94/24/97.9/1.0  ABG lactate: --    Venous Blood Gas:  01-08 @ 21:24  7.40/38/39/24/56.4  VBG Lactate: 3.00      MICROBIOLOGY:     RADIOLOGY:  [ ] Reviewed and interpreted by me CHIEF COMPLAINT:    Interval Events:  remains on vasopressin and norepi   hypothermic    REVIEW OF SYSTEMS:  [ x] Unable to assess ROS because unresponsive    OBJECTIVE:  ICU Vital Signs Last 24 Hrs  T(C): 35.4 (10 Andre 2025 07:30), Max: 36.1 (09 Jan 2025 08:00)  T(F): 95.7 (10 Andre 2025 07:30), Max: 97 (09 Jan 2025 08:00)  HR: 94 (10 Andre 2025 07:30) (81 - 108)  BP: 107/64 (10 Andre 2025 07:30) (81/55 - 122/80)  BP(mean): 75 (10 Andre 2025 07:30) (59 - 108)  ABP: --  ABP(mean): --  RR: 29 (10 Andre 2025 07:30) (22 - 38)  SpO2: 100% (10 Andre 2025 07:30) (92% - 100%)    O2 Parameters below as of 09 Jan 2025 09:40  Patient On (Oxygen Delivery Method): nasal cannula  O2 Flow (L/min): 4            01-09 @ 07:01  -  01-10 @ 07:00  --------------------------------------------------------  IN: 1269 mL / OUT: 455 mL / NET: 814 mL      CAPILLARY BLOOD GLUCOSE      POCT Blood Glucose.: 194 mg/dL (10 Andre 2025 06:00)      PHYSICAL EXAM:  General: chronically ill appearing  HEENT: dry MM  Neck: contracted  Respiratory: poor inspiratory effort  Cardiovascular: s1s2 RRR  Abdomen: soft, non tender, non distended  Extremities: warm, +2 pitting edema, contracted  Skin: wound vac in place, multiple large sacral wounds   Neurological: unable to assess  Psychiatry: unable to assess    LINES:    HOSPITAL MEDICATIONS:  MEDICATIONS  (STANDING):  ceftazidime/avibactam IVPB 2.5 Gram(s) IV Intermittent every 8 hours  chlorhexidine 2% Cloths 1 Application(s) Topical <User Schedule>  droxidopa 100 milliGRAM(s) Oral three times a day  fludroCORTISONE 0.1 milliGRAM(s) Oral every 12 hours  hydrocortisone sodium succinate Injectable 50 milliGRAM(s) IV Push every 6 hours  insulin lispro (ADMELOG) corrective regimen sliding scale   SubCutaneous every 6 hours  metroNIDAZOLE  IVPB 500 milliGRAM(s) IV Intermittent every 8 hours  midodrine 30 milliGRAM(s) Oral every 8 hours  norepinephrine Infusion 0.05 MICROgram(s)/kG/Min (1.98 mL/Hr) IV Continuous <Continuous>  pantoprazole  Injectable 40 milliGRAM(s) IV Push daily  polyethylene glycol 3350 17 Gram(s) Oral daily  senna 2 Tablet(s) Oral at bedtime  vasopressin Infusion 0.04 Unit(s)/Min (6 mL/Hr) IV Continuous <Continuous>    MEDICATIONS  (PRN):  morphine  - Injectable 2 milliGRAM(s) IV Push every 4 hours PRN Severe Pain (7 - 10)/SOB  ondansetron Injectable 4 milliGRAM(s) IV Push every 8 hours PRN Nausea and/or Vomiting  sodium chloride 0.9% lock flush 10 milliLiter(s) IV Push every 1 hour PRN Pre/post blood products, medications, blood draw, and to maintain line patency      LABS:                        7.3    19.70 )-----------( 47       ( 10 Andre 2025 03:30 )             23.4     Hgb Trend: 7.3<--, 7.8<--, 8.2<--, 9.0<--, 8.6<--  01-10    139  |  105  |  35[H]  ----------------------------<  210[H]  3.7   |  24  |  1.00    Ca    7.6[L]      10 Andre 2025 03:30  Phos  3.2     01-10  Mg     1.7     01-10    TPro  4.7[L]  /  Alb  1.3[L]  /  TBili  1.0  /  DBili  x   /  AST  28  /  ALT  14  /  AlkPhos  654[H]  01-10      Urinalysis Basic - ( 10 Andre 2025 03:30 )    Color: x / Appearance: x / SG: x / pH: x  Gluc: 210 mg/dL / Ketone: x  / Bili: x / Urobili: x   Blood: x / Protein: x / Nitrite: x   Leuk Esterase: x / RBC: x / WBC x   Sq Epi: x / Non Sq Epi: x / Bacteria: x      Arterial Blood Gas:  01-08 @ 08:21  7.49/31/94/24/97.9/1.0  ABG lactate: --    Venous Blood Gas:  01-08 @ 21:24  7.40/38/39/24/56.4  VBG Lactate: 3.00      MICROBIOLOGY:     RADIOLOGY:  [ ] Reviewed and interpreted by me

## 2025-01-10 NOTE — DISCHARGE NOTE FOR THE EXPIRED PATIENT - HOSPITAL COURSE
79F w/ MS, bedbound, chronic osteomyelitis with multiple large sacral and other pressure wounds. Prolonged hospital course w/ Flu A and RSV, as well as septic shock due to poly-microbial bacteremia w/ ESBL E coli, CRE klebsiella, proteus, bacteroides, as well as ESBL E coli UTI in setting of large decubitus ulcers.   Patient with worsening shock requiring increase in vasopressor support, Goals of care conversation , patient made DNR/ DNI   On 1/10 patient with increased pressor requirements, widen complex on monitor progressed to asystole  Time of death 1653pm.   The patients son was notified of patients time of death.

## 2025-01-10 NOTE — PROGRESS NOTE ADULT - PROVIDER SPECIALTY LIST ADULT
Critical Care
Hospitalist
Infectious Disease
Infectious Disease
Internal Medicine
Critical Care
Hospitalist
Hospitalist
Critical Care
Hospitalist
Infectious Disease
Critical Care
Infectious Disease
Palliative Care
Wound Care
Critical Care
Hospitalist
Critical Care
Hospitalist
Hospitalist

## 2025-01-10 NOTE — PHARMACOTHERAPY INTERVENTION NOTE - COMMENTS
As per policy, vancomycin IV 1000 mg once adjusted to 750 mg once based on patient's weight. 
Per policy, meropenem dose adjusted from 500 mg IV q24h to 500 mg q12h based on renal function and indication. Note patient with low baseline creatinine and weight.
Recommended initiating metronidazole for anaerobic coverage of Bacteroides fragilis due to ceftazidime-avibactam's poor anaerobic activity. 
Recommended initiation of senna and polyethylene glycol for bowel regimen given patient's fecal incontinence. 
Recommended initiation of pantoprazole for GI stress ulcer prophylaxis given patient's extended mechanical ventilation.  
Recommended escalating from meropenem to ceftazidime-avibactam 2.5 g IVPB q8hrs dosed based on an eGFR: 99 mL/min, in the setting of [facility] Klebsiella pneumoniae bacteremia.       Madelaine Billy, PharmD   Clinical Pharmacy Specialist, Infectious Diseases  Tele-Antimicrobial Stewardship Program (Tele-[facility])  Tele-[facility] Phone: [phone]  
Vancomycin serum level in AM ordered to guide drug dosing.
Ceftazidime-avibactam approved for treatment of [facility] bacteremia. 
Recommended discontinuing antibiotics to target total duration of therapy of 10 days.

## 2025-01-10 NOTE — CHART NOTE - NSCHARTNOTESELECT_GEN_ALL_CORE
Alternating pressure mattress/Event Note
FAMILY UPDATE/Event Note
live -on/Event Note
Event Note
Event Note
Nutrition Services

## 2025-01-14 DIAGNOSIS — J10.1 INFLUENZA DUE TO OTHER IDENTIFIED INFLUENZA VIRUS WITH OTHER RESPIRATORY MANIFESTATIONS: ICD-10-CM

## 2025-01-14 DIAGNOSIS — G92.8 OTHER TOXIC ENCEPHALOPATHY: ICD-10-CM

## 2025-01-14 DIAGNOSIS — J96.01 ACUTE RESPIRATORY FAILURE WITH HYPOXIA: ICD-10-CM

## 2025-01-14 DIAGNOSIS — B96.4 PROTEUS (MIRABILIS) (MORGANII) AS THE CAUSE OF DISEASES CLASSIFIED ELSEWHERE: ICD-10-CM

## 2025-01-14 DIAGNOSIS — N39.0 URINARY TRACT INFECTION, SITE NOT SPECIFIED: ICD-10-CM

## 2025-01-14 DIAGNOSIS — T83.511A INFECTION AND INFLAMMATORY REACTION DUE TO INDWELLING URETHRAL CATHETER, INITIAL ENCOUNTER: ICD-10-CM

## 2025-01-14 DIAGNOSIS — R53.2 FUNCTIONAL QUADRIPLEGIA: ICD-10-CM

## 2025-01-14 DIAGNOSIS — L89.214 PRESSURE ULCER OF RIGHT HIP, STAGE 4: ICD-10-CM

## 2025-01-14 DIAGNOSIS — N17.0 ACUTE KIDNEY FAILURE WITH TUBULAR NECROSIS: ICD-10-CM

## 2025-01-14 DIAGNOSIS — L89.154 PRESSURE ULCER OF SACRAL REGION, STAGE 4: ICD-10-CM

## 2025-01-14 DIAGNOSIS — L89.224 PRESSURE ULCER OF LEFT HIP, STAGE 4: ICD-10-CM

## 2025-01-14 DIAGNOSIS — D65 DISSEMINATED INTRAVASCULAR COAGULATION [DEFIBRINATION SYNDROME]: ICD-10-CM

## 2025-01-14 DIAGNOSIS — A41.59 OTHER GRAM-NEGATIVE SEPSIS: ICD-10-CM

## 2025-01-14 DIAGNOSIS — F03.C0 UNSPECIFIED DEMENTIA, SEVERE, WITHOUT BEHAVIORAL DISTURBANCE, PSYCHOTIC DISTURBANCE, MOOD DISTURBANCE, AND ANXIETY: ICD-10-CM

## 2025-01-14 DIAGNOSIS — R65.21 SEVERE SEPSIS WITH SEPTIC SHOCK: ICD-10-CM

## 2025-01-14 DIAGNOSIS — E88.09 OTHER DISORDERS OF PLASMA-PROTEIN METABOLISM, NOT ELSEWHERE CLASSIFIED: ICD-10-CM

## 2025-01-14 DIAGNOSIS — B96.20 UNSPECIFIED ESCHERICHIA COLI [E. COLI] AS THE CAUSE OF DISEASES CLASSIFIED ELSEWHERE: ICD-10-CM

## 2025-01-14 DIAGNOSIS — Z74.01 BED CONFINEMENT STATUS: ICD-10-CM

## 2025-01-14 DIAGNOSIS — E87.6 HYPOKALEMIA: ICD-10-CM

## 2025-01-14 DIAGNOSIS — J10.08 INFLUENZA DUE TO OTHER IDENTIFIED INFLUENZA VIRUS WITH OTHER SPECIFIED PNEUMONIA: ICD-10-CM

## 2025-01-14 DIAGNOSIS — J96.02 ACUTE RESPIRATORY FAILURE WITH HYPERCAPNIA: ICD-10-CM

## 2025-01-14 DIAGNOSIS — B97.4 RESPIRATORY SYNCYTIAL VIRUS AS THE CAUSE OF DISEASES CLASSIFIED ELSEWHERE: ICD-10-CM

## 2025-01-14 DIAGNOSIS — E43 UNSPECIFIED SEVERE PROTEIN-CALORIE MALNUTRITION: ICD-10-CM

## 2025-01-14 DIAGNOSIS — M81.0 AGE-RELATED OSTEOPOROSIS WITHOUT CURRENT PATHOLOGICAL FRACTURE: ICD-10-CM

## 2025-01-14 DIAGNOSIS — J98.11 ATELECTASIS: ICD-10-CM

## 2025-01-14 DIAGNOSIS — M46.28 OSTEOMYELITIS OF VERTEBRA, SACRAL AND SACROCOCCYGEAL REGION: ICD-10-CM

## 2025-01-14 DIAGNOSIS — B96.1 KLEBSIELLA PNEUMONIAE [K. PNEUMONIAE] AS THE CAUSE OF DISEASES CLASSIFIED ELSEWHERE: ICD-10-CM

## 2025-01-14 DIAGNOSIS — Z66 DO NOT RESUSCITATE: ICD-10-CM
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
## 2025-01-14 DIAGNOSIS — Z16.24 RESISTANCE TO MULTIPLE ANTIBIOTICS: ICD-10-CM

## 2025-01-14 DIAGNOSIS — G35 MULTIPLE SCLEROSIS: ICD-10-CM

## 2025-01-14 DIAGNOSIS — R31.9 HEMATURIA, UNSPECIFIED: ICD-10-CM

## 2025-01-14 DIAGNOSIS — U07.1 COVID-19: ICD-10-CM

## 2025-01-14 DIAGNOSIS — R64 CACHEXIA: ICD-10-CM

## 2025-01-19 NOTE — DIETITIAN INITIAL EVALUATION ADULT - OTHER CALCULATIONS
"Goal Outcome Evaluation:      Plan of Care Reviewed With: patient, family    Overall Patient Progress: improvingOverall Patient Progress: improving     HR 70-160s at times; pt reconverted to afib RVR this afternoon. Dr. King notified, PTA coreg resumed. Other VSS this shift. Pt rested in bed, A2 w/lift for turns/cares. Precedex turned off this afternoon, pt remained alert, answered yes/no questions appropriately and followed commands. Extubated at approx 1815 to 5 LPM oxymask. Continues with strong, productive cough; ongoing suction required. OG removed during extubation, currently NPO. Loose, incontinent BM this shift. BG managed with insulin gtt, currently alg 1, see MAR for activity. Potentially incontinent of urine x1, mixed with stool and difficult to determine; HD completed at bedside; per HD RN, 3L removed. Daughter at bedside, supportive and involved with cares. Tele Afib, rates variable. Bed alarms in use, pt not attempting to exit bed. Drowsy but oriented, able to make needs known. Continue to monitor.       Problem: Adult Inpatient Plan of Care  Goal: Plan of Care Review  Description: The Plan of Care Review/Shift note should be completed every shift.  The Outcome Evaluation is a brief statement about your assessment that the patient is improving, declining, or no change.  This information will be displayed automatically on your shift  note.  Outcome: Progressing  Flowsheets (Taken 1/18/2025 8682)  Plan of Care Reviewed With:   patient   family  Overall Patient Progress: improving  Goal: Patient-Specific Goal (Individualized)  Description: You can add care plan individualizations to a care plan. Examples of Individualization might be:  \"Parent requests to be called daily at 9am for status\", \"I have a hard time hearing out of my right ear\", or \"Do not touch me to wake me up as it startles  me\".  Outcome: Progressing  Goal: Absence of Hospital-Acquired Illness or Injury  Outcome: Progressing  Intervention: " Identify and Manage Fall Risk  Recent Flowsheet Documentation  Taken 1/18/2025 1600 by Diana Clark RN  Safety Promotion/Fall Prevention:   treat underlying cause   treat reversible contributory factors   supervised activity   safety round/check completed   room organization consistent   room near nurse's station   patient and family education   nonskid shoes/slippers when out of bed   mobility aid in reach   lighting adjusted   increase visualization of patient   increased rounding and observation   clutter free environment maintained   activity supervised   assistive device/personal items within reach  Taken 1/18/2025 1200 by Diana Clark, RN  Safety Promotion/Fall Prevention:   treat underlying cause   treat reversible contributory factors   supervised activity   safety round/check completed   room organization consistent   room near nurse's station   patient and family education   nonskid shoes/slippers when out of bed   mobility aid in reach   lighting adjusted   increase visualization of patient   increased rounding and observation   clutter free environment maintained   activity supervised   assistive device/personal items within reach  Taken 1/18/2025 0800 by Diana Clark, RN  Safety Promotion/Fall Prevention:   treat underlying cause   treat reversible contributory factors   supervised activity   safety round/check completed   room organization consistent   room near nurse's station   patient and family education   nonskid shoes/slippers when out of bed   mobility aid in reach   lighting adjusted   increase visualization of patient   increased rounding and observation   clutter free environment maintained   activity supervised   assistive device/personal items within reach  Intervention: Prevent Skin Injury  Recent Flowsheet Documentation  Taken 1/18/2025 1800 by Diana Clark, RN  Body Position:   turned   log-rolled   legs elevated   heels elevated   weight shifting   upper extremity  elevated   side-lying 30 degrees   lower extremity elevated  Taken 1/18/2025 1600 by Diana Clark RN  Body Position:   turned   log-rolled   legs elevated   heels elevated   weight shifting   upper extremity elevated   side-lying 30 degrees   lower extremity elevated  Skin Protection:   adhesive use limited   skin to device areas padded   silicone foam dressing in place   pulse oximeter probe site changed   tubing/devices free from skin contact   skin sealant/moisture barrier applied  Taken 1/18/2025 1400 by Diana Clark RN  Body Position:   turned   log-rolled   legs elevated   heels elevated   weight shifting   upper extremity elevated   side-lying 30 degrees   lower extremity elevated  Taken 1/18/2025 1200 by Diana Clark RN  Body Position:   turned   log-rolled   legs elevated   heels elevated   weight shifting   upper extremity elevated   side-lying 30 degrees   lower extremity elevated  Skin Protection:   adhesive use limited   skin to device areas padded   silicone foam dressing in place   pulse oximeter probe site changed   tubing/devices free from skin contact   skin sealant/moisture barrier applied  Taken 1/18/2025 1000 by Diana Clark RN  Body Position:   turned   log-rolled   legs elevated   heels elevated   weight shifting   upper extremity elevated   side-lying 30 degrees   lower extremity elevated  Taken 1/18/2025 0800 by Diana Clark RN  Body Position:   turned   log-rolled   legs elevated   heels elevated   weight shifting   upper extremity elevated   side-lying 30 degrees   lower extremity elevated  Skin Protection:   adhesive use limited   skin to device areas padded   silicone foam dressing in place   pulse oximeter probe site changed   tubing/devices free from skin contact   skin sealant/moisture barrier applied  Intervention: Prevent and Manage VTE (Venous Thromboembolism) Risk  Recent Flowsheet Documentation  Taken 1/18/2025 1600 by Diana Clark RN  VTE  Prevention/Management: SCDs off (sequential compression devices)  Taken 1/18/2025 1200 by Diana Clark RN  VTE Prevention/Management: SCDs off (sequential compression devices)  Taken 1/18/2025 0800 by Diana Clark RN  VTE Prevention/Management: SCDs off (sequential compression devices)  Intervention: Prevent Infection  Recent Flowsheet Documentation  Taken 1/18/2025 1600 by Diana Clark RN  Infection Prevention:   single patient room provided   rest/sleep promoted   personal protective equipment utilized   hand hygiene promoted   equipment surfaces disinfected  Taken 1/18/2025 1200 by Diana Clark RN  Infection Prevention:   single patient room provided   rest/sleep promoted   personal protective equipment utilized   hand hygiene promoted   equipment surfaces disinfected  Taken 1/18/2025 0800 by Diana Clark RN  Infection Prevention:   single patient room provided   rest/sleep promoted   personal protective equipment utilized   hand hygiene promoted   equipment surfaces disinfected  Goal: Optimal Comfort and Wellbeing  Outcome: Progressing  Intervention: Monitor Pain and Promote Comfort  Recent Flowsheet Documentation  Taken 1/18/2025 1200 by Diana Clark RN  Pain Management Interventions:   pillow support provided   rest   repositioned   pain pump in use   pain management plan reviewed with patient/caregiver   quiet environment facilitated   emotional support   care clustered  Taken 1/18/2025 0800 by Diana Clark RN  Pain Management Interventions:   pillow support provided   rest   repositioned   pain management plan reviewed with patient/caregiver   quiet environment facilitated   emotional support   care clustered  Intervention: Provide Person-Centered Care  Recent Flowsheet Documentation  Taken 1/18/2025 1600 by Diana Clark RN  Trust Relationship/Rapport:   care explained   choices provided   reassurance provided   questions encouraged   questions answered  Taken  1/18/2025 1200 by Diana Clark RN  Trust Relationship/Rapport:   care explained   choices provided   reassurance provided   questions encouraged   questions answered  Taken 1/18/2025 0800 by Diana Clark RN  Trust Relationship/Rapport:   care explained   choices provided   reassurance provided   questions encouraged   questions answered  Goal: Readiness for Transition of Care  Outcome: Progressing     Problem: Fall Injury Risk  Goal: Absence of Fall and Fall-Related Injury  Outcome: Progressing  Intervention: Identify and Manage Contributors  Recent Flowsheet Documentation  Taken 1/18/2025 1600 by Diana Clark RN  Medication Review/Management:   high-risk medications identified   medications reviewed  Taken 1/18/2025 1200 by Diana Clark RN  Medication Review/Management:   high-risk medications identified   medications reviewed  Taken 1/18/2025 0800 by Diana Clark RN  Medication Review/Management:   high-risk medications identified   medications reviewed  Intervention: Promote Injury-Free Environment  Recent Flowsheet Documentation  Taken 1/18/2025 1600 by Diana Clark RN  Safety Promotion/Fall Prevention:   treat underlying cause   treat reversible contributory factors   supervised activity   safety round/check completed   room organization consistent   room near nurse's station   patient and family education   nonskid shoes/slippers when out of bed   mobility aid in reach   lighting adjusted   increase visualization of patient   increased rounding and observation   clutter free environment maintained   activity supervised   assistive device/personal items within reach  Taken 1/18/2025 1200 by Diana Clark RN  Safety Promotion/Fall Prevention:   treat underlying cause   treat reversible contributory factors   supervised activity   safety round/check completed   room organization consistent   room near nurse's station   patient and family education   nonskid shoes/slippers when  out of bed   mobility aid in reach   lighting adjusted   increase visualization of patient   increased rounding and observation   clutter free environment maintained   activity supervised   assistive device/personal items within reach  Taken 1/18/2025 0800 by Diana Clark RN  Safety Promotion/Fall Prevention:   treat underlying cause   treat reversible contributory factors   supervised activity   safety round/check completed   room organization consistent   room near nurse's station   patient and family education   nonskid shoes/slippers when out of bed   mobility aid in reach   lighting adjusted   increase visualization of patient   increased rounding and observation   clutter free environment maintained   activity supervised   assistive device/personal items within reach     Problem: Pain Acute  Goal: Optimal Pain Control and Function  Outcome: Progressing  Intervention: Optimize Psychosocial Wellbeing  Recent Flowsheet Documentation  Taken 1/18/2025 1600 by Diana Clark RN  Supportive Measures:   active listening utilized   positive reinforcement provided   relaxation techniques promoted  Taken 1/18/2025 1200 by Diana Clark, RN  Supportive Measures:   active listening utilized   positive reinforcement provided   relaxation techniques promoted  Taken 1/18/2025 0800 by Diana Clark RN  Supportive Measures:   active listening utilized   positive reinforcement provided   relaxation techniques promoted  Intervention: Develop Pain Management Plan  Recent Flowsheet Documentation  Taken 1/18/2025 1200 by Diana Clark, RN  Pain Management Interventions:   pillow support provided   rest   repositioned   pain pump in use   pain management plan reviewed with patient/caregiver   quiet environment facilitated   emotional support   care clustered  Taken 1/18/2025 0800 by Diana Clark, RN  Pain Management Interventions:   pillow support provided   rest   repositioned   pain management plan reviewed  with patient/caregiver   quiet environment facilitated   emotional support   care clustered  Intervention: Prevent or Manage Pain  Recent Flowsheet Documentation  Taken 1/18/2025 1600 by Diana Clark RN  Sensory Stimulation Regulation:   care clustered   quiet environment promoted  Medication Review/Management:   high-risk medications identified   medications reviewed  Taken 1/18/2025 1200 by Diana Clark RN  Sensory Stimulation Regulation:   care clustered   quiet environment promoted  Medication Review/Management:   high-risk medications identified   medications reviewed  Taken 1/18/2025 0800 by Diana Clark RN  Sensory Stimulation Regulation:   care clustered   quiet environment promoted  Medication Review/Management:   high-risk medications identified   medications reviewed     Problem: Gas Exchange Impaired  Goal: Optimal Gas Exchange  Outcome: Progressing  Intervention: Optimize Oxygenation and Ventilation  Recent Flowsheet Documentation  Taken 1/18/2025 1800 by Diana Clark RN  Head of Bed (HOB) Positioning: HOB at 30 degrees  Taken 1/18/2025 1600 by Diana Clark RN  Airway/Ventilation Management:   airway patency maintained   pulmonary hygiene promoted  Head of Bed (HOB) Positioning: HOB at 30 degrees  Taken 1/18/2025 1400 by Diana Clark RN  Head of Bed (HOB) Positioning: HOB at 30 degrees  Taken 1/18/2025 1200 by Diana Clark RN  Airway/Ventilation Management:   airway patency maintained   pulmonary hygiene promoted  Head of Bed (HOB) Positioning: HOB at 30 degrees  Taken 1/18/2025 1000 by Diana Clark RN  Head of Bed (HOB) Positioning: HOB at 30 degrees  Taken 1/18/2025 0800 by Diana Clark RN  Airway/Ventilation Management:   airway patency maintained   pulmonary hygiene promoted  Head of Bed (HOB) Positioning: HOB at 30 degrees     Problem: Restraint, Nonviolent  Goal: Absence of Harm or Injury  Outcome: Progressing  Intervention: Implement Least Restrictive  Safety Strategies  Recent Flowsheet Documentation  Taken 1/18/2025 1600 by Diana Clark RN  Medical Device Protection:   IV pole/bag removed from visual field   tubing secured  Taken 1/18/2025 1200 by Diana Clark RN  Medical Device Protection:   IV pole/bag removed from visual field   tubing secured  Taken 1/18/2025 0800 by Diana Clark RN  Medical Device Protection:   IV pole/bag removed from visual field   tubing secured  Intervention: Protect Dignity, Rights and Personal Wellbeing  Recent Flowsheet Documentation  Taken 1/18/2025 1600 by Diana Clark RN  Trust Relationship/Rapport:   care explained   choices provided   reassurance provided   questions encouraged   questions answered  Taken 1/18/2025 1200 by Diana Clark RN  Trust Relationship/Rapport:   care explained   choices provided   reassurance provided   questions encouraged   questions answered  Taken 1/18/2025 0800 by Diana Clark RN  Trust Relationship/Rapport:   care explained   choices provided   reassurance provided   questions encouraged   questions answered  Intervention: Protect Skin and Joint Integrity  Recent Flowsheet Documentation  Taken 1/18/2025 1800 by Diana Clark RN  Body Position:   turned   log-rolled   legs elevated   heels elevated   weight shifting   upper extremity elevated   side-lying 30 degrees   lower extremity elevated  Taken 1/18/2025 1600 by Diaan Clark RN  Body Position:   turned   log-rolled   legs elevated   heels elevated   weight shifting   upper extremity elevated   side-lying 30 degrees   lower extremity elevated  Skin Protection:   adhesive use limited   skin to device areas padded   silicone foam dressing in place   pulse oximeter probe site changed   tubing/devices free from skin contact   skin sealant/moisture barrier applied  Range of Motion: active ROM (range of motion) encouraged  Taken 1/18/2025 1400 by Diana Clark RN  Body Position:   turned   log-rolled   legs  elevated   heels elevated   weight shifting   upper extremity elevated   side-lying 30 degrees   lower extremity elevated  Taken 1/18/2025 1200 by Diana Clark RN  Body Position:   turned   log-rolled   legs elevated   heels elevated   weight shifting   upper extremity elevated   side-lying 30 degrees   lower extremity elevated  Skin Protection:   adhesive use limited   skin to device areas padded   silicone foam dressing in place   pulse oximeter probe site changed   tubing/devices free from skin contact   skin sealant/moisture barrier applied  Range of Motion: active ROM (range of motion) encouraged  Taken 1/18/2025 1000 by Diana Clark RN  Body Position:   turned   log-rolled   legs elevated   heels elevated   weight shifting   upper extremity elevated   side-lying 30 degrees   lower extremity elevated  Taken 1/18/2025 0800 by Diana Clark RN  Body Position:   turned   log-rolled   legs elevated   heels elevated   weight shifting   upper extremity elevated   side-lying 30 degrees   lower extremity elevated  Skin Protection:   adhesive use limited   skin to device areas padded   silicone foam dressing in place   pulse oximeter probe site changed   tubing/devices free from skin contact   skin sealant/moisture barrier applied  Range of Motion: active ROM (range of motion) encouraged     Problem: Risk for Delirium  Goal: Optimal Coping  Outcome: Progressing  Intervention: Optimize Psychosocial Adjustment to Delirium  Recent Flowsheet Documentation  Taken 1/18/2025 1600 by Diana Clark RN  Supportive Measures:   active listening utilized   positive reinforcement provided   relaxation techniques promoted  Taken 1/18/2025 1200 by Diana Clark RN  Supportive Measures:   active listening utilized   positive reinforcement provided   relaxation techniques promoted  Taken 1/18/2025 0800 by Diana Clark RN  Supportive Measures:   active listening utilized   positive reinforcement provided    relaxation techniques promoted  Goal: Improved Behavioral Control  Outcome: Progressing  Intervention: Prevent and Manage Agitation  Recent Flowsheet Documentation  Taken 1/18/2025 1600 by Diana Clark RN  Environment Familiarity/Consistency: daily routine followed  Taken 1/18/2025 1200 by Diana Clark RN  Environment Familiarity/Consistency: daily routine followed  Taken 1/18/2025 0800 by Diana Clark RN  Environment Familiarity/Consistency: daily routine followed  Intervention: Minimize Safety Risk  Recent Flowsheet Documentation  Taken 1/18/2025 1600 by Diana Clark RN  Enhanced Safety Measures:   room near unit station   review medications for side effects with activity   patient/family teach back on injury risk   pain management   monitor patients coagulation values   family to remain at bedside   assistive devices when indicated  Trust Relationship/Rapport:   care explained   choices provided   reassurance provided   questions encouraged   questions answered  Taken 1/18/2025 1200 by Diana Clark RN  Enhanced Safety Measures:   room near unit station   review medications for side effects with activity   patient/family teach back on injury risk   pain management   monitor patients coagulation values   family to remain at bedside   assistive devices when indicated  Trust Relationship/Rapport:   care explained   choices provided   reassurance provided   questions encouraged   questions answered  Taken 1/18/2025 0800 by Diana Clark RN  Enhanced Safety Measures:   room near unit station   review medications for side effects with activity   patient/family teach back on injury risk   pain management   monitor patients coagulation values   family to remain at bedside   assistive devices when indicated  Trust Relationship/Rapport:   care explained   choices provided   reassurance provided   questions encouraged   questions answered  Goal: Improved Attention and Thought Clarity  Outcome:  Progressing  Intervention: Maximize Cognitive Function  Recent Flowsheet Documentation  Taken 1/18/2025 1600 by Diana Clark RN  Sensory Stimulation Regulation:   care clustered   quiet environment promoted  Reorientation Measures:   reorientation provided   clock in view   calendar in view  Taken 1/18/2025 1200 by Diana Clark RN  Sensory Stimulation Regulation:   care clustered   quiet environment promoted  Reorientation Measures:   reorientation provided   clock in view   calendar in view  Taken 1/18/2025 0800 by Diana Clark RN  Sensory Stimulation Regulation:   care clustered   quiet environment promoted  Reorientation Measures:   reorientation provided   clock in view   calendar in view  Goal: Improved Sleep  Outcome: Progressing     Problem: Mechanical Ventilation Invasive  Goal: Effective Communication  Outcome: Progressing  Intervention: Ensure Effective Communication  Recent Flowsheet Documentation  Taken 1/18/2025 1600 by Diana Clark RN  Trust Relationship/Rapport:   care explained   choices provided   reassurance provided   questions encouraged   questions answered  Taken 1/18/2025 1200 by Diana Clark RN  Trust Relationship/Rapport:   care explained   choices provided   reassurance provided   questions encouraged   questions answered  Taken 1/18/2025 0800 by Diana Clark RN  Trust Relationship/Rapport:   care explained   choices provided   reassurance provided   questions encouraged   questions answered  Goal: Mechanical Ventilation Liberation  Outcome: Progressing  Intervention: Promote Extubation and Mechanical Ventilation Liberation  Recent Flowsheet Documentation  Taken 1/18/2025 1600 by Diana Clark RN  Medication Review/Management:   high-risk medications identified   medications reviewed  Environmental Support: calm environment promoted  Taken 1/18/2025 1200 by Diana Clark RN  Medication Review/Management:   high-risk medications identified   medications  reviewed  Environmental Support: calm environment promoted  Taken 1/18/2025 0800 by Diana Clark RN  Medication Review/Management:   high-risk medications identified   medications reviewed  Environmental Support: calm environment promoted  Goal: Optimal Nutrition Delivery  Outcome: Progressing  Intervention: Optimize Nutrition Delivery  Recent Flowsheet Documentation  Taken 1/18/2025 1600 by Diana Clark RN  Nutrition Support Management: weight trending reviewed  Taken 1/18/2025 1200 by Diana Clark RN  Nutrition Support Management: weight trending reviewed  Taken 1/18/2025 0800 by Diana Clark RN  Nutrition Support Management: weight trending reviewed  Goal: Absence of Device-Related Skin and Tissue Injury  Outcome: Progressing  Intervention: Maintain Skin and Tissue Health  Recent Flowsheet Documentation  Taken 1/18/2025 1600 by Diana Clark RN  Device Skin Pressure Protection:   absorbent pad utilized/changed   skin-to-device areas padded   tubing/devices free from skin contact   adhesive use limited  Taken 1/18/2025 1200 by Diana Clark RN  Device Skin Pressure Protection:   absorbent pad utilized/changed   skin-to-device areas padded   tubing/devices free from skin contact   adhesive use limited  Taken 1/18/2025 0800 by Diana Clark RN  Device Skin Pressure Protection:   absorbent pad utilized/changed   skin-to-device areas padded   tubing/devices free from skin contact   adhesive use limited  Goal: Absence of Ventilator-Induced Lung Injury  Outcome: Progressing  Intervention: Prevent Ventilator-Associated Pneumonia  Recent Flowsheet Documentation  Taken 1/18/2025 1800 by Diana Clark RN  Oral Care:   oral rinse provided   suction provided   swabbed with antiseptic solution  Head of Bed (HOB) Positioning: HOB at 30 degrees  Taken 1/18/2025 1600 by Diana Clark RN  VAP Prevention Bundle:   VTE prophylaxis provided   vent circuit breaks minimized   stress ulcer  prophylaxis provided   HOB elevation maintained   oral care regularly provided   readiness to extubate assessed   spontaneous breathing trial performed  Oral Care:   oral rinse provided   suction provided   swabbed with antiseptic solution  Head of Bed (HOB) Positioning: HOB at 30 degrees  VAP Prevention Contraindications: per provider order  VAP Prevention Measures: completed  Taken 1/18/2025 1400 by Diana Clark RN  Oral Care:   oral rinse provided   suction provided   swabbed with antiseptic solution  Head of Bed (HOB) Positioning: HOB at 30 degrees  Taken 1/18/2025 1200 by Diana Clark RN  VAP Prevention Bundle:   VTE prophylaxis provided   vent circuit breaks minimized   stress ulcer prophylaxis provided   HOB elevation maintained   oral care regularly provided   readiness to extubate assessed   spontaneous breathing trial performed  Oral Care:   oral rinse provided   suction provided   swabbed with antiseptic solution  Head of Bed (HOB) Positioning: [facility] at 30 degrees  VAP Prevention Contraindications: per provider order  VAP Prevention Measures: completed  Taken 1/18/2025 1000 by Diana Clark RN  Oral Care:   oral rinse provided   suction provided   swabbed with antiseptic solution  Head of Bed (HOB) Positioning: HOB at 30 degrees  Taken 1/18/2025 0800 by Diana Clark RN  VAP Prevention Bundle:   VTE prophylaxis provided   vent circuit breaks minimized   stress ulcer prophylaxis provided   HOB elevation maintained   oral care regularly provided   readiness to extubate assessed   spontaneous breathing trial performed  Oral Care:   oral rinse provided   suction provided   swabbed with antiseptic solution  Head of Bed (HOB) Positioning: [facility] at 30 degrees  VAP Prevention Contraindications: per provider order  VAP Prevention Measures: completed      WT: 89.15 (40.8 kg ) 12/16

## 2025-03-11 NOTE — ED ADULT NURSE NOTE - FINAL NURSING ELECTRONIC SIGNATURE
Patient Education   Preventive Care Advice   This is general advice given by our system to help you stay healthy. However, your care team may have specific advice just for you. Please talk to your care team about your preventive care needs.  Nutrition  Eat 5 or more servings of fruits and vegetables each day.  Try wheat bread, brown rice and whole grain pasta (instead of white bread, rice, and pasta).  Get enough calcium and vitamin D. Check the label on foods and aim for 100% of the RDA (recommended daily allowance).  Lifestyle  Exercise at least 150 minutes each week  (30 minutes a day, 5 days a week).  Do muscle strengthening activities 2 days a week. These help control your weight and prevent disease.  No smoking.  Wear sunscreen to prevent skin cancer.  Have a dental exam and cleaning every 6 months.  Yearly exams  See your health care team every year to talk about:  Any changes in your health.  Any medicines your care team has prescribed.  Preventive care, family planning, and ways to prevent chronic diseases.  Shots (vaccines)   HPV shots (up to age 26), if you've never had them before.  Hepatitis B shots (up to age 59), if you've never had them before.  COVID-19 shot: Get this shot when it's due.  Flu shot: Get a flu shot every year.  Tetanus shot: Get a tetanus shot every 10 years.  Pneumococcal, hepatitis A, and RSV shots: Ask your care team if you need these based on your risk.  Shingles shot (for age 50 and up)  General health tests  Diabetes screening:  Starting at age 35, Get screened for diabetes at least every 3 years.  If you are younger than age 35, ask your care team if you should be screened for diabetes.  Cholesterol test: At age 39, start having a cholesterol test every 5 years, or more often if advised.  Bone density scan (DEXA): At age 50, ask your care team if you should have this scan for osteoporosis (brittle bones).  Hepatitis C: Get tested at least once in your life.  STIs (sexually  transmitted infections)  Before age 24: Ask your care team if you should be screened for STIs.  After age 24: Get screened for STIs if you're at risk. You are at risk for STIs (including HIV) if:  You are sexually active with more than one person.  You don't use condoms every time.  You or a partner was diagnosed with a sexually transmitted infection.  If you are at risk for HIV, ask about PrEP medicine to prevent HIV.  Get tested for HIV at least once in your life, whether you are at risk for HIV or not.  Cancer screening tests  Cervical cancer screening: If you have a cervix, begin getting regular cervical cancer screening tests starting at age 21.  Breast cancer scan (mammogram): If you've ever had breasts, begin having regular mammograms starting at age 40. This is a scan to check for breast cancer.  Colon cancer screening: It is important to start screening for colon cancer at age 45.  Have a colonoscopy test every 10 years (or more often if you're at risk) Or, ask your provider about stool tests like a FIT test every year or Cologuard test every 3 years.  To learn more about your testing options, visit:   .  For help making a decision, visit:   https://bit.ly/du61832.  Prostate cancer screening test: If you have a prostate, ask your care team if a prostate cancer screening test (PSA) at age 55 is right for you.  Lung cancer screening: If you are a current or former smoker ages 50 to 80, ask your care team if ongoing lung cancer screenings are right for you.  For informational purposes only. Not to replace the advice of your health care provider. Copyright   2023 [employer]. All rights reserved. Clinically reviewed by the [facility] Transitions Program. [employer] [id number] - REV 01/24.      20-Nov-2024 03:37

## 2025-05-08 NOTE — GOALS OF CARE CONVERSATION - ADVANCED CARE PLANNING - CONVERSATION DETAILS
I discussed pt's care with her son Nasir over the phone. Nasir said pt does not have a HCP and he makes decisions together with his brother Matheus and the pt's father. I explained that pt is in persistent resp failure requiring NIV and persistent shock on vasopressors. I explained the TLC has to be changed for risk of infection and explained the risks and benefits of that procedure. I also explained the pt's overall condition.  I explained that she continues to be in resp failure from her pna and profound weakness requiring NIV. I explained NIV is only a bridge and temporary. However, bc pt is so contracted, intubation will be challenging for her and she would be at high risk of death during intubation and requiring a surgical airway. I also explained that even if pt survives intubation, she will have a very difficult time coming off the vent likely requiring a trach with her significant debilitation. I explained what a trach entails long term.   He said he understands and doesn't want his mom to suffer but is having a hard time coming to a decision. He would like to speak to his brother and father before any further decisions are made regarding her care. I asked what he personally felt his mother would want with these options and he said he couldn't tell me. I offered to speak with his brother and father about these options but he declined and wants to relay the clinical information himself. He said he would call us back this evening after he speaks to his family. All questions answered as best I could.   Spoke with Merline who will arrange for a family meeting to further address [facility].
I discussed pt's care with Matheus at bedside and Joe over the phone. They all spoke together and spoke with their father and agree that pt is DNR/I. They wish to continue all supportive measures but if her breathing gets any worse they would like to focus on her comfort. Nasir said he still wants to continue the vasopressors and IV abx and inc the pressors if her BP begins to deteriorate and continue all appropriate medical interventions up until intubation and CPR. They agree to a central line change, risks and benefits discussed, and blood product transfusions as needed. All questions answered as best I could. MOLST placed in chart and attending updated.
Called back by Joe, we discussed mom's overall prognosis, what intubation/ventillation period would look like and that mom would likely not come over the ventilator 2/2 the neurologic condition, deconditioned/caloric malnutrition, kyphosis and with the recent polymicrobial resistant infections.  He endorses understanding, but wants mom to be placed on ventilator if need be.  He endorses he will call his brother Yazan and his father and they will further discuss what he talked about and he endorses he will be here in the morning.  I gave the unit number and confirmed his best contact number.
Adequate: hears normal conversation without difficulty

## 2025-07-30 NOTE — PROGRESS NOTE ADULT - PROBLEM SELECTOR PLAN 6
[facility] ENCOUNTER   PODIATRY NEW PATIENT / CONSULT NOTE    REQUESTING PROVIDER: ZARI Apodaca    CHIEF COMPLAINT:   Chief Complaint   Patient presents with   • Office Visit     Referral Breana Mccarthy APNP, bilateral great toe pain, possible ingrown, troubles wearing closed shoes, pain 4/10       SUBJECTIVE:  HISTORY OF PRESENT ILLNESS: Kamryn Luna is a 70 year old female seen today for bilateral great toenail pain.  Patient states been present for a while and only occurs enclosed shoes.  No treatments have been rendered    REVIEW OF SYSTEMS:  Constitutional: Negative for fever and chills.  Skin: Negative for rash.  Neurologic: Feet were negative for changes in sensory or motor function.  Endocrine: Negative for heat or cold intolerance.  Hematologic: Patient on anticoagulants. []  YES    [x]  NO  Extremities:  Edema: none.  Tenderness: bilaterally  All other systems are reviewed and negative except as noted in history of present illness.    HISTORIES:  I have reviewed the past medical history, family history, social history, medications and allergies listed in the medical record as well as the nursing notes for this encounter.    OBJECTIVE  PHYSICAL EXAM:  Visit Vitals  BP (!) 170/100 (BP Location: LUE - Left upper extremity, Patient Position: Sitting, Cuff Size: Large Adult)   Ht 5' 7\" (1.702 m)   Wt 86.2 kg (190 lb)   BMI 29.76 kg/m²     General:  No apparent distress. Alert and oriented.  Neurological:  Protective sensation: Intact to light touch bilateral.      Vascular:  Pulses: Right  Dorsalis Pedis 2/4 and Po 5 sterior Tibial 2/4.  Left  Dorsalis Pedis 2/4 and Posterior Tibial 2/4  Positive digital hair growth  Dermatologic:  Skin: Clean, dry and intact bilateral. Skin texture to the lateral lower extremities is within normal limits. No ecchymosis or erythema to either foot or ankle.  Nails left hallux, right hallux are thick  and discolored     Musculoskeletal:  Gait analysis: [x]  Normal     [] Abnormal        RESULT REVIEW: Recent liver enzymes were normal    ASSESSMENT:  1. Onychodystrophy    2. Pain in both feet        PLAN:  No orders of the defined types were placed in this encounter.      Patient was examined and evaluated.  The etiology of the above condition was discussed in detail with patient. Current condition was discussed at length and the natural progressive course if left untreated. Viable alternative modes of treatment were discussed.  We can send a sample of her nail for confirmation of fungal elements and if positive consider oral terbinafine.  This was done and sent to an outside lab.  It if negative we discussed permanent nail removal    No follow-ups on file.    Instructions provided as documented in the after visit summary.    The patient indicated understanding of the diagnosis and agreed with the plan of care.     See above